# Patient Record
Sex: FEMALE | Race: WHITE | URBAN - NONMETROPOLITAN AREA
[De-identification: names, ages, dates, MRNs, and addresses within clinical notes are randomized per-mention and may not be internally consistent; named-entity substitution may affect disease eponyms.]

---

## 2017-03-02 ENCOUNTER — DOCTOR'S OFFICE (OUTPATIENT)
Dept: URBAN - NONMETROPOLITAN AREA CLINIC 1 | Facility: CLINIC | Age: 62
Setting detail: OPHTHALMOLOGY
End: 2017-03-02
Payer: COMMERCIAL

## 2017-03-02 DIAGNOSIS — I11.9: ICD-10-CM

## 2017-03-02 DIAGNOSIS — H25.13: ICD-10-CM

## 2017-03-02 PROCEDURE — 92014 COMPRE OPH EXAM EST PT 1/>: CPT | Performed by: OPTOMETRIST

## 2017-03-02 ASSESSMENT — REFRACTION_MANIFEST
OS_VA2: 20/
OS_VA3: 20/
OS_VA2: 20/
OS_VA1: 20/
OD_VA2: 20/
OD_VA3: 20/
OU_VA: 20/
OD_VA3: 20/
OD_VA1: 20/
OS_SPHERE: -2.75
OD_VA3: 20/
OD_VA1: 20/
OU_VA: 20/
OS_CYLINDER: -0.50
OS_VA3: 20/
OD_CYLINDER: -0.50
OD_AXIS: 180
OS_AXIS: 180
OS_VA1: 20/
OD_VA2: 20/
OD_VA2: 20/
OU_VA: 20/
OS_VA3: 20/
OD_SPHERE: -2.50
OS_VA1: 20/
OD_VA1: 20/
OS_VA2: 20/

## 2017-03-02 ASSESSMENT — SPHEQUIV_DERIVED
OS_SPHEQUIV: -3
OD_SPHEQUIV: -2.75
OS_SPHEQUIV: -3
OD_SPHEQUIV: -2.75

## 2017-03-02 ASSESSMENT — REFRACTION_CURRENTRX
OS_OVR_VA: 20/
OS_SPHERE: -3.00
OS_OVR_VA: 20/
OS_OVR_VA: 20/
OS_AXIS: 173
OD_AXIS: 172
OD_OVR_VA: 20/
OD_SPHERE: -3.00
OD_CYLINDER: -0.50
OS_CYLINDER: -0.50

## 2017-03-02 ASSESSMENT — VISUAL ACUITY
OD_BCVA: 20/F+F
OS_BCVA: 20/F+F

## 2017-03-02 ASSESSMENT — CONFRONTATIONAL VISUAL FIELD TEST (CVF)
OS_FINDINGS: CONSTRICTION
OD_FINDINGS: CONSTRICTION

## 2018-03-06 ENCOUNTER — DOCTOR'S OFFICE (OUTPATIENT)
Dept: URBAN - NONMETROPOLITAN AREA CLINIC 1 | Facility: CLINIC | Age: 63
Setting detail: OPHTHALMOLOGY
End: 2018-03-06
Payer: COMMERCIAL

## 2018-03-06 DIAGNOSIS — I11.9: ICD-10-CM

## 2018-03-06 DIAGNOSIS — H25.13: ICD-10-CM

## 2018-03-06 PROCEDURE — 92012 INTRM OPH EXAM EST PATIENT: CPT | Performed by: OPTOMETRIST

## 2018-03-15 ASSESSMENT — REFRACTION_OUTSIDERX
OS_CYLINDER: -0.50
OD_SPHERE: -2.50
OS_AXIS: 180
OS_VA2: 20/
OS_VA3: 20/
OD_AXIS: 180
OS_SPHERE: -2.75
OD_VA2: 20/
OD_VA3: 20/
OD_CYLINDER: -0.50
OD_VA1: 20/
OU_VA: 20/
OS_VA1: 20/

## 2018-03-15 ASSESSMENT — REFRACTION_CURRENTRX
OD_OVR_VA: 20/
OS_OVR_VA: 20/
OD_SPHERE: -3.00
OD_AXIS: 172
OS_OVR_VA: 20/
OS_SPHERE: -3.00
OS_OVR_VA: 20/
OD_OVR_VA: 20/
OD_OVR_VA: 20/
OS_CYLINDER: -0.50
OD_CYLINDER: -0.50
OS_AXIS: 173

## 2018-03-15 ASSESSMENT — REFRACTION_MANIFEST
OU_VA: 20/
OS_VA1: 20/
OD_VA1: 20/
OS_VA2: 20/
OS_VA1: 20/
OS_VA3: 20/
OD_VA3: 20/
OD_VA3: 20/
OS_VA2: 20/
OD_VA2: 20/
OS_VA3: 20/
OD_VA2: 20/
OU_VA: 20/
OD_VA1: 20/

## 2018-03-15 ASSESSMENT — SPHEQUIV_DERIVED
OS_SPHEQUIV: -3
OD_SPHEQUIV: -2.75

## 2018-03-15 ASSESSMENT — VISUAL ACUITY
OD_BCVA: 20/F+F
OS_BCVA: 20/F+F

## 2019-03-12 ENCOUNTER — DOCTOR'S OFFICE (OUTPATIENT)
Dept: URBAN - NONMETROPOLITAN AREA CLINIC 1 | Facility: CLINIC | Age: 64
Setting detail: OPHTHALMOLOGY
End: 2019-03-12
Payer: COMMERCIAL

## 2019-03-12 DIAGNOSIS — H25.13: ICD-10-CM

## 2019-03-12 DIAGNOSIS — I11.9: ICD-10-CM

## 2019-03-12 PROCEDURE — 92014 COMPRE OPH EXAM EST PT 1/>: CPT | Performed by: OPTOMETRIST

## 2019-03-12 ASSESSMENT — REFRACTION_MANIFEST
OU_VA: 20/
OD_SPHERE: -2.50
OS_VA2: 20/
OD_VA2: 20/
OD_VA3: 20/
OS_VA3: 20/
OD_VA3: 20/
OS_CYLINDER: -0.50
OD_CYLINDER: -0.50
OS_VA1: 20/
OD_VA1: 20/
OD_AXIS: 180
OD_VA1: 20/
OU_VA: 20/
OD_VA2: 20/
OS_VA2: 20/
OS_VA1: 20/
OS_AXIS: 180
OS_SPHERE: -2.75
OS_VA3: 20/

## 2019-03-12 ASSESSMENT — REFRACTION_CURRENTRX
OD_AXIS: 172
OD_SPHERE: -3.00
OS_CYLINDER: -0.50
OD_OVR_VA: 20/
OS_SPHERE: -3.00
OS_OVR_VA: 20/
OS_AXIS: 173
OS_OVR_VA: 20/
OD_OVR_VA: 20/
OD_OVR_VA: 20/
OD_CYLINDER: -0.50
OS_OVR_VA: 20/

## 2019-03-12 ASSESSMENT — CONFRONTATIONAL VISUAL FIELD TEST (CVF)
OS_FINDINGS: CONSTRICTION
OD_FINDINGS: CONSTRICTION

## 2019-03-12 ASSESSMENT — SPHEQUIV_DERIVED
OS_SPHEQUIV: -3
OD_SPHEQUIV: -2.75
OD_SPHEQUIV: -2.75
OS_SPHEQUIV: -3

## 2019-03-12 ASSESSMENT — VISUAL ACUITY
OD_BCVA: 20/F+F
OS_BCVA: 20/F+F

## 2020-06-05 ENCOUNTER — DOCTOR'S OFFICE (OUTPATIENT)
Dept: URBAN - NONMETROPOLITAN AREA CLINIC 1 | Facility: CLINIC | Age: 65
Setting detail: OPHTHALMOLOGY
End: 2020-06-05
Payer: COMMERCIAL

## 2020-06-05 DIAGNOSIS — I11.9: ICD-10-CM

## 2020-06-05 DIAGNOSIS — H25.13: ICD-10-CM

## 2020-06-05 PROCEDURE — 92014 COMPRE OPH EXAM EST PT 1/>: CPT | Performed by: OPHTHALMOLOGY

## 2020-06-05 ASSESSMENT — REFRACTION_CURRENTRX
OS_SPHERE: -3.00
OS_CYLINDER: -0.50
OS_AXIS: 173
OD_AXIS: 172
OD_OVR_VA: 20/
OD_SPHERE: -3.00
OD_CYLINDER: -0.50
OS_OVR_VA: 20/

## 2020-06-05 ASSESSMENT — VISUAL ACUITY
OS_BCVA: 20/F+F
OD_BCVA: 20/F+F

## 2020-06-05 ASSESSMENT — REFRACTION_MANIFEST
OS_SPHERE: -2.75
OD_SPHERE: -2.50
OD_CYLINDER: -0.50
OS_AXIS: 180
OD_AXIS: 180
OS_CYLINDER: -0.50

## 2020-06-05 ASSESSMENT — SPHEQUIV_DERIVED
OD_SPHEQUIV: -2.75
OD_SPHEQUIV: -2.75
OS_SPHEQUIV: -3
OS_SPHEQUIV: -3

## 2020-06-05 ASSESSMENT — CONFRONTATIONAL VISUAL FIELD TEST (CVF)
OD_FINDINGS: CONSTRICTION
OS_FINDINGS: CONSTRICTION

## 2021-01-03 ENCOUNTER — APPOINTMENT (EMERGENCY)
Dept: RADIOLOGY | Facility: HOSPITAL | Age: 66
End: 2021-01-03
Payer: MEDICARE

## 2021-01-03 ENCOUNTER — HOSPITAL ENCOUNTER (EMERGENCY)
Facility: HOSPITAL | Age: 66
Discharge: HOME | End: 2021-01-03
Attending: EMERGENCY MEDICINE | Admitting: EMERGENCY MEDICINE
Payer: MEDICARE

## 2021-01-03 VITALS
SYSTOLIC BLOOD PRESSURE: 131 MMHG | WEIGHT: 212.52 LBS | DIASTOLIC BLOOD PRESSURE: 65 MMHG | TEMPERATURE: 98 F | RESPIRATION RATE: 22 BRPM | OXYGEN SATURATION: 93 % | HEART RATE: 77 BPM

## 2021-01-03 DIAGNOSIS — R06.02 SHORTNESS OF BREATH: Primary | ICD-10-CM

## 2021-01-03 LAB
ALBUMIN SERPL BCP-MCNC: 3.4 G/DL (ref 3.5–5)
ALP SERPL-CCNC: 78 U/L (ref 46–116)
ALT SERPL W P-5'-P-CCNC: 20 U/L (ref 12–78)
ANION GAP SERPL CALCULATED.3IONS-SCNC: 6 MMOL/L (ref 4–13)
APTT PPP: 33 SECONDS (ref 23–37)
AST SERPL W P-5'-P-CCNC: 11 U/L (ref 5–45)
BASOPHILS # BLD AUTO: 0 THOUSANDS/ΜL (ref 0–0.1)
BASOPHILS NFR BLD AUTO: 0 % (ref 0–1)
BILIRUB SERPL-MCNC: 0.44 MG/DL (ref 0.2–1)
BUN SERPL-MCNC: 12 MG/DL (ref 5–25)
CALCIUM ALBUM COR SERPL-MCNC: 10.5 MG/DL (ref 8.3–10.1)
CALCIUM SERPL-MCNC: 10 MG/DL (ref 8.3–10.1)
CHLORIDE SERPL-SCNC: 96 MMOL/L (ref 100–108)
CO2 SERPL-SCNC: 31 MMOL/L (ref 21–32)
CREAT SERPL-MCNC: 0.7 MG/DL (ref 0.6–1.3)
D DIMER PPP FEU-MCNC: 0.36 UG/ML FEU
EOSINOPHIL # BLD AUTO: 0.04 THOUSAND/ΜL (ref 0–0.61)
EOSINOPHIL NFR BLD AUTO: 1 % (ref 0–6)
ERYTHROCYTE [DISTWIDTH] IN BLOOD BY AUTOMATED COUNT: 15 % (ref 11.6–15.1)
FLUAV RNA RESP QL NAA+PROBE: NEGATIVE
FLUBV RNA RESP QL NAA+PROBE: NEGATIVE
GFR SERPL CREATININE-BSD FRML MDRD: 90 ML/MIN/1.73SQ M
GLUCOSE SERPL-MCNC: 116 MG/DL (ref 65–140)
HCT VFR BLD AUTO: 36 % (ref 34.8–46.1)
HGB BLD-MCNC: 11.6 G/DL (ref 11.5–15.4)
IMM GRANULOCYTES # BLD AUTO: 0.03 THOUSAND/UL (ref 0–0.2)
IMM GRANULOCYTES NFR BLD AUTO: 0 % (ref 0–2)
INR PPP: 1.01 (ref 0.84–1.19)
LACTATE SERPL-SCNC: 1.6 MMOL/L (ref 0.5–2)
LIPASE SERPL-CCNC: 68 U/L (ref 73–393)
LYMPHOCYTES # BLD AUTO: 0.86 THOUSANDS/ΜL (ref 0.6–4.47)
LYMPHOCYTES NFR BLD AUTO: 11 % (ref 14–44)
MCH RBC QN AUTO: 29.7 PG (ref 26.8–34.3)
MCHC RBC AUTO-ENTMCNC: 32.2 G/DL (ref 31.4–37.4)
MCV RBC AUTO: 92 FL (ref 82–98)
MONOCYTES # BLD AUTO: 0.7 THOUSAND/ΜL (ref 0.17–1.22)
MONOCYTES NFR BLD AUTO: 9 % (ref 4–12)
NEUTROPHILS # BLD AUTO: 6.24 THOUSANDS/ΜL (ref 1.85–7.62)
NEUTS SEG NFR BLD AUTO: 79 % (ref 43–75)
NRBC BLD AUTO-RTO: 0 /100 WBCS
NT-PROBNP SERPL-MCNC: 136 PG/ML
PLATELET # BLD AUTO: 136 THOUSANDS/UL (ref 149–390)
PMV BLD AUTO: 9.7 FL (ref 8.9–12.7)
POTASSIUM SERPL-SCNC: 4.3 MMOL/L (ref 3.5–5.3)
PROT SERPL-MCNC: 7.7 G/DL (ref 6.4–8.2)
PROTHROMBIN TIME: 13.1 SECONDS (ref 11.6–14.5)
RBC # BLD AUTO: 3.91 MILLION/UL (ref 3.81–5.12)
RSV RNA RESP QL NAA+PROBE: NEGATIVE
SARS-COV-2 RNA RESP QL NAA+PROBE: NEGATIVE
SODIUM SERPL-SCNC: 133 MMOL/L (ref 136–145)
TROPONIN I SERPL-MCNC: <0.02 NG/ML
WBC # BLD AUTO: 7.87 THOUSAND/UL (ref 4.31–10.16)

## 2021-01-03 PROCEDURE — 84484 ASSAY OF TROPONIN QUANT: CPT | Performed by: EMERGENCY MEDICINE

## 2021-01-03 PROCEDURE — 85025 COMPLETE CBC W/AUTO DIFF WBC: CPT | Performed by: EMERGENCY MEDICINE

## 2021-01-03 PROCEDURE — 83690 ASSAY OF LIPASE: CPT | Performed by: EMERGENCY MEDICINE

## 2021-01-03 PROCEDURE — 99285 EMERGENCY DEPT VISIT HI MDM: CPT | Performed by: EMERGENCY MEDICINE

## 2021-01-03 PROCEDURE — 85610 PROTHROMBIN TIME: CPT | Performed by: EMERGENCY MEDICINE

## 2021-01-03 PROCEDURE — 83880 ASSAY OF NATRIURETIC PEPTIDE: CPT | Performed by: EMERGENCY MEDICINE

## 2021-01-03 PROCEDURE — 36415 COLL VENOUS BLD VENIPUNCTURE: CPT | Performed by: EMERGENCY MEDICINE

## 2021-01-03 PROCEDURE — 93005 ELECTROCARDIOGRAM TRACING: CPT

## 2021-01-03 PROCEDURE — 71045 X-RAY EXAM CHEST 1 VIEW: CPT

## 2021-01-03 PROCEDURE — 99285 EMERGENCY DEPT VISIT HI MDM: CPT

## 2021-01-03 PROCEDURE — 85730 THROMBOPLASTIN TIME PARTIAL: CPT | Performed by: EMERGENCY MEDICINE

## 2021-01-03 PROCEDURE — 85379 FIBRIN DEGRADATION QUANT: CPT | Performed by: EMERGENCY MEDICINE

## 2021-01-03 PROCEDURE — 80053 COMPREHEN METABOLIC PANEL: CPT | Performed by: EMERGENCY MEDICINE

## 2021-01-03 PROCEDURE — 83605 ASSAY OF LACTIC ACID: CPT | Performed by: EMERGENCY MEDICINE

## 2021-01-03 PROCEDURE — 0241U HB NFCT DS VIR RESP RNA 4 TRGT: CPT | Performed by: EMERGENCY MEDICINE

## 2021-01-03 NOTE — ED NOTES
[facility] stated they would be on their way to pick pt up, approx 30-40 min       Jael Hernandez RN  01/03/21 0746

## 2021-01-03 NOTE — ED PROVIDER NOTES
History  Chief Complaint   Patient presents with    Shortness of Breath     Pt comes from [address] and staff reports that pt has been SOB with some wheezing, possible covid exposure     Brought in from group home for possible shortness of breath  Did have a COVID exposure  Noted to be 94% on room air at the scene by EMS  Slight wheezes noted  No nausea or vomiting  No fevers or chills  History provided by:  EMS personnel, patient and nursing home  History limited by: Patient does not speak   used: No    Shortness of Breath  Severity:  Mild  Onset quality:  Sudden  Duration: This morning  Timing:  Constant  Progression:  Unchanged  Chronicity:  New  Context: not animal exposure, not strong odors and not URI    Context comment:  Possible exposure to a person with COVID  Relieved by:  Nothing  Worsened by:  Nothing  Ineffective treatments:  None tried  Associated symptoms: no abdominal pain, no chest pain, no cough, no ear pain, no fever, no headaches, no neck pain, no rash, no sore throat, no sputum production, no syncope, no vomiting and no wheezing        None       Past Medical History:   Diagnosis Date    Anxiety     Diabetes mellitus ([facility] [address] )     GERD (gastroesophageal reflux disease)     Hypertension     Mental disability        History reviewed  No pertinent surgical history  History reviewed  No pertinent family history  I have reviewed and agree with the history as documented  E-Cigarette/Vaping     E-Cigarette/Vaping Substances     Social History     Tobacco Use    Smoking status: Never Smoker    Smokeless tobacco: Never Used   Substance Use Topics    Alcohol use: Not Currently    Drug use: Not Currently       Review of Systems   Constitutional: Negative for chills and fever  HENT: Negative for ear pain, hearing loss, sore throat, trouble swallowing and voice change  Eyes: Negative for pain and discharge     Respiratory: Positive for shortness of breath  Negative for cough, sputum production and wheezing  Cardiovascular: Negative for chest pain, palpitations and syncope  Gastrointestinal: Negative for abdominal pain, blood in stool, constipation, diarrhea, nausea and vomiting  Genitourinary: Negative for dysuria, flank pain, frequency and hematuria  Musculoskeletal: Negative for joint swelling, neck pain and neck stiffness  Skin: Negative for rash and wound  Neurological: Negative for dizziness, seizures, syncope, facial asymmetry and headaches  Psychiatric/Behavioral: Negative for hallucinations, self-injury and suicidal ideas  All other systems reviewed and are negative  Physical Exam  Physical Exam  Vitals signs and nursing note reviewed  Constitutional:       General: She is not in acute distress  Appearance: She is well-developed  HENT:      Head: Normocephalic and atraumatic  Right Ear: External ear normal       Left Ear: External ear normal    Eyes:      Conjunctiva/sclera: Conjunctivae normal       Pupils: Pupils are equal, round, and reactive to light  Neck:      Musculoskeletal: Normal range of motion and neck supple  Cardiovascular:      Rate and Rhythm: Normal rate and regular rhythm  Heart sounds: Normal heart sounds  No murmur  Pulmonary:      Effort: Pulmonary effort is normal       Breath sounds: Normal breath sounds  No wheezing or rales  Abdominal:      General: Bowel sounds are normal  There is no distension  Palpations: Abdomen is soft  Tenderness: There is no abdominal tenderness  There is no guarding or rebound  Musculoskeletal: Normal range of motion  General: No deformity  Skin:     General: Skin is warm and dry  Findings: No rash  Neurological:      General: No focal deficit present  Mental Status: She is alert  Cranial Nerves: No cranial nerve deficit     Psychiatric:         Behavior: Behavior normal          Vital Signs  ED Triage Vitals [01/03/21 0916]   Temperature Pulse Respirations Blood Pressure SpO2   98 °F (36 7 °C) 82 18 125/60 94 %      Temp Source Heart Rate Source Patient Position - Orthostatic VS BP Location FiO2 (%)   Temporal Monitor Lying Left arm --      Pain Score       No Pain           Vitals:    01/03/21 0916 01/03/21 0930 01/03/21 1000 01/03/21 1015   BP: 125/60 125/92 117/63 120/72   Pulse: 82 82 79 79   Patient Position - Orthostatic VS: Lying Lying Lying Lying         Visual Acuity  Visual Acuity      Most Recent Value   L Pupil Size (mm)  2   R Pupil Size (mm)  2          ED Medications  Medications - No data to display    Diagnostic Studies  Results Reviewed     Procedure Component Value Units Date/Time    Lipase [[id number]]  (Abnormal) Collected: 01/03/21 0956    Lab Status: Final result Specimen: Blood from Hand, Right Updated: 01/03/21 1039     Lipase 68 u/L     NT-BNP PRO [[id number]]  (Abnormal) Collected: 01/03/21 0956    Lab Status: Final result Specimen: Blood from Hand, Right Updated: 01/03/21 1039     NT-proBNP 136 pg/mL     Comprehensive metabolic panel [[id number]]  (Abnormal) Collected: 01/03/21 0956    Lab Status: Final result Specimen: Blood from Hand, Right Updated: 01/03/21 1039     Sodium 133 mmol/L      Potassium 4 3 mmol/L      Chloride 96 mmol/L      CO2 31 mmol/L      ANION GAP 6 mmol/L      BUN 12 mg/dL      Creatinine 0 70 mg/dL      Glucose 116 mg/dL      Calcium 10 0 mg/dL      Corrected Calcium 10 5 mg/dL      AST 11 U/L      ALT 20 U/L      Alkaline Phosphatase 78 U/L      Total Protein 7 7 g/dL      Albumin 3 4 g/dL      Total Bilirubin 0 44 mg/dL      eGFR 90 ml/min/1 73sq m     Narrative:      Elizabet guidelines for Chronic Kidney Disease (CKD):     Stage 1 with normal or high GFR (GFR > 90 mL/min/1 73 square meters)    Stage 2 Mild CKD (GFR = 60-89 mL/min/1 73 square meters)    Stage 3A Moderate CKD (GFR = 45-59 mL/min/1 73 square meters)    Stage 3B Moderate CKD (GFR = 30-44 mL/min/1 73 square meters)    Stage 4 Severe CKD (GFR = 15-29 mL/min/1 73 square meters)    Stage 5 End Stage CKD (GFR <15 mL/min/1 73 square meters)  Note: GFR calculation is accurate only with a steady state creatinine    Lactic acid [[id number]]  (Normal) Collected: 01/03/21 0956    Lab Status: Final result Specimen: Blood from Hand, Right Updated: 01/03/21 1021     LACTIC ACID 1 6 mmol/L     Narrative:      Result may be elevated if tourniquet was used during collection  Troponin I [[id number]]  (Normal) Collected: 01/03/21 0956    Lab Status: Final result Specimen: Blood from Hand, Right Updated: 01/03/21 1021     Troponin I <0 02 ng/mL     COVID19, Influenza A/B, RSV PCR, [facility] [[id number]]  (Normal) Collected: 01/03/21 0924    Lab Status: Final result Specimen: Nares from Nasopharyngeal Swab Updated: 01/03/21 1012     SARS-CoV-2 Negative     INFLUENZA A PCR Negative     INFLUENZA B PCR Negative     RSV PCR Negative    Narrative: This test has been authorized by FDA under an EUA (Emergency Use Assay) for use by authorized laboratories  Clinical caution and judgement should be used with the interpretation of these results with consideration of the clinical impression and other laboratory testing  Testing reported as "Positive" or "Negative" has been proven to be accurate according to standard laboratory validation requirements  All testing is performed with control materials showing appropriate reactivity at standard intervals      D-Dimer [[id number]]  (Normal) Collected: 01/03/21 0924    Lab Status: Final result Specimen: Blood from Arm, Left Updated: 01/03/21 0950     D-Dimer, Quant 0 36 ug/ml FEU     Protime-INR [[id number]]  (Normal) Collected: 01/03/21 0924    Lab Status: Final result Specimen: Blood from Arm, Left Updated: 01/03/21 0948     Protime 13 1 seconds      INR 1 01    APTT [[id number]]  (Normal) Collected: 01/03/21 0924    Lab Status: Final result Specimen: Blood from Arm, Left Updated: 01/03/21 0948     PTT 33 seconds     CBC and differential [[id number]]  (Abnormal) Collected: 01/03/21 0924    Lab Status: Final result Specimen: Blood from Arm, Left Updated: 01/03/21 0934     WBC 7 87 Thousand/uL      RBC 3 91 Million/uL      Hemoglobin 11 6 g/dL      Hematocrit 36 0 %      MCV 92 fL      MCH 29 7 pg      MCHC 32 2 g/dL      RDW 15 0 %      MPV 9 7 fL      Platelets 890 Thousands/uL      nRBC 0 /100 WBCs      Neutrophils Relative 79 %      Immat GRANS % 0 %      Lymphocytes Relative 11 %      Monocytes Relative 9 %      Eosinophils Relative 1 %      Basophils Relative 0 %      Neutrophils Absolute 6 24 Thousands/µL      Immature Grans Absolute 0 03 Thousand/uL      Lymphocytes Absolute 0 86 Thousands/µL      Monocytes Absolute 0 70 Thousand/µL      Eosinophils Absolute 0 04 Thousand/µL      Basophils Absolute 0 00 Thousands/µL                  XR chest 1 view portable   Final Result by Louis Deluna MD (01/03 1005)      No acute cardiopulmonary disease  Workstation performed: [id number]                    Procedures  ECG 12 Lead Documentation Only    Date/Time: 1/3/2021 9:18 AM  Performed by: Alexia Case MD  Authorized by: Alexia Case MD     ECG reviewed by me, the ED Provider: yes    Patient location:  ED  Previous ECG:     Previous ECG:  Unavailable  Rate:     ECG rate:  80  Rhythm:     Rhythm: sinus rhythm    Ectopy:     Ectopy: none    QRS:     QRS axis:  Normal             ED Course  ED Course as of Jan 03 1041   Sun Jan 03, 2021   1040 Patient is not able to give a good history  The EKG is unremarkable  The patient's troponin is negative  The D-dimer is negative  Patient is COVID negative  Chest x-ray is read as normal   Will discharge back to the group home where they can observe her  Patient has a normal white blood cell count and lactic acid                                  SBIRT 20yo+      Most Recent Value   SBIRT (22 yo +)   In order to provide better care to our patients, we are screening all of our patients for alcohol and drug use  Would it be okay to ask you these screening questions? Yes Filed at: 01/03/2021 7020   Initial Alcohol Screen: US AUDIT-C    1  How often do you have a drink containing alcohol?  0 Filed at: 01/03/2021 0939   2  How many drinks containing alcohol do you have on a typical day you are drinking? 0 Filed at: 01/03/2021 0939   3a  Male UNDER 65: How often do you have five or more drinks on one occasion? 0 Filed at: 01/03/2021 0939   3b  FEMALE Any Age, or MALE 65+: How often do you have 4 or more drinks on one occassion? 0 Filed at: 01/03/2021 0939   Audit-C Score  0 Filed at: 01/03/2021 5265   KAILASH: How many times in the past year have you    Used an illegal drug or used a prescription medication for non-medical reasons? Never Filed at: 01/03/2021 9013                    MDM  Number of Diagnoses or Management Options     Amount and/or Complexity of Data Reviewed  Clinical lab tests: reviewed        Disposition  Final diagnoses:   Shortness of breath     Time reflects when diagnosis was documented in both MDM as applicable and the Disposition within this note     Time User Action Codes Description Comment    1/3/2021 10:39 AM Jamari Penn Add [R06 02] Shortness of breath       ED Disposition     ED Disposition Condition Date/Time Comment    Discharge Stable Sun Flip 3, 2021 10:40 AM [address] [place] [address] discharge to home/self care  Follow-up Information    None         Patient's Medications    No medications on file     No discharge procedures on file      PDMP Review     None          ED Provider  Electronically Signed by           Suad Joseph MD  01/03/21 Rashaad [id number] Oni Eaton MD  01/03/21 1041

## 2021-01-03 NOTE — ED NOTES
Called [facility] in regards to pt transport home, caregiver stated she would call her on call and ask if they can pick her up or if we should set up ambulance transport              Corrinne Bernard, RN  01/03/21 7421

## 2021-01-09 LAB
ATRIAL RATE: 82 BPM
P AXIS: 54 DEGREES
PR INTERVAL: 178 MS
QRS AXIS: 58 DEGREES
QRSD INTERVAL: 100 MS
QT INTERVAL: 354 MS
QTC INTERVAL: 413 MS
T WAVE AXIS: 149 DEGREES
VENTRICULAR RATE: 82 BPM

## 2021-01-09 PROCEDURE — 93010 ELECTROCARDIOGRAM REPORT: CPT | Performed by: INTERNAL MEDICINE

## 2021-06-09 ENCOUNTER — DOCTOR'S OFFICE (OUTPATIENT)
Dept: URBAN - NONMETROPOLITAN AREA CLINIC 1 | Facility: CLINIC | Age: 66
Setting detail: OPHTHALMOLOGY
End: 2021-06-09
Payer: COMMERCIAL

## 2021-06-09 DIAGNOSIS — H25.13: ICD-10-CM

## 2021-06-09 DIAGNOSIS — H00.11: ICD-10-CM

## 2021-06-09 DIAGNOSIS — I11.9: ICD-10-CM

## 2021-06-09 DIAGNOSIS — H00.14: ICD-10-CM

## 2021-06-09 PROCEDURE — 92014 COMPRE OPH EXAM EST PT 1/>: CPT | Performed by: OPHTHALMOLOGY

## 2021-06-09 ASSESSMENT — CONFRONTATIONAL VISUAL FIELD TEST (CVF)
OS_FINDINGS: CONSTRICTION
OD_FINDINGS: CONSTRICTION

## 2021-06-09 ASSESSMENT — REFRACTION_MANIFEST
OS_AXIS: 180
OD_AXIS: 180
OS_SPHERE: -2.75
OD_SPHERE: -2.50
OD_CYLINDER: -0.50
OS_CYLINDER: -0.50

## 2021-06-09 ASSESSMENT — REFRACTION_CURRENTRX
OD_AXIS: 172
OS_OVR_VA: 20/
OS_SPHERE: -3.00
OS_CYLINDER: -0.50
OD_SPHERE: -3.00
OD_CYLINDER: -0.50
OD_OVR_VA: 20/
OS_AXIS: 173

## 2021-06-09 ASSESSMENT — VISUAL ACUITY
OS_BCVA: 20/F+F
OD_BCVA: 20/F+F

## 2021-06-09 ASSESSMENT — SPHEQUIV_DERIVED
OS_SPHEQUIV: -3
OD_SPHEQUIV: -2.75
OS_SPHEQUIV: -3
OD_SPHEQUIV: -2.75

## 2021-07-18 ENCOUNTER — APPOINTMENT (INPATIENT)
Dept: CT IMAGING | Facility: HOSPITAL | Age: 66
DRG: 871 | End: 2021-07-18
Payer: MEDICARE

## 2021-07-18 ENCOUNTER — HOSPITAL ENCOUNTER (INPATIENT)
Facility: HOSPITAL | Age: 66
LOS: 24 days | Discharge: NURSING, REHAB OR LONG-TERM CARE | DRG: 871 | End: 2021-08-11
Attending: EMERGENCY MEDICINE | Admitting: STUDENT IN AN ORGANIZED HEALTH CARE EDUCATION/TRAINING PROGRAM
Payer: MEDICARE

## 2021-07-18 ENCOUNTER — APPOINTMENT (EMERGENCY)
Dept: RADIOLOGY | Facility: HOSPITAL | Age: 66
DRG: 871 | End: 2021-07-18
Payer: MEDICARE

## 2021-07-18 DIAGNOSIS — K59.00 CONSTIPATION: ICD-10-CM

## 2021-07-18 DIAGNOSIS — E11.9 TYPE 2 DIABETES MELLITUS WITHOUT COMPLICATION, WITHOUT LONG-TERM CURRENT USE OF INSULIN (HCC): ICD-10-CM

## 2021-07-18 DIAGNOSIS — J18.9 PNEUMONIA: ICD-10-CM

## 2021-07-18 DIAGNOSIS — J90 PLEURAL EFFUSION: ICD-10-CM

## 2021-07-18 DIAGNOSIS — F39 MOOD DISORDER (HCC): ICD-10-CM

## 2021-07-18 DIAGNOSIS — R09.02 HYPOXIC: Primary | ICD-10-CM

## 2021-07-18 DIAGNOSIS — R60.0 BILATERAL LEG EDEMA: ICD-10-CM

## 2021-07-18 DIAGNOSIS — G40.909 SEIZURE DISORDER (HCC): ICD-10-CM

## 2021-07-18 DIAGNOSIS — J96.01 ACUTE RESPIRATORY FAILURE WITH HYPOXIA (HCC): ICD-10-CM

## 2021-07-18 PROBLEM — F99 PSYCHIATRIC DISORDER: Status: ACTIVE | Noted: 2021-07-18

## 2021-07-18 PROBLEM — J69.0 ASPIRATION PNEUMONIA (HCC): Status: ACTIVE | Noted: 2021-07-18

## 2021-07-18 PROBLEM — A41.9 SEPSIS (HCC): Status: ACTIVE | Noted: 2021-07-18

## 2021-07-18 PROBLEM — K21.9 GERD (GASTROESOPHAGEAL REFLUX DISEASE): Status: ACTIVE | Noted: 2021-07-18

## 2021-07-18 LAB
ALBUMIN SERPL BCP-MCNC: 3.1 G/DL (ref 3.5–5)
ALP SERPL-CCNC: 67 U/L (ref 46–116)
ALT SERPL W P-5'-P-CCNC: 20 U/L (ref 12–78)
ANION GAP SERPL CALCULATED.3IONS-SCNC: 4 MMOL/L (ref 4–13)
AST SERPL W P-5'-P-CCNC: 25 U/L (ref 5–45)
BASOPHILS # BLD AUTO: 0.01 THOUSANDS/ΜL (ref 0–0.1)
BASOPHILS NFR BLD AUTO: 0 % (ref 0–1)
BILIRUB SERPL-MCNC: 0.54 MG/DL (ref 0.2–1)
BUN SERPL-MCNC: 13 MG/DL (ref 5–25)
CALCIUM ALBUM COR SERPL-MCNC: 10.9 MG/DL (ref 8.3–10.1)
CALCIUM SERPL-MCNC: 10.2 MG/DL (ref 8.3–10.1)
CHLORIDE SERPL-SCNC: 95 MMOL/L (ref 100–108)
CO2 SERPL-SCNC: 35 MMOL/L (ref 21–32)
CREAT SERPL-MCNC: 0.71 MG/DL (ref 0.6–1.3)
EOSINOPHIL # BLD AUTO: 0.13 THOUSAND/ΜL (ref 0–0.61)
EOSINOPHIL NFR BLD AUTO: 3 % (ref 0–6)
ERYTHROCYTE [DISTWIDTH] IN BLOOD BY AUTOMATED COUNT: 14.9 % (ref 11.6–15.1)
GFR SERPL CREATININE-BSD FRML MDRD: 88 ML/MIN/1.73SQ M
GLUCOSE SERPL-MCNC: 140 MG/DL (ref 65–140)
GLUCOSE SERPL-MCNC: 193 MG/DL (ref 65–140)
HCT VFR BLD AUTO: 33.1 % (ref 34.8–46.1)
HGB BLD-MCNC: 10.8 G/DL (ref 11.5–15.4)
IMM GRANULOCYTES # BLD AUTO: 0.02 THOUSAND/UL (ref 0–0.2)
IMM GRANULOCYTES NFR BLD AUTO: 0 % (ref 0–2)
LACTATE SERPL-SCNC: 1.2 MMOL/L (ref 0.5–2)
LACTATE SERPL-SCNC: 1.9 MMOL/L (ref 0.5–2)
LYMPHOCYTES # BLD AUTO: 0.9 THOUSANDS/ΜL (ref 0.6–4.47)
LYMPHOCYTES NFR BLD AUTO: 17 % (ref 14–44)
MCH RBC QN AUTO: 29.8 PG (ref 26.8–34.3)
MCHC RBC AUTO-ENTMCNC: 32.6 G/DL (ref 31.4–37.4)
MCV RBC AUTO: 91 FL (ref 82–98)
MONOCYTES # BLD AUTO: 0.62 THOUSAND/ΜL (ref 0.17–1.22)
MONOCYTES NFR BLD AUTO: 12 % (ref 4–12)
NEUTROPHILS # BLD AUTO: 3.56 THOUSANDS/ΜL (ref 1.85–7.62)
NEUTS SEG NFR BLD AUTO: 68 % (ref 43–75)
NRBC BLD AUTO-RTO: 0 /100 WBCS
NT-PROBNP SERPL-MCNC: 509 PG/ML
PLATELET # BLD AUTO: 163 THOUSANDS/UL (ref 149–390)
PMV BLD AUTO: 9 FL (ref 8.9–12.7)
POTASSIUM SERPL-SCNC: 4.5 MMOL/L (ref 3.5–5.3)
PROT SERPL-MCNC: 7.4 G/DL (ref 6.4–8.2)
RBC # BLD AUTO: 3.62 MILLION/UL (ref 3.81–5.12)
SARS-COV-2 RNA RESP QL NAA+PROBE: NEGATIVE
SODIUM SERPL-SCNC: 134 MMOL/L (ref 136–145)
TROPONIN I SERPL-MCNC: <0.02 NG/ML
WBC # BLD AUTO: 5.24 THOUSAND/UL (ref 4.31–10.16)

## 2021-07-18 PROCEDURE — 71250 CT THORAX DX C-: CPT

## 2021-07-18 PROCEDURE — 94640 AIRWAY INHALATION TREATMENT: CPT

## 2021-07-18 PROCEDURE — 99285 EMERGENCY DEPT VISIT HI MDM: CPT

## 2021-07-18 PROCEDURE — 85025 COMPLETE CBC W/AUTO DIFF WBC: CPT | Performed by: PHYSICIAN ASSISTANT

## 2021-07-18 PROCEDURE — U0003 INFECTIOUS AGENT DETECTION BY NUCLEIC ACID (DNA OR RNA); SEVERE ACUTE RESPIRATORY SYNDROME CORONAVIRUS 2 (SARS-COV-2) (CORONAVIRUS DISEASE [COVID-19]), AMPLIFIED PROBE TECHNIQUE, MAKING USE OF HIGH THROUGHPUT TECHNOLOGIES AS DESCRIBED BY CMS-2020-01-R: HCPCS | Performed by: PHYSICIAN ASSISTANT

## 2021-07-18 PROCEDURE — 83605 ASSAY OF LACTIC ACID: CPT | Performed by: FAMILY MEDICINE

## 2021-07-18 PROCEDURE — 84484 ASSAY OF TROPONIN QUANT: CPT | Performed by: PHYSICIAN ASSISTANT

## 2021-07-18 PROCEDURE — 71045 X-RAY EXAM CHEST 1 VIEW: CPT

## 2021-07-18 PROCEDURE — 80053 COMPREHEN METABOLIC PANEL: CPT | Performed by: PHYSICIAN ASSISTANT

## 2021-07-18 PROCEDURE — 36415 COLL VENOUS BLD VENIPUNCTURE: CPT | Performed by: PHYSICIAN ASSISTANT

## 2021-07-18 PROCEDURE — G1004 CDSM NDSC: HCPCS

## 2021-07-18 PROCEDURE — 99285 EMERGENCY DEPT VISIT HI MDM: CPT | Performed by: EMERGENCY MEDICINE

## 2021-07-18 PROCEDURE — 94760 N-INVAS EAR/PLS OXIMETRY 1: CPT

## 2021-07-18 PROCEDURE — 87040 BLOOD CULTURE FOR BACTERIA: CPT | Performed by: FAMILY MEDICINE

## 2021-07-18 PROCEDURE — 83880 ASSAY OF NATRIURETIC PEPTIDE: CPT | Performed by: PHYSICIAN ASSISTANT

## 2021-07-18 PROCEDURE — 84145 PROCALCITONIN (PCT): CPT | Performed by: FAMILY MEDICINE

## 2021-07-18 PROCEDURE — 82948 REAGENT STRIP/BLOOD GLUCOSE: CPT

## 2021-07-18 PROCEDURE — 99222 1ST HOSP IP/OBS MODERATE 55: CPT | Performed by: FAMILY MEDICINE

## 2021-07-18 PROCEDURE — U0005 INFEC AGEN DETEC AMPLI PROBE: HCPCS | Performed by: PHYSICIAN ASSISTANT

## 2021-07-18 PROCEDURE — 1124F ACP DISCUSS-NO DSCNMKR DOCD: CPT | Performed by: PHYSICIAN ASSISTANT

## 2021-07-18 PROCEDURE — 83605 ASSAY OF LACTIC ACID: CPT | Performed by: PHYSICIAN ASSISTANT

## 2021-07-18 PROCEDURE — 94664 DEMO&/EVAL PT USE INHALER: CPT

## 2021-07-18 RX ORDER — FERROUS SULFATE 325(65) MG
325 TABLET ORAL
COMMUNITY
End: 2021-08-11 | Stop reason: HOSPADM

## 2021-07-18 RX ORDER — FERROUS SULFATE 325(65) MG
325 TABLET ORAL
Status: DISCONTINUED | OUTPATIENT
Start: 2021-07-19 | End: 2021-07-22

## 2021-07-18 RX ORDER — ACETAMINOPHEN 325 MG/1
650 TABLET ORAL EVERY 6 HOURS PRN
Status: DISCONTINUED | OUTPATIENT
Start: 2021-07-18 | End: 2021-07-21

## 2021-07-18 RX ORDER — IPRATROPIUM BROMIDE AND ALBUTEROL SULFATE 2.5; .5 MG/3ML; MG/3ML
3 SOLUTION RESPIRATORY (INHALATION)
Status: DISCONTINUED | OUTPATIENT
Start: 2021-07-18 | End: 2021-07-31

## 2021-07-18 RX ORDER — ATORVASTATIN CALCIUM 40 MG/1
80 TABLET, FILM COATED ORAL
Status: DISCONTINUED | OUTPATIENT
Start: 2021-07-18 | End: 2021-07-22

## 2021-07-18 RX ORDER — GABAPENTIN 800 MG/1
800 TABLET ORAL 3 TIMES DAILY
COMMUNITY
End: 2021-08-11 | Stop reason: HOSPADM

## 2021-07-18 RX ORDER — ROSUVASTATIN CALCIUM 40 MG/1
40 TABLET, COATED ORAL DAILY
COMMUNITY

## 2021-07-18 RX ORDER — CEFEPIME HYDROCHLORIDE 2 G/50ML
2000 INJECTION, SOLUTION INTRAVENOUS EVERY 8 HOURS
Status: DISCONTINUED | OUTPATIENT
Start: 2021-07-18 | End: 2021-07-21

## 2021-07-18 RX ORDER — SODIUM CHLORIDE, SODIUM GLUCONATE, SODIUM ACETATE, POTASSIUM CHLORIDE, MAGNESIUM CHLORIDE, SODIUM PHOSPHATE, DIBASIC, AND POTASSIUM PHOSPHATE .53; .5; .37; .037; .03; .012; .00082 G/100ML; G/100ML; G/100ML; G/100ML; G/100ML; G/100ML; G/100ML
125 INJECTION, SOLUTION INTRAVENOUS CONTINUOUS
Status: DISCONTINUED | OUTPATIENT
Start: 2021-07-18 | End: 2021-07-19

## 2021-07-18 RX ORDER — PANTOPRAZOLE SODIUM 20 MG/1
20 TABLET, DELAYED RELEASE ORAL
Status: DISCONTINUED | OUTPATIENT
Start: 2021-07-19 | End: 2021-07-22

## 2021-07-18 RX ORDER — GABAPENTIN 400 MG/1
800 CAPSULE ORAL 3 TIMES DAILY
Status: DISCONTINUED | OUTPATIENT
Start: 2021-07-18 | End: 2021-07-21

## 2021-07-18 RX ORDER — QUETIAPINE FUMARATE 100 MG/1
100 TABLET, FILM COATED ORAL 2 TIMES DAILY
Status: DISCONTINUED | OUTPATIENT
Start: 2021-07-18 | End: 2021-07-21

## 2021-07-18 RX ORDER — LORAZEPAM 0.5 MG/1
TABLET ORAL 2 TIMES DAILY
COMMUNITY
End: 2021-08-11 | Stop reason: HOSPADM

## 2021-07-18 RX ORDER — OMEPRAZOLE 20 MG/1
20 CAPSULE, DELAYED RELEASE ORAL DAILY
COMMUNITY
Start: 2021-06-22 | End: 2022-06-22

## 2021-07-18 RX ORDER — CEFTRIAXONE 1 G/50ML
1000 INJECTION, SOLUTION INTRAVENOUS ONCE
Status: COMPLETED | OUTPATIENT
Start: 2021-07-18 | End: 2021-07-18

## 2021-07-18 RX ORDER — SERTRALINE HYDROCHLORIDE 100 MG/1
100 TABLET, FILM COATED ORAL 2 TIMES DAILY
COMMUNITY
End: 2021-08-11 | Stop reason: HOSPADM

## 2021-07-18 RX ORDER — OXYBUTYNIN CHLORIDE 5 MG/1
10 TABLET, EXTENDED RELEASE ORAL DAILY
Status: DISCONTINUED | OUTPATIENT
Start: 2021-07-19 | End: 2021-07-22

## 2021-07-18 RX ORDER — NYSTATIN 100000 [USP'U]/G
POWDER TOPICAL DAILY
COMMUNITY
Start: 2021-06-30 | End: 2022-05-21

## 2021-07-18 RX ORDER — DIVALPROEX SODIUM 250 MG/1
250 TABLET, DELAYED RELEASE ORAL DAILY
COMMUNITY

## 2021-07-18 RX ORDER — TOLTERODINE 4 MG/1
2 CAPSULE, EXTENDED RELEASE ORAL 2 TIMES DAILY
COMMUNITY

## 2021-07-18 RX ORDER — CEFEPIME HYDROCHLORIDE 2 G/50ML
2000 INJECTION, SOLUTION INTRAVENOUS EVERY 12 HOURS
Status: DISCONTINUED | OUTPATIENT
Start: 2021-07-18 | End: 2021-07-18

## 2021-07-18 RX ORDER — ASPIRIN 81 MG/1
81 TABLET ORAL DAILY
Status: DISCONTINUED | OUTPATIENT
Start: 2021-07-19 | End: 2021-07-21

## 2021-07-18 RX ORDER — DIVALPROEX SODIUM 250 MG/1
250 TABLET, DELAYED RELEASE ORAL EVERY 8 HOURS SCHEDULED
Status: DISCONTINUED | OUTPATIENT
Start: 2021-07-18 | End: 2021-07-21

## 2021-07-18 RX ORDER — LORAZEPAM 0.5 MG/1
0.5 TABLET ORAL 2 TIMES DAILY
Status: DISCONTINUED | OUTPATIENT
Start: 2021-07-18 | End: 2021-07-21

## 2021-07-18 RX ORDER — QUETIAPINE FUMARATE 100 MG/1
100 TABLET, FILM COATED ORAL 3 TIMES DAILY
COMMUNITY
End: 2021-08-11 | Stop reason: HOSPADM

## 2021-07-18 RX ORDER — ASPIRIN 81 MG/1
81 TABLET ORAL DAILY
COMMUNITY

## 2021-07-18 RX ADMIN — LORAZEPAM 0.5 MG: 0.5 TABLET ORAL at 20:17

## 2021-07-18 RX ADMIN — AZITHROMYCIN MONOHYDRATE 500 MG: 500 INJECTION, POWDER, LYOPHILIZED, FOR SOLUTION INTRAVENOUS at 18:04

## 2021-07-18 RX ADMIN — CEFEPIME HYDROCHLORIDE 2000 MG: 2 INJECTION, SOLUTION INTRAVENOUS at 20:18

## 2021-07-18 RX ADMIN — IPRATROPIUM BROMIDE AND ALBUTEROL SULFATE 3 ML: 2.5; .5 SOLUTION RESPIRATORY (INHALATION) at 20:13

## 2021-07-18 RX ADMIN — SODIUM CHLORIDE, SODIUM GLUCONATE, SODIUM ACETATE, POTASSIUM CHLORIDE, MAGNESIUM CHLORIDE, SODIUM PHOSPHATE, DIBASIC, AND POTASSIUM PHOSPHATE 125 ML/HR: .53; .5; .37; .037; .03; .012; .00082 INJECTION, SOLUTION INTRAVENOUS at 21:06

## 2021-07-18 RX ADMIN — CEFTRIAXONE 1000 MG: 1 INJECTION, SOLUTION INTRAVENOUS at 17:25

## 2021-07-18 RX ADMIN — METRONIDAZOLE 500 MG: 500 INJECTION, SOLUTION INTRAVENOUS at 20:18

## 2021-07-18 RX ADMIN — SERTRALINE HYDROCHLORIDE 100 MG: 100 TABLET ORAL at 21:26

## 2021-07-18 RX ADMIN — ATORVASTATIN CALCIUM 80 MG: 40 TABLET, FILM COATED ORAL at 20:17

## 2021-07-18 RX ADMIN — GABAPENTIN 800 MG: 400 CAPSULE ORAL at 21:26

## 2021-07-18 RX ADMIN — QUETIAPINE 100 MG: 100 TABLET, FILM COATED ORAL at 20:17

## 2021-07-18 RX ADMIN — DIVALPROEX SODIUM 250 MG: 250 TABLET, DELAYED RELEASE ORAL at 21:26

## 2021-07-18 NOTE — H&P
[address] 1955, 77 y o  female MRN: [id number]  Unit/Bed#: -01 Encounter: [id number]  Primary Care Provider: Sumeet Baker MD   Date and time admitted to hospital: 7/18/2021  2:07 PM    * Sepsis [facility])  Assessment & Plan  · 2/2 aspiration pna  On right side  · Place on cefepime and flagyl and proceed with procal  · Met criteria by rr>20 and hr   · I reviewed the CT of the chest I ordered looks like a right lower lobe pneumonia await final read  · Proceed with isolate 100 mL/hour  · Lactic acid is normal  · Blood cultures    GERD (gastroesophageal reflux disease)  Assessment & Plan  · PPI    DM (diabetes mellitus), type 2 ([address] )  Assessment & Plan  Lab Results   Component Value Date    HGBA1C 6 6 (H) 05/13/2019       No results for input(s): POCGLU in the last 72 hours  Blood Sugar Average: Last 72 hrs:  ·  place on sliding scale repeat a hemoglobin A1c    Psychiatric disorder  Assessment & Plan  · Patient is nonverbal but understand you  She resides in a group home continue all her psychiatric medications  I reviewed the QTC is normal for 11    Aspiration pneumonia [facility])  Assessment & Plan  · Chest x-ray showed suspected right middle lobe I had ordered a CT chest looks like right middle lobe suspect aspiration  Will place on cefepime and Flagyl place on modified diet ask speech to evaluate  Aspiration precautions sputum culture will place on also chest physiotherapy as sounds very junky  · Proceed with procalcitonin      VTE Pharmacologic Prophylaxis: VTE Score: 3 Moderate Risk (Score 3-4) - Pharmacological DVT Prophylaxis Ordered: enoxaparin (Lovenox)  Code Status: Level 1 - Full Code   Discussion with family: Attempted to update  (brother) via phone  Left voicemail       Anticipated Length of Stay: Patient will be admitted on an inpatient basis with an anticipated length of stay of greater than 2 midnights secondary to Secondary to sepsis and aspiration pneumonia  Total Time for Visit, including Counseling / Coordination of Care: 60 minutes Greater than 50% of this total time spent on direct patient counseling and coordination of care  Chief Complaint:  Cough    History of Present Illness:  Alejandro Meyers is a 77 y o  female with a PMH of psychiatric disorder who is nonverbal who presents with acute onset of cough that started today very junky  She was doing fine eat yesterday I did discuss with her caretaker at the group home at bedside did not notice any fevers he did not notice any coughing she had previous pneumonias  No nausea vomiting diarrhea    Review of Systems:  Review of Systems   Constitutional: Negative for chills and fever  HENT: Negative for ear pain and sore throat  Eyes: Negative for pain and visual disturbance  Respiratory: Positive for cough  Negative for shortness of breath  Cardiovascular: Negative for chest pain and palpitations  Gastrointestinal: Negative for abdominal pain and vomiting  Genitourinary: Negative for dysuria and hematuria  Musculoskeletal: Negative for arthralgias and back pain  Skin: Negative for color change and rash  Neurological: Negative for seizures and syncope  All other systems reviewed and are negative  Past Medical and Surgical History:   Past Medical History:   Diagnosis Date    Anxiety     Diabetes mellitus ([facility] [address] )     GERD (gastroesophageal reflux disease)     Hypertension     Mental disability        History reviewed  No pertinent surgical history  Meds/Allergies:  Prior to Admission medications    Medication Sig Start Date End Date Taking?  Authorizing Provider   aspirin (ECOTRIN LOW STRENGTH) 81 mg EC tablet Take 81 mg by mouth daily   Yes Historical Provider, MD   divalproex sodium (DEPAKOTE) 250 mg EC tablet Take 250 mg by mouth every 8 (eight) hours   Yes Historical Provider, MD   ferrous sulfate 325 (65 Fe) mg tablet Take 325 mg by mouth daily with breakfast   Yes Historical Provider, MD   gabapentin (NEURONTIN) 800 mg tablet Take 800 mg by mouth 3 (three) times a day   Yes Historical Provider, MD   LORazepam (ATIVAN) 0 5 mg tablet Take by mouth 2 (two) times a day   Yes Historical Provider, MD   nystatin (MYCOSTATIN) powder Apply topically daily 6/30/21 6/30/22 Yes Historical Provider, MD   omeprazole (PriLOSEC) 20 mg delayed release capsule Take 20 mg by mouth daily 6/22/21 6/22/22 Yes Historical Provider, MD   QUEtiapine (SEROquel) 100 mg tablet Take 100 mg by mouth 2 (two) times a day   Yes Historical Provider, MD   rosuvastatin (CRESTOR) 40 MG tablet Take 40 mg by mouth daily   Yes Historical Provider, MD   sertraline (ZOLOFT) 100 mg tablet Take 100 mg by mouth 2 (two) times a day   Yes Historical Provider, MD   tolterodine (DETROL LA) 4 mg 24 hr capsule Take 4 mg by mouth daily   Yes Historical Provider, MD     I have reviewed home medications using recent Epic encounter  Allergies: No Known Allergies    Social History:  Marital Status: Single     Substance Use History:   Social History     Substance and Sexual Activity   Alcohol Use Not Currently     Social History     Tobacco Use   Smoking Status Never Smoker   Smokeless Tobacco Never Used     Social History     Substance and Sexual Activity   Drug Use Not Currently       Family History:  History reviewed  No pertinent family history  Physical Exam:     Vitals:   Blood Pressure: 144/76 (07/18/21 1813)  Pulse: 103 (07/18/21 1813)  Temperature: 98 7 °F (37 1 °C) (07/18/21 1813)  Temp Source: Oral (07/18/21 1813)  Respirations: 22 (07/18/21 1721)  Weight - Scale: 94 2 kg (207 lb 10 8 oz) (07/18/21 1813)  SpO2: 95 % (07/18/21 1813)    Physical Exam  Vitals and nursing note reviewed  Constitutional:       General: She is not in acute distress  Appearance: She is well-developed  HENT:      Head: Normocephalic and atraumatic     Eyes:      Conjunctiva/sclera: Conjunctivae normal  Cardiovascular:      Rate and Rhythm: Normal rate and regular rhythm  Heart sounds: No murmur heard  Pulmonary:      Effort: Pulmonary effort is normal  No respiratory distress  Breath sounds: Rales (Right-sided mid lower and also very congested on the left as well) present  Abdominal:      General: There is no distension  Palpations: Abdomen is soft  Tenderness: There is no abdominal tenderness  Musculoskeletal:         General: Swelling (Mild) present  Cervical back: Neck supple  Skin:     General: Skin is warm and dry  Neurological:      Mental Status: She is alert  Comments: At baseline nonverbal with understands when talked to   Psychiatric:         Mood and Affect: Mood normal          Additional Data:     Lab Results:  Results from last 7 days   Lab Units 07/18/21  1612   WBC Thousand/uL 5 24   HEMOGLOBIN g/dL 10 8*   HEMATOCRIT % 33 1*   PLATELETS Thousands/uL 163   NEUTROS PCT % 68   LYMPHS PCT % 17   MONOS PCT % 12   EOS PCT % 3     Results from last 7 days   Lab Units 07/18/21  1612   SODIUM mmol/L 134*   POTASSIUM mmol/L 4 5   CHLORIDE mmol/L 95*   CO2 mmol/L 35*   BUN mg/dL 13   CREATININE mg/dL 0 71   ANION GAP mmol/L 4   CALCIUM mg/dL 10 2*   ALBUMIN g/dL 3 1*   TOTAL BILIRUBIN mg/dL 0 54   ALK PHOS U/L 67   ALT U/L 20   AST U/L 25   GLUCOSE RANDOM mg/dL 140                 Results from last 7 days   Lab Units 07/18/21  1612   LACTIC ACID mmol/L 1 2       Imaging: Reviewed radiology reports from this admission including: chest CT scan  CT chest wo contrast   Final Result by Magdalena Singletary MD (07/18 1850)      Significant elevation of the right hemidiaphragm with volume loss and consolidation involving the right middle lobe and right lower lobe with appearance favoring atelectasis  Further detail limited by both patient motion and lack of contrast       Dilated central pulmonary arteries as above           Workstation performed: [id number]         XR chest 1 view portable   ED Interpretation by Chante Omalley PA-C (07/18 1008)   Infiltrate in the right base          EKG and Other Studies Reviewed on Admission:   · EKG: NSR  HR 70     ** Please Note: This note has been constructed using a voice recognition system   **

## 2021-07-18 NOTE — ASSESSMENT & PLAN NOTE
· 2/2 aspiration pna  On right side  · Place on cefepime and flagyl and proceed with procal  · Met criteria by rr>20 and hr   · I reviewed the CT of the chest I ordered looks like a right lower lobe pneumonia await final read  · Proceed with isolate 100 mL/hour    · Lactic acid is normal  · Blood cultures

## 2021-07-18 NOTE — ASSESSMENT & PLAN NOTE
· Patient is nonverbal but understand you  She resides in a group home continue all her psychiatric medications    I reviewed the QTC is normal for 11

## 2021-07-18 NOTE — ED PROVIDER NOTES
History  Chief Complaint   Patient presents with    Cough     cough     77year old female presents to the emergency department via EMS from Urgent Care  Patient with history of MR, nonverbal at baseline  EMS states patient was brought to urgent care due to wet cough  Was found to be hypoxic on room air was sent to the emergency department for further evaluation  Caretaker at bedside who reports patient started with cough over last several days  Reports has been fully COVID vaccinated  States has been otherwise doing well  She denies any fevers  Notes patient is a diet-controlled diabetic and did have a noted higher glucose than normal this morning  History provided by:  EMS personnel and caregiver  Cough  Cough characteristics: "wet"  Severity:  Mild  Onset quality:  Gradual  Timing:  Constant  Progression:  Worsening  Chronicity:  New  Smoker: no    Relieved by:  None tried  Worsened by:  Nothing  Ineffective treatments:  None tried  Associated symptoms: no fever, no rash and no wheezing        Prior to Admission Medications   Prescriptions Last Dose Informant Patient Reported? Taking?    LORazepam (ATIVAN) 0 5 mg tablet   Yes Yes   Sig: Take by mouth 2 (two) times a day   QUEtiapine (SEROquel) 100 mg tablet   Yes Yes   Sig: Take 100 mg by mouth 2 (two) times a day   aspirin (ECOTRIN LOW STRENGTH) 81 mg EC tablet   Yes Yes   Sig: Take 81 mg by mouth daily   divalproex sodium (DEPAKOTE) 250 mg EC tablet   Yes Yes   Sig: Take 250 mg by mouth every 8 (eight) hours   ferrous sulfate 325 (65 Fe) mg tablet   Yes Yes   Sig: Take 325 mg by mouth daily with breakfast   gabapentin (NEURONTIN) 800 mg tablet   Yes Yes   Sig: Take 800 mg by mouth 3 (three) times a day   nystatin (MYCOSTATIN) powder   Yes Yes   Sig: Apply topically daily   omeprazole (PriLOSEC) 20 mg delayed release capsule   Yes Yes   Sig: Take 20 mg by mouth daily   rosuvastatin (CRESTOR) 40 MG tablet   Yes Yes   Sig: Take 40 mg by mouth daily sertraline (ZOLOFT) 100 mg tablet   Yes Yes   Sig: Take 100 mg by mouth 2 (two) times a day   tolterodine (DETROL LA) 4 mg 24 hr capsule   Yes Yes   Sig: Take 4 mg by mouth daily      Facility-Administered Medications: None       Past Medical History:   Diagnosis Date    Anxiety     Diabetes mellitus ([facility] [address] )     GERD (gastroesophageal reflux disease)     Hypertension     Mental disability        History reviewed  No pertinent surgical history  History reviewed  No pertinent family history  I have reviewed and agree with the history as documented  E-Cigarette/Vaping    E-Cigarette Use Never User      E-Cigarette/Vaping Substances     Social History     Tobacco Use    Smoking status: Never Smoker    Smokeless tobacco: Never Used   Vaping Use    Vaping Use: Never used   Substance Use Topics    Alcohol use: Not Currently    Drug use: Not Currently       Review of Systems   Unable to perform ROS: Patient nonverbal   Constitutional: Negative for fever  Respiratory: Positive for cough  Negative for wheezing  Skin: Negative for rash  Physical Exam  Physical Exam  Vitals and nursing note reviewed  Constitutional:       General: She is not in acute distress  Appearance: Normal appearance  She is normal weight  She is ill-appearing (Chronically ill-appearing)  She is not toxic-appearing or diaphoretic  HENT:      Head: Normocephalic and atraumatic  Nose: Nose normal  No congestion or rhinorrhea  Mouth/Throat:      Mouth: Mucous membranes are moist       Pharynx: Oropharynx is clear  No oropharyngeal exudate or posterior oropharyngeal erythema  Eyes:      Extraocular Movements: Extraocular movements intact  Conjunctiva/sclera: Conjunctivae normal       Pupils: Pupils are equal, round, and reactive to light  Cardiovascular:      Rate and Rhythm: Normal rate and regular rhythm     Pulmonary:      Effort: Pulmonary effort is normal       Breath sounds: Decreased air movement present  Rales present  Abdominal:      General: Abdomen is flat  Bowel sounds are normal  There is no distension  Palpations: Abdomen is soft  Tenderness: There is no abdominal tenderness  There is no guarding  Musculoskeletal:         General: No swelling or tenderness  Normal range of motion  Cervical back: Normal range of motion  Skin:     General: Skin is warm and dry  Capillary Refill: Capillary refill takes less than 2 seconds  Neurological:      Mental Status: She is alert  Mental status is at baseline  Vital Signs  ED Triage Vitals   Temperature Pulse Respirations Blood Pressure SpO2   07/18/21 1428 07/18/21 1428 07/18/21 1428 07/18/21 1721 07/18/21 1428   97 9 °F (36 6 °C) 80 16 117/57 94 %      Temp Source Heart Rate Source Patient Position - Orthostatic VS BP Location FiO2 (%)   07/18/21 1721 07/18/21 1721 07/18/21 1721 07/18/21 1721 --   Oral Monitor Lying Right arm       Pain Score       07/18/21 1722       No Pain           Vitals:    07/18/21 1428 07/18/21 1721   BP:  117/57   Pulse: 80 93   Patient Position - Orthostatic VS:  Lying         Visual Acuity      ED Medications  Medications   azithromycin (ZITHROMAX) 500 mg in sodium chloride 0 9 % 250 mL IVPB (has no administration in time range)   cefTRIAXone (ROCEPHIN) IVPB (premix in dextrose) 1,000 mg 50 mL (1,000 mg Intravenous New Bag 7/18/21 1725)       Diagnostic Studies  Results Reviewed     Procedure Component Value Units Date/Time    Novel Coronavirus (Covid-19),PCR [facility] - 2 Hour Stat [[id number]]  (Normal) Collected: 07/18/21 1600    Lab Status: Final result Specimen: Nares from Nasopharyngeal Swab Updated: 07/18/21 1721     SARS-CoV-2 Negative    Narrative:       The specimen collection materials, transport medium, and/or testing methodology utilized in the production of these test results have been proven to be reliable in a limited validation with an abbreviated program under the Emergency Utilization Authorization provided by the FDA  Testing reported as "Presumptive positive" will be confirmed with secondary testing to ensure result accuracy  Clinical caution and judgement should be used with the interpretation of these results with consideration of the clinical impression and other laboratory testing  Testing reported as "Positive" or "Negative" has been proven to be accurate according to standard laboratory validation requirements  All testing is performed with control materials showing appropriate reactivity at standard intervals  Troponin I [[id number]]  (Normal) Collected: 07/18/21 1612    Lab Status: Final result Specimen: Blood from Arm, Left Updated: 07/18/21 1700     Troponin I <0 02 ng/mL     NT-BNP PRO [[id number]]  (Abnormal) Collected: 07/18/21 1612    Lab Status: Final result Specimen: Blood from Arm, Left Updated: 07/18/21 1654     NT-proBNP 509 pg/mL     Lactic acid [[id number]]  (Normal) Collected: 07/18/21 1612    Lab Status: Final result Specimen: Blood from Arm, Left Updated: 07/18/21 1653     LACTIC ACID 1 2 mmol/L     Narrative:      Result may be elevated if tourniquet was used during collection      Comprehensive metabolic panel [[id number]]  (Abnormal) Collected: 07/18/21 1612    Lab Status: Final result Specimen: Blood from Arm, Left Updated: 07/18/21 1647     Sodium 134 mmol/L      Potassium 4 5 mmol/L      Chloride 95 mmol/L      CO2 35 mmol/L      ANION GAP 4 mmol/L      BUN 13 mg/dL      Creatinine 0 71 mg/dL      Glucose 140 mg/dL      Calcium 10 2 mg/dL      Corrected Calcium 10 9 mg/dL      AST 25 U/L      ALT 20 U/L      Alkaline Phosphatase 67 U/L      Total Protein 7 4 g/dL      Albumin 3 1 g/dL      Total Bilirubin 0 54 mg/dL      eGFR 88 ml/min/1 73sq m     Narrative:      [place] guidelines for Chronic Kidney Disease (CKD):     Stage 1 with normal or high GFR (GFR > 90 mL/min/1 73 square meters)    Stage 2 Mild CKD (GFR = 60-89 mL/min/1 73 square meters)    Stage 3A Moderate CKD (GFR = 45-59 mL/min/1 73 square meters)    Stage 3B Moderate CKD (GFR = 30-44 mL/min/1 73 square meters)    Stage 4 Severe CKD (GFR = 15-29 mL/min/1 73 square meters)    Stage 5 End Stage CKD (GFR <15 mL/min/1 73 square meters)  Note: GFR calculation is accurate only with a steady state creatinine    CBC and differential [[id number]]  (Abnormal) Collected: 07/18/21 1612    Lab Status: Final result Specimen: Blood from Arm, Left Updated: 07/18/21 1630     WBC 5 24 Thousand/uL      RBC 3 62 Million/uL      Hemoglobin 10 8 g/dL      Hematocrit 33 1 %      MCV 91 fL      MCH 29 8 pg      MCHC 32 6 g/dL      RDW 14 9 %      MPV 9 0 fL      Platelets 459 Thousands/uL      nRBC 0 /100 WBCs      Neutrophils Relative 68 %      Immat GRANS % 0 %      Lymphocytes Relative 17 %      Monocytes Relative 12 %      Eosinophils Relative 3 %      Basophils Relative 0 %      Neutrophils Absolute 3 56 Thousands/µL      Immature Grans Absolute 0 02 Thousand/uL      Lymphocytes Absolute 0 90 Thousands/µL      Monocytes Absolute 0 62 Thousand/µL      Eosinophils Absolute 0 13 Thousand/µL      Basophils Absolute 0 01 Thousands/µL                  XR chest 1 view portable   ED Interpretation by Cortes Jean-Baptiste PA-C (07/18 1705)   Infiltrate in the right base                 Procedures  ECG 12 Lead Documentation Only    Date/Time: 7/18/2021 2:37 PM  Performed by: Cortes Jean-Baptiste PA-C  Authorized by: Cortes Jean-Baptiste PA-C     ECG reviewed by me, the ED Provider: yes    Patient location:  ED  Interpretation:     Interpretation: non-specific    Rate:     ECG rate:  81    ECG rate assessment: normal    Rhythm:     Rhythm: sinus rhythm    Ectopy:     Ectopy: none    QRS:     QRS axis:  Normal    QRS intervals:  Normal  Conduction:     Conduction: normal    ST segments:     ST segments:  Normal  T waves:     T waves: normal               ED Course  ED Course as of Jul 18 1746   Sun Jul 18, 2021   1700 Laboratory findings relatively  No leukocytosis  Sodium minimally  BNP minimally elevated  Normal lactic acid  Troponin  COVID pending-patient is fully vaccinated      [id number] ED interpretation x-ray concerning sided infiltrate      1705 Discussed with hospitalist who accepted the patient for admission      1726 SARS-COV-2: Negative                     SBIRT 22yo+      Most Recent Value   SBIRT (22 yo +)   In order to provide better care to our patients, we are screening all of our patients for alcohol and drug use  Would it be okay to ask you these screening questions? Yes Filed at: 07/18/2021 1430   Initial Alcohol Screen: US AUDIT-C    1  How often do you have a drink containing alcohol?  0 Filed at: 07/18/2021 1430   2  How many drinks containing alcohol do you have on a typical day you are drinking? 0 Filed at: 07/18/2021 1430   3a  Male UNDER 65: How often do you have five or more drinks on one occasion? 0 Filed at: 07/18/2021 1430   3b  FEMALE Any Age, or MALE 65+: How often do you have 4 or more drinks on one occassion? 0 Filed at: 07/18/2021 1430   Audit-C Score  0 Filed at: 07/18/2021 1430   KAILASH: How many times in the past year have you    Used an illegal drug or used a prescription medication for non-medical reasons? Never Filed at: 07/18/2021 1430                    MDM  Number of Diagnoses or Management Options  Hypoxic: new and requires workup  Pneumonia: new and requires workup  Diagnosis management comments: 77year old female presents emergency department with caretaker from the group home/urgent care for evaluation of cough and hypoxia  Vitals and medical record reviewed  On arrival patient oxygen saturation 87% on RA now on 2 5L NC  Patient nonverbal at baseline  Lungs were decreased noted rales  Heart regular rate rhythm  Abdomen soft and nontender  EKG was nonischemic  Troponin negative  No leukocytosis  Sodium 130 foreign minimally elevated BNP  COVID negative    ED interpretation of chest x-ray concerning for pneumonia  Patient was started on antibiotics  Discussed with hospitalist who accepted the patient for admission  Patient's caretakers agreeable with this treatment plan  Amount and/or Complexity of Data Reviewed  Clinical lab tests: ordered and reviewed  Tests in the radiology section of CPT®: ordered and reviewed  Review and summarize past medical records: yes  Discuss the patient with other providers: yes  Independent visualization of images, tracings, or specimens: yes        Disposition  Final diagnoses:   Hypoxic   Pneumonia     Time reflects when diagnosis was documented in both MDM as applicable and the Disposition within this note     Time User Action Codes Description Comment    7/18/2021  5:05 PM Ingrid Boss [R09 02] Hypoxic     7/18/2021  5:05 PM Ingrid Boss [J18 [id number]] Pneumonia       ED Disposition     ED Disposition Condition Date/Time Comment    Admit Stable Sun Jul 18, 2021  5:05 PM Case was discussed with Dr Nakul Meza and the patient's admission status was agreed to be Admission Status: inpatient status to the service of Dr Nakul Meza   Follow-up Information    None         Patient's Medications   Discharge Prescriptions    No medications on file     No discharge procedures on file      PDMP Review     None          ED Provider  Electronically Signed by           Kwasi Santiago PA-C  07/18/21 0744

## 2021-07-18 NOTE — ASSESSMENT & PLAN NOTE
Lab Results   Component Value Date    HGBA1C 6 6 (H) 05/13/2019       No results for input(s): POCGLU in the last 72 hours      Blood Sugar Average: Last 72 hrs:  ·  place on sliding scale repeat a hemoglobin A1c

## 2021-07-18 NOTE — ASSESSMENT & PLAN NOTE
· Chest x-ray showed suspected right middle lobe I had ordered a CT chest looks like right middle lobe suspect aspiration  Will place on cefepime and Flagyl place on modified diet ask speech to evaluate  Aspiration precautions sputum culture will place on also chest physiotherapy as sounds very junky    · Proceed with procalcitonin

## 2021-07-19 LAB
ANION GAP SERPL CALCULATED.3IONS-SCNC: 5 MMOL/L (ref 4–13)
BASOPHILS # BLD MANUAL: 0.05 THOUSAND/UL (ref 0–0.1)
BASOPHILS NFR MAR MANUAL: 1 % (ref 0–1)
BUN SERPL-MCNC: 12 MG/DL (ref 5–25)
CALCIUM SERPL-MCNC: 9.5 MG/DL (ref 8.3–10.1)
CHLORIDE SERPL-SCNC: 97 MMOL/L (ref 100–108)
CO2 SERPL-SCNC: 31 MMOL/L (ref 21–32)
CREAT SERPL-MCNC: 0.67 MG/DL (ref 0.6–1.3)
EOSINOPHIL # BLD MANUAL: 0 THOUSAND/UL (ref 0–0.4)
EOSINOPHIL NFR BLD MANUAL: 0 % (ref 0–6)
ERYTHROCYTE [DISTWIDTH] IN BLOOD BY AUTOMATED COUNT: 15.1 % (ref 11.6–15.1)
EST. AVERAGE GLUCOSE BLD GHB EST-MCNC: 143 MG/DL
GFR SERPL CREATININE-BSD FRML MDRD: 91 ML/MIN/1.73SQ M
GLUCOSE SERPL-MCNC: 158 MG/DL (ref 65–140)
GLUCOSE SERPL-MCNC: 174 MG/DL (ref 65–140)
GLUCOSE SERPL-MCNC: 179 MG/DL (ref 65–140)
GLUCOSE SERPL-MCNC: 200 MG/DL (ref 65–140)
HBA1C MFR BLD: 6.6 %
HCT VFR BLD AUTO: 31 % (ref 34.8–46.1)
HGB BLD-MCNC: 9.8 G/DL (ref 11.5–15.4)
LYMPHOCYTES # BLD AUTO: 0.77 THOUSAND/UL (ref 0.6–4.47)
LYMPHOCYTES # BLD AUTO: 17 % (ref 14–44)
MCH RBC QN AUTO: 29.3 PG (ref 26.8–34.3)
MCHC RBC AUTO-ENTMCNC: 31.6 G/DL (ref 31.4–37.4)
MCV RBC AUTO: 93 FL (ref 82–98)
MONOCYTES # BLD AUTO: 0.32 THOUSAND/UL (ref 0–1.22)
MONOCYTES NFR BLD: 7 % (ref 4–12)
NEUTROPHILS # BLD MANUAL: 3.38 THOUSAND/UL (ref 1.85–7.62)
NEUTS BAND NFR BLD MANUAL: 26 % (ref 0–8)
NEUTS SEG NFR BLD AUTO: 49 % (ref 43–75)
NRBC BLD AUTO-RTO: 0 /100 WBCS
OVALOCYTES BLD QL SMEAR: PRESENT
PLATELET # BLD AUTO: 153 THOUSANDS/UL (ref 149–390)
PLATELET BLD QL SMEAR: ADEQUATE
PMV BLD AUTO: 9 FL (ref 8.9–12.7)
POLYCHROMASIA BLD QL SMEAR: PRESENT
POTASSIUM SERPL-SCNC: 4.1 MMOL/L (ref 3.5–5.3)
PROCALCITONIN SERPL-MCNC: <0.05 NG/ML
RBC # BLD AUTO: 3.35 MILLION/UL (ref 3.81–5.12)
RBC MORPH BLD: PRESENT
SODIUM SERPL-SCNC: 133 MMOL/L (ref 136–145)
TOTAL CELLS COUNTED SPEC: 100
WBC # BLD AUTO: 4.5 THOUSAND/UL (ref 4.31–10.16)
WBC TOXIC VACUOLES BLD QL SMEAR: PRESENT

## 2021-07-19 PROCEDURE — 87070 CULTURE OTHR SPECIMN AEROBIC: CPT | Performed by: FAMILY MEDICINE

## 2021-07-19 PROCEDURE — 85025 COMPLETE CBC W/AUTO DIFF WBC: CPT | Performed by: FAMILY MEDICINE

## 2021-07-19 PROCEDURE — 87205 SMEAR GRAM STAIN: CPT | Performed by: FAMILY MEDICINE

## 2021-07-19 PROCEDURE — 94640 AIRWAY INHALATION TREATMENT: CPT

## 2021-07-19 PROCEDURE — 80048 BASIC METABOLIC PNL TOTAL CA: CPT | Performed by: FAMILY MEDICINE

## 2021-07-19 PROCEDURE — 85007 BL SMEAR W/DIFF WBC COUNT: CPT | Performed by: FAMILY MEDICINE

## 2021-07-19 PROCEDURE — 94760 N-INVAS EAR/PLS OXIMETRY 1: CPT

## 2021-07-19 PROCEDURE — 85027 COMPLETE CBC AUTOMATED: CPT | Performed by: FAMILY MEDICINE

## 2021-07-19 PROCEDURE — 83036 HEMOGLOBIN GLYCOSYLATED A1C: CPT | Performed by: FAMILY MEDICINE

## 2021-07-19 PROCEDURE — 82948 REAGENT STRIP/BLOOD GLUCOSE: CPT

## 2021-07-19 PROCEDURE — 94664 DEMO&/EVAL PT USE INHALER: CPT

## 2021-07-19 PROCEDURE — 94668 MNPJ CHEST WALL SBSQ: CPT

## 2021-07-19 PROCEDURE — 94669 MECHANICAL CHEST WALL OSCILL: CPT

## 2021-07-19 PROCEDURE — 99232 SBSQ HOSP IP/OBS MODERATE 35: CPT | Performed by: FAMILY MEDICINE

## 2021-07-19 RX ADMIN — INSULIN LISPRO 1 UNITS: 100 INJECTION, SOLUTION INTRAVENOUS; SUBCUTANEOUS at 08:32

## 2021-07-19 RX ADMIN — SERTRALINE HYDROCHLORIDE 100 MG: 100 TABLET ORAL at 08:33

## 2021-07-19 RX ADMIN — IPRATROPIUM BROMIDE AND ALBUTEROL SULFATE 3 ML: 2.5; .5 SOLUTION RESPIRATORY (INHALATION) at 07:39

## 2021-07-19 RX ADMIN — DIVALPROEX SODIUM 250 MG: 250 TABLET, DELAYED RELEASE ORAL at 14:14

## 2021-07-19 RX ADMIN — ENOXAPARIN SODIUM 40 MG: 40 INJECTION SUBCUTANEOUS at 08:32

## 2021-07-19 RX ADMIN — ATORVASTATIN CALCIUM 80 MG: 40 TABLET, FILM COATED ORAL at 17:03

## 2021-07-19 RX ADMIN — SERTRALINE HYDROCHLORIDE 100 MG: 100 TABLET ORAL at 20:00

## 2021-07-19 RX ADMIN — INSULIN LISPRO 1 UNITS: 100 INJECTION, SOLUTION INTRAVENOUS; SUBCUTANEOUS at 17:02

## 2021-07-19 RX ADMIN — QUETIAPINE 100 MG: 100 TABLET, FILM COATED ORAL at 17:03

## 2021-07-19 RX ADMIN — ASPIRIN 81 MG: 81 TABLET, COATED ORAL at 08:33

## 2021-07-19 RX ADMIN — LORAZEPAM 0.5 MG: 0.5 TABLET ORAL at 08:33

## 2021-07-19 RX ADMIN — METRONIDAZOLE 500 MG: 500 INJECTION, SOLUTION INTRAVENOUS at 03:45

## 2021-07-19 RX ADMIN — FERROUS SULFATE TAB 325 MG (65 MG ELEMENTAL FE) 325 MG: 325 (65 FE) TAB at 08:33

## 2021-07-19 RX ADMIN — QUETIAPINE 100 MG: 100 TABLET, FILM COATED ORAL at 08:33

## 2021-07-19 RX ADMIN — PANTOPRAZOLE SODIUM 20 MG: 20 TABLET, DELAYED RELEASE ORAL at 05:14

## 2021-07-19 RX ADMIN — IPRATROPIUM BROMIDE AND ALBUTEROL SULFATE 3 ML: 2.5; .5 SOLUTION RESPIRATORY (INHALATION) at 19:30

## 2021-07-19 RX ADMIN — DIVALPROEX SODIUM 250 MG: 250 TABLET, DELAYED RELEASE ORAL at 05:14

## 2021-07-19 RX ADMIN — IPRATROPIUM BROMIDE AND ALBUTEROL SULFATE 3 ML: 2.5; .5 SOLUTION RESPIRATORY (INHALATION) at 02:15

## 2021-07-19 RX ADMIN — GABAPENTIN 800 MG: 400 CAPSULE ORAL at 08:33

## 2021-07-19 RX ADMIN — CEFEPIME HYDROCHLORIDE 2000 MG: 2 INJECTION, SOLUTION INTRAVENOUS at 19:11

## 2021-07-19 RX ADMIN — IPRATROPIUM BROMIDE AND ALBUTEROL SULFATE 3 ML: 2.5; .5 SOLUTION RESPIRATORY (INHALATION) at 13:19

## 2021-07-19 RX ADMIN — METRONIDAZOLE 500 MG: 500 INJECTION, SOLUTION INTRAVENOUS at 18:02

## 2021-07-19 RX ADMIN — METRONIDAZOLE 500 MG: 500 INJECTION, SOLUTION INTRAVENOUS at 12:45

## 2021-07-19 RX ADMIN — GABAPENTIN 800 MG: 400 CAPSULE ORAL at 20:00

## 2021-07-19 RX ADMIN — DIVALPROEX SODIUM 250 MG: 250 TABLET, DELAYED RELEASE ORAL at 20:55

## 2021-07-19 RX ADMIN — SODIUM CHLORIDE, SODIUM GLUCONATE, SODIUM ACETATE, POTASSIUM CHLORIDE, MAGNESIUM CHLORIDE, SODIUM PHOSPHATE, DIBASIC, AND POTASSIUM PHOSPHATE 125 ML/HR: .53; .5; .37; .037; .03; .012; .00082 INJECTION, SOLUTION INTRAVENOUS at 07:15

## 2021-07-19 RX ADMIN — INSULIN LISPRO 2 UNITS: 100 INJECTION, SOLUTION INTRAVENOUS; SUBCUTANEOUS at 11:51

## 2021-07-19 RX ADMIN — OXYBUTYNIN CHLORIDE 10 MG: 5 TABLET, EXTENDED RELEASE ORAL at 08:33

## 2021-07-19 RX ADMIN — CEFEPIME HYDROCHLORIDE 2000 MG: 2 INJECTION, SOLUTION INTRAVENOUS at 04:48

## 2021-07-19 RX ADMIN — CEFEPIME HYDROCHLORIDE 2000 MG: 2 INJECTION, SOLUTION INTRAVENOUS at 11:51

## 2021-07-19 RX ADMIN — ACETAMINOPHEN 650 MG: 325 TABLET ORAL at 14:13

## 2021-07-19 RX ADMIN — LORAZEPAM 0.5 MG: 0.5 TABLET ORAL at 17:03

## 2021-07-19 RX ADMIN — GABAPENTIN 800 MG: 400 CAPSULE ORAL at 17:03

## 2021-07-19 NOTE — PLAN OF CARE
Problem: MOBILITY - ADULT  Goal: Maintain or return to baseline ADL function  Description: INTERVENTIONS:  -  Assess patient's ability to carry out ADLs; assess patient's baseline for ADL function and identify physical deficits which impact ability to perform ADLs (bathing, care of mouth/teeth, toileting, grooming, dressing, etc )  - Assess/evaluate cause of self-care deficits   - Assess range of motion  - Assess patient's mobility; develop plan if impaired  - Assess patient's need for assistive devices and provide as appropriate  - Encourage maximum independence but intervene and supervise when necessary  - Involve family in performance of ADLs  - Assess for home care needs following discharge   - Consider OT consult to assist with ADL evaluation and planning for discharge  - Provide patient education as appropriate  Outcome: Progressing  Goal: Maintains/Returns to pre admission functional level  Description: INTERVENTIONS:  - Perform BMAT or MOVE assessment daily    - Set and communicate daily mobility goal to care team and patient/family/caregiver  - Collaborate with rehabilitation services on mobility goals if consulted  - Perform Range of Motion 4 times a day  - Reposition patient every 2 hours    - Dangle patient 2 times a day  - Stand patient 2 times a day  - Ambulate patient 2 times a day  - Out of bed to chair 2 times a day   - Out of bed for meals 2 times a day  - Out of bed for toileting  - Record patient progress and toleration of activity level   Outcome: Progressing     Problem: Prexisting or High Potential for Compromised Skin Integrity  Goal: Skin integrity is maintained or improved  Description: INTERVENTIONS:  - Identify patients at risk for skin breakdown  - Assess and monitor skin integrity  - Assess and monitor nutrition and hydration status  - Monitor labs   - Assess for incontinence   - Turn and reposition patient  - Assist with mobility/ambulation  - Relieve pressure over bony prominences  - Avoid friction and shearing  - Provide appropriate hygiene as needed including keeping skin clean and dry  - Evaluate need for skin moisturizer/barrier cream  - Collaborate with interdisciplinary team   - Patient/family teaching  - Consider wound care consult   Outcome: Progressing

## 2021-07-19 NOTE — PHYSICAL THERAPY NOTE
PHYSICAL THERAPY NOTE          Patient Name: Karina BORGES Date: 7/19/2021 07/19/21 1132   Note Type   Note type Evaluation   Cancel Reasons Other  (pt poorly responsive)     Received order for PT consult  Chart reviewed  Pt admitted with dx sepsis  Attempted to see patient this AM, however patient poorly responsive to verbal, tactile cues via sternal rub, and painful stimuli at great toe  Not appropriate to participate in PT services this date  Will continue to follow and see patient as medically appropriate at a later time       Milagro Rudolph, PT,DPT

## 2021-07-19 NOTE — PROGRESS NOTES
[address]  Progress Note - Kenroy Leon 1955, 77 y o  female MRN: [id number]  Unit/Bed#: -01 Encounter: [id number]  Primary Care Provider: Sumeet Baker MD   Date and time admitted to hospital: 7/18/2021  2:07 PM    * Sepsis [facility])  Assessment & Plan  · Improved discontinue IV fluids monitor she is eating  · 2/2 aspiration pna  On right side  · Place on cefepime and flagyl and proceed with procal  · Met criteria by rr>20 and hr   · CT of the chest reviewed there is right middle lobe and right lower lobe consolidation today his potassium over atelectasis awaiting procalcitonin with oscillation high-frequency lung sounds much better     ·   · Lactic acid is normal  · Blood cultures    GERD (gastroesophageal reflux disease)  Assessment & Plan  · PPI    DM (diabetes mellitus), type 2 [facility])  Assessment & Plan  Lab Results   Component Value Date    HGBA1C 6 6 (H) 05/13/2019       Recent Labs     07/18/21 2044 07/19/21  0724   POCGLU 193* 158*       Blood Sugar Average: Last 72 hrs:  · (P) 175 5 stable on sliding scale repeat a hemoglobin A1c    Psychiatric disorder  Assessment & Plan  · Patient is nonverbal but understand you  She resides in a group home continue all her psychiatric medications  I reviewed the QTC is normal for 13    Aspiration pneumonia [facility])  Assessment & Plan  · Chest x-ray showed suspected right middle lobe I had ordered a CT chest looks like right middle lobe suspect aspiration  Will continue on cefepime and Flagyl place on modified diet ask speech to evaluate  Aspiration precautions sputum culture will place on also chest physiotherapy as sounds very junky  Today improved  · Proceed with procalcitonin pending  · CT of the chest shows right middle lobe and right lower lobe consultation suspected atelectasis will check procalcitonin prior to discontinue antibiotics          VTE Pharmacologic Prophylaxis: VTE Score: 3 Moderate Risk (Score 3-4) - Pharmacological DVT Prophylaxis Ordered: enoxaparin (Lovenox)  Patient Centered Rounds: I performed bedside rounds with nursing staff today  Discussions with Specialists or Other Care Team Provider:  Discussed with case management    Education and Discussions with Family / Patient: Will attempt to reach brother to update    Time Spent for Care: 30 minutes  More than 50% of total time spent on counseling and coordination of care as described above  Current Length of Stay: 1 day(s)  Current Patient Status: Inpatient   Certification Statement: The patient will continue to require additional inpatient hospital stay due to Secondary to aspiration versus atelectasis  Discharge Plan: Anticipate discharge in 24-48 hrs to rehab facility  Code Status: Level 1 - Full Code    Subjective:   Patient seen and examined she is nonverbal but looks not in acute distress    Objective:     Vitals:   Temp (24hrs), Av 5 °F (36 9 °C), Min:97 6 °F (36 4 °C), Max:99 3 °F (37 4 °C)    Temp:  [97 6 °F (36 4 °C)-99 3 °F (37 4 °C)] 99 3 °F (37 4 °C)  HR:  [] 105  Resp:  [16-24] 22  BP: (117-144)/(57-76) 144/74  SpO2:  [85 %-99 %] 91 %  Body mass index is 40 56 kg/m²  Input and Output Summary (last 24 hours): Intake/Output Summary (Last 24 hours) at 2021 0917  Last data filed at 2021 2200  Gross per 24 hour   Intake 812 5 ml   Output --   Net 812 5 ml       Physical Exam:   Physical Exam  Vitals and nursing note reviewed  Constitutional:       General: She is not in acute distress  Appearance: She is well-developed  HENT:      Head: Normocephalic and atraumatic  Eyes:      Conjunctiva/sclera: Conjunctivae normal    Cardiovascular:      Rate and Rhythm: Normal rate and regular rhythm  Heart sounds: No murmur heard  Pulmonary:      Effort: Pulmonary effort is normal  No respiratory distress  Breath sounds: Rales (Right lower light middle) present     Abdominal:      Palpations: Abdomen is soft  Tenderness: There is no abdominal tenderness  Musculoskeletal:         General: Swelling (Mild) present  Cervical back: Neck supple  Skin:     General: Skin is warm and dry  Neurological:      Mental Status: She is alert  Mental status is at baseline        Comments: Nonverbal but understands   Psychiatric:         Mood and Affect: Mood normal          Additional Data:     Labs:  Results from last 7 days   Lab Units 07/19/21  0517 07/18/21  1612   WBC Thousand/uL 4 50 5 24   HEMOGLOBIN g/dL 9 8* 10 8*   HEMATOCRIT % 31 0* 33 1*   PLATELETS Thousands/uL 153 163   BANDS PCT % 26*  --    NEUTROS PCT %  --  68   LYMPHS PCT %  --  17   LYMPHO PCT % 17  --    MONOS PCT %  --  12   MONO PCT % 7  --    EOS PCT % 0 3     Results from last 7 days   Lab Units 07/19/21  0517 07/18/21  1612   SODIUM mmol/L 133* 134*   POTASSIUM mmol/L 4 1 4 5   CHLORIDE mmol/L 97* 95*   CO2 mmol/L 31 35*   BUN mg/dL 12 13   CREATININE mg/dL 0 67 0 71   ANION GAP mmol/L 5 4   CALCIUM mg/dL 9 5 10 2*   ALBUMIN g/dL  --  3 1*   TOTAL BILIRUBIN mg/dL  --  0 54   ALK PHOS U/L  --  67   ALT U/L  --  20   AST U/L  --  25   GLUCOSE RANDOM mg/dL 174* 140         Results from last 7 days   Lab Units 07/19/21  0724 07/18/21  2044   POC GLUCOSE mg/dl 158* 193*         Results from last 7 days   Lab Units 07/18/21  2039 07/18/21  1612   LACTIC ACID mmol/L 1 9 1 2       Lines/Drains:  Invasive Devices     Peripheral Intravenous Line            Peripheral IV 07/18/21 Left Antecubital <1 day                      Imaging: Reviewed radiology reports from this admission including: chest CT scan    Recent Cultures (last 7 days):         Last 24 Hours Medication List:   Current Facility-Administered Medications   Medication Dose Route Frequency Provider Last Rate    acetaminophen  650 mg Oral Q6H PRN Stefanie Rodriguez MD      aspirin  81 mg Oral Daily Stefanie Rodriguez MD      atorvastatin  80 mg Oral Daily With Ly La MD  cefepime  2,000 mg Intravenous Q8H Govind Wan MD 2,000 mg (07/19/21 0448)    divalproex sodium  250 mg Oral [facility] Govind Wan MD      enoxaparin  40 mg Subcutaneous Q24H [facility] Govind Wan MD      ferrous sulfate  325 mg Oral Daily With Breakfast Govind Wan MD      gabapentin  800 mg Oral TID Govind Wan MD      insulin lispro  1-6 Units Subcutaneous TID [facility] Govind Wan MD      ipratropium-albuterol  3 mL Nebulization Q6H Govind Wan MD      LORazepam  0 5 mg Oral BID Govind Wan MD      metroNIDAZOLE  500 mg Intravenous Q8H Govind Wan  mg (07/19/21 0345)    oxybutynin  10 mg Oral Daily Govind Wan MD      pantoprazole  20 mg Oral Early Morning Govind Wan MD      QUEtiapine  100 mg Oral BID Govind Wan MD      sertraline  100 mg Oral BID Govind Wan MD          Today, Patient Was Seen By: Govind Wan MD    **Please Note: This note may have been constructed using a voice recognition system  **

## 2021-07-19 NOTE — ASSESSMENT & PLAN NOTE
· Chest x-ray showed suspected right middle lobe I had ordered a CT chest looks like right middle lobe suspect aspiration  Will continue on cefepime and Flagyl place on modified diet ask speech to evaluate  Aspiration precautions sputum culture will place on also chest physiotherapy as sounds very junky  Today improved  · Proceed with procalcitonin pending  · CT of the chest shows right middle lobe and right lower lobe consultation suspected atelectasis will check procalcitonin prior to discontinue antibiotics

## 2021-07-19 NOTE — CASE MANAGEMENT
Case Management Assessment & Discharge Planning Note    Patient name Daniella Marshall  Location [facility] [id number] MRN [id number]  : 1955 Date 2021       Current Admission Date: 2021  Current Admission Diagnosis:  Sepsis [facility])  Patient Active Problem List   Diagnosis    Sepsis ([facility] )    Aspiration pneumonia ([facility] )    Psychiatric disorder    DM (diabetes mellitus), type 2 ([facility] )    GERD (gastroesophageal reflux disease)    Previous Admission - Discharge Date:1/3/21   LOS (days): 1  Geometric Mean LOS (GMLOS) (days): 4 80  Days to GMLOS:4 Previous Discharge Diagnosis:  There are no discharge diagnoses documented for the most recent discharge  Risk of Unplanned Readmission Score  Predictive Model Details          13 (Low)  Factor Value    Calculated 2021 08:06 27% Number of active Rx orders 28    Risk of Unplanned Readmission Model 13% Active antipsychotic Rx order present     13% ECG/EKG order present in last 6 months     9% Imaging order present in last 6 months     8% Latest hemoglobin low (9 8 g/dL)     8% Number of ED visits in last six months 1     8% Age 77     6% Active anticoagulant Rx order present     5% Charlson Comorbidity Index 3     1% Active ulcer medication Rx order present     1% Current length of stay 0 626 days         BUNDLE:      OBJECTIVE:  Pt is a 77y o  year old Single, white or  [1], female with Hinduism preference of Non-Adventism admitted on 2021  2:07 PM  Pt is admitted to [place] [id number] at [address] [place] [address] with complaints of Sepsis ([facility] )  Current admission status: Inpatient  Preferred Pharmacy:   [address] [place] [address], [address] [place] [address]  [address] [address]  Phone: [phone] Fax: [phone]    Primary Care Provider: Bebe Weber MD     Admit with sepsis- presumed aspiration PNA  02, 2 IV antibiotics, Speech eval is pending, IV fluids: PT/OT pending      CM met with called brother who is listed as primary contact-- Jim Bower, to obtain baseline information  CM discussed the role of CM in helping the patient develop a discharge plan and assist the patient in carry out their plan  CM spoke to Care Giver and Supervisor  Pt is from a [address] in [facility]-- [phone]  There is 24/7 Supervision in the Home per Pure who works at the home  Supervisor for the house is Erin Chapa   ASSESSMENT:  Healthcare Proxy  Health Care Agent:   Name: Mamie Guerra  Relationship: Brother [1]  Home Phone:   Mobile Phone: [phone]  If more than one Healthcare Proxy exists, details will need to be added manually in this note  Believe Decision Maker for Yu with be the [address] [place] [address]    Patient Information  Mental Status: Other (Comment)  Assessment information provided by[de-identified] , Other - please comment  Primary Caregiver: Other (Comment) ([facility])  Caregiver's Name[de-identified] Ortega Ruvalcaba is the supervisor of Group [facility] [phone]  Caregiver's Relationship to Patient[de-identified] Other (Specify) (Group Supervisor)  Caregiver's Telephone Number[de-identified] [phone]  Support Systems: Other (Comment), Home care staff (group [place] )  What city do you live in?: [facility]  Type of Current Residence: [facility] entry access options   Select all that apply : Ramp  Living Arrangements: Other (Comment) (Has 24/7 Care Givers)    Activities of Daily Living Prior to Admission  Functional Status: Assistance  Completes ADLs independently?: No  Level of ADL dependence: Assistance  Ambulates independently?: No  Level of ambulatory dependence: Assistance  Does patient use assisted devices?: Yes  Assisted Devices (DME) used: [place], Wheelchair  Does patient currently own DME?: Yes  What DME does the patient currently own?: Denise Segura  Does patient have a history of Outpatient Therapy (PT/OT)?: No  Does the patient have a history of Short-Term Rehab?: No  Does patient currently have [address]?: No     Pt does ambulate with assistance, per Pure there are railings all over the home  Pt will use a WC when out in the community-- Noting patient goes to Day Care on Monday- Tues and Thursday  Current Home Health Care  Type of Current Home Care Services: 24hr Caregiver    Patient Information Continued  Income Source: Government aid  Does patient receive dialysis treatments?: No  Does patient have a history of substance abuse?: No       Means of Transportation  Means of Transport to Appts[de-identified]  (Facility Transport)    DISCHARGE DETAILS:    Discharge planning discussed with[de-identified] Brother- [facility] Supervisor of Group home  Freedom of Choice: Yes    Contacts  Patient Contacts: Serjio Gathers  Realtionship to Patient[de-identified] Other (Comment)  Contact Method: Phone  Phone Number: [phone]  Reason/Outcome: Discharge [address]         Is the patient interested in [address] at discharge?: No    DME Referral Provided  Referral made for DME?: No     Anticipate dc will be return to [address] [place] [address], the Group home will transport

## 2021-07-19 NOTE — ASSESSMENT & PLAN NOTE
· Improved discontinue IV fluids monitor she is eating  · 2/2 aspiration pna  On right side  · Place on cefepime and flagyl and proceed with procal  · Met criteria by rr>20 and hr   · CT of the chest reviewed there is right middle lobe and right lower lobe consolidation today his potassium over atelectasis awaiting procalcitonin with oscillation high-frequency lung sounds much better     ·   · Lactic acid is normal  · Blood cultures

## 2021-07-19 NOTE — OCCUPATIONAL THERAPY NOTE
Occupational Therapy Cancellation Note         Patient Name: Portia Younger  HLGIA'V Date: 7/19/2021  Problem List  Principal Problem:    Sepsis ([facility] )  Active Problems:    Aspiration pneumonia ([facility] )    Psychiatric disorder    DM (diabetes mellitus), type 2 ([facility] )    GERD (gastroesophageal reflux disease)          07/19/21 1133   Note Type   Note type Evaluation   Cancel Reasons   (Pt unarrousable)         OT orders received  Chart review completed  Pt admitted to [address] [place] [address] 7/18/2021 with Dx: sepsis  Attempted to see Pt for OT evaluation this AM, however Pt sleeping and unarrousable at this time despite sternal rubs and verbal stimuli  Will continue to follow and address as medically appropriate and as schedule allows         BRAYAN Leiva/JOE

## 2021-07-19 NOTE — RESPIRATORY THERAPY NOTE
RT Protocol Note  [facility] 77 y o  female MRN: [id number]  Unit/Bed#: -01 Encounter: [id number]    Assessment    Principal Problem:    Sepsis ([place] [address] )  Active Problems:    Aspiration pneumonia ([place] [address] )    Psychiatric disorder    DM (diabetes mellitus), type 2 ([place] [address] )    GERD (gastroesophageal reflux disease)      Home Pulmonary Medications:         Past Medical History:   Diagnosis Date    Anxiety     Diabetes mellitus ([place] [address] )     GERD (gastroesophageal reflux disease)     Hypertension     Mental disability      Social History     Socioeconomic History    Marital status: Single     Spouse name: None    Number of children: None    Years of education: None    Highest education level: None   Occupational History    None   Tobacco Use    Smoking status: Never Smoker    Smokeless tobacco: Never Used   Vaping Use    Vaping Use: Never used   Substance and Sexual Activity    Alcohol use: Not Currently    Drug use: Not Currently    Sexual activity: Not Currently   Other Topics Concern    None   Social History Narrative    None     Social Determinants of Health     Financial Resource Strain:     Difficulty of Paying Living Expenses:    Food Insecurity:     Worried About Running Out of Food in the Last Year:     Ran Out of Food in the Last Year:    Transportation Needs:     Lack of Transportation (Medical):      Lack of Transportation (Non-Medical):    Physical Activity:     Days of Exercise per Week:     Minutes of Exercise per Session:    Stress:     Feeling of Stress :    Social Connections:     Frequency of Communication with Friends and Family:     Frequency of Social Gatherings with Friends and Family:     Attends Oriental orthodox Services:     Active Member of Clubs or Organizations:     Attends Club or Organization Meetings:     Marital Status:    Intimate Partner Violence:     Fear of Current or Ex-Partner:     Emotionally Abused:     Physically Abused:     Sexually Abused: Subjective         Objective    Physical Exam:   Assessment Type: During-treatment  General Appearance: Awake, Alert  Respiratory Pattern: Normal  Chest Assessment: Chest expansion symmetrical  Bilateral Breath Sounds: Coarse  Cough: Productive, Congested  Suction: Oral  O2 Device: 2LPM NC    Vitals:  Blood pressure 144/74, pulse 105, temperature 99 3 °F (37 4 °C), resp  rate 22, height 5' (1 524 m), weight 94 2 kg (207 lb 10 8 oz), SpO2 91 %  Imaging and other studies: I have personally reviewed pertinent reports  O2 Device: 2LPM NC     Plan       Airway Clearance Plan: (P) Percussive Vest     Resp Comments: (P) Pt here with Asp  PNA, coarse BS B/L unable to follow commands for flutter valve this am, started on vest therapy  Pt is non-verbal at baseline

## 2021-07-19 NOTE — ASSESSMENT & PLAN NOTE
Lab Results   Component Value Date    HGBA1C 6 6 (H) 05/13/2019       Recent Labs     07/18/21 2044 07/19/21  0724   POCGLU 193* 158*       Blood Sugar Average: Last 72 hrs:  · (P) 175 5 stable on sliding scale repeat a hemoglobin A1c

## 2021-07-19 NOTE — MALNUTRITION/BMI
This medical record reflects one or more clinical indicators suggestive of morbid obesity  BMI Findings:  Adult BMI Classifications: Morbid Obesity 40-44 9     Body mass index is 40 56 kg/m²  See Nutrition note dated 7/19/2021 for additional details  Completed nutrition assessment is viewable in the nutrition documentation

## 2021-07-19 NOTE — RESPIRATORY THERAPY NOTE
RT Protocol Note  Dustin Cortez 77 y o  female MRN: [id number]  Unit/Bed#: -01 Encounter: [id number]    Assessment    Principal Problem:    Sepsis ([place] [address] )  Active Problems:    Aspiration pneumonia ([place] [address] )    Psychiatric disorder    DM (diabetes mellitus), type 2 ([place] [address] )    GERD (gastroesophageal reflux disease)      Home Pulmonary Medications:       Past Medical History:   Diagnosis Date    Anxiety     Diabetes mellitus ([place] [address] )     GERD (gastroesophageal reflux disease)     Hypertension     Mental disability      Social History     Socioeconomic History    Marital status: Single     Spouse name: None    Number of children: None    Years of education: None    Highest education level: None   Occupational History    None   Tobacco Use    Smoking status: Never Smoker    Smokeless tobacco: Never Used   Vaping Use    Vaping Use: Never used   Substance and Sexual Activity    Alcohol use: Not Currently    Drug use: Not Currently    Sexual activity: Not Currently   Other Topics Concern    None   Social History Narrative    None     Social Determinants of Health     Financial Resource Strain:     Difficulty of Paying Living Expenses:    Food Insecurity:     Worried About Running Out of Food in the Last Year:     Ran Out of Food in the Last Year:    Transportation Needs:     Lack of Transportation (Medical):      Lack of Transportation (Non-Medical):    Physical Activity:     Days of Exercise per Week:     Minutes of Exercise per Session:    Stress:     Feeling of Stress :    Social Connections:     Frequency of Communication with Friends and Family:     Frequency of Social Gatherings with Friends and Family:     Attends Episcopal Services:     Active Member of Clubs or Organizations:     Attends Club or Organization Meetings:     Marital Status:    Intimate Partner Violence:     Fear of Current or Ex-Partner:     Emotionally Abused:     Physically Abused:     Sexually Abused: Subjective         Objective    Physical Exam:   Assessment Type: During-treatment  General Appearance: Alert, Awake  Respiratory Pattern: Normal  Chest Assessment: Chest expansion symmetrical  Bilateral Breath Sounds: Coarse, Rhonchi  Cough: Productive  O2 Device: 2lpm NC    Vitals:  Blood pressure 144/76, pulse 90, temperature 98 7 °F (37 1 °C), temperature source Oral, resp  rate 20, height 5' (1 524 m), weight 94 2 kg (207 lb 10 8 oz), SpO2 97 %  Imaging and other studies: I have personally reviewed pertinent reports  O2 Device: 2lpm NC     Plan  Encourage pt to cough and deep breathe with the aid of the flutter valve             Resp Comments: 77 yr old pt, awake and nonverable, follows basic commands, productive loose cough, BS coarse with scattered rhonchi, breatthing pattern unlabored, Sp02 ~ 97% on 2lpm NC

## 2021-07-20 ENCOUNTER — APPOINTMENT (INPATIENT)
Dept: CT IMAGING | Facility: HOSPITAL | Age: 66
DRG: 871 | End: 2021-07-20
Payer: MEDICARE

## 2021-07-20 ENCOUNTER — APPOINTMENT (INPATIENT)
Dept: RADIOLOGY | Facility: HOSPITAL | Age: 66
DRG: 871 | End: 2021-07-20
Payer: MEDICARE

## 2021-07-20 PROBLEM — J96.01 ACUTE RESPIRATORY FAILURE WITH HYPOXIA (HCC): Status: ACTIVE | Noted: 2021-07-20

## 2021-07-20 PROBLEM — J18.9 SEPSIS DUE TO PNEUMONIA (HCC): Status: ACTIVE | Noted: 2021-07-18

## 2021-07-20 LAB
ANION GAP SERPL CALCULATED.3IONS-SCNC: 4 MMOL/L (ref 4–13)
ARTERIAL PATENCY WRIST A: YES
BASE EXCESS BLDA CALC-SCNC: 10 MMOL/L (ref -2–3)
BASE EXCESS BLDA CALC-SCNC: 5.9 MMOL/L
BASOPHILS # BLD AUTO: 0.03 THOUSANDS/ΜL (ref 0–0.1)
BASOPHILS NFR BLD AUTO: 1 % (ref 0–1)
BUN SERPL-MCNC: 15 MG/DL (ref 5–25)
CALCIUM SERPL-MCNC: 9.2 MG/DL (ref 8.3–10.1)
CHLORIDE SERPL-SCNC: 98 MMOL/L (ref 100–108)
CO2 SERPL-SCNC: 32 MMOL/L (ref 21–32)
CREAT SERPL-MCNC: 0.62 MG/DL (ref 0.6–1.3)
EOSINOPHIL # BLD AUTO: 0.03 THOUSAND/ΜL (ref 0–0.61)
EOSINOPHIL NFR BLD AUTO: 1 % (ref 0–6)
ERYTHROCYTE [DISTWIDTH] IN BLOOD BY AUTOMATED COUNT: 15.2 % (ref 11.6–15.1)
GFR SERPL CREATININE-BSD FRML MDRD: 94 ML/MIN/1.73SQ M
GLUCOSE SERPL-MCNC: 180 MG/DL (ref 65–140)
GLUCOSE SERPL-MCNC: 183 MG/DL (ref 65–140)
GLUCOSE SERPL-MCNC: 253 MG/DL (ref 65–140)
GLUCOSE SERPL-MCNC: 255 MG/DL (ref 65–140)
GLUCOSE SERPL-MCNC: 272 MG/DL (ref 65–140)
GLUCOSE SERPL-MCNC: 273 MG/DL (ref 65–140)
HCO3 BLDA-SCNC: 31.5 MMOL/L (ref 22–28)
HCO3 BLDA-SCNC: 35.5 MMOL/L (ref 22–28)
HCT VFR BLD AUTO: 28.7 % (ref 34.8–46.1)
HCT VFR BLD CALC: 29 % (ref 34.8–46.1)
HGB BLD-MCNC: 9.1 G/DL (ref 11.5–15.4)
HGB BLDA-MCNC: 9.9 G/DL (ref 11.5–15.4)
IMM GRANULOCYTES # BLD AUTO: 0.04 THOUSAND/UL (ref 0–0.2)
IMM GRANULOCYTES NFR BLD AUTO: 1 % (ref 0–2)
LYMPHOCYTES # BLD AUTO: 0.56 THOUSANDS/ΜL (ref 0.6–4.47)
LYMPHOCYTES NFR BLD AUTO: 9 % (ref 14–44)
MCH RBC QN AUTO: 29.4 PG (ref 26.8–34.3)
MCHC RBC AUTO-ENTMCNC: 31.7 G/DL (ref 31.4–37.4)
MCV RBC AUTO: 93 FL (ref 82–98)
MONOCYTES # BLD AUTO: 0.58 THOUSAND/ΜL (ref 0.17–1.22)
MONOCYTES NFR BLD AUTO: 9 % (ref 4–12)
NASAL CANNULA: 15
NEUTROPHILS # BLD AUTO: 4.9 THOUSANDS/ΜL (ref 1.85–7.62)
NEUTS SEG NFR BLD AUTO: 79 % (ref 43–75)
NRBC BLD AUTO-RTO: 0 /100 WBCS
O2 CT BLDA-SCNC: 13.2 ML/DL (ref 16–23)
OXYHGB MFR BLDA: 93 % (ref 94–97)
PCO2 BLD: 37 MMOL/L (ref 21–32)
PCO2 BLD: 50.2 MM HG (ref 36–44)
PCO2 BLDA: 51.2 MM HG (ref 36–44)
PH BLD: 7.46 [PH] (ref 7.35–7.45)
PH BLDA: 7.41 [PH] (ref 7.35–7.45)
PLATELET # BLD AUTO: 123 THOUSANDS/UL (ref 149–390)
PMV BLD AUTO: 9 FL (ref 8.9–12.7)
PO2 BLD: 61 MM HG (ref 75–129)
PO2 BLDA: 73.3 MM HG (ref 75–129)
POTASSIUM BLD-SCNC: 3.4 MMOL/L (ref 3.5–5.3)
POTASSIUM SERPL-SCNC: 3.8 MMOL/L (ref 3.5–5.3)
PROCALCITONIN SERPL-MCNC: 0.08 NG/ML
RBC # BLD AUTO: 3.09 MILLION/UL (ref 3.81–5.12)
SAO2 % BLD FROM PO2: 92 % (ref 60–85)
SODIUM BLD-SCNC: 135 MMOL/L (ref 136–145)
SODIUM SERPL-SCNC: 134 MMOL/L (ref 136–145)
SPECIMEN SOURCE: ABNORMAL
SPECIMEN SOURCE: ABNORMAL
WBC # BLD AUTO: 6.14 THOUSAND/UL (ref 4.31–10.16)

## 2021-07-20 PROCEDURE — 71275 CT ANGIOGRAPHY CHEST: CPT

## 2021-07-20 PROCEDURE — 36600 WITHDRAWAL OF ARTERIAL BLOOD: CPT

## 2021-07-20 PROCEDURE — 71045 X-RAY EXAM CHEST 1 VIEW: CPT

## 2021-07-20 PROCEDURE — 94760 N-INVAS EAR/PLS OXIMETRY 1: CPT

## 2021-07-20 PROCEDURE — 94640 AIRWAY INHALATION TREATMENT: CPT

## 2021-07-20 PROCEDURE — 82948 REAGENT STRIP/BLOOD GLUCOSE: CPT

## 2021-07-20 PROCEDURE — 99232 SBSQ HOSP IP/OBS MODERATE 35: CPT | Performed by: INTERNAL MEDICINE

## 2021-07-20 PROCEDURE — 92610 EVALUATE SWALLOWING FUNCTION: CPT

## 2021-07-20 PROCEDURE — 84145 PROCALCITONIN (PCT): CPT | Performed by: FAMILY MEDICINE

## 2021-07-20 PROCEDURE — 94669 MECHANICAL CHEST WALL OSCILL: CPT

## 2021-07-20 PROCEDURE — 82947 ASSAY GLUCOSE BLOOD QUANT: CPT

## 2021-07-20 PROCEDURE — 84132 ASSAY OF SERUM POTASSIUM: CPT

## 2021-07-20 PROCEDURE — 85025 COMPLETE CBC W/AUTO DIFF WBC: CPT | Performed by: FAMILY MEDICINE

## 2021-07-20 PROCEDURE — 85014 HEMATOCRIT: CPT

## 2021-07-20 PROCEDURE — 82803 BLOOD GASES ANY COMBINATION: CPT

## 2021-07-20 PROCEDURE — 99291 CRITICAL CARE FIRST HOUR: CPT | Performed by: STUDENT IN AN ORGANIZED HEALTH CARE EDUCATION/TRAINING PROGRAM

## 2021-07-20 PROCEDURE — G1004 CDSM NDSC: HCPCS

## 2021-07-20 PROCEDURE — 82805 BLOOD GASES W/O2 SATURATION: CPT | Performed by: NURSE PRACTITIONER

## 2021-07-20 PROCEDURE — 94002 VENT MGMT INPAT INIT DAY: CPT

## 2021-07-20 PROCEDURE — 80048 BASIC METABOLIC PNL TOTAL CA: CPT | Performed by: FAMILY MEDICINE

## 2021-07-20 PROCEDURE — 97167 OT EVAL HIGH COMPLEX 60 MIN: CPT

## 2021-07-20 PROCEDURE — 84295 ASSAY OF SERUM SODIUM: CPT

## 2021-07-20 PROCEDURE — 94668 MNPJ CHEST WALL SBSQ: CPT

## 2021-07-20 PROCEDURE — 97163 PT EVAL HIGH COMPLEX 45 MIN: CPT

## 2021-07-20 PROCEDURE — 82805 BLOOD GASES W/O2 SATURATION: CPT | Performed by: INTERNAL MEDICINE

## 2021-07-20 RX ORDER — FUROSEMIDE 10 MG/ML
20 INJECTION INTRAMUSCULAR; INTRAVENOUS ONCE
Status: COMPLETED | OUTPATIENT
Start: 2021-07-20 | End: 2021-07-20

## 2021-07-20 RX ADMIN — CEFEPIME HYDROCHLORIDE 2000 MG: 2 INJECTION, SOLUTION INTRAVENOUS at 03:49

## 2021-07-20 RX ADMIN — INSULIN LISPRO 1 UNITS: 100 INJECTION, SOLUTION INTRAVENOUS; SUBCUTANEOUS at 09:06

## 2021-07-20 RX ADMIN — QUETIAPINE 100 MG: 100 TABLET, FILM COATED ORAL at 17:01

## 2021-07-20 RX ADMIN — IPRATROPIUM BROMIDE AND ALBUTEROL SULFATE 3 ML: 2.5; .5 SOLUTION RESPIRATORY (INHALATION) at 20:06

## 2021-07-20 RX ADMIN — INSULIN LISPRO 4 UNITS: 100 INJECTION, SOLUTION INTRAVENOUS; SUBCUTANEOUS at 16:36

## 2021-07-20 RX ADMIN — VANCOMYCIN HYDROCHLORIDE 1250 MG: 5 INJECTION, POWDER, LYOPHILIZED, FOR SOLUTION INTRAVENOUS at 23:27

## 2021-07-20 RX ADMIN — IPRATROPIUM BROMIDE AND ALBUTEROL SULFATE 3 ML: 2.5; .5 SOLUTION RESPIRATORY (INHALATION) at 02:22

## 2021-07-20 RX ADMIN — LORAZEPAM 0.5 MG: 0.5 TABLET ORAL at 17:02

## 2021-07-20 RX ADMIN — GABAPENTIN 800 MG: 400 CAPSULE ORAL at 09:05

## 2021-07-20 RX ADMIN — DIVALPROEX SODIUM 250 MG: 250 TABLET, DELAYED RELEASE ORAL at 13:58

## 2021-07-20 RX ADMIN — ATORVASTATIN CALCIUM 80 MG: 40 TABLET, FILM COATED ORAL at 16:32

## 2021-07-20 RX ADMIN — LORAZEPAM 0.5 MG: 0.5 TABLET ORAL at 09:05

## 2021-07-20 RX ADMIN — METRONIDAZOLE 500 MG: 500 INJECTION, SOLUTION INTRAVENOUS at 02:43

## 2021-07-20 RX ADMIN — FERROUS SULFATE TAB 325 MG (65 MG ELEMENTAL FE) 325 MG: 325 (65 FE) TAB at 09:06

## 2021-07-20 RX ADMIN — INSULIN LISPRO 3 UNITS: 100 INJECTION, SOLUTION INTRAVENOUS; SUBCUTANEOUS at 11:38

## 2021-07-20 RX ADMIN — CEFEPIME HYDROCHLORIDE 2000 MG: 2 INJECTION, SOLUTION INTRAVENOUS at 19:21

## 2021-07-20 RX ADMIN — METRONIDAZOLE 500 MG: 500 INJECTION, SOLUTION INTRAVENOUS at 18:23

## 2021-07-20 RX ADMIN — IOHEXOL 85 ML: 350 INJECTION, SOLUTION INTRAVENOUS at 10:02

## 2021-07-20 RX ADMIN — IPRATROPIUM BROMIDE AND ALBUTEROL SULFATE 3 ML: 2.5; .5 SOLUTION RESPIRATORY (INHALATION) at 13:38

## 2021-07-20 RX ADMIN — CEFEPIME HYDROCHLORIDE 2000 MG: 2 INJECTION, SOLUTION INTRAVENOUS at 12:58

## 2021-07-20 RX ADMIN — DIVALPROEX SODIUM 250 MG: 250 TABLET, DELAYED RELEASE ORAL at 05:00

## 2021-07-20 RX ADMIN — ENOXAPARIN SODIUM 40 MG: 40 INJECTION SUBCUTANEOUS at 09:05

## 2021-07-20 RX ADMIN — GABAPENTIN 800 MG: 400 CAPSULE ORAL at 16:32

## 2021-07-20 RX ADMIN — PANTOPRAZOLE SODIUM 20 MG: 20 TABLET, DELAYED RELEASE ORAL at 05:00

## 2021-07-20 RX ADMIN — IPRATROPIUM BROMIDE AND ALBUTEROL SULFATE 3 ML: 2.5; .5 SOLUTION RESPIRATORY (INHALATION) at 07:41

## 2021-07-20 RX ADMIN — OXYBUTYNIN CHLORIDE 10 MG: 5 TABLET, EXTENDED RELEASE ORAL at 09:05

## 2021-07-20 RX ADMIN — AZITHROMYCIN MONOHYDRATE 500 MG: 500 INJECTION, POWDER, LYOPHILIZED, FOR SOLUTION INTRAVENOUS at 14:01

## 2021-07-20 RX ADMIN — QUETIAPINE 100 MG: 100 TABLET, FILM COATED ORAL at 09:05

## 2021-07-20 RX ADMIN — ASPIRIN 81 MG: 81 TABLET, COATED ORAL at 09:05

## 2021-07-20 RX ADMIN — METRONIDAZOLE 500 MG: 500 INJECTION, SOLUTION INTRAVENOUS at 11:37

## 2021-07-20 RX ADMIN — FUROSEMIDE 20 MG: 10 INJECTION, SOLUTION INTRAMUSCULAR; INTRAVENOUS at 22:32

## 2021-07-20 RX ADMIN — SERTRALINE HYDROCHLORIDE 100 MG: 100 TABLET ORAL at 09:05

## 2021-07-20 NOTE — ASSESSMENT & PLAN NOTE
· Sepsis secondary to pneumonia likely aspiration vs gram-negative  · Continue cefepime and metronidazole  Add azithromycin for atypical coverage      Results from last 7 days   Lab Units 07/20/21  0521 07/18/21 2032   PROCALCITONIN ng/ml 0 08 <0 05

## 2021-07-20 NOTE — RESPIRATORY THERAPY NOTE
RT Protocol Note  Jose Thakur 77 y o  female MRN: [id number]  Unit/Bed#: -01 Encounter: [id number]    Assessment    Principal Problem:    Sepsis ([place] [address] )  Active Problems:    Aspiration pneumonia ([place] [address] )    Psychiatric disorder    DM (diabetes mellitus), type 2 ([place] [address] )    GERD (gastroesophageal reflux disease)      Home Pulmonary Medications:         Past Medical History:   Diagnosis Date    Anxiety     Diabetes mellitus ([place] [address] )     GERD (gastroesophageal reflux disease)     Hypertension     Mental disability      Social History     Socioeconomic History    Marital status: Single     Spouse name: None    Number of children: None    Years of education: None    Highest education level: None   Occupational History    None   Tobacco Use    Smoking status: Never Smoker    Smokeless tobacco: Never Used   Vaping Use    Vaping Use: Never used   Substance and Sexual Activity    Alcohol use: Not Currently    Drug use: Not Currently    Sexual activity: Not Currently   Other Topics Concern    None   Social History Narrative    None     Social Determinants of Health     Financial Resource Strain:     Difficulty of Paying Living Expenses:    Food Insecurity:     Worried About Running Out of Food in the Last Year:     Ran Out of Food in the Last Year:    Transportation Needs:     Lack of Transportation (Medical):      Lack of Transportation (Non-Medical):    Physical Activity:     Days of Exercise per Week:     Minutes of Exercise per Session:    Stress:     Feeling of Stress :    Social Connections:     Frequency of Communication with Friends and Family:     Frequency of Social Gatherings with Friends and Family:     Attends [employer] Services:     Active Member of Clubs or Organizations:     Attends Club or Organization Meetings:     Marital Status:    Intimate Partner Violence:     Fear of Current or Ex-Partner:     Emotionally Abused:     Physically Abused:     Sexually Abused:

## 2021-07-20 NOTE — SPEECH THERAPY NOTE
Speech-Language Pathology Bedside Swallow Evaluation    Patient Name: Daniella Marshall    EOKYD'L Date: 7/20/2021     Problem List  Principal Problem:    Sepsis ([facility] )  Active Problems:    Aspiration pneumonia ([place] [address] )    Psychiatric disorder    DM (diabetes mellitus), type 2 ([facility] )    GERD (gastroesophageal reflux disease)      Past Medical History  Past Medical History:   Diagnosis Date    Anxiety     Diabetes mellitus ([place] [address] )     GERD (gastroesophageal reflux disease)     Hypertension     Mental disability        Summary  Pt presented with s/s suggestive of mild-moderate oropharyngeal dysphagia  Symptoms or concerns included decreased mastication and suspected decreased control of thin liquids, as well as suspected pharyngeal swallow delay and suspected decreased hyolaryngeal elevation upon palpation  Pt tolerated current diet of mech soft and NTL without s/s aspiration  She also appeared to tolerate a few sips of thin, however unable to phonate after swallow to verify clear vocal quality  Pt remains at increased risk for aspiration due to mid flow O2, suspected swallow delay and previously reported moist cough  Recommend continuing mech soft diet and NTL at this time, ST will continue to follow  Risk/s for Aspiration: at least mild-mod    Recommended Diet: mechanically altered/level 2 diet and nectar thick liquids   Recommended Form of Meds: as tolerated, likely crushed in puree   Aspiration precautions and swallowing strategies: upright posture, only feed when fully alert, slow rate of feeding, small bites/sips and alternating bites and sips  Other Recommendations: Continue frequent oral care      Current Medical Status per H&P 7/8/21  Daniella Marshall is a 77 y o  female with a PMH of psychiatric disorder who is nonverbal who presents with acute onset of cough that started today very junky    She was doing fine eat yesterday I did discuss with her caretaker at the group home at bedside did not notice any fevers he did not notice any coughing she had previous pneumonias  No nausea vomiting diarrhea    Special Studies:  CT chest 7/18/21 IMPRESSION:  Significant elevation of the right hemidiaphragm with volume loss and consolidation involving the right middle lobe and right lower lobe with appearance favoring atelectasis  Further detail limited by both patient motion and lack of contrast   Dilated central pulmonary arteries as above  Social/Education/Vocational Hx:  Pt lives in group home    Swallow Information   Current Risks for Dysphagia & Aspiration: AMS  Current Symptoms/Concerns: coughing with po  Current Diet: mechanically altered/level 2 diet and nectar thick liquids   Baseline Diet: unknown      Baseline Assessment   Behavior/Cognition: alert  Speech/Language Status: able to follow commands inconsistently and no verbal output noted  Patient Positioning: upright in chair  Pain Status/Interventions/Response to Interventions: No report of or nonverbal indications of pain  Swallow Mechanism Exam  Facial: masked facies  Labial: decreased strength  Lingual: decreased coordination  Velum: unable to visualize  Mandible:  decreased ROM  Dentition: edentulous  Vocal quality:aphonic   Volitional Cough: unable to initiate volitional cough   Respiratory Status: on [address]  [address]    Consistencies Assessed and Performance   Consistencies Administered: thin liquids, nectar thick, puree and mechanical soft solids    Oral Stage: mild-moderate, decreased mastication and suspected decreased control of liquids     Pharyngeal Stage: mild-moderate, suspected pharyngeal swallow delay and suspected decreased hyolaryngeal elevation upon palpation  No coughing, throat clearing, change in vocal quality or respiratory status noted today, however suspect increased risk for aspiration        Esophageal Concerns: hx of GERD      Summary and Recommendations (see above)    Results Reviewed with: patient and RN     Treatment Recommended: yes Frequency of treatment: 2-3x/week    Dysphagia LTG  -Patient will demonstrate safe and effective oral intake (without overt s/s significant oral/pharyngeal dysphagia including s/s penetration or aspiration) for the highest appropriate diet level  Short Term Goals:  -Pt will tolerate Dysphagia 2/mechanical soft diet and nectar thick liquid with no significant s/s oral or pharyngeal dysphagia across 1-3 diagnostic sessions     -Patient will tolerate trials of upgraded food and/or liquid texture with no significant s/s of oral or pharyngeal dysphagia including aspiration across 1-3 diagnostic sessions

## 2021-07-20 NOTE — PHYSICAL THERAPY NOTE
PHYSICAL THERAPY EVALUATION  NAME:  Lesia Garcia  DATE: 07/20/21    AGE:   77 y o  Mrn:   [id number]  ADMIT DX:  Cough [R05]  Pneumonia [J18 9]  Hypoxic [R09 02]    Past Medical History:   Diagnosis Date    Anxiety     Diabetes mellitus ([facility] [address] )     GERD (gastroesophageal reflux disease)     Hypertension     Mental disability      Length Of Stay: 2  Performed at least 2 patient identifiers during session: Name and Birthday  PHYSICAL THERAPY EVALUATION :      07/20/21 1116   PT Last Visit   PT Visit Date 07/20/21   Note Type   Note type Evaluation   Pain Assessment   Pain Assessment Tool FLACC   Pain Rating: FLACC (Rest) - Face 0   Pain Rating: FLACC (Rest) - Legs 0   Pain Rating: FLACC (Rest) - Activity 0   Pain Rating: FLACC (Rest) - Cry 0   Pain Rating: FLACC (Rest) - Consolability 0   Score: FLACC (Rest) 0   Pain Rating: FLACC (Activity) - Face 0   Pain Rating: FLACC (Activity) - Legs 1   Pain Rating: FLACC (Activity) - Activity 1   Pain Rating: FLACC (Activity) - Cry 0   Pain Rating: FLACC (Activity) - Consolability 0   Score: FLACC (Activity) 2   Home Living   Type of Home Group Home   Additional Comments pt is non-verbal at baseline  Live sin a group home with 24/7 assistance available  Prior Function   Comments Per CM note, pt ambulates with assistance  There are railings all over the home  Pt uses a wheelchair out in the community and goes to Day Care Monday, Tuesday and Thursday   Restrictions/Precautions   Other Precautions Chair Alarm; Bed Alarm;Cognitive;O2;Fall Risk  (15 lpm)   Cognition   Orientation Level Unable to assess  (pt responds to name)   Following Commands Follows one step commands with increased time or repetition   RLE Assessment   RLE Assessment WNL  (2-/5)   LLE Assessment   LLE Assessment WNL  (2-/5)   Bed Mobility   Supine to Sit 2  Maximal assistance   Additional items Assist x 1; Increased time required;Verbal cues;LE management  (trunk management)   Additional Comments HOB elevated > 30 degrees  completed bed mobility with maxAx1 with increased time and effort with assistance at trunk   Transfers   Sit to Stand 2  Maximal assistance  (1/2 standing despite maxAx2 HHA, then RW maxAx2 to stand)   Additional items Assist x 2; Increased time required;Verbal cues   Stand to Sit 2  Maximal assistance   Additional items Assist x 2; Increased time required;Verbal cues   Stand pivot Unable to assess   Other 2  Maximal assistance  (lateral transfer via multiple scoots)   Additional items Assist x 2; Increased time required;Verbal cues   Additional Comments sit<>stand 1st trial wiht HHA achieved 1/2 standing despite maxAx2  2nd trial with RW, achieved full standing with maxAx2, unable to wt shift to right to advance L LE as pt was attempting to advance R LE  returned to sitting for safety  3rd trial HHA sit<>stand with maxAx2, unable to wt shift to complete spt returning to sitting for safety  completed lateral transfer to right to drop arm recliner with maxx2 with multiple scoots with increased time and verbal cues for tehcnique   Ambulation/Elevation   Distance not appropriate at this time, pt unable   Balance   Static Sitting Fair -   Dynamic Sitting Poor +   Static Standing Poor   Dynamic Standing Poor -   Endurance Deficit   Endurance Deficit Yes   Endurance Deficit Description SpO2 on 15 lpm midflow at rest 94-96%  sitting EOB, Spo2 90% on 15 lpm midflow  after transfer SpO2 decreased as low as 86% during transfer, then 88% with good pleth on 15 lpm  increased to 93% within 3' sitting rest   Activity Tolerance   Activity Tolerance Patient limited by fatigue   Medical Staff Made Aware [facility]   Nurse Made Aware Cierra LERMA   Assessment   Prognosis Good   Problem List Decreased strength;Decreased endurance; Impaired balance;Decreased mobility; Decreased cognition;Decreased safety awareness; Obesity   Barriers to Discharge Decreased caregiver support; Inaccessible home environment   Barriers to Discharge Comments requires increased assistance to complete all mobility   Goals   Patient Goals none stated (pt nonverbal   STG Expiration Date 07/30/21   PT Treatment Day 0   Plan   Treatment/Interventions Functional transfer training;LE strengthening/ROM; Therapeutic exercise; Endurance training;Patient/family training;Equipment eval/education; Bed mobility;Gait training; Compensatory technique education;Spoke to nursing;Spoke to case management;OT   PT Frequency Other (Comment)  (3-5x/week)   Recommendation   PT Discharge Recommendation Post acute rehabilitation services   Equipment Recommended   (TBD by rehab)   PT - OK to Discharge   (when medically cleared to rehab)   Additional Comments pt sitting in relciner chair at end of session with all needs within reach and posey alarm on   AM-PAC Basic Mobility Inpatient   Turning in Bed Without Bedrails 2   Lying on Back to Sitting on Edge of Flat Bed 2   Moving Bed to Chair 1   Standing Up From Chair 1   Walk in Room 1   Climb 3-5 Stairs 1   Basic Mobility Inpatient Raw Score 8   Turning Head Towards Sound 3   Follow Simple Instructions 3   Low Function Basic Mobility Raw Score 14   Low Function Basic Mobility Standardized Score 22 01     Pt requires PT /OT co-eval due to signficant assistance with mobility and cognitive-behavioral impairments  (Please find full objective findings from PT assessment regarding body systems outlined above)  Assessment: Pt is a 77 y o  female seen for PT evaluation s/p admission to [address] [place] [address] on 7/18/2021 with Sepsis due to pneumonia [facility])  CTA chest negative for PE  Order placed for PT services    Upon evaluation: Pt is presenting with impaired functional mobility due to decreased strength, decreased endurance, impaired balance, impaired cognition, decreased safety awareness, fall risk and LE edema requiring maximal assistance for bed mobility and maximal assistance of 2 people for transfers and unable to wt shift to ambulate  Pt's clinical presentation is currently unpredictable given the functional mobility deficits above, especially weakness, edema of extremities, decreased endurance, pain, decreased activity tolerance, decreased functional mobility tolerance, decreased safety awareness, impaired judgement and decreased cognition, coupled with fall risks as indicated by 14/32 on low funcitoning basic mobility form as well as impaired balance, polypharmacy, impaired judgement, decreased safety awareness and decreased cognition and combined with medical complications of abnormal H&H, abnormal blood sugars, abnormal sodium values, low SpO2 values, new onset O2 use, abnormal CO2 values and need for input for mobility technique/safety  Pt's PMHx and comorbidities that may affect physical performance and progress include: DM, HTN and intellectual disability  Personal factors affecting pt at time of IE include: anxiety, inaccessible home environment, limited home support, inability to perform IADLs, inability to perform ADLs, inability to navigate level surfaces without external assistance and limited insight into impairments  Pt will benefit from continued skilled PT services to address deficits as defined above and to maximize level of functional mobility to facilitate return toward PLOF and improved QOL  From PT/mobility standpoint, recommendation at time of d/c would be Short term rehab pending progress in order to reduce fall risk and maximize pt's functional independence and consistency with mobility in order to facilitate return to PLOF  Recommend ther ex next 1-2 sessions  The patient's AM-PAC Basic Mobility Inpatient Short Form Low Function Raw Score 14 , Standardized Score is 22 01  A standardized score less than 42 9 suggests the patient may benefit from discharge to post-acute rehabilitation services  Please also refer to the recommendation of the Physical Therapist for safe discharge planning      Goals: Pt will: Perform bed mobility tasks with consistent min A of 1 to reposition in bed and prepare for transfers  Pt will perform transfers with consistent min A of 1 to decrease burden of care, decrease risk for falls and improve activity tolerance and prepare for ambulation  Pt will ambulate with LRAD for >/= 25' with  modAx1  to decrease burden of care, decrease risk for falls, improve activity tolerance and improve gait quality and to access home environment  Pt will increase B LE strength >/= 1/2 MMT grade to facilitate functional mobility        Renee Stanfordr, PT,DPT

## 2021-07-20 NOTE — PLAN OF CARE
Problem: PHYSICAL THERAPY ADULT  Goal: Performs mobility at highest level of function for planned discharge setting  See evaluation for individualized goals  Description: Treatment/Interventions: Functional transfer training, LE strengthening/ROM, Therapeutic exercise, Endurance training, Patient/family training, Equipment eval/education, Bed mobility, Gait training, Compensatory technique education, Spoke to nursing, Spoke to case management, OT  Equipment Recommended:  (TBD by rehab)       See flowsheet documentation for full assessment, interventions and recommendations  Note: Prognosis: Good  Problem List: Decreased strength, Decreased endurance, Impaired balance, Decreased mobility, Decreased cognition, Decreased safety awareness, Obesity  Assessment: Pt is a 77 y o  female seen for PT evaluation s/p admission to [address] [place] [address] on 7/18/2021 with Sepsis due to pneumonia [facility])  CTA chest negative for PE  Order placed for PT services  Upon evaluation: Pt is presenting with impaired functional mobility due to decreased strength, decreased endurance, impaired balance, impaired cognition, decreased safety awareness, fall risk and LE edema requiring maximal assistance for bed mobility and maximal assistance of 2 people for transfers and unable to wt shift to ambulate   Pt's clinical presentation is currently unpredictable given the functional mobility deficits above, especially weakness, edema of extremities, decreased endurance, pain, decreased activity tolerance, decreased functional mobility tolerance, decreased safety awareness, impaired judgement and decreased cognition, coupled with fall risks as indicated by 14/32 on low funcitoning basic mobility form as well as impaired balance, polypharmacy, impaired judgement, decreased safety awareness and decreased cognition and combined with medical complications of abnormal H&H, abnormal blood sugars, abnormal sodium values, low SpO2 values, new onset O2 use, abnormal CO2 values and need for input for mobility technique/safety  Pt's PMHx and comorbidities that may affect physical performance and progress include: DM, HTN and intellectual disability  Personal factors affecting pt at time of IE include: anxiety, inaccessible home environment, limited home support, inability to perform IADLs, inability to perform ADLs, inability to navigate level surfaces without external assistance and limited insight into impairments  Pt will benefit from continued skilled PT services to address deficits as defined above and to maximize level of functional mobility to facilitate return toward PLOF and improved QOL  From PT/mobility standpoint, recommendation at time of d/c would be Short term rehab pending progress in order to reduce fall risk and maximize pt's functional independence and consistency with mobility in order to facilitate return to PLOF  Recommend ther ex next 1-2 sessions  Barriers to Discharge: Decreased caregiver support, Inaccessible home environment  Barriers to Discharge Comments: requires increased assistance to complete all mobility     PT Discharge Recommendation: Post acute rehabilitation services     PT - OK to Discharge:  (when medically cleared to rehab)    See flowsheet documentation for full assessment

## 2021-07-20 NOTE — PROGRESS NOTES
[address]  Progress Note - Kaushik Andrade 1955, 77 y o  female MRN: [id number]  Unit/Bed#: -Imelda Encounter: [id number]  Primary Care Provider: Arsenio Cranker, MD   Date and time admitted to hospital: 7/18/2021  2:07 PM    * Sepsis due to pneumonia [facility])  Assessment & Plan  · Sepsis secondary to pneumonia likely aspiration vs gram-negative  · Continue cefepime and metronidazole  Add azithromycin for atypical coverage  Results from last 7 days   Lab Units 07/20/21  0521 07/18/21 2032   PROCALCITONIN ng/ml 0 08 <0 05       Acute respiratory failure with hypoxia (HCC)  Assessment & Plan  · Acute respiratory failure/hypoxia secondary to pneumonia  · CTA negative for pulmonary embolism    GERD (gastroesophageal reflux disease)  Assessment & Plan  · Continue pantoprazole    DM (diabetes mellitus), type 2 [facility])  Assessment & Plan  Lab Results   Component Value Date    HGBA1C 6 6 (H) 07/19/2021     Recent Labs     07/19/21  1117 07/19/21  1554 07/20/21  0734 07/20/21  1104   POCGLU 200* 179* 180* 253*     · Continue sliding scale insulin    Psychiatric disorder  Assessment & Plan  · Mood disorder continue depakote gabapentin quetiapine lorazepam and sertraline      VTE Pharmacologic Prophylaxis: VTE Score: 3 Moderate Risk (Score 3-4) - Pharmacological DVT Prophylaxis Ordered: Enoxaparin (Lovenox)  Patient Centered Rounds: I have performed bedside rounds with nursing staff today  Discussions with Specialists or Other Care Team Provider:  Case management    Education and Discussions with Family / Patient:  Discussed with brother and sister-in-law on telephone    Time Spent for Care: 25 mins  More than 50% of total time spent on counseling and coordination of care as described above      Current Length of Stay: 2 day(s)  Current Patient Status: Inpatient   Certification Statement: The patient will continue to require additional inpatient hospital stay due to respiratory failure  Discharge Plan / Estimated Discharge Date: Anticipate discharge in > 72 hrs to discharge location to be determined pending rehab evaluations  Code Status: Level 1 - Full Code      Subjective:   Patient seen and examined  Worsening hypoxia throughout the day currently on 15 L    Objective:   Vitals: Blood pressure 117/59, pulse 87, temperature 99 5 °F (37 5 °C), resp  rate 20, height 5' (1 524 m), weight 94 2 kg (207 lb 10 8 oz), SpO2 90 %  Physical Exam  Vitals reviewed  Constitutional:       General: She is not in acute distress  Comments: Lethargic   HENT:      Head: Atraumatic  Cardiovascular:      Rate and Rhythm: Regular rhythm  Heart sounds: Normal heart sounds  Pulmonary:      Breath sounds: Decreased breath sounds and rhonchi present  No wheezing  Abdominal:      General: Bowel sounds are normal       Palpations: Abdomen is soft  Tenderness: There is no guarding or rebound  Musculoskeletal:         General: No swelling  Skin:     General: Skin is warm     Neurological:      Comments: Nonverbal       Additional Data:   Labs:  Results from last 7 days   Lab Units 07/20/21  0521 07/19/21 0517 07/18/21  1612   WBC Thousand/uL 6 14 4 50 5 24   HEMOGLOBIN g/dL 9 1* 9 8* 10 8*   HEMATOCRIT % 28 7* 31 0* 33 1*   MCV fL 93 93 91   TOTAL NEUT ABS Thousand/uL  --  3 38  --    BANDS PCT %  --  26*  --    PLATELETS Thousands/uL 123* 153 163     Results from last 7 days   Lab Units 07/20/21  0521 07/19/21  0517 07/18/21  1612   SODIUM mmol/L 134* 133* 134*   POTASSIUM mmol/L 3 8 4 1 4 5   CHLORIDE mmol/L 98* 97* 95*   CO2 mmol/L 32 31 35*   ANION GAP mmol/L 4 5 4   BUN mg/dL 15 12 13   CREATININE mg/dL 0 62 0 67 0 71   CALCIUM mg/dL 9 2 9 5 10 2*   ALBUMIN g/dL  --   --  3 1*   TOTAL BILIRUBIN mg/dL  --   --  0 54   ALK PHOS U/L  --   --  67   ALT U/L  --   --  20   AST U/L  --   --  25   EGFR ml/min/1 73sq m 94 91 88   GLUCOSE RANDOM mg/dL 183* 174* 140         Results from last 7 days   Lab Units 07/18/21  1612   TROPONIN I ng/mL <0 02     Results from last 7 days   Lab Units 07/18/21  1612   NT-PRO BNP pg/mL 509*      Results from last 7 days   Lab Units 07/20/21  0521 07/18/21  2039 07/18/21  1612   LACTIC ACID mmol/L  --  1 9 1 2   PROCALCITONIN ng/ml 0 08  --   --      Results from last 7 days   Lab Units 07/20/21  1104 07/20/21  0734 07/19/21  1554 07/19/21  1117 07/19/21  0724 07/18/21  2044   POC GLUCOSE mg/dl 253* 180* 179* 200* 158* 193*     Results from last 7 days   Lab Units 07/19/21  0517   HEMOGLOBIN A1C % 6 6*         * I Have Reviewed All Lab Data Listed Above  Cultures:   Results from last 7 days   Lab Units 07/19/21  0519 07/18/21 2040 07/18/21 2039   BLOOD CULTURE   --  No Growth at 24 hrs  No Growth at 24 hrs  SPUTUM CULTURE  Culture too young- will reincubate  --   --    GRAM STAIN RESULT  4+ Gram negative coccobacilli*  2+ Gram negative rods*  2+ Gram positive cocci in pairs*  2+ Polys*  --   --        Results from last 7 days   Lab Units 07/18/21  1600   SARS-COV-2  Negative           Lines/Drains:  Invasive Devices     Peripheral Intravenous Line            Peripheral IV 07/18/21 Left Antecubital 1 day          Drain            External Urinary Catheter <1 day              Telemetry:      Imaging:  Imaging Reports Reviewed Today Include:   XR chest 1 view portable    Result Date: 7/19/2021  Impression: No acute cardiopulmonary disease  Workstation performed: [id number]     CT chest wo contrast    Result Date: 7/18/2021  Impression: Significant elevation of the right hemidiaphragm with volume loss and consolidation involving the right middle lobe and right lower lobe with appearance favoring atelectasis  Further detail limited by both patient motion and lack of contrast  Dilated central pulmonary arteries as above  Workstation performed: [id number]     CTA chest pe study    Result Date: 7/20/2021  Impression: No central pulmonary emboli identified  Significant interval worsening in airspace consolidation and patchy infiltrates involving the right upper lobe to the greatest degree posterior segment but also elsewhere within the right upper lobe  There is also interval worsening of the consolidation  and volume loss in the dependent lower lobes bilaterally  Also component of chronic volume loss related to elevated right hemidiaphragm  Focally dilated main pulmonary artery  Assess for pulmonary valve disease  Workstation performed: [id number]     Scheduled Meds:  Current Facility-Administered Medications   Medication Dose Route Frequency Provider Last Rate    acetaminophen  650 mg Oral Q6H PRN Elian Lyons MD      aspirin  81 mg Oral Daily Elian Lyons MD      atorvastatin  80 mg Oral Daily With Gaurav Mack MD      cefepime  2,000 mg Intravenous Q8H Elian Lyons MD 2,000 mg (07/20/21 1258)    divalproex sodium  250 mg Oral [facility] Elian Lyons MD      enoxaparin  40 mg Subcutaneous Q24H [facility] Elian Lyons MD      ferrous sulfate  325 mg Oral Daily With Breakfast Elian Lyons MD      gabapentin  800 mg Oral TID Elian Lyons MD      insulin lispro  1-6 Units Subcutaneous TID [facility] Elian Lyons MD      ipratropium-albuterol  3 mL Nebulization Q6H Elian Lyons MD      LORazepam  0 5 mg Oral BID Elian Lyons MD      metroNIDAZOLE  500 mg Intravenous Q8H Elian Lyons  mg (07/20/21 1137)    oxybutynin  10 mg Oral Daily Elian Lyons MD      pantoprazole  20 mg Oral Early Morning Elian Lyons MD      QUEtiapine  100 mg Oral BID Elian Lyons MD      sertraline  100 mg Oral BID MD Judi Valdivia DO  [facility] Internal Medicine  Hospitalist    ** Please Note: This note has been constructed using a voice recognition system   **

## 2021-07-20 NOTE — PLAN OF CARE
Problem: MOBILITY - ADULT  Goal: Maintain or return to baseline ADL function  Description: INTERVENTIONS:  -  Assess patient's ability to carry out ADLs; assess patient's baseline for ADL function and identify physical deficits which impact ability to perform ADLs (bathing, care of mouth/teeth, toileting, grooming, dressing, etc )  - Assess/evaluate cause of self-care deficits   - Assess range of motion  - Assess patient's mobility; develop plan if impaired  - Assess patient's need for assistive devices and provide as appropriate  - Encourage maximum independence but intervene and supervise when necessary  - Involve family in performance of ADLs  - Assess for home care needs following discharge   - Consider OT consult to assist with ADL evaluation and planning for discharge  - Provide patient education as appropriate  Outcome: Progressing  Goal: Maintains/Returns to pre admission functional level  Description: INTERVENTIONS:  - Perform BMAT or MOVE assessment daily    - Set and communicate daily mobility goal to care team and patient/family/caregiver  - Collaborate with rehabilitation services on mobility goals if consulted  - Perform Range of Motion 3 times a day  - Reposition patient every 2 hours    - Dangle patient 3 times a day  - Stand patient 3 times a day  - Ambulate patient 3 times a day  - Out of bed to chair 3 times a day   - Out of bed for meals 3 times a day  - Out of bed for toileting  - Record patient progress and toleration of activity level   Outcome: Progressing     Problem: Prexisting or High Potential for Compromised Skin Integrity  Goal: Skin integrity is maintained or improved  Description: INTERVENTIONS:  - Identify patients at risk for skin breakdown  - Assess and monitor skin integrity  - Assess and monitor nutrition and hydration status  - Monitor labs   - Assess for incontinence   - Turn and reposition patient  - Assist with mobility/ambulation  - Relieve pressure over bony prominences  - Avoid friction and shearing  - Provide appropriate hygiene as needed including keeping skin clean and dry  - Evaluate need for skin moisturizer/barrier cream  - Collaborate with interdisciplinary team   - Patient/family teaching  - Consider wound care consult   Outcome: Progressing     Problem: Nutrition/Hydration-ADULT  Goal: Nutrient/Hydration intake appropriate for improving, restoring or maintaining nutritional needs  Description: Monitor and assess patient's nutrition/hydration status for malnutrition  Collaborate with interdisciplinary team and initiate plan and interventions as ordered  Monitor patient's weight and dietary intake as ordered or per policy  Utilize nutrition screening tool and intervene as necessary  Determine patient's food preferences and provide high-protein, high-caloric foods as appropriate       INTERVENTIONS:  - Monitor oral intake, urinary output, labs, and treatment plans  - Assess nutrition and hydration status and recommend course of action  - Evaluate amount of meals eaten  - Assist patient with eating if necessary   - Allow adequate time for meals  - Recommend/ encourage appropriate diets, oral nutritional supplements, and vitamin/mineral supplements  - Order, calculate, and assess calorie counts as needed  - Recommend, monitor, and adjust tube feedings and TPN/PPN based on assessed needs  - Assess need for intravenous fluids  - Provide specific nutrition/hydration education as appropriate  - Include patient/family/caregiver in decisions related to nutrition  Outcome: Progressing     Problem: Potential for Falls  Goal: Patient will remain free of falls  Description: INTERVENTIONS:  - Educate patient/family on patient safety including physical limitations  - Instruct patient to call for assistance with activity   - Consult OT/PT to assist with strengthening/mobility   - Keep Call bell within reach  - Keep bed low and locked with side rails adjusted as appropriate  - Keep care items and personal belongings within reach  - Initiate and maintain comfort rounds  - Make Fall Risk Sign visible to staff  - Offer Toileting every 2 Hours, in advance of need  - Initiate/Maintain bed/chair alarm  - Obtain necessary fall risk management equipment:   - Apply yellow socks and bracelet for high fall risk patients  - Consider moving patient to room near nurses station  Outcome: Progressing

## 2021-07-20 NOTE — OCCUPATIONAL THERAPY NOTE
Occupational Therapy Evaluation     Patient Name: Jose ROBB Date: 7/20/2021  Problem List  Principal Problem:    Sepsis due to pneumonia [facility])  Active Problems:    Aspiration pneumonia ([place] [address] )    Psychiatric disorder    DM (diabetes mellitus), type 2 ([place] [address] )    GERD (gastroesophageal reflux disease)    Acute respiratory failure with hypoxia ([place] [address] )    Past Medical History  Past Medical History:   Diagnosis Date    Anxiety     Diabetes mellitus ([place] [address] )     GERD (gastroesophageal reflux disease)     Hypertension     Mental disability      Past Surgical History  History reviewed  No pertinent surgical history  07/20/21 1119   Note Type   Note type Evaluation   Restrictions/Precautions   Other Precautions Bed Alarm;Cognitive; Chair Alarm; Fall Risk;O2  (15L MF)   Pain Assessment   Pain Assessment Tool FLACC   Pain Rating: FLACC (Rest) - Face 0   Pain Rating: FLACC (Rest) - Legs 0   Pain Rating: FLACC (Rest) - Activity 0   Pain Rating: FLACC (Rest) - Cry 0   Pain Rating: FLACC (Rest) - Consolability 0   Score: FLACC (Rest) 0   Pain Rating: FLACC (Activity) - Face 0   Pain Rating: FLACC (Activity) - Legs 0   Pain Rating: FLACC (Activity) - Activity 0   Pain Rating: FLACC (Activity) - Cry 0   Pain Rating: FLACC (Activity) - Consolability 0   Score: FLACC (Activity) 0   Home Living   Type of Home Group Home   Additional Comments Pt is non-verbal at baseline, unable to provide PLOF or home set-up  Per CM note, Pt lives at a group home with 24/7 assistance available  Pt ambulates with assistance and has a w/c for long distance use  Prior Function   Comments Uncertain of Pt's PLOF at this time  It is presumed Pt has assistance for ADLs PRN and all IADL tasks  will consult with CM as able to obtain information   ADL   Where Assessed Edge of bed   Grooming Assistance 3  Moderate Assistance   Grooming Deficit Steadying;Verbal cueing;Supervision/safety; Increased time to complete;Wash/dry hands   UB Dressing Assistance 3  Moderate Assistance   UB Dressing Deficit Steadying;Verbal cueing;Supervision/safety; Increased time to complete; Thread RUE; Thread LUE;Pull over head;Pull around back   LB Dressing Assistance 1  Total Assistance   LB Dressing Deficit Steadying; Requires assistive device for steadying;Verbal cueing;Supervision/safety; Increased time to complete; Don/doff R sock; Don/doff L sock; Thread RLE into pants; Thread LLE into pants;Pull up over hips   Additional Comments Pt completing ADL tasks while seated at EOB  UB Dressing and grooming tasks @ MOd A wiht HOHA and vc'ing for initation and sequencing  LB Dressing @ Total A with no attempts to complete independently from Pt at this time   Bed Mobility   Supine to Sit 2  Maximal assistance   Additional items Assist x 1;HOB elevated;Trapeze bar; Increased time required;Verbal cues;LE management  (trunk management)   Transfers   Sit to Stand 2  Maximal assistance  (able to achieve 1/2 stand with HHA  full stand with RW)   Additional items Assist x 2; Increased time required;Verbal cues   Stand to Sit 2  Maximal assistance   Additional items Assist x 2; Increased time required;Verbal cues   Stand pivot Unable to assess   Sit pivot 2  Maximal assistance   Additional items Assist x 2; Increased time required;Verbal cues   Additional Comments Pt completing STS initially from EOB with use of HHA from therapist  Pt able to achieve 1/2 stand with Max x's 2  Pt then completing STS from EOB with RW @ Max x's 2 although Pt unable to safely pivot at htis time  Pt was returned to EOB for safety concerns  Pt then completing sit/squat pivot from EOB to drop arm recliner @ Max A with B knees blocked and assistance with forward flexion and weight shifting  Max A for repositioning in recliner chair      Balance   Static Sitting Fair +   Dynamic Sitting Fair   Static Standing Poor   Dynamic Standing Poor -   Activity Tolerance   Activity Tolerance Patient limited by fatigue   Medical tasks, decrease cognition, decrease safety awareness , decrease UB MS, decrease generalized strength, decrease activity engagement and decrease performance during functional transfers  Pt would benefit from continued acute OT services to address deficits as well as post acute rehab upon d/c from [address] [place] [address]  Plan   Treatment Interventions ADL retraining;Functional transfer training;UE strengthening/ROM; Endurance training;Cognitive reorientation;Patient/family training;Equipment evaluation/education; Neuromuscular reeducation; Compensatory technique education;Continued evaluation; Energy conservation; Activityengagement   Goal Expiration Date 07/30/21   OT Frequency 3-5x/wk   Recommendation   OT Discharge Recommendation Post acute rehabilitation services   [facility] Daily Activity Inpatient   Lower Body Dressing 1   Bathing 1   Toileting 1   Upper Body Dressing 2   Grooming 2   Eating 2   Daily Activity Raw Score 9   Turning Head Towards Sound 3   Follow Simple Instructions 3   Low Function Daily Activity Raw Score 15   Low Function Daily Activity Standardized Score 26 28   AM-PAC Applied Cognition Inpatient   Following a Speech/Presentation 2   Understanding Ordinary Conversation 3   Taking Medications 1   Remembering Where Things Are Placed or Put Away 1   Remembering List of 4-5 Errands 1   Taking Care of Complicated Tasks 1   Applied Cognition Raw Score 9   Applied Cognition Standardized Score 22 48       The patient's raw score on the AM-PAC Daily Activity inpatient short form is 9, standardized score is  , less than 39 4  Patients at this level are likely to benefit from DC to post-acute rehabilitation services  Please refer to the recommendation of the Occupational Therapist for safe DC planning  Pt goals to be met by 7/30/2021    1  Pt will demonstrate ability to complete grooming/hygiene tasks @ S after set-up    2  Pt will demonstrate ability to complete supine<>sit @ S in order to increase safety and independence during ADL tasks  3  Pt will demonstrate ability to complete UB ADLs including washing/dressing @ S in order to increase performance and participation during meaningful tasks  4  Pt will demonstrate ability to complete LB dressing @ Mod A in order to increase safety and independence during meaningful tasks  5  Pt will demonstrate ability to complete toileting tasks including CM and pericare @ Mod A in order to increase safety and independence during meaningful tasks  6  Pt will demonstrate ability to complete EOB, chair, toilet/commode transfers @ Mod A in order to increase performance and participation during functional tasks  7  Pt will demonstrate ability to stand for 1-2 minutes while maintaining F+ balance with use of RW for UB support PRN  8  Pt will demonstrate ability to tolerate 30-35 minute OT session with no vc'ing for deep breathing or use of energy conservation techniques in order to increase activity tolerance during functional tasks  9  Pt will demonstrate Good carryover of use of energy conservation/compensatory strategies during ADLs and functional tasks in order to increase safety and reduce risk for falls  10  Pt will demonstrate Good attention and participation in continued evaluation of functional ambulation house hold distances in order to assist with safe d/c planning  11  Pt will demonstrate 100% carryover of BUE HEP in order to increase BUE MS and increase performance during functional tasks upon d/c home      Ursula Vides OTR/L

## 2021-07-20 NOTE — RESPIRATORY THERAPY NOTE
RT Protocol Note  Asya Sood 77 y o  female MRN: [id number]  Unit/Bed#: -01 Encounter: [id number]    Assessment    Principal Problem:    Sepsis ([place] [address] )  Active Problems:    Aspiration pneumonia ([place] [address] )    Psychiatric disorder    DM (diabetes mellitus), type 2 ([place] [address] )    GERD (gastroesophageal reflux disease)      Home Pulmonary Medications:         Past Medical History:   Diagnosis Date    Anxiety     Diabetes mellitus ([place] [address] )     GERD (gastroesophageal reflux disease)     Hypertension     Mental disability      Social History     Socioeconomic History    Marital status: Single     Spouse name: None    Number of children: None    Years of education: None    Highest education level: None   Occupational History    None   Tobacco Use    Smoking status: Never Smoker    Smokeless tobacco: Never Used   Vaping Use    Vaping Use: Never used   Substance and Sexual Activity    Alcohol use: Not Currently    Drug use: Not Currently    Sexual activity: Not Currently   Other Topics Concern    None   Social History Narrative    None     Social Determinants of Health     Financial Resource Strain:     Difficulty of Paying Living Expenses:    Food Insecurity:     Worried About Running Out of Food in the Last Year:     Ran Out of Food in the Last Year:    Transportation Needs:     Lack of Transportation (Medical):      Lack of Transportation (Non-Medical):    Physical Activity:     Days of Exercise per Week:     Minutes of Exercise per Session:    Stress:     Feeling of Stress :    Social Connections:     Frequency of Communication with Friends and Family:     Frequency of Social Gatherings with Friends and Family:     Attends Jain Services:     Active Member of Clubs or Organizations:     Attends Club or Organization Meetings:     Marital Status:    Intimate Partner Violence:     Fear of Current or Ex-Partner:     Emotionally Abused:     Physically Abused:     Sexually Abused: Subjective         Objective    Physical Exam:   Assessment Type: During-treatment  General Appearance: Alert, Awake  Respiratory Pattern: Normal  Chest Assessment: Chest expansion symmetrical  Bilateral Breath Sounds: Diminished, Clear  Cough: Non-productive  O2 Device: midflow 15LPM     Vitals:  Blood pressure 117/59, pulse 87, temperature 99 5 °F (37 5 °C), resp  rate 20, height 5' (1 524 m), weight 94 2 kg (207 lb 10 8 oz), SpO2 90 %  Imaging and other studies: I have personally reviewed pertinent reports  O2 Device: midflow 15LPM      Plan       Airway Clearance Plan: Percussive Vest     Resp Comments: (P) Pt reassessed and placed on midflow 15LPM  Notified Dr Ziggy De Jesus of pts increased O2 requirements

## 2021-07-20 NOTE — PLAN OF CARE
Problem: OCCUPATIONAL THERAPY ADULT  Goal: Performs self-care activities at highest level of function for planned discharge setting  See evaluation for individualized goals  Description: Treatment Interventions: ADL retraining, Functional transfer training, UE strengthening/ROM, Endurance training, Cognitive reorientation, Patient/family training, Equipment evaluation/education, Neuromuscular reeducation, Compensatory technique education, Continued evaluation, Energy conservation, Activityengagement          See flowsheet documentation for full assessment, interventions and recommendations  Note: Limitation: Decreased ADL status, Decreased UE strength, Decreased Safe judgement during ADL, Decreased endurance, Decreased cognition, Decreased self-care trans, Decreased high-level ADLs  Prognosis: Good  Assessment: Pt is a 76 y/o F admitted to [address] [place] [address] 7/18/2021 d/t experiencing increased coughing  Dx: sepsis d/t pneumonia  Pt with PMHx impacting performance during functional tasks including: anxiety, DM, GERD, HTN, mental disability, non-verbal  Pt unable to report PLOF or home set-up at this time  Limited information available at this time  Per CM note, Pt is a resident at a group home and has 24/7 assistance  Assistance for ambulation and has a w/c for long distance use  On evaluation, Pt requiring Max A for supine>sit  Mod A for UB dressing and grooming tasks  LB Dressing @ Total A  Pt requiring Max x's 2 for STS and sit pivot with HHA and RW  Pt BUE ROM and MS WFL  Pt on 15L of MF at start of session, satting at 95%  with activity Pt dropping to 87% and recovering to 94% with rest and pursed lip breathing technique  Pt with minimal s/s of distress during evaluation   Pt's barriers to d/c include: decrease activity tolerance, decrease standing balance, decrease sitting balance, decrease performance during ADL tasks, decrease cognition, decrease safety awareness , decrease UB MS, decrease generalized strength, decrease activity engagement and decrease performance during functional transfers  Pt would benefit from continued acute OT services to address deficits as well as post acute rehab upon d/c from [address] [place] [address]       OT Discharge Recommendation: Post acute rehabilitation services

## 2021-07-20 NOTE — PLAN OF CARE
Problem: Potential for Falls  Goal: Patient will remain free of falls  Description: INTERVENTIONS:  - Educate patient/family on patient safety including physical limitations  - Instruct patient to call for assistance with activity   - Consult OT/PT to assist with strengthening/mobility   - Keep Call bell within reach  - Keep bed low and locked with side rails adjusted as appropriate  - Keep care items and personal belongings within reach  - Initiate and maintain comfort rounds  - Make Fall Risk Sign visible to staff  - Offer Toileting every Hours, in advance of need  - Initiate/Maintain alarm  - Obtain necessary fall risk management equipment:  Problem: Nutrition/Hydration-ADULT  Goal: Nutrient/Hydration intake appropriate for improving, restoring or maintaining nutritional needs  Description: Monitor and assess patient's nutrition/hydration status for malnutrition  Collaborate with interdisciplinary team and initiate plan and interventions as ordered  Monitor patient's weight and dietary intake as ordered or per policy  Utilize nutrition screening tool and intervene as necessary  Determine patient's food preferences and provide high-protein, high-caloric foods as appropriate       INTERVENTIONS:  - Monitor oral intake, urinary output, labs, and treatment plans  - Assess nutrition and hydration status and recommend course of action  - Evaluate amount of meals eaten  - Assist patient with eating if necessary   - Allow adequate time for meals  - Recommend/ encourage appropriate diets, oral nutritional supplements, and vitamin/mineral supplements  - Order, calculate, and assess calorie counts as needed  - Recommend, monitor, and adjust tube feedings and TPN/PPN based on assessed needs  - Assess need for intravenous fluids  - Provide specific nutrition/hydration education as appropriate  - Include patient/family/caregiver in decisions related to nutrition  Outcome: Progressing     - Apply yellow socks and bracelet for high fall risk patients  - Consider moving patient to room near nurses station  Outcome: Progressing

## 2021-07-21 ENCOUNTER — APPOINTMENT (INPATIENT)
Dept: NON INVASIVE DIAGNOSTICS | Facility: HOSPITAL | Age: 66
DRG: 871 | End: 2021-07-21
Payer: MEDICARE

## 2021-07-21 ENCOUNTER — APPOINTMENT (INPATIENT)
Dept: RADIOLOGY | Facility: HOSPITAL | Age: 66
DRG: 871 | End: 2021-07-21
Payer: MEDICARE

## 2021-07-21 PROBLEM — D61.818 PANCYTOPENIA (HCC): Status: ACTIVE | Noted: 2021-07-21

## 2021-07-21 PROBLEM — E78.5 HYPERLIPIDEMIA: Status: ACTIVE | Noted: 2021-07-21

## 2021-07-21 PROBLEM — G40.909 SEIZURE DISORDER (HCC): Chronic | Status: ACTIVE | Noted: 2021-07-21

## 2021-07-21 PROBLEM — R93.89 ABNORMAL CT OF THE CHEST: Status: ACTIVE | Noted: 2021-07-21

## 2021-07-21 PROBLEM — E66.9 OBESITY (BMI 30.0-34.9): Status: ACTIVE | Noted: 2021-07-21

## 2021-07-21 PROBLEM — E66.01 MORBID OBESITY WITH BMI OF 40.0-44.9, ADULT (HCC): Chronic | Status: ACTIVE | Noted: 2021-07-21

## 2021-07-21 PROBLEM — E66.811 OBESITY (BMI 30.0-34.9): Status: ACTIVE | Noted: 2021-07-21

## 2021-07-21 PROBLEM — E87.70 FLUID OVERLOAD: Status: ACTIVE | Noted: 2021-07-21

## 2021-07-21 LAB
ALBUMIN SERPL BCP-MCNC: 2.2 G/DL (ref 3.5–5)
ALP SERPL-CCNC: 64 U/L (ref 46–116)
ALT SERPL W P-5'-P-CCNC: 54 U/L (ref 12–78)
ANION GAP SERPL CALCULATED.3IONS-SCNC: 2 MMOL/L (ref 4–13)
ANION GAP SERPL CALCULATED.3IONS-SCNC: 5 MMOL/L (ref 4–13)
ANION GAP SERPL CALCULATED.3IONS-SCNC: 6 MMOL/L (ref 4–13)
ARTERIAL PATENCY WRIST A: YES
AST SERPL W P-5'-P-CCNC: 49 U/L (ref 5–45)
BACTERIA SPT RESP CULT: ABNORMAL
BASE EXCESS BLDA CALC-SCNC: 6.5 MMOL/L
BASE EXCESS BLDA CALC-SCNC: 6.8 MMOL/L
BASE EXCESS BLDA CALC-SCNC: 9.5 MMOL/L
BILIRUB SERPL-MCNC: 0.39 MG/DL (ref 0.2–1)
BODY TEMPERATURE: 99 DEGREES FEHRENHEIT
BODY TEMPERATURE: 99 DEGREES FEHRENHEIT
BODY TEMPERATURE: 99.9 DEGREES FEHRENHEIT
BUN SERPL-MCNC: 12 MG/DL (ref 5–25)
BUN SERPL-MCNC: 12 MG/DL (ref 5–25)
BUN SERPL-MCNC: 15 MG/DL (ref 5–25)
CALCIUM ALBUM COR SERPL-MCNC: 10.7 MG/DL (ref 8.3–10.1)
CALCIUM SERPL-MCNC: 9.3 MG/DL (ref 8.3–10.1)
CALCIUM SERPL-MCNC: 9.4 MG/DL (ref 8.3–10.1)
CALCIUM SERPL-MCNC: 9.5 MG/DL (ref 8.3–10.1)
CHLORIDE SERPL-SCNC: 102 MMOL/L (ref 100–108)
CHLORIDE SERPL-SCNC: 99 MMOL/L (ref 100–108)
CHLORIDE SERPL-SCNC: 99 MMOL/L (ref 100–108)
CO2 SERPL-SCNC: 27 MMOL/L (ref 21–32)
CO2 SERPL-SCNC: 31 MMOL/L (ref 21–32)
CO2 SERPL-SCNC: 34 MMOL/L (ref 21–32)
CREAT SERPL-MCNC: 0.56 MG/DL (ref 0.6–1.3)
CREAT SERPL-MCNC: 0.58 MG/DL (ref 0.6–1.3)
CREAT SERPL-MCNC: 0.58 MG/DL (ref 0.6–1.3)
ERYTHROCYTE [DISTWIDTH] IN BLOOD BY AUTOMATED COUNT: 14.9 % (ref 11.6–15.1)
FLUAV RNA NPH QL NAA+PROBE: NORMAL
FLUBV RNA NPH QL NAA+PROBE: NORMAL
GFR SERPL CREATININE-BSD FRML MDRD: 96 ML/MIN/1.73SQ M
GFR SERPL CREATININE-BSD FRML MDRD: 96 ML/MIN/1.73SQ M
GFR SERPL CREATININE-BSD FRML MDRD: 97 ML/MIN/1.73SQ M
GLUCOSE SERPL-MCNC: 155 MG/DL (ref 65–140)
GLUCOSE SERPL-MCNC: 156 MG/DL (ref 65–140)
GLUCOSE SERPL-MCNC: 185 MG/DL (ref 65–140)
GLUCOSE SERPL-MCNC: 186 MG/DL (ref 65–140)
GLUCOSE SERPL-MCNC: 209 MG/DL (ref 65–140)
GLUCOSE SERPL-MCNC: 216 MG/DL (ref 65–140)
GLUCOSE SERPL-MCNC: 263 MG/DL (ref 65–140)
GRAM STN SPEC: ABNORMAL
HCO3 BLDA-SCNC: 31.8 MMOL/L (ref 22–28)
HCO3 BLDA-SCNC: 33 MMOL/L (ref 22–28)
HCO3 BLDA-SCNC: 33.5 MMOL/L (ref 22–28)
HCT VFR BLD AUTO: 30.4 % (ref 34.8–46.1)
HGB BLD-MCNC: 9.6 G/DL (ref 11.5–15.4)
MAGNESIUM SERPL-MCNC: 1.9 MG/DL (ref 1.6–2.6)
MAGNESIUM SERPL-MCNC: 2 MG/DL (ref 1.6–2.6)
MCH RBC QN AUTO: 29.9 PG (ref 26.8–34.3)
MCHC RBC AUTO-ENTMCNC: 31.6 G/DL (ref 31.4–37.4)
MCV RBC AUTO: 95 FL (ref 82–98)
NASAL CANNULA: 15
NON VENT ROOM AIR: 100 %
O2 CT BLDA-SCNC: 12.2 ML/DL (ref 16–23)
O2 CT BLDA-SCNC: 13.7 ML/DL (ref 16–23)
O2 CT BLDA-SCNC: 15.7 ML/DL (ref 16–23)
OXYHGB MFR BLDA: 71.8 % (ref 94–97)
OXYHGB MFR BLDA: 94.8 % (ref 94–97)
OXYHGB MFR BLDA: 97.8 % (ref 94–97)
PCO2 BLDA: 43.3 MM HG (ref 36–44)
PCO2 BLDA: 47.3 MM HG (ref 36–44)
PCO2 BLDA: 56.2 MM HG (ref 36–44)
PCO2 TEMP ADJ BLDA: 43.7 MM HG (ref 36–44)
PCO2 TEMP ADJ BLDA: 48.8 MM HG (ref 36–44)
PCO2 TEMP ADJ BLDA: 56.7 MM HG (ref 36–44)
PH BLD: 7.38 [PH] (ref 7.35–7.45)
PH BLD: 7.44 [PH] (ref 7.35–7.45)
PH BLD: 7.5 [PH] (ref 7.35–7.45)
PH BLDA: 7.39 [PH] (ref 7.35–7.45)
PH BLDA: 7.45 [PH] (ref 7.35–7.45)
PH BLDA: 7.51 [PH] (ref 7.35–7.45)
PHOSPHATE SERPL-MCNC: 3 MG/DL (ref 2.3–4.1)
PLATELET # BLD AUTO: 149 THOUSANDS/UL (ref 149–390)
PMV BLD AUTO: 9.8 FL (ref 8.9–12.7)
PO2 BLD: 126.6 MM HG (ref 75–129)
PO2 BLD: 40.3 MM HG (ref 75–129)
PO2 BLD: 86.1 MM HG (ref 75–129)
PO2 BLDA: 125.3 MM HG (ref 75–129)
PO2 BLDA: 38.4 MM HG (ref 75–129)
PO2 BLDA: 85 MM HG (ref 75–129)
POTASSIUM SERPL-SCNC: 3.3 MMOL/L (ref 3.5–5.3)
POTASSIUM SERPL-SCNC: 3.8 MMOL/L (ref 3.5–5.3)
POTASSIUM SERPL-SCNC: 3.9 MMOL/L (ref 3.5–5.3)
PROCALCITONIN SERPL-MCNC: 0.14 NG/ML
PROT SERPL-MCNC: 6.5 G/DL (ref 6.4–8.2)
RBC # BLD AUTO: 3.21 MILLION/UL (ref 3.81–5.12)
RSV RNA NPH QL NAA+PROBE: NORMAL
SODIUM SERPL-SCNC: 135 MMOL/L (ref 136–145)
SPECIMEN SOURCE: ABNORMAL
WBC # BLD AUTO: 6.66 THOUSAND/UL (ref 4.31–10.16)

## 2021-07-21 PROCEDURE — 80048 BASIC METABOLIC PNL TOTAL CA: CPT | Performed by: NURSE PRACTITIONER

## 2021-07-21 PROCEDURE — 94669 MECHANICAL CHEST WALL OSCILL: CPT

## 2021-07-21 PROCEDURE — 93970 EXTREMITY STUDY: CPT

## 2021-07-21 PROCEDURE — 82948 REAGENT STRIP/BLOOD GLUCOSE: CPT

## 2021-07-21 PROCEDURE — 94760 N-INVAS EAR/PLS OXIMETRY 1: CPT

## 2021-07-21 PROCEDURE — 82272 OCCULT BLD FECES 1-3 TESTS: CPT | Performed by: NURSE PRACTITIONER

## 2021-07-21 PROCEDURE — 80053 COMPREHEN METABOLIC PANEL: CPT | Performed by: NURSE PRACTITIONER

## 2021-07-21 PROCEDURE — 82805 BLOOD GASES W/O2 SATURATION: CPT | Performed by: PHYSICIAN ASSISTANT

## 2021-07-21 PROCEDURE — 94640 AIRWAY INHALATION TREATMENT: CPT

## 2021-07-21 PROCEDURE — 84100 ASSAY OF PHOSPHORUS: CPT | Performed by: PHYSICIAN ASSISTANT

## 2021-07-21 PROCEDURE — 94003 VENT MGMT INPAT SUBQ DAY: CPT

## 2021-07-21 PROCEDURE — 94668 MNPJ CHEST WALL SBSQ: CPT

## 2021-07-21 PROCEDURE — 87631 RESP VIRUS 3-5 TARGETS: CPT | Performed by: INTERNAL MEDICINE

## 2021-07-21 PROCEDURE — 84145 PROCALCITONIN (PCT): CPT | Performed by: NURSE PRACTITIONER

## 2021-07-21 PROCEDURE — 36600 WITHDRAWAL OF ARTERIAL BLOOD: CPT

## 2021-07-21 PROCEDURE — 93306 TTE W/DOPPLER COMPLETE: CPT

## 2021-07-21 PROCEDURE — 87081 CULTURE SCREEN ONLY: CPT | Performed by: NURSE PRACTITIONER

## 2021-07-21 PROCEDURE — 80165 DIPROPYLACETIC ACID FREE: CPT | Performed by: PHYSICIAN ASSISTANT

## 2021-07-21 PROCEDURE — 83735 ASSAY OF MAGNESIUM: CPT | Performed by: PHYSICIAN ASSISTANT

## 2021-07-21 PROCEDURE — 80048 BASIC METABOLIC PNL TOTAL CA: CPT | Performed by: PHYSICIAN ASSISTANT

## 2021-07-21 PROCEDURE — 99291 CRITICAL CARE FIRST HOUR: CPT | Performed by: STUDENT IN AN ORGANIZED HEALTH CARE EDUCATION/TRAINING PROGRAM

## 2021-07-21 PROCEDURE — 85027 COMPLETE CBC AUTOMATED: CPT | Performed by: NURSE PRACTITIONER

## 2021-07-21 PROCEDURE — 83735 ASSAY OF MAGNESIUM: CPT | Performed by: NURSE PRACTITIONER

## 2021-07-21 PROCEDURE — 93970 EXTREMITY STUDY: CPT | Performed by: SURGERY

## 2021-07-21 RX ORDER — POTASSIUM CHLORIDE 20 MEQ/1
20 TABLET, EXTENDED RELEASE ORAL ONCE
Status: COMPLETED | OUTPATIENT
Start: 2021-07-21 | End: 2021-07-21

## 2021-07-21 RX ORDER — BISACODYL 10 MG
10 SUPPOSITORY, RECTAL RECTAL DAILY
Status: DISCONTINUED | OUTPATIENT
Start: 2021-07-21 | End: 2021-07-21

## 2021-07-21 RX ORDER — ACETAMINOPHEN 650 MG/1
650 SUPPOSITORY RECTAL EVERY 4 HOURS PRN
Status: DISCONTINUED | OUTPATIENT
Start: 2021-07-21 | End: 2021-07-24

## 2021-07-21 RX ORDER — POTASSIUM CHLORIDE 14.9 MG/ML
20 INJECTION INTRAVENOUS
Status: COMPLETED | OUTPATIENT
Start: 2021-07-21 | End: 2021-07-21

## 2021-07-21 RX ORDER — GABAPENTIN 250 MG/5ML
400 SOLUTION ORAL 3 TIMES DAILY
Status: DISCONTINUED | OUTPATIENT
Start: 2021-07-21 | End: 2021-07-22

## 2021-07-21 RX ORDER — OLANZAPINE 5 MG/1
5 TABLET, ORALLY DISINTEGRATING ORAL
Status: DISCONTINUED | OUTPATIENT
Start: 2021-07-21 | End: 2021-07-23

## 2021-07-21 RX ORDER — MAGNESIUM SULFATE HEPTAHYDRATE 40 MG/ML
2 INJECTION, SOLUTION INTRAVENOUS ONCE
Status: COMPLETED | OUTPATIENT
Start: 2021-07-21 | End: 2021-07-21

## 2021-07-21 RX ORDER — ACETAMINOPHEN 160 MG/5ML
650 SUSPENSION, ORAL (FINAL DOSE FORM) ORAL EVERY 4 HOURS PRN
Status: DISCONTINUED | OUTPATIENT
Start: 2021-07-21 | End: 2021-07-21

## 2021-07-21 RX ORDER — DIVALPROEX SODIUM 250 MG/1
250 TABLET, EXTENDED RELEASE ORAL DAILY
Status: DISCONTINUED | OUTPATIENT
Start: 2021-07-21 | End: 2021-07-22

## 2021-07-21 RX ORDER — BISACODYL 10 MG
10 SUPPOSITORY, RECTAL RECTAL DAILY PRN
Status: DISCONTINUED | OUTPATIENT
Start: 2021-07-22 | End: 2021-07-24

## 2021-07-21 RX ORDER — DIVALPROEX SODIUM 500 MG/1
500 TABLET, EXTENDED RELEASE ORAL DAILY
Status: DISCONTINUED | OUTPATIENT
Start: 2021-07-21 | End: 2021-07-21

## 2021-07-21 RX ORDER — ASPIRIN 81 MG/1
81 TABLET, CHEWABLE ORAL DAILY
Status: DISCONTINUED | OUTPATIENT
Start: 2021-07-21 | End: 2021-07-22

## 2021-07-21 RX ORDER — AMOXICILLIN 250 MG
1 CAPSULE ORAL
Status: DISCONTINUED | OUTPATIENT
Start: 2021-07-21 | End: 2021-07-22

## 2021-07-21 RX ORDER — FUROSEMIDE 10 MG/ML
20 INJECTION INTRAMUSCULAR; INTRAVENOUS ONCE
Status: CANCELLED | OUTPATIENT
Start: 2021-07-21 | End: 2021-07-21

## 2021-07-21 RX ORDER — GABAPENTIN 400 MG/1
400 CAPSULE ORAL 3 TIMES DAILY
Status: DISCONTINUED | OUTPATIENT
Start: 2021-07-21 | End: 2021-07-21

## 2021-07-21 RX ORDER — POTASSIUM CHLORIDE 14.9 MG/ML
20 INJECTION INTRAVENOUS ONCE
Status: DISCONTINUED | OUTPATIENT
Start: 2021-07-21 | End: 2021-07-21

## 2021-07-21 RX ORDER — ACETAZOLAMIDE 500 MG/5ML
500 INJECTION, POWDER, LYOPHILIZED, FOR SOLUTION INTRAVENOUS ONCE
Status: COMPLETED | OUTPATIENT
Start: 2021-07-21 | End: 2021-07-21

## 2021-07-21 RX ORDER — ECHINACEA PURPUREA EXTRACT 125 MG
1 TABLET ORAL
Status: DISCONTINUED | OUTPATIENT
Start: 2021-07-21 | End: 2021-08-11 | Stop reason: HOSPADM

## 2021-07-21 RX ORDER — DIVALPROEX SODIUM 500 MG/1
500 TABLET, EXTENDED RELEASE ORAL EVERY EVENING
Status: DISCONTINUED | OUTPATIENT
Start: 2021-07-21 | End: 2021-07-22

## 2021-07-21 RX ORDER — OXYMETAZOLINE HYDROCHLORIDE 0.05 G/100ML
1 SPRAY NASAL EVERY 12 HOURS SCHEDULED
Status: DISCONTINUED | OUTPATIENT
Start: 2021-07-21 | End: 2021-07-23

## 2021-07-21 RX ORDER — BISACODYL 10 MG
10 SUPPOSITORY, RECTAL RECTAL DAILY PRN
Status: DISCONTINUED | OUTPATIENT
Start: 2021-07-21 | End: 2021-07-21

## 2021-07-21 RX ADMIN — INSULIN LISPRO 2 UNITS: 100 INJECTION, SOLUTION INTRAVENOUS; SUBCUTANEOUS at 17:44

## 2021-07-21 RX ADMIN — POTASSIUM CHLORIDE 20 MEQ: 14.9 INJECTION, SOLUTION INTRAVENOUS at 01:59

## 2021-07-21 RX ADMIN — INSULIN LISPRO 2 UNITS: 100 INJECTION, SOLUTION INTRAVENOUS; SUBCUTANEOUS at 12:32

## 2021-07-21 RX ADMIN — CEFEPIME HYDROCHLORIDE 2000 MG: 2 INJECTION, SOLUTION INTRAVENOUS at 11:36

## 2021-07-21 RX ADMIN — OXYMETAZOLINE HYDROCHLORIDE 1 SPRAY: 0.05 SPRAY NASAL at 21:24

## 2021-07-21 RX ADMIN — CEFEPIME HYDROCHLORIDE 2000 MG: 2 INJECTION, SOLUTION INTRAVENOUS at 18:18

## 2021-07-21 RX ADMIN — METRONIDAZOLE 500 MG: 500 INJECTION, SOLUTION INTRAVENOUS at 11:36

## 2021-07-21 RX ADMIN — INSULIN LISPRO 2 UNITS: 100 INJECTION, SOLUTION INTRAVENOUS; SUBCUTANEOUS at 23:17

## 2021-07-21 RX ADMIN — GABAPENTIN 400 MG: 400 CAPSULE ORAL at 16:03

## 2021-07-21 RX ADMIN — GABAPENTIN 400 MG: 400 CAPSULE ORAL at 21:24

## 2021-07-21 RX ADMIN — ACETAZOLAMIDE 500 MG: 500 INJECTION, POWDER, LYOPHILIZED, FOR SOLUTION INTRAVENOUS at 11:28

## 2021-07-21 RX ADMIN — MAGNESIUM SULFATE HEPTAHYDRATE 2 G: 40 INJECTION, SOLUTION INTRAVENOUS at 08:35

## 2021-07-21 RX ADMIN — METRONIDAZOLE 500 MG: 500 INJECTION, SOLUTION INTRAVENOUS at 02:48

## 2021-07-21 RX ADMIN — ENOXAPARIN SODIUM 40 MG: 40 INJECTION SUBCUTANEOUS at 09:03

## 2021-07-21 RX ADMIN — DIVALPROEX SODIUM 250 MG: 250 TABLET, EXTENDED RELEASE ORAL at 11:43

## 2021-07-21 RX ADMIN — IPRATROPIUM BROMIDE AND ALBUTEROL SULFATE 3 ML: 2.5; .5 SOLUTION RESPIRATORY (INHALATION) at 07:53

## 2021-07-21 RX ADMIN — ATORVASTATIN CALCIUM 80 MG: 40 TABLET, FILM COATED ORAL at 16:03

## 2021-07-21 RX ADMIN — POTASSIUM CHLORIDE 20 MEQ: 14.9 INJECTION, SOLUTION INTRAVENOUS at 05:10

## 2021-07-21 RX ADMIN — BISACODYL 10 MG: 10 SUPPOSITORY RECTAL at 11:36

## 2021-07-21 RX ADMIN — OXYMETAZOLINE HYDROCHLORIDE 1 SPRAY: 0.05 SPRAY NASAL at 12:21

## 2021-07-21 RX ADMIN — VANCOMYCIN HYDROCHLORIDE 1250 MG: 5 INJECTION, POWDER, LYOPHILIZED, FOR SOLUTION INTRAVENOUS at 23:09

## 2021-07-21 RX ADMIN — IPRATROPIUM BROMIDE AND ALBUTEROL SULFATE 3 ML: 2.5; .5 SOLUTION RESPIRATORY (INHALATION) at 13:44

## 2021-07-21 RX ADMIN — DIVALPROEX SODIUM 500 MG: 500 TABLET, FILM COATED, EXTENDED RELEASE ORAL at 17:22

## 2021-07-21 RX ADMIN — VANCOMYCIN HYDROCHLORIDE 1250 MG: 5 INJECTION, POWDER, LYOPHILIZED, FOR SOLUTION INTRAVENOUS at 12:00

## 2021-07-21 RX ADMIN — IPRATROPIUM BROMIDE AND ALBUTEROL SULFATE 3 ML: 2.5; .5 SOLUTION RESPIRATORY (INHALATION) at 20:59

## 2021-07-21 RX ADMIN — CEFEPIME HYDROCHLORIDE 2000 MG: 2 INJECTION, SOLUTION INTRAVENOUS at 04:04

## 2021-07-21 RX ADMIN — POTASSIUM CHLORIDE 20 MEQ: 1500 TABLET, EXTENDED RELEASE ORAL at 17:23

## 2021-07-21 RX ADMIN — ACETAZOLAMIDE 500 MG: 500 INJECTION, POWDER, LYOPHILIZED, FOR SOLUTION INTRAVENOUS at 02:37

## 2021-07-21 RX ADMIN — OLANZAPINE 5 MG: 5 TABLET, ORALLY DISINTEGRATING ORAL at 23:04

## 2021-07-21 RX ADMIN — MEROPENEM 1000 MG: 1 INJECTION, POWDER, FOR SOLUTION INTRAVENOUS at 23:12

## 2021-07-21 RX ADMIN — IPRATROPIUM BROMIDE AND ALBUTEROL SULFATE 3 ML: 2.5; .5 SOLUTION RESPIRATORY (INHALATION) at 01:35

## 2021-07-21 RX ADMIN — ASPIRIN 81 MG CHEWABLE TABLET 81 MG: 81 TABLET CHEWABLE at 11:36

## 2021-07-21 RX ADMIN — METRONIDAZOLE 500 MG: 500 INJECTION, SOLUTION INTRAVENOUS at 18:17

## 2021-07-21 RX ADMIN — INSULIN LISPRO 4 UNITS: 100 INJECTION, SOLUTION INTRAVENOUS; SUBCUTANEOUS at 08:35

## 2021-07-21 NOTE — ASSESSMENT & PLAN NOTE
· Congential Intellectual Disabiltiy, Anxiety, Psychosis- continue gabapentin, quetiapine, lorazepam, and sertraline  · Home regimen of seroquel is 100 mg q am and 1600 with 200 mg q hs  · Hold Seroquel due to somnolence   · Zyprexa 5 mg sublingual while NPO

## 2021-07-21 NOTE — QUICK NOTE
Called to see patient who became hypoxic and lethargic after respiratory treatment  Was requiring 15L, now requiring additional NRB support  Pt is full code and nonverbal at baseline  ABG and CXR ordered  Not a candidate for BIPAP  D/W CC who will accept as transfer for high flow oxygen support  [facility] director

## 2021-07-21 NOTE — ASSESSMENT & PLAN NOTE
· POA a/e/b - tachypnea, tachycardia, hypoxia   · Sepsis secondary to pneumonia likely aspiration vs gram-negative  · CXR 7/20 progressive b/l infiltrates R>L  · 7/20 Continue cefepime and metronidazole  Discontinue azithromycin and add Vanco given group home setting and worsening infiltrate on CXR    · 7/22 Due to low grade fevers, clinical deterioration, and positive sputum culture Cefepime changed to Meropenum  · Current ABX - Meropenum, Metronidazole, Vancomycin  · Blood cultures NG48  · Flu/RSV - negative  · Sputum culture Gram neg coccobacilli, GNR, and GPC  · COVID negative on admission and patient fully vaccinated  · See plan for aspiration below    Results from last 7 days   Lab Units 07/20/21  0521 07/18/21 2032   PROCALCITONIN ng/ml 0 08 <0 05

## 2021-07-21 NOTE — ASSESSMENT & PLAN NOTE
· Sepsis secondary to pneumonia likely aspiration vs gram-negative  · Continue cefepime and metronidazole  Discontinue azithromycin and add Vanco given group home setting and worsening infiltrate on CXR    · Blood cultures pending  · COVID negative on admission and patient fully vaccinated  · CXR 7/21 shows worsening right infiltrate official read pending  · Monitor I/O's and daily lab work    Results from last 7 days   Lab Units 07/20/21  0521 07/18/21  2032   PROCALCITONIN ng/ml 0 08 <0 05

## 2021-07-21 NOTE — PROGRESS NOTES
Vancomycin Assessment    Nadege Stevenson is a 77 y o  female who will begin receiving vancomycin for Pneumonia  Relevant clinical data and objective history reviewed:  Creatinine   Date Value Ref Range Status   07/20/2021 0 62 0 60 - 1 30 mg/dL Final     Comment:     Standardized to IDMS reference method   07/19/2021 0 67 0 60 - 1 30 mg/dL Final     Comment:     Standardized to IDMS reference method   07/18/2021 0 71 0 60 - 1 30 mg/dL Final     Comment:     Standardized to IDMS reference method     /70   Pulse 91   Temp 99 3 °F (37 4 °C)   Resp 20   Ht 5' (1 524 m)   Wt 94 2 kg (207 lb 10 8 oz)   SpO2 90%   BMI 40 56 kg/m²   I/O last 3 completed shifts: In: 3447 1 [P O :1820; I V :1427 1; IV Piggyback:200]  Out: 862 [Urine:862]  Lab Results   Component Value Date/Time    BUN 15 07/20/2021 05:21 AM    WBC 6 14 07/20/2021 05:21 AM    HGB 9 1 (L) 07/20/2021 05:21 AM    HCT 28 7 (L) 07/20/2021 05:21 AM    MCV 93 07/20/2021 05:21 AM     (L) 07/20/2021 05:21 AM     Temp Readings from Last 3 Encounters:   07/20/21 99 3 °F (37 4 °C)   01/03/21 98 °F (36 7 °C) (Temporal)     Vancomycin Days of Therapy: 1    Assessment/Plan  The patient is currently on vancomycin utilizing scheduled dosing based on adjusted body weight (due to obesity)  Baseline risks associated with therapy include: pre-existing renal impairment, concomitant nephrotoxic medications, advanced age, and dehydration  After clinical evaluation the patient will begin vancomycin 1250 mg IV q 12 hrs  Pharmacy will also follow closely for s/sx of nephrotoxicity, infusion reactions, and appropriateness of therapy  BMP and CBC will be ordered per protocol  Plan for trough as patient approaches steady state, prior to the 4th  dose at approximately 1030 on 7/22/21  Due to infection severity, will target a trough of 15-20 (appropriate for most indications)     Pharmacy will continue to follow the patients culture results and clinical progress daily      Diana Jean-Baptiste, Pharmacist

## 2021-07-21 NOTE — ASSESSMENT & PLAN NOTE
· Congential Intellectual Disabiltiy, Anxiety, Psychosis- continue gabapentin, quetiapine, lorazepam, and sertraline  · Home regimen of seroquel is 100 mg q am and 1600 with 200 mg q hs  · Currently on 100 mg BID due to somnolence  · PM dose held last night

## 2021-07-21 NOTE — ASSESSMENT & PLAN NOTE
· Acute respiratory failure/hypoxia secondary to pneumonia with component of pulmonary edema  · Cont nebs and abx as abve  · CTA negative for pulmonary embolism- see plan under PNA  · Check echo  · Gave 20 mg IV Lasix with good response  · Repeat lab work concerning for contraction alkalosis - Diamox 500 mg IV x 2   · 7/20 Escalated to midflow then required transfer for HFNC under Critical Care  · 7/21 HFNC + NRB, tolerated off NRB in the evening  · 7/22 progressive hypoxia on HFNC - placed on CPAP with improvement, CXR - progressive infiltrate R middle lobe, highly suggestion of mucous plugging and aspiration  · Currently on CPAP 12 100%  · Goal SpO2 >92%  · Trend ABG as needed  · Cont duoneb, 3% saline, chest PT, suction  · Due to cognitive delay, pt is unable to fully participate in aggressive pulmonary toilet  · Likely has undiagnosed sleep apnea

## 2021-07-21 NOTE — ASSESSMENT & PLAN NOTE
· CT of chest on 7/18 revealed:  Significant dilation of the main pulmonary artery, measuring 44 mm, with prominence of the central pulmonary arteries    · ECHO - pending

## 2021-07-21 NOTE — ASSESSMENT & PLAN NOTE
· Chest x-ray showed suspected right middle lobe I had ordered a CT chest looks like right middle lobe suspect aspiration      · Completed 7 days of antibiotics - see course above  · Aspiration precautions  · Sputum culture - gram neg and gram positive  · Speech therapy saw patient 7/23 recommends mechanically altered/level 1 diet and nectar thick liquids

## 2021-07-21 NOTE — ASSESSMENT & PLAN NOTE
· Following fluid resuscitation pt developed increased work of breathing and increased oxygen requirements  · 2 7 Liters positive associated with generalized edema     · Transferred to critical care, placed on high flow nasal cannula  · Diuresed with Lasix 20 mg X 1 followed by Diamox 500 mg X 2  · Goal Net Negative  1 L   · Echo, pending   · Florette Bran IVF  · Obtain b/l dopplers of lower extremities due to increased edema and pain to palpitation   · Monitor I/O  · Daily weights

## 2021-07-21 NOTE — ASSESSMENT & PLAN NOTE
· Sepsis secondary to pneumonia likely aspiration resolved status post antibiotics       Results from last 7 days   Lab Units 07/20/21  0521 07/18/21 2032   PROCALCITONIN ng/ml 0 08 <0 05

## 2021-07-21 NOTE — RESPIRATORY THERAPY NOTE
Assumed care of patient at 1900 after report from day shift therapist   Patient was on 15L midflow from earlier in the day  Shewas placed on oscillating vest with help from nurse's aide at 2006  Patient remained stable while on vest   After vest was removed patient's SpO2 decreased to 82%  Day shift therapist said in report the same event happened earlier in the day  Nurse confirmed she had heard the same in her shift report  Patient did not otherwise appear in distress  Her color was good  Patient also respond to hearing her name (she is non vocal)  Pulsox was repositioned several times  Nasal suctioning was attempted but therapist was unable to pass catheter past nares  Patient was orally suctioned for a moderate amount of blood  SLIM was at bedside  An ABG was drawn by therapist (R Radial, Pos adin's test) 7 46/CO2 47 3/pO2 38 4/HCO3 31 8  [facility] Critical care was called  It was decided patient should be moved to ICU  ABG label was scanned by nurse to expedite patient transport  Once in ICU patient's SpO2 was 90% on 15L midflow cannula  High Flow Nasal Cannula was ordered by critical care  Patient's SpO2 was stable at 92% at 100%/55L  A repeat ABG was drawn (R radial, pos  Adin's test) to rule out mixed sample  Sample analgized point of care as printer would not print label  7 45/CO2 50 2 pO2 61/HCO3 35 5  Therapist was called back to patient's room as SpO2 decreased to 84%  A non rebreather was placed on patient over NC  Critical care physician order a third ABG to rule out intubation  (L Radial pos adin's test) 7 506/CO2 43 3 pO2 125 HCO3 33 5  Patient had become more active at third BAG draw, kicking off her left sock with right foot and scratching her nose  She also began looking around her room

## 2021-07-21 NOTE — CASE MANAGEMENT
Cm called Tony Muse (supervisior of the group home) at 09:51 to # [phone] and she stated she would meet with me at 11:30 am when she comes to the hospital, cm did meet with her and she stated the pt does not have a guardian or POA, she stated she is able to make decisions for the pt  But not about health issues, she stated her brother needs to make the medical decisions, she left before I could review possible snf facilities, pt is a target and she will need to have the option process completed  Pt is not stable for d/c, pt was transferred to ICU last eveinng and is on 15 liters of high flow oxygen, cm will continue to work on a safe d/c plan

## 2021-07-21 NOTE — RESPIRATORY THERAPY NOTE
RT Protocol Note  Jaun Brand 77 y o  female MRN: [id number]  Unit/Bed#: -01 Encounter: [id number]    Assessment    Principal Problem:    Sepsis due to pneumonia [facility])  Active Problems:    Aspiration pneumonia ([place] [address] )    Psychiatric disorder    DM (diabetes mellitus), type 2 ([place] [address] )    GERD (gastroesophageal reflux disease)    Acute respiratory failure with hypoxia (HCC)    Obesity (BMI 30 0-34  9)    Seizure disorder ([place] [address] )    Pancytopenia ([place] [address] )    Hyperlipidemia      Home Pulmonary Medications:         Past Medical History:   Diagnosis Date    Anxiety     Diabetes mellitus ([place] [address] )     GERD (gastroesophageal reflux disease)     Hypertension     Mental disability      Social History     Socioeconomic History    Marital status: Single     Spouse name: None    Number of children: None    Years of education: None    Highest education level: None   Occupational History    None   Tobacco Use    Smoking status: Never Smoker    Smokeless tobacco: Never Used   Vaping Use    Vaping Use: Never used   Substance and Sexual Activity    Alcohol use: Not Currently    Drug use: Not Currently    Sexual activity: Not Currently   Other Topics Concern    None   Social History Narrative    None     Social Determinants of Health     Financial Resource Strain:     Difficulty of Paying Living Expenses:    Food Insecurity:     Worried About Running Out of Food in the Last Year:     Ran Out of Food in the Last Year:    Transportation Needs:     Lack of Transportation (Medical):      Lack of Transportation (Non-Medical):    Physical Activity:     Days of Exercise per Week:     Minutes of Exercise per Session:    Stress:     Feeling of Stress :    Social Connections:     Frequency of Communication with Friends and Family:     Frequency of Social Gatherings with Friends and Family:     Attends Hinduism Services:     Active Member of Clubs or Organizations:     Attends Club or Organization Meetings:    Aldair Villegas Marital Status:    Intimate Partner Violence:     Fear of Current or Ex-Partner:     Emotionally Abused:     Physically Abused:     Sexually Abused:        Subjective    Subjective Data: sleeping when woke for treatment    Objective    Physical Exam:   Assessment Type: (P) During-treatment  General Appearance: (P) Awake  Respiratory Pattern: (P) Tachypneic  Chest Assessment: (P) Chest expansion symmetrical  Bilateral Breath Sounds: (P) Diminished, Coarse  O2 Device: (P) HFNC 100%    Vitals:  Blood pressure 128/60, pulse 70, temperature 99 °F (37 2 °C), resp  rate (!) 27, height 5' (1 524 m), weight 80 4 kg (177 lb 4 oz), SpO2 93 %  Results from last 7 days   Lab Units 07/21/21  0600   PH ART  7 386   PCO2 ART mm Hg 56 2*   PO2 ART mm Hg 85 0   HCO3 ART mmol/L 33 0*   BASE EXC ART mmol/L 6 5   O2 CONTENT ART mL/dL 15 7*   O2 HGB, ARTERIAL % 94 8   ABG SOURCE  Radial, Left   SOPHIA TEST  Yes   NON VENT ROOM AIR % 100       Imaging and other studies: I have personally reviewed pertinent reports  O2 Device: (P) HFNC 100%     Plan       Airway Clearance Plan: Percussive Vest     Resp Comments: Vest on hold at this point  Pt had major destauration events with vest at 1400 and 2000  reamins on HFNC without NRB for now

## 2021-07-21 NOTE — RESPIRATORY THERAPY NOTE
RT Protocol Note  Montana Rivera 77 y o  female MRN: [id number]  Unit/Bed#: -01 Encounter: [id number]    Assessment    Principal Problem:    Sepsis due to pneumonia [facility])  Active Problems:    Aspiration pneumonia ([place] [address] )    Psychiatric disorder    DM (diabetes mellitus), type 2 ([place] [address] )    GERD (gastroesophageal reflux disease)    Acute respiratory failure with hypoxia (HCC)    Obesity (BMI 30 0-34  9)    Seizure disorder (HCC)    Pancytopenia (HCC)    Hyperlipidemia    Abnormal CT of the chest    Fluid overload      Home Pulmonary Medications:         Past Medical History:   Diagnosis Date    Anxiety     Diabetes mellitus ([place] [address] )     GERD (gastroesophageal reflux disease)     Hyperlipidemia     Hypertension     Mental disability     Seizure disorder ([place] [address] )      Social History     Socioeconomic History    Marital status: Single     Spouse name: None    Number of children: None    Years of education: None    Highest education level: None   Occupational History    None   Tobacco Use    Smoking status: Never Smoker    Smokeless tobacco: Never Used   Vaping Use    Vaping Use: Never used   Substance and Sexual Activity    Alcohol use: Not Currently    Drug use: Not Currently    Sexual activity: Not Currently   Other Topics Concern    None   Social History Narrative    None     Social Determinants of Health     Financial Resource Strain:     Difficulty of Paying Living Expenses:    Food Insecurity:     Worried About Running Out of Food in the Last Year:     Ran Out of Food in the Last Year:    Transportation Needs:     Lack of Transportation (Medical):      Lack of Transportation (Non-Medical):    Physical Activity:     Days of Exercise per Week:     Minutes of Exercise per Session:    Stress:     Feeling of Stress :    Social Connections:     Frequency of Communication with Friends and Family:     Frequency of Social Gatherings with Friends and Family:     Attends Hindu Services:     Active Member of Clubs or Organizations:     Attends Club or Organization Meetings:     Marital Status:    Intimate Partner Violence:     Fear of Current or Ex-Partner:     Emotionally Abused:     Physically Abused:     Sexually Abused:        Subjective    Subjective Data: FIO2 titrated to 90%     Objective    Physical Exam:   Assessment Type: (P) During-treatment  General Appearance: (P) Awake  Respiratory Pattern: (P) Tachypneic  Chest Assessment: (P) Chest expansion symmetrical  Bilateral Breath Sounds: (P) Diminished, Coarse  Cough: Non-productive  O2 Device: (P) HFNC 80%    Vitals:  Blood pressure 120/58, pulse 77, temperature 99 5 °F (37 5 °C), resp  rate (!) 32, height 5' (1 524 m), weight 80 4 kg (177 lb 4 oz), SpO2 95 %  Results from last 7 days   Lab Units 07/21/21  0600   PH ART  7 386   PCO2 ART mm Hg 56 2*   PO2 ART mm Hg 85 0   HCO3 ART mmol/L 33 0*   BASE EXC ART mmol/L 6 5   O2 CONTENT ART mL/dL 15 7*   O2 HGB, ARTERIAL % 94 8   ABG SOURCE  Radial, Left   SOPHIA TEST  Yes   NON VENT ROOM AIR % 100       Imaging and other studies: I have personally reviewed pertinent reports  O2 Device: (P) HFNC 80%     Plan       Airway Clearance Plan: Percussive Vest     Resp Comments: (P) Vest resummed, tolerated well   Pt had no desaturation events during or after vest therapy   Pt was helpful and able to lean forward and lift her arms to put vest on

## 2021-07-21 NOTE — ASSESSMENT & PLAN NOTE
· History of  Pancytopenia diagnosed in 3/2019  · Follows with Oncologist, Dr Itz Carter   · Continue to monitor CBC

## 2021-07-21 NOTE — ASSESSMENT & PLAN NOTE
Lab Results   Component Value Date    HGBA1C 6 6 (H) 07/19/2021     Recent Labs     07/20/21  0734 07/20/21  1104 07/20/21  1617 07/20/21 2042   POCGLU 180* 253* 272* 255*     · Continue sliding scale insulin sugar stable continue Lantus 10 at night Humalog 4 units t i d   With meals she eats adequately and sliding scale

## 2021-07-21 NOTE — ASSESSMENT & PLAN NOTE
· Acute respiratory failure/hypoxia secondary to pneumonia  · Cont nebs and abx as abve  · CTA negative for pulmonary embolism- see plan under PNA  · Check echo  · Gave 20 mg IV Lasix with 1 liter output- repeat dose PRN  · Diamox 500 mg IV X 1   · Escalated to midflow then required transfer to [facility] under Critical Care  · Patient fell asleep, then desaturated and noted to be snoring  · Easily arousable, so NRB placed over HFNC while sleeping with sats in mid 90's  · Improved PaO2 of 125  · Likely has undiagnosed sleep apnea and unable to wear CPAP mask due to intellectual disability  · Spoke with brother who wants patient Level 1 full code

## 2021-07-21 NOTE — ASSESSMENT & PLAN NOTE
· Chest x-ray showed suspected right middle lobe  · CTA Chest:   No central pulmonary emboli identified  Significant interval worsening in airspace consolidation and patchy infiltrates  involving the right upper lobe to the greatest degree posterior segment but also elsewhere within the right upper lobe  There is also interval worsening of the consolidation and volume loss in the dependent lower lobes bilaterally  Also  component of chronic volume loss related to elevated right hemidiaphragm  Focally dilated main pulmonary artery  Assess for pulmonary valve disease     · Continue cefepime and Flagyl, add Vanco    · NPO  · Sputum culture pending- consider sending respiratory viral panel given negative procalcitonin

## 2021-07-21 NOTE — CONSULTS
[address] 1955, 77 y o  female MRN: [id number]  Unit/Bed#: -01 Encounter: [id number]  Primary Care Provider: Harshad Garrison MD   Date and time admitted to hospital: 7/18/2021  2:07 PM    Consults    * Sepsis due to pneumonia [facility])  Assessment & Plan  · Sepsis secondary to pneumonia likely aspiration vs gram-negative  · Continue cefepime and metronidazole  Discontinue azithromycin and add Vanco given group home setting and worsening infiltrate on CXR    · Blood cultures pending  · COVID negative on admission and patient fully vaccinated  · CXR 7/21 shows worsening right infiltrate official read pending  · Monitor I/O's and daily lab work    Results from last 7 days   Lab Units 07/20/21 0521 07/18/21 2032   PROCALCITONIN ng/ml 0 08 <0 05         Acute respiratory failure with hypoxia (HCC)  Assessment & Plan  · Acute respiratory failure/hypoxia secondary to pneumonia  · Cont nebs and abx as abve  · CTA negative for pulmonary embolism- see plan under PNA  · Check echo  · Gave 20 mg IV Lasix with 1 liter output- repeat dose PRN  · Escalated to midflow then required transfer to [facility] under Critical Care  · Patient fell asleep, then desaturated and noted to be snoring  · Easily arousable, so NRB placed over HFNC while sleeping with sats in mid 90's  · Likely has undiagnosed sleep apnea and unable to wear CPAP mask due to intellectual disability  · Spoke with brother who wants patient Level 1 full code      Seizure disorder ([facility] [address] )  Assessment & Plan  · Cont depakote  · Check with Pharmacy regarding current dose of Depakote  mg q 8 hours vs home dose of Depakote  mg q am and 500 mg q pm  · Check level in am    Morbid obesity with BMI of 40 0-44 9, adult ([address] )  Assessment & Plan  · Speech consulted and recommended Level 2 nectar thick diet  · Nutrition consulted    GERD (gastroesophageal reflux disease)  Assessment & Plan  · Continue pantoprazole      DM (diabetes mellitus), type 2 [facility])  Assessment & Plan  Lab Results   Component Value Date    HGBA1C 6 6 (H) 07/19/2021       Recent Labs     07/20/21  0734 07/20/21  1104 07/20/21  1617 07/20/21  2042   POCGLU 180* 253* 272* 255*       Blood Sugar Average: Last 72 hrs:  (P) 211 25   · Not on meds prehospital  · Continue sliding scale insulin      Psychiatric disorder  Assessment & Plan  · Congential Intellectual Disabiltiy, Anxiety, Psychosis- continue gabapentin, quetiapine, lorazepam, and sertraline  · Home regimen of seroquel is 100 mg q am and 1600 with 200 mg q hs  · Currently on 100 mg BID due to somnolence  · PM dose held last night      Aspiration pneumonia (HCC)  Assessment & Plan  · Chest x-ray showed suspected right middle lobe  · CTA Chest:   No central pulmonary emboli identified  Significant interval worsening in airspace consolidation and patchy infiltrates  involving the right upper lobe to the greatest degree posterior segment but also elsewhere within the right upper lobe  There is also interval worsening of the consolidation and volume loss in the dependent lower lobes bilaterally  Also  component of chronic volume loss related to elevated right hemidiaphragm  Focally dilated main pulmonary artery  Assess for pulmonary valve disease  · Continue cefepime and Flagyl, add Vanco    · NPO  · Sputum culture pending- consider sending respiratory viral panel given negative procalcitonin      -------------------------------------------------------------------------------------------------------------  Chief Complaint: Cough    History of Present Illness   HX and PE limited by:  Intellectual disability and aphasic at baseline  Gordon Zaragoza is a 77 y o  female with a past medical history of intellectual disability residing in assisted care facility, anxiety, psychosis, seizure disorder, frequent UTIs who was taken to urgent care for a cough and was found to be hypoxic and referred to the emergency department  In the ER, saturations were noted in the mid 80s and she was placed on 2-3 L of nasal cannula  Lab work revealed a normal white count, negative troponin, mildly elevated BNP of 509 and lactate of 1 2  Chest x-ray clear on admission but due to clinical exam patient started on broad-spectrum antibiotics for aspiration versus Gram-negative pneumonia  She was admitted to the medicine service and continued on cefepime, azithromycin, and Flagyl  Speech was consulted and recommended a mechanically altered level 2 diet with nectar thick liquids  She had intermittent fevers and escalating requirements of oxygen on July 20th  Due to increasing somnolence and difficulty obtaining a pulse ox, the critical care team was consulted  The patient was on 15 L mid flow with mild increased work of breathing, but easily arousable  She was transferred to the critical care service and placed on high-flow nasal cannula with improvement of oxygenation  The long-term care facility was contacted, as was her brother who will serve as her decision maker  Additional history was provided of frequent urinary tract infections and upon review of medical record, patient had a Pseudomonas UTI in January 2021 and E coli UTI in November 2020  No multi-drug resistant organisms noted  History obtained from chart review  -------------------------------------------------------------------------------------------------------------  Dispo: Transfer to Stepdown Level 1     Code Status: Level 1 - Full Code  --------------------------------------------------------------------------------------------------------------  Review of Systems   Unable to perform ROS: Patient nonverbal       Review of systems was unable to be performed secondary to intellectual disability and aphasia at baseline    Physical Exam  Constitutional:       Appearance: She is ill-appearing  HENT:      Head: Normocephalic  Mouth/Throat:      Mouth: Mucous membranes are moist       Pharynx: Oropharynx is clear  Eyes:      Conjunctiva/sclera: Conjunctivae normal       Pupils: Pupils are equal, round, and reactive to light  Cardiovascular:      Rate and Rhythm: Normal rate and regular rhythm  Pulmonary:      Effort: Respiratory distress present  Breath sounds: Rhonchi and rales present  Abdominal:      General: Bowel sounds are normal  There is no distension  Palpations: Abdomen is soft  Tenderness: There is no abdominal tenderness  There is no guarding  Musculoskeletal:      Right lower leg: Edema present  Left lower leg: Edema present  Skin:     General: Skin is warm  Neurological:      General: No focal deficit present  Mental Status: She is alert  Cranial Nerves: No facial asymmetry  Comments: Aphasia at baseline  Moves all extremities       --------------------------------------------------------------------------------------------------------------  Vitals:   Vitals:    07/20/21 2300 07/20/21 2346 07/20/21 2353 07/21/21 0000   BP: 133/61   143/67   Pulse: 88   79   Resp: (!) 28   (!) 23   Temp: 100 °F (37 8 °C)   100 2 °F (37 9 °C)   TempSrc: Bladder      SpO2: (!) 88% (!) 80% (!) 85% 95%   Weight:       Height:         Temp  Min: 97 6 °F (36 4 °C)  Max: 101 4 °F (38 6 °C)  IBW (Ideal Body Weight): 45 5 kg  Height: 5' (152 4 cm)  Body mass index is 40 56 kg/m²      Laboratory and Diagnostics:  Results from last 7 days   Lab Units 07/20/21  2312 07/20/21  0521 07/19/21  0517 07/18/21  1612   WBC Thousand/uL  --  6 14 4 50 5 24   HEMOGLOBIN g/dL  --  9 1* 9 8* 10 8*   I STAT HEMOGLOBIN g/dl 9 9*  --   --   --    HEMATOCRIT %  --  28 7* 31 0* 33 1*   HEMATOCRIT, ISTAT % 29*  --   --   --    PLATELETS Thousands/uL  --  123* 153 163   NEUTROS PCT %  --  79*  --  68   BANDS PCT %  --   --  26*  --    MONOS PCT %  --  9  --  12   MONO PCT %  --   --  7  --      Results from last 7 days   Lab Units 07/20/21  2312 07/20/21  0521 07/19/21  0517 07/18/21  1612   SODIUM mmol/L  --  134* 133* 134*   POTASSIUM mmol/L  --  3 8 4 1 4 5   CHLORIDE mmol/L  --  98* 97* 95*   CO2 mmol/L  --  32 31 35*   CO2, I-STAT mmol/L 37*  --   --   --    ANION GAP mmol/L  --  4 5 4   BUN mg/dL  --  15 12 13   CREATININE mg/dL  --  0 62 0 67 0 71   CALCIUM mg/dL  --  9 2 9 5 10 2*   GLUCOSE RANDOM mg/dL  --  183* 174* 140   ALT U/L  --   --   --  20   AST U/L  --   --   --  25   ALK PHOS U/L  --   --   --  67   ALBUMIN g/dL  --   --   --  3 1*   TOTAL BILIRUBIN mg/dL  --   --   --  0 54               Results from last 7 days   Lab Units 07/18/21  1612   TROPONIN I ng/mL <0 02     Results from last 7 days   Lab Units 07/18/21 2039 07/18/21  1612   LACTIC ACID mmol/L 1 9 1 2     ABG:  Results from last 7 days   Lab Units 07/20/21 2122   PH ART  7 446   PCO2 ART mm Hg 47 3*   PO2 ART mm Hg 38 4*   HCO3 ART mmol/L 31 8*   BASE EXC ART mmol/L 6 8   ABG SOURCE  Radial, Right     VBG:  Results from last 7 days   Lab Units 07/20/21 2122   ABG SOURCE  Radial, Right     Results from last 7 days   Lab Units 07/20/21  0521 07/18/21 2032   PROCALCITONIN ng/ml 0 08 <0 05       Micro:  Results from last 7 days   Lab Units 07/19/21  0519 07/18/21 2040 07/18/21 2039   BLOOD CULTURE   --  No Growth at 24 hrs  No Growth at 24 hrs  SPUTUM CULTURE  Culture too young- will reincubate  --   --    GRAM STAIN RESULT  4+ Gram negative coccobacilli*  2+ Gram negative rods*  2+ Gram positive cocci in pairs*  2+ Polys*  --   --        EKG:   Imaging: I have personally reviewed pertinent reports  Historical Information   Past Medical History:   Diagnosis Date    Anxiety     Diabetes mellitus ([address] )     GERD (gastroesophageal reflux disease)     Hypertension     Mental disability      History reviewed  No pertinent surgical history    Social History   Social History     Substance and Sexual Activity   Alcohol Use Not Currently     Social History     Substance and Sexual Activity   Drug Use Not Currently     Social History     Tobacco Use   Smoking Status Never Smoker   Smokeless Tobacco Never Used     Exercise History: Wheelchair bound at baseline  Family History:   History reviewed  No pertinent family history  Family history unknown      Medications:  Current Facility-Administered Medications   Medication Dose Route Frequency    acetaminophen (TYLENOL) tablet 650 mg  650 mg Oral Q6H PRN    aspirin (ECOTRIN LOW STRENGTH) EC tablet 81 mg  81 mg Oral Daily    atorvastatin (LIPITOR) tablet 80 mg  80 mg Oral Daily With Dinner    cefepime (MAXIPIME) IVPB (premix in dextrose) 2,000 mg 50 mL  2,000 mg Intravenous Q8H    divalproex sodium (DEPAKOTE) EC tablet 250 mg  250 mg Oral Q8H [address]    enoxaparin (LOVENOX) subcutaneous injection 40 mg  40 mg Subcutaneous Q24H RONNELL    ferrous sulfate tablet 325 mg  325 mg Oral Daily With Breakfast    gabapentin (NEURONTIN) capsule 800 mg  800 mg Oral TID    insulin lispro (HumaLOG) 100 units/mL subcutaneous injection 1-6 Units  1-6 Units Subcutaneous TID AC    ipratropium-albuterol (DUO-NEB) 0 5-2 5 mg/3 mL inhalation solution 3 mL  3 mL Nebulization Q6H    LORazepam (ATIVAN) tablet 0 5 mg  0 5 mg Oral BID    metroNIDAZOLE (FLAGYL) IVPB (premix) 500 mg 100 mL  500 mg Intravenous Q8H    oxybutynin (DITROPAN-XL) 24 hr tablet 10 mg  10 mg Oral Daily    pantoprazole (PROTONIX) EC tablet 20 mg  20 mg Oral Early Morning    QUEtiapine (SEROquel) tablet 100 mg  100 mg Oral BID    sertraline (ZOLOFT) tablet 100 mg  100 mg Oral BID    vancomycin (VANCOCIN) 1,250 mg in sodium chloride 0 9 % 250 mL IVPB  17 5 mg/kg (Adjusted) Intravenous Q12H     Home medications:  Prior to Admission Medications   Prescriptions Last Dose Informant Patient Reported? Taking?    LORazepam (ATIVAN) 0 5 mg tablet 7/18/2021 at Unknown time  Yes Yes   Sig: Take by mouth 2 (two) times a day   QUEtiapine (SEROquel) 100 mg tablet 7/18/2021 at Unknown time  Yes Yes   Sig: Take 100 mg by mouth 2 (two) times a day   aspirin (ECOTRIN LOW STRENGTH) 81 mg EC tablet 7/18/2021 at Unknown time  Yes Yes   Sig: Take 81 mg by mouth daily   divalproex sodium (DEPAKOTE) 250 mg EC tablet 7/18/2021 at Unknown time  Yes Yes   Sig: Take 250 mg by mouth every 8 (eight) hours   ferrous sulfate 325 (65 Fe) mg tablet 7/18/2021 at Unknown time  Yes Yes   Sig: Take 325 mg by mouth daily with breakfast   gabapentin (NEURONTIN) 800 mg tablet 7/18/2021 at Unknown time  Yes Yes   Sig: Take 800 mg by mouth 3 (three) times a day   nystatin (MYCOSTATIN) powder 7/18/2021 at Unknown time  Yes Yes   Sig: Apply topically daily   omeprazole (PriLOSEC) 20 mg delayed release capsule 7/18/2021 at Unknown time  Yes Yes   Sig: Take 20 mg by mouth daily   rosuvastatin (CRESTOR) 40 MG tablet 7/18/2021 at Unknown time  Yes Yes   Sig: Take 40 mg by mouth daily   sertraline (ZOLOFT) 100 mg tablet 7/18/2021 at Unknown time  Yes Yes   Sig: Take 100 mg by mouth 2 (two) times a day   tolterodine (DETROL LA) 4 mg 24 hr capsule 7/18/2021 at Unknown time  Yes Yes   Sig: Take 4 mg by mouth daily      Facility-Administered Medications: None     Allergies:  No Known Allergies  ------------------------------------------------------------------------------------------------------------  Advance Directive and Living Will:      Power of :    POLST:    ------------------------------------------------------------------------------------------------------------  Care Time Delivered:   Upon my evaluation, this patient had a high probability of imminent or life-threatening deterioration due to respiratory failure, which required my direct attention, intervention, and personal management  I have personally provided 70 minutes ([phone] [phone]) of critical care time, exclusive of procedures, teaching, family meetings, and any prior time recorded by providers other than myself         Davis Hall, PA-C        Portions of the record may have been created with voice recognition software  Occasional wrong word or "sound a like" substitutions may have occurred due to the inherent limitations of voice recognition software    Read the chart carefully and recognize, using context, where substitutions have occurred

## 2021-07-21 NOTE — ASSESSMENT & PLAN NOTE
· Acute respiratory failure/hypoxia secondary to pneumonia  · CTA negative for pulmonary embolism  · Patient mid hospitalization had worsening hypoxia and was transferred to critical care service requiring high-flow and BiPAP at night  · 7/27 awake bronchoscopy-with mucus plugs in right middle and lower lobes   · Patient is down on 2 L nasal cannula without any evidence of coughing with current diet    Will repeat a chest x-ray portable tomorrow she still has some rales

## 2021-07-21 NOTE — ASSESSMENT & PLAN NOTE
· Chest x-ray showed suspected right middle lobe consolidation  · CTA Chest:   No central pulmonary emboli identified  Significant interval worsening in airspace consolidation and patchy infiltrates  involving the right upper lobe to the greatest degree posterior segment but also elsewhere within the right upper lobe  There is also interval worsening of the consolidation and volume loss in the dependent lower lobes bilaterally  Also  component of chronic volume loss related to elevated right hemidiaphragm  Focally dilated main pulmonary artery  Assess for pulmonary valve disease     · Continue cefepime and Flagyl, add Vanco    · NPO due to mental status  · Aspiration precautions  · Sputum culture - gram neg and gram positive  · Speech therapy recommends mechanically altered/level 2 diet and nectar thick liquids  · Witnessed aspiration 7/21, made NPO with re-eval speech eval on 7/22

## 2021-07-21 NOTE — ASSESSMENT & PLAN NOTE
· Cont depakote  · Check with Pharmacy regarding current dose of Depakote  mg q 8 hours vs home dose of Depakote  mg q am and 500 mg q pm  · Level pending

## 2021-07-21 NOTE — PROGRESS NOTES
Vancomycin IV Pharmacy-to-Dose Consultation    Megan Unger is a 77 y o  female who is currently receiving Vancomycin IV with management by the Pharmacy Consult service  Assessment/Plan:  The patient was reviewed  Renal function is stable and no signs or symptoms of nephrotoxicity and/or infusion reactions were documented in the chart  Based on todays assessment, continue current vancomycin (day # 2) dosing of 1250 mg IV q 12 hrs, with a plan for trough to be drawn at 1030 on 7/22/21  We will continue to follow the patients culture results and clinical progress daily      Donna Barker, Pharmacist

## 2021-07-21 NOTE — ASSESSMENT & PLAN NOTE
· Acute respiratory failure/hypoxia secondary to pneumonia  · Cont nebs and abx as abve  · CTA negative for pulmonary embolism- see plan under PNA  · Check echo  · Gave 20 mg IV Lasix with 1 liter output- repeat dose PRN  · Escalated to midflow then required transfer to [facility] under Critical Care  · Patient fell asleep, then desaturated and noted to be snoring  · Easily arousable, so NRB placed over HFNC while sleeping with sats in mid 90's  · Likely has undiagnosed sleep apnea and unable to wear CPAP mask due to intellectual disability  · Spoke with brother who wants patient Level 1 full code

## 2021-07-21 NOTE — ASSESSMENT & PLAN NOTE
· Cont depakote  · Check with Pharmacy regarding current dose of Depakote  mg q 8 hours vs home dose of Depakote  mg q am and 500 mg q pm  · Check level in am

## 2021-07-21 NOTE — ASSESSMENT & PLAN NOTE
Lab Results   Component Value Date    HGBA1C 6 6 (H) 07/19/2021       Recent Labs     07/20/21  0734 07/20/21  1104 07/20/21  1617 07/20/21 2042   POCGLU 180* 253* 272* 255*       Blood Sugar Average: Last 72 hrs:  (P) 211 25   · Not on meds prehospital  · Continue sliding scale insulin

## 2021-07-21 NOTE — PROGRESS NOTES
[address]  Progress Note - Portia Younger 1955, 77 y o  female MRN: [id number]  Unit/Bed#: -01 Encounter: [id number]  Primary Care Provider: Nikki Wisdom MD   Date and time admitted to hospital: 7/18/2021  2:07 PM    * Sepsis due to pneumonia [facility])  Assessment & Plan  · Sepsis secondary to pneumonia likely aspiration vs gram-negative  · Continue cefepime and metronidazole  Discontinue azithromycin and add Vanco given group home setting and worsening infiltrate on CXR    · Blood cultures pending  · COVID negative on admission and patient fully vaccinated  · CXR 7/21 shows worsening right infiltrate official read pending  · Monitor I/O's and daily lab work    Results from last 7 days   Lab Units 07/20/21 0521 07/18/21 2032   PROCALCITONIN ng/ml 0 08 <0 05         Acute respiratory failure with hypoxia (HCC)  Assessment & Plan  · Acute respiratory failure/hypoxia secondary to pneumonia  · Cont nebs and abx as abve  · CTA negative for pulmonary embolism- see plan under PNA  · Check echo  · Gave 20 mg IV Lasix with 1 liter output- repeat dose PRN  · Diamox 500 mg IV X 1   · Escalated to midflow then required transfer to [facility] under Critical Care  · Patient fell asleep, then desaturated and noted to be snoring  · Easily arousable, so NRB placed over HFNC while sleeping with sats in mid 90's  · Improved PaO2 of 125  · Likely has undiagnosed sleep apnea and unable to wear CPAP mask due to intellectual disability  · Spoke with brother who wants patient Level 1 full code      Seizure disorder ([facility] [address] )  Assessment & Plan  · Cont depakote  · Check with Pharmacy regarding current dose of Depakote  mg q 8 hours vs home dose of Depakote  mg q am and 500 mg q pm  · Level pending    Morbid obesity with BMI of 40 0-44 9, adult ([facility] [address] )  Assessment & Plan  · Speech consulted and recommended Level 2 nectar thick diet  · Nutrition consulted    GERD (gastroesophageal reflux disease)  Assessment & Plan  · Continue pantoprazole      DM (diabetes mellitus), type 2 [facility])  Assessment & Plan  Lab Results   Component Value Date    HGBA1C 6 6 (H) 07/19/2021       Recent Labs     07/20/21  0734 07/20/21  1104 07/20/21  1617 07/20/21  2042   POCGLU 180* 253* 272* 255*       Blood Sugar Average: Last 72 hrs:  (P) 211 25   · Not on meds prehospital  · Continue sliding scale insulin      Psychiatric disorder  Assessment & Plan  · Congential Intellectual Disabiltiy, Anxiety, Psychosis- continue gabapentin, quetiapine, lorazepam, and sertraline  · Home regimen of seroquel is 100 mg q am and 1600 with 200 mg q hs  · Currently on 100 mg BID due to somnolence  · PM dose held last night      Aspiration pneumonia (HCC)  Assessment & Plan  · Chest x-ray showed suspected right middle lobe  · CTA Chest:   No central pulmonary emboli identified  Significant interval worsening in airspace consolidation and patchy infiltrates  involving the right upper lobe to the greatest degree posterior segment but also elsewhere within the right upper lobe  There is also interval worsening of the consolidation and volume loss in the dependent lower lobes bilaterally  Also  component of chronic volume loss related to elevated right hemidiaphragm  Focally dilated main pulmonary artery  Assess for pulmonary valve disease  · Continue cefepime and Flagyl, add Vanco    · NPO  · Sputum culture pending- consider sending respiratory viral panel given negative procalcitonin      ----------------------------------------------------------------------------------------  HPI/24hr events: Slept well overnight with HFNC with NRB  Received Lasix 20 mg, then Diamox 500 with 1 59L output      Disposition: Continue Stepdown Level 1 level of care   Code Status: Level 1 - Full Code  ---------------------------------------------------------------------------------------  SUBJECTIVE    Review of Systems   Unable to perform ROS: Patient nonverbal     Review of systems was unable to be performed secondary to patient being nonverbal  ---------------------------------------------------------------------------------------  OBJECTIVE    Vitals   Vitals:    21 2353 21 0000 21 0100 21 0136   BP:  143/67 133/62    Pulse:  79 81    Resp:  (!) 23 (!) 26    Temp:  100 2 °F (37 9 °C) 100 2 °F (37 9 °C)    TempSrc:       SpO2: (!) 85% 97% 97% 92%   Weight:       Height:         Temp (24hrs), Av 5 °F (37 5 °C), Min:98 °F (36 7 °C), Max:100 2 °F (37 9 °C)  Current: Temperature: 100 2 °F (37 9 °C)          Respiratory:  SpO2: SpO2: 92 %  Nasal Cannula O2 Flow Rate (L/min): 15 L/min    Invasive/non-invasive ventilation settings   Respiratory    Lab Data (Last 4 hours)       0108            pH, Arterial       7 506           pCO2, Arterial       43 3           pO2, Arterial       125 3           HCO3, Arterial       33 5           Base Excess, Arterial       9 5                O2/Vent Data           Most Recent        Non-Invasive Ventilation Mode   HFNC (High flow)                  Physical Exam  Vitals and nursing note reviewed  Constitutional:       General: She is not in acute distress  Appearance: She is ill-appearing  HENT:      Head: Normocephalic  Mouth/Throat:      Mouth: Mucous membranes are moist    Eyes:      Conjunctiva/sclera: Conjunctivae normal       Pupils: Pupils are equal, round, and reactive to light  Cardiovascular:      Rate and Rhythm: Normal rate and regular rhythm  Heart sounds: Normal heart sounds  Pulmonary:      Effort: Pulmonary effort is normal  No respiratory distress  Breath sounds: Rhonchi and rales present  Abdominal:      General: Bowel sounds are normal  There is no distension  Palpations: Abdomen is soft  Tenderness: There is no abdominal tenderness  There is no guarding  Musculoskeletal:      Right lower leg: Edema present        Left lower leg: Edema present  Skin:     General: Skin is warm  Neurological:      General: No focal deficit present  Mental Status: She is alert  Mental status is at baseline           Laboratory and Diagnostics:  Results from last 7 days   Lab Units 07/20/21 2312 07/20/21 0521 07/19/21 0517 07/18/21  1612   WBC Thousand/uL  --  6 14 4 50 5 24   HEMOGLOBIN g/dL  --  9 1* 9 8* 10 8*   I STAT HEMOGLOBIN g/dl 9 9*  --   --   --    HEMATOCRIT %  --  28 7* 31 0* 33 1*   HEMATOCRIT, ISTAT % 29*  --   --   --    PLATELETS Thousands/uL  --  123* 153 163   NEUTROS PCT %  --  79*  --  68   BANDS PCT %  --   --  26*  --    MONOS PCT %  --  9  --  12   MONO PCT %  --   --  7  --      Results from last 7 days   Lab Units 07/21/21  0106 07/20/21 2312 07/20/21 0521 07/19/21 0517 07/18/21  1612   SODIUM mmol/L 135*  --  134* 133* 134*   POTASSIUM mmol/L 3 3*  --  3 8 4 1 4 5   CHLORIDE mmol/L 99*  --  98* 97* 95*   CO2 mmol/L 34*  --  32 31 35*   CO2, I-STAT mmol/L  --  37*  --   --   --    ANION GAP mmol/L 2*  --  4 5 4   BUN mg/dL 12  --  15 12 13   CREATININE mg/dL 0 56*  --  0 62 0 67 0 71   CALCIUM mg/dL 9 4  --  9 2 9 5 10 2*   GLUCOSE RANDOM mg/dL 263*  --  183* 174* 140   ALT U/L  --   --   --   --  20   AST U/L  --   --   --   --  25   ALK PHOS U/L  --   --   --   --  67   ALBUMIN g/dL  --   --   --   --  3 1*   TOTAL BILIRUBIN mg/dL  --   --   --   --  0 54               Results from last 7 days   Lab Units 07/18/21  1612   TROPONIN I ng/mL <0 02     Results from last 7 days   Lab Units 07/18/21 2039 07/18/21  1612   LACTIC ACID mmol/L 1 9 1 2     ABG:  Results from last 7 days   Lab Units 07/21/21  0108   PH ART  7 506*   PCO2 ART mm Hg 43 3   PO2 ART mm Hg 125 3   HCO3 ART mmol/L 33 5*   BASE EXC ART mmol/L 9 5   ABG SOURCE  Radial, Left     VBG:  Results from last 7 days   Lab Units 07/21/21  0108   ABG SOURCE  Radial, Left     Results from last 7 days   Lab Units 07/20/21  0521 07/18/21  2032   PROCALCITONIN ng/ml 0 08 <0 05       Micro  Results from last 7 days   Lab Units 07/19/21  0519 07/18/21 2040 07/18/21 2039   BLOOD CULTURE   --  No Growth at 24 hrs  No Growth at 24 hrs  SPUTUM CULTURE  Culture too young- will reincubate  --   --    GRAM STAIN RESULT  4+ Gram negative coccobacilli*  2+ Gram negative rods*  2+ Gram positive cocci in pairs*  2+ Polys*  --   --        EKG:   Imaging: I have personally reviewed pertinent reports  Intake and Output  I/O       07/19 0701 - 07/20 0700 07/20 0701 - 07/21 0700    P  O  1360 940    I V  (mL/kg) 1427 1 (15 1)     IV Piggyback 200     Total Intake(mL/kg) 2987 1 (31 7) 940 (10)    Urine (mL/kg/hr) 862 (0 4) 1100 (0 5)    Total Output 862 1100    Net +2125 1 -160                Height and Weights   Height: 5' (152 4 cm)  IBW (Ideal Body Weight): 45 5 kg  Body mass index is 40 56 kg/m²  Weight (last 2 days)     None            Nutrition       Diet Orders   (From admission, onward)             Start     Ordered    07/20/21 2216  Diet NPO  Diet effective now     Question Answer Comment   Diet Type NPO    RD to adjust diet per protocol?  Yes        07/20/21 2216                  Active Medications  Scheduled Meds:  Current Facility-Administered Medications   Medication Dose Route Frequency Provider Last Rate    acetaminophen  650 mg Oral Q6H PRN Bettina S Keven, CRNP      aspirin  81 mg Oral Daily Bettina S Keven, CRNP      atorvastatin  80 mg Oral Daily With [employer] S Keven, CRNP      cefepime  2,000 mg Intravenous Q8H Bettina S Keven, CRNP 2,000 mg (07/20/21 1921)    divalproex sodium  250 mg Oral Q8H [address] Bettina S Keven, CRNP      enoxaparin  40 mg Subcutaneous Q24H [address] Bettina S Keven, CRNP      ferrous sulfate  325 mg Oral Daily With Breakfast Bettina S Keven, CRNP      gabapentin  800 mg Oral TID Bettina S Keven, CRNP      insulin lispro  1-6 Units Subcutaneous TID AC Bettina S Keven, CRNP      ipratropium-albuterol  3 mL Nebulization Q6H Bettian S Keven, CRNP      LORazepam 0 5 mg Oral BID Bettina S Keven, CRNP      metroNIDAZOLE  500 mg Intravenous Q8H Bettina S Keven, CRNP 500 mg (07/20/21 1823)    oxybutynin  10 mg Oral Daily Bettina S Keven, CRNP      pantoprazole  20 mg Oral Early Morning Bettina S Keven, CRNP      potassium chloride  20 mEq Intravenous Q2H Erma Harris PA-C      QUEtiapine  100 mg Oral BID Bettina S Keven, CRNP      sertraline  100 mg Oral BID Bettina S Keven, CRNP      vancomycin  17 5 mg/kg (Adjusted) Intravenous Q12H Azuka Onye, DO 1,250 mg (07/20/21 2327)     Continuous Infusions:     PRN Meds:   acetaminophen, 650 mg, Q6H PRN        Invasive Devices Review  Invasive Devices     Peripheral Intravenous Line            Peripheral IV 07/18/21 Left Antecubital 2 days    Peripheral IV 07/20/21 Left;Upper Arm <1 day          Drain            Urethral Catheter Temperature probe;Non-latex 16 Fr  <1 day                Rationale for remaining devices: nicole for accurate I/O's  ---------------------------------------------------------------------------------------  Advance Directive and Living Will:      Power of :    POLST:    ---------------------------------------------------------------------------------------  Care Time Delivered:   No Critical Care time spent       Brian Reyna PA-C      Portions of the record may have been created with voice recognition software  Occasional wrong word or "sound a like" substitutions may have occurred due to the inherent limitations of voice recognition software    Read the chart carefully and recognize, using context, where substitutions have occurred

## 2021-07-22 ENCOUNTER — APPOINTMENT (INPATIENT)
Dept: RADIOLOGY | Facility: HOSPITAL | Age: 66
DRG: 871 | End: 2021-07-22
Payer: MEDICARE

## 2021-07-22 LAB
ANION GAP SERPL CALCULATED.3IONS-SCNC: 3 MMOL/L (ref 4–13)
ANION GAP SERPL CALCULATED.3IONS-SCNC: 6 MMOL/L (ref 4–13)
ARTERIAL PATENCY WRIST A: YES
BACTERIA UR QL AUTO: ABNORMAL /HPF
BASE EXCESS BLDA CALC-SCNC: 1.1 MMOL/L
BASOPHILS # BLD AUTO: 0.02 THOUSANDS/ΜL (ref 0–0.1)
BASOPHILS NFR BLD AUTO: 0 % (ref 0–1)
BILIRUB UR QL STRIP: NEGATIVE
BODY TEMPERATURE: ABNORMAL DEGREES FEHRENHEIT
BUN SERPL-MCNC: 15 MG/DL (ref 5–25)
BUN SERPL-MCNC: 20 MG/DL (ref 5–25)
CALCIUM SERPL-MCNC: 9 MG/DL (ref 8.3–10.1)
CALCIUM SERPL-MCNC: 9.8 MG/DL (ref 8.3–10.1)
CHLORIDE SERPL-SCNC: 103 MMOL/L (ref 100–108)
CHLORIDE SERPL-SCNC: 104 MMOL/L (ref 100–108)
CLARITY UR: ABNORMAL
CO2 SERPL-SCNC: 27 MMOL/L (ref 21–32)
CO2 SERPL-SCNC: 29 MMOL/L (ref 21–32)
COARSE GRAN CASTS URNS QL MICRO: ABNORMAL /LPF
COLOR UR: YELLOW
CREAT SERPL-MCNC: 0.59 MG/DL (ref 0.6–1.3)
CREAT SERPL-MCNC: 0.59 MG/DL (ref 0.6–1.3)
EOSINOPHIL # BLD AUTO: 0.02 THOUSAND/ΜL (ref 0–0.61)
EOSINOPHIL NFR BLD AUTO: 0 % (ref 0–6)
ERYTHROCYTE [DISTWIDTH] IN BLOOD BY AUTOMATED COUNT: 14.9 % (ref 11.6–15.1)
FINE GRAN CASTS URNS QL MICRO: ABNORMAL /LPF
GFR SERPL CREATININE-BSD FRML MDRD: 95 ML/MIN/1.73SQ M
GFR SERPL CREATININE-BSD FRML MDRD: 95 ML/MIN/1.73SQ M
GLUCOSE SERPL-MCNC: 140 MG/DL (ref 65–140)
GLUCOSE SERPL-MCNC: 140 MG/DL (ref 65–140)
GLUCOSE SERPL-MCNC: 163 MG/DL (ref 65–140)
GLUCOSE SERPL-MCNC: 167 MG/DL (ref 65–140)
GLUCOSE SERPL-MCNC: 173 MG/DL (ref 65–140)
GLUCOSE UR STRIP-MCNC: NEGATIVE MG/DL
HCO3 BLDA-SCNC: 26.7 MMOL/L (ref 22–28)
HCT VFR BLD AUTO: 31.1 % (ref 34.8–46.1)
HGB BLD-MCNC: 9.7 G/DL (ref 11.5–15.4)
HGB UR QL STRIP.AUTO: ABNORMAL
HYALINE CASTS #/AREA URNS LPF: ABNORMAL /LPF
IMM GRANULOCYTES # BLD AUTO: 0.11 THOUSAND/UL (ref 0–0.2)
IMM GRANULOCYTES NFR BLD AUTO: 1 % (ref 0–2)
KETONES UR STRIP-MCNC: ABNORMAL MG/DL
LEUKOCYTE ESTERASE UR QL STRIP: ABNORMAL
LYMPHOCYTES # BLD AUTO: 0.5 THOUSANDS/ΜL (ref 0.6–4.47)
LYMPHOCYTES NFR BLD AUTO: 6 % (ref 14–44)
MAGNESIUM SERPL-MCNC: 1.9 MG/DL (ref 1.6–2.6)
MCH RBC QN AUTO: 29.5 PG (ref 26.8–34.3)
MCHC RBC AUTO-ENTMCNC: 31.2 G/DL (ref 31.4–37.4)
MCV RBC AUTO: 95 FL (ref 82–98)
MONOCYTES # BLD AUTO: 0.67 THOUSAND/ΜL (ref 0.17–1.22)
MONOCYTES NFR BLD AUTO: 9 % (ref 4–12)
MRSA NOSE QL CULT: NORMAL
NEUTROPHILS # BLD AUTO: 6.52 THOUSANDS/ΜL (ref 1.85–7.62)
NEUTS SEG NFR BLD AUTO: 84 % (ref 43–75)
NITRITE UR QL STRIP: NEGATIVE
NON VENT CPAP: ABNORMAL
NON-SQ EPI CELLS URNS QL MICRO: ABNORMAL /HPF
NRBC BLD AUTO-RTO: 0 /100 WBCS
O2 CT BLDA-SCNC: 13.3 ML/DL (ref 16–23)
OXYHGB MFR BLDA: 96.1 % (ref 94–97)
PCO2 BLDA: 47.1 MM HG (ref 36–44)
PH BLDA: 7.37 [PH] (ref 7.35–7.45)
PH UR STRIP.AUTO: 6.5 [PH]
PLATELET # BLD AUTO: 161 THOUSANDS/UL (ref 149–390)
PMV BLD AUTO: 9.3 FL (ref 8.9–12.7)
PO2 BLDA: 95 MM HG (ref 75–129)
POTASSIUM SERPL-SCNC: 3.7 MMOL/L (ref 3.5–5.3)
POTASSIUM SERPL-SCNC: 4.1 MMOL/L (ref 3.5–5.3)
PROCALCITONIN SERPL-MCNC: 0.08 NG/ML
PROT UR STRIP-MCNC: ABNORMAL MG/DL
RBC # BLD AUTO: 3.29 MILLION/UL (ref 3.81–5.12)
RBC #/AREA URNS AUTO: ABNORMAL /HPF
SODIUM SERPL-SCNC: 134 MMOL/L (ref 136–145)
SODIUM SERPL-SCNC: 138 MMOL/L (ref 136–145)
SP GR UR STRIP.AUTO: 1.02 (ref 1–1.03)
SPECIMEN SOURCE: ABNORMAL
UROBILINOGEN UR QL STRIP.AUTO: 1 E.U./DL
VANCOMYCIN TROUGH SERPL-MCNC: 13.2 UG/ML (ref 10–20)
VENT CPAP FIO2: 100 %
WBC # BLD AUTO: 7.84 THOUSAND/UL (ref 4.31–10.16)
WBC #/AREA URNS AUTO: ABNORMAL /HPF

## 2021-07-22 PROCEDURE — 94640 AIRWAY INHALATION TREATMENT: CPT

## 2021-07-22 PROCEDURE — 80048 BASIC METABOLIC PNL TOTAL CA: CPT | Performed by: INTERNAL MEDICINE

## 2021-07-22 PROCEDURE — 93306 TTE W/DOPPLER COMPLETE: CPT | Performed by: INTERNAL MEDICINE

## 2021-07-22 PROCEDURE — 94669 MECHANICAL CHEST WALL OSCILL: CPT

## 2021-07-22 PROCEDURE — 81001 URINALYSIS AUTO W/SCOPE: CPT | Performed by: INTERNAL MEDICINE

## 2021-07-22 PROCEDURE — 94760 N-INVAS EAR/PLS OXIMETRY 1: CPT

## 2021-07-22 PROCEDURE — 80048 BASIC METABOLIC PNL TOTAL CA: CPT | Performed by: PHYSICIAN ASSISTANT

## 2021-07-22 PROCEDURE — C9113 INJ PANTOPRAZOLE SODIUM, VIA: HCPCS | Performed by: INTERNAL MEDICINE

## 2021-07-22 PROCEDURE — 99291 CRITICAL CARE FIRST HOUR: CPT | Performed by: INTERNAL MEDICINE

## 2021-07-22 PROCEDURE — 82805 BLOOD GASES W/O2 SATURATION: CPT | Performed by: INTERNAL MEDICINE

## 2021-07-22 PROCEDURE — 82948 REAGENT STRIP/BLOOD GLUCOSE: CPT

## 2021-07-22 PROCEDURE — 94002 VENT MGMT INPAT INIT DAY: CPT

## 2021-07-22 PROCEDURE — NC001 PR NO CHARGE: Performed by: ANESTHESIOLOGY

## 2021-07-22 PROCEDURE — 84145 PROCALCITONIN (PCT): CPT | Performed by: INTERNAL MEDICINE

## 2021-07-22 PROCEDURE — 94660 CPAP INITIATION&MGMT: CPT

## 2021-07-22 PROCEDURE — 94003 VENT MGMT INPAT SUBQ DAY: CPT

## 2021-07-22 PROCEDURE — 80202 ASSAY OF VANCOMYCIN: CPT | Performed by: STUDENT IN AN ORGANIZED HEALTH CARE EDUCATION/TRAINING PROGRAM

## 2021-07-22 PROCEDURE — 71045 X-RAY EXAM CHEST 1 VIEW: CPT

## 2021-07-22 PROCEDURE — 85025 COMPLETE CBC W/AUTO DIFF WBC: CPT | Performed by: INTERNAL MEDICINE

## 2021-07-22 PROCEDURE — 83735 ASSAY OF MAGNESIUM: CPT | Performed by: INTERNAL MEDICINE

## 2021-07-22 RX ORDER — SODIUM CHLORIDE 30 MG/ML INHALATION SOLUTION 30 MG/ML
4 SOLUTION INHALANT
Status: DISCONTINUED | OUTPATIENT
Start: 2021-07-22 | End: 2021-07-31

## 2021-07-22 RX ORDER — FUROSEMIDE 10 MG/ML
20 INJECTION INTRAMUSCULAR; INTRAVENOUS ONCE
Status: COMPLETED | OUTPATIENT
Start: 2021-07-22 | End: 2021-07-22

## 2021-07-22 RX ORDER — POTASSIUM CHLORIDE 14.9 MG/ML
20 INJECTION INTRAVENOUS ONCE
Status: COMPLETED | OUTPATIENT
Start: 2021-07-22 | End: 2021-07-22

## 2021-07-22 RX ORDER — ACETAZOLAMIDE 500 MG/5ML
500 INJECTION, POWDER, LYOPHILIZED, FOR SOLUTION INTRAVENOUS ONCE
Status: DISCONTINUED | OUTPATIENT
Start: 2021-07-22 | End: 2021-07-22

## 2021-07-22 RX ORDER — PANTOPRAZOLE SODIUM 40 MG/1
40 INJECTION, POWDER, FOR SOLUTION INTRAVENOUS
Status: DISCONTINUED | OUTPATIENT
Start: 2021-07-22 | End: 2021-07-23

## 2021-07-22 RX ORDER — POTASSIUM CHLORIDE 14.9 MG/ML
20 INJECTION INTRAVENOUS ONCE
Status: COMPLETED | OUTPATIENT
Start: 2021-07-22 | End: 2021-07-23

## 2021-07-22 RX ORDER — OLANZAPINE 5 MG/1
2.5 TABLET, ORALLY DISINTEGRATING ORAL DAILY PRN
Status: DISCONTINUED | OUTPATIENT
Start: 2021-07-22 | End: 2021-07-25

## 2021-07-22 RX ADMIN — INSULIN LISPRO 2 UNITS: 100 INJECTION, SOLUTION INTRAVENOUS; SUBCUTANEOUS at 12:05

## 2021-07-22 RX ADMIN — METRONIDAZOLE 500 MG: 500 INJECTION, SOLUTION INTRAVENOUS at 18:34

## 2021-07-22 RX ADMIN — IPRATROPIUM BROMIDE AND ALBUTEROL SULFATE 3 ML: 2.5; .5 SOLUTION RESPIRATORY (INHALATION) at 01:35

## 2021-07-22 RX ADMIN — FUROSEMIDE 20 MG: 10 INJECTION, SOLUTION INTRAMUSCULAR; INTRAVENOUS at 03:06

## 2021-07-22 RX ADMIN — ENOXAPARIN SODIUM 40 MG: 40 INJECTION SUBCUTANEOUS at 08:46

## 2021-07-22 RX ADMIN — VANCOMYCIN HYDROCHLORIDE 1500 MG: 1 INJECTION, POWDER, LYOPHILIZED, FOR SOLUTION INTRAVENOUS at 23:58

## 2021-07-22 RX ADMIN — VANCOMYCIN HYDROCHLORIDE 1500 MG: 1 INJECTION, POWDER, LYOPHILIZED, FOR SOLUTION INTRAVENOUS at 11:17

## 2021-07-22 RX ADMIN — VALPROATE SODIUM 250 MG: 100 INJECTION, SOLUTION INTRAVENOUS at 06:06

## 2021-07-22 RX ADMIN — IPRATROPIUM BROMIDE AND ALBUTEROL SULFATE 3 ML: 2.5; .5 SOLUTION RESPIRATORY (INHALATION) at 20:57

## 2021-07-22 RX ADMIN — OLANZAPINE 5 MG: 5 TABLET, ORALLY DISINTEGRATING ORAL at 22:13

## 2021-07-22 RX ADMIN — FUROSEMIDE 20 MG: 10 INJECTION, SOLUTION INTRAMUSCULAR; INTRAVENOUS at 20:51

## 2021-07-22 RX ADMIN — FUROSEMIDE 20 MG: 10 INJECTION, SOLUTION INTRAMUSCULAR; INTRAVENOUS at 10:03

## 2021-07-22 RX ADMIN — POTASSIUM CHLORIDE 20 MEQ: 14.9 INJECTION, SOLUTION INTRAVENOUS at 10:03

## 2021-07-22 RX ADMIN — MEROPENEM 1000 MG: 1 INJECTION, POWDER, FOR SOLUTION INTRAVENOUS at 15:09

## 2021-07-22 RX ADMIN — IPRATROPIUM BROMIDE AND ALBUTEROL SULFATE 3 ML: 2.5; .5 SOLUTION RESPIRATORY (INHALATION) at 14:06

## 2021-07-22 RX ADMIN — PANTOPRAZOLE SODIUM 40 MG: 40 INJECTION, POWDER, FOR SOLUTION INTRAVENOUS at 08:47

## 2021-07-22 RX ADMIN — VALPROATE SODIUM 250 MG: 100 INJECTION, SOLUTION INTRAVENOUS at 13:56

## 2021-07-22 RX ADMIN — METRONIDAZOLE 500 MG: 500 INJECTION, SOLUTION INTRAVENOUS at 11:17

## 2021-07-22 RX ADMIN — POTASSIUM CHLORIDE 20 MEQ: 14.9 INJECTION, SOLUTION INTRAVENOUS at 20:19

## 2021-07-22 RX ADMIN — INSULIN LISPRO 2 UNITS: 100 INJECTION, SOLUTION INTRAVENOUS; SUBCUTANEOUS at 06:02

## 2021-07-22 RX ADMIN — SODIUM CHLORIDE SOLN NEBU 3% 4 ML: 3 NEBU SOLN at 03:17

## 2021-07-22 RX ADMIN — INSULIN LISPRO 2 UNITS: 100 INJECTION, SOLUTION INTRAVENOUS; SUBCUTANEOUS at 23:15

## 2021-07-22 RX ADMIN — OXYMETAZOLINE HYDROCHLORIDE 1 SPRAY: 0.05 SPRAY NASAL at 20:20

## 2021-07-22 RX ADMIN — SODIUM CHLORIDE SOLN NEBU 3% 4 ML: 3 NEBU SOLN at 14:06

## 2021-07-22 RX ADMIN — MEROPENEM 1000 MG: 1 INJECTION, POWDER, FOR SOLUTION INTRAVENOUS at 07:51

## 2021-07-22 RX ADMIN — IPRATROPIUM BROMIDE AND ALBUTEROL SULFATE 3 ML: 2.5; .5 SOLUTION RESPIRATORY (INHALATION) at 07:30

## 2021-07-22 RX ADMIN — SODIUM CHLORIDE SOLN NEBU 3% 4 ML: 3 NEBU SOLN at 20:57

## 2021-07-22 RX ADMIN — METRONIDAZOLE 500 MG: 500 INJECTION, SOLUTION INTRAVENOUS at 02:13

## 2021-07-22 RX ADMIN — POTASSIUM CHLORIDE 20 MEQ: 14.9 INJECTION, SOLUTION INTRAVENOUS at 23:04

## 2021-07-22 RX ADMIN — SODIUM CHLORIDE SOLN NEBU 3% 4 ML: 3 NEBU SOLN at 07:30

## 2021-07-22 RX ADMIN — VALPROATE SODIUM 250 MG: 100 INJECTION, SOLUTION INTRAVENOUS at 22:14

## 2021-07-22 NOTE — PROGRESS NOTES
Late Entry   0210-Drop in patient's saturations r/t coughing attack  Saturations high 70's to low 80's  Saturations improved with no extra oxygen requirements  0219-Saturations dropped to high 70's after coughing attack  Currently on HFNC with no improvement noted  Patient also placed on 15L's NRB  Sats ranging mid to low 80's  Dry, congested cough pursed lip, irregular, and shallow breathing all noted  Audible crackles and rales auscultated throughout left lung field and little air movement heard throughout right  Nubia Kizzy made aware and present at bedside  CXR obtained and morning labs sent  New orders placed CPAP  Sitter (Ailyn Lewis) present at bedside and SBAR given

## 2021-07-22 NOTE — RESPIRATORY THERAPY NOTE
RT Ventilator Management Note  Ngoc Moura 77 y o  female MRN: [id number]  Unit/Bed#: -01 Encounter: [id number]      Daily Screen    No data found in the last 10 encounters  Physical Exam:   Assessment Type: During-treatment  General Appearance: Sleeping  Respiratory Pattern: Tachypneic  Chest Assessment: Chest expansion asymmetrical  Cough: Non-productive, Congested, Dry  O2 Device: CPAP  Subjective Data: Patient coughed uncontrolably on BiPAP      Resp Comments: Assumed care of patient at 1900 after report from day shift therapist   Patient presented on HFNC 90%/55L  FiO2 was found at 90% at first check  SpO2 was 92% and uninteded increase did not seem inappropriate as SpO2 was 92%  At 0237 critical care requested BiPAP  restraints (mitts) were removed and patient placed under 1 on 1 care  V60 was eventually set at CPAP 12 cmH2O and FiO2 100%  Patient did not tolerate BiPAP mode coughing uncontrolably and producing low tidal volumes making oxygenation difficult  at 0346 and ABG was drawn by critical care (pH 7 37/CO2 47 7/pO2 95/HCO3 26 7 also o2 Content was low at 13 3 and P/F ratio was 95   at 0516 critical care tiger texted therapsit she had lowered FiO2 to 80%

## 2021-07-22 NOTE — PLAN OF CARE
Speech-Language Pathology Bedside Swallow Evaluation      Patient Name: Gregg Stafford    ESITM'Q Date: 7/22/2021     Chart review and discussion with RN/NP completed  ? Aspiration event last date with evening pills (crushed in applesauce)  Since episode, medical condition worsened  Recommended hold and reattempt next date and completion will be based upon pt's medical condition  Continue to recommend NPO as pt is not safe for po intake at this time  Oral care      [address], CCC-SLP

## 2021-07-22 NOTE — PROGRESS NOTES
Attempted to give patient's evening medications crushed in applesauce  Immediately coughed with a drop in saturations  Patient temporarily placed on 15L NRB along with HFNC until saturations improved  Zak Souza made aware and verbal order received to hold meds  Patient currently NPO

## 2021-07-22 NOTE — ASSESSMENT & PLAN NOTE
· Chest x-ray showed suspected right middle lobe consolidation  · CTA Chest:   No central pulmonary emboli identified  Significant interval worsening in airspace consolidation and patchy infiltrates  involving the right upper lobe to the greatest degree posterior segment but also elsewhere within the right upper lobe  There is also interval worsening of the consolidation and volume loss in the dependent lower lobes bilaterally  Also  component of chronic volume loss related to elevated right hemidiaphragm  Focally dilated main pulmonary artery  Assess for pulmonary valve disease     · Continue cefepime and Flagyl, add Vanco    · NPO due to mental status   · Aspiration precautions  · Sputum culture - gram neg and gram positive  · Speech therapy recommends mechanically altered/level 2 diet and nectar thick liquids  · Witnessed aspiration 7/21, made NPO with re-eval speech eval on 7/23

## 2021-07-22 NOTE — PROGRESS NOTES
[address]  Progress Note - Sydnie Acosta 1955, 77 y o  female MRN: [id number]  Unit/Bed#: -01 Encounter: [id number]  Primary Care Provider: Corrina Olivarez MD   Date and time admitted to hospital: 7/18/2021  2:07 PM    * Sepsis due to pneumonia [facility])  Assessment & Plan  · POA a/e/b - tachypnea, tachycardia, hypoxia   · Sepsis secondary to pneumonia likely aspiration vs gram-negative  · CXR 7/20 progressive b/l infiltrates R>L  · 7/20 Continue cefepime and metronidazole  Discontinue azithromycin and add Vanco given group home setting and worsening infiltrate on CXR    · 7/22 Due to low grade fevers, clinical deterioration, and positive sputum culture Cefepime changed to Meropenum  · Current ABX - Meropenum, Metronidazole, Vancomycin  · Blood cultures NG48  · Flu/RSV - negative  · Sputum culture Gram neg coccobacilli, GNR, and GPC  · COVID negative on admission and patient fully vaccinated  · See plan for aspiration below    Results from last 7 days   Lab Units 07/20/21  0521 07/18/21 2032   PROCALCITONIN ng/ml 0 08 <0 05       Acute respiratory failure with hypoxia (HCC)  Assessment & Plan  · Acute respiratory failure/hypoxia secondary to pneumonia with component of pulmonary edema  · Cont nebs and abx as abve  · CTA negative for pulmonary embolism- see plan under PNA  · Check echo  · Gave 20 mg IV Lasix with good response  · Repeat lab work concerning for contraction alkalosis - Diamox 500 mg IV x 2   · 7/20 Escalated to midflow then required transfer for HFNC under Critical Care  · 7/21 HFNC + NRB, tolerated off NRB in the evening  · 7/22 progressive hypoxia on HFNC - placed on CPAP with improvement, CXR - progressive infiltrate R middle lobe, highly suggestion of mucous plugging and aspiration  · Currently on CPAP 12 100%  · Goal SpO2 >92%  · Trend ABG as needed  · Cont duoneb, 3% saline, chest PT, suction  · Due to cognitive delay, pt is unable to fully participate in aggressive pulmonary toilet  · Likely has undiagnosed sleep apnea    Aspiration pneumonia [facility])  Assessment & Plan  · Chest x-ray showed suspected right middle lobe consolidation  · CTA Chest:   No central pulmonary emboli identified  Significant interval worsening in airspace consolidation and patchy infiltrates  involving the right upper lobe to the greatest degree posterior segment but also elsewhere within the right upper lobe  There is also interval worsening of the consolidation and volume loss in the dependent lower lobes bilaterally  Also  component of chronic volume loss related to elevated right hemidiaphragm  Focally dilated main pulmonary artery  Assess for pulmonary valve disease  · Continue cefepime and Flagyl, add Vanco    · NPO due to mental status  · Aspiration precautions  · Sputum culture - gram neg and gram positive  · Speech therapy recommends mechanically altered/level 2 diet and nectar thick liquids  · Witnessed aspiration 7/21, made NPO with re-eval speech eval on 7/22    Abnormal CT of the chest  Assessment & Plan  · CT of chest on 7/18 revealed:  Significant dilation of the main pulmonary artery, measuring 44 mm, with prominence of the central pulmonary arteries  · ECHO - pending    Hyperlipidemia  Assessment & Plan  · Patient takes Crestor 40 mg daily at home  · Continue Lipitor inpatient - on hold due to NPO    Pancytopenia [facility])  Assessment & Plan  · History of  Pancytopenia diagnosed in 3/2019  · Follows with Oncologist, Dr Holloway Gist   · Continue to monitor CBC     Seizure disorder [facility])  Assessment & Plan  · Cont home Depakote  mg q am and 500 mg q pm  · Changed to IV due to NPO  · Level pending    Obesity (BMI 30 0-34  9)  Assessment & Plan  · Nutrition consulted    GERD (gastroesophageal reflux disease)  Assessment & Plan  · Continue pantoprazole      DM (diabetes mellitus), type 2 [facility])  Assessment & Plan  Lab Results   Component Value Date    HGBA1C 6 6 (H) 07/19/2021 Recent Labs     07/20/21  0734 07/20/21  1104 07/20/21  1617 07/20/21  2042   POCGLU 180* 253* 272* 255*       Blood Sugar Average: Last 72 hrs:  (P) 211 25   · Not on meds prehospital  · Continue sliding scale insulin      Psychiatric disorder  Assessment & Plan  · Congential Intellectual Disabiltiy, Anxiety, Psychosis- continue gabapentin, quetiapine, lorazepam, and sertraline  · Home regimen of seroquel is 100 mg q am and 1600 with 200 mg q hs  · Hold Seroquel due to somnolence   · Zyprexa 5 mg sublingual while NPO      ----------------------------------------------------------------------------------------  HPI/24hr events: Yesterday patient tolerated slight wean in HFNC with FiO2 at 80% however as the evening progressed, pt had witnessed aspiration on applesauce  Progressive hypoxia overnight, maxed on HFNC with NRB, SpO2 86 - 88% (as low as 74% following attempts to cough)  CXR revealing worsening right middle lobe infiltrate with concern for mucous plugging  Cpap and 3% saline neb ordered with improvement in oxygenation  ABX changed due to low grade fevers, clinical deterioration, and high potential for MDRO pneumonia despite 4 days of broad antibiotic therapy  Pt placed on 1:1 continual observation for safety while on Cpap       Disposition: Continue Critical Care   Code Status: Level 3 - DNAR and DNI  ---------------------------------------------------------------------------------------  SUBJECTIVE  n/a    Review of Systems  Review of systems was unable to be performed secondary to encephalopathy, resp distress  ---------------------------------------------------------------------------------------  OBJECTIVE    Vitals   Vitals:    07/22/21 0302 07/22/21 0310 07/22/21 0317 07/22/21 0400   BP:    111/53   BP Location:    Left arm   Pulse: 69   67   Resp: (!) 30   (!) 31   Temp: 99 9 °F (37 7 °C)   99 9 °F (37 7 °C)   TempSrc:    Bladder   SpO2: 91% 91% 95% 97%   Weight:       Height:         Temp (24hrs), Av 8 °F (37 7 °C), Min:99 °F (37 2 °C), Max:100 4 °F (38 °C)  Current: Temperature: 99 9 °F (37 7 °C)          Respiratory:  SpO2: SpO2: 97 %, SpO2 Device: O2 Device: CPAP  Nasal Cannula O2 Flow Rate (L/min): 15 L/min    Invasive/non-invasive ventilation settings   Respiratory    Lab Data (Last 4 hours)       0346            pH, Arterial       7 371           pCO2, Arterial       47 1           pO2, Arterial       95 0           HCO3, Arterial       26 7           Base Excess, Arterial       1 1                O2/Vent Data        0317   Most Recent        Non-Invasive Ventilation Mode CPAP  CPAP                  Physical Exam  Constitutional:       Appearance: She is well-developed  She is obese  She is ill-appearing  Interventions: Face mask in place  Cardiovascular:      Rate and Rhythm: Normal rate and regular rhythm  Pulses: Normal pulses  Heart sounds: No murmur heard  No friction rub  No gallop  Pulmonary:      Effort: Tachypnea and accessory muscle usage present  Breath sounds: Decreased breath sounds, rhonchi and rales present  No wheezing  Comments: Poor effort  Abdominal:      General: Bowel sounds are normal  There is no distension  Palpations: Abdomen is soft  Tenderness: There is no abdominal tenderness  Musculoskeletal:      Right lower le+ Pitting Edema present  Left lower le+ Pitting Edema present  Skin:     Capillary Refill: Capillary refill takes less than 2 seconds  Neurological:      General: No focal deficit present  Mental Status: She is easily aroused  Mental status is at baseline  She is lethargic        Comments: Pt non verbal         Laboratory and Diagnostics:  Results from last 7 days   Lab Units 21  0233 21  0556 21  2312 21  0521 21  0517 21  1612   WBC Thousand/uL 7 84 6 66  --  6 14 4 50 5 24   HEMOGLOBIN g/dL 9 7* 9 6*  --  9 1* 9 8* 10 8*   I STAT HEMOGLOBIN g/dl  --   --  9 9*  --   --   --    HEMATOCRIT % 31 1* 30 4*  --  28 7* 31 0* 33 1*   HEMATOCRIT, ISTAT %  --   --  29*  --   --   --    PLATELETS Thousands/uL 161 149  --  123* 153 163   NEUTROS PCT % 84*  --   --  79*  --  68   BANDS PCT %  --   --   --   --  26*  --    MONOS PCT % 9  --   --  9  --  12   MONO PCT %  --   --   --   --  7  --      Results from last 7 days   Lab Units 07/22/21 0233 07/21/21  1600 07/21/21  0556 07/21/21  0106 07/20/21  2312 07/20/21  0521 07/19/21  0517 07/18/21  1612   SODIUM mmol/L 134* 135* 135* 135*  --  134* 133* 134*   POTASSIUM mmol/L 4 1 3 8 3 9 3 3*  --  3 8 4 1 4 5   CHLORIDE mmol/L 104 102 99* 99*  --  98* 97* 95*   CO2 mmol/L 27 27 31 34*  --  32 31 35*   CO2, I-STAT mmol/L  --   --   --   --  37*  --   --   --    ANION GAP mmol/L 3* 6 5 2*  --  4 5 4   BUN mg/dL 15 15 12 12  --  15 12 13   CREATININE mg/dL 0 59* 0 58* 0 58* 0 56*  --  0 62 0 67 0 71   CALCIUM mg/dL 9 0 9 5 9 3 9 4  --  9 2 9 5 10 2*   GLUCOSE RANDOM mg/dL 167* 155* 216* 263*  --  183* 174* 140   ALT U/L  --   --  54  --   --   --   --  20   AST U/L  --   --  49*  --   --   --   --  25   ALK PHOS U/L  --   --  64  --   --   --   --  67   ALBUMIN g/dL  --   --  2 2*  --   --   --   --  3 1*   TOTAL BILIRUBIN mg/dL  --   --  0 39  --   --   --   --  0 54     Results from last 7 days   Lab Units 07/22/21 0233 07/21/21  1600 07/21/21  0556   MAGNESIUM mg/dL 1 9 2 0 1 9   PHOSPHORUS mg/dL  --   --  3 0           Results from last 7 days   Lab Units 07/18/21  1612   TROPONIN I ng/mL <0 02     Results from last 7 days   Lab Units 07/18/21 2039 07/18/21  1612   LACTIC ACID mmol/L 1 9 1 2     ABG:  Results from last 7 days   Lab Units 07/22/21  0346   PH ART  7 371   PCO2 ART mm Hg 47 1*   PO2 ART mm Hg 95 0   HCO3 ART mmol/L 26 7   BASE EXC ART mmol/L 1 1   ABG SOURCE  Radial, Left     VBG:  Results from last 7 days   Lab Units 07/22/21  0346   ABG SOURCE  Radial, Left     Results from last 7 days   Lab Units 07/21/21  0556 07/20/21  0521 07/18/21 2032   PROCALCITONIN ng/ml 0 14 0 08 <0 05       Micro  Results from last 7 days   Lab Units 07/19/21  0519 07/18/21 2040 07/18/21 2039   BLOOD CULTURE   --  No Growth at 48 hrs  No Growth at 48 hrs  SPUTUM CULTURE  4+ Growth of   --   --    GRAM STAIN RESULT  4+ Gram negative coccobacilli*  2+ Gram negative rods*  2+ Gram positive cocci in pairs*  2+ Polys*  --   --        EKG: NSR HR 60's  Imaging: I have personally reviewed pertinent reports  and I have personally reviewed pertinent films in PACS    Intake and Output  I/O       07/20 0701 - 07/21 0700 07/21 0701 - 07/22 0700    P  O  940 60    I V  (mL/kg) 60 (0 7) 40 (0 5)    IV Piggyback 550 550    Total Intake(mL/kg) 1550 (19 3) 650 (8 1)    Urine (mL/kg/hr) 1715 (0 9) 1745 (0 9)    Stool  0    Total Output 1715 1745    Net -165 -1095          Unmeasured Stool Occurrence  1 x          Height and Weights   Height: 5' (152 4 cm)  IBW (Ideal Body Weight): 45 5 kg  Body mass index is 34 62 kg/m²  Weight (last 2 days)     Date/Time   Weight    07/21/21 0547   80 4 (177 25)                Nutrition       Diet Orders   (From admission, onward)             Start     Ordered    07/22/21 0229  Diet NPO  Diet effective now     Comments: Mechanical soft/level 2, Nectar thick liquids, crush pills if able   Question Answer Comment   Diet Type NPO    RD to adjust diet per protocol?  Yes        07/22/21 0228                  Active Medications  Scheduled Meds:  Current Facility-Administered Medications   Medication Dose Route Frequency Provider Last Rate    acetaminophen  650 mg Rectal Q4H PRN KRISTOPHER Huitron      bisacodyl  10 mg Rectal Daily PRN KRISTOPHER Moreno      enoxaparin  40 mg Subcutaneous Q24H [address] Bettina S KevenKRISTOPHER      insulin lispro  2-12 Units Subcutaneous Q6H [address] KRISTOPHER Moreno      ipratropium-albuterol  3 mL Nebulization Q6H Bettina S Keven, CRNP      meropenem  1,000 mg Intravenous Q8H Aura KRISTOPHER Bennett Stopped (07/22/21 0100)    metroNIDAZOLE  500 mg Intravenous Q8H Bettina S KevenJONATHANNP 500 mg (07/22/21 0213)    OLANZapine  5 mg Oral HS Mala Vallejofelipa, JONATHANNP      oxymetazoline  1 spray Each Nare Q12H [address] KRISTOPHER Moreno      pantoprazole  40 mg Intravenous Q24H [address] JONATHAN CasonNP      sodium chloride  1 spray Each Nare Q1H PRN KRISTOPHER Moreno      sodium chloride  4 mL Nebulization Q6H KRISTOPHER Cason      valproate sodium  250 mg Intravenous Q8H [address] KRISTOPHER Ojeda      vancomycin  17 5 mg/kg (Adjusted) Intravenous Q12H Genette Brain, DO Stopped (07/22/21 0100)     Continuous Infusions:     PRN Meds:   acetaminophen, 650 mg, Q4H PRN  bisacodyl, 10 mg, Daily PRN  sodium chloride, 1 spray, Q1H PRN        Invasive Devices Review  Invasive Devices     Peripheral Intravenous Line            Peripheral IV 07/21/21 Left Forearm 1 day    Peripheral IV 07/21/21 Right Forearm 1 day          Drain            Urethral Catheter Temperature probe;Non-latex 16 Fr  1 day                Rationale for remaining devices: nicole-accurate I/O critically ill, immobile  ---------------------------------------------------------------------------------------  Advance Directive and Living Will:      Power of :    POLST:    ---------------------------------------------------------------------------------------  Care Time Delivered:   Upon my evaluation, this patient had a high probability of imminent or life-threatening deterioration due to acute hypoxic respiratory failure, pneumonia, which required my direct attention, intervention, and personal management  I have personally provided 65 minutes (01:05 to 05:10) of critical care time, exclusive of procedures, teaching, family meetings, and any prior time recorded by providers other than myself         KRISTOPHER Mitchell      Portions of the record may have been created with voice recognition software  Occasional wrong word or "sound a like" substitutions may have occurred due to the inherent limitations of voice recognition software    Read the chart carefully and recognize, using context, where substitutions have occurred

## 2021-07-22 NOTE — RESPIRATORY THERAPY NOTE
RT Protocol Note  Megan Fluke 77 y o  female MRN: [id number]  Unit/Bed#: -01 Encounter: [id number]    Assessment    Principal Problem:    Sepsis due to pneumonia [facility])  Active Problems:    Aspiration pneumonia ([place] [address] )    Psychiatric disorder    DM (diabetes mellitus), type 2 ([place] [address] )    GERD (gastroesophageal reflux disease)    Acute respiratory failure with hypoxia (HCC)    Obesity (BMI 30 0-34  9)    Seizure disorder (HCC)    Pancytopenia (HCC)    Hyperlipidemia    Abnormal CT of the chest      Home Pulmonary Medications:         Past Medical History:   Diagnosis Date    Anxiety     Diabetes mellitus ([place] [address] )     GERD (gastroesophageal reflux disease)     Hyperlipidemia     Hypertension     Mental disability     Seizure disorder ([place] [address] )      Social History     Socioeconomic History    Marital status: Single     Spouse name: None    Number of children: None    Years of education: None    Highest education level: None   Occupational History    None   Tobacco Use    Smoking status: Never Smoker    Smokeless tobacco: Never Used   Vaping Use    Vaping Use: Never used   Substance and Sexual Activity    Alcohol use: Not Currently    Drug use: Not Currently    Sexual activity: Not Currently   Other Topics Concern    None   Social History Narrative    None     Social Determinants of Health     Financial Resource Strain:     Difficulty of Paying Living Expenses:    Food Insecurity:     Worried About Running Out of Food in the Last Year:     Ran Out of Food in the Last Year:    Transportation Needs:     Lack of Transportation (Medical):      Lack of Transportation (Non-Medical):    Physical Activity:     Days of Exercise per Week:     Minutes of Exercise per Session:    Stress:     Feeling of Stress :    Social Connections:     Frequency of Communication with Friends and Family:     Frequency of Social Gatherings with Friends and Family:     Attends Holiness Services:     Active Member of Clubs or Organizations:     Attends Club or Organization Meetings:     Marital Status:    Intimate Partner Violence:     Fear of Current or Ex-Partner:     Emotionally Abused:     Physically Abused:     Sexually Abused:        Subjective    Subjective Data: Patient coughed uncontrolably on BiPAP    Objective    Physical Exam:   Assessment Type: (P) Assess only  General Appearance: (P) Awake  Respiratory Pattern: (P) Tachypneic  Chest Assessment: (P) Chest expansion symmetrical  Bilateral Breath Sounds: (P) Diminished  Cough: Non-productive, Congested, Dry  O2 Device: (P) HFNC 90%    Vitals:  Blood pressure 110/55, pulse 70, temperature 99 5 °F (37 5 °C), temperature source Bladder, resp  rate (!) 29, height 5' (1 524 m), weight 81 6 kg (179 lb 14 3 oz), SpO2 94 %  Results from last 7 days   Lab Units 07/22/21  0346 07/21/21  0600   PH ART  7 371 7 386   PCO2 ART mm Hg 47 1* 56 2*   PO2 ART mm Hg 95 0 85 0   HCO3 ART mmol/L 26 7 33 0*   BASE EXC ART mmol/L 1 1 6 5   O2 CONTENT ART mL/dL 13 3* 15 7*   O2 HGB, ARTERIAL % 96 1 94 8   ABG SOURCE  Radial, Left Radial, Left   SOPHIA TEST  Yes Yes   NON VENT ROOM AIR %  --  100       Imaging and other studies: I have personally reviewed pertinent reports        O2 Device: (P) HFNC 90%     Plan       Airway Clearance Plan: Percussive Vest     Resp Comments: (P) Pt taken off cpap and on HFNC, 90% 55L, Pt is stable and tolerating well

## 2021-07-22 NOTE — ASSESSMENT & PLAN NOTE
· Acute respiratory failure/hypoxia secondary to pneumonia with component of pulmonary edema  · Cont nebs and abx as abve  · CTA negative for pulmonary embolism- see plan under PNA  · Check echo  · Gave 20 mg IV Lasix with good response  · Repeat lab work concerning for contraction alkalosis - Diamox 500 mg IV x 2   · 7/20 Escalated to midflow then required transfer for HFNC under Critical Care  · 7/21 HFNC + NRB, tolerated off NRB in the evening  · 7/22 progressive hypoxia on HFNC - placed on CPAP with improvement, CXR - progressive infiltrate R middle lobe, highly suggestion of mucous plugging and aspiration  · Alternates HFNC and CPAP 12 100%  · Goal SpO2 >92%  · Trend ABG as needed  · Cont duoneb, 3% saline, chest PT, suction  · Due to cognitive delay, pt is unable to fully participate in aggressive pulmonary toilet  · Likely has undiagnosed sleep apnea

## 2021-07-22 NOTE — PROGRESS NOTES
Vancomycin Assessment    Madison Askew is a 77 y o  female who is currently receiving vancomycin 1250 mg IV q 12 hrs for Pneumonia     Relevant clinical data and objective history reviewed:  Creatinine   Date Value Ref Range Status   07/22/2021 0 59 (L) 0 60 - 1 30 mg/dL Final     Comment:     Standardized to IDMS reference method   07/21/2021 0 58 (L) 0 60 - 1 30 mg/dL Final     Comment:     Standardized to IDMS reference method   07/21/2021 0 58 (L) 0 60 - 1 30 mg/dL Final     Comment:     Standardized to IDMS reference method     /56 (BP Location: Left arm)   Pulse 74   Temp 99 3 °F (37 4 °C) (Bladder)   Resp (!) 27   Ht 5' (1 524 m)   Wt 81 6 kg (179 lb 14 3 oz)   SpO2 92%   BMI 35 13 kg/m²   I/O last 3 completed shifts: In: 5155 [P O :540; I V :100; IV Piggyback:1100]  Out: 4335 [Urine:4335]  Lab Results   Component Value Date/Time    BUN 15 07/22/2021 02:33 AM    WBC 7 84 07/22/2021 02:33 AM    HGB 9 7 (L) 07/22/2021 02:33 AM    HCT 31 1 (L) 07/22/2021 02:33 AM    MCV 95 07/22/2021 02:33 AM     07/22/2021 02:33 AM     Temp Readings from Last 3 Encounters:   07/22/21 99 3 °F (37 4 °C) (Bladder)   01/03/21 98 °F (36 7 °C) (Temporal)     Vancomycin Days of Therapy: 3    Assessment/Plan  The patient is currently on vancomycin utilizing scheduled dosing  Baseline risks associated with therapy include: pre-existing renal impairment, concomitant nephrotoxic medications, and dehydration  The patient is receiving 1250 mg IV q 12 hrs with the most recent vancomycin level being at steady-state and sub-therapeutic based on a goal of 15-20 (appropriate for most indications) ; therefore, after clinical evaluation will be changed to 1500 mg IV q 12 hrs   Pharmacy will continue to follow closely for s/sx of nephrotoxicity, infusion reactions, and appropriateness of therapy  BMP and CBC will be ordered per protocol    Plan for trough as patient approaches steady state, prior to the 5th  dose at approximately 1030 on 07/24/2021  Pharmacy will continue to follow the patients culture results and clinical progress daily      Lee Chowdhury, Pharmacist

## 2021-07-22 NOTE — PHYSICAL THERAPY NOTE
PHYSICAL THERAPY NOTE          Patient Name: Daniella Marshall  CWGBE'P Date: 7/22/2021 07/22/21 1100   Note Type   Note Type Treatment   Cancel Reasons Medical status       Chart reviewed  Discussed pt's status during mobility rounds  Pt was transferred to ICU 7/20/2021 requiring increasing O2 demands with HFNC and CPAP  Per Dr Nj Murrieta, patient not medically appropriate to be seen by therapy this date  Will continue to follow and see patient as medically appropriate at a later time       Brie Oconnell, PT,DPT

## 2021-07-22 NOTE — ACP (ADVANCE CARE PLANNING)
ACP Note  7/22/2021  1043    Called patient's brother and primary healthcare decision maker, Kelliedulce maria Alvin  Gave update on patient's condition  Discussed goals of care, and he reaffirmed that his family wants her to be DNR/DNI  We additionally discussed whether to list the patient's sister, Dory Harrington, as a contact  He asked that we continue to give updates to him, and that he remain the patient's primary decision maker      ACP Time 10 minutes    Carlos Lopez MD

## 2021-07-23 PROBLEM — J98.6 ELEVATED HEMIDIAPHRAGM: Status: ACTIVE | Noted: 2021-07-23

## 2021-07-23 LAB
ANION GAP SERPL CALCULATED.3IONS-SCNC: 5 MMOL/L (ref 4–13)
BUN SERPL-MCNC: 21 MG/DL (ref 5–25)
CALCIUM SERPL-MCNC: 9.5 MG/DL (ref 8.3–10.1)
CHLORIDE SERPL-SCNC: 105 MMOL/L (ref 100–108)
CO2 SERPL-SCNC: 30 MMOL/L (ref 21–32)
CREAT SERPL-MCNC: 0.48 MG/DL (ref 0.6–1.3)
ERYTHROCYTE [DISTWIDTH] IN BLOOD BY AUTOMATED COUNT: 15.2 % (ref 11.6–15.1)
GFR SERPL CREATININE-BSD FRML MDRD: 102 ML/MIN/1.73SQ M
GLUCOSE SERPL-MCNC: 142 MG/DL (ref 65–140)
GLUCOSE SERPL-MCNC: 143 MG/DL (ref 65–140)
GLUCOSE SERPL-MCNC: 165 MG/DL (ref 65–140)
GLUCOSE SERPL-MCNC: 177 MG/DL (ref 65–140)
GLUCOSE SERPL-MCNC: 188 MG/DL (ref 65–140)
HCT VFR BLD AUTO: 27.5 % (ref 34.8–46.1)
HGB BLD-MCNC: 8.7 G/DL (ref 11.5–15.4)
MAGNESIUM SERPL-MCNC: 1.9 MG/DL (ref 1.6–2.6)
MCH RBC QN AUTO: 29.5 PG (ref 26.8–34.3)
MCHC RBC AUTO-ENTMCNC: 31.6 G/DL (ref 31.4–37.4)
MCV RBC AUTO: 93 FL (ref 82–98)
PLATELET # BLD AUTO: 188 THOUSANDS/UL (ref 149–390)
PMV BLD AUTO: 9 FL (ref 8.9–12.7)
POTASSIUM SERPL-SCNC: 4.5 MMOL/L (ref 3.5–5.3)
RBC # BLD AUTO: 2.95 MILLION/UL (ref 3.81–5.12)
SODIUM SERPL-SCNC: 140 MMOL/L (ref 136–145)
VALPROATE FREE SERPL-MCNC: 6.5 UG/ML (ref 6–22)
WBC # BLD AUTO: 5.12 THOUSAND/UL (ref 4.31–10.16)

## 2021-07-23 PROCEDURE — 85027 COMPLETE CBC AUTOMATED: CPT | Performed by: PHYSICIAN ASSISTANT

## 2021-07-23 PROCEDURE — 80048 BASIC METABOLIC PNL TOTAL CA: CPT | Performed by: PHYSICIAN ASSISTANT

## 2021-07-23 PROCEDURE — 94669 MECHANICAL CHEST WALL OSCILL: CPT

## 2021-07-23 PROCEDURE — 92526 ORAL FUNCTION THERAPY: CPT

## 2021-07-23 PROCEDURE — 94760 N-INVAS EAR/PLS OXIMETRY 1: CPT

## 2021-07-23 PROCEDURE — C9113 INJ PANTOPRAZOLE SODIUM, VIA: HCPCS | Performed by: INTERNAL MEDICINE

## 2021-07-23 PROCEDURE — 83735 ASSAY OF MAGNESIUM: CPT | Performed by: PHYSICIAN ASSISTANT

## 2021-07-23 PROCEDURE — 99291 CRITICAL CARE FIRST HOUR: CPT | Performed by: INTERNAL MEDICINE

## 2021-07-23 PROCEDURE — 94660 CPAP INITIATION&MGMT: CPT

## 2021-07-23 PROCEDURE — 94640 AIRWAY INHALATION TREATMENT: CPT

## 2021-07-23 PROCEDURE — 97530 THERAPEUTIC ACTIVITIES: CPT

## 2021-07-23 PROCEDURE — 82948 REAGENT STRIP/BLOOD GLUCOSE: CPT

## 2021-07-23 RX ORDER — OLANZAPINE 5 MG/1
2.5 TABLET, ORALLY DISINTEGRATING ORAL ONCE
Status: COMPLETED | OUTPATIENT
Start: 2021-07-23 | End: 2021-07-23

## 2021-07-23 RX ORDER — FUROSEMIDE 10 MG/ML
20 INJECTION INTRAMUSCULAR; INTRAVENOUS ONCE
Status: COMPLETED | OUTPATIENT
Start: 2021-07-23 | End: 2021-07-23

## 2021-07-23 RX ORDER — OLANZAPINE 10 MG/1
10 TABLET, ORALLY DISINTEGRATING ORAL
Status: DISCONTINUED | OUTPATIENT
Start: 2021-07-23 | End: 2021-07-24

## 2021-07-23 RX ORDER — MAGNESIUM SULFATE HEPTAHYDRATE 40 MG/ML
2 INJECTION, SOLUTION INTRAVENOUS ONCE
Status: COMPLETED | OUTPATIENT
Start: 2021-07-23 | End: 2021-07-23

## 2021-07-23 RX ORDER — GABAPENTIN 400 MG/1
400 CAPSULE ORAL 3 TIMES DAILY
Status: DISCONTINUED | OUTPATIENT
Start: 2021-07-23 | End: 2021-07-23

## 2021-07-23 RX ORDER — FERROUS SULFATE 325(65) MG
325 TABLET ORAL
Status: DISCONTINUED | OUTPATIENT
Start: 2021-07-24 | End: 2021-07-23

## 2021-07-23 RX ORDER — DIVALPROEX SODIUM 125 MG/1
250 CAPSULE, COATED PELLETS ORAL EVERY 8 HOURS SCHEDULED
Status: DISCONTINUED | OUTPATIENT
Start: 2021-07-23 | End: 2021-07-23

## 2021-07-23 RX ORDER — ATORVASTATIN CALCIUM 40 MG/1
80 TABLET, FILM COATED ORAL
Status: DISCONTINUED | OUTPATIENT
Start: 2021-07-23 | End: 2021-07-23

## 2021-07-23 RX ORDER — ASPIRIN 81 MG/1
81 TABLET, CHEWABLE ORAL DAILY
Status: DISCONTINUED | OUTPATIENT
Start: 2021-07-24 | End: 2021-07-23

## 2021-07-23 RX ORDER — PANTOPRAZOLE SODIUM 40 MG/1
40 INJECTION, POWDER, FOR SOLUTION INTRAVENOUS
Status: DISCONTINUED | OUTPATIENT
Start: 2021-07-24 | End: 2021-07-25

## 2021-07-23 RX ORDER — OLANZAPINE 10 MG/1
10 TABLET, ORALLY DISINTEGRATING ORAL
Status: DISCONTINUED | OUTPATIENT
Start: 2021-07-23 | End: 2021-07-23

## 2021-07-23 RX ADMIN — FUROSEMIDE 20 MG: 10 INJECTION, SOLUTION INTRAMUSCULAR; INTRAVENOUS at 10:23

## 2021-07-23 RX ADMIN — MEROPENEM 1000 MG: 1 INJECTION, POWDER, FOR SOLUTION INTRAVENOUS at 23:05

## 2021-07-23 RX ADMIN — METRONIDAZOLE 500 MG: 500 INJECTION, SOLUTION INTRAVENOUS at 18:33

## 2021-07-23 RX ADMIN — ENOXAPARIN SODIUM 40 MG: 40 INJECTION SUBCUTANEOUS at 08:07

## 2021-07-23 RX ADMIN — OLANZAPINE 2.5 MG: 5 TABLET, ORALLY DISINTEGRATING ORAL at 01:06

## 2021-07-23 RX ADMIN — INSULIN LISPRO 2 UNITS: 100 INJECTION, SOLUTION INTRAVENOUS; SUBCUTANEOUS at 18:36

## 2021-07-23 RX ADMIN — IPRATROPIUM BROMIDE AND ALBUTEROL SULFATE 3 ML: 2.5; .5 SOLUTION RESPIRATORY (INHALATION) at 20:13

## 2021-07-23 RX ADMIN — OLANZAPINE 10 MG: 10 TABLET, ORALLY DISINTEGRATING ORAL at 21:50

## 2021-07-23 RX ADMIN — SODIUM CHLORIDE 250 MG: 9 INJECTION, SOLUTION INTRAVENOUS at 14:41

## 2021-07-23 RX ADMIN — ACETAMINOPHEN 650 MG: 650 SUPPOSITORY RECTAL at 21:50

## 2021-07-23 RX ADMIN — PANTOPRAZOLE SODIUM 40 MG: 40 INJECTION, POWDER, FOR SOLUTION INTRAVENOUS at 08:07

## 2021-07-23 RX ADMIN — IPRATROPIUM BROMIDE AND ALBUTEROL SULFATE 3 ML: 2.5; .5 SOLUTION RESPIRATORY (INHALATION) at 08:21

## 2021-07-23 RX ADMIN — OLANZAPINE 2.5 MG: 5 TABLET, ORALLY DISINTEGRATING ORAL at 00:03

## 2021-07-23 RX ADMIN — MAGNESIUM SULFATE HEPTAHYDRATE 2 G: 40 INJECTION, SOLUTION INTRAVENOUS at 08:07

## 2021-07-23 RX ADMIN — SODIUM CHLORIDE SOLN NEBU 3% 4 ML: 3 NEBU SOLN at 14:03

## 2021-07-23 RX ADMIN — MEROPENEM 1000 MG: 1 INJECTION, POWDER, FOR SOLUTION INTRAVENOUS at 15:41

## 2021-07-23 RX ADMIN — METRONIDAZOLE 500 MG: 500 INJECTION, SOLUTION INTRAVENOUS at 10:30

## 2021-07-23 RX ADMIN — MEROPENEM 1000 MG: 1 INJECTION, POWDER, FOR SOLUTION INTRAVENOUS at 08:09

## 2021-07-23 RX ADMIN — MEROPENEM 1000 MG: 1 INJECTION, POWDER, FOR SOLUTION INTRAVENOUS at 01:06

## 2021-07-23 RX ADMIN — IPRATROPIUM BROMIDE AND ALBUTEROL SULFATE 3 ML: 2.5; .5 SOLUTION RESPIRATORY (INHALATION) at 02:40

## 2021-07-23 RX ADMIN — INSULIN LISPRO 2 UNITS: 100 INJECTION, SOLUTION INTRAVENOUS; SUBCUTANEOUS at 12:42

## 2021-07-23 RX ADMIN — METRONIDAZOLE 500 MG: 500 INJECTION, SOLUTION INTRAVENOUS at 03:10

## 2021-07-23 RX ADMIN — SODIUM CHLORIDE SOLN NEBU 3% 4 ML: 3 NEBU SOLN at 02:40

## 2021-07-23 RX ADMIN — VALPROATE SODIUM 250 MG: 100 INJECTION, SOLUTION INTRAVENOUS at 05:32

## 2021-07-23 RX ADMIN — SODIUM CHLORIDE SOLN NEBU 3% 4 ML: 3 NEBU SOLN at 08:21

## 2021-07-23 RX ADMIN — SODIUM CHLORIDE SOLN NEBU 3% 4 ML: 3 NEBU SOLN at 20:13

## 2021-07-23 RX ADMIN — SODIUM CHLORIDE 250 MG: 9 INJECTION, SOLUTION INTRAVENOUS at 21:50

## 2021-07-23 RX ADMIN — FUROSEMIDE 20 MG: 10 INJECTION, SOLUTION INTRAMUSCULAR; INTRAVENOUS at 21:50

## 2021-07-23 RX ADMIN — IPRATROPIUM BROMIDE AND ALBUTEROL SULFATE 3 ML: 2.5; .5 SOLUTION RESPIRATORY (INHALATION) at 14:03

## 2021-07-23 NOTE — ASSESSMENT & PLAN NOTE
· Cont home Depakote  mg q am and 500 mg q pm  · Changed to IV due to NPO  · Be alert for breakthrough seizures given meropenem lowering depakote effectiveness

## 2021-07-23 NOTE — PHYSICAL THERAPY NOTE
PHYSICAL THERAPY TREATMENT NOTE  NAME:  Yaritza Ponce  DATE: 07/23/21    Length Of Stay: 5  Performed at least 2 patient identifiers during session: Assigned identification number and ID bracelet    TREATMENT:    07/23/21 1130   PT Last Visit   PT Visit Date 07/23/21   Note Type   Note Type Treatment   Pain Assessment   Pain Assessment Tool FLACC   Pain Score No Pain   Pain Rating: FLACC (Rest) - Face 0   Pain Rating: FLACC (Rest) - Legs 0   Pain Rating: FLACC (Rest) - Activity 0   Pain Rating: FLACC (Rest) - Cry 0   Pain Rating: FLACC (Rest) - Consolability 0   Score: FLACC (Rest) 0   Pain Rating: FLACC (Activity) - Face 0   Pain Rating: FLACC (Activity) - Legs 0   Pain Rating: FLACC (Activity) - Activity 0   Pain Rating: FLACC (Activity) - Cry 0   Pain Rating: FLACC (Activity) - Consolability 0   Score: FLACC (Activity) 0   Restrictions/Precautions   Other Precautions Chair Alarm; Bed Alarm; Fall Risk;Telemetry;O2;Multiple lines  (HFNC 55LPM)   General   Chart Reviewed Yes   Response to Previous Treatment Patient unable to report, no changes reported from family or staff   Cognition   Orientation Level Unable to assess   Following Commands Follows one step commands inconsistently   Bed Mobility   Supine to Sit 2  Maximal assistance   Additional items Assist x 2;HOB elevated; Increased time required   Transfers   Sit to Stand 2  Maximal assistance   Additional items Assist x 2;Verbal cues; Increased time required  (with use of Quick Move)   Stand to Sit 2  Maximal assistance   Additional items Assist x 2; Increased time required;Verbal cues   Stand pivot Unable to assess   Other 2  Maximal assistance   Additional items Assist x 3   Additional Comments   (Used Quick Move for sit to stand transfer into bedside chair)   Balance   Static Sitting Fair +   Dynamic Sitting Fair   Static Standing Poor   Dynamic Standing Poor -   Endurance Deficit   Endurance Deficit Yes   Endurance Deficit Description Pt wearing a HFNC @ 55 LPM, maintained SPO@ in 90%   Activity Tolerance   Activity Tolerance Patient limited by fatigue   Nurse Made Aware RN Aime medrano helped with the transfer   Assessment   Prognosis Good   Problem List Decreased strength;Decreased endurance; Impaired balance;Decreased mobility; Decreased cognition;Decreased safety awareness; Obesity   Barriers to Discharge Decreased caregiver support   Barriers to Discharge Comments   (Requires increased assistance )   Goals   PT Treatment Day 1   Plan   Treatment/Interventions Functional transfer training;LE strengthening/ROM; Therapeutic exercise; Endurance training;Patient/family training;Equipment eval/education; Bed mobility;Gait training; Compensatory technique education   Progress Slow progress, decreased activity tolerance   PT Frequency   (3-5x per week)   Recommendation   PT Discharge Recommendation Post acute rehabilitation services   PT - OK to Discharge   (OK to d/c when medically cleared)   Additional Comments Pt was in bedside recliner ready for Speech Therapy/lunch   AM-PAC Basic Mobility Inpatient   Turning in Bed Without Bedrails 2   Lying on Back to Sitting on Edge of Flat Bed 1   Moving Bed to Chair 1   Standing Up From Chair 1   Walk in Room 1   Climb 3-5 Stairs 1   Basic Mobility Inpatient Raw Score 7   Turning Head Towards Sound 3   Follow Simple Instructions 3   Low Function Basic Mobility Raw Score 13   Low Function Basic Mobility Standardized Score 20 14       The patient's AM-PAC Basic Mobility Inpatient Short Form Low Function Raw Score 13 , Standardized Score is 20  14  A standardized score less than 42 9 suggests the patient may benefit from discharge to post-acute rehabilitation services  Please also refer to the recommendation of the Physical Therapist for safe discharge planning  Pt was seen in room today for PT  Pt was non-expressive  Used the Move Quick to transfer the pt out of bed into her bedside recliner   Pt was left in bedside recliner ready to be seen by Speech Therapy and for lunch  Pt was able to grab the bar on the Quick Move however was not able to follow directions  Pt would continue to benefit from cont'd PT services        Jazmine Moreno, PTA

## 2021-07-23 NOTE — ASSESSMENT & PLAN NOTE
· Congential Intellectual Disabiltiy, Anxiety, Psychosis- continue gabapentin, quetiapine, lorazepam, and sertraline  · Home regimen of seroquel is 100 mg q am and 1600 with 200 mg q hs  · Hold Seroquel due to somnolence   · Zyprexa 10 mg sublingual @ hs and 2 5 mg prn while NPO

## 2021-07-23 NOTE — ASSESSMENT & PLAN NOTE
· Chest x-ray showed suspected right middle lobe consolidation  · CTA Chest:   No central pulmonary emboli identified  Significant interval worsening in airspace consolidation and patchy infiltrates  involving the right upper lobe to the greatest degree posterior segment but also elsewhere within the right upper lobe  There is also interval worsening of the consolidation and volume loss in the dependent lower lobes bilaterally  Also  component of chronic volume loss related to elevated right hemidiaphragm  Focally dilated main pulmonary artery  Assess for pulmonary valve disease     · Continue Merropenem/flagyl (though can likely d/c flagyl)  · Aspiration precautions  · Sputum culture - gram neg and gram positive  · Speech therapy saw patient 7/23 recommends mechanically altered/level 1 diet and nectar thick liquids  · Witnessed aspiration 7/21  · Continue NPO for now given respiratory status and poor reserve, consider PO diet vs enteral access 7/24

## 2021-07-23 NOTE — ASSESSMENT & PLAN NOTE
· CT of chest on 7/18 revealed:  Significant dilation of the main pulmonary artery, measuring 44 mm, with prominence of the central pulmonary arteries    · ECHO - EF 60%, mild AR, MR, TR, and W[place]; peak PA pressure 35 mmHg

## 2021-07-23 NOTE — ASSESSMENT & PLAN NOTE
· History of  Pancytopenia diagnosed in 3/2019  · Follows with Oncologist, Dr Carlos Manuel Elder   · Continue to monitor CBC    · Start Ferrous sulfate when able

## 2021-07-23 NOTE — ASSESSMENT & PLAN NOTE
· CT of chest on 7/18 revealed:  Significant dilation of the main pulmonary artery, measuring 44 mm, with prominence of the central pulmonary arteries    · ECHO - EF 60%, mild AR, MR, TR, and LA; peak PA pressure 35 mmHg

## 2021-07-23 NOTE — SPEECH THERAPY NOTE
Speech Language/Pathology    Speech/Language Pathology Progress Note    Patient Name: Yesi Monzon  EKADJ'[id number] Date: 7/23/2021       Subjective:  Pt is OOB, in chair  HFNC in place  Current Diet: NPO, noted a  'aspiration event' a few days ago? Objective:  Pt seen for ongoing dx dysphagia tx  She is fully awake & cooperative  Trialed w/ puree, soft banana, cookie, nt by cup/straw (4 oz), thin by cup straw (4 oz)  Pt w/ immed retrieval from tsp, able to form adequate seal on cup rim & draw from straw  Noted improved amt from straw given the hiflow in the way  Reduced manipulation of the solids w/ suspected whole pieces transferred given the lack of mastication  Swallows are suspected prompt w/ fair rise  O2 did not decline from 94-95% with any material even w/ successive straw sip thin  Mult swallows at times w/ puree but no overt coughing or throat clearing w/ any material       Assessment:  Pt w/ mild oropharyngeal swallow w/ all material but no overt coughing or choking today  Would benefit from a diet at this point        Plan/Recommendations:  Begin puree with nectar thick  Crush meds in puree

## 2021-07-23 NOTE — ASSESSMENT & PLAN NOTE
Lab Results   Component Value Date    HGBA1C 6 6 (H) 07/19/2021       Recent Labs     07/22/21  1808 07/22/21  2313 07/23/21  0545 07/23/21  1147   POCGLU 140 177* 143* 165*       Blood Sugar Average: Last 72 hrs:  (P) 140 [id number]   · Not on meds prehospital  · Continue sliding scale insulin

## 2021-07-23 NOTE — ASSESSMENT & PLAN NOTE
· Acute respiratory failure/hypoxia secondary to pneumonia with component of pulmonary edema  · Cont nebs and abx as abve  · CTA negative for pulmonary embolism- see plan under PNA  · Check echo  · Gave 20 mg IV Lasix once again today  · Goal Negative 1-1 5L  · 7/20 Escalated to midflow then required transfer for HFNC under Critical Care  · 7/21 HFNC + NRB, tolerated off NRB in the evening  · 7/22 progressive hypoxia on HFNC - placed on CPAP with improvement, CXR - progressive infiltrate R middle lobe, highly suggestion of mucous plugging and aspiration  · Alternates HFNC during the day and CPAP 12 70% HS/prn  · Goal SpO2 >92%  · Trend ABG as needed  · Cont duoneb, 3% saline, chest PT, suction  · Due to cognitive delay, pt is unable to fully participate in aggressive pulmonary toilet  SLP re-evaluation on 07/23 cleared for puree with nectar thick however will maintain NPO status at this time    · Likely has undiagnosed sleep apnea  · Consider bronchoscopy given thick right sided secretions with history of elevated right hemidiaphragm

## 2021-07-23 NOTE — CASE MANAGEMENT
Case Management Progress Note    Patient name Galilea Alcazar  Location /- MRN [id number]  : 1955 Date 2021       LOS (days): 5  Geometric Mean LOS (GMLOS) (days): 4 80  Days to GMLOS:0        BUNDLE:      OBJECTIVE:  Pt is a 77y o  year old Single, white or  [1], female with Church preference of Non-Hoahaoism admitted on 2021  2:07 PM  Pt is admitted to - at [address] [facility] with complaints of Sepsis due to pneumonia [facility])  Current admission status: Inpatient  Preferred Pharmacy:   [address] [place] [address], [facility] [address]  [address] [place] [address]  Phone: [phone] Fax: [phone]    Primary Care Provider: Nba Baez MD    PROGRESS NOTE:  Not medically ready for dc  Acute respiratory failure  Aspiration pneumonia  Remains on high flow  IV antibiotics      DC plan: will need STR  Patient is a trigger for Level 2 PASSR  ? If patient will be appropriate for Acute Rehab  CM spoke to Supervisor of Rosa Isela Wheeler, and discussed possible SNF upon stability  Per Ke Noss all documentation that would be needed to completed level of care assessment is in the Twitter that is here in the hospital     CM will continue to follow

## 2021-07-23 NOTE — ASSESSMENT & PLAN NOTE
· History of  Pancytopenia diagnosed in 3/2019  · Follows with Oncologist, Dr Tutu Martinez   · Continue to monitor CBC

## 2021-07-23 NOTE — RESPIRATORY THERAPY NOTE
RT Protocol Note  Melissa Dill 77 y o  female MRN: [id number]  Unit/Bed#: -01 Encounter: [id number]    Assessment    Principal Problem:    Sepsis due to pneumonia [facility])  Active Problems:    Aspiration pneumonia ([place] [address] )    Psychiatric disorder    DM (diabetes mellitus), type 2 ([place] [address] )    GERD (gastroesophageal reflux disease)    Acute respiratory failure with hypoxia (HCC)    Obesity (BMI 30 0-34  9)    Seizure disorder (HCC)    Pancytopenia (HCC)    Hyperlipidemia    Abnormal CT of the chest      Home Pulmonary Medications:         Past Medical History:   Diagnosis Date    Anxiety     Diabetes mellitus ([place] [address] )     GERD (gastroesophageal reflux disease)     Hyperlipidemia     Hypertension     Mental disability     Seizure disorder ([place] [address] )      Social History     Socioeconomic History    Marital status: Single     Spouse name: None    Number of children: None    Years of education: None    Highest education level: None   Occupational History    None   Tobacco Use    Smoking status: Never Smoker    Smokeless tobacco: Never Used   Vaping Use    Vaping Use: Never used   Substance and Sexual Activity    Alcohol use: Not Currently    Drug use: Not Currently    Sexual activity: Not Currently   Other Topics Concern    None   Social History Narrative    None     Social Determinants of Health     Financial Resource Strain:     Difficulty of Paying Living Expenses:    Food Insecurity:     Worried About Running Out of Food in the Last Year:     Ran Out of Food in the Last Year:    Transportation Needs:     Lack of Transportation (Medical):      Lack of Transportation (Non-Medical):    Physical Activity:     Days of Exercise per Week:     Minutes of Exercise per Session:    Stress:     Feeling of Stress :    Social Connections:     Frequency of Communication with Friends and Family:     Frequency of Social Gatherings with Friends and Family:     Attends Alevism Services:     Active Member of Clubs or Organizations:     Attends Club or Organization Meetings:     Marital Status:    Intimate Partner Violence:     Fear of Current or Ex-Partner:     Emotionally Abused:     Physically Abused:     Sexually Abused:        Subjective    Subjective Data: CPT w/ bed     Objective    Physical Exam:   Assessment Type: During-treatment  General Appearance: Awake  Respiratory Pattern: Tachypneic  Chest Assessment: Chest expansion symmetrical  Bilateral Breath Sounds: Diminished  Cough: Non-productive    Vitals:  Blood pressure 110/53, pulse 74, temperature 99 5 °F (37 5 °C), temperature source Bladder, resp  rate (!) 28, height 5' (1 524 m), weight 82 1 kg (181 lb), SpO2 96 %  Results from last 7 days   Lab Units 07/22/21  0346 07/21/21  0600   PH ART  7 371 7 386   PCO2 ART mm Hg 47 1* 56 2*   PO2 ART mm Hg 95 0 85 0   HCO3 ART mmol/L 26 7 33 0*   BASE EXC ART mmol/L 1 1 6 5   O2 CONTENT ART mL/dL 13 3* 15 7*   O2 HGB, ARTERIAL % 96 1 94 8   ABG SOURCE  Radial, Left Radial, Left   SOPHIA TEST  Yes Yes   NON VENT ROOM AIR %  --  100       Imaging and other studies: I have personally reviewed pertinent reports  O2 Device: HFNC 90%     Plan       Airway Clearance Plan: Percussive Vest     Resp Comments: (P) Pt off biap and on HFNC at 0821   Tolerated vest therapy WO any desaturations  HFNC @ 100% 55L, will titrate as tolerated    Pt is stable no distress

## 2021-07-23 NOTE — ASSESSMENT & PLAN NOTE
· POA a/e/b - tachypnea, tachycardia, hypoxia   · Sepsis secondary to pneumonia likely aspiration vs gram-negative  · CXR 7/20 progressive b/l infiltrates R>L  · 7/20 Continue cefepime and metronidazole  Discontinue azithromycin and add Vanco given group home setting and worsening infiltrate on CXR    · 7/22 Due to low grade fevers, clinical deterioration, and positive sputum culture Cefepime changed to Meropenum  · Current ABX - Meropenum, Metronidazole  · Vancomycin d/c due to negative MRSA swab, culture results MRF  · Consider discontinuing Flagyl due to no anaerobic isolates and new evidence not supportive of need for anaerobic coverage  · Consider D/C antibiotics after 7 day course  · Blood cultures NG 4 days  · Flu/RSV - negative  · Sputum culture Gram neg coccobacilli, GNR, and GPC; MRF  · COVID negative on admission and patient fully vaccinated  · PCT negative: <0 05 > 0 08 > 0 14 > 0 08  · See plan for aspiration below

## 2021-07-23 NOTE — ASSESSMENT & PLAN NOTE
Lab Results   Component Value Date    HGBA1C 6 6 (H) 07/19/2021       Recent Labs     07/22/21  0601 07/22/21  1204 07/22/21  1808 07/22/21  2313   POCGLU 173* 163* 140 177*       Blood Sugar Average: Last 72 hrs:  (P) 195 75   · Not on meds prehospital  · Continue sliding scale insulin

## 2021-07-23 NOTE — PLAN OF CARE
Problem: PHYSICAL THERAPY ADULT  Goal: Performs mobility at highest level of function for planned discharge setting  See evaluation for individualized goals  Description: Treatment/Interventions: Functional transfer training, LE strengthening/ROM, Therapeutic exercise, Endurance training, Patient/family training, Equipment eval/education, Bed mobility, Gait training, Compensatory technique education, Spoke to nursing, Spoke to case management, OT  Equipment Recommended:  (TBD by rehab)       See flowsheet documentation for full assessment, interventions and recommendations  Outcome: Progressing  Note: Prognosis: Good  Problem List: Decreased strength, Decreased endurance, Impaired balance, Decreased mobility, Decreased cognition, Decreased safety awareness, Obesity  Assessment: Pt was seen in room today for PT  Pt was non-expressive  Used the Move Quick to transfer the pt out of bed into her bedside recliner  Pt was left in bedside recliner ready to be seen by Speech Therapy and for lunch  Pt was able to grab the bar on the Quick Move however was not able to follow directions  Pt would continue to benefit from cont'd PT services  Barriers to Discharge: Decreased caregiver support  Barriers to Discharge Comments:  (Requires increased assistance )     PT Discharge Recommendation: Post acute rehabilitation services     PT - OK to Discharge:  (OK to d/c when medically cleared)    See flowsheet documentation for full assessment

## 2021-07-23 NOTE — RESPIRATORY THERAPY NOTE
RT Ventilator Management Note  Daniella Marshall 77 y o  female MRN: [id number]  Unit/Bed#: -01 Encounter: [id number]      Daily Screen    No data found in the last 10 encounters  Physical Exam:   Assessment Type: Pre-treatment  General Appearance: Alert, Awake  Respiratory Pattern: Tachypneic, Shallow  Chest Assessment: Chest expansion symmetrical  Bilateral Breath Sounds: Clear, Diminished  Cough: Non-productive      Resp Comments: Assumed care of patient at 1900 after report from dayshift therapist   Patient presented in stable condition on HFNC  CPT performed with bed as reporisitong patien with vest had consiistantly caused a stressful drop in SpO2  Patient placed on hours of sleep CPAp per physician order  Patient tolerated CPAP well    She lspt through the night remaining stable

## 2021-07-23 NOTE — CONSULTS
The patient's Vancomycin therapy has been discontinued / completed  Thank you for this consult  Pharmacy will sign-off now

## 2021-07-24 LAB
ANION GAP SERPL CALCULATED.3IONS-SCNC: 5 MMOL/L (ref 4–13)
BACTERIA BLD CULT: NORMAL
BACTERIA BLD CULT: NORMAL
BUN SERPL-MCNC: 25 MG/DL (ref 5–25)
CA-I BLD-SCNC: 1.25 MMOL/L (ref 1.12–1.32)
CALCIUM SERPL-MCNC: 9.7 MG/DL (ref 8.3–10.1)
CHLORIDE SERPL-SCNC: 104 MMOL/L (ref 100–108)
CO2 SERPL-SCNC: 36 MMOL/L (ref 21–32)
CREAT SERPL-MCNC: 0.6 MG/DL (ref 0.6–1.3)
ERYTHROCYTE [DISTWIDTH] IN BLOOD BY AUTOMATED COUNT: 14.9 % (ref 11.6–15.1)
GFR SERPL CREATININE-BSD FRML MDRD: 95 ML/MIN/1.73SQ M
GLUCOSE SERPL-MCNC: 153 MG/DL (ref 65–140)
GLUCOSE SERPL-MCNC: 153 MG/DL (ref 65–140)
GLUCOSE SERPL-MCNC: 154 MG/DL (ref 65–140)
GLUCOSE SERPL-MCNC: 158 MG/DL (ref 65–140)
GLUCOSE SERPL-MCNC: 217 MG/DL (ref 65–140)
GLUCOSE SERPL-MCNC: 314 MG/DL (ref 65–140)
HCT VFR BLD AUTO: 30 % (ref 34.8–46.1)
HGB BLD-MCNC: 9.2 G/DL (ref 11.5–15.4)
MAGNESIUM SERPL-MCNC: 2.1 MG/DL (ref 1.6–2.6)
MCH RBC QN AUTO: 29.1 PG (ref 26.8–34.3)
MCHC RBC AUTO-ENTMCNC: 30.7 G/DL (ref 31.4–37.4)
MCV RBC AUTO: 95 FL (ref 82–98)
PHOSPHATE SERPL-MCNC: 3.4 MG/DL (ref 2.3–4.1)
PLATELET # BLD AUTO: 247 THOUSANDS/UL (ref 149–390)
PMV BLD AUTO: 8.5 FL (ref 8.9–12.7)
POTASSIUM SERPL-SCNC: 3.4 MMOL/L (ref 3.5–5.3)
RBC # BLD AUTO: 3.16 MILLION/UL (ref 3.81–5.12)
SODIUM SERPL-SCNC: 145 MMOL/L (ref 136–145)
WBC # BLD AUTO: 5.41 THOUSAND/UL (ref 4.31–10.16)

## 2021-07-24 PROCEDURE — 94669 MECHANICAL CHEST WALL OSCILL: CPT

## 2021-07-24 PROCEDURE — 82330 ASSAY OF CALCIUM: CPT | Performed by: PHYSICIAN ASSISTANT

## 2021-07-24 PROCEDURE — 83735 ASSAY OF MAGNESIUM: CPT | Performed by: PHYSICIAN ASSISTANT

## 2021-07-24 PROCEDURE — 85027 COMPLETE CBC AUTOMATED: CPT | Performed by: PHYSICIAN ASSISTANT

## 2021-07-24 PROCEDURE — 99233 SBSQ HOSP IP/OBS HIGH 50: CPT | Performed by: NURSE PRACTITIONER

## 2021-07-24 PROCEDURE — 82948 REAGENT STRIP/BLOOD GLUCOSE: CPT

## 2021-07-24 PROCEDURE — 94640 AIRWAY INHALATION TREATMENT: CPT

## 2021-07-24 PROCEDURE — 94760 N-INVAS EAR/PLS OXIMETRY 1: CPT

## 2021-07-24 PROCEDURE — 84100 ASSAY OF PHOSPHORUS: CPT | Performed by: PHYSICIAN ASSISTANT

## 2021-07-24 PROCEDURE — 94660 CPAP INITIATION&MGMT: CPT

## 2021-07-24 PROCEDURE — 94668 MNPJ CHEST WALL SBSQ: CPT

## 2021-07-24 PROCEDURE — C9113 INJ PANTOPRAZOLE SODIUM, VIA: HCPCS | Performed by: PHYSICIAN ASSISTANT

## 2021-07-24 PROCEDURE — 80048 BASIC METABOLIC PNL TOTAL CA: CPT | Performed by: PHYSICIAN ASSISTANT

## 2021-07-24 RX ORDER — ACETAMINOPHEN 160 MG/1
640 BAR, CHEWABLE ORAL EVERY 6 HOURS PRN
Status: DISCONTINUED | OUTPATIENT
Start: 2021-07-24 | End: 2021-08-11 | Stop reason: HOSPADM

## 2021-07-24 RX ORDER — ONDANSETRON 2 MG/ML
4 INJECTION INTRAMUSCULAR; INTRAVENOUS EVERY 6 HOURS PRN
Status: DISCONTINUED | OUTPATIENT
Start: 2021-07-24 | End: 2021-08-11 | Stop reason: HOSPADM

## 2021-07-24 RX ORDER — OXYBUTYNIN CHLORIDE 5 MG/1
10 TABLET, EXTENDED RELEASE ORAL DAILY
Status: DISCONTINUED | OUTPATIENT
Start: 2021-07-25 | End: 2021-08-11 | Stop reason: HOSPADM

## 2021-07-24 RX ORDER — RANITIDINE 150 MG/1
150 CAPSULE ORAL DAILY
COMMUNITY
End: 2021-08-11 | Stop reason: HOSPADM

## 2021-07-24 RX ORDER — FUROSEMIDE 10 MG/ML
20 INJECTION INTRAMUSCULAR; INTRAVENOUS ONCE
Status: COMPLETED | OUTPATIENT
Start: 2021-07-24 | End: 2021-07-24

## 2021-07-24 RX ORDER — POTASSIUM CHLORIDE 14.9 MG/ML
20 INJECTION INTRAVENOUS
Status: COMPLETED | OUTPATIENT
Start: 2021-07-24 | End: 2021-07-24

## 2021-07-24 RX ORDER — LORAZEPAM 0.5 MG/1
0.5 TABLET ORAL 2 TIMES DAILY
Status: DISCONTINUED | OUTPATIENT
Start: 2021-07-25 | End: 2021-07-25

## 2021-07-24 RX ORDER — GABAPENTIN 400 MG/1
400 CAPSULE ORAL 3 TIMES DAILY
Status: DISCONTINUED | OUTPATIENT
Start: 2021-07-24 | End: 2021-08-11 | Stop reason: HOSPADM

## 2021-07-24 RX ORDER — DOXYCYCLINE HYCLATE 50 MG/1
324 CAPSULE, GELATIN COATED ORAL
COMMUNITY
End: 2022-07-22

## 2021-07-24 RX ORDER — QUETIAPINE FUMARATE 50 MG/1
200 TABLET, EXTENDED RELEASE ORAL
Status: DISCONTINUED | OUTPATIENT
Start: 2021-07-24 | End: 2021-08-11 | Stop reason: HOSPADM

## 2021-07-24 RX ADMIN — GABAPENTIN 400 MG: 400 CAPSULE ORAL at 15:20

## 2021-07-24 RX ADMIN — MEROPENEM 1000 MG: 1 INJECTION, POWDER, FOR SOLUTION INTRAVENOUS at 08:31

## 2021-07-24 RX ADMIN — ENOXAPARIN SODIUM 40 MG: 40 INJECTION SUBCUTANEOUS at 08:31

## 2021-07-24 RX ADMIN — INSULIN LISPRO 2 UNITS: 100 INJECTION, SOLUTION INTRAVENOUS; SUBCUTANEOUS at 05:54

## 2021-07-24 RX ADMIN — GABAPENTIN 400 MG: 400 CAPSULE ORAL at 21:25

## 2021-07-24 RX ADMIN — POTASSIUM CHLORIDE 20 MEQ: 14.9 INJECTION, SOLUTION INTRAVENOUS at 10:50

## 2021-07-24 RX ADMIN — INSULIN LISPRO 3 UNITS: 100 INJECTION, SOLUTION INTRAVENOUS; SUBCUTANEOUS at 22:25

## 2021-07-24 RX ADMIN — INSULIN LISPRO 1 UNITS: 100 INJECTION, SOLUTION INTRAVENOUS; SUBCUTANEOUS at 08:32

## 2021-07-24 RX ADMIN — SERTRALINE HYDROCHLORIDE 100 MG: 50 TABLET ORAL at 21:25

## 2021-07-24 RX ADMIN — SODIUM CHLORIDE SOLN NEBU 3% 4 ML: 3 NEBU SOLN at 02:23

## 2021-07-24 RX ADMIN — INSULIN LISPRO 2 UNITS: 100 INJECTION, SOLUTION INTRAVENOUS; SUBCUTANEOUS at 01:01

## 2021-07-24 RX ADMIN — GABAPENTIN 400 MG: 400 CAPSULE ORAL at 08:32

## 2021-07-24 RX ADMIN — SODIUM CHLORIDE SOLN NEBU 3% 4 ML: 3 NEBU SOLN at 08:34

## 2021-07-24 RX ADMIN — IPRATROPIUM BROMIDE AND ALBUTEROL SULFATE 3 ML: 2.5; .5 SOLUTION RESPIRATORY (INHALATION) at 08:26

## 2021-07-24 RX ADMIN — MEROPENEM 1000 MG: 1 INJECTION, POWDER, FOR SOLUTION INTRAVENOUS at 15:20

## 2021-07-24 RX ADMIN — IPRATROPIUM BROMIDE AND ALBUTEROL SULFATE 3 ML: 2.5; .5 SOLUTION RESPIRATORY (INHALATION) at 13:37

## 2021-07-24 RX ADMIN — SODIUM CHLORIDE SOLN NEBU 3% 4 ML: 3 NEBU SOLN at 13:42

## 2021-07-24 RX ADMIN — METRONIDAZOLE 500 MG: 500 INJECTION, SOLUTION INTRAVENOUS at 10:55

## 2021-07-24 RX ADMIN — INSULIN LISPRO 3 UNITS: 100 INJECTION, SOLUTION INTRAVENOUS; SUBCUTANEOUS at 15:19

## 2021-07-24 RX ADMIN — METRONIDAZOLE 500 MG: 500 INJECTION, SOLUTION INTRAVENOUS at 18:02

## 2021-07-24 RX ADMIN — SODIUM CHLORIDE SOLN NEBU 3% 4 ML: 3 NEBU SOLN at 19:41

## 2021-07-24 RX ADMIN — SODIUM CHLORIDE 250 MG: 9 INJECTION, SOLUTION INTRAVENOUS at 05:28

## 2021-07-24 RX ADMIN — SODIUM CHLORIDE 250 MG: 9 INJECTION, SOLUTION INTRAVENOUS at 14:44

## 2021-07-24 RX ADMIN — FUROSEMIDE 20 MG: 10 INJECTION, SOLUTION INTRAMUSCULAR; INTRAVENOUS at 18:02

## 2021-07-24 RX ADMIN — PANTOPRAZOLE SODIUM 40 MG: 40 INJECTION, POWDER, FOR SOLUTION INTRAVENOUS at 08:32

## 2021-07-24 RX ADMIN — INSULIN LISPRO 1 UNITS: 100 INJECTION, SOLUTION INTRAVENOUS; SUBCUTANEOUS at 11:18

## 2021-07-24 RX ADMIN — QUETIAPINE FUMARATE 200 MG: 50 TABLET, EXTENDED RELEASE ORAL at 21:25

## 2021-07-24 RX ADMIN — IPRATROPIUM BROMIDE AND ALBUTEROL SULFATE 3 ML: 2.5; .5 SOLUTION RESPIRATORY (INHALATION) at 19:40

## 2021-07-24 RX ADMIN — SODIUM CHLORIDE 250 MG: 9 INJECTION, SOLUTION INTRAVENOUS at 21:25

## 2021-07-24 RX ADMIN — POTASSIUM CHLORIDE 20 MEQ: 14.9 INJECTION, SOLUTION INTRAVENOUS at 08:33

## 2021-07-24 RX ADMIN — IPRATROPIUM BROMIDE AND ALBUTEROL SULFATE 3 ML: 2.5; .5 SOLUTION RESPIRATORY (INHALATION) at 02:23

## 2021-07-24 RX ADMIN — METRONIDAZOLE 500 MG: 500 INJECTION, SOLUTION INTRAVENOUS at 02:29

## 2021-07-24 NOTE — PROGRESS NOTES
[address]  Progress Note - Alejandro Meyers 1955, 77 y o  female MRN: [id number]  Unit/Bed#: -01 Encounter: [id number]  Primary Care Provider: Arlyn Larsen MD   Date and time admitted to hospital: 7/18/2021  2:07 PM    * Sepsis due to pneumonia [facility])  Assessment & Plan  · POA a/e/b - tachypnea, tachycardia, hypoxia   · Sepsis secondary to pneumonia likely aspiration vs gram-negative  · CXR 7/20 progressive b/l infiltrates R>L  · 7/20 Continue cefepime and metronidazole  Discontinue azithromycin and add Vanco given group home setting and worsening infiltrate on CXR    · 7/22 Due to low grade fevers, clinical deterioration, and positive sputum culture Cefepime changed to Meropenum  · Current ABX - Meropenum, Metronidazole  · Vancomycin d/c due to negative MRSA swab, culture results MRF  · Consider discontinuing Flagyl due to no anaerobic isolates and new evidence not supportive of need for anaerobic coverage  · Consider D/C antibiotics after 7 day course  · Blood cultures NG 4 days  · Flu/RSV - negative  · Sputum culture Gram neg coccobacilli, GNR, and GPC; MRF  · COVID negative on admission and patient fully vaccinated  · PCT negative: <0 05 > 0 08 > 0 14 > 0 08  · See plan for aspiration below    Acute respiratory failure with hypoxia (HCC)  Assessment & Plan  · Acute respiratory failure/hypoxia secondary to pneumonia with component of pulmonary edema  · Cont nebs and abx as abve  · CTA negative for pulmonary embolism- see plan under PNA  · Check echo  · Gave 20 mg IV Lasix once again today  · Goal Negative 1-1 5L  · 7/20 Escalated to midflow then required transfer for HFNC under Critical Care  · 7/21 HFNC + NRB, tolerated off NRB in the evening  · 7/22 progressive hypoxia on HFNC - placed on CPAP with improvement, CXR - progressive infiltrate R middle lobe, highly suggestion of mucous plugging and aspiration  · Alternates HFNC during the day and CPAP 12 70% HS/prn  · Goal SpO2 >92%  · Trend ABG as needed  · Cont duoneb, 3% saline, chest PT, suction  · Due to cognitive delay, pt is unable to fully participate in aggressive pulmonary toilet  SLP re-evaluation on 07/23 cleared for puree with nectar thick however will maintain NPO status at this time  · Likely has undiagnosed sleep apnea  · Consider bronchoscopy given thick right sided secretions with history of elevated right hemidiaphragm     Aspiration pneumonia (HCC)  Assessment & Plan  · Chest x-ray showed suspected right middle lobe consolidation  · CTA Chest:   No central pulmonary emboli identified  Significant interval worsening in airspace consolidation and patchy infiltrates  involving the right upper lobe to the greatest degree posterior segment but also elsewhere within the right upper lobe  There is also interval worsening of the consolidation and volume loss in the dependent lower lobes bilaterally  Also  component of chronic volume loss related to elevated right hemidiaphragm  Focally dilated main pulmonary artery  Assess for pulmonary valve disease  · Continue Merropenem/flagyl (though can likely d/c flagyl)  · Aspiration precautions  · Sputum culture - gram neg and gram positive  · Speech therapy saw patient 7/23 recommends mechanically altered/level 1 diet and nectar thick liquids  · Witnessed aspiration 7/21  · Continue NPO for now given respiratory status and poor reserve, consider PO diet vs enteral access 7/24    Chronically elevated right hemidiaphragm  Assessment & Plan  · CTA showed component of chronic volume loss related to elevated right hemidiaphragm  Abnormal CT of the chest  Assessment & Plan  · CT of chest on 7/18 revealed:  Significant dilation of the main pulmonary artery, measuring 44 mm, with prominence of the central pulmonary arteries    · ECHO - EF 60%, mild AR, MR, TR, and VA; peak PA pressure 35 mmHg    Hyperlipidemia  Assessment & Plan  · Patient takes Crestor 40 mg daily at home  · Restart Lipitor when able; currently NPO    Pancytopenia ([address] )  Assessment & Plan  · History of  Pancytopenia diagnosed in 3/2019  · Follows with Oncologist, Dr Galilea Sandy   · Continue to monitor CBC    · Start Ferrous sulfate when able    Seizure disorder [facility])  Assessment & Plan  · Cont home Depakote  mg q am and 500 mg q pm  · Changed to IV due to NPO  · Be alert for breakthrough seizures given meropenem lowering depakote effectiveness    Obesity (BMI 30 0-34  9)  Assessment & Plan  · Nutrition consulted     GERD (gastroesophageal reflux disease)  Assessment & Plan  · Continue pantoprazole    DM (diabetes mellitus), type 2 [facility])  Assessment & Plan  Lab Results   Component Value Date    HGBA1C 6 6 (H) 07/19/2021       Recent Labs     07/22/21  1808 07/22/21  2313 07/23/21  0545 07/23/21  1147   POCGLU 140 177* 143* 165*       Blood Sugar Average: Last 72 hrs:  (P) 912 [id number]   · Not on meds prehospital  · Continue sliding scale insulin    Psychiatric disorder  Assessment & Plan  · Congential Intellectual Disabiltiy, Anxiety, Psychosis- continue gabapentin, quetiapine, lorazepam, and sertraline  · Home regimen of seroquel is 100 mg q am and 1600 with 200 mg q hs  · Hold Seroquel due to somnolence   · Zyprexa 10 mg sublingual @ hs and 2 5 mg prn while NPO    ----------------------------------------------------------------------------------------  HPI/24hr events: Yesterday (07/23/2021), patient tolerated transition from CPAP HS to HFNC during the day  Weaned HFNC to 85% however patient did pull off oxygen multiple times and due to desaturation required increase of FiO2 to 100%  Patient returned to CPAP 12 with 70% during HS  Given scheduled zyprexa 10 mg ODT prior to start of CPAP  Patient tolerated CPAP overnight and did not require 1:1     Given lasix 20 mg IV x2  for goal net negative 1 to 2 liters  Vancomycin was discontinued 07/23 - procal and MRSA negative      Re-evaluated by SLP on  and cleared for puree with nectar thick liquids however NPO status maintained due to poor reserve  Disposition: Continue Stepdown Level 1 level of care   Code Status: Level 3 - DNAR and DNI  ---------------------------------------------------------------------------------------  SUBJECTIVE  Pt is non-verbal at baseline    Review of Systems  Review of systems was unable to be performed secondary to non-verbal at baseline   ---------------------------------------------------------------------------------------  OBJECTIVE    Vitals   Vitals:    21 1900 21 0000 21 0223   BP: 154/65  129/60    BP Location: Right arm  Right arm    Pulse: 64  62    Resp: 22  (!) 33    Temp: 99 9 °F (37 7 °C)  99 5 °F (37 5 °C)    TempSrc: Bladder  Bladder    SpO2: 97% 95% 97% 98%   Weight:       Height:         Temp (24hrs), Av 6 °F (37 6 °C), Min:99 1 °F (37 3 °C), Max:99 9 °F (37 7 °C)  Current: Temperature: 99 5 °F (37 5 °C)          Respiratory:  SpO2: SpO2: 98 %, SpO2 Activity: SpO2 Activity: At Rest, SpO2 Device: O2 Device: CPAP 12 with FiO2 70%    Invasive/non-invasive ventilation settings   Respiratory    Lab Data (Last 4 hours)    None         O2/Vent Data (Last 4 hours)       0200          Non-Invasive Ventilation Mode CPAP                   Physical Exam  Vitals reviewed  Constitutional:       General: She is not in acute distress  Appearance: She is well-developed  She is obese  She is not toxic-appearing or diaphoretic  Comments: CPAP intact   HENT:      Head: Normocephalic and atraumatic  Nose: Nose normal    Eyes:      General: No scleral icterus  Conjunctiva/sclera: Conjunctivae normal       Pupils: Pupils are equal, round, and reactive to light  Neck:      Vascular: No JVD  Cardiovascular:      Rate and Rhythm: Normal rate and regular rhythm  Heart sounds: Normal heart sounds  No murmur heard  No friction rub     Pulmonary:      Effort: Pulmonary effort is normal  No respiratory distress  Breath sounds: Rhonchi and rales present  No wheezing  Comments: Occasional moist cough  Abdominal:      General: Bowel sounds are normal  There is no distension  Palpations: Abdomen is soft  There is no mass  Tenderness: There is no abdominal tenderness  There is no guarding or rebound  Musculoskeletal:         General: No tenderness or deformity  Normal range of motion  Cervical back: Normal range of motion and neck supple  Right lower leg: Edema present  Left lower leg: Edema present  Skin:     General: Skin is warm and dry  Capillary Refill: Capillary refill takes less than 2 seconds  Coloration: Skin is not pale  Findings: No erythema or rash  Neurological:      General: No focal deficit present  Mental Status: She is alert  Mental status is at baseline  Cranial Nerves: No cranial nerve deficit  Comments: Non-verbal at baseline  Psychiatric:         Behavior: Behavior normal  Behavior is cooperative  Comments: Mental status at baseline           Laboratory and Diagnostics:  Results from last 7 days   Lab Units 07/23/21  0542 07/22/21  0233 07/21/21  0556 07/20/21  2312 07/20/21  0521 07/19/21  0517 07/18/21  1612   WBC Thousand/uL 5 12 7 84 6 66  --  6 14 4 50 5 24   HEMOGLOBIN g/dL 8 7* 9 7* 9 6*  --  9 1* 9 8* 10 8*   I STAT HEMOGLOBIN g/dl  --   --   --  9 9*  --   --   --    HEMATOCRIT % 27 5* 31 1* 30 4*  --  28 7* 31 0* 33 1*   HEMATOCRIT, ISTAT %  --   --   --  29*  --   --   --    PLATELETS Thousands/uL 188 161 149  --  123* 153 163   NEUTROS PCT %  --  84*  --   --  79*  --  68   BANDS PCT %  --   --   --   --   --  26*  --    MONOS PCT %  --  9  --   --  9  --  12   MONO PCT %  --   --   --   --   --  7  --      Results from last 7 days   Lab Units 07/23/21  0542 07/22/21  1805 07/22/21  0233 07/21/21  1600 07/21/21  0556 07/21/21  0106 07/20/21  2312 07/20/21  4429 07/18/21  1612   SODIUM mmol/L 140 138 134* 135* 135* 135*  --  134* 134*   POTASSIUM mmol/L 4 5 3 7 4 1 3 8 3 9 3 3*  --  3 8 4 5   CHLORIDE mmol/L 105 103 104 102 99* 99*  --  98* 95*   CO2 mmol/L 30 29 27 27 31 34*  --  32 35*   CO2, I-STAT mmol/L  --   --   --   --   --   --  37*  --   --    ANION GAP mmol/L 5 6 3* 6 5 2*  --  4 4   BUN mg/dL 21 20 15 15 12 12  --  15 13   CREATININE mg/dL 0 48* 0 59* 0 59* 0 58* 0 58* 0 56*  --  0 62 0 71   CALCIUM mg/dL 9 5 9 8 9 0 9 5 9 3 9 4  --  9 2 10 2*   GLUCOSE RANDOM mg/dL 142* 140 167* 155* 216* 263*  --  183* 140   ALT U/L  --   --   --   --  54  --   --   --  20   AST U/L  --   --   --   --  49*  --   --   --  25   ALK PHOS U/L  --   --   --   --  64  --   --   --  67   ALBUMIN g/dL  --   --   --   --  2 2*  --   --   --  3 1*   TOTAL BILIRUBIN mg/dL  --   --   --   --  0 39  --   --   --  0 54     Results from last 7 days   Lab Units 07/23/21  0542 07/22/21  0233 07/21/21  1600 07/21/21  0556   MAGNESIUM mg/dL 1 9 1 9 2 0 1 9   PHOSPHORUS mg/dL  --   --   --  3 0           Results from last 7 days   Lab Units 07/18/21  1612   TROPONIN I ng/mL <0 02     Results from last 7 days   Lab Units 07/18/21 2039 07/18/21  1612   LACTIC ACID mmol/L 1 9 1 2     ABG:  Results from last 7 days   Lab Units 07/22/21  0346   PH ART  7 371   PCO2 ART mm Hg 47 1*   PO2 ART mm Hg 95 0   HCO3 ART mmol/L 26 7   BASE EXC ART mmol/L 1 1   ABG SOURCE  Radial, Left     VBG:  Results from last 7 days   Lab Units 07/22/21  0346   ABG SOURCE  Radial, Left     Results from last 7 days   Lab Units 07/22/21  0233 07/21/21  0556 07/20/21  0521 07/18/21 2032   PROCALCITONIN ng/ml 0 08 0 14 0 08 <0 05       Micro  Results from last 7 days   Lab Units 07/21/21  1055 07/19/21  0519 07/18/21 2040 07/18/21 2039   BLOOD CULTURE   --   --  No Growth After 4 Days  No Growth After 4 Days     SPUTUM CULTURE   --  4+ Growth of   --   --    GRAM STAIN RESULT   --  4+ Gram negative coccobacilli*  2+ Gram negative rods*  2+ Gram positive cocci in pairs*  2+ Polys*  --   --    MRSA CULTURE ONLY  No Methicillin Resistant Staphlyococcus aureus (MRSA) isolated  --   --   --        EKG: NSR rate 70  Imaging: I have personally reviewed pertinent reports  and I have personally reviewed pertinent films in PACS    Intake and Output  I/O       07/22 0701 - 07/23 0700 07/23 0701 - 07/24 0700    P  O   0    I V  (mL/kg) 250 (3) 90 (1 1)    IV Piggyback 1050 500    Total Intake(mL/kg) 1300 (15 8) 590 (7 2)    Urine (mL/kg/hr) 2730 (1 4) 2410 (1 2)    Stool  0    Total Output 2730 2410    Net -1430 -1820          Unmeasured Stool Occurrence  1 x          Height and Weights   Height: 5' (152 4 cm)  IBW (Ideal Body Weight): 45 5 kg  Body mass index is 35 35 kg/m²  Weight (last 2 days)     Date/Time   Weight    07/23/21 0553   82 1 (181)    07/22/21 0600   81 6 (179 9)                Nutrition       Diet Orders   (From admission, onward)             Start     Ordered    07/23/21 1246  Diet NPO  Diet effective now     Comments: Mechanical soft/level 1, Nectar thick liquids, crush pills if able   Question Answer Comment   Diet Type NPO    RD to adjust diet per protocol?  Yes        07/23/21 1246                  Active Medications  Scheduled Meds:  Current Facility-Administered Medications   Medication Dose Route Frequency Provider Last Rate    acetaminophen  650 mg Rectal Q4H PRN KRISTOPHER Simmons      bisacodyl  10 mg Rectal Daily PRN KRISTOPHER Moreno      enoxaparin  40 mg Subcutaneous Q24H [facility] Bettina S Keven, KRISTOPHER      insulin lispro  2-12 Units Subcutaneous Q6H [facility] KRISTOPHER Moreno      ipratropium-albuterol  3 mL Nebulization Q6H Bettina S Keven, KRISTOPHER      meropenem  1,000 mg Intravenous Q8H KRISTOPHER Cason Stopped (07/24/21 0201)    metroNIDAZOLE  500 mg Intravenous Q8H Bettina S Keven, JONATHANNP 500 mg (07/24/21 0229)    OLANZapine  10 mg Oral HS Charan oMrel PA-C      OLANZapine  2 5 mg Oral Daily PRN Henrik Toledo PA-C      pantoprazole  40 mg Intravenous Q24H [address] [place] PA-C      sodium chloride  1 spray Each Nare Q1H PRN KRISTOPHER Castro      sodium chloride  4 mL Nebulization Q6H KRISTOPHER Cason      valproate sodium  250 mg Intravenous [facility] Quentin [place] PA-C Stopped (07/23/21 2300)     Continuous Infusions:     PRN Meds:   acetaminophen, 650 mg, Q4H PRN  bisacodyl, 10 mg, Daily PRN  OLANZapine, 2 5 mg, Daily PRN  sodium chloride, 1 spray, Q1H PRN        Invasive Devices Review  Invasive Devices     Peripheral Intravenous Line            Peripheral IV 07/21/21 Left Forearm 2 days    Peripheral IV 07/21/21 Right Forearm 2 days          Drain            Urethral Catheter Temperature probe;Non-latex 16 Fr  3 days                Rationale for remaining devices:   Discontinue nicole catheter  Pure wick placed   ---------------------------------------------------------------------------------------  Advance Directive and Living Will:      Power of :    POLST:    ---------------------------------------------------------------------------------------  Care Time Delivered:   No Critical Care time spent       KRISTOPHER Castro      Portions of the record may have been created with voice recognition software  Occasional wrong word or "sound a like" substitutions may have occurred due to the inherent limitations of voice recognition software    Read the chart carefully and recognize, using context, where substitutions have occurred

## 2021-07-24 NOTE — RESPIRATORY THERAPY NOTE
Resp Care        07/24/21 0841   Respiratory Assessment   Assessment Type During-treatment   General Appearance Awake   Respiratory Pattern Tachypneic   Chest Assessment Chest expansion symmetrical   Bilateral Breath Sounds Diminished   Cough Dry;Non-productive   Resp Comments No changes this morning pt on HFNC 80%/55L tolerating well SpO2 91-92%      O2 Device HFNC 80%

## 2021-07-24 NOTE — RESPIRATORY THERAPY NOTE
RT Ventilator Management Note  Jose Thakur 77 y o  female MRN: [id number]  Unit/Bed#: -01 Encounter: [id number]      Daily Screen    No data found in the last 10 encounters  Physical Exam:          Pt received on HFNC 55L @ 100% via nasal prongs  Tolerating well  Plan to use bipap HS

## 2021-07-25 ENCOUNTER — APPOINTMENT (INPATIENT)
Dept: RADIOLOGY | Facility: HOSPITAL | Age: 66
DRG: 871 | End: 2021-07-25
Payer: MEDICARE

## 2021-07-25 PROBLEM — J98.6 ELEVATED HEMIDIAPHRAGM: Chronic | Status: ACTIVE | Noted: 2021-07-23

## 2021-07-25 PROBLEM — I10 HYPERTENSION: Status: ACTIVE | Noted: 2019-12-04

## 2021-07-25 PROBLEM — F79 INTELLECTUAL DISABILITY: Status: ACTIVE | Noted: 2017-09-20

## 2021-07-25 PROBLEM — E11.9 TYPE 2 DIABETES MELLITUS WITHOUT COMPLICATION, WITHOUT LONG-TERM CURRENT USE OF INSULIN (HCC): Status: ACTIVE | Noted: 2020-02-14

## 2021-07-25 PROBLEM — E11.9 TYPE 2 DIABETES MELLITUS WITHOUT COMPLICATION, WITHOUT LONG-TERM CURRENT USE OF INSULIN (HCC): Chronic | Status: ACTIVE | Noted: 2020-02-14

## 2021-07-25 PROBLEM — R26.2 AMBULATORY DYSFUNCTION: Chronic | Status: ACTIVE | Noted: 2017-09-20

## 2021-07-25 PROBLEM — N39.46 MIXED INCONTINENCE: Chronic | Status: ACTIVE | Noted: 2017-04-12

## 2021-07-25 PROBLEM — I10 HYPERTENSION: Chronic | Status: ACTIVE | Noted: 2019-12-04

## 2021-07-25 PROBLEM — E66.9 OBESITY (BMI 30.0-34.9): Chronic | Status: ACTIVE | Noted: 2021-07-21

## 2021-07-25 PROBLEM — N39.46 MIXED INCONTINENCE: Status: ACTIVE | Noted: 2017-04-12

## 2021-07-25 PROBLEM — D61.818 PANCYTOPENIA (HCC): Status: RESOLVED | Noted: 2021-07-21 | Resolved: 2021-07-25

## 2021-07-25 PROBLEM — F79 INTELLECTUAL DISABILITY: Chronic | Status: ACTIVE | Noted: 2017-09-20

## 2021-07-25 PROBLEM — R93.89 ABNORMAL CT OF THE CHEST: Status: RESOLVED | Noted: 2021-07-21 | Resolved: 2021-07-25

## 2021-07-25 PROBLEM — K21.9 GASTROESOPHAGEAL REFLUX DISEASE WITHOUT ESOPHAGITIS: Chronic | Status: ACTIVE | Noted: 2018-04-02

## 2021-07-25 PROBLEM — R26.2 AMBULATORY DYSFUNCTION: Status: ACTIVE | Noted: 2017-09-20

## 2021-07-25 PROBLEM — K21.9 GASTROESOPHAGEAL REFLUX DISEASE WITHOUT ESOPHAGITIS: Status: ACTIVE | Noted: 2018-04-02

## 2021-07-25 PROBLEM — F99 PSYCHIATRIC DISORDER: Chronic | Status: ACTIVE | Noted: 2021-07-18

## 2021-07-25 PROBLEM — E66.811 OBESITY (BMI 30.0-34.9): Chronic | Status: ACTIVE | Noted: 2021-07-21

## 2021-07-25 PROBLEM — E78.5 HYPERLIPIDEMIA: Chronic | Status: ACTIVE | Noted: 2021-07-21

## 2021-07-25 LAB
ANION GAP SERPL CALCULATED.3IONS-SCNC: 3 MMOL/L (ref 4–13)
ARTERIAL PATENCY WRIST A: YES
BASE EXCESS BLDA CALC-SCNC: 8.1 MMOL/L
BASOPHILS # BLD AUTO: 0.02 THOUSANDS/ΜL (ref 0–0.1)
BASOPHILS NFR BLD AUTO: 0 % (ref 0–1)
BUN SERPL-MCNC: 24 MG/DL (ref 5–25)
CALCIUM SERPL-MCNC: 9.4 MG/DL (ref 8.3–10.1)
CHLORIDE SERPL-SCNC: 109 MMOL/L (ref 100–108)
CO2 SERPL-SCNC: 36 MMOL/L (ref 21–32)
CREAT SERPL-MCNC: 0.56 MG/DL (ref 0.6–1.3)
EOSINOPHIL # BLD AUTO: 0.08 THOUSAND/ΜL (ref 0–0.61)
EOSINOPHIL NFR BLD AUTO: 2 % (ref 0–6)
ERYTHROCYTE [DISTWIDTH] IN BLOOD BY AUTOMATED COUNT: 15.2 % (ref 11.6–15.1)
GFR SERPL CREATININE-BSD FRML MDRD: 97 ML/MIN/1.73SQ M
GLUCOSE SERPL-MCNC: 211 MG/DL (ref 65–140)
GLUCOSE SERPL-MCNC: 235 MG/DL (ref 65–140)
GLUCOSE SERPL-MCNC: 266 MG/DL (ref 65–140)
GLUCOSE SERPL-MCNC: 269 MG/DL (ref 65–140)
GLUCOSE SERPL-MCNC: 287 MG/DL (ref 65–140)
HCO3 BLDA-SCNC: 33.5 MMOL/L (ref 22–28)
HCT VFR BLD AUTO: 31.6 % (ref 34.8–46.1)
HEMOCCULT STL QL: NEGATIVE
HEMOCCULT STL QL: NEGATIVE
HEMOCCULT STL QL: NORMAL
HGB BLD-MCNC: 9.6 G/DL (ref 11.5–15.4)
IMM GRANULOCYTES # BLD AUTO: 0.11 THOUSAND/UL (ref 0–0.2)
IMM GRANULOCYTES NFR BLD AUTO: 2 % (ref 0–2)
LYMPHOCYTES # BLD AUTO: 1.01 THOUSANDS/ΜL (ref 0.6–4.47)
LYMPHOCYTES NFR BLD AUTO: 20 % (ref 14–44)
MAGNESIUM SERPL-MCNC: 2.2 MG/DL (ref 1.6–2.6)
MCH RBC QN AUTO: 29.3 PG (ref 26.8–34.3)
MCHC RBC AUTO-ENTMCNC: 30.4 G/DL (ref 31.4–37.4)
MCV RBC AUTO: 96 FL (ref 82–98)
MONOCYTES # BLD AUTO: 0.68 THOUSAND/ΜL (ref 0.17–1.22)
MONOCYTES NFR BLD AUTO: 14 % (ref 4–12)
NEUTROPHILS # BLD AUTO: 3.09 THOUSANDS/ΜL (ref 1.85–7.62)
NEUTS SEG NFR BLD AUTO: 62 % (ref 43–75)
NON VENT ROOM AIR: 60 %
NRBC BLD AUTO-RTO: 0 /100 WBCS
O2 CT BLDA-SCNC: 16.2 ML/DL (ref 16–23)
OXYHGB MFR BLDA: 94 % (ref 94–97)
PCO2 BLDA: 50.4 MM HG (ref 36–44)
PH BLDA: 7.44 [PH] (ref 7.35–7.45)
PHOSPHATE SERPL-MCNC: 3 MG/DL (ref 2.3–4.1)
PLATELET # BLD AUTO: 276 THOUSANDS/UL (ref 149–390)
PMV BLD AUTO: 8.5 FL (ref 8.9–12.7)
PO2 BLDA: 77 MM HG (ref 75–129)
POTASSIUM SERPL-SCNC: 3.8 MMOL/L (ref 3.5–5.3)
RBC # BLD AUTO: 3.28 MILLION/UL (ref 3.81–5.12)
SODIUM SERPL-SCNC: 148 MMOL/L (ref 136–145)
WBC # BLD AUTO: 4.99 THOUSAND/UL (ref 4.31–10.16)

## 2021-07-25 PROCEDURE — 36600 WITHDRAWAL OF ARTERIAL BLOOD: CPT

## 2021-07-25 PROCEDURE — 84100 ASSAY OF PHOSPHORUS: CPT | Performed by: PHYSICIAN ASSISTANT

## 2021-07-25 PROCEDURE — 85025 COMPLETE CBC W/AUTO DIFF WBC: CPT | Performed by: PHYSICIAN ASSISTANT

## 2021-07-25 PROCEDURE — 82948 REAGENT STRIP/BLOOD GLUCOSE: CPT

## 2021-07-25 PROCEDURE — 82805 BLOOD GASES W/O2 SATURATION: CPT | Performed by: PHYSICIAN ASSISTANT

## 2021-07-25 PROCEDURE — 80048 BASIC METABOLIC PNL TOTAL CA: CPT | Performed by: PHYSICIAN ASSISTANT

## 2021-07-25 PROCEDURE — 94640 AIRWAY INHALATION TREATMENT: CPT

## 2021-07-25 PROCEDURE — 99233 SBSQ HOSP IP/OBS HIGH 50: CPT | Performed by: NURSE PRACTITIONER

## 2021-07-25 PROCEDURE — 71045 X-RAY EXAM CHEST 1 VIEW: CPT

## 2021-07-25 PROCEDURE — 94760 N-INVAS EAR/PLS OXIMETRY 1: CPT

## 2021-07-25 PROCEDURE — 83735 ASSAY OF MAGNESIUM: CPT | Performed by: PHYSICIAN ASSISTANT

## 2021-07-25 RX ORDER — PANTOPRAZOLE SODIUM 40 MG/1
40 TABLET, DELAYED RELEASE ORAL
Status: DISCONTINUED | OUTPATIENT
Start: 2021-07-25 | End: 2021-08-11 | Stop reason: HOSPADM

## 2021-07-25 RX ORDER — ATORVASTATIN CALCIUM 40 MG/1
80 TABLET, FILM COATED ORAL
Status: DISCONTINUED | OUTPATIENT
Start: 2021-07-25 | End: 2021-08-11 | Stop reason: HOSPADM

## 2021-07-25 RX ORDER — LORAZEPAM 0.5 MG/1
0.5 TABLET ORAL EVERY 12 HOURS SCHEDULED
Status: DISCONTINUED | OUTPATIENT
Start: 2021-07-25 | End: 2021-07-26

## 2021-07-25 RX ORDER — ROSUVASTATIN CALCIUM 40 MG/1
40 TABLET, COATED ORAL DAILY
Status: ON HOLD | COMMUNITY
Start: 2021-07-20 | End: 2021-07-25 | Stop reason: SDUPTHER

## 2021-07-25 RX ORDER — ASPIRIN 81 MG/1
81 TABLET ORAL DAILY
Status: DISCONTINUED | OUTPATIENT
Start: 2021-07-25 | End: 2021-08-11 | Stop reason: HOSPADM

## 2021-07-25 RX ORDER — ASPIRIN 81 MG/1
81 TABLET ORAL DAILY
Status: ON HOLD | COMMUNITY
Start: 2021-07-20 | End: 2021-07-25 | Stop reason: SDUPTHER

## 2021-07-25 RX ORDER — INSULIN GLARGINE 100 [IU]/ML
6 INJECTION, SOLUTION SUBCUTANEOUS
Status: DISCONTINUED | OUTPATIENT
Start: 2021-07-25 | End: 2021-07-29

## 2021-07-25 RX ORDER — POTASSIUM CHLORIDE 14.9 MG/ML
20 INJECTION INTRAVENOUS ONCE
Status: COMPLETED | OUTPATIENT
Start: 2021-07-25 | End: 2021-07-25

## 2021-07-25 RX ORDER — DIVALPROEX SODIUM 250 MG/1
250 TABLET, DELAYED RELEASE ORAL EVERY 8 HOURS SCHEDULED
Status: DISCONTINUED | OUTPATIENT
Start: 2021-07-25 | End: 2021-07-25

## 2021-07-25 RX ORDER — FERROUS SULFATE 325(65) MG
325 TABLET ORAL
Status: DISCONTINUED | OUTPATIENT
Start: 2021-07-25 | End: 2021-08-11 | Stop reason: HOSPADM

## 2021-07-25 RX ORDER — DIVALPROEX SODIUM 250 MG/1
250 TABLET, DELAYED RELEASE ORAL EVERY 8 HOURS SCHEDULED
Status: DISCONTINUED | OUTPATIENT
Start: 2021-07-25 | End: 2021-08-11 | Stop reason: HOSPADM

## 2021-07-25 RX ADMIN — LORAZEPAM 0.5 MG: 0.5 TABLET ORAL at 08:00

## 2021-07-25 RX ADMIN — DIVALPROEX SODIUM 250 MG: 250 TABLET, DELAYED RELEASE ORAL at 13:30

## 2021-07-25 RX ADMIN — OXYBUTYNIN 10 MG: 5 TABLET, FILM COATED, EXTENDED RELEASE ORAL at 08:00

## 2021-07-25 RX ADMIN — MEROPENEM 1000 MG: 1 INJECTION, POWDER, FOR SOLUTION INTRAVENOUS at 00:20

## 2021-07-25 RX ADMIN — SODIUM CHLORIDE SOLN NEBU 3% 4 ML: 3 NEBU SOLN at 01:37

## 2021-07-25 RX ADMIN — SERTRALINE HYDROCHLORIDE 100 MG: 50 TABLET ORAL at 21:32

## 2021-07-25 RX ADMIN — INSULIN GLARGINE 6 UNITS: 100 INJECTION, SOLUTION SUBCUTANEOUS at 21:31

## 2021-07-25 RX ADMIN — IPRATROPIUM BROMIDE AND ALBUTEROL SULFATE 3 ML: 2.5; .5 SOLUTION RESPIRATORY (INHALATION) at 01:37

## 2021-07-25 RX ADMIN — GABAPENTIN 400 MG: 400 CAPSULE ORAL at 16:45

## 2021-07-25 RX ADMIN — IPRATROPIUM BROMIDE AND ALBUTEROL SULFATE 3 ML: 2.5; .5 SOLUTION RESPIRATORY (INHALATION) at 19:55

## 2021-07-25 RX ADMIN — INSULIN LISPRO 2 UNITS: 100 INJECTION, SOLUTION INTRAVENOUS; SUBCUTANEOUS at 07:44

## 2021-07-25 RX ADMIN — QUETIAPINE FUMARATE 200 MG: 50 TABLET, EXTENDED RELEASE ORAL at 21:32

## 2021-07-25 RX ADMIN — INSULIN LISPRO 3 UNITS: 100 INJECTION, SOLUTION INTRAVENOUS; SUBCUTANEOUS at 16:30

## 2021-07-25 RX ADMIN — SODIUM CHLORIDE SOLN NEBU 3% 4 ML: 3 NEBU SOLN at 19:55

## 2021-07-25 RX ADMIN — IPRATROPIUM BROMIDE AND ALBUTEROL SULFATE 3 ML: 2.5; .5 SOLUTION RESPIRATORY (INHALATION) at 14:07

## 2021-07-25 RX ADMIN — SODIUM CHLORIDE 250 MG: 9 INJECTION, SOLUTION INTRAVENOUS at 05:50

## 2021-07-25 RX ADMIN — ATORVASTATIN CALCIUM 80 MG: 40 TABLET, FILM COATED ORAL at 17:06

## 2021-07-25 RX ADMIN — ENOXAPARIN SODIUM 40 MG: 40 INJECTION SUBCUTANEOUS at 08:00

## 2021-07-25 RX ADMIN — LORAZEPAM 0.5 MG: 0.5 TABLET ORAL at 20:17

## 2021-07-25 RX ADMIN — FERROUS SULFATE TAB 325 MG (65 MG ELEMENTAL FE) 325 MG: 325 (65 FE) TAB at 07:56

## 2021-07-25 RX ADMIN — GABAPENTIN 400 MG: 400 CAPSULE ORAL at 20:17

## 2021-07-25 RX ADMIN — INSULIN LISPRO 3 UNITS: 100 INJECTION, SOLUTION INTRAVENOUS; SUBCUTANEOUS at 11:31

## 2021-07-25 RX ADMIN — POTASSIUM CHLORIDE 20 MEQ: 14.9 INJECTION, SOLUTION INTRAVENOUS at 05:49

## 2021-07-25 RX ADMIN — GABAPENTIN 400 MG: 400 CAPSULE ORAL at 08:00

## 2021-07-25 RX ADMIN — ASPIRIN 81 MG: 81 TABLET, COATED ORAL at 08:00

## 2021-07-25 RX ADMIN — SODIUM CHLORIDE SOLN NEBU 3% 4 ML: 3 NEBU SOLN at 07:40

## 2021-07-25 RX ADMIN — METRONIDAZOLE 500 MG: 500 INJECTION, SOLUTION INTRAVENOUS at 03:37

## 2021-07-25 RX ADMIN — PANTOPRAZOLE SODIUM 40 MG: 40 TABLET, DELAYED RELEASE ORAL at 08:00

## 2021-07-25 RX ADMIN — INSULIN LISPRO 4 UNITS: 100 INJECTION, SOLUTION INTRAVENOUS; SUBCUTANEOUS at 21:38

## 2021-07-25 RX ADMIN — SODIUM CHLORIDE SOLN NEBU 3% 4 ML: 3 NEBU SOLN at 14:07

## 2021-07-25 RX ADMIN — DIVALPROEX SODIUM 250 MG: 250 TABLET, DELAYED RELEASE ORAL at 21:32

## 2021-07-25 RX ADMIN — MEROPENEM 1000 MG: 1 INJECTION, POWDER, FOR SOLUTION INTRAVENOUS at 07:46

## 2021-07-25 RX ADMIN — IPRATROPIUM BROMIDE AND ALBUTEROL SULFATE 3 ML: 2.5; .5 SOLUTION RESPIRATORY (INHALATION) at 07:40

## 2021-07-25 NOTE — ASSESSMENT & PLAN NOTE
· Congential Intellectual Disabiltiy, Anxiety, Psychosis- continue gabapentin, quetiapine, lorazepam, and sertraline  · Home regimen of seroquel is 100 mg q am and 1600 with 200 mg q hs  · Home medications held after transfer to  due to somnolence  · Mental status improved and appears at baseline     · Seroquel 200 mg PO HS resumed 07/24   · Lorazepam 0 5 mg BID resumed 07/24 - hold for sedation, low threshold to discontinue  · Sertraline 100 mg PO HS resumed 07/24  · Continue Zyprexa 2 5 mg PO daily prn

## 2021-07-25 NOTE — RESPIRATORY THERAPY NOTE
RT Ventilator Management Note  Ioana Rape 77 y o  female MRN: [id number]  Unit/Bed#: -01 Encounter: [id number]      Daily Screen    No data found in the last 10 encounters  Physical Exam:   Assessment Type: Assess only  General Appearance: Sleeping  Respiratory Pattern: Normal  Chest Assessment: Chest expansion symmetrical  Bilateral Breath Sounds: Diminished  Cough: Non-productive  O2 Device: HFNC 80%  and 55L  Subjective Data: Pt placed on cpap for hs        Resp Comments: Pt continues ons cpap of 12 and 60%, resting comfortably, breathing pattern unlabored, Sp02 averaging 95%  Morning abg results as follows on CPAP support           Results for Ny Lantigua (MRN [id number]) as of 7/25/2021 04:20   7/25/2021 04:15   pH, Arterial 7 441   pCO2, Arterial 50 4 (H)   pO2, Arterial 77 0   HCO3, Arterial 33 5 (H)   Base Excess, Arterial 8 1   O2 Content, Arterial 16 2   O2 HGB,Arterial 94 0   ROOM AIR FIO2 60

## 2021-07-25 NOTE — ASSESSMENT & PLAN NOTE
· Cont home Depakote  mg q am and 500 mg q pm  · Changed to IV due to NPO  · Consider resuming PO depakote if tolerating PO pills  · Be alert for breakthrough seizures given meropenem lowering depakote effectiveness

## 2021-07-25 NOTE — ASSESSMENT & PLAN NOTE
· POA a/e/b - tachypnea, tachycardia, hypoxia   · Sepsis secondary to pneumonia likely aspiration vs gram-negative  · CXR 7/20 progressive b/l infiltrates R>L  · 7/20 Continue cefepime and metronidazole  Discontinue azithromycin and add Vanco given group home setting and worsening infiltrate on CXR  · 7/22 Due to low grade fevers, clinical deterioration, and positive sputum culture Cefepime changed to Meropenum  · Current ABX - Meropenum  Consider discontinuing  PCT pending    · Vancomycin d/c due to negative MRSA swab, culture results MRF  · Discontinue flagyl after 7 day course  · Consider D/C antibiotics after 7 day course  · Blood cultures - negative  · Flu/RSV - negative  · Sputum culture Gram neg coccobacilli, GNR, and GPC; MRF  · COVID negative on admission and patient fully vaccinated  · PCT negative: <0 05 > 0 08 > 0 14 > 0 08  · See plan for aspiration below

## 2021-07-25 NOTE — ASSESSMENT & PLAN NOTE
· Acute respiratory failure/hypoxia secondary to pneumonia with component of pulmonary edema  · Cont nebs and abx as abve  · CTA negative for pulmonary embolism- see plan under PNA  · Check echo  · Lasix prn  · Goal Negative 1L/24 hours  · 7/20 Escalated to midflow then required transfer for HFNC under Critical Care  · 7/21 HFNC + NRB, tolerated off NRB in the evening  · 7/22 progressive hypoxia on HFNC - placed on CPAP with improvement, CXR - progressive infiltrate R middle lobe, highly suggestion of mucous plugging and aspiration  · Alternates HFNC during the day and CPAP 12 60% HS/prn  · Goal SpO2 >92%  · Trend ABG as needed  · Cont duoneb, 3% saline, chest PT, suction  · Due to cognitive delay, pt is unable to fully participate in aggressive pulmonary toilet  SLP re-evaluation on 07/23 cleared for puree with nectar thick  Diet resumed 07/25  Aspiration precautions    · Likely has undiagnosed sleep apnea  · Consider bronchoscopy given thick right sided secretions with history of elevated right hemidiaphragm

## 2021-07-25 NOTE — ASSESSMENT & PLAN NOTE
Lab Results   Component Value Date    HGBA1C 6 6 (H) 07/19/2021       Recent Labs     07/24/21  0824 07/24/21  1051 07/24/21  1519 07/24/21  2225   POCGLU 154* 217* 314* 287*       Blood Sugar Average: Last 72 hrs:  (P) 178 75   · Not on meds prehospital  · Continue sliding scale insulin - increased algorithm for increased glucose; above goal range

## 2021-07-25 NOTE — ASSESSMENT & PLAN NOTE
· Cognitive intellectual disability, non-verbal  · Resides in Group home  · Appears to be a baseline

## 2021-07-25 NOTE — ASSESSMENT & PLAN NOTE
· CT of chest on 7/18 revealed:  Significant dilation of the main pulmonary artery, measuring 44 mm, with prominence of the central pulmonary arteries    · ECHO - EF 60%, mild AR, MR, TR, and VA; peak PA pressure 35 mmHg

## 2021-07-25 NOTE — ASSESSMENT & PLAN NOTE
· Chest x-ray showed suspected right middle lobe consolidation  · CTA Chest:   No central pulmonary emboli identified  Significant interval worsening in airspace consolidation and patchy infiltrates  involving the right upper lobe to the greatest degree posterior segment but also elsewhere within the right upper lobe  There is also interval worsening of the consolidation and volume loss in the dependent lower lobes bilaterally  Also  component of chronic volume loss related to elevated right hemidiaphragm  Focally dilated main pulmonary artery  Assess for pulmonary valve disease  · Consider discontining abx after 7 day course  Currently on Meropenem  · Aspiration precautions  · Sputum culture - gram neg and gram positive  · Speech therapy saw patient 7/23 recommends mechanically altered/level 1 diet and nectar thick liquids  · Witnessed aspiration 7/21 - made NPO and awaiting re-evaluation by SLP  · Re-evaluation by SLP on 07/23 - same recommendation  Diet resumed on 07/24 when mental status improved to baseline

## 2021-07-25 NOTE — ASSESSMENT & PLAN NOTE
· History of  Pancytopenia diagnosed in 3/2019  · Follows with Oncologist, Dr Holloway Gist   · Continue to monitor CBC    · Start Ferrous sulfate when able

## 2021-07-25 NOTE — ASSESSMENT & PLAN NOTE
· Congential Intellectual Disabiltiy, Anxiety, Psychosis- continue gabapentin, quetiapine, lorazepam, and sertraline  · Home regimen of seroquel is 100 mg q am and 1600 with 200 mg q hs  · Home medications held after transfer to  due to somnolence  · Mental status improved and appears at baseline     · Seroquel 200 mg PO HS resumed 07/24   · Lorazepam 0 5 mg BID resumed 07/24  · Sertraline 100 mg PO HS resumed 07/24  · Continue Zyprexa 2 5 mg PO daily prn

## 2021-07-25 NOTE — PROGRESS NOTES
[address]  Progress Note - Sydnie Acosta 1955, 77 y o  female MRN: [id number]  Unit/Bed#: -01 Encounter: [id number]  Primary Care Provider: Corrina Olivarez MD   Date and time admitted to hospital: 7/18/2021  2:07 PM    * Sepsis due to pneumonia [facility])  Assessment & Plan  · POA a/e/b - tachypnea, tachycardia, hypoxia   · Sepsis secondary to pneumonia likely aspiration vs gram-negative  · CXR 7/20 progressive b/l infiltrates R>L  · 7/20 Continue cefepime and metronidazole  Discontinue azithromycin and add Vanco given group home setting and worsening infiltrate on CXR  · 7/22 Due to low grade fevers, clinical deterioration, and positive sputum culture Cefepime changed to Meropenum  · Current ABX - Meropenum  Consider discontinuing  PCT pending    · Vancomycin d/c due to negative MRSA swab, culture results MRF  · Discontinue flagyl after 7 day course  · Consider D/C antibiotics after 7 day course  · Blood cultures - negative  · Flu/RSV - negative  · Sputum culture Gram neg coccobacilli, GNR, and GPC; MRF  · COVID negative on admission and patient fully vaccinated  · PCT negative: <0 05 > 0 08 > 0 14 > 0 08  · See plan for aspiration below    Acute respiratory failure with hypoxia (HCC)  Assessment & Plan  · Acute respiratory failure/hypoxia secondary to pneumonia with component of pulmonary edema  · Cont nebs and abx as abve  · CTA negative for pulmonary embolism- see plan under PNA  · Check echo  · Lasix prn  · Goal Negative 1L/24 hours  · 7/20 Escalated to midflow then required transfer for HFNC under Critical Care  · 7/21 HFNC + NRB, tolerated off NRB in the evening  · 7/22 progressive hypoxia on HFNC - placed on CPAP with improvement, CXR - progressive infiltrate R middle lobe, highly suggestion of mucous plugging and aspiration  · Alternates HFNC during the day and CPAP 12 60% HS/prn  · Goal SpO2 >92%  · Trend ABG as needed  · Cont duoneb, 3% saline, chest PT, suction  · Due to cognitive delay, pt is unable to fully participate in aggressive pulmonary toilet  SLP re-evaluation on 07/23 cleared for puree with nectar thick  Diet resumed 07/25  Aspiration precautions  · Likely has undiagnosed sleep apnea  · Consider bronchoscopy given thick right sided secretions with history of elevated right hemidiaphragm     Aspiration pneumonia (HCC)  Assessment & Plan  · Chest x-ray showed suspected right middle lobe consolidation  · CTA Chest:   No central pulmonary emboli identified  Significant interval worsening in airspace consolidation and patchy infiltrates  involving the right upper lobe to the greatest degree posterior segment but also elsewhere within the right upper lobe  There is also interval worsening of the consolidation and volume loss in the dependent lower lobes bilaterally  Also  component of chronic volume loss related to elevated right hemidiaphragm  Focally dilated main pulmonary artery  Assess for pulmonary valve disease  · Consider discontining abx after 7 day course  Currently on Meropenem  · Aspiration precautions  · Sputum culture - gram neg and gram positive  · Speech therapy saw patient 7/23 recommends mechanically altered/level 1 diet and nectar thick liquids  · Witnessed aspiration 7/21 - made NPO and awaiting re-evaluation by SLP  · Re-evaluation by SLP on 07/23 - same recommendation  Diet resumed on 07/24 when mental status improved to baseline  Chronically elevated right hemidiaphragm  Assessment & Plan  · CTA showed component of chronic volume loss related to elevated right hemidiaphragm  Abnormal CT of the chest  Assessment & Plan  · CT of chest on 7/18 revealed:  Significant dilation of the main pulmonary artery, measuring 44 mm, with prominence of the central pulmonary arteries    · ECHO - EF 60%, mild AR, MR, TR, and NV; peak PA pressure 35 mmHg    Hyperlipidemia  Assessment & Plan  · Patient takes Crestor 40 mg daily at home  · Restart Lipitor when able; currently NPO    Pancytopenia ([address] )  Assessment & Plan  · History of  Pancytopenia diagnosed in 3/2019  · Follows with Oncologist, Dr Laly Russell   · Continue to monitor CBC    · Start Ferrous sulfate when able    Seizure disorder [facility])  Assessment & Plan  · Cont home Depakote  mg q am and 500 mg q pm  · Changed to IV due to NPO  · Consider resuming PO depakote if tolerating PO pills  · Be alert for breakthrough seizures given meropenem lowering depakote effectiveness    Obesity (BMI 30 0-34  9)  Assessment & Plan  · Nutrition consulted     GERD (gastroesophageal reflux disease)  Assessment & Plan  · Continue pantoprazole    DM (diabetes mellitus), type 2 [facility])  Assessment & Plan  Lab Results   Component Value Date    HGBA1C 6 6 (H) 07/19/2021       Recent Labs     07/24/21  0824 07/24/21  1051 07/24/21  1519 07/24/21  2225   POCGLU 154* 217* 314* 287*       Blood Sugar Average: Last 72 hrs:  (P) 178 75   · Not on meds prehospital  · Continue sliding scale insulin - increased algorithm for increased glucose; above goal range  Psychiatric disorder  Assessment & Plan  · Congential Intellectual Disabiltiy, Anxiety, Psychosis- continue gabapentin, quetiapine, lorazepam, and sertraline  · Home regimen of seroquel is 100 mg q am and 1600 with 200 mg q hs  · Home medications held after transfer to  due to somnolence  · Mental status improved and appears at baseline  · Seroquel 200 mg PO HS resumed 07/24   · Lorazepam 0 5 mg BID resumed 07/24  · Sertraline 100 mg PO HS resumed 07/24  · Continue Zyprexa 2 5 mg PO daily prn    ----------------------------------------------------------------------------------------  HPI/24hr events: Patient was drowsy yesterday (07/24) AM after receiving Zyprexa 10 mg ODT evening prior  Resumed HS Seroquel and sertraline  Discontinued Zyprexa 10 mg ODT HS  Diet advanced to puree with nectar thick and patient tolerated diet well   Compliant with HFNC during the day - weaned to FiO2 80%  Tolerated CPAP 12 with FiO2 60% overnight  Slept well and no acute issues  Lasix 20 mg IV given last evening at 1802  Disposition: Continue Stepdown Level 1 level of care   Code Status: Level 3 - DNAR and DNI  ---------------------------------------------------------------------------------------  SUBJECTIVE    Review of Systems  Review of systems was unable to be performed secondary to non-verbal at baseline   ---------------------------------------------------------------------------------------  OBJECTIVE    Vitals   Vitals:    21 2332 21 0000 21 0138   BP: 129/60  146/58    BP Location: Right arm  Right arm    Pulse: 74  62    Resp: 18  18    Temp: 98 1 °F (36 7 °C)  97 9 °F (36 6 °C)    TempSrc: Temporal  Temporal    SpO2: 90% 95% 95% 97%   Weight:       Height:         Temp (24hrs), Av °F (36 7 °C), Min:97 5 °F (36 4 °C), Max:98 7 °F (37 1 °C)  Current: Temperature: 97 9 °F (36 6 °C)          Respiratory:  SpO2: SpO2: 97 %, SpO2 Activity: SpO2 Activity: At Rest, SpO2 Device: O2 Device: CPAP  Nasal Cannula O2 Flow Rate (L/min): 15 L/min    Invasive/non-invasive ventilation settings   Respiratory    Lab Data (Last 4 hours)       0415            pH, Arterial       7 441           pCO2, Arterial       50 4           pO2, Arterial       77 0           HCO3, Arterial       33 5           Base Excess, Arterial       8 1                O2/Vent Data     None                Physical Exam  Vitals reviewed  Constitutional:       General: She is not in acute distress  Appearance: She is well-developed  She is obese  She is not ill-appearing, toxic-appearing or diaphoretic  HENT:      Head: Normocephalic and atraumatic  Mouth/Throat:      Mouth: Mucous membranes are moist       Pharynx: Oropharynx is clear  Eyes:      General: No scleral icterus       Conjunctiva/sclera: Conjunctivae normal       Pupils: Pupils are equal, round, and reactive to light  Neck:      Vascular: No JVD  Cardiovascular:      Rate and Rhythm: Normal rate and regular rhythm  Pulses: Normal pulses  Heart sounds: Normal heart sounds  No murmur heard  No friction rub  Pulmonary:      Effort: Pulmonary effort is normal  No respiratory distress  Breath sounds: Normal breath sounds  No wheezing, rhonchi or rales  Abdominal:      General: Bowel sounds are normal  There is no distension  Palpations: Abdomen is soft  There is no mass  Tenderness: There is no abdominal tenderness  There is no guarding or rebound  Musculoskeletal:         General: No tenderness or deformity  Normal range of motion  Cervical back: Normal range of motion and neck supple  No rigidity  Right lower leg: Edema present  Left lower leg: Edema present  Skin:     General: Skin is warm and dry  Capillary Refill: Capillary refill takes less than 2 seconds  Coloration: Skin is not pale  Findings: No erythema or rash  Neurological:      Mental Status: She is alert  Mental status is at baseline  Cranial Nerves: No cranial nerve deficit  Comments: Non-verbal at baseline  Psychiatric:         Behavior: Behavior normal       Comments: Mental status/behavior at baseline           Laboratory and Diagnostics:  Results from last 7 days   Lab Units 07/24/21  0545 07/23/21  0542 07/22/21  0233 07/21/21  0556 07/20/21  2312 07/20/21  0521 07/19/21  0517 07/18/21  1612   WBC Thousand/uL 5 41 5 12 7 84 6 66  --  6 14 4 50 5 24   HEMOGLOBIN g/dL 9 2* 8 7* 9 7* 9 6*  --  9 1* 9 8* 10 8*   I STAT HEMOGLOBIN g/dl  --   --   --   --  9 9*  --   --   --    HEMATOCRIT % 30 0* 27 5* 31 1* 30 4*  --  28 7* 31 0* 33 1*   HEMATOCRIT, ISTAT %  --   --   --   --  29*  --   --   --    PLATELETS Thousands/uL 247 188 161 149  --  123* 153 163   NEUTROS PCT %  --   --  84*  --   --  79*  --  68   BANDS PCT %  --   --   --   --   --   --  26*  --    MONOS PCT %  --   --  9  --   --  9  --  12   MONO PCT %  --   --   --   --   --   --  7  --      Results from last 7 days   Lab Units 07/24/21  0545 07/23/21  0542 07/22/21  1805 07/22/21  0233 07/21/21  1600 07/21/21  0556 07/21/21  0106 07/19/21  0517 07/18/21  1612   SODIUM mmol/L 145 140 138 134* 135* 135* 135*  --  134*   POTASSIUM mmol/L 3 4* 4 5 3 7 4 1 3 8 3 9 3 3*  --  4 5   CHLORIDE mmol/L 104 105 103 104 102 99* 99*  --  95*   CO2 mmol/L 36* 30 29 27 27 31 34*  --  35*   CO2, I-STAT   --   --   --   --   --   --   --    < >  --    ANION GAP mmol/L 5 5 6 3* 6 5 2*  --  4   BUN mg/dL 25 21 20 15 15 12 12  --  13   CREATININE mg/dL 0 60 0 48* 0 59* 0 59* 0 58* 0 58* 0 56*  --  0 71   CALCIUM mg/dL 9 7 9 5 9 8 9 0 9 5 9 3 9 4  --  10 2*   GLUCOSE RANDOM mg/dL 153* 142* 140 167* 155* 216* 263*  --  140   ALT U/L  --   --   --   --   --  54  --   --  20   AST U/L  --   --   --   --   --  49*  --   --  25   ALK PHOS U/L  --   --   --   --   --  64  --   --  67   ALBUMIN g/dL  --   --   --   --   --  2 2*  --   --  3 1*   TOTAL BILIRUBIN mg/dL  --   --   --   --   --  0 39  --   --  0 54    < > = values in this interval not displayed       Results from last 7 days   Lab Units 07/24/21  0545 07/23/21  0542 07/22/21  0233 07/21/21  1600 07/21/21  0556   MAGNESIUM mg/dL 2 1 1 9 1 9 2 0 1 9   PHOSPHORUS mg/dL 3 4  --   --   --  3 0           Results from last 7 days   Lab Units 07/18/21  1612   TROPONIN I ng/mL <0 02     Results from last 7 days   Lab Units 07/18/21  2039 07/18/21  1612   LACTIC ACID mmol/L 1 9 1 2     ABG:  Results from last 7 days   Lab Units 07/25/21  0415 07/22/21  0346   PH ART  7 441 7 371   PCO2 ART mm Hg 50 4* 47 1*   PO2 ART mm Hg 77 0 95 0   HCO3 ART mmol/L 33 5* 26 7   BASE EXC ART mmol/L 8 1 1 1   ABG SOURCE   --  Radial, Left     VBG:  Results from last 7 days   Lab Units 07/22/21  0346   ABG SOURCE  Radial, Left     Results from last 7 days   Lab Units 07/22/21  0233 07/21/21  0556 07/20/21  0521 07/18/21 2032   PROCALCITONIN ng/ml 0 08 0 14 0 08 <0 05       Micro  Results from last 7 days   Lab Units 07/21/21  1055 07/19/21  0519 07/18/21 2040 07/18/21 2039   BLOOD CULTURE   --   --  No Growth After 5 Days  No Growth After 5 Days  SPUTUM CULTURE   --  4+ Growth of   --   --    GRAM STAIN RESULT   --  4+ Gram negative coccobacilli*  2+ Gram negative rods*  2+ Gram positive cocci in pairs*  2+ Polys*  --   --    MRSA CULTURE ONLY  No Methicillin Resistant Staphlyococcus aureus (MRSA) isolated  --   --   --        EKG: NSR on telemetry  Imaging: I have personally reviewed pertinent reports  and I have personally reviewed pertinent films in PACS  No new imaging in past 24 hours  Intake and Output  I/O       07/23 0701 - 07/24 0700 07/24 0701 - 07/25 0700    P  O  0 913    I V  (mL/kg) 90 (1 1) 90 (1 1)    IV Piggyback 600 650    Total Intake(mL/kg) 690 (8 5) 1653 (20 4)    Urine (mL/kg/hr) 2610 (1 3) 500 (0 3)    Stool 0     Total Output 2610 500    Net -1920 +1153          Unmeasured Urine Occurrence  1 x    Unmeasured Stool Occurrence 1 x           Height and Weights   Height: 5' (152 4 cm)  IBW (Ideal Body Weight): 45 5 kg  Body mass index is 34 92 kg/m²  Weight (last 2 days)     Date/Time   Weight    07/24/21 0547   81 1 (178 79)    07/23/21 0553   82 1 (181)                Nutrition       Diet Orders   (From admission, onward)             Start     Ordered    07/24/21 1201  Diet Dysphagia/Modified Consistency; Dysphagia 1-Pureed; Nectar Thick Liquid  Diet effective now     Comments: Mechanical soft/level 1, Nectar thick liquids, crush pills if able  Crush medications   Question Answer Comment   Diet Type Dysphagia/Modified Consistency    Dysphagia/Modified Consistency Dysphagia 1-Pureed    Liquid Modifier Nectar Thick Liquid    Special Instructions Aspiration precautions    RD to adjust diet per protocol?  Yes        07/24/21 1201                  Active Medications  Scheduled Meds:  Current Facility-Administered Medications   Medication Dose Route Frequency Provider Last Rate    acetaminophen  640 mg Oral Q6H PRN Thelma Chambers PA-C      enoxaparin  40 mg Subcutaneous Q24H [address] KRISTOPHER Hairston      gabapentin  400 mg Oral TID Thelma Chambers PA-C      insulin lispro  1-5 Units Subcutaneous HS KRISTOPHER Moreno      insulin lispro  1-6 Units Subcutaneous TID AC KRISTOPHER Moreno      ipratropium-albuterol  3 mL Nebulization Q6H KRISTOPHER Hairston      LORazepam  0 5 mg Oral BID Thelma Chambers PA-C      meropenem  1,000 mg Intravenous Q8H KRISTOPHER Wall Stopped (07/25/21 0101)    metroNIDAZOLE  500 mg Intravenous Q8H KRISTOPHER Moreno 500 mg (07/25/21 0337)    OLANZapine  2 5 mg Oral Daily PRN Brian Reyna PA-C      ondansetron  4 mg Intravenous Q6H PRN Thelma Chambers PA-C      oxybutynin  10 mg Oral Daily Chad Blue      pantoprazole  40 mg Intravenous Q24H [address] [facility] [place]      QUEtiapine  200 mg Oral HS Thelma Chambers PA-C      sertraline  100 mg Oral HS Thelma Chambers PA-C      sodium chloride  1 spray Each Nare Q1H PRN KRISTOPHER Moreno      sodium chloride  4 mL Nebulization Q6H KRISTOPHER Cason      valproate sodium  250 mg Intravenous [facility] Angel Grajeda PA-C Stopped (07/24/21 2239)     Continuous Infusions:     PRN Meds:   acetaminophen, 640 mg, Q6H PRN  OLANZapine, 2 5 mg, Daily PRN  ondansetron, 4 mg, Q6H PRN  sodium chloride, 1 spray, Q1H PRN        Invasive Devices Review  Invasive Devices     Peripheral Intravenous Line            Peripheral IV 07/21/21 Left Forearm 4 days    Peripheral IV 07/21/21 Right Forearm 4 days                Rationale for remaining devices: PIV for medications   ---------------------------------------------------------------------------------------  Advance Directive and Living Will:      Power of :    POLST: ---------------------------------------------------------------------------------------  Care Time Delivered:   No Critical Care time spent       [facility]      Portions of the record may have been created with voice recognition software  Occasional wrong word or "sound a like" substitutions may have occurred due to the inherent limitations of voice recognition software    Read the chart carefully and recognize, using context, where substitutions have occurred

## 2021-07-25 NOTE — ASSESSMENT & PLAN NOTE
· Cont home Depakote  mg q am and 500 mg q pm  · Changed to IV due to NPO  · Resumed PO depakote 07/25 - currently on 250 mg Q 8 hours

## 2021-07-25 NOTE — ASSESSMENT & PLAN NOTE
· Acute respiratory failure/hypoxia secondary to pneumonia with component of pulmonary edema  · Cont nebs and abx as abve  · CTA negative for pulmonary embolism- see plan under PNA  · Check echo  · Lasix prn  · Goal even to slightly negative  · 7/20 Escalated to midflow then required transfer for HFNC under Critical Care  · 7/21 HFNC + NRB, tolerated off NRB in the evening  · 7/22 progressive hypoxia on HFNC - placed on CPAP with improvement, CXR - progressive infiltrate R middle lobe, highly suggestion of mucous plugging and aspiration  · Alternates HFNC during the day and CPAP 12 70% HS/prn  · Goal SpO2 >92%  · Trend ABG as needed  · Cont duoneb, 3% saline, chest PT, suction  · Due to cognitive delay, pt is unable to fully participate in aggressive pulmonary toilet  SLP re-evaluation on 07/23 cleared for puree with nectar thick  Diet resumed 07/25  Aspiration precautions    · Likely has undiagnosed sleep apnea  · Consider bronchoscopy given thick right sided secretions with history of elevated right hemidiaphragm

## 2021-07-25 NOTE — ASSESSMENT & PLAN NOTE
· Chest x-ray showed suspected right middle lobe consolidation  · CTA Chest:   No central pulmonary emboli identified  Significant interval worsening in airspace consolidation and patchy infiltrates  involving the right upper lobe to the greatest degree posterior segment but also elsewhere within the right upper lobe  There is also interval worsening of the consolidation and volume loss in the dependent lower lobes bilaterally  Also  component of chronic volume loss related to elevated right hemidiaphragm  Focally dilated main pulmonary artery  Assess for pulmonary valve disease  · Completed 7 days of antibiotics - see course above  · Aspiration precautions  · Sputum culture - gram neg and gram positive  · Speech therapy saw patient 7/23 recommends mechanically altered/level 1 diet and nectar thick liquids  · Witnessed aspiration 7/21 - made NPO and awaiting re-evaluation by SLP  · Re-evaluation by SLP on 07/23 - same recommendation  Diet resumed on 07/24 when mental status improved to baseline

## 2021-07-25 NOTE — ASSESSMENT & PLAN NOTE
· POA a/e/b - tachypnea, tachycardia, hypoxia   · Sepsis secondary to pneumonia likely aspiration vs gram-negative  · CXR 7/20 progressive b/l infiltrates R>L  · 7/20 Continue cefepime and metronidazole  Discontinue azithromycin and add Vanco given group home setting and worsening infiltrate on CXR    · 7/22 Due to low grade fevers, clinical deterioration, and positive sputum culture Cefepime changed to Meropenum  · Antibiotics completed:  · Meropenum 07/21 - 07/25  · Vancomycin 07/20 - 0723, d/c due to negative MRSA swab, culture results MRF  · Metronidazole 07/18 - 07/24  · Azithromycin 07/18, 07/20  · Ceftriaxone 07/18  · Blood cultures - negative  · Flu/RSV - negative  · Sputum culture Gram neg coccobacilli, GNR, and GPC; MRF  · COVID negative on admission and patient fully vaccinated  · PCT negative: <0 05 > 0 08 > 0 14 > 0 08  · See plan for aspiration below

## 2021-07-25 NOTE — ASSESSMENT & PLAN NOTE
Lab Results   Component Value Date    HGBA1C 6 6 (H) 07/19/2021       Recent Labs     07/25/21  0744 07/25/21  1130 07/25/21  1555 07/25/21  2137   POCGLU 211* 269* 235* 330*       Blood Sugar Average: Last 72 hrs:  (P) 567 6622   · Not on meds prehospital  · Continue sliding scale insulin  · Started lantus 6 units HS 07/25

## 2021-07-26 ENCOUNTER — APPOINTMENT (INPATIENT)
Dept: CT IMAGING | Facility: HOSPITAL | Age: 66
DRG: 871 | End: 2021-07-26
Payer: MEDICARE

## 2021-07-26 LAB
ANION GAP SERPL CALCULATED.3IONS-SCNC: -2 MMOL/L (ref 4–13)
ANION GAP SERPL CALCULATED.3IONS-SCNC: 2 MMOL/L (ref 4–13)
BASOPHILS # BLD AUTO: 0.02 THOUSANDS/ΜL (ref 0–0.1)
BASOPHILS NFR BLD AUTO: 0 % (ref 0–1)
BUN SERPL-MCNC: 22 MG/DL (ref 5–25)
BUN SERPL-MCNC: 23 MG/DL (ref 5–25)
CALCIUM SERPL-MCNC: 9.6 MG/DL (ref 8.3–10.1)
CALCIUM SERPL-MCNC: 9.7 MG/DL (ref 8.3–10.1)
CHLORIDE SERPL-SCNC: 110 MMOL/L (ref 100–108)
CHLORIDE SERPL-SCNC: 115 MMOL/L (ref 100–108)
CO2 SERPL-SCNC: 39 MMOL/L (ref 21–32)
CO2 SERPL-SCNC: 39 MMOL/L (ref 21–32)
CREAT SERPL-MCNC: 0.47 MG/DL (ref 0.6–1.3)
CREAT SERPL-MCNC: 0.57 MG/DL (ref 0.6–1.3)
EOSINOPHIL # BLD AUTO: 0.16 THOUSAND/ΜL (ref 0–0.61)
EOSINOPHIL NFR BLD AUTO: 3 % (ref 0–6)
ERYTHROCYTE [DISTWIDTH] IN BLOOD BY AUTOMATED COUNT: 15.3 % (ref 11.6–15.1)
GFR SERPL CREATININE-BSD FRML MDRD: 103 ML/MIN/1.73SQ M
GFR SERPL CREATININE-BSD FRML MDRD: 96 ML/MIN/1.73SQ M
GLUCOSE SERPL-MCNC: 171 MG/DL (ref 65–140)
GLUCOSE SERPL-MCNC: 180 MG/DL (ref 65–140)
GLUCOSE SERPL-MCNC: 185 MG/DL (ref 65–140)
GLUCOSE SERPL-MCNC: 202 MG/DL (ref 65–140)
GLUCOSE SERPL-MCNC: 306 MG/DL (ref 65–140)
GLUCOSE SERPL-MCNC: 314 MG/DL (ref 65–140)
GLUCOSE SERPL-MCNC: 330 MG/DL (ref 65–140)
HCT VFR BLD AUTO: 33.2 % (ref 34.8–46.1)
HGB BLD-MCNC: 9.8 G/DL (ref 11.5–15.4)
IMM GRANULOCYTES # BLD AUTO: 0.11 THOUSAND/UL (ref 0–0.2)
IMM GRANULOCYTES NFR BLD AUTO: 2 % (ref 0–2)
LYMPHOCYTES # BLD AUTO: 1.48 THOUSANDS/ΜL (ref 0.6–4.47)
LYMPHOCYTES NFR BLD AUTO: 26 % (ref 14–44)
MCH RBC QN AUTO: 29.3 PG (ref 26.8–34.3)
MCHC RBC AUTO-ENTMCNC: 29.5 G/DL (ref 31.4–37.4)
MCV RBC AUTO: 99 FL (ref 82–98)
MONOCYTES # BLD AUTO: 0.55 THOUSAND/ΜL (ref 0.17–1.22)
MONOCYTES NFR BLD AUTO: 10 % (ref 4–12)
NEUTROPHILS # BLD AUTO: 3.37 THOUSANDS/ΜL (ref 1.85–7.62)
NEUTS SEG NFR BLD AUTO: 59 % (ref 43–75)
NRBC BLD AUTO-RTO: 0 /100 WBCS
PLATELET # BLD AUTO: 315 THOUSANDS/UL (ref 149–390)
PMV BLD AUTO: 8.7 FL (ref 8.9–12.7)
POTASSIUM SERPL-SCNC: 4.2 MMOL/L (ref 3.5–5.3)
POTASSIUM SERPL-SCNC: 4.2 MMOL/L (ref 3.5–5.3)
PROCALCITONIN SERPL-MCNC: <0.05 NG/ML
RBC # BLD AUTO: 3.35 MILLION/UL (ref 3.81–5.12)
SODIUM SERPL-SCNC: 151 MMOL/L (ref 136–145)
SODIUM SERPL-SCNC: 152 MMOL/L (ref 136–145)
WBC # BLD AUTO: 5.69 THOUSAND/UL (ref 4.31–10.16)

## 2021-07-26 PROCEDURE — 94640 AIRWAY INHALATION TREATMENT: CPT

## 2021-07-26 PROCEDURE — 94760 N-INVAS EAR/PLS OXIMETRY 1: CPT

## 2021-07-26 PROCEDURE — 80048 BASIC METABOLIC PNL TOTAL CA: CPT | Performed by: INTERNAL MEDICINE

## 2021-07-26 PROCEDURE — G1004 CDSM NDSC: HCPCS

## 2021-07-26 PROCEDURE — 84145 PROCALCITONIN (PCT): CPT | Performed by: ANESTHESIOLOGY

## 2021-07-26 PROCEDURE — 71250 CT THORAX DX C-: CPT

## 2021-07-26 PROCEDURE — 85025 COMPLETE CBC W/AUTO DIFF WBC: CPT | Performed by: PHYSICIAN ASSISTANT

## 2021-07-26 PROCEDURE — 80048 BASIC METABOLIC PNL TOTAL CA: CPT | Performed by: PHYSICIAN ASSISTANT

## 2021-07-26 PROCEDURE — 82948 REAGENT STRIP/BLOOD GLUCOSE: CPT

## 2021-07-26 PROCEDURE — 99291 CRITICAL CARE FIRST HOUR: CPT | Performed by: ANESTHESIOLOGY

## 2021-07-26 RX ORDER — FUROSEMIDE 10 MG/ML
20 INJECTION INTRAMUSCULAR; INTRAVENOUS ONCE
Status: COMPLETED | OUTPATIENT
Start: 2021-07-26 | End: 2021-07-26

## 2021-07-26 RX ORDER — LIDOCAINE HYDROCHLORIDE 40 MG/ML
10 SOLUTION TOPICAL ONCE
Status: DISCONTINUED | OUTPATIENT
Start: 2021-07-26 | End: 2021-07-31

## 2021-07-26 RX ORDER — LORAZEPAM 0.5 MG/1
0.5 TABLET ORAL 2 TIMES DAILY PRN
Status: DISCONTINUED | OUTPATIENT
Start: 2021-07-26 | End: 2021-08-11 | Stop reason: HOSPADM

## 2021-07-26 RX ORDER — ACETAMINOPHEN 325 MG/1
650 TABLET ORAL ONCE
Status: COMPLETED | OUTPATIENT
Start: 2021-07-26 | End: 2021-07-26

## 2021-07-26 RX ORDER — ECHINACEA PURPUREA EXTRACT 125 MG
1 TABLET ORAL 3 TIMES DAILY
Status: DISCONTINUED | OUTPATIENT
Start: 2021-07-26 | End: 2021-07-31

## 2021-07-26 RX ADMIN — IPRATROPIUM BROMIDE AND ALBUTEROL SULFATE 3 ML: 2.5; .5 SOLUTION RESPIRATORY (INHALATION) at 13:45

## 2021-07-26 RX ADMIN — DIVALPROEX SODIUM 250 MG: 250 TABLET, DELAYED RELEASE ORAL at 15:55

## 2021-07-26 RX ADMIN — SALINE NASAL SPRAY 1 SPRAY: 1.5 SOLUTION NASAL at 22:05

## 2021-07-26 RX ADMIN — ACETAMINOPHEN 650 MG: 325 TABLET ORAL at 23:26

## 2021-07-26 RX ADMIN — FERROUS SULFATE TAB 325 MG (65 MG ELEMENTAL FE) 325 MG: 325 (65 FE) TAB at 07:38

## 2021-07-26 RX ADMIN — SODIUM CHLORIDE SOLN NEBU 3% 4 ML: 3 NEBU SOLN at 01:50

## 2021-07-26 RX ADMIN — FUROSEMIDE 20 MG: 10 INJECTION, SOLUTION INTRAMUSCULAR; INTRAVENOUS at 04:48

## 2021-07-26 RX ADMIN — OXYBUTYNIN 10 MG: 5 TABLET, FILM COATED, EXTENDED RELEASE ORAL at 08:27

## 2021-07-26 RX ADMIN — INSULIN LISPRO 2 UNITS: 100 INJECTION, SOLUTION INTRAVENOUS; SUBCUTANEOUS at 07:39

## 2021-07-26 RX ADMIN — QUETIAPINE FUMARATE 200 MG: 50 TABLET, EXTENDED RELEASE ORAL at 21:53

## 2021-07-26 RX ADMIN — ATORVASTATIN CALCIUM 80 MG: 40 TABLET, FILM COATED ORAL at 15:55

## 2021-07-26 RX ADMIN — INSULIN LISPRO 1 UNITS: 100 INJECTION, SOLUTION INTRAVENOUS; SUBCUTANEOUS at 17:38

## 2021-07-26 RX ADMIN — IPRATROPIUM BROMIDE AND ALBUTEROL SULFATE 3 ML: 2.5; .5 SOLUTION RESPIRATORY (INHALATION) at 01:50

## 2021-07-26 RX ADMIN — SODIUM CHLORIDE SOLN NEBU 3% 4 ML: 3 NEBU SOLN at 19:59

## 2021-07-26 RX ADMIN — INSULIN LISPRO 3 UNITS: 100 INJECTION, SOLUTION INTRAVENOUS; SUBCUTANEOUS at 22:35

## 2021-07-26 RX ADMIN — DIVALPROEX SODIUM 250 MG: 250 TABLET, DELAYED RELEASE ORAL at 07:37

## 2021-07-26 RX ADMIN — ENOXAPARIN SODIUM 40 MG: 40 INJECTION SUBCUTANEOUS at 08:27

## 2021-07-26 RX ADMIN — GABAPENTIN 400 MG: 400 CAPSULE ORAL at 21:53

## 2021-07-26 RX ADMIN — SODIUM CHLORIDE SOLN NEBU 3% 4 ML: 3 NEBU SOLN at 10:58

## 2021-07-26 RX ADMIN — SERTRALINE HYDROCHLORIDE 100 MG: 50 TABLET ORAL at 21:53

## 2021-07-26 RX ADMIN — SODIUM CHLORIDE SOLN NEBU 3% 4 ML: 3 NEBU SOLN at 13:57

## 2021-07-26 RX ADMIN — GABAPENTIN 400 MG: 400 CAPSULE ORAL at 15:55

## 2021-07-26 RX ADMIN — GABAPENTIN 400 MG: 400 CAPSULE ORAL at 08:26

## 2021-07-26 RX ADMIN — PANTOPRAZOLE SODIUM 40 MG: 40 TABLET, DELAYED RELEASE ORAL at 07:38

## 2021-07-26 RX ADMIN — IPRATROPIUM BROMIDE AND ALBUTEROL SULFATE 3 ML: 2.5; .5 SOLUTION RESPIRATORY (INHALATION) at 07:46

## 2021-07-26 RX ADMIN — INSULIN LISPRO 4 UNITS: 100 INJECTION, SOLUTION INTRAVENOUS; SUBCUTANEOUS at 11:26

## 2021-07-26 RX ADMIN — IPRATROPIUM BROMIDE AND ALBUTEROL SULFATE 3 ML: 2.5; .5 SOLUTION RESPIRATORY (INHALATION) at 19:59

## 2021-07-26 RX ADMIN — INSULIN GLARGINE 6 UNITS: 100 INJECTION, SOLUTION SUBCUTANEOUS at 21:54

## 2021-07-26 RX ADMIN — DIVALPROEX SODIUM 250 MG: 250 TABLET, DELAYED RELEASE ORAL at 21:53

## 2021-07-26 RX ADMIN — SALINE NASAL SPRAY 1 SPRAY: 1.5 SOLUTION NASAL at 16:05

## 2021-07-26 RX ADMIN — ASPIRIN 81 MG: 81 TABLET, COATED ORAL at 08:27

## 2021-07-26 NOTE — PROGRESS NOTES
[address]  Progress Note - Daniella Marshall 1955, 77 y o  female MRN: [id number]  Unit/Bed#: -01 Encounter: [id number]  Primary Care Provider: Bebe Weber MD   Date and time admitted to hospital: 7/18/2021  2:07 PM    * Sepsis due to pneumonia [facility])  Assessment & Plan  · POA a/e/b - tachypnea, tachycardia, hypoxia   · Sepsis secondary to pneumonia likely aspiration vs gram-negative  · CXR 7/20 progressive b/l infiltrates R>L  · 7/20 Continue cefepime and metronidazole  Discontinue azithromycin and add Vanco given group home setting and worsening infiltrate on CXR    · 7/22 Due to low grade fevers, clinical deterioration, and positive sputum culture Cefepime changed to Meropenum  · Antibiotics completed:  · Meropenum 07/21 - 07/25  · Vancomycin 07/20 - 0723, d/c due to negative MRSA swab, culture results MRF  · Metronidazole 07/18 - 07/24  · Azithromycin 07/18, 07/20  · Ceftriaxone 07/18  · Blood cultures - negative  · Flu/RSV - negative  · Sputum culture Gram neg coccobacilli, GNR, and GPC; MRF  · COVID negative on admission and patient fully vaccinated  · PCT negative: <0 05 > 0 08 > 0 14 > 0 08  · See plan for aspiration below    Acute respiratory failure with hypoxia (HCC)  Assessment & Plan  · Acute respiratory failure/hypoxia secondary to pneumonia with component of pulmonary edema  · Cont nebs and abx as abve  · CTA negative for pulmonary embolism- see plan under PNA  · Check echo  · Lasix prn  · Goal even to slightly negative  · 7/20 Escalated to midflow then required transfer for HFNC under Critical Care  · 7/21 HFNC + NRB, tolerated off NRB in the evening  · 7/22 progressive hypoxia on HFNC - placed on CPAP with improvement, CXR - progressive infiltrate R middle lobe, highly suggestion of mucous plugging and aspiration  · Alternates HFNC during the day and CPAP 12 70% HS/prn  · Goal SpO2 >92%  · Trend ABG as needed  · Cont duoneb, 3% saline, chest PT, suction  · Due to cognitive delay, pt is unable to fully participate in aggressive pulmonary toilet  SLP re-evaluation on 07/23 cleared for puree with nectar thick  Diet resumed 07/25  Aspiration precautions  · Likely has undiagnosed sleep apnea  · Consider bronchoscopy given thick right sided secretions with history of elevated right hemidiaphragm     Aspiration pneumonia (HCC)  Assessment & Plan  · Chest x-ray showed suspected right middle lobe consolidation  · CTA Chest:   No central pulmonary emboli identified  Significant interval worsening in airspace consolidation and patchy infiltrates  involving the right upper lobe to the greatest degree posterior segment but also elsewhere within the right upper lobe  There is also interval worsening of the consolidation and volume loss in the dependent lower lobes bilaterally  Also  component of chronic volume loss related to elevated right hemidiaphragm  Focally dilated main pulmonary artery  Assess for pulmonary valve disease  · Completed 7 days of antibiotics - see course above  · Aspiration precautions  · Sputum culture - gram neg and gram positive  · Speech therapy saw patient 7/23 recommends mechanically altered/level 1 diet and nectar thick liquids  · Witnessed aspiration 7/21 - made NPO and awaiting re-evaluation by SLP  · Re-evaluation by SLP on 07/23 - same recommendation  Diet resumed on 07/24 when mental status improved to baseline  Intellectual disability  Assessment & Plan  · Cognitive intellectual disability, non-verbal  · Resides in Group home  · Appears to be a baseline  Psychiatric disorder  Assessment & Plan  · Congential Intellectual Disabiltiy, Anxiety, Psychosis- continue gabapentin, quetiapine, lorazepam, and sertraline  · Home regimen of seroquel is 100 mg q am and 1600 with 200 mg q hs  · Home medications held after transfer to  due to somnolence  · Mental status improved and appears at baseline     · Seroquel 200 mg PO HS resumed 07/24   · Lorazepam 0 5 mg BID resumed 07/24 - hold for sedation, low threshold to discontinue  · Sertraline 100 mg PO HS resumed 07/24  · Continue Zyprexa 2 5 mg PO daily prn    Seizure disorder (HCC)  Assessment & Plan  · Cont home Depakote  mg q am and 500 mg q pm  · Changed to IV due to NPO  · Resumed PO depakote 07/25 - currently on 250 mg Q 8 hours    Obesity (BMI 30 0-34  9)  Assessment & Plan  · Nutrition consulted     Type 2 diabetes mellitus without complication, without long-term current use of insulin [facility])  Assessment & Plan  Lab Results   Component Value Date    HGBA1C 6 6 (H) 07/19/2021       Recent Labs     07/25/21  0744 07/25/21  1130 07/25/21  1555 07/25/21  2137   POCGLU 211* 269* 235* 330*       Blood Sugar Average: Last 72 hrs:  (P) [phone]   · Not on meds prehospital  · Continue sliding scale insulin  · Started lantus 6 units HS 07/25      ----------------------------------------------------------------------------------------  HPI/24hr events:   Patient tolerating HFNC during the day however when oxygen is removed by patient, saturations quickly decline due to this, FiO2 need to be titrated up to 100% once again  Patient is compliant with CPAP overnight, FiO2 weaned down to 70%  Lantus 6 units HS started last evening for persistent hyperglycemia greater than 200 despite Accu-Checks a c  HS with sliding scale insulin coverage  Decreasing UOP overnight and fluid balance positive   Lasix 20 mg given this AM     Disposition: Continue Stepdown Level 1 level of care   Code Status: Level 3 - DNAR and DNI  ---------------------------------------------------------------------------------------  SUBJECTIVE    Review of Systems  Review of systems was unable to be performed secondary to Intellectual disability and nonverbal at baseline  ---------------------------------------------------------------------------------------  OBJECTIVE    Vitals   Vitals:    07/26/21 0000 07/26/21 0151 21 0152 21 0206   BP: 116/58      BP Location: Right arm      Pulse: 67      Resp: 20      Temp: 97 9 °F (36 6 °C)      TempSrc: Temporal      SpO2: 95% 95% 95% 94%   Weight:       Height:         Temp (24hrs), Av 2 °F (36 8 °C), Min:97 9 °F (36 6 °C), Max:98 6 °F (37 °C)  Current: Temperature: 97 9 °F (36 6 °C)          Respiratory:  SpO2: SpO2: 94 %, SpO2 Activity: SpO2 Activity: At Rest, SpO2 Device: O2 Device: CPAP  Nasal Cannula O2 Flow Rate (L/min): 15 L/min    Invasive/non-invasive ventilation settings   Respiratory    Lab Data (Last 4 hours)    None         O2/Vent Data (Last 4 hours)    None                Physical Exam  Vitals reviewed  Constitutional:       General: She is not in acute distress  Appearance: She is well-developed  She is obese  She is not ill-appearing, toxic-appearing or diaphoretic  HENT:      Head: Normocephalic and atraumatic  Mouth/Throat:      Mouth: Mucous membranes are moist       Pharynx: Oropharynx is clear  No oropharyngeal exudate  Eyes:      General: No scleral icterus  Conjunctiva/sclera: Conjunctivae normal       Pupils: Pupils are equal, round, and reactive to light  Neck:      Vascular: No JVD  Cardiovascular:      Rate and Rhythm: Normal rate and regular rhythm  Heart sounds: Normal heart sounds  No murmur heard  No friction rub  Pulmonary:      Effort: Pulmonary effort is normal  No respiratory distress  Breath sounds: Normal breath sounds  No wheezing or rales  Abdominal:      General: Bowel sounds are normal  There is no distension  Palpations: Abdomen is soft  There is no mass  Tenderness: There is no abdominal tenderness  There is no guarding or rebound  Musculoskeletal:         General: No tenderness or deformity  Normal range of motion  Cervical back: Normal range of motion and neck supple  Right lower leg: Edema present  Left lower leg: Edema present     Skin:     General: Skin is warm and dry  Capillary Refill: Capillary refill takes less than 2 seconds  Coloration: Skin is not pale  Findings: No erythema or rash  Neurological:      General: No focal deficit present  Mental Status: She is alert  Mental status is at baseline  GCS: GCS eye subscore is 4  GCS verbal subscore is 1  GCS motor subscore is 6  Cranial Nerves: No cranial nerve deficit        Comments: Nonverbal at baseline   Psychiatric:      Comments: At Baseline         Laboratory and Diagnostics:  Results from last 7 days   Lab Units 07/25/21 0448 07/24/21  0545 07/23/21  0542 07/22/21  0233 07/21/21  0556 07/20/21  2312 07/20/21  0521 07/19/21  0517   WBC Thousand/uL 4 99 5 41 5 12 7 84 6 66  --  6 14 4 50   HEMOGLOBIN g/dL 9 6* 9 2* 8 7* 9 7* 9 6*  --  9 1* 9 8*   I STAT HEMOGLOBIN g/dl  --   --   --   --   --  9 9*  --   --    HEMATOCRIT % 31 6* 30 0* 27 5* 31 1* 30 4*  --  28 7* 31 0*   HEMATOCRIT, ISTAT %  --   --   --   --   --  29*  --   --    PLATELETS Thousands/uL 276 247 188 161 149  --  123* 153   NEUTROS PCT % 62  --   --  84*  --   --  79*  --    BANDS PCT %  --   --   --   --   --   --   --  26*   MONOS PCT % 14*  --   --  9  --   --  9  --    MONO PCT %  --   --   --   --   --   --   --  7     Results from last 7 days   Lab Units 07/25/21 0448 07/24/21  0545 07/23/21  0542 07/22/21  1805 07/22/21  0233 07/21/21  1600 07/21/21  0556   SODIUM mmol/L 148* 145 140 138 134* 135* 135*   POTASSIUM mmol/L 3 8 3 4* 4 5 3 7 4 1 3 8 3 9   CHLORIDE mmol/L 109* 104 105 103 104 102 99*   CO2 mmol/L 36* 36* 30 29 27 27 31   ANION GAP mmol/L 3* 5 5 6 3* 6 5   BUN mg/dL 24 25 21 20 15 15 12   CREATININE mg/dL 0 56* 0 60 0 48* 0 59* 0 59* 0 58* 0 58*   CALCIUM mg/dL 9 4 9 7 9 5 9 8 9 0 9 5 9 3   GLUCOSE RANDOM mg/dL 266* 153* 142* 140 167* 155* 216*   ALT U/L  --   --   --   --   --   --  54   AST U/L  --   --   --   --   --   --  49*   ALK PHOS U/L  --   --   --   --   --   --  64   ALBUMIN g/dL  -- --   --   --   --   --  2 2*   TOTAL BILIRUBIN mg/dL  --   --   --   --   --   --  0 39     Results from last 7 days   Lab Units 07/25/21  0448 07/24/21  0545 07/23/21  0542 07/22/21  0233 07/21/21  1600 07/21/21  0556   MAGNESIUM mg/dL 2 2 2 1 1 9 1 9 2 0 1 9   PHOSPHORUS mg/dL 3 0 3 4  --   --   --  3 0                   ABG:  Results from last 7 days   Lab Units 07/25/21  0415 07/22/21  0346   PH ART  7 441 7 371   PCO2 ART mm Hg 50 4* 47 1*   PO2 ART mm Hg 77 0 95 0   HCO3 ART mmol/L 33 5* 26 7   BASE EXC ART mmol/L 8 1 1 1   ABG SOURCE   --  Radial, Left     VBG:  Results from last 7 days   Lab Units 07/22/21  0346   ABG SOURCE  Radial, Left     Results from last 7 days   Lab Units 07/22/21  0233 07/21/21  0556 07/20/21  0521   PROCALCITONIN ng/ml 0 08 0 14 0 08       Micro  Results from last 7 days   Lab Units 07/21/21  1055 07/19/21  0519   SPUTUM CULTURE   --  4+ Growth of    GRAM STAIN RESULT   --  4+ Gram negative coccobacilli*  2+ Gram negative rods*  2+ Gram positive cocci in pairs*  2+ Polys*   MRSA CULTURE ONLY  No Methicillin Resistant Staphlyococcus aureus (MRSA) isolated  --        EKG: NSR  Imaging: I have personally reviewed pertinent reports  and I have personally reviewed pertinent films in PACS    Intake and Output  I/O       07/24 0701 - 07/25 0700 07/25 0701 - 07/26 0700    P  O  913 1140    I V  (mL/kg) 90 (1 1)     IV Piggyback 750 200    Total Intake(mL/kg) 1753 (21 4) 1340 (16 4)    Urine (mL/kg/hr) 1000 (0 5) 600 (0 3)    Stool  0    Total Output 1000 600    Net +753 +740          Unmeasured Urine Occurrence 1 x 1 x    Unmeasured Stool Occurrence  2 x          Height and Weights   Height: 5' (152 4 cm)  IBW (Ideal Body Weight): 45 5 kg  Body mass index is 35 22 kg/m²    Weight (last 2 days)     Date/Time   Weight    07/25/21 0600   81 8 (180 34)    07/24/21 0547   81 1 (178 79)                Nutrition       Diet Orders   (From admission, onward)             Start     Ordered 07/24/21 1201  Diet Dysphagia/Modified Consistency; Dysphagia 1-Pureed; Nectar Thick Liquid  Diet effective now     Comments: Mechanical soft/level 1, Nectar thick liquids, crush pills if able  Crush medications   Question Answer Comment   Diet Type Dysphagia/Modified Consistency    Dysphagia/Modified Consistency Dysphagia 1-Pureed    Liquid Modifier Nectar Thick Liquid    Special Instructions Aspiration precautions    RD to adjust diet per protocol?  Yes        07/24/21 1201                  Active Medications  Scheduled Meds:  Current Facility-Administered Medications   Medication Dose Route Frequency Provider Last Rate    acetaminophen  640 mg Oral Q6H PRN Zenaida Farfan PA-C      aspirin  81 mg Oral Daily Zenaida Farfan PA-C      atorvastatin  80 mg Oral Daily With Bari Harrison PA-C      divalproex sodium  250 mg Oral [facility] Zenaida Farfan PA-C      enoxaparin  40 mg Subcutaneous Q24H [address] Bettina S Keven, KRISTOPHER      ferrous sulfate  325 mg Oral Daily With Breakfast Zenaida Farfan PA-C      gabapentin  400 mg Oral TID Zenaida Farfan PA-C      insulin glargine  6 Units Subcutaneous HS KRISTOPHER Moreno      insulin lispro  1-5 Units Subcutaneous HS KRISTOPHER Moreno      insulin lispro  1-6 Units Subcutaneous TID AC KRISTOPHER Moreno      ipratropium-albuterol  3 mL Nebulization Q6H Bettina S Keven, KRISTOPHER      LORazepam  0 5 mg Oral Q12H [address] KRISTOPHER Moreno      ondansetron  4 mg Intravenous Q6H PRN Zenaida Farfan PA-C      oxybutynin  10 mg Oral Daily Nga Aguirre Lemrodrigo      pantoprazole  40 mg Oral Early Morning Nga Aguirre Lemrodrigo      QUEtiapine  200 mg Oral HS Zenaidaab Farfan PA-C      sertraline  100 mg Oral HS Zenaida Farfan PA-C      sodium chloride  1 spray Each Nare Q1H PRN KRISTOPHER Moreno      sodium chloride  4 mL Nebulization Q6H KRISTOPHER Cason       Continuous Infusions:     PRN Meds:   acetaminophen, 640 mg, Q6H PRN  ondansetron, 4 mg, Q6H PRN  sodium chloride, 1 spray, Q1H PRN        Invasive Devices Review  Invasive Devices     Peripheral Intravenous Line            Peripheral IV 07/21/21 Left Forearm 5 days    Peripheral IV 07/21/21 Right Forearm 5 days                Rationale for remaining devices: PIV for medication administration  ---------------------------------------------------------------------------------------  Advance Directive and Living Will:      Power of :    POLST:    ---------------------------------------------------------------------------------------  Care Time Delivered:   No Critical Care time spent       KRISTOPHER Earl      Portions of the record may have been created with voice recognition software  Occasional wrong word or "sound a like" substitutions may have occurred due to the inherent limitations of voice recognition software    Read the chart carefully and recognize, using context, where substitutions have occurred

## 2021-07-26 NOTE — OCCUPATIONAL THERAPY NOTE
Occupational Therapy Cancellation Note     Patient Name: Sydnie Acosta  HDQHO'N Date: 7/26/2021  Problem List  Principal Problem:    Sepsis due to pneumonia [facility])  Active Problems:    Aspiration pneumonia ([facility] )    Psychiatric disorder    Type 2 diabetes mellitus without complication, without long-term current use of insulin ([facility] )    Acute respiratory failure with hypoxia (HCC)    Obesity (BMI 30 0-34  9)    Seizure disorder ([place] [address] )    Intellectual disability    Mixed incontinence            07/26/21 1105   Note Type   Note Type Treatment   Cancel Reasons Medical status         Chart review completed  Pt addressed with Claude Mera during mobility rounds who reports Pt is requiring increased O2 needs, currently on BiPAP and is not appropriate for OT services at this time  Will continue to follow Pt and address as medically appropriate and as schedule allows         BRAYAN Ann/JOE

## 2021-07-26 NOTE — ASSESSMENT & PLAN NOTE
Lab Results   Component Value Date    HGBA1C 6 6 (H) 07/19/2021       Recent Labs     07/25/21  1555 07/25/21  2137 07/26/21  0709 07/26/21  1125   POCGLU 235* 330* 202* 306*       Blood Sugar Average: Last 72 hrs:  (P) 375 [id number]   · Not on meds prehospital  · Continue sliding scale insulin  · Started lantus 6 units HS 07/25  · Pt now NPO, may require insulin infusion until oral intake is re-established

## 2021-07-26 NOTE — PROGRESS NOTES
ST recommending pureed with NTLs as of 7/23 noted, reflected in current diet order  Elevated POC BG levels noted, will add CCD 1 restriction  Calorie count ordered, sheets placed in pt's room, RN notified  No supplementation at this time, good intake per nursing flowsheets and RN report

## 2021-07-26 NOTE — PHYSICAL THERAPY NOTE
PHYSICAL THERAPY NOTE          Patient Name: Lamont Mead  RHTFG'S Date: 7/26/2021     Chart review completed  Spoke with RN who reports pt is currently on BiPAP and is not appropriate for PT services at this time  Will continue to follow pt and address as medically appropriate and as schedule allows          Abby Sands, ROSALVA

## 2021-07-26 NOTE — ASSESSMENT & PLAN NOTE
· Acute respiratory failure/hypoxia secondary to pneumonia with component of pulmonary edema, progressed to dense consolidation with likely mucous plugging  · CTA negative for pulmonary embolism- see plan under PNA  · 7/20 Escalated to midflow then required transfer for HFNC under Critical Care  · 7/21 HFNC + NRB, tolerated off NRB in the evening  · 7/22 progressive hypoxia on HFNC - placed on CPAP with improvement, CXR - progressive infiltrate R middle lobe, highly suggestion of mucous plugging and aspiration  · 7/26 CT chest - multifocal pneumonia with improvement in right lower lobe with worsening in b/l upper lobes, mild interstitial edema, mod b/l atelectasis, small b/l pleural effusions  · Currently alternates HFNC and Bipap 18/12 70%  · Goal SpO2 >92%  · Trend ABG as needed  · Diurese with Lasix/Diamox prn  · Goal neg to even fluid balance  · Cont duoneb, 3% saline, chest PT, suction  · Due to cognitive delay, pt is unable to fully participate in aggressive pulmonary toilet  SLP re-evaluation on 07/23 cleared for puree with nectar thick  Diet resumed 07/25  Aspiration precautions    · Likely has undiagnosed sleep apnea  · 7/27 plan for awake bronchoscopy

## 2021-07-26 NOTE — ASSESSMENT & PLAN NOTE
· POA a/e/b - tachypnea, tachycardia, hypoxia   · Sepsis secondary to pneumonia likely aspiration vs gram-negative  · CXR 7/20 progressive b/l infiltrates R>L  · 7/20 Continue cefepime and metronidazole  Discontinue azithromycin and add Vanco given group home setting and worsening infiltrate on CXR    · 7/22 Due to low grade fevers, clinical deterioration, and positive sputum culture Cefepime changed to Meropenum  · Antibiotics completed:  · Meropenum 07/21 - 07/25  · Vancomycin 07/20 - 0723, d/c due to negative MRSA swab, culture results MRF  · Metronidazole 07/18 - 07/24  · Azithromycin 07/18, 07/20  · Ceftriaxone 07/18  · Blood cultures - negative  · Flu/RSV - negative  · Sputum culture Gram neg coccobacilli, GNR, and GPC; MRF  · COVID negative on admission and patient fully vaccinated  · PCT negative: <0 05 > 0 08 > 0 14 > 0 08  · See plan for aspiration below  · 7/26 CT chest - multifocal pneumonia with improvement in right lower lobe with worsening in b/l upper lobes  · Plan for awake bronchoscopy 7/27

## 2021-07-27 ENCOUNTER — APPOINTMENT (INPATIENT)
Dept: GASTROENTEROLOGY | Facility: HOSPITAL | Age: 66
DRG: 871 | End: 2021-07-27
Attending: ANESTHESIOLOGY
Payer: MEDICARE

## 2021-07-27 ENCOUNTER — APPOINTMENT (INPATIENT)
Dept: RADIOLOGY | Facility: HOSPITAL | Age: 66
DRG: 871 | End: 2021-07-27
Payer: MEDICARE

## 2021-07-27 LAB
ANION GAP SERPL CALCULATED.3IONS-SCNC: 1 MMOL/L (ref 4–13)
ARTERIAL PATENCY WRIST A: YES
BASE EXCESS BLDA CALC-SCNC: 13.7 MMOL/L
BUN SERPL-MCNC: 23 MG/DL (ref 5–25)
CALCIUM SERPL-MCNC: 9.4 MG/DL (ref 8.3–10.1)
CHLORIDE SERPL-SCNC: 110 MMOL/L (ref 100–108)
CO2 SERPL-SCNC: 37 MMOL/L (ref 21–32)
CREAT SERPL-MCNC: 0.51 MG/DL (ref 0.6–1.3)
ERYTHROCYTE [DISTWIDTH] IN BLOOD BY AUTOMATED COUNT: 15.4 % (ref 11.6–15.1)
GFR SERPL CREATININE-BSD FRML MDRD: 100 ML/MIN/1.73SQ M
GLUCOSE SERPL-MCNC: 199 MG/DL (ref 65–140)
GLUCOSE SERPL-MCNC: 216 MG/DL (ref 65–140)
GLUCOSE SERPL-MCNC: 243 MG/DL (ref 65–140)
GLUCOSE SERPL-MCNC: 265 MG/DL (ref 65–140)
GLUCOSE SERPL-MCNC: 269 MG/DL (ref 65–140)
HCO3 BLDA-SCNC: 39.1 MMOL/L (ref 22–28)
HCT VFR BLD AUTO: 33.2 % (ref 34.8–46.1)
HGB BLD-MCNC: 9.7 G/DL (ref 11.5–15.4)
MCH RBC QN AUTO: 28.8 PG (ref 26.8–34.3)
MCHC RBC AUTO-ENTMCNC: 29.2 G/DL (ref 31.4–37.4)
MCV RBC AUTO: 99 FL (ref 82–98)
O2 CT BLDA-SCNC: 14.8 ML/DL (ref 16–23)
OXYHGB MFR BLDA: 96.6 % (ref 94–97)
PCO2 BLDA: 53.3 MM HG (ref 36–44)
PH BLDA: 7.48 [PH] (ref 7.35–7.45)
PLATELET # BLD AUTO: 314 THOUSANDS/UL (ref 149–390)
PMV BLD AUTO: 8.6 FL (ref 8.9–12.7)
PO2 BLDA: 99.4 MM HG (ref 75–129)
POTASSIUM SERPL-SCNC: 4.5 MMOL/L (ref 3.5–5.3)
PROCALCITONIN SERPL-MCNC: <0.05 NG/ML
RBC # BLD AUTO: 3.37 MILLION/UL (ref 3.81–5.12)
SODIUM SERPL-SCNC: 148 MMOL/L (ref 136–145)
SPECIMEN SOURCE: ABNORMAL
WBC # BLD AUTO: 6.53 THOUSAND/UL (ref 4.31–10.16)

## 2021-07-27 PROCEDURE — 94640 AIRWAY INHALATION TREATMENT: CPT

## 2021-07-27 PROCEDURE — 94760 N-INVAS EAR/PLS OXIMETRY 1: CPT

## 2021-07-27 PROCEDURE — 80048 BASIC METABOLIC PNL TOTAL CA: CPT | Performed by: INTERNAL MEDICINE

## 2021-07-27 PROCEDURE — 85027 COMPLETE CBC AUTOMATED: CPT | Performed by: INTERNAL MEDICINE

## 2021-07-27 PROCEDURE — 36600 WITHDRAWAL OF ARTERIAL BLOOD: CPT

## 2021-07-27 PROCEDURE — 31645 BRNCHSC W/THER ASPIR 1ST: CPT | Performed by: ANESTHESIOLOGY

## 2021-07-27 PROCEDURE — 0BC68ZZ EXTIRPATION OF MATTER FROM RIGHT LOWER LOBE BRONCHUS, VIA NATURAL OR ARTIFICIAL OPENING ENDOSCOPIC: ICD-10-PCS | Performed by: FAMILY MEDICINE

## 2021-07-27 PROCEDURE — 94660 CPAP INITIATION&MGMT: CPT

## 2021-07-27 PROCEDURE — 94669 MECHANICAL CHEST WALL OSCILL: CPT

## 2021-07-27 PROCEDURE — 82948 REAGENT STRIP/BLOOD GLUCOSE: CPT

## 2021-07-27 PROCEDURE — 84145 PROCALCITONIN (PCT): CPT | Performed by: ANESTHESIOLOGY

## 2021-07-27 PROCEDURE — 0BC38ZZ EXTIRPATION OF MATTER FROM RIGHT MAIN BRONCHUS, VIA NATURAL OR ARTIFICIAL OPENING ENDOSCOPIC: ICD-10-PCS | Performed by: FAMILY MEDICINE

## 2021-07-27 PROCEDURE — 82805 BLOOD GASES W/O2 SATURATION: CPT | Performed by: NURSE PRACTITIONER

## 2021-07-27 PROCEDURE — 71045 X-RAY EXAM CHEST 1 VIEW: CPT

## 2021-07-27 PROCEDURE — 99291 CRITICAL CARE FIRST HOUR: CPT | Performed by: ANESTHESIOLOGY

## 2021-07-27 PROCEDURE — 0BCB8ZZ EXTIRPATION OF MATTER FROM LEFT LOWER LOBE BRONCHUS, VIA NATURAL OR ARTIFICIAL OPENING ENDOSCOPIC: ICD-10-PCS | Performed by: FAMILY MEDICINE

## 2021-07-27 RX ORDER — LIDOCAINE HYDROCHLORIDE 40 MG/ML
10 INJECTION, SOLUTION RETROBULBAR; TOPICAL ONCE
Status: DISCONTINUED | OUTPATIENT
Start: 2021-07-27 | End: 2021-07-27 | Stop reason: CLARIF

## 2021-07-27 RX ORDER — LIDOCAINE HYDROCHLORIDE 40 MG/ML
5 SOLUTION TOPICAL ONCE
Status: COMPLETED | OUTPATIENT
Start: 2021-07-27 | End: 2021-07-27

## 2021-07-27 RX ORDER — MIDAZOLAM HYDROCHLORIDE 2 MG/2ML
2 INJECTION, SOLUTION INTRAMUSCULAR; INTRAVENOUS ONCE
Status: COMPLETED | OUTPATIENT
Start: 2021-07-27 | End: 2021-07-27

## 2021-07-27 RX ORDER — EPINEPHRINE 0.1 MG/ML
SYRINGE (ML) INJECTION
Status: DISPENSED
Start: 2021-07-27 | End: 2021-07-27

## 2021-07-27 RX ORDER — LIDOCAINE HYDROCHLORIDE 40 MG/ML
10 SOLUTION TOPICAL ONCE
Status: COMPLETED | OUTPATIENT
Start: 2021-07-27 | End: 2021-07-27

## 2021-07-27 RX ORDER — KETAMINE HYDROCHLORIDE 50 MG/ML
50 INJECTION, SOLUTION, CONCENTRATE INTRAMUSCULAR; INTRAVENOUS ONCE
Status: DISCONTINUED | OUTPATIENT
Start: 2021-07-27 | End: 2021-07-31

## 2021-07-27 RX ORDER — PHENYLEPHRINE HYDROCHLORIDE 10 MG/ML
INJECTION INTRAVENOUS
Status: DISPENSED
Start: 2021-07-27 | End: 2021-07-27

## 2021-07-27 RX ORDER — LIDOCAINE HYDROCHLORIDE 40 MG/ML
10 INJECTION, SOLUTION RETROBULBAR; TOPICAL ONCE
Status: DISCONTINUED | OUTPATIENT
Start: 2021-07-27 | End: 2021-07-27 | Stop reason: SDUPTHER

## 2021-07-27 RX ADMIN — IPRATROPIUM BROMIDE AND ALBUTEROL SULFATE 3 ML: 2.5; .5 SOLUTION RESPIRATORY (INHALATION) at 07:50

## 2021-07-27 RX ADMIN — IPRATROPIUM BROMIDE AND ALBUTEROL SULFATE 3 ML: 2.5; .5 SOLUTION RESPIRATORY (INHALATION) at 13:44

## 2021-07-27 RX ADMIN — SODIUM CHLORIDE SOLN NEBU 3% 4 ML: 3 NEBU SOLN at 13:44

## 2021-07-27 RX ADMIN — DIVALPROEX SODIUM 250 MG: 250 TABLET, DELAYED RELEASE ORAL at 14:02

## 2021-07-27 RX ADMIN — LIDOCAINE HYDROCHLORIDE 10 ML: 40 SOLUTION TOPICAL at 10:26

## 2021-07-27 RX ADMIN — INSULIN LISPRO 2 UNITS: 100 INJECTION, SOLUTION INTRAVENOUS; SUBCUTANEOUS at 22:18

## 2021-07-27 RX ADMIN — ATORVASTATIN CALCIUM 80 MG: 40 TABLET, FILM COATED ORAL at 16:26

## 2021-07-27 RX ADMIN — QUETIAPINE FUMARATE 200 MG: 50 TABLET, EXTENDED RELEASE ORAL at 22:19

## 2021-07-27 RX ADMIN — IPRATROPIUM BROMIDE AND ALBUTEROL SULFATE 3 ML: 2.5; .5 SOLUTION RESPIRATORY (INHALATION) at 01:30

## 2021-07-27 RX ADMIN — ENOXAPARIN SODIUM 40 MG: 40 INJECTION SUBCUTANEOUS at 08:52

## 2021-07-27 RX ADMIN — SODIUM CHLORIDE SOLN NEBU 3% 4 ML: 3 NEBU SOLN at 19:43

## 2021-07-27 RX ADMIN — SALINE NASAL SPRAY 1 SPRAY: 1.5 SOLUTION NASAL at 21:45

## 2021-07-27 RX ADMIN — INSULIN GLARGINE 6 UNITS: 100 INJECTION, SOLUTION SUBCUTANEOUS at 22:19

## 2021-07-27 RX ADMIN — SODIUM CHLORIDE SOLN NEBU 3% 4 ML: 3 NEBU SOLN at 01:30

## 2021-07-27 RX ADMIN — SALINE NASAL SPRAY 1 SPRAY: 1.5 SOLUTION NASAL at 16:38

## 2021-07-27 RX ADMIN — GABAPENTIN 400 MG: 400 CAPSULE ORAL at 16:26

## 2021-07-27 RX ADMIN — LIDOCAINE HYDROCHLORIDE 10 ML: 40 INJECTION, SOLUTION RETROBULBAR; TOPICAL at 10:28

## 2021-07-27 RX ADMIN — IPRATROPIUM BROMIDE AND ALBUTEROL SULFATE 3 ML: 2.5; .5 SOLUTION RESPIRATORY (INHALATION) at 19:25

## 2021-07-27 RX ADMIN — SERTRALINE HYDROCHLORIDE 100 MG: 50 TABLET ORAL at 22:20

## 2021-07-27 RX ADMIN — LIDOCAINE HYDROCHLORIDE 5 ML: 40 SOLUTION TOPICAL at 10:28

## 2021-07-27 RX ADMIN — MIDAZOLAM 0.5 MG: 1 INJECTION INTRAMUSCULAR; INTRAVENOUS at 10:27

## 2021-07-27 RX ADMIN — GABAPENTIN 400 MG: 400 CAPSULE ORAL at 21:45

## 2021-07-27 RX ADMIN — SODIUM CHLORIDE SOLN NEBU 3% 4 ML: 3 NEBU SOLN at 07:50

## 2021-07-27 RX ADMIN — DIVALPROEX SODIUM 250 MG: 250 TABLET, DELAYED RELEASE ORAL at 22:20

## 2021-07-27 NOTE — PROGRESS NOTES
[address]  Progress Note - Talita Shelter 1955, 77 y o  female MRN: [id number]  Unit/Bed#: -01 Encounter: [id number]  Primary Care Provider: Darleen Abarca MD   Date and time admitted to hospital: 7/18/2021  2:07 PM    * Sepsis due to pneumonia [facility])  Assessment & Plan  · POA a/e/b - tachypnea, tachycardia, hypoxia   · Sepsis secondary to pneumonia likely aspiration vs gram-negative  · CXR 7/20 progressive b/l infiltrates R>L  · 7/20 Continue cefepime and metronidazole  Discontinue azithromycin and add Vanco given group home setting and worsening infiltrate on CXR    · 7/22 Due to low grade fevers, clinical deterioration, and positive sputum culture Cefepime changed to Meropenum  · Antibiotics completed:  · Meropenum 07/21 - 07/25  · Vancomycin 07/20 - 0723, d/c due to negative MRSA swab, culture results MRF  · Metronidazole 07/18 - 07/24  · Azithromycin 07/18, 07/20  · Ceftriaxone 07/18  · Blood cultures - negative  · Flu/RSV - negative  · Sputum culture Gram neg coccobacilli, GNR, and GPC; MRF  · COVID negative on admission and patient fully vaccinated  · PCT negative: <0 05 > 0 08 > 0 14 > 0 08  · See plan for aspiration below  · 7/26 CT chest - multifocal pneumonia with improvement in right lower lobe with worsening in b/l upper lobes  · Plan for awake bronchoscopy 7/27    Acute respiratory failure with hypoxia [facility])  Assessment & Plan  · Acute respiratory failure/hypoxia secondary to pneumonia with component of pulmonary edema, progressed to dense consolidation with likely mucous plugging  · CTA negative for pulmonary embolism- see plan under PNA  · 7/20 Escalated to midflow then required transfer for HFNC under Critical Care  · 7/21 HFNC + NRB, tolerated off NRB in the evening  · 7/22 progressive hypoxia on HFNC - placed on CPAP with improvement, CXR - progressive infiltrate R middle lobe, highly suggestion of mucous plugging and aspiration  · 7/26 CT chest - multifocal pneumonia with improvement in right lower lobe with worsening in b/l upper lobes, mild interstitial edema, mod b/l atelectasis, small b/l pleural effusions  · Currently alternates HFNC and Bipap 18/12 70%  · Goal SpO2 >92%  · Trend ABG as needed  · Diurese with Lasix/Diamox prn  · Goal neg to even fluid balance  · Cont duoneb, 3% saline, chest PT, suction  · Due to cognitive delay, pt is unable to fully participate in aggressive pulmonary toilet  SLP re-evaluation on 07/23 cleared for puree with nectar thick  Diet resumed 07/25  Aspiration precautions  · Likely has undiagnosed sleep apnea  · 7/27 plan for awake bronchoscopy     Aspiration pneumonia [facility])  Assessment & Plan  · Chest x-ray showed suspected right middle lobe consolidation  · CTA Chest:   No central pulmonary emboli identified  Significant interval worsening in airspace consolidation and patchy infiltrates  involving the right upper lobe to the greatest degree posterior segment but also elsewhere within the right upper lobe  There is also interval worsening of the consolidation and volume loss in the dependent lower lobes bilaterally  Also  component of chronic volume loss related to elevated right hemidiaphragm  Focally dilated main pulmonary artery  Assess for pulmonary valve disease  · Completed 7 days of antibiotics - see course above  · Aspiration precautions  · Sputum culture - gram neg and gram positive  · Speech therapy saw patient 7/23 recommends mechanically altered/level 1 diet and nectar thick liquids  · Witnessed aspiration 7/21 - made NPO and awaiting re-evaluation by SLP  · Re-evaluation by SLP on 07/23 - same recommendation  Diet resumed on 07/24 when mental status improved to baseline  Intellectual disability  Assessment & Plan  · Cognitive intellectual disability, non-verbal  · Resides in Group home  · Appears to be a baseline      Psychiatric disorder  Assessment & Plan  · Congential Intellectual Disabiltiy, Anxiety, Psychosis- continue gabapentin, quetiapine, lorazepam, and sertraline  · Home regimen of seroquel is 100 mg q am and 1600 with 200 mg q hs  · Home medications held after transfer to  due to somnolence  · Mental status improved and appears at baseline  · Seroquel 200 mg PO HS resumed 07/24   · Lorazepam 0 5 mg BID resumed 07/24 - hold for sedation, low threshold to discontinue  · Sertraline 100 mg PO HS resumed 07/24  · Continue Zyprexa 2 5 mg PO daily prn    Seizure disorder [facility])  Assessment & Plan  · Cont home Depakote  mg q am and 500 mg q pm  · Changed to IV due to NPO  · Valproic acid level in 7/21 was 6 5  · Resumed PO depakote 07/25 - currently on 250 mg Q 8 hours    Obesity (BMI 30 0-34  9)  Assessment & Plan  · Nutrition consulted     Type 2 diabetes mellitus without complication, without long-term current use of insulin [facility])  Assessment & Plan  Lab Results   Component Value Date    HGBA1C 6 6 (H) 07/19/2021       Recent Labs     07/25/21  1555 07/25/21  2137 07/26/21  0709 07/26/21  1125   POCGLU 235* 330* 202* 306*       Blood Sugar Average: Last 72 hrs:  (P) 245 [id number]   · Not on meds prehospital  · Continue sliding scale insulin  · Started lantus 6 units HS 07/25  · Pt now NPO, may require insulin infusion until oral intake is re-established      ----------------------------------------------------------------------------------------  HPI/24hr events: Low grade temp last night- given tylenol with resolution  Slept well on Bipap  No acute issues      Disposition: Continue Stepdown Level 2 level of care   Code Status: Level 3 - DNAR and DNI  ---------------------------------------------------------------------------------------  SUBJECTIVE    Review of Systems   Unable to perform ROS: Patient nonverbal     Review of systems was unable to be performed secondary to patient nonverbal at baseline  ---------------------------------------------------------------------------------------  OBJECTIVE    Vitals   Vitals:    21 1514 21 1928 210   BP: 107/55 113/55     BP Location: Left arm Left arm     Pulse: 69 75     Resp: (!)      Temp: 98 1 °F (36 7 °C) 98 4 °F (36 9 °C)     TempSrc: Temporal Temporal     SpO2: 98% 94% 97% 95%   Weight:       Height:         Temp (24hrs), Av 4 °F (36 9 °C), Min:97 9 °F (36 6 °C), Max:98 9 °F (37 2 °C)  Current: Temperature: 98 4 °F (36 9 °C)          Respiratory:  SpO2: SpO2: 95 %  Nasal Cannula O2 Flow Rate (L/min): 60 L/min    Invasive/non-invasive ventilation settings   Respiratory    Lab Data (Last 4 hours)    None         O2/Vent Data (Last 4 hours)      2002          Non-Invasive Ventilation Mode HFNC (High flow)                   Physical Exam  Vitals and nursing note reviewed  HENT:      Head: Normocephalic and atraumatic  Mouth/Throat:      Mouth: Mucous membranes are moist    Eyes:      Conjunctiva/sclera: Conjunctivae normal    Cardiovascular:      Rate and Rhythm: Normal rate and regular rhythm  Pulmonary:      Effort: Pulmonary effort is normal       Comments: Decreased BS   Abdominal:      General: Bowel sounds are normal  There is no distension  Palpations: Abdomen is soft  Tenderness: There is no guarding  Musculoskeletal:      Right lower leg: No edema  Left lower leg: No edema  Neurological:      General: No focal deficit present  Mental Status: She is alert        Comments: Nonverbal- baseline         Laboratory and Diagnostics:  Results from last 7 days   Lab Units 21  0439 21  0448 21  0545 21  0542 21  0233 21  0556 21  2312 21  0521   WBC Thousand/uL 5 69 4 99 5 41 5 12 7 84 6 66  --  6 14   HEMOGLOBIN g/dL 9 8* 9 6* 9 2* 8 7* 9 7* 9 6*  --  9 1*   I STAT HEMOGLOBIN g/dl  --   --   --   --   --   --  9 9*  -- HEMATOCRIT % 33 2* 31 6* 30 0* 27 5* 31 1* 30 4*  --  28 7*   HEMATOCRIT, ISTAT %  --   --   --   --   --   --  29*  --    PLATELETS Thousands/uL 315 276 247 188 161 149  --  123*   NEUTROS PCT % 59 62  --   --  84*  --   --  79*   MONOS PCT % 10 14*  --   --  9  --   --  9     Results from last 7 days   Lab Units 07/26/21  1605 07/26/21  0439 07/25/21 0448 07/24/21  0545 07/23/21  0542 07/22/21  1805 07/22/21  0233 07/21/21  0556   SODIUM mmol/L 151* 152* 148* 145 140 138 134* 135*   POTASSIUM mmol/L 4 2 4 2 3 8 3 4* 4 5 3 7 4 1 3 9   CHLORIDE mmol/L 110* 115* 109* 104 105 103 104 99*   CO2 mmol/L 39* 39* 36* 36* 30 29 27 31   ANION GAP mmol/L 2* -2* 3* 5 5 6 3* 5   BUN mg/dL 22 23 24 25 21 20 15 12   CREATININE mg/dL 0 47* 0 57* 0 56* 0 60 0 48* 0 59* 0 59* 0 58*   CALCIUM mg/dL 9 7 9 6 9 4 9 7 9 5 9 8 9 0 9 3   GLUCOSE RANDOM mg/dL 171* 180* 266* 153* 142* 140 167* 216*   ALT U/L  --   --   --   --   --   --   --  54   AST U/L  --   --   --   --   --   --   --  49*   ALK PHOS U/L  --   --   --   --   --   --   --  64   ALBUMIN g/dL  --   --   --   --   --   --   --  2 2*   TOTAL BILIRUBIN mg/dL  --   --   --   --   --   --   --  0 39     Results from last 7 days   Lab Units 07/25/21 0448 07/24/21  0545 07/23/21  0542 07/22/21  0233 07/21/21  1600 07/21/21  0556   MAGNESIUM mg/dL 2 2 2 1 1 9 1 9 2 0 1 9   PHOSPHORUS mg/dL 3 0 3 4  --   --   --  3 0                   ABG:  Results from last 7 days   Lab Units 07/25/21  0415 07/22/21  0346   PH ART  7 441 7 371   PCO2 ART mm Hg 50 4* 47 1*   PO2 ART mm Hg 77 0 95 0   HCO3 ART mmol/L 33 5* 26 7   BASE EXC ART mmol/L 8 1 1 1   ABG SOURCE   --  Radial, Left     VBG:  Results from last 7 days   Lab Units 07/22/21  0346   ABG SOURCE  Radial, Left     Results from last 7 days   Lab Units 07/26/21  0439 07/22/21  0233 07/21/21  0556 07/20/21  0521   PROCALCITONIN ng/ml <0 05 0 08 0 14 0 08       Micro  Results from last 7 days   Lab Units 07/21/21  1055   MRSA CULTURE ONLY  No Methicillin Resistant Staphlyococcus aureus (MRSA) isolated       EKG:   Imaging: I have personally reviewed pertinent reports  Intake and Output  I/O       07/25 0701 - 07/26 0700 07/26 0701 - 07/27 0700    P  O  1140 720    I V  (mL/kg)      IV Piggyback 200     Total Intake(mL/kg) 1340 (16 3) 720 (8 8)    Urine (mL/kg/hr) 600 (0 3) 500 (0 4)    Stool 0     Total Output 600 500    Net +740 +220          Unmeasured Urine Occurrence 1 x     Unmeasured Stool Occurrence 2 x           Height and Weights   Height: 5' (152 4 cm)  IBW (Ideal Body Weight): 45 5 kg  Body mass index is 35 31 kg/m²  Weight (last 2 days)     Date/Time   Weight    07/26/21 0600   82 (180 78)    07/25/21 0600   81 8 (180 34)    07/24/21 0547   81 1 (178 79)                Nutrition       Diet Orders   (From admission, onward)             Start     Ordered    07/27/21 0001  Diet NPO; Sips with meds  Diet effective midnight     Question Answer Comment   Diet Type NPO    NPO Except: Sips with meds    RD to adjust diet per protocol? Yes        07/26/21 1655 07/26/21 1655  Diet Dysphagia/Modified Consistency; Dysphagia 1-Pureed; Nectar Thick Liquid  Diet effective now     Question Answer Comment   Diet Type Dysphagia/Modified Consistency    Dysphagia/Modified Consistency Dysphagia 1-Pureed    Liquid Modifier Nectar Thick Liquid    Special Instructions Aspiration precautions    RD to adjust diet per protocol?  Yes        07/26/21 1655                  Active Medications  Scheduled Meds:  Current Facility-Administered Medications   Medication Dose Route Frequency Provider Last Rate    acetaminophen  640 mg Oral Q6H PRN Anthony Webber PA-C      aspirin  81 mg Oral Daily Anthony Webber PA-C      atorvastatin  80 mg Oral Daily With Bari Harrison PA-C      divalproex sodium  250 mg Oral [facility] Anthony Webber PA-C      enoxaparin  40 mg Subcutaneous Q24H [address] KRISTOPHER Baca      ferrous sulfate  325 mg Oral Daily With Breakfast Philippe Mcgraw PA-C      gabapentin  400 mg Oral TID [facility]      insulin glargine  6 Units Subcutaneous HS KRISTOPHER Moreno      insulin lispro  1-5 Units Subcutaneous HS KRISTOPHER Moreno      [START ON 7/27/2021] insulin lispro  1-6 Units Subcutaneous TID AC Erma Harris PA-C      ipratropium-albuterol  3 mL Nebulization Q6H KRISTOPHER Hairston      lidocaine  10 mL Topical Once Josemanuel Quiñonez MD      LORazepam  0 5 mg Oral BID PRN KRISTOPHER Carcamo      ondansetron  4 mg Intravenous Q6H PRN [facility]      oxybutynin  10 mg Oral Daily [place], [place]      pantoprazole  40 mg Oral Early Morning [place], [place]      QUEtiapine  200 mg Oral HS [facility]      sertraline  100 mg Oral HS [facility], PA-      sodium chloride  1 spray Each Nare Q1H PRN KRISTOPHER Ibrahim      sodium chloride  1 spray Each Nare TID KRSITOPHER Carcamo      sodium chloride  4 mL Nebulization Q6H KRISTOPHER Cason       Continuous Infusions:     PRN Meds:   acetaminophen, 640 mg, Q6H PRN  LORazepam, 0 5 mg, BID PRN  ondansetron, 4 mg, Q6H PRN  sodium chloride, 1 spray, Q1H PRN        Invasive Devices Review  Invasive Devices     Peripheral Intravenous Line            Peripheral IV 07/21/21 Right Forearm 5 days    Peripheral IV 07/26/21 Dorsal (posterior); Proximal;Right Forearm <1 day          Drain            External Urinary Catheter <1 day                Rationale for remaining devices:   ---------------------------------------------------------------------------------------  Advance Directive and Living Will:      Power of :    POLST:    ---------------------------------------------------------------------------------------  Care Time Delivered:   No Critical Care time spent       Rommel Ward PA-C      Portions of the record may have been created with voice recognition software    Occasional wrong word or "sound a like" substitutions may have occurred due to the inherent limitations of voice recognition software    Read the chart carefully and recognize, using context, where substitutions have occurred

## 2021-07-27 NOTE — PHYSICAL THERAPY NOTE
PHYSICAL THERAPY NOTE          Patient Name: Palma CARLOS Date: 7/27/2021 07/27/21 1100   Note Type   Note Type Treatment   Cancel Reasons Medical status     Chart reviewed  Discussed at start of mobility rounds  Pt is s/p bronchoscopy this AM, continuing to require BiPAP at this time  Per RN, Priya Sandy, and advanced practitioner, patient not appropriate for PT services this date  Will continue to follow and see patient as medically appropriate at a later time       Fab Mendoza, PT,DPT

## 2021-07-27 NOTE — PLAN OF CARE
Problem: MOBILITY - ADULT  Goal: Maintain or return to baseline ADL function  Description: INTERVENTIONS:  -  Assess patient's ability to carry out ADLs; assess patient's baseline for ADL function and identify physical deficits which impact ability to perform ADLs (bathing, care of mouth/teeth, toileting, grooming, dressing, etc )  - Assess/evaluate cause of self-care deficits   - Assess range of motion  - Assess patient's mobility; develop plan if impaired  - Assess patient's need for assistive devices and provide as appropriate  - Encourage maximum independence but intervene and supervise when necessary  - Involve family in performance of ADLs  - Assess for home care needs following discharge   - Consider OT consult to assist with ADL evaluation and planning for discharge  - Provide patient education as appropriate  Outcome: Progressing  Goal: Maintains/Returns to pre admission functional level  Description: INTERVENTIONS:  - Perform BMAT or MOVE assessment daily    - Set and communicate daily mobility goal to care team and patient/family/caregiver  - Collaborate with rehabilitation services on mobility goals if consulted  - Perform Range of Motion 3 times a day  - Reposition patient every 2 hours    - Dangle patient 3 times a day  - Stand patient 3 times a day  - Ambulate patient 3 times a day  - Out of bed to chair 3 times a day   - Out of bed for meals 3 times a day  - Out of bed for toileting  - Record patient progress and toleration of activity level   Outcome: Progressing     Problem: Prexisting or High Potential for Compromised Skin Integrity  Goal: Skin integrity is maintained or improved  Description: INTERVENTIONS:  - Identify patients at risk for skin breakdown  - Assess and monitor skin integrity  - Assess and monitor nutrition and hydration status  - Monitor labs   - Assess for incontinence   - Turn and reposition patient  - Assist with mobility/ambulation  - Relieve pressure over bony prominences  - Avoid friction and shearing  - Provide appropriate hygiene as needed including keeping skin clean and dry  - Evaluate need for skin moisturizer/barrier cream  - Collaborate with interdisciplinary team   - Patient/family teaching  - Consider wound care consult   Outcome: Progressing     Problem: Nutrition/Hydration-ADULT  Goal: Nutrient/Hydration intake appropriate for improving, restoring or maintaining nutritional needs  Description: Monitor and assess patient's nutrition/hydration status for malnutrition  Collaborate with interdisciplinary team and initiate plan and interventions as ordered  Monitor patient's weight and dietary intake as ordered or per policy  Utilize nutrition screening tool and intervene as necessary  Determine patient's food preferences and provide high-protein, high-caloric foods as appropriate       INTERVENTIONS:  - Monitor oral intake, urinary output, labs, and treatment plans  - Assess nutrition and hydration status and recommend course of action  - Evaluate amount of meals eaten  - Assist patient with eating if necessary   - Allow adequate time for meals  - Recommend/ encourage appropriate diets, oral nutritional supplements, and vitamin/mineral supplements  - Order, calculate, and assess calorie counts as needed  - Recommend, monitor, and adjust tube feedings and TPN/PPN based on assessed needs  - Assess need for intravenous fluids  - Provide specific nutrition/hydration education as appropriate  - Include patient/family/caregiver in decisions related to nutrition  Outcome: Progressing     Problem: Potential for Falls  Goal: Patient will remain free of falls  Description: INTERVENTIONS:  - Educate patient/family on patient safety including physical limitations  - Instruct patient to call for assistance with activity   - Consult OT/PT to assist with strengthening/mobility   - Keep Call bell within reach  - Keep bed low and locked with side rails adjusted as appropriate  - Keep care items and personal belongings within reach  - Initiate and maintain comfort rounds  - Make Fall Risk Sign visible to staff  - Offer Toileting every 2 Hours, in advance of need  - Initiate/Maintain bed/chair alarm  - Obtain necessary fall risk management equipment:   - Apply yellow socks and bracelet for high fall risk patients  - Consider moving patient to room near nurses station  Outcome: Progressing     Problem: CARDIOVASCULAR - ADULT  Goal: Maintains optimal cardiac output and hemodynamic stability  Description: INTERVENTIONS:  - Monitor I/O, vital signs and rhythm  - Monitor for S/S and trends of decreased cardiac output  - Administer and titrate ordered vasoactive medications to optimize hemodynamic stability  - Assess quality of pulses, skin color and temperature  - Assess for signs of decreased coronary artery perfusion  - Instruct patient to report change in severity of symptoms  Outcome: Progressing  Goal: Absence of cardiac dysrhythmias or at baseline rhythm  Description: INTERVENTIONS:  - Continuous cardiac monitoring, vital signs, obtain 12 lead EKG if ordered  - Administer antiarrhythmic and heart rate control medications as ordered  - Monitor electrolytes and administer replacement therapy as ordered  Outcome: Progressing     Problem: RESPIRATORY - ADULT  Goal: Achieves optimal ventilation and oxygenation  Description: INTERVENTIONS:  - Assess for changes in respiratory status  - Assess for changes in mentation and behavior  - Position to facilitate oxygenation and minimize respiratory effort  - Oxygen administered by appropriate delivery if ordered  - Initiate smoking cessation education as indicated  - Encourage broncho-pulmonary hygiene including cough, deep breathe, Incentive Spirometry  - Assess the need for suctioning and aspirate as needed  - Assess and instruct to report SOB or any respiratory difficulty  - Respiratory Therapy support as indicated  Outcome: Progressing     Problem: METABOLIC, FLUID AND ELECTROLYTES - ADULT  Goal: Electrolytes maintained within normal limits  Description: INTERVENTIONS:  - Monitor labs and assess patient for signs and symptoms of electrolyte imbalances  - Administer electrolyte replacement as ordered  - Monitor response to electrolyte replacements, including repeat lab results as appropriate  - Instruct patient on fluid and nutrition as appropriate  Outcome: Progressing  Goal: Fluid balance maintained  Description: INTERVENTIONS:  - Monitor labs   - Monitor I/O and WT  - Instruct patient on fluid and nutrition as appropriate  - Assess for signs & symptoms of volume excess or deficit  Outcome: Progressing  Goal: Glucose maintained within target range  Description: INTERVENTIONS:  - Monitor Blood Glucose as ordered  - Assess for signs and symptoms of hyperglycemia and hypoglycemia  - Administer ordered medications to maintain glucose within target range  - Assess nutritional intake and initiate nutrition service referral as needed  Outcome: Progressing

## 2021-07-27 NOTE — RESPIRATORY THERAPY NOTE
RT Ventilator Management Note  Montana Rivera 77 y o  female MRN: [id number]  Unit/Bed#: -01 Encounter: [id number]      Daily Screen    No data found in the last 10 encounters  Physical Exam:          Pt received on HFNC 50L @ 100% via nasal prongs  Pt placed on CPAP 12 @ 70% for HS  Tolerating well  Pt with 1 to 1 in room  VSS/SpO2 93%  Pt placed on bipap 18/12 as per MD request in effort to promote lung recruitment  Pt tolerating well

## 2021-07-27 NOTE — RESPIRATORY THERAPY NOTE
Pt was bronched at bedside for mucus plug w/ Dr Clarke Fairly at [id number]  During procedure pt was taken off bipap and placed on HFNC 100% 60L   Alcides Radha 4cc lidocaine and 10cc saline push delivered during procedure via bronchoscope  Plug was successfully removed  Pt was briefly tubed with a 6 0 for a recruitment maneuver  Tube was removed  Pt back on bipap  18/12 70%  HFNC to resume was 1400 Tx, along with vest therapy  Pt tolerated procedure well

## 2021-07-27 NOTE — ASSESSMENT & PLAN NOTE
Lab Results   Component Value Date    HGBA1C 6 6 (H) 07/19/2021       Recent Labs     07/27/21  0701 07/27/21  1131 07/27/21  1605 07/27/21  2113   POCGLU 216* 199* 243* 265*       Blood Sugar Average: Last 72 hrs:  (P) 234 375   · Not on meds prehospital  · Continue sliding scale insulin  · Started lantus 6 units HS 07/25  · Pt now NPO, may require increase in SSI algorithm until oral intake is re-established

## 2021-07-27 NOTE — OCCUPATIONAL THERAPY NOTE
Occupational Therapy Cancellation Note       Patient Name: Gregg Stafford  DVOCX'B Date: 7/27/2021  Problem List  Principal Problem:    Sepsis due to pneumonia [facility])  Active Problems:    Aspiration pneumonia ([facility] )    Psychiatric disorder    Type 2 diabetes mellitus without complication, without long-term current use of insulin ([facility] )    Acute respiratory failure with hypoxia (HCC)    Obesity (BMI 30 0-34  9)    Seizure disorder ([place] [address] )    Intellectual disability    Mixed incontinence            07/27/21 1103   Note Type   Note Type Treatment   Cancel Reasons Medical status       Chart review completed  Spoke with RN, Haywood Sandhoff who reports Pt was bronched this AM  Pt continues on Bipap at this time and is not appropriate for OT services  Will continue to follow and address as medically appropriate and as schedule allows       Nikos Castelan OTR/L

## 2021-07-27 NOTE — PLAN OF CARE
Problem: MOBILITY - ADULT  Goal: Maintain or return to baseline ADL function  Description: INTERVENTIONS:  -  Assess patient's ability to carry out ADLs; assess patient's baseline for ADL function and identify physical deficits which impact ability to perform ADLs (bathing, care of mouth/teeth, toileting, grooming, dressing, etc )  - Assess/evaluate cause of self-care deficits   - Assess range of motion  - Assess patient's mobility; develop plan if impaired  - Assess patient's need for assistive devices and provide as appropriate  - Encourage maximum independence but intervene and supervise when necessary  - Involve family in performance of ADLs  - Assess for home care needs following discharge   - Consider OT consult to assist with ADL evaluation and planning for discharge  - Provide patient education as appropriate  Outcome: Progressing  Goal: Maintains/Returns to pre admission functional level  Description: INTERVENTIONS:  - Perform BMAT or MOVE assessment daily    - Set and communicate daily mobility goal to care team and patient/family/caregiver  - Collaborate with rehabilitation services on mobility goals if consulted  - Perform Range of Motion 3 times a day  - Reposition patient every 2 hours    - Dangle patient 3 times a day  - Stand patient 3 times a day  - Ambulate patient 3 times a day  - Out of bed to chair 3 times a day   - Out of bed for meals 3 times a day  - Out of bed for toileting  - Record patient progress and toleration of activity level   Outcome: Progressing     Problem: Prexisting or High Potential for Compromised Skin Integrity  Goal: Skin integrity is maintained or improved  Description: INTERVENTIONS:  - Identify patients at risk for skin breakdown  - Assess and monitor skin integrity  - Assess and monitor nutrition and hydration status  - Monitor labs   - Assess for incontinence   - Turn and reposition patient  - Assist with mobility/ambulation  - Relieve pressure over bony prominences  - Avoid friction and shearing  - Provide appropriate hygiene as needed including keeping skin clean and dry  - Evaluate need for skin moisturizer/barrier cream  - Collaborate with interdisciplinary team   - Patient/family teaching  - Consider wound care consult   Outcome: Progressing     Problem: Potential for Falls  Goal: Patient will remain free of falls  Description: INTERVENTIONS:  - Educate patient/family on patient safety including physical limitations  - Instruct patient to call for assistance with activity   - Consult OT/PT to assist with strengthening/mobility   - Keep Call bell within reach  - Keep bed low and locked with side rails adjusted as appropriate  - Keep care items and personal belongings within reach  - Initiate and maintain comfort rounds  - Make Fall Risk Sign visible to staff  - Offer Toileting every 2 Hours, in advance of need  - Initiate/Maintain bed/chair alarm  - Obtain necessary fall risk management equipment:   - Apply yellow socks and bracelet for high fall risk patients  - Consider moving patient to room near nurses station  Outcome: Progressing

## 2021-07-27 NOTE — ASSESSMENT & PLAN NOTE
· POA a/e/b - tachypnea, tachycardia, hypoxia   · Sepsis secondary to pneumonia likely aspiration vs gram-negative  · CXR 7/20 progressive b/l infiltrates R>L  · 7/20 Continue cefepime and metronidazole  Discontinue azithromycin and add Vanco given group home setting and worsening infiltrate on CXR    · 7/22 Due to low grade fevers, clinical deterioration, and positive sputum culture Cefepime changed to Meropenum  · Antibiotics completed:  · Meropenum 07/21 - 07/25  · Vancomycin 07/20 - 0723, d/c due to negative MRSA swab, culture results [facility]  · Metronidazole 07/18 - 07/24  · Azithromycin 07/18, 07/20  · Ceftriaxone 07/18  · Blood cultures - negative  · Flu/RSV - negative  · Sputum culture Gram neg coccobacilli, GNR, and GPC; [facility]  · COVID negative on admission and patient fully vaccinated  · PCT negative: <0 05 > 0 08 > 0 14 > 0 08  · See plan for aspiration below  · 7/26 CT chest - multifocal pneumonia with improvement in right lower lobe with worsening in b/l upper lobes  · awake bronchoscopy 7/27  · 7/27 CXR with left pleural effusion and left base opacity

## 2021-07-27 NOTE — ASSESSMENT & PLAN NOTE
· Cont home Depakote  mg q am and 500 mg q pm  · Changed to IV due to NPO  · Valproic acid level in 7/21 was 6 5  · Resumed PO depakote 07/25 - currently on 250 mg Q 8 hours

## 2021-07-27 NOTE — ASSESSMENT & PLAN NOTE
· Acute respiratory failure/hypoxia secondary to pneumonia with component of pulmonary edema, progressed to dense consolidation with likely mucous plugging  · CTA negative for pulmonary embolism- see plan under PNA  · 7/20 Escalated to midflow then required transfer for HFNC under Critical Care  · 7/21 HFNC + NRB, tolerated off NRB in the evening  · 7/22 progressive hypoxia on HFNC - placed on CPAP with improvement, CXR - progressive infiltrate R middle lobe, highly suggestion of mucous plugging and aspiration  · 7/26 CT chest - multifocal pneumonia with improvement in right lower lobe with worsening in b/l upper lobes, mild interstitial edema, mod b/l atelectasis, small b/l pleural effusions  · Currently alternates HFNC and Bipap 18/12 35% HS  · Goal SpO2 >92%  · Trend ABG as needed  · Diurese with Lasix/Diamox prn  · Goal neg to even fluid balance  · Cont duoneb, 3% saline, chest PT, suction  · Due to cognitive delay, pt is unable to fully participate in aggressive pulmonary toilet  SLP re-evaluation on 07/23 cleared for puree with nectar thick  Diet resumed 07/25  Aspiration precautions    · Likely has undiagnosed sleep apnea  · 7/27 awake bronchoscopy-with mucus plugs in right middle and lower lobes   · 7/27 CXR with left pleural effusion and left basilar opacity

## 2021-07-28 LAB
ANION GAP SERPL CALCULATED.3IONS-SCNC: 2 MMOL/L (ref 4–13)
BUN SERPL-MCNC: 24 MG/DL (ref 5–25)
CALCIUM SERPL-MCNC: 9.2 MG/DL (ref 8.3–10.1)
CHLORIDE SERPL-SCNC: 108 MMOL/L (ref 100–108)
CO2 SERPL-SCNC: 38 MMOL/L (ref 21–32)
CREAT SERPL-MCNC: 0.52 MG/DL (ref 0.6–1.3)
ERYTHROCYTE [DISTWIDTH] IN BLOOD BY AUTOMATED COUNT: 15.1 % (ref 11.6–15.1)
GFR SERPL CREATININE-BSD FRML MDRD: 99 ML/MIN/1.73SQ M
GLUCOSE SERPL-MCNC: 203 MG/DL (ref 65–140)
GLUCOSE SERPL-MCNC: 205 MG/DL (ref 65–140)
GLUCOSE SERPL-MCNC: 257 MG/DL (ref 65–140)
GLUCOSE SERPL-MCNC: 268 MG/DL (ref 65–140)
GLUCOSE SERPL-MCNC: 274 MG/DL (ref 65–140)
HCT VFR BLD AUTO: 29.8 % (ref 34.8–46.1)
HGB BLD-MCNC: 9 G/DL (ref 11.5–15.4)
MCH RBC QN AUTO: 29.8 PG (ref 26.8–34.3)
MCHC RBC AUTO-ENTMCNC: 30.2 G/DL (ref 31.4–37.4)
MCV RBC AUTO: 99 FL (ref 82–98)
PLATELET # BLD AUTO: 293 THOUSANDS/UL (ref 149–390)
PMV BLD AUTO: 8.8 FL (ref 8.9–12.7)
POTASSIUM SERPL-SCNC: 4 MMOL/L (ref 3.5–5.3)
RBC # BLD AUTO: 3.02 MILLION/UL (ref 3.81–5.12)
SODIUM SERPL-SCNC: 148 MMOL/L (ref 136–145)
WBC # BLD AUTO: 6.59 THOUSAND/UL (ref 4.31–10.16)

## 2021-07-28 PROCEDURE — 94668 MNPJ CHEST WALL SBSQ: CPT

## 2021-07-28 PROCEDURE — 85027 COMPLETE CBC AUTOMATED: CPT | Performed by: NURSE PRACTITIONER

## 2021-07-28 PROCEDURE — 94660 CPAP INITIATION&MGMT: CPT

## 2021-07-28 PROCEDURE — 97110 THERAPEUTIC EXERCISES: CPT

## 2021-07-28 PROCEDURE — 99233 SBSQ HOSP IP/OBS HIGH 50: CPT | Performed by: ANESTHESIOLOGY

## 2021-07-28 PROCEDURE — 82948 REAGENT STRIP/BLOOD GLUCOSE: CPT

## 2021-07-28 PROCEDURE — 94760 N-INVAS EAR/PLS OXIMETRY 1: CPT

## 2021-07-28 PROCEDURE — 94669 MECHANICAL CHEST WALL OSCILL: CPT

## 2021-07-28 PROCEDURE — 80048 BASIC METABOLIC PNL TOTAL CA: CPT | Performed by: NURSE PRACTITIONER

## 2021-07-28 PROCEDURE — 97530 THERAPEUTIC ACTIVITIES: CPT

## 2021-07-28 PROCEDURE — 94640 AIRWAY INHALATION TREATMENT: CPT

## 2021-07-28 RX ORDER — BISACODYL 10 MG
10 SUPPOSITORY, RECTAL RECTAL DAILY PRN
Status: DISCONTINUED | OUTPATIENT
Start: 2021-07-28 | End: 2021-08-11 | Stop reason: HOSPADM

## 2021-07-28 RX ORDER — ALBUMIN, HUMAN INJ 5% 5 %
12.5 SOLUTION INTRAVENOUS ONCE
Status: COMPLETED | OUTPATIENT
Start: 2021-07-28 | End: 2021-07-28

## 2021-07-28 RX ORDER — SENNOSIDES 8.6 MG
1 TABLET ORAL
Status: DISCONTINUED | OUTPATIENT
Start: 2021-07-28 | End: 2021-08-11 | Stop reason: HOSPADM

## 2021-07-28 RX ADMIN — SODIUM CHLORIDE SOLN NEBU 3% 4 ML: 3 NEBU SOLN at 13:11

## 2021-07-28 RX ADMIN — OXYBUTYNIN 10 MG: 5 TABLET, FILM COATED, EXTENDED RELEASE ORAL at 09:11

## 2021-07-28 RX ADMIN — STANDARDIZED SENNA CONCENTRATE 8.6 MG: 8.6 TABLET ORAL at 21:29

## 2021-07-28 RX ADMIN — IPRATROPIUM BROMIDE AND ALBUTEROL SULFATE 3 ML: 2.5; .5 SOLUTION RESPIRATORY (INHALATION) at 20:16

## 2021-07-28 RX ADMIN — GABAPENTIN 400 MG: 400 CAPSULE ORAL at 09:11

## 2021-07-28 RX ADMIN — GABAPENTIN 400 MG: 400 CAPSULE ORAL at 21:29

## 2021-07-28 RX ADMIN — DIVALPROEX SODIUM 250 MG: 250 TABLET, DELAYED RELEASE ORAL at 05:42

## 2021-07-28 RX ADMIN — PANTOPRAZOLE SODIUM 40 MG: 40 TABLET, DELAYED RELEASE ORAL at 05:42

## 2021-07-28 RX ADMIN — ASPIRIN 81 MG: 81 TABLET, COATED ORAL at 09:11

## 2021-07-28 RX ADMIN — SALINE NASAL SPRAY 1 SPRAY: 1.5 SOLUTION NASAL at 09:12

## 2021-07-28 RX ADMIN — IPRATROPIUM BROMIDE AND ALBUTEROL SULFATE 3 ML: 2.5; .5 SOLUTION RESPIRATORY (INHALATION) at 02:43

## 2021-07-28 RX ADMIN — IPRATROPIUM BROMIDE AND ALBUTEROL SULFATE 3 ML: 2.5; .5 SOLUTION RESPIRATORY (INHALATION) at 13:11

## 2021-07-28 RX ADMIN — SODIUM CHLORIDE SOLN NEBU 3% 4 ML: 3 NEBU SOLN at 08:04

## 2021-07-28 RX ADMIN — SERTRALINE HYDROCHLORIDE 100 MG: 50 TABLET ORAL at 21:29

## 2021-07-28 RX ADMIN — DIVALPROEX SODIUM 250 MG: 250 TABLET, DELAYED RELEASE ORAL at 21:29

## 2021-07-28 RX ADMIN — INSULIN GLARGINE 6 UNITS: 100 INJECTION, SOLUTION SUBCUTANEOUS at 21:29

## 2021-07-28 RX ADMIN — SALINE NASAL SPRAY 1 SPRAY: 1.5 SOLUTION NASAL at 17:12

## 2021-07-28 RX ADMIN — SODIUM CHLORIDE SOLN NEBU 3% 4 ML: 3 NEBU SOLN at 02:52

## 2021-07-28 RX ADMIN — INSULIN LISPRO 6 UNITS: 100 INJECTION, SOLUTION INTRAVENOUS; SUBCUTANEOUS at 17:12

## 2021-07-28 RX ADMIN — SODIUM CHLORIDE SOLN NEBU 3% 4 ML: 3 NEBU SOLN at 20:16

## 2021-07-28 RX ADMIN — ATORVASTATIN CALCIUM 80 MG: 40 TABLET, FILM COATED ORAL at 17:12

## 2021-07-28 RX ADMIN — SODIUM CHLORIDE, SODIUM LACTATE, POTASSIUM CHLORIDE, AND CALCIUM CHLORIDE 250 ML: .6; .31; .03; .02 INJECTION, SOLUTION INTRAVENOUS at 05:47

## 2021-07-28 RX ADMIN — IPRATROPIUM BROMIDE AND ALBUTEROL SULFATE 3 ML: 2.5; .5 SOLUTION RESPIRATORY (INHALATION) at 08:04

## 2021-07-28 RX ADMIN — ALBUMIN (HUMAN) 12.5 G: 12.5 INJECTION, SOLUTION INTRAVENOUS at 02:24

## 2021-07-28 RX ADMIN — DIVALPROEX SODIUM 250 MG: 250 TABLET, DELAYED RELEASE ORAL at 13:34

## 2021-07-28 RX ADMIN — SALINE NASAL SPRAY 1 SPRAY: 1.5 SOLUTION NASAL at 21:30

## 2021-07-28 RX ADMIN — FERROUS SULFATE TAB 325 MG (65 MG ELEMENTAL FE) 325 MG: 325 (65 FE) TAB at 09:11

## 2021-07-28 RX ADMIN — ENOXAPARIN SODIUM 40 MG: 40 INJECTION SUBCUTANEOUS at 09:11

## 2021-07-28 RX ADMIN — QUETIAPINE FUMARATE 200 MG: 50 TABLET, EXTENDED RELEASE ORAL at 21:29

## 2021-07-28 RX ADMIN — GABAPENTIN 400 MG: 400 CAPSULE ORAL at 17:12

## 2021-07-28 RX ADMIN — INSULIN LISPRO 6 UNITS: 100 INJECTION, SOLUTION INTRAVENOUS; SUBCUTANEOUS at 21:28

## 2021-07-28 NOTE — RESPIRATORY THERAPY NOTE
RT Protocol Note  Lamont Mead 77 y o  female MRN: [id number]  Unit/Bed#: -01 Encounter: [id number]    Assessment    Principal Problem:    Sepsis due to pneumonia [facility])  Active Problems:    Aspiration pneumonia ([place] [address] )    Psychiatric disorder    Type 2 diabetes mellitus without complication, without long-term current use of insulin ([place] [address] )    Acute respiratory failure with hypoxia (HCC)    Obesity (BMI 30 0-34  9)    Seizure disorder ([place] [address] )    Intellectual disability    Mixed incontinence      Home Pulmonary Medications:         Past Medical History:   Diagnosis Date    Anxiety     Diabetes mellitus ([place] [address] )     GERD (gastroesophageal reflux disease)     Hyperlipidemia     Hypertension     Mental disability     Seizure disorder ([place] [address] )      Social History     Socioeconomic History    Marital status: Single     Spouse name: None    Number of children: None    Years of education: None    Highest education level: None   Occupational History    None   Tobacco Use    Smoking status: Never Smoker    Smokeless tobacco: Never Used   Vaping Use    Vaping Use: Never used   Substance and Sexual Activity    Alcohol use: Not Currently    Drug use: Not Currently    Sexual activity: Not Currently   Other Topics Concern    None   Social History Narrative    None     Social Determinants of Health     Financial Resource Strain:     Difficulty of Paying Living Expenses:    Food Insecurity:     Worried About Running Out of Food in the Last Year:     Ran Out of Food in the Last Year:    Transportation Needs:     Lack of Transportation (Medical):      Lack of Transportation (Non-Medical):    Physical Activity:     Days of Exercise per Week:     Minutes of Exercise per Session:    Stress:     Feeling of Stress :    Social Connections:     Frequency of Communication with Friends and Family:     Frequency of Social Gatherings with Friends and Family:     Attends Baptism Services:     Active Member of [employer] or Organizations:     Attends Club or Organization Meetings:     Marital Status:    Intimate Partner Violence:     Fear of Current or Ex-Partner:     Emotionally Abused:     Physically Abused:     Sexually Abused:        Subjective    Subjective Data: CPT with bed    Objective    Physical Exam:   Assessment Type: (P) During-treatment  General Appearance: (P) Drowsy  Respiratory Pattern: (P) Tachypneic  Chest Assessment: (P) Chest expansion symmetrical  Bilateral Breath Sounds: (P) Diminished  Cough: Non-productive  O2 Device: (P) 70% HFNC    Vitals:  Blood pressure 96/52, pulse 74, temperature 98 3 °F (36 8 °C), temperature source Axillary, resp  rate 22, height 5' (1 524 m), weight 81 6 kg (179 lb 14 3 oz), SpO2 (!) 88 %  Results from last 7 days   Lab Units 07/27/21  0910 07/25/21  0415 07/22/21  0346   PH ART  7 483* 7 441   < >   PCO2 ART mm Hg 53 3* 50 4*   < >   PO2 ART mm Hg 99 4 77 0   < >   HCO3 ART mmol/L 39 1* 33 5*   < >   BASE EXC ART mmol/L 13 7 8 1   < >   O2 CONTENT ART mL/dL 14 8* 16 2   < >   O2 HGB, ARTERIAL % 96 6 94 0   < >   ABG SOURCE  Radial, Left  --   --    SOPHIA TEST  Yes Yes   < >   NON VENT ROOM AIR %  --  60  --     < > = values in this interval not displayed  Imaging and other studies: I have personally reviewed pertinent reports        O2 Device: (P) 70% HFNC     Plan       Airway Clearance Plan: Percussive Vest     Resp Comments: Off bipap on HFNC, tolerating vest no desaturations

## 2021-07-28 NOTE — PROGRESS NOTES
[address]  Progress Note - Corinne Stade 1955, 77 y o  female MRN: [id number]  Unit/Bed#: -01 Encounter: [id number]  Primary Care Provider: Raf Stokes MD   Date and time admitted to hospital: 7/18/2021  2:07 PM    * Sepsis due to pneumonia [facility])  Assessment & Plan  · POA a/e/b - tachypnea, tachycardia, hypoxia   · Sepsis secondary to pneumonia likely aspiration vs gram-negative  · CXR 7/20 progressive b/l infiltrates R>L  · 7/20 Continue cefepime and metronidazole  Discontinue azithromycin and add Vanco given group home setting and worsening infiltrate on CXR    · 7/22 Due to low grade fevers, clinical deterioration, and positive sputum culture Cefepime changed to Meropenum  · Antibiotics completed:  · Meropenum 07/21 - 07/25  · Vancomycin 07/20 - 0723, d/c due to negative MRSA swab, culture results MRF  · Metronidazole 07/18 - 07/24  · Azithromycin 07/18, 07/20  · Ceftriaxone 07/18  · Blood cultures - negative  · Flu/RSV - negative  · Sputum culture Gram neg coccobacilli, GNR, and GPC; MRF  · COVID negative on admission and patient fully vaccinated  · PCT negative: <0 05 > 0 08 > 0 14 > 0 08  · See plan for aspiration below  · 7/26 CT chest - multifocal pneumonia with improvement in right lower lobe with worsening in b/l upper lobes  · awake bronchoscopy 7/27  · 7/27 CXR with left pleural effusion and left base opacity    Acute respiratory failure with hypoxia (HCC)  Assessment & Plan  · Acute respiratory failure/hypoxia secondary to pneumonia with component of pulmonary edema, progressed to dense consolidation with likely mucous plugging  · CTA negative for pulmonary embolism- see plan under PNA  · 7/20 Escalated to midflow then required transfer for HFNC under Critical Care  · 7/21 HFNC + NRB, tolerated off NRB in the evening  · 7/22 progressive hypoxia on HFNC - placed on CPAP with improvement, CXR - progressive infiltrate R middle lobe, highly suggestion of mucous plugging and aspiration  · 7/26 CT chest - multifocal pneumonia with improvement in right lower lobe with worsening in b/l upper lobes, mild interstitial edema, mod b/l atelectasis, small b/l pleural effusions  · Currently alternates HFNC and Bipap 18/12 35% HS  · Goal SpO2 >92%  · Trend ABG as needed  · Diurese with Lasix/Diamox prn  · Goal neg to even fluid balance  · Cont duoneb, 3% saline, chest PT, suction  · Due to cognitive delay, pt is unable to fully participate in aggressive pulmonary toilet  SLP re-evaluation on 07/23 cleared for puree with nectar thick  Diet resumed 07/25  Aspiration precautions  · Likely has undiagnosed sleep apnea  · 7/27 awake bronchoscopy-with mucus plugs in right middle and lower lobes   · 7/27 CXR with left pleural effusion and left basilar opacity    Aspiration pneumonia (HCC)  Assessment & Plan  · Chest x-ray showed suspected right middle lobe consolidation  · CTA Chest:   No central pulmonary emboli identified  Significant interval worsening in airspace consolidation and patchy infiltrates  involving the right upper lobe to the greatest degree posterior segment but also elsewhere within the right upper lobe  There is also interval worsening of the consolidation and volume loss in the dependent lower lobes bilaterally  Also  component of chronic volume loss related to elevated right hemidiaphragm  Focally dilated main pulmonary artery  Assess for pulmonary valve disease  · Completed 7 days of antibiotics - see course above  · Aspiration precautions  · Sputum culture - gram neg and gram positive  · Speech therapy saw patient 7/23 recommends mechanically altered/level 1 diet and nectar thick liquids  · Witnessed aspiration 7/21 - made NPO and awaiting re-evaluation by SLP  · Re-evaluation by SLP on 07/23 - same recommendation  Diet resumed on 07/24 when mental status improved to baseline      Intellectual disability  Assessment & Plan  · Cognitive intellectual disability, non-verbal  · Resides in Group home  · Appears to be a baseline  Psychiatric disorder  Assessment & Plan  · Congential Intellectual Disabiltiy, Anxiety, Psychosis- continue gabapentin, quetiapine, lorazepam, and sertraline  · Home regimen of seroquel is 100 mg q am and 1600 with 200 mg q hs  · Home medications held after transfer to  due to somnolence  · Mental status improved and appears at baseline  · Seroquel 200 mg PO HS resumed 07/24   · Lorazepam 0 5 mg BID resumed 07/24 - hold for sedation, low threshold to discontinue  · Sertraline 100 mg PO HS resumed 07/24  · Continue Zyprexa 2 5 mg PO daily prn    Seizure disorder [facility])  Assessment & Plan  · Cont home Depakote  mg q am and 500 mg q pm  · Changed to IV due to NPO  · Valproic acid level in 7/21 was 6 5  · Resumed PO depakote 07/25 - currently on 250 mg Q 8 hours    Obesity (BMI 30 0-34  9)  Assessment & Plan  · Nutrition consulted     Type 2 diabetes mellitus without complication, without long-term current use of insulin [facility])  Assessment & Plan  Lab Results   Component Value Date    HGBA1C 6 6 (H) 07/19/2021       Recent Labs     07/27/21  0701 07/27/21  1131 07/27/21  1605 07/27/21  2113   POCGLU 216* 199* 243* 265*       Blood Sugar Average: Last 72 hrs:  (P) 234 375   · Not on meds prehospital  · Continue sliding scale insulin  · Started lantus 6 units HS 07/25  · Pt now NPO, may require increase in SSI algorithm until oral intake is re-established      ----------------------------------------------------------------------------------------  HPI/24hr events: Nursing reported inadequate urine output overnight and soft BP  Albumin 5% 12 5 g x1 administered is no increase in UO or BP  An additional bolus of  mL ordered to be given this morning  No other acute events were reported overnight  Tolerated BiPAP well       Disposition: Continue Stepdown Level 1 level of care   Code Status: Level 3 - DNAR and DNI  ---------------------------------------------------------------------------------------    Review of Systems  Review of systems was unable to be performed secondary to intectual disability    ---------------------------------------------------------------------------------------  OBJECTIVE    Vitals   Vitals:    21 0100 21 0200 21 0300 21 0400   BP: 93/51 (!) 94/47 (!) 91/49 94/50   BP Location:   Left arm    Pulse: 71 69 75 73   Resp: 20 18 21 20   Temp:   98 2 °F (36 8 °C)    TempSrc:   Temporal    SpO2: 95% 94% 94% 95%   Weight:       Height:         Temp (24hrs), Av 7 °F (37 1 °C), Min:98 2 °F (36 8 °C), Max:99 2 °F (37 3 °C)  Current: Temperature: 98 2 °F (36 8 °C)          Respiratory:  SpO2: SpO2: 95 %  Nasal Cannula O2 Flow Rate (L/min): 70 L/min    Invasive/non-invasive ventilation settings   Respiratory    Lab Data (Last 4 hours)    None         O2/Vent Data (Last 4 hours)    None                Physical Exam  Constitutional:       General: She is not in acute distress  Appearance: She is not ill-appearing or diaphoretic  HENT:      Head: Normocephalic and atraumatic  Right Ear: External ear normal       Left Ear: External ear normal       Nose: Nose normal  No congestion  Mouth/Throat:      Mouth: Mucous membranes are moist       Pharynx: Oropharynx is clear  Eyes:      General: No scleral icterus  Right eye: No discharge  Left eye: No discharge  Extraocular Movements: Extraocular movements intact  Conjunctiva/sclera: Conjunctivae normal       Pupils: Pupils are equal, round, and reactive to light  Cardiovascular:      Rate and Rhythm: Normal rate and regular rhythm  Pulses: Normal pulses  Heart sounds: No friction rub  No gallop  Pulmonary:      Effort: Pulmonary effort is normal  No respiratory distress  Breath sounds: No stridor  Examination of the right-lower field reveals decreased breath sounds  Examination of the left-lower field reveals decreased breath sounds  Decreased breath sounds present  No wheezing, rhonchi or rales  Chest:      Chest wall: No tenderness  Abdominal:      General: Bowel sounds are normal  There is no distension  Palpations: Abdomen is soft  There is no mass  Tenderness: There is no abdominal tenderness  There is no guarding or rebound  Musculoskeletal:         General: No swelling, tenderness, deformity or signs of injury  Normal range of motion  Cervical back: Normal range of motion and neck supple  No rigidity or tenderness  Right lower leg: No edema  Skin:     General: Skin is warm and dry  Capillary Refill: Capillary refill takes less than 2 seconds  Coloration: Skin is not jaundiced or pale  Findings: No rash  Neurological:      General: No focal deficit present  Mental Status: She is alert  Sensory: No sensory deficit  Comments: Nonverbal at baseline           Laboratory and Diagnostics:  Results from last 7 days   Lab Units 07/27/21  0427 07/26/21  0439 07/25/21  0448 07/24/21  0545 07/23/21  0542 07/22/21  0233 07/21/21  0556   WBC Thousand/uL 6 53 5 69 4 99 5 41 5 12 7 84 6 66   HEMOGLOBIN g/dL 9 7* 9 8* 9 6* 9 2* 8 7* 9 7* 9 6*   HEMATOCRIT % 33 2* 33 2* 31 6* 30 0* 27 5* 31 1* 30 4*   PLATELETS Thousands/uL 314 315 276 247 188 161 149   NEUTROS PCT %  --  59 62  --   --  84*  --    MONOS PCT %  --  10 14*  --   --  9  --      Results from last 7 days   Lab Units 07/27/21  0427 07/26/21  1605 07/26/21  0439 07/25/21  0448 07/24/21  0545 07/23/21  0542 07/22/21  1805 07/21/21  0556   SODIUM mmol/L 148* 151* 152* 148* 145 140 138 135*   POTASSIUM mmol/L 4 5 4 2 4 2 3 8 3 4* 4 5 3 7 3 9   CHLORIDE mmol/L 110* 110* 115* 109* 104 105 103 99*   CO2 mmol/L 37* 39* 39* 36* 36* 30 29 31   ANION GAP mmol/L 1* 2* -2* 3* 5 5 6 5   BUN mg/dL 23 22 23 24 25 21 20 12   CREATININE mg/dL 0 51* 0 47* 0 57* 0 56* 0 60 0 48* 0 59* 0 58*   CALCIUM mg/dL 9 4 9 7 9 6 9 4 9 7 9 5 9 8 9 3   GLUCOSE RANDOM mg/dL 269* 171* 180* 266* 153* 142* 140 216*   ALT U/L  --   --   --   --   --   --   --  54   AST U/L  --   --   --   --   --   --   --  49*   ALK PHOS U/L  --   --   --   --   --   --   --  64   ALBUMIN g/dL  --   --   --   --   --   --   --  2 2*   TOTAL BILIRUBIN mg/dL  --   --   --   --   --   --   --  0 39     Results from last 7 days   Lab Units 07/25/21  0448 07/24/21  0545 07/23/21  0542 07/22/21  0233 07/21/21  1600 07/21/21  0556   MAGNESIUM mg/dL 2 2 2 1 1 9 1 9 2 0 1 9   PHOSPHORUS mg/dL 3 0 3 4  --   --   --  3 0                   ABG:  Results from last 7 days   Lab Units 07/27/21  0910   PH ART  7 483*   PCO2 ART mm Hg 53 3*   PO2 ART mm Hg 99 4   HCO3 ART mmol/L 39 1*   BASE EXC ART mmol/L 13 7   ABG SOURCE  Radial, Left     VBG:  Results from last 7 days   Lab Units 07/27/21  0910   ABG SOURCE  Radial, Left     Results from last 7 days   Lab Units 07/27/21  0427 07/26/21  0439 07/22/21  0233 07/21/21  0556   PROCALCITONIN ng/ml <0 05 <0 05 0 08 0 14       Micro  Results from last 7 days   Lab Units 07/21/21  1055   MRSA CULTURE ONLY  No Methicillin Resistant Staphlyococcus aureus (MRSA) isolated       Intake and Output  I/O       07/26 0701 - 07/27 0700 07/27 0701 - 07/28 0700    P  O  900 1320    I V  (mL/kg)  10 (0 1)    IV Piggyback  250    Total Intake(mL/kg) 900 (11 2) 1580 (19 7)    Urine (mL/kg/hr) 725 (0 4) 250 (0 1)    Total Output 725 250    Net +175 +1330          Unmeasured Urine Occurrence 1 x 1 x          Height and Weights   Height: 5' (152 4 cm)  IBW (Ideal Body Weight): 45 5 kg  Body mass index is 34 62 kg/m²  Weight (last 2 days)     Date/Time   Weight    07/27/21 0456   80 4 (177 25)    07/26/21 0600   82 (180 78)                Nutrition       Diet Orders   (From admission, onward)             Start     Ordered    07/27/21 1651  Diet Dysphagia/Modified Consistency; Dysphagia 1-Pureed;  Nectar Thick Liquid; Consistent Carbohydrate Diet Level 1 (4 carb servings/60 grams CHO/meal)  Diet effective midnight     Question Answer Comment   Diet Type Dysphagia/Modified Consistency    Dysphagia/Modified Consistency Dysphagia 1-Pureed    Liquid Modifier Nectar Thick Liquid    Other Restriction(s): Consistent Carbohydrate Diet Level 1 (4 carb servings/60 grams CHO/meal)    RD to adjust diet per protocol?  Yes        07/27/21 1651                  Active Medications  Scheduled Meds:  Current Facility-Administered Medications   Medication Dose Route Frequency Provider Last Rate    acetaminophen  640 mg Oral Q6H PRN Philippe Mcgraw PA-C      aspirin  81 mg Oral Daily Philippe Mcgraw PA-C      atorvastatin  80 mg Oral Daily With Bari Harrison PA-C      divalproex sodium  250 mg Oral [facility] Philippe Mcgraw PA-C      enoxaparin  40 mg Subcutaneous Q24H [address] Bett[address] KevenKRISTOPHER      ferrous sulfate  325 mg Oral Daily With Breakfast Philippe Mcgraw PA-C      gabapentin  400 mg Oral TID Philippe Mcgraw PA-C      insulin glargine  6 Units Subcutaneous HS KRISTOPHER Moreno      insulin lispro  1-5 Units Subcutaneous HS KRISTOPHER Moreno      insulin lispro  1-6 Units Subcutaneous TID AC Erma Harris PA-C      ipratropium-albuterol  3 mL Nebulization Q6H KRISTOPHER Hairston      ketamine  50 mg Intravenous Once Josemanuel Quiñonez MD      lactated ringers  250 mL Intravenous Once [employer] CARLOS      lidocaine  10 mL Topical Once Josemanuel Quiñonez MD      LORazepam  0 5 mg Oral BID PRN KRISTOPHER Carcamo      ondansetron  4 mg Intravenous Q6H PRN Philippe Mcgraw PA-C      oxybutynin  10 mg Oral Daily [place], [place]      pantoprazole  40 mg Oral Early Morning [place], [place]      QUEtiapine  200 mg Oral HS Philippe Mcgraw PA-C      sertraline  100 mg Oral HS Philippe Mcgraw PA-C      sodium chloride  1 spray Each Nare Q1H PRN KRISTOPHER Ibrahim      sodium chloride  1 spray Each Nare TID Sarabjit Burger KRISTOPHER Harmon      sodium chloride  4 mL Nebulization Q6H KRISTOPHER Cason       Continuous Infusions:     PRN Meds:   acetaminophen, 640 mg, Q6H PRN  LORazepam, 0 5 mg, BID PRN  ondansetron, 4 mg, Q6H PRN  sodium chloride, 1 spray, Q1H PRN        Invasive Devices Review  Invasive Devices     Peripheral Intravenous Line            Peripheral IV 07/26/21 Dorsal (posterior); Proximal;Right Forearm 1 day    Peripheral IV 07/27/21 Proximal;Right;Ventral (anterior) Forearm <1 day          Drain            External Urinary Catheter 2 days                Rationale for remaining devices: NA  ---------------------------------------------------------------------------------------  Advance Directive and Living Will:      Power of :    POLST:    ---------------------------------------------------------------------------------------  Care Time Delivered:   No Critical Care time spent       [employer], PA-C      Portions of the record may have been created with voice recognition software  Occasional wrong word or "sound a like" substitutions may have occurred due to the inherent limitations of voice recognition software    Read the chart carefully and recognize, using context, where substitutions have occurred

## 2021-07-28 NOTE — PROGRESS NOTES
Energy needs reassessed  Pt eating % meals per nursing flowsheets  Calorie count completed for 4 meals  On 7/28 pt consumed total of estimated 1125 kcal, 42 g PRO (74% estimated kcal and 93% estimated PRO needs) though was NPO x 1 meal this day  This AM, pt consumed 100% of breakfast estimating 295 kcal and 12 g PRO, appears to eat less at breakfast than other meals  Maintain CCD 1 restriction, dysphagia diet per ST recommendations  No supplements indicated at this time  Can likely d/c calorie count

## 2021-07-28 NOTE — PHYSICAL THERAPY NOTE
PHYSICAL THERAPY TREATMENT NOTE  NAME:  Kenroy Leon  DATE: 07/28/21    Length Of Stay: 10  Performed at least 2 patient identifiers during session: ID [email or web address]    TREATMENT:    07/28/21 1109   PT Last Visit   PT Visit Date 07/28/21   Note Type   Note Type Treatment   Pain Assessment   Pain Assessment Tool FLACC   Pain Rating: FLACC (Rest) - Face 0   Pain Rating: FLACC (Rest) - Legs 0   Pain Rating: FLACC (Rest) - Activity 0   Pain Rating: FLACC (Rest) - Cry 0   Pain Rating: FLACC (Rest) - Consolability 0   Score: FLACC (Rest) 0   Pain Rating: FLACC (Activity) - Face 0   Pain Rating: FLACC (Activity) - Legs 0   Pain Rating: FLACC (Activity) - Activity 0   Pain Rating: FLACC (Activity) - Cry 0   Pain Rating: FLACC (Activity) - Consolability 0   Score: FLACC (Activity) 0   Restrictions/Precautions   Other Precautions Chair Alarm; Bed Alarm; Fall Risk;Telemetry;O2  (Nonverbal, HFNC ~60lpm)   General   Chart Reviewed Yes   Response to Previous Treatment Patient unable to report, no changes reported from family or staff   Family/Caregiver Present No   Cognition   Overall Cognitive Status Impaired   Arousal/Participation Alert; Responsive   Attention Attends with cues to redirect   Following Commands Follows one step commands inconsistently   Comments Pt is non-verbal but is able to follow one step commands at times   Subjective   Subjective Able to nod head yes/no when asked questions   Bed Mobility   Supine to Sit   (modax1->maxax2)   Additional items Assist x 2;HOB elevated; Increased time required;Verbal cues  (Trunk management)   Additional Comments HOB elevated with no bedrails used  Pt is able to move legs to EOB but requires modax1 to initate trunk movement  Pt is unable to scoot to EOB and requires maxax2 for feet to touch ground  Pt is able to hold trunk without  external support  Transfers   Sit to Stand 2  Maximal assistance   Additional items Assist x 2; Increased time required;Verbal cues  (With use of Quick move)   Stand to Sit 2  Maximal assistance   Additional items Assist x 2; Increased time required;Verbal cues   Stand pivot Unable to assess   Additional Comments Use of quick move  Pt requiring maxAx2 for hand placement with quick move and for extension of hips for upright positioning  Pt requiring mod vc for sequencing for all transfers  Attempted STS with RW and maxAx2 with pt unable to maintain upright position and requiring a controlled decent back into recliner  Ambulation/Elevation   Distance Not appropriate   Balance   Static Sitting Fair +   Dynamic Sitting Fair   Static Standing Poor   Dynamic Standing Poor -   Endurance Deficit   Endurance Deficit Yes   Endurance Deficit Description ~60lpm of HFNC, SpO2 in low 90's throught treatment session  Activity Tolerance   Activity Tolerance Patient limited by fatigue   Medical Staff Made Aware PT Marquita Vidal   Nurse Made Aware RN Caryn Morris, Physician notified   Exercises   Quad Sets Sitting;5 reps;AAROM; Bilateral   Hip Flexion Sitting;10 reps;AROM; Bilateral   Hip Abduction Sitting;5 reps;AAROM; Bilateral   Hip Adduction Sitting;5 reps;AAROM; Bilateral   Knee AROM Long Arc Quad Sitting;10 reps;Bilateral;AAROM   Ankle Pumps Sitting;5 reps;AROM; Bilateral   Assessment   Prognosis Good   Problem List Decreased strength;Decreased endurance; Impaired balance;Decreased mobility; Decreased cognition;Decreased safety awareness; Obesity   Barriers to Discharge Decreased caregiver support   Goals   Patient Goals None stated (non-verbal pt)   PT Treatment Day 2   Plan   Treatment/Interventions Functional transfer training;LE strengthening/ROM; Therapeutic exercise; Endurance training;Patient/family training;Equipment eval/education; Bed mobility;Gait training   Progress Slow progress, medical status limitations   PT Frequency   (3-5x/wk)   Recommendation   PT Discharge Recommendation Post acute rehabilitation services   Equipment Recommended   (TBD by rehab)   PT - OK to Discharge Yes   Additional Comments When medically cleared   Additional Comments 2 Pt seated in recliner with LE elevated, chair alarm on, and all needs within reach   [facility] 8 in Bed Without Bedrails 2   Lying on Back to Sitting on Edge of Flat Bed 1   Moving Bed to Chair 1   Standing Up From Chair 1   Walk in Room 1   Climb 3-5 Stairs 1   Basic Mobility Inpatient Raw Score 7   Turning Head Towards Sound 3   Follow Simple Instructions 3   Low Function Basic Mobility Raw Score 13   Low Function Basic Mobility Standardized Score 20 14     The patient's AM-PAC Basic Mobility Inpatient Short Form Raw Score is 7, Standardized Score is    A standardized score less than 42 9 suggests the patient may benefit from discharge to post-acute rehabilitation services  Please also refer to the recommendation of the Physical Therapist for safe discharge planning  Pt seen for PT treatment session this date with interventions consisting of Therapeutic exercise consisting of: AROM and AAROM 5 reps and 10 reps B LE in sitting position and therapeutic activity consisting of training: bed mobility, supine<>sit transfers, sit<>stand transfers and static standing tolerance for ~30" w/ RW for UE support and maxAx2  Pt agreeable to PT treatment session upon arrival, pt found supine in bed w/ HOB elevated, in no apparent distress  Pt requiring MaxAx2 for upright posture and hand placement for transfers with Quick Move but is able to move LE to EOB with max verbal and manual cues for trunk management  Pt able to follow through with therex for short periods at a time with manual cues but is limited due to fatigue  Continue to recommend STR at time of d/c in order to maximize pt's functional independence and safety w/ mobility  Pt continues to be functioning below baseline level, and remains limited 2* factors listed above   PT will continue to see pt while here in order to address the deficits listed above and provide interventions consistent w/ POC in effort to achieve STGs      Betty Giordano, PTA

## 2021-07-28 NOTE — PLAN OF CARE
Pt tolerated HFNC and BIPAP overnight while sleeping  Pt had bronch yesterday  Will monitor pt's respiratory status and continue to recruit lungs  Pt has not voided this shift  Pt's BP and MAP borderline  Pt given 12 5 gms albumin without improvement in BP or voiding  Bladder scan done for 215 ml this am   Pt given 250 ml bolus of LR  Labs pending  Pt's weight has increased from yesterday  Will continue to monitor and intervene as needed to promote outcomes  Problem: MOBILITY - ADULT  Goal: Maintain or return to baseline ADL function  Description: INTERVENTIONS:  -  Assess patient's ability to carry out ADLs; assess patient's baseline for ADL function and identify physical deficits which impact ability to perform ADLs (bathing, care of mouth/teeth, toileting, grooming, dressing, etc )  - Assess/evaluate cause of self-care deficits   - Assess range of motion  - Assess patient's mobility; develop plan if impaired  - Assess patient's need for assistive devices and provide as appropriate  - Encourage maximum independence but intervene and supervise when necessary  - Involve family in performance of ADLs  - Assess for home care needs following discharge   - Consider OT consult to assist with ADL evaluation and planning for discharge  - Provide patient education as appropriate  Outcome: Progressing  Goal: Maintains/Returns to pre admission functional level  Description: INTERVENTIONS:  - Perform BMAT or MOVE assessment daily    - Set and communicate daily mobility goal to care team and patient/family/caregiver  - Collaborate with rehabilitation services on mobility goals if consulted  - Perform Range of Motion 3 times a day  - Reposition patient every 2 hours    - Dangle patient 3 times a day  - Stand patient 3 times a day  - Ambulate patient 3 times a day  - Out of bed to chair 3 times a day   - Out of bed for meals 3 times a day  - Out of bed for toileting  - Record patient progress and toleration of activity level   Outcome: Progressing     Problem: Prexisting or High Potential for Compromised Skin Integrity  Goal: Skin integrity is maintained or improved  Description: INTERVENTIONS:  - Identify patients at risk for skin breakdown  - Assess and monitor skin integrity  - Assess and monitor nutrition and hydration status  - Monitor labs   - Assess for incontinence   - Turn and reposition patient  - Assist with mobility/ambulation  - Relieve pressure over bony prominences  - Avoid friction and shearing  - Provide appropriate hygiene as needed including keeping skin clean and dry  - Evaluate need for skin moisturizer/barrier cream  - Collaborate with interdisciplinary team   - Patient/family teaching  - Consider wound care consult   Outcome: Progressing     Problem: Nutrition/Hydration-ADULT  Goal: Nutrient/Hydration intake appropriate for improving, restoring or maintaining nutritional needs  Description: Monitor and assess patient's nutrition/hydration status for malnutrition  Collaborate with interdisciplinary team and initiate plan and interventions as ordered  Monitor patient's weight and dietary intake as ordered or per policy  Utilize nutrition screening tool and intervene as necessary  Determine patient's food preferences and provide high-protein, high-caloric foods as appropriate       INTERVENTIONS:  - Monitor oral intake, urinary output, labs, and treatment plans  - Assess nutrition and hydration status and recommend course of action  - Evaluate amount of meals eaten  - Assist patient with eating if necessary   - Allow adequate time for meals  - Recommend/ encourage appropriate diets, oral nutritional supplements, and vitamin/mineral supplements  - Order, calculate, and assess calorie counts as needed  - Recommend, monitor, and adjust tube feedings and TPN/PPN based on assessed needs  - Assess need for intravenous fluids  - Provide specific nutrition/hydration education as appropriate  - Include patient/family/caregiver in decisions related to nutrition  Outcome: Progressing     Problem: Potential for Falls  Goal: Patient will remain free of falls  Description: INTERVENTIONS:  - Educate patient/family on patient safety including physical limitations  - Instruct patient to call for assistance with activity   - Consult OT/PT to assist with strengthening/mobility   - Keep Call bell within reach  - Keep bed low and locked with side rails adjusted as appropriate  - Keep care items and personal belongings within reach  - Initiate and maintain comfort rounds  - Make Fall Risk Sign visible to staff  - Offer Toileting every 2 Hours, in advance of need  - Initiate/Maintain bed/chair alarm  - Obtain necessary fall risk management equipment:   - Apply yellow socks and bracelet for high fall risk patients  - Consider moving patient to room near nurses station  Outcome: Progressing     Problem: CARDIOVASCULAR - ADULT  Goal: Maintains optimal cardiac output and hemodynamic stability  Description: INTERVENTIONS:  - Monitor I/O, vital signs and rhythm  - Monitor for S/S and trends of decreased cardiac output  - Administer and titrate ordered vasoactive medications to optimize hemodynamic stability  - Assess quality of pulses, skin color and temperature  - Assess for signs of decreased coronary artery perfusion  - Instruct patient to report change in severity of symptoms  Outcome: Progressing  Goal: Absence of cardiac dysrhythmias or at baseline rhythm  Description: INTERVENTIONS:  - Continuous cardiac monitoring, vital signs, obtain 12 lead EKG if ordered  - Administer antiarrhythmic and heart rate control medications as ordered  - Monitor electrolytes and administer replacement therapy as ordered  Outcome: Progressing     Problem: RESPIRATORY - ADULT  Goal: Achieves optimal ventilation and oxygenation  Description: INTERVENTIONS:  - Assess for changes in respiratory status  - Assess for changes in mentation and behavior  - Position to facilitate oxygenation and minimize respiratory effort  - Oxygen administered by appropriate delivery if ordered  - Initiate smoking cessation education as indicated  - Encourage broncho-pulmonary hygiene including cough, deep breathe, Incentive Spirometry  - Assess the need for suctioning and aspirate as needed  - Assess and instruct to report SOB or any respiratory difficulty  - Respiratory Therapy support as indicated  Outcome: Progressing     Problem: METABOLIC, FLUID AND ELECTROLYTES - ADULT  Goal: Electrolytes maintained within normal limits  Description: INTERVENTIONS:  - Monitor labs and assess patient for signs and symptoms of electrolyte imbalances  - Administer electrolyte replacement as ordered  - Monitor response to electrolyte replacements, including repeat lab results as appropriate  - Instruct patient on fluid and nutrition as appropriate  Outcome: Progressing  Goal: Fluid balance maintained  Description: INTERVENTIONS:  - Monitor labs   - Monitor I/O and WT  - Instruct patient on fluid and nutrition as appropriate  - Assess for signs & symptoms of volume excess or deficit  Outcome: Progressing  Goal: Glucose maintained within target range  Description: INTERVENTIONS:  - Monitor Blood Glucose as ordered  - Assess for signs and symptoms of hyperglycemia and hypoglycemia  - Administer ordered medications to maintain glucose within target range  - Assess nutritional intake and initiate nutrition service referral as needed  Outcome: Progressing

## 2021-07-28 NOTE — PLAN OF CARE
Problem: PHYSICAL THERAPY ADULT  Goal: Performs mobility at highest level of function for planned discharge setting  See evaluation for individualized goals  Description: Treatment/Interventions: Functional transfer training, LE strengthening/ROM, Therapeutic exercise, Endurance training, Patient/family training, Equipment eval/education, Bed mobility, Gait training, Compensatory technique education, Spoke to nursing, Spoke to case management, OT  Equipment Recommended:  (TBD by rehab)       See flowsheet documentation for full assessment, interventions and recommendations  Outcome: Progressing  Note: Prognosis: Good  Problem List: Decreased strength, Decreased endurance, Impaired balance, Decreased mobility, Decreased cognition, Decreased safety awareness, Obesity  Assessment: Pt seen for PT treatment session this date with interventions consisting of Therapeutic exercise consisting of: AROM and AAROM 5 reps and 10 reps B LE in sitting position and therapeutic activity consisting of training: bed mobility, supine<>sit transfers, sit<>stand transfers and static standing tolerance for ~30" w/ RW for UE support and maxAx2  Pt agreeable to PT treatment session upon arrival, pt found supine in bed w/ HOB elevated, in no apparent distress  Pt requiring MaxAx2 for upright posture and hand placement for transfers with Quick Move but is able to move LE to EOB with max verbal and manual cues for trunk management  Pt able to follow through with therex for short periods at a time with manual cues but is limited due to fatigue  Continue to recommend STR at time of d/c in order to maximize pt's functional independence and safety w/ mobility  Pt continues to be functioning below baseline level, and remains limited 2* factors listed above  PT will continue to see pt while here in order to address the deficits listed above and provide interventions consistent w/ POC in effort to achieve STGs    Barriers to Discharge: Decreased caregiver support  Barriers to Discharge Comments:  (Requires increased assistance )     PT Discharge Recommendation: Post acute rehabilitation services     PT - OK to Discharge: Yes    See flowsheet documentation for full assessment

## 2021-07-29 PROBLEM — N39.46 MIXED INCONTINENCE: Chronic | Status: RESOLVED | Noted: 2017-04-12 | Resolved: 2021-07-29

## 2021-07-29 LAB
ANION GAP SERPL CALCULATED.3IONS-SCNC: 0 MMOL/L (ref 4–13)
BUN SERPL-MCNC: 20 MG/DL (ref 5–25)
CALCIUM SERPL-MCNC: 9 MG/DL (ref 8.3–10.1)
CHLORIDE SERPL-SCNC: 110 MMOL/L (ref 100–108)
CO2 SERPL-SCNC: 38 MMOL/L (ref 21–32)
CREAT SERPL-MCNC: 0.56 MG/DL (ref 0.6–1.3)
ERYTHROCYTE [DISTWIDTH] IN BLOOD BY AUTOMATED COUNT: 15.1 % (ref 11.6–15.1)
GFR SERPL CREATININE-BSD FRML MDRD: 97 ML/MIN/1.73SQ M
GLUCOSE SERPL-MCNC: 116 MG/DL (ref 65–140)
GLUCOSE SERPL-MCNC: 195 MG/DL (ref 65–140)
GLUCOSE SERPL-MCNC: 206 MG/DL (ref 65–140)
GLUCOSE SERPL-MCNC: 206 MG/DL (ref 65–140)
GLUCOSE SERPL-MCNC: 254 MG/DL (ref 65–140)
HCT VFR BLD AUTO: 29.4 % (ref 34.8–46.1)
HGB BLD-MCNC: 8.7 G/DL (ref 11.5–15.4)
MCH RBC QN AUTO: 29 PG (ref 26.8–34.3)
MCHC RBC AUTO-ENTMCNC: 29.6 G/DL (ref 31.4–37.4)
MCV RBC AUTO: 98 FL (ref 82–98)
PLATELET # BLD AUTO: 281 THOUSANDS/UL (ref 149–390)
PMV BLD AUTO: 9 FL (ref 8.9–12.7)
POTASSIUM SERPL-SCNC: 4 MMOL/L (ref 3.5–5.3)
RBC # BLD AUTO: 3 MILLION/UL (ref 3.81–5.12)
SODIUM SERPL-SCNC: 148 MMOL/L (ref 136–145)
WBC # BLD AUTO: 6.33 THOUSAND/UL (ref 4.31–10.16)

## 2021-07-29 PROCEDURE — 82948 REAGENT STRIP/BLOOD GLUCOSE: CPT

## 2021-07-29 PROCEDURE — 94660 CPAP INITIATION&MGMT: CPT

## 2021-07-29 PROCEDURE — 99233 SBSQ HOSP IP/OBS HIGH 50: CPT | Performed by: ANESTHESIOLOGY

## 2021-07-29 PROCEDURE — 97110 THERAPEUTIC EXERCISES: CPT | Performed by: PHYSICAL THERAPIST

## 2021-07-29 PROCEDURE — 80048 BASIC METABOLIC PNL TOTAL CA: CPT | Performed by: PHYSICIAN ASSISTANT

## 2021-07-29 PROCEDURE — 94669 MECHANICAL CHEST WALL OSCILL: CPT

## 2021-07-29 PROCEDURE — 94760 N-INVAS EAR/PLS OXIMETRY 1: CPT

## 2021-07-29 PROCEDURE — 97535 SELF CARE MNGMENT TRAINING: CPT

## 2021-07-29 PROCEDURE — 97110 THERAPEUTIC EXERCISES: CPT

## 2021-07-29 PROCEDURE — 85027 COMPLETE CBC AUTOMATED: CPT | Performed by: PHYSICIAN ASSISTANT

## 2021-07-29 PROCEDURE — 94640 AIRWAY INHALATION TREATMENT: CPT

## 2021-07-29 RX ORDER — ACETAZOLAMIDE 250 MG/1
250 TABLET ORAL ONCE
Status: COMPLETED | OUTPATIENT
Start: 2021-07-29 | End: 2021-07-29

## 2021-07-29 RX ORDER — INSULIN GLARGINE 100 [IU]/ML
10 INJECTION, SOLUTION SUBCUTANEOUS
Status: DISCONTINUED | OUTPATIENT
Start: 2021-07-29 | End: 2021-08-11 | Stop reason: HOSPADM

## 2021-07-29 RX ADMIN — ENOXAPARIN SODIUM 40 MG: 40 INJECTION SUBCUTANEOUS at 08:17

## 2021-07-29 RX ADMIN — SODIUM CHLORIDE SOLN NEBU 3% 4 ML: 3 NEBU SOLN at 20:31

## 2021-07-29 RX ADMIN — FERROUS SULFATE TAB 325 MG (65 MG ELEMENTAL FE) 325 MG: 325 (65 FE) TAB at 08:16

## 2021-07-29 RX ADMIN — GABAPENTIN 400 MG: 400 CAPSULE ORAL at 08:16

## 2021-07-29 RX ADMIN — INSULIN LISPRO 4 UNITS: 100 INJECTION, SOLUTION INTRAVENOUS; SUBCUTANEOUS at 08:12

## 2021-07-29 RX ADMIN — OXYBUTYNIN 10 MG: 5 TABLET, FILM COATED, EXTENDED RELEASE ORAL at 08:16

## 2021-07-29 RX ADMIN — SODIUM CHLORIDE SOLN NEBU 3% 4 ML: 3 NEBU SOLN at 02:09

## 2021-07-29 RX ADMIN — PANTOPRAZOLE SODIUM 40 MG: 40 TABLET, DELAYED RELEASE ORAL at 06:34

## 2021-07-29 RX ADMIN — STANDARDIZED SENNA CONCENTRATE 8.6 MG: 8.6 TABLET ORAL at 21:34

## 2021-07-29 RX ADMIN — SALINE NASAL SPRAY 1 SPRAY: 1.5 SOLUTION NASAL at 08:16

## 2021-07-29 RX ADMIN — IPRATROPIUM BROMIDE AND ALBUTEROL SULFATE 3 ML: 2.5; .5 SOLUTION RESPIRATORY (INHALATION) at 02:09

## 2021-07-29 RX ADMIN — INSULIN GLARGINE 10 UNITS: 100 INJECTION, SOLUTION SUBCUTANEOUS at 21:34

## 2021-07-29 RX ADMIN — GABAPENTIN 400 MG: 400 CAPSULE ORAL at 21:33

## 2021-07-29 RX ADMIN — SERTRALINE HYDROCHLORIDE 100 MG: 50 TABLET ORAL at 21:33

## 2021-07-29 RX ADMIN — ACETAZOLAMIDE 250 MG: 250 TABLET ORAL at 09:43

## 2021-07-29 RX ADMIN — SALINE NASAL SPRAY 1 SPRAY: 1.5 SOLUTION NASAL at 22:10

## 2021-07-29 RX ADMIN — ATORVASTATIN CALCIUM 80 MG: 40 TABLET, FILM COATED ORAL at 15:42

## 2021-07-29 RX ADMIN — IPRATROPIUM BROMIDE AND ALBUTEROL SULFATE 3 ML: 2.5; .5 SOLUTION RESPIRATORY (INHALATION) at 13:43

## 2021-07-29 RX ADMIN — ASPIRIN 81 MG: 81 TABLET, COATED ORAL at 08:16

## 2021-07-29 RX ADMIN — QUETIAPINE FUMARATE 200 MG: 50 TABLET, EXTENDED RELEASE ORAL at 21:33

## 2021-07-29 RX ADMIN — GABAPENTIN 400 MG: 400 CAPSULE ORAL at 15:42

## 2021-07-29 RX ADMIN — IPRATROPIUM BROMIDE AND ALBUTEROL SULFATE 3 ML: 2.5; .5 SOLUTION RESPIRATORY (INHALATION) at 20:31

## 2021-07-29 RX ADMIN — DIVALPROEX SODIUM 250 MG: 250 TABLET, DELAYED RELEASE ORAL at 14:33

## 2021-07-29 RX ADMIN — INSULIN LISPRO 4 UNITS: 100 INJECTION, SOLUTION INTRAVENOUS; SUBCUTANEOUS at 16:48

## 2021-07-29 RX ADMIN — IPRATROPIUM BROMIDE AND ALBUTEROL SULFATE 3 ML: 2.5; .5 SOLUTION RESPIRATORY (INHALATION) at 07:18

## 2021-07-29 RX ADMIN — SODIUM CHLORIDE SOLN NEBU 3% 4 ML: 3 NEBU SOLN at 13:43

## 2021-07-29 RX ADMIN — INSULIN LISPRO 4 UNITS: 100 INJECTION, SOLUTION INTRAVENOUS; SUBCUTANEOUS at 11:56

## 2021-07-29 RX ADMIN — DIVALPROEX SODIUM 250 MG: 250 TABLET, DELAYED RELEASE ORAL at 06:34

## 2021-07-29 RX ADMIN — SALINE NASAL SPRAY 1 SPRAY: 1.5 SOLUTION NASAL at 15:43

## 2021-07-29 RX ADMIN — DIVALPROEX SODIUM 250 MG: 250 TABLET, DELAYED RELEASE ORAL at 21:33

## 2021-07-29 NOTE — PHYSICAL THERAPY NOTE
Physical Therapy Progress Note     07/29/21 1402   PT Last Visit   PT Visit Date 07/29/21   Note Type   Note Type Treatment   Pain Assessment   Pain Assessment Tool 0-10   Pain Rating: FLACC (Rest) - Face 0   Pain Rating: FLACC (Rest) - Legs 0   Pain Rating: FLACC (Rest) - Activity 0   Pain Rating: FLACC (Rest) - Cry 0   Pain Rating: FLACC (Rest) - Consolability 0   Score: FLACC (Rest) 0   Pain Rating: FLACC (Activity) - Face 0   Pain Rating: FLACC (Activity) - Legs 0   Pain Rating: FLACC (Activity) - Activity 0   Pain Rating: FLACC (Activity) - Cry 0   Pain Rating: FLACC (Activity) - Consolability 0   Score: FLACC (Activity) 0   Restrictions/Precautions   Other Precautions Cognitive; Chair Alarm;Multiple lines;Telemetry; Fall Risk   General   Chart Reviewed Yes   Response to Previous Treatment Patient with no complaints from previous session  Family/Caregiver Present No   Cognition   Overall Cognitive Status Impaired   Orientation Level Other (Comment)  (pt did not respond to questions)   Subjective   Subjective no complaint  pt sleeing on arrival , difficult to arouse   Endurance Deficit   Endurance Deficit Yes   Endurance Deficit Description HFNC  lethargy   Activity Tolerance   Activity Tolerance Treatment limited secondary to medical complications (Comment)   Nurse Made Aware yes   Exercises   Ankle Pumps Sitting;10 reps;AAROM; Bilateral  (also hip rotation, attempt quad sets)   Assessment   Prognosis Fair   Problem List Decreased strength;Decreased endurance; Impaired balance;Decreased mobility; Decreased cognition; Impaired judgement;Decreased safety awareness; Obesity   Assessment attempted to see pt for PT treatment session  pt asleep on arrival  aroused to touch initally, then fell back to sleep  persistend stimulatoin required for pt participation in ther ex  pt did perform ankle pumps wiht min assist  performed hip rotation in sitting with legs elevated  pt then did not participate in any further ex  session terminated  pt  will need continued skilled PT , recommend rehab  Barriers to Discharge Inaccessible home environment;Decreased caregiver support   Goals   Patient Goals none offered   STG Expiration Date 07/29/21   PT Treatment Day 3   Plan   Treatment/Interventions Functional transfer training;LE strengthening/ROM; Therapeutic exercise; Endurance training;Cognitive reorientation;Patient/family training;Equipment eval/education; Bed mobility;Gait training; Compensatory technique education;Spoke to nursing   Progress Slow progress, medical status limitations   PT Frequency   (3-5x/week)   Recommendation   PT Discharge Recommendation Post acute rehabilitation services   PT - OK to Discharge   (rehab)   [address] [place] [address] Mobility Inpatient   Turning in Bed Without Bedrails 1   Lying on Back to Sitting on Edge of Flat Bed 1   Moving Bed to Chair 1   Standing Up From Chair 1   Walk in Room 1   Climb 3-5 Stairs 1   Basic Mobility Inpatient Raw Score 6   Turning Head Towards Sound 3   Follow Simple Instructions 3   Low Function Basic Mobility Raw Score 12   Low Function Basic Mobility Standardized Score 18 33     An AM-PAC Basic Mobility standardized score less than 42 9 suggests the patient may benefit from discharge to post-acute rehab services      Wilder Ignacio, PT

## 2021-07-29 NOTE — CASE MANAGEMENT
CM met with pt's supervisor at the group home, Harjinder Tucker at pt's bedside  CM discussed STR rec  During this time, CM explained the [address] [place] [address] Process  Diana Han was completed and signed by all parties  She is open to whichever SNF can accommodate the pt  Preference location is [place]  [facility] faxed the [address] [place] [address] paperwork to [facility]  CM sent multiple SNF referrals for review

## 2021-07-29 NOTE — ASSESSMENT & PLAN NOTE
Lab Results   Component Value Date    HGBA1C 6 6 (H) 07/19/2021       Recent Labs     07/28/21  0713 07/28/21  1122 07/28/21  1619 07/28/21  2103   POCGLU 205* 274* 268* 257*       Blood Sugar Average: Last 72 hrs:  (P) 244 5   · Not on meds prehospital  · Continue sliding scale insulin  · Started lantus 6 units HS 07/25  · Pt now NPO, may require increase in SSI algorithm until oral intake is re-established

## 2021-07-29 NOTE — ASSESSMENT & PLAN NOTE
Lab Results   Component Value Date    HGBA1C 6 6 (H) 07/19/2021       Recent Labs     07/29/21  0705 07/29/21  1102 07/29/21  1614 07/29/21 2125   POCGLU 195* 254* 206* 116       Blood Sugar Average: Last 72 hrs:  (P) 241 [id number]   · Not on meds prehospital  · Continue sliding scale insulin  · Started lantus 6 units HS 07/25  · Pt now NPO, may require increase in SSI algorithm until oral intake is re-established  · If blood glucose remains >180, will increase lantus

## 2021-07-29 NOTE — PLAN OF CARE
Pt slowly being weaned down on HFNC  Pt tolerating BiPAP at HS without problems  Pt desats to mid 80's with turning and repositioning but recovers quickly to 90's  Pt tolerating FiO2 40% on BiPAP  Labs drawn and results without major abnormalities  Pt tolerating protocols  Will continue to intervene as needed to promote outcomes  Problem: MOBILITY - ADULT  Goal: Maintain or return to baseline ADL function  Description: INTERVENTIONS:  -  Assess patient's ability to carry out ADLs; assess patient's baseline for ADL function and identify physical deficits which impact ability to perform ADLs (bathing, care of mouth/teeth, toileting, grooming, dressing, etc )  - Assess/evaluate cause of self-care deficits   - Assess range of motion  - Assess patient's mobility; develop plan if impaired  - Assess patient's need for assistive devices and provide as appropriate  - Encourage maximum independence but intervene and supervise when necessary  - Involve family in performance of ADLs  - Assess for home care needs following discharge   - Consider OT consult to assist with ADL evaluation and planning for discharge  - Provide patient education as appropriate  Outcome: Progressing  Goal: Maintains/Returns to pre admission functional level  Description: INTERVENTIONS:  - Perform BMAT or MOVE assessment daily    - Set and communicate daily mobility goal to care team and patient/family/caregiver  - Collaborate with rehabilitation services on mobility goals if consulted  - Perform Range of Motion 3 times a day  - Reposition patient every 2 hours    - Dangle patient 3 times a day  - Stand patient 3 times a day  - Ambulate patient 3 times a day  - Out of bed to chair 3 times a day   - Out of bed for meals 3 times a day  - Out of bed for toileting  - Record patient progress and toleration of activity level   Outcome: Progressing     Problem: Prexisting or High Potential for Compromised Skin Integrity  Goal: Skin integrity is maintained or improved  Description: INTERVENTIONS:  - Identify patients at risk for skin breakdown  - Assess and monitor skin integrity  - Assess and monitor nutrition and hydration status  - Monitor labs   - Assess for incontinence   - Turn and reposition patient  - Assist with mobility/ambulation  - Relieve pressure over bony prominences  - Avoid friction and shearing  - Provide appropriate hygiene as needed including keeping skin clean and dry  - Evaluate need for skin moisturizer/barrier cream  - Collaborate with interdisciplinary team   - Patient/family teaching  - Consider wound care consult   Outcome: Progressing     Problem: Nutrition/Hydration-ADULT  Goal: Nutrient/Hydration intake appropriate for improving, restoring or maintaining nutritional needs  Description: Monitor and assess patient's nutrition/hydration status for malnutrition  Collaborate with interdisciplinary team and initiate plan and interventions as ordered  Monitor patient's weight and dietary intake as ordered or per policy  Utilize nutrition screening tool and intervene as necessary  Determine patient's food preferences and provide high-protein, high-caloric foods as appropriate       INTERVENTIONS:  - Monitor oral intake, urinary output, labs, and treatment plans  - Assess nutrition and hydration status and recommend course of action  - Evaluate amount of meals eaten  - Assist patient with eating if necessary   - Allow adequate time for meals  - Recommend/ encourage appropriate diets, oral nutritional supplements, and vitamin/mineral supplements  - Order, calculate, and assess calorie counts as needed  - Recommend, monitor, and adjust tube feedings and TPN/PPN based on assessed needs  - Assess need for intravenous fluids  - Provide specific nutrition/hydration education as appropriate  - Include patient/family/caregiver in decisions related to nutrition  Outcome: Progressing     Problem: Potential for Falls  Goal: Patient will remain free of falls  Description: INTERVENTIONS:  - Educate patient/family on patient safety including physical limitations  - Instruct patient to call for assistance with activity   - Consult OT/PT to assist with strengthening/mobility   - Keep Call bell within reach  - Keep bed low and locked with side rails adjusted as appropriate  - Keep care items and personal belongings within reach  - Initiate and maintain comfort rounds  - Make Fall Risk Sign visible to staff  - Offer Toileting every 2 Hours, in advance of need  - Initiate/Maintain bed/chair alarm  - Obtain necessary fall risk management equipment:   - Apply yellow socks and bracelet for high fall risk patients  - Consider moving patient to room near nurses station  Outcome: Progressing     Problem: CARDIOVASCULAR - ADULT  Goal: Maintains optimal cardiac output and hemodynamic stability  Description: INTERVENTIONS:  - Monitor I/O, vital signs and rhythm  - Monitor for S/S and trends of decreased cardiac output  - Administer and titrate ordered vasoactive medications to optimize hemodynamic stability  - Assess quality of pulses, skin color and temperature  - Assess for signs of decreased coronary artery perfusion  - Instruct patient to report change in severity of symptoms  Outcome: Progressing  Goal: Absence of cardiac dysrhythmias or at baseline rhythm  Description: INTERVENTIONS:  - Continuous cardiac monitoring, vital signs, obtain 12 lead EKG if ordered  - Administer antiarrhythmic and heart rate control medications as ordered  - Monitor electrolytes and administer replacement therapy as ordered  Outcome: Progressing     Problem: RESPIRATORY - ADULT  Goal: Achieves optimal ventilation and oxygenation  Description: INTERVENTIONS:  - Assess for changes in respiratory status  - Assess for changes in mentation and behavior  - Position to facilitate oxygenation and minimize respiratory effort  - Oxygen administered by appropriate delivery if ordered  - Initiate smoking cessation education as indicated  - Encourage broncho-pulmonary hygiene including cough, deep breathe, Incentive Spirometry  - Assess the need for suctioning and aspirate as needed  - Assess and instruct to report SOB or any respiratory difficulty  - Respiratory Therapy support as indicated  Outcome: Progressing     Problem: METABOLIC, FLUID AND ELECTROLYTES - ADULT  Goal: Electrolytes maintained within normal limits  Description: INTERVENTIONS:  - Monitor labs and assess patient for signs and symptoms of electrolyte imbalances  - Administer electrolyte replacement as ordered  - Monitor response to electrolyte replacements, including repeat lab results as appropriate  - Instruct patient on fluid and nutrition as appropriate  Outcome: Progressing  Goal: Fluid balance maintained  Description: INTERVENTIONS:  - Monitor labs   - Monitor I/O and WT  - Instruct patient on fluid and nutrition as appropriate  - Assess for signs & symptoms of volume excess or deficit  Outcome: Progressing  Goal: Glucose maintained within target range  Description: INTERVENTIONS:  - Monitor Blood Glucose as ordered  - Assess for signs and symptoms of hyperglycemia and hypoglycemia  - Administer ordered medications to maintain glucose within target range  - Assess nutritional intake and initiate nutrition service referral as needed  Outcome: Progressing

## 2021-07-29 NOTE — PLAN OF CARE
Problem: PHYSICAL THERAPY ADULT  Goal: Performs mobility at highest level of function for planned discharge setting  See evaluation for individualized goals  Description: Treatment/Interventions: Functional transfer training, LE strengthening/ROM, Therapeutic exercise, Endurance training, Patient/family training, Equipment eval/education, Bed mobility, Gait training, Compensatory technique education, Spoke to nursing, Spoke to case management, OT  Equipment Recommended:  (TBD by rehab)       See flowsheet documentation for full assessment, interventions and recommendations  Outcome: Not Progressing  Note: Prognosis: Fair  Problem List: Decreased strength, Decreased endurance, Impaired balance, Decreased mobility, Decreased cognition, Impaired judgement, Decreased safety awareness, Obesity  Assessment: attempted to see pt for PT treatment session  pt asleep on arrival  aroused to touch initally, then fell back to sleep  persistend stimulatoin required for pt participation in ther ex  pt did perform ankle pumps wiht min assist  performed hip rotation in sitting with legs elevated  pt then did not participate in any further ex   session terminated  pt  will need continued skilled PT , recommend rehab  Barriers to Discharge: Inaccessible home environment, Decreased caregiver support  Barriers to Discharge Comments:  (Requires increased assistance )     PT Discharge Recommendation: Post acute rehabilitation services     PT - OK to Discharge:  (rehab)    See flowsheet documentation for full assessment

## 2021-07-29 NOTE — ASSESSMENT & PLAN NOTE
· Acute respiratory failure/hypoxia secondary to pneumonia with component of pulmonary edema, progressed to dense consolidation with likely mucous plugging  · CTA negative for pulmonary embolism- see plan under PNA  · 7/20 Escalated to midflow then required transfer for HFNC under Critical Care  · 7/21 HFNC + NRB, tolerated off NRB in the evening  · 7/22 progressive hypoxia on HFNC - placed on CPAP with improvement, CXR - progressive infiltrate R middle lobe, highly suggestion of mucous plugging and aspiration  · 7/26 CT chest - multifocal pneumonia with improvement in right lower lobe with worsening in b/l upper lobes, mild interstitial edema, mod b/l atelectasis, small b/l pleural effusions  · Pt tolerating HFNC at 40L/ 40%, transitioned to midflow   · continue Bipap 18/12 35% HS  · Continue to wean as able   · Goal SpO2 >92%  · Trend ABG as needed  · Pt transitioned to additional 250 of diamox today, likely transition to BID on 7/30  · Pt on oxybutinin - bladder scan revealed   · Goal neg to even fluid balance  · Cont duoneb, 3% saline, chest PT, suction  · Due to cognitive delay, pt is unable to fully participate in aggressive pulmonary toilet  SLP re-evaluation on 07/23 cleared for puree with nectar thick  Diet resumed 07/25  Aspiration precautions    · Likely has undiagnosed sleep apnea  · 7/27 awake bronchoscopy-with mucus plugs in right middle and lower lobes   · 7/27 CXR with left pleural effusion and left basilar opacity

## 2021-07-29 NOTE — PROGRESS NOTES
[address]  Progress Note - Ioana Rape 1955, 77 y o  female MRN: [id number]  Unit/Bed#: -01 Encounter: [id number]  Primary Care Provider: Mona Mederos MD   Date and time admitted to hospital: 7/18/2021  2:07 PM    * Sepsis due to pneumonia [facility])  Assessment & Plan  · POA a/e/b - tachypnea, tachycardia, hypoxia   · Sepsis secondary to pneumonia likely aspiration vs gram-negative  · CXR 7/20 progressive b/l infiltrates R>L  · 7/20 Continue cefepime and metronidazole  Discontinue azithromycin and add Vanco given group home setting and worsening infiltrate on CXR    · 7/22 Due to low grade fevers, clinical deterioration, and positive sputum culture Cefepime changed to Meropenum  · Antibiotics completed:  · Meropenum 07/21 - 07/25  · Vancomycin 07/20 - 0723, d/c due to negative MRSA swab, culture results MRF  · Metronidazole 07/18 - 07/24  · Azithromycin 07/18, 07/20  · Ceftriaxone 07/18  · Blood cultures - negative  · Flu/RSV - negative  · Sputum culture Gram neg coccobacilli, GNR, and GPC; MRF  · COVID negative on admission and patient fully vaccinated  · PCT negative: <0 05 > 0 08 > 0 14 > 0 08  · See plan for aspiration below  · 7/26 CT chest - multifocal pneumonia with improvement in right lower lobe with worsening in b/l upper lobes  · awake bronchoscopy 7/27  · 7/27 CXR with left pleural effusion and left base opacity    Acute respiratory failure with hypoxia (HCC)  Assessment & Plan  · Acute respiratory failure/hypoxia secondary to pneumonia with component of pulmonary edema, progressed to dense consolidation with likely mucous plugging  · CTA negative for pulmonary embolism- see plan under PNA  · 7/20 Escalated to midflow then required transfer for HFNC under Critical Care  · 7/21 HFNC + NRB, tolerated off NRB in the evening  · 7/22 progressive hypoxia on HFNC - placed on CPAP with improvement, CXR - progressive infiltrate R middle lobe, highly suggestion of mucous plugging and aspiration  · 7/26 CT chest - multifocal pneumonia with improvement in right lower lobe with worsening in b/l upper lobes, mild interstitial edema, mod b/l atelectasis, small b/l pleural effusions  · Currently alternates HFNC and Bipap 18/12 35% HS  · Goal SpO2 >92%  · Trend ABG as needed  · Diurese with Lasix/Diamox prn  · Goal neg to even fluid balance  · Cont duoneb, 3% saline, chest PT, suction  · Due to cognitive delay, pt is unable to fully participate in aggressive pulmonary toilet  SLP re-evaluation on 07/23 cleared for puree with nectar thick  Diet resumed 07/25  Aspiration precautions  · Likely has undiagnosed sleep apnea  · 7/27 awake bronchoscopy-with mucus plugs in right middle and lower lobes   · 7/27 CXR with left pleural effusion and left basilar opacity    Aspiration pneumonia (HCC)  Assessment & Plan  · Chest x-ray showed suspected right middle lobe consolidation  · CTA Chest:   No central pulmonary emboli identified  Significant interval worsening in airspace consolidation and patchy infiltrates  involving the right upper lobe to the greatest degree posterior segment but also elsewhere within the right upper lobe  There is also interval worsening of the consolidation and volume loss in the dependent lower lobes bilaterally  Also  component of chronic volume loss related to elevated right hemidiaphragm  Focally dilated main pulmonary artery  Assess for pulmonary valve disease  · Completed 7 days of antibiotics - see course above  · Aspiration precautions  · Sputum culture - gram neg and gram positive  · Speech therapy saw patient 7/23 recommends mechanically altered/level 1 diet and nectar thick liquids  · Witnessed aspiration 7/21 - made NPO and awaiting re-evaluation by SLP  · Re-evaluation by SLP on 07/23 - same recommendation  Diet resumed on 07/24 when mental status improved to baseline      Intellectual disability  Assessment & Plan  · Cognitive intellectual disability, non-verbal  · Resides in Group home  · Appears to be a baseline  Psychiatric disorder  Assessment & Plan  · Congential Intellectual Disabiltiy, Anxiety, Psychosis- continue gabapentin, quetiapine, lorazepam, and sertraline  · Home regimen of seroquel is 100 mg q am and 1600 with 200 mg q hs  · Home medications held after transfer to  due to somnolence  · Mental status improved and appears at baseline  · Seroquel 200 mg PO HS resumed 07/24   · Lorazepam 0 5 mg BID resumed 07/24 - hold for sedation, low threshold to discontinue  · Sertraline 100 mg PO HS resumed 07/24  · Continue Zyprexa 2 5 mg PO daily prn    Seizure disorder [facility])  Assessment & Plan  · Cont home Depakote  mg q am and 500 mg q pm  · Changed to IV due to NPO  · Valproic acid level in 7/21 was 6 5  · Resumed PO depakote 07/25 - currently on 250 mg Q 8 hours    Obesity (BMI 30 0-34  9)  Assessment & Plan  · Nutrition consulted     Type 2 diabetes mellitus without complication, without long-term current use of insulin [facility])  Assessment & Plan  Lab Results   Component Value Date    HGBA1C 6 6 (H) 07/19/2021       Recent Labs     07/28/21  0713 07/28/21  1122 07/28/21  1619 07/28/21  2103   POCGLU 205* 274* 268* 257*       Blood Sugar Average: Last 72 hrs:  (P) 244 5   · Not on meds prehospital  · Continue sliding scale insulin  · Started lantus 6 units HS 07/25  · Pt now NPO, may require increase in SSI algorithm until oral intake is re-established      ----------------------------------------------------------------------------------------  HPI/24hr events: No acute events overnight  Tolerated BiPAP well  Attempt to wean oxygen to HFNC this morning and possibly midflow today  Insulin scale increased yesterday  Humalog algorithm increased yesterday  BMP glucose this morning is 206    An additional 4 units of humalog administered this morning with plan for scheduled coverage this morning with breakfast       Disposition: Continue Stepdown Level 1 level of care   Code Status: Level 3 - DNAR and DNI  ---------------------------------------------------------------------------------------    Review of Systems  Review of systems was unable to be performed secondary to intellectual disability    ---------------------------------------------------------------------------------------  OBJECTIVE    Vitals   Vitals:    21 2300 21 0000 21 0210 21 0400   BP: 104/56 105/55  (!) 93/46   BP Location: Left arm   Left arm   Pulse: 74 81  64   Resp: (!) 24 22  20   Temp: 97 8 °F (36 6 °C)   99 1 °F (37 3 °C)   TempSrc: Temporal   Axillary   SpO2: 92% 93% 92% 93%   Weight:    82 3 kg (181 lb 7 oz)   Height:         Temp (24hrs), Av 2 °F (36 8 °C), Min:97 6 °F (36 4 °C), Max:99 1 °F (37 3 °C)  Current: Temperature: 99 1 °F (37 3 °C)          Respiratory:  SpO2: SpO2: 93 %  Nasal Cannula O2 Flow Rate (L/min): 50 L/min    Invasive/non-invasive ventilation settings   Respiratory    Lab Data (Last 4 hours)    None         O2/Vent Data (Last 4 hours)      210          Non-Invasive Ventilation Mode BiPAP                   Physical Exam  Constitutional:       General: She is not in acute distress  Appearance: She is not ill-appearing or diaphoretic  HENT:      Head: Normocephalic and atraumatic  Right Ear: External ear normal       Left Ear: External ear normal       Nose: Nose normal  No congestion  Mouth/Throat:      Mouth: Mucous membranes are moist       Pharynx: Oropharynx is clear  Eyes:      General: No scleral icterus  Right eye: No discharge  Left eye: No discharge  Extraocular Movements: Extraocular movements intact  Conjunctiva/sclera: Conjunctivae normal       Pupils: Pupils are equal, round, and reactive to light  Cardiovascular:      Rate and Rhythm: Normal rate and regular rhythm  Pulses: Normal pulses  Heart sounds: No friction rub  No gallop  104 105   CO2 mmol/L 38* 37* 39* 39* 36* 36* 30   ANION GAP mmol/L 2* 1* 2* -2* 3* 5 5   BUN mg/dL 24 23 22 23 24 25 21   CREATININE mg/dL 0 52* 0 51* 0 47* 0 57* 0 56* 0 60 0 48*   CALCIUM mg/dL 9 2 9 4 9 7 9 6 9 4 9 7 9 5   GLUCOSE RANDOM mg/dL 203* 269* 171* 180* 266* 153* 142*     Results from last 7 days   Lab Units 07/25/21  0448 07/24/21  0545 07/23/21  0542   MAGNESIUM mg/dL 2 2 2 1 1 9   PHOSPHORUS mg/dL 3 0 3 4  --                    ABG:  Results from last 7 days   Lab Units 07/27/21  0910   PH ART  7 483*   PCO2 ART mm Hg 53 3*   PO2 ART mm Hg 99 4   HCO3 ART mmol/L 39 1*   BASE EXC ART mmol/L 13 7   ABG SOURCE  Radial, Left     VBG:  Results from last 7 days   Lab Units 07/27/21  0910   ABG SOURCE  Radial, Left     Results from last 7 days   Lab Units 07/27/21  0427 07/26/21  0439   PROCALCITONIN ng/ml <0 05 <0 05         Intake and Output  I/O       07/27 0701 - 07/28 0700 07/28 0701 - 07/29 0700    P  O  1320 840    I V  (mL/kg) 20 (0 2) 10 (0 1)    IV Piggyback 500     Total Intake(mL/kg) 1840 (22 5) 850 (10 3)    Urine (mL/kg/hr) 250 (0 1) 500 (0 3)    Total Output 250 500    Net +1590 +350          Unmeasured Urine Occurrence 1 x     Unmeasured Stool Occurrence  1 x          Height and Weights   Height: 5' (152 4 cm)  IBW (Ideal Body Weight): 45 5 kg  Body mass index is 35 43 kg/m²  Weight (last 2 days)     Date/Time   Weight    07/29/21 0400   82 3 (181 44)    07/28/21 0500   81 6 (179 9)    07/27/21 0456   80 4 (177 25)                Nutrition       Diet Orders   (From admission, onward)             Start     Ordered    07/27/21 1651  Diet Dysphagia/Modified Consistency; Dysphagia 1-Pureed;  Nectar Thick Liquid; Consistent Carbohydrate Diet Level 1 (4 carb servings/60 grams CHO/meal)  Diet effective midnight     Question Answer Comment   Diet Type Dysphagia/Modified Consistency    Dysphagia/Modified Consistency Dysphagia 1-Pureed    Liquid Modifier Nectar Thick Liquid    Other Restriction(s): Consistent Carbohydrate Diet Level 1 (4 carb servings/60 grams CHO/meal)    RD to adjust diet per protocol?  Yes        07/27/21 1651                  Active Medications  Scheduled Meds:  Current Facility-Administered Medications   Medication Dose Route Frequency Provider Last Rate    acetaminophen  640 mg Oral Q6H PRN Lisa Sher PA-C      aspirin  81 mg Oral Daily Lisa Sher PA-C      atorvastatin  80 mg Oral Daily With Bari Harrison PA-C      bisacodyl  10 mg Rectal Daily PRN KRISTOPHER Gibbons      divalproex sodium  250 mg Oral [facility] Lisa Sher PA-C      enoxaparin  40 mg Subcutaneous Q24H [facility] Bettina S KevenKRISTOPHER      ferrous sulfate  325 mg Oral Daily With Breakfast Lisa Sher PA-C      gabapentin  400 mg Oral TID Lisa Sher PA-C      insulin glargine  6 Units Subcutaneous HS KRISTOPHER Moreno      insulin lispro  2-12 Units Subcutaneous HS KRISTOPHER Barrios      insulin lispro  2-12 Units Subcutaneous TID AC Deuce Carpio Jr, PA-C      ipratropium-albuterol  3 mL Nebulization Q6H Bettina S KRISTOPHER Baca      ketamine  50 mg Intravenous Once Roberto Molina MD      lidocaine  10 mL Topical Once Roberto Molina MD      LORazepam  0 5 mg Oral BID PRN KRISTOPHER Greenwood      ondansetron  4 mg Intravenous Q6H PRN Lisa Sher PA-C      oxybutynin  10 mg Oral Daily Adan Kovacs      pantoprazole  40 mg Oral Early Morning Lisa Sher PA-C      QUEtiapine  200 mg Oral HS Lisa Sher PA-C      senna  1 tablet Oral HS KRISTOPHER Barrios      sertraline  100 mg Oral HS Lisa Sher PA-C      sodium chloride  1 spray Each Nare Q1H PRN KRISTOPHER Bower      sodium chloride  1 spray Each Nare TID KRISTOPHER Greenwood      sodium chloride  4 mL Nebulization Q6H KRISTOPHER Cason       Continuous Infusions:     PRN Meds:   acetaminophen, 640 mg, Q6H PRN  bisacodyl, 10 mg, Daily PRN  LORazepam, 0 5 mg, BID PRN  ondansetron, 4 mg, Q6H PRN  sodium chloride, 1 spray, Q1H PRN        Invasive Devices Review  Invasive Devices     Peripheral Intravenous Line            Peripheral IV 07/26/21 Dorsal (posterior); Proximal;Right Forearm 2 days          Drain            External Urinary Catheter 3 days                Rationale for remaining devices: NA  ---------------------------------------------------------------------------------------  Advance Directive and Living Will:      Power of :    POLST:    ---------------------------------------------------------------------------------------  Care Time Delivered:   No Critical Care time spent       [employer], PA-C      Portions of the record may have been created with voice recognition software  Occasional wrong word or "sound a like" substitutions may have occurred due to the inherent limitations of voice recognition software    Read the chart carefully and recognize, using context, where substitutions have occurred

## 2021-07-29 NOTE — OCCUPATIONAL THERAPY NOTE
OccupationalTherapy Progress Note       Patient Name: Ioana Marr  [id number] Date: 7/29/2021  Problem List  Principal Problem:    Sepsis due to pneumonia [facility])  Active Problems:    Aspiration pneumonia ([facility] [address] )    Psychiatric disorder    Type 2 diabetes mellitus without complication, without long-term current use of insulin ([facility] )    Acute respiratory failure with hypoxia (HCC)    Obesity (BMI 30 0-34  9)    Seizure disorder [facility])    Intellectual disability            07/29/21 1015   Note Type   Note Type Treatment   Restrictions/Precautions   Other Precautions Cognitive; Chair Alarm; Bed Alarm;O2;Multiple lines;Telemetry  (HFNC)   Pain Assessment   Pain Assessment Tool FLACC   Pain Rating: FLACC (Rest) - Face 0   Pain Rating: FLACC (Rest) - Legs 0   Pain Rating: FLACC (Rest) - Activity 0   Pain Rating: FLACC (Rest) - Cry 0   Pain Rating: FLACC (Rest) - Consolability 0   Score: FLACC (Rest) 0   Pain Rating: FLACC (Activity) - Face 0   Pain Rating: FLACC (Activity) - Legs 0   Pain Rating: FLACC (Activity) - Activity 0   Pain Rating: FLACC (Activity) - Cry 0   Pain Rating: FLACC (Activity) - Consolability 0   Score: FLACC (Activity) 0   ADL   Where Assessed Chair   Grooming Assistance 4  Minimal Assistance   Grooming Deficit Verbal cueing;Supervision/safety; Increased time to complete   Grooming Comments while seated upright in recliner chair   UB Dressing Assistance 4  Minimal Assistance   UB Dressing Deficit Verbal cueing;Supervision/safety; Increased time to complete; Thread RUE; Thread LUE;Pull over head   UB Dressing Comments occasional HOHA for sequencing and initiation  LB Dressing Assistance 1  Total Assistance   LB Dressing Deficit Steadying; Requires assistive device for steadying;Supervision/safety;Verbal cueing; Increased time to complete; Don/doff R sock; Don/doff L sock; Thread RLE into pants; Thread LLE into pants;Pull up over hips   LB Dressing Comments Pt initially attempting to complete sock donning, although when unable to reach feet, Pt handed sock to therapist to complete tasks  Functional Standing Tolerance   Time 15-20 seconds   Activity static standing   Comments Pt completing static standing at RW for ~15-20 seconds d/t posterior lean and increased fatigue  Pt requiring Mod x's 2 to safely maintain static standing with F postural control  Pt noted to have increased participation and performance this date compared to previous sessions  Bed Mobility   Additional Comments Pt seated OOB at start and end of session with call bell in reach, chair alarm intact and all needs met at this time   Transfers   Sit to Stand 4  Minimal assistance   Additional items Assist x 2; Increased time required;Verbal cues   Stand to Sit 4  Minimal assistance   Additional items Assist x 2; Increased time required;Verbal cues   Stand pivot Unable to assess   Additional Comments Pt completing STS from recliner chair @ Min A x's 2  Pt with great participation this date, able to initiate STS with good perofrmance although once standing, Pt with increased fatigue and BLE weakness  Pt able to maintain standing for 15-20 seconds before required to sit  STS completed x3  unable to safely attempt SPT at this time  Right Upper Extremity- Strength   R Shoulder Flexion;ABduction   R Elbow Elbow flexion;Elbow extension   R Position Seated   R Weight/Reps/Sets 1 set of 15   RUE Strength Comment Pt completing BUE HEP while seated uprigh tin recliner chair  exercises focusing on biceps, triceps, adn shoulders/chest  Pt with Good participation and tolerance although requirign AAROM to ensure full ROM and accurate movements   Exercises completed to increase MS in order to increase safety and independence during ADLs and functional transfers   Left Upper Extremity-Strength   L Shoulder Flexion;ABduction   L Elbow Elbow flexion;Elbow extension   L Position Seated   L Weights/Reps/Sets 1 set of 15   LUE Strength Comment Pt completing BUE HEP while seated uprigh tin recliner chair  exercises focusing on biceps, triceps, adn shoulders/chest  Pt with Good participation and tolerance although requirign AAROM to ensure full ROM and accurate movements  Exercises completed to increase MS in order to increase safety and independence during ADLs and functional transfers   Cognition   Overall Cognitive Status Impaired   Arousal/Participation Alert; Responsive; Cooperative   Attention Attends with cues to redirect   Following Commands Follows one step commands with increased time or repetition   Comments Pt with increased alertness and participation this date  Pt noted to be coloring upon entering room  Pt following 1 step commands pretty consistantly  occasionally requiring repeated command  Activity Tolerance   Activity Tolerance Patient limited by fatigue   Medical Staff Made Aware Spoke with RN, Katy   Assessment   Assessment Pt seen for treatment session #1 this date  Pt alert and agreeable to participate at this time  Pt completing STS transfers with increased performance compared to previous sessions  Pt on HFNC at time of evaluation and satting in 90's throughout entirety of session with minimal SOB noted  Pt with increased participation and alertness this date and is actively awake and completing coloring tasks in room comfortably  Pt continues to require extensive assistance for LB ADLs although participation and performance with UB ADLs has improved  Pt demonstrates Good potential to make progress towards goals, limited by decrease activity tolerance, decrease standing balance, decrease sitting balance, decrease performance during ADL tasks, decrease cognition, decrease safety awareness , decrease UB MS, decrease generalized strength, decrease activity engagement and decrease performance during functional transfers   Plan   Treatment Interventions ADL retraining;Functional transfer training;UE strengthening/ROM; Endurance training;Cognitive reorientation;Patient/family training;Equipment evaluation/education; Neuromuscular reeducation; Compensatory technique education;Continued evaluation; Energy conservation; Activityengagement   Goal Expiration Date 07/30/21   OT Treatment Day 1   OT Frequency 3-5x/wk   Recommendation   OT Discharge Recommendation Post acute rehabilitation services   AM-PAC Daily Activity Inpatient   Lower Body Dressing 1   Bathing 1   Toileting 1   Upper Body Dressing 3   Grooming 3   Eating 3   Daily Activity Raw Score 12   Daily Activity Standardized Score (Calc for Raw Score >=11) 30 6   Turning Head Towards Sound 4   Follow Simple Instructions 3   Low Function Daily Activity Raw Score 19   Low Function Daily Activity Standardized Score 31 34   AM-PAC Applied Cognition Inpatient   Following a Speech/Presentation 2   Understanding Ordinary Conversation 3   Taking Medications 1   Remembering Where Things Are Placed or Put Away 1   Remembering List of 4-5 Errands 1   Taking Care of Complicated Tasks 1   Applied Cognition Raw Score 9   Applied Cognition Standardized Score 22 48     Pt goals to be met by 7/30/2021     1  Pt will demonstrate ability to complete grooming/hygiene tasks @ S after set-up  2  Pt will demonstrate ability to complete supine<>sit @ S in order to increase safety and independence during ADL tasks  3  Pt will demonstrate ability to complete UB ADLs including washing/dressing @ S in order to increase performance and participation during meaningful tasks  4  Pt will demonstrate ability to complete LB dressing @ Mod A in order to increase safety and independence during meaningful tasks  5  Pt will demonstrate ability to complete toileting tasks including CM and pericare @ Mod A in order to increase safety and independence during meaningful tasks  6  Pt will demonstrate ability to complete EOB, chair, toilet/commode transfers @ Mod A in order to increase performance and participation during functional tasks    7  Pt will demonstrate ability to stand for 1-2 minutes while maintaining F+ balance with use of RW for UB support PRN  8  Pt will demonstrate ability to tolerate 30-35 minute OT session with no vc'ing for deep breathing or use of energy conservation techniques in order to increase activity tolerance during functional tasks  9  Pt will demonstrate Good carryover of use of energy conservation/compensatory strategies during ADLs and functional tasks in order to increase safety and reduce risk for falls  10  Pt will demonstrate Good attention and participation in continued evaluation of functional ambulation house hold distances in order to assist with safe d/c planning    11  Pt will demonstrate 100% carryover of BUE HEP in order to increase BUE MS and increase performance during functional tasks upon d/c home      Chantelle Ryan OTR/L

## 2021-07-30 LAB
ANION GAP SERPL CALCULATED.3IONS-SCNC: 3 MMOL/L (ref 4–13)
BASOPHILS # BLD AUTO: 0.03 THOUSANDS/ΜL (ref 0–0.1)
BASOPHILS NFR BLD AUTO: 0 % (ref 0–1)
BUN SERPL-MCNC: 13 MG/DL (ref 5–25)
CALCIUM SERPL-MCNC: 8.9 MG/DL (ref 8.3–10.1)
CHLORIDE SERPL-SCNC: 106 MMOL/L (ref 100–108)
CO2 SERPL-SCNC: 32 MMOL/L (ref 21–32)
CREAT SERPL-MCNC: 0.59 MG/DL (ref 0.6–1.3)
EOSINOPHIL # BLD AUTO: 0.23 THOUSAND/ΜL (ref 0–0.61)
EOSINOPHIL NFR BLD AUTO: 3 % (ref 0–6)
ERYTHROCYTE [DISTWIDTH] IN BLOOD BY AUTOMATED COUNT: 14.9 % (ref 11.6–15.1)
GFR SERPL CREATININE-BSD FRML MDRD: 95 ML/MIN/1.73SQ M
GLUCOSE SERPL-MCNC: 171 MG/DL (ref 65–140)
GLUCOSE SERPL-MCNC: 181 MG/DL (ref 65–140)
GLUCOSE SERPL-MCNC: 185 MG/DL (ref 65–140)
GLUCOSE SERPL-MCNC: 219 MG/DL (ref 65–140)
GLUCOSE SERPL-MCNC: 220 MG/DL (ref 65–140)
HCT VFR BLD AUTO: 27.9 % (ref 34.8–46.1)
HGB BLD-MCNC: 8.4 G/DL (ref 11.5–15.4)
IMM GRANULOCYTES # BLD AUTO: 0.08 THOUSAND/UL (ref 0–0.2)
IMM GRANULOCYTES NFR BLD AUTO: 1 % (ref 0–2)
LYMPHOCYTES # BLD AUTO: 1.42 THOUSANDS/ΜL (ref 0.6–4.47)
LYMPHOCYTES NFR BLD AUTO: 21 % (ref 14–44)
MAGNESIUM SERPL-MCNC: 1.9 MG/DL (ref 1.6–2.6)
MCH RBC QN AUTO: 29.3 PG (ref 26.8–34.3)
MCHC RBC AUTO-ENTMCNC: 30.1 G/DL (ref 31.4–37.4)
MCV RBC AUTO: 97 FL (ref 82–98)
MONOCYTES # BLD AUTO: 0.38 THOUSAND/ΜL (ref 0.17–1.22)
MONOCYTES NFR BLD AUTO: 6 % (ref 4–12)
NEUTROPHILS # BLD AUTO: 4.59 THOUSANDS/ΜL (ref 1.85–7.62)
NEUTS SEG NFR BLD AUTO: 69 % (ref 43–75)
NRBC BLD AUTO-RTO: 0 /100 WBCS
PHOSPHATE SERPL-MCNC: 3.7 MG/DL (ref 2.3–4.1)
PLATELET # BLD AUTO: 263 THOUSANDS/UL (ref 149–390)
PMV BLD AUTO: 8.9 FL (ref 8.9–12.7)
POTASSIUM SERPL-SCNC: 4.2 MMOL/L (ref 3.5–5.3)
RBC # BLD AUTO: 2.87 MILLION/UL (ref 3.81–5.12)
SODIUM SERPL-SCNC: 141 MMOL/L (ref 136–145)
WBC # BLD AUTO: 6.73 THOUSAND/UL (ref 4.31–10.16)

## 2021-07-30 PROCEDURE — 94760 N-INVAS EAR/PLS OXIMETRY 1: CPT

## 2021-07-30 PROCEDURE — 94669 MECHANICAL CHEST WALL OSCILL: CPT

## 2021-07-30 PROCEDURE — 97530 THERAPEUTIC ACTIVITIES: CPT

## 2021-07-30 PROCEDURE — 92526 ORAL FUNCTION THERAPY: CPT

## 2021-07-30 PROCEDURE — 94668 MNPJ CHEST WALL SBSQ: CPT

## 2021-07-30 PROCEDURE — 80048 BASIC METABOLIC PNL TOTAL CA: CPT | Performed by: PHYSICIAN ASSISTANT

## 2021-07-30 PROCEDURE — 83735 ASSAY OF MAGNESIUM: CPT | Performed by: PHYSICIAN ASSISTANT

## 2021-07-30 PROCEDURE — 82948 REAGENT STRIP/BLOOD GLUCOSE: CPT

## 2021-07-30 PROCEDURE — 84100 ASSAY OF PHOSPHORUS: CPT | Performed by: PHYSICIAN ASSISTANT

## 2021-07-30 PROCEDURE — 99233 SBSQ HOSP IP/OBS HIGH 50: CPT | Performed by: ANESTHESIOLOGY

## 2021-07-30 PROCEDURE — 94660 CPAP INITIATION&MGMT: CPT

## 2021-07-30 PROCEDURE — 94640 AIRWAY INHALATION TREATMENT: CPT

## 2021-07-30 PROCEDURE — 85025 COMPLETE CBC W/AUTO DIFF WBC: CPT | Performed by: PHYSICIAN ASSISTANT

## 2021-07-30 RX ORDER — FUROSEMIDE 20 MG/1
20 TABLET ORAL ONCE
Status: COMPLETED | OUTPATIENT
Start: 2021-07-30 | End: 2021-07-30

## 2021-07-30 RX ORDER — MAGNESIUM SULFATE HEPTAHYDRATE 40 MG/ML
2 INJECTION, SOLUTION INTRAVENOUS ONCE
Status: COMPLETED | OUTPATIENT
Start: 2021-07-30 | End: 2021-07-30

## 2021-07-30 RX ADMIN — FUROSEMIDE 20 MG: 20 TABLET ORAL at 09:34

## 2021-07-30 RX ADMIN — STANDARDIZED SENNA CONCENTRATE 8.6 MG: 8.6 TABLET ORAL at 22:10

## 2021-07-30 RX ADMIN — SERTRALINE HYDROCHLORIDE 100 MG: 50 TABLET ORAL at 22:11

## 2021-07-30 RX ADMIN — IPRATROPIUM BROMIDE AND ALBUTEROL SULFATE 3 ML: 2.5; .5 SOLUTION RESPIRATORY (INHALATION) at 13:16

## 2021-07-30 RX ADMIN — SODIUM CHLORIDE SOLN NEBU 3% 4 ML: 3 NEBU SOLN at 13:16

## 2021-07-30 RX ADMIN — INSULIN GLARGINE 10 UNITS: 100 INJECTION, SOLUTION SUBCUTANEOUS at 22:12

## 2021-07-30 RX ADMIN — GABAPENTIN 400 MG: 400 CAPSULE ORAL at 17:14

## 2021-07-30 RX ADMIN — ATORVASTATIN CALCIUM 80 MG: 40 TABLET, FILM COATED ORAL at 17:14

## 2021-07-30 RX ADMIN — SALINE NASAL SPRAY 1 SPRAY: 1.5 SOLUTION NASAL at 08:09

## 2021-07-30 RX ADMIN — QUETIAPINE FUMARATE 200 MG: 50 TABLET, EXTENDED RELEASE ORAL at 22:11

## 2021-07-30 RX ADMIN — INSULIN LISPRO 4 UNITS: 100 INJECTION, SOLUTION INTRAVENOUS; SUBCUTANEOUS at 08:07

## 2021-07-30 RX ADMIN — MAGNESIUM SULFATE HEPTAHYDRATE 2 G: 40 INJECTION, SOLUTION INTRAVENOUS at 05:59

## 2021-07-30 RX ADMIN — SODIUM CHLORIDE SOLN NEBU 3% 4 ML: 3 NEBU SOLN at 07:48

## 2021-07-30 RX ADMIN — INSULIN LISPRO 4 UNITS: 100 INJECTION, SOLUTION INTRAVENOUS; SUBCUTANEOUS at 12:05

## 2021-07-30 RX ADMIN — IPRATROPIUM BROMIDE AND ALBUTEROL SULFATE 3 ML: 2.5; .5 SOLUTION RESPIRATORY (INHALATION) at 20:43

## 2021-07-30 RX ADMIN — INSULIN LISPRO 4 UNITS: 100 INJECTION, SOLUTION INTRAVENOUS; SUBCUTANEOUS at 22:10

## 2021-07-30 RX ADMIN — SODIUM CHLORIDE SOLN NEBU 3% 4 ML: 3 NEBU SOLN at 02:22

## 2021-07-30 RX ADMIN — DIVALPROEX SODIUM 250 MG: 250 TABLET, DELAYED RELEASE ORAL at 14:04

## 2021-07-30 RX ADMIN — INSULIN LISPRO 4 UNITS: 100 INJECTION, SOLUTION INTRAVENOUS; SUBCUTANEOUS at 17:12

## 2021-07-30 RX ADMIN — SALINE NASAL SPRAY 1 SPRAY: 1.5 SOLUTION NASAL at 22:04

## 2021-07-30 RX ADMIN — SODIUM CHLORIDE SOLN NEBU 3% 4 ML: 3 NEBU SOLN at 20:43

## 2021-07-30 RX ADMIN — ENOXAPARIN SODIUM 40 MG: 40 INJECTION SUBCUTANEOUS at 08:08

## 2021-07-30 RX ADMIN — GABAPENTIN 400 MG: 400 CAPSULE ORAL at 08:10

## 2021-07-30 RX ADMIN — DIVALPROEX SODIUM 250 MG: 250 TABLET, DELAYED RELEASE ORAL at 22:12

## 2021-07-30 RX ADMIN — DIVALPROEX SODIUM 250 MG: 250 TABLET, DELAYED RELEASE ORAL at 05:35

## 2021-07-30 RX ADMIN — ASPIRIN 81 MG: 81 TABLET, COATED ORAL at 08:10

## 2021-07-30 RX ADMIN — FERROUS SULFATE TAB 325 MG (65 MG ELEMENTAL FE) 325 MG: 325 (65 FE) TAB at 08:10

## 2021-07-30 RX ADMIN — IPRATROPIUM BROMIDE AND ALBUTEROL SULFATE 3 ML: 2.5; .5 SOLUTION RESPIRATORY (INHALATION) at 02:22

## 2021-07-30 RX ADMIN — OXYBUTYNIN 10 MG: 5 TABLET, FILM COATED, EXTENDED RELEASE ORAL at 08:10

## 2021-07-30 RX ADMIN — SALINE NASAL SPRAY 1 SPRAY: 1.5 SOLUTION NASAL at 17:13

## 2021-07-30 RX ADMIN — PANTOPRAZOLE SODIUM 40 MG: 40 TABLET, DELAYED RELEASE ORAL at 05:35

## 2021-07-30 RX ADMIN — GABAPENTIN 400 MG: 400 CAPSULE ORAL at 22:05

## 2021-07-30 RX ADMIN — IPRATROPIUM BROMIDE AND ALBUTEROL SULFATE 3 ML: 2.5; .5 SOLUTION RESPIRATORY (INHALATION) at 07:48

## 2021-07-30 NOTE — PROGRESS NOTES
[address]  Progress Note - Dottie Mustafa 1955, 77 y o  female MRN: [id number]  Unit/Bed#: -01 Encounter: [id number]  Primary Care Provider: Natan Wright MD   Date and time admitted to hospital: 7/18/2021  2:07 PM    * Sepsis due to pneumonia [facility])  Assessment & Plan  · POA a/e/b - tachypnea, tachycardia, hypoxia   · Sepsis secondary to pneumonia likely aspiration vs gram-negative  · CXR 7/20 progressive b/l infiltrates R>L  · 7/20 Continue cefepime and metronidazole  Discontinue azithromycin and add Vanco given group home setting and worsening infiltrate on CXR    · 7/22 Due to low grade fevers, clinical deterioration, and positive sputum culture Cefepime changed to Meropenum  · Antibiotics completed:  · Meropenum 07/21 - 07/25  · Vancomycin 07/20 - 0723, d/c due to negative MRSA swab, culture results MRF  · Metronidazole 07/18 - 07/24  · Azithromycin 07/18, 07/20  · Ceftriaxone 07/18  · Blood cultures - negative  · Flu/RSV - negative  · Sputum culture Gram neg coccobacilli, GNR, and GPC; MRF  · COVID negative on admission and patient fully vaccinated  · PCT negative: <0 05 > 0 08 > 0 14 > 0 08  · See plan for aspiration below  · 7/26 CT chest - multifocal pneumonia with improvement in right lower lobe with worsening in b/l upper lobes  · awake bronchoscopy 7/27  · 7/27 CXR with left pleural effusion and left base opacity    Acute respiratory failure with hypoxia (HCC)  Assessment & Plan  · Acute respiratory failure/hypoxia secondary to pneumonia with component of pulmonary edema, progressed to dense consolidation with likely mucous plugging  · CTA negative for pulmonary embolism- see plan under PNA  · 7/20 Escalated to midflow then required transfer for HFNC under Critical Care  · 7/21 HFNC + NRB, tolerated off NRB in the evening  · 7/22 progressive hypoxia on HFNC - placed on CPAP with improvement, CXR - progressive infiltrate R middle lobe, highly suggestion of mucous plugging and aspiration  · 7/26 CT chest - multifocal pneumonia with improvement in right lower lobe with worsening in b/l upper lobes, mild interstitial edema, mod b/l atelectasis, small b/l pleural effusions  · Pt tolerating HFNC at 40L/ 40%, transitioned to midflow   · continue Bipap 18/12 35% HS  · Continue to wean as able   · Goal SpO2 >92%  · Trend ABG as needed  · Pt transitioned to additional 250 of diamox today, likely transition to BID on 7/30  · Pt on oxybutinin - bladder scan revealed   · Goal neg to even fluid balance  · Cont duoneb, 3% saline, chest PT, suction  · Due to cognitive delay, pt is unable to fully participate in aggressive pulmonary toilet  SLP re-evaluation on 07/23 cleared for puree with nectar thick  Diet resumed 07/25  Aspiration precautions  · Likely has undiagnosed sleep apnea  · 7/27 awake bronchoscopy-with mucus plugs in right middle and lower lobes   · 7/27 CXR with left pleural effusion and left basilar opacity    Aspiration pneumonia (HCC)  Assessment & Plan  · Chest x-ray showed suspected right middle lobe consolidation  · CTA Chest:   No central pulmonary emboli identified  Significant interval worsening in airspace consolidation and patchy infiltrates  involving the right upper lobe to the greatest degree posterior segment but also elsewhere within the right upper lobe  There is also interval worsening of the consolidation and volume loss in the dependent lower lobes bilaterally  Also  component of chronic volume loss related to elevated right hemidiaphragm  Focally dilated main pulmonary artery  Assess for pulmonary valve disease     · Completed 7 days of antibiotics - see course above  · Aspiration precautions  · Sputum culture - gram neg and gram positive  · Speech therapy saw patient 7/23 recommends mechanically altered/level 1 diet and nectar thick liquids  · Witnessed aspiration 7/21 - made NPO and awaiting re-evaluation by SLP  · Re-evaluation by SLP on 07/23 - same recommendation  Diet resumed on 07/24 when mental status improved to baseline  Intellectual disability  Assessment & Plan  · Cognitive intellectual disability, non-verbal  · Resides in Group home  · Appears to be a baseline  Psychiatric disorder  Assessment & Plan  · Congential Intellectual Disabiltiy, Anxiety, Psychosis- continue gabapentin, quetiapine, lorazepam, and sertraline  · Home regimen of seroquel is 100 mg q am and 1600 with 200 mg q hs  · Home medications held after transfer to  due to somnolence  · Mental status improved and appears at baseline  · Seroquel 200 mg PO HS resumed 07/24   · Lorazepam 0 5 mg BID resumed 07/24 - hold for sedation, low threshold to discontinue  · Sertraline 100 mg PO HS resumed 07/24  · Continue Zyprexa 2 5 mg PO daily prn    Seizure disorder [facility])  Assessment & Plan  · Cont home Depakote  mg q am and 500 mg q pm  · Changed to IV due to NPO  · Valproic acid level in 7/21 was 6 5  · Resumed PO depakote 07/25 - currently on 250 mg Q 8 hours    Obesity (BMI 30 0-34  9)  Assessment & Plan  · Nutrition consulted     Type 2 diabetes mellitus without complication, without long-term current use of insulin [facility])  Assessment & Plan  Lab Results   Component Value Date    HGBA1C 6 6 (H) 07/19/2021       Recent Labs     07/29/21  0705 07/29/21  1102 07/29/21  1614 07/29/21  2125   POCGLU 195* 254* 206* 116       Blood Sugar Average: Last 72 hrs:  (P) 396 [id number]   · Not on meds prehospital  · Continue sliding scale insulin  · Started lantus 6 units HS 07/25  · Pt now NPO, may require increase in SSI algorithm until oral intake is re-established  · If blood glucose remains >180, will increase lantus       ----------------------------------------------------------------------------------------  HPI/24hr events: No acute events overnight  Her condition and respiratory status continues to improve as she was able to weaned down to NC O2    Case management is working with family for placement into a SNF  Disposition: Continue Stepdown Level 1 level of care   Code Status: Level 3 - DNAR and DNI  ---------------------------------------------------------------------------------------    Review of Systems  Review of systems was unable to be performed secondary to intellectual disability    ---------------------------------------------------------------------------------------  OBJECTIVE    Vitals   Vitals:    21 2330 21 0200 21 0223 21 0354   BP: (!) 88/49 (!) 96/49  108/54   BP Location: Left arm   Right arm   Pulse: 56 56  (!) 54   Resp: 18 20  18   Temp: 98 5 °F (36 9 °C)   98 8 °F (37 1 °C)   TempSrc: Temporal   Temporal   SpO2: 94% 93% 93% 93%   Weight:       Height:         Temp (24hrs), Av 1 °F (36 7 °C), Min:97 1 °F (36 2 °C), Max:98 8 °F (37 1 °C)  Current: Temperature: 98 8 °F (37 1 °C)          Respiratory:  SpO2: SpO2: 93 %  Nasal Cannula O2 Flow Rate (L/min): 5 L/min    Invasive/non-invasive ventilation settings   Respiratory    Lab Data (Last 4 hours)    None         O2/Vent Data (Last 4 hours)    None                Physical Exam  Constitutional:       General: She is not in acute distress  Appearance: She is not ill-appearing or diaphoretic  HENT:      Head: Normocephalic and atraumatic  Right Ear: External ear normal       Left Ear: External ear normal       Nose: Nose normal  No congestion  Mouth/Throat:      Mouth: Mucous membranes are moist       Pharynx: Oropharynx is clear  Eyes:      General: No scleral icterus  Right eye: No discharge  Left eye: No discharge  Extraocular Movements: Extraocular movements intact  Conjunctiva/sclera: Conjunctivae normal       Pupils: Pupils are equal, round, and reactive to light  Cardiovascular:      Rate and Rhythm: Normal rate and regular rhythm  Pulses: Normal pulses  Heart sounds: No friction rub  No gallop      Pulmonary: Effort: Pulmonary effort is normal       Breath sounds: Examination of the right-lower field reveals decreased breath sounds  Examination of the left-lower field reveals decreased breath sounds  Decreased breath sounds present  Abdominal:      General: Bowel sounds are normal  There is no distension  Palpations: Abdomen is soft  There is no mass  Tenderness: There is no abdominal tenderness  There is no guarding or rebound  Musculoskeletal:         General: No swelling, tenderness, deformity or signs of injury  Normal range of motion  Cervical back: Normal range of motion and neck supple  No rigidity or tenderness  Right lower leg: No edema  Left lower leg: No edema  Skin:     General: Skin is warm and dry  Capillary Refill: Capillary refill takes less than 2 seconds  Coloration: Skin is not jaundiced or pale  Findings: No rash  Neurological:      General: No focal deficit present  Mental Status: She is alert  Comments: Non-verbal at baseline            Laboratory and Diagnostics:  Results from last 7 days   Lab Units 07/29/21 0449 07/28/21  0552 07/27/21  0427 07/26/21  0439 07/25/21  0448 07/24/21  0545 07/23/21  0542   WBC Thousand/uL 6 33 6 59 6 53 5 69 4 99 5 41 5 12   HEMOGLOBIN g/dL 8 7* 9 0* 9 7* 9 8* 9 6* 9 2* 8 7*   HEMATOCRIT % 29 4* 29 8* 33 2* 33 2* 31 6* 30 0* 27 5*   PLATELETS Thousands/uL 281 293 314 315 276 247 188   NEUTROS PCT %  --   --   --  59 62  --   --    MONOS PCT %  --   --   --  10 14*  --   --      Results from last 7 days   Lab Units 07/29/21 0449 07/28/21  0552 07/27/21  0427 07/26/21  1605 07/26/21  0439 07/25/21  0448 07/24/21  0545   SODIUM mmol/L 148* 148* 148* 151* 152* 148* 145   POTASSIUM mmol/L 4 0 4 0 4 5 4 2 4 2 3 8 3 4*   CHLORIDE mmol/L 110* 108 110* 110* 115* 109* 104   CO2 mmol/L 38* 38* 37* 39* 39* 36* 36*   ANION GAP mmol/L 0* 2* 1* 2* -2* 3* 5   BUN mg/dL 20 24 23 22 23 24 25   CREATININE mg/dL 0 56* 0 52* 0 51* 0 47* 0 57* 0 56* 0 60   CALCIUM mg/dL 9 0 9 2 9 4 9 7 9 6 9 4 9 7   GLUCOSE RANDOM mg/dL 206* 203* 269* 171* 180* 266* 153*     Results from last 7 days   Lab Units 07/25/21  0448 07/24/21  0545 07/23/21  0542   MAGNESIUM mg/dL 2 2 2 1 1 9   PHOSPHORUS mg/dL 3 0 3 4  --                    ABG:  Results from last 7 days   Lab Units 07/27/21  0910   PH ART  7 483*   PCO2 ART mm Hg 53 3*   PO2 ART mm Hg 99 4   HCO3 ART mmol/L 39 1*   BASE EXC ART mmol/L 13 7   ABG SOURCE  Radial, Left     VBG:  Results from last 7 days   Lab Units 07/27/21  0910   ABG SOURCE  Radial, Left     Results from last 7 days   Lab Units 07/27/21  0427 07/26/21  0439   PROCALCITONIN ng/ml <0 05 <0 05       Micro        Intake and Output  I/O       07/28 0701 - 07/29 0700 07/29 0701 - 07/30 0700    P  O  840 2350    I V  (mL/kg) 10 (0 1)     Total Intake(mL/kg) 850 (10 3) 2350 (28 6)    Urine (mL/kg/hr) 775 (0 4) 350 (0 2)    Stool 0     Total Output 775 350    Net +75 +2000          Unmeasured Stool Occurrence 2 x           Height and Weights   Height: 5' (152 4 cm)  IBW (Ideal Body Weight): 45 5 kg  Body mass index is 35 43 kg/m²  Weight (last 2 days)     Date/Time   Weight    07/29/21 0400   82 3 (181 44)    07/28/21 0500   81 6 (179 9)                Nutrition       Diet Orders   (From admission, onward)             Start     Ordered    07/27/21 1651  Diet Dysphagia/Modified Consistency; Dysphagia 1-Pureed; Nectar Thick Liquid; Consistent Carbohydrate Diet Level 1 (4 carb servings/60 grams CHO/meal)  Diet effective midnight     Question Answer Comment   Diet Type Dysphagia/Modified Consistency    Dysphagia/Modified Consistency Dysphagia 1-Pureed    Liquid Modifier Nectar Thick Liquid    Other Restriction(s): Consistent Carbohydrate Diet Level 1 (4 carb servings/60 grams CHO/meal)    RD to adjust diet per protocol?  Yes        07/27/21 1651                  Active Medications  Scheduled Meds:  Current Facility-Administered Medications Medication Dose Route Frequency Provider Last Rate    acetaminophen  640 mg Oral Q6H PRN Chris Moura PA-C      aspirin  81 mg Oral Daily [place], [place]      atorvastatin  80 mg Oral Daily With Bari Harrison PA-C      bisacodyl  10 mg Rectal Daily PRN KRISTOPHER Miller      divalproex sodium  250 mg Oral [facility] Chris oMura PA-C      enoxaparin  40 mg Subcutaneous Q24H [address] Bettina [address] KevenKRISTOPHER      ferrous sulfate  325 mg Oral Daily With Breakfast Chris Moura PA-C      gabapentin  400 mg Oral TID Chris Moura PA-C      insulin glargine  10 Units Subcutaneous HS Alana Solano PA-C      insulin lispro  2-12 Units Subcutaneous HS KRISTOPHER Barrios      insulin lispro  2-12 Units Subcutaneous TID AC Deuce Wolf PA-C      insulin lispro  4 Units Subcutaneous TID With Meals Mahsa Carpio Jr, PA-C      ipratropium-albuterol  3 mL Nebulization Q6H KRISTOPHER Hairston      ketamine  50 mg Intravenous Once Gillian Glynn MD      lidocaine  10 mL Topical Once Gillian Glynn MD      LORazepam  0 5 mg Oral BID PRN KRISTOPHER Poon      ondansetron  4 mg Intravenous Q6H PRN Chris Moura PA-C      oxybutynin  10 mg Oral Daily [place], [place]      pantoprazole  40 mg Oral Early Morning Chris Moura PA-C      QUEtiapine  200 mg Oral HS Chris Moura PA-C      senna  1 tablet Oral HS KRISTOPHER Barrios      sertraline  100 mg Oral HS Chris Moura PA-C      sodium chloride  1 spray Each Nare Q1H PRN KRISTOPHER Valerio      sodium chloride  1 spray Each Nare TID KRISTOPHER Poon      sodium chloride  4 mL Nebulization Q6H KRISTOPHER Cason       Continuous Infusions:     PRN Meds:   acetaminophen, 640 mg, Q6H PRN  bisacodyl, 10 mg, Daily PRN  LORazepam, 0 5 mg, BID PRN  ondansetron, 4 mg, Q6H PRN  sodium chloride, 1 spray, Q1H PRN        Invasive Devices Review  Invasive Devices     Peripheral Intravenous Line Peripheral IV 07/29/21 Dorsal (posterior); Left Forearm <1 day          Drain            External Urinary Catheter 4 days                Rationale for remaining devices: NA  ---------------------------------------------------------------------------------------  Advance Directive and Living Will:      Power of :    POLST:    ---------------------------------------------------------------------------------------  Care Time Delivered:   No Critical Care time spent       [facility]      Portions of the record may have been created with voice recognition software  Occasional wrong word or "sound a like" substitutions may have occurred due to the inherent limitations of voice recognition software    Read the chart carefully and recognize, using context, where substitutions have occurred

## 2021-07-30 NOTE — CASE MANAGEMENT
CM called [facility] at [phone] and left vm for Rocky Weber to check on status of Optioning process  [facility] accepted in [facility] once LOC letter is received and once pt is medically cleared

## 2021-07-30 NOTE — OCCUPATIONAL THERAPY NOTE
OccupationalTherapy Treatment Note     Patient Name: Yoana Kamara  MOYWR'A Date: 7/30/2021  Problem List  Principal Problem:    Sepsis due to pneumonia [facility])  Active Problems:    Aspiration pneumonia ([facility] )    Psychiatric disorder    Type 2 diabetes mellitus without complication, without long-term current use of insulin ([facility] )    Acute respiratory failure with hypoxia (HCC)    Obesity (BMI 30 0-34  9)    Seizure disorder ([facility] )    Intellectual disability       07/30/21 1138   Note Type   Note Type Treatment   Restrictions/Precautions   Other Precautions Cognitive;Multiple lines;Telemetry;O2;Fall Risk  (5L O2, intellectual disability)   Pain Assessment   Pain Assessment Tool Pain Assessment not indicated - pt denies pain   Pain Score No Pain   Transfers   Sit to Stand 2  Maximal assistance   Additional items Assist x 2; Increased time required;Verbal cues   Additional Comments Pt attempted to complete 5 STS and required Max A x2  First two trials completed using bed rails to pull up on  Second two trials completed using janeth RW to assist with stand  Last trial complete with the quick move to help position  Pt unable to achieve full stand and continually tried to stand without direction to  Rest breaks required between all standing trials  Pt's O2 dropped during standing trials but inaccurate pleuth  RN and respiratory therapist aware of low O2  Cognition   Overall Cognitive Status Impaired   Arousal/Participation Alert; Cooperative   Attention Attends with cues to redirect   Orientation Level Appropriate for developmental age   Memory Unable to assess   Following Commands Follows one step commands with increased time or repetition   Comments Pt nonverbal at baseline with intellectual disability    Activity Tolerance   Activity Tolerance Patient limited by fatigue   Medical Staff Made Aware Spoke with Dina Lee and RN, [place]   Assessment   Assessment Pt completed OT tx session #2 on this date focused on standing tolerance  Pt alert and agreeable to participate  Pt attempted 5 STS trials with Max A x2 but was unable to achieve full stand  Therapeutic rest breaks required in between each set d/t decreased SpO2  Pt would continue to benefit from post acute rehabilitation services to increase standing tolerance necessary for ADL performance and functional transfers     Plan   Treatment Interventions Functional transfer training   Goal Expiration Date 07/30/21   OT Treatment Day 1   OT Frequency 3-5x/wk   Recommendation   OT Discharge Recommendation Post acute rehabilitation services   AM-PAC Daily Activity Inpatient   Lower Body Dressing 1   Bathing 1   Toileting 1   Upper Body Dressing 2   Grooming 2   Eating 2   Daily Activity Raw Score 9   Turning Head Towards Sound 3   Follow Simple Instructions 3   Low Function Daily Activity Raw Score 15   Low Function Daily Activity Standardized Score 26 28   AM-PAC Applied Cognition Inpatient   Following a Speech/Presentation 2   Understanding Ordinary Conversation 3   Taking Medications 1   Remembering Where Things Are Placed or Put Away 1   Remembering List of 4-5 Errands 1   Taking Care of Complicated Tasks 1   Applied Cognition Raw Score 9   Applied Cognition Standardized Score 22 48     Lanre Nunez, OTR/L

## 2021-07-30 NOTE — ASSESSMENT & PLAN NOTE
· Acute respiratory failure/hypoxia secondary to pneumonia with component of pulmonary edema, progressed to dense consolidation with likely mucous plugging  · CTA negative for pulmonary embolism- see plan under PNA  · 7/20 Escalated to midflow then required transfer for HFNC under Critical Care  · 7/21 HFNC + NRB, tolerated off NRB in the evening  · 7/22 progressive hypoxia on HFNC - placed on CPAP with improvement, CXR - progressive infiltrate R middle lobe, highly suggestion of mucous plugging and aspiration  · 7/26 CT chest - multifocal pneumonia with improvement in right lower lobe with worsening in b/l upper lobes, mild interstitial edema, mod b/l atelectasis, small b/l pleural effusions  · Pt tolerated HFNC at 40L/ 40%, transitioned to midflow on 7/30  · continue Bipap 18/12 35% HS - d/c 7/30  · Continue to wean as able   · Goal SpO2 >92%  · Trend ABG as needed  · Pt transitioned to additional 250 of diamox today, likely transition to BID on 7/30  · Pt on oxybutinin  · Encourage fluids   · Goal neg to even fluid balance  · Cont duoneb, 3% saline, chest PT, suction  · Due to cognitive delay, pt is unable to fully participate in aggressive pulmonary toilet  SLP re-evaluation on 07/23 cleared for puree with nectar thick  Diet resumed 07/25  Aspiration precautions    · Likely has undiagnosed sleep apnea  · 7/27 awake bronchoscopy-with mucus plugs in right middle and lower lobes   · 7/27 CXR with left pleural effusion and left basilar opacity

## 2021-07-30 NOTE — PLAN OF CARE
Problem: OCCUPATIONAL THERAPY ADULT  Goal: Performs self-care activities at highest level of function for planned discharge setting  See evaluation for individualized goals  Description: Treatment Interventions: ADL retraining, Functional transfer training, UE strengthening/ROM, Endurance training, Cognitive reorientation, Patient/family training, Equipment evaluation/education, Neuromuscular reeducation, Compensatory technique education, Continued evaluation, Energy conservation, Activityengagement          See flowsheet documentation for full assessment, interventions and recommendations  Outcome: Not Progressing  Note: Limitation: Decreased ADL status, Decreased UE strength, Decreased Safe judgement during ADL, Decreased endurance, Decreased cognition, Decreased self-care trans, Decreased high-level ADLs  Prognosis: Good  Assessment: Pt completed OT tx session #2 on this date focused on standing tolerance  Pt alert and agreeable to participate  Pt attempted 5 STS trials with Max A x2 but was unable to achieve full stand  Therapeutic rest breaks required in between each set d/t decreased SpO2  Pt would continue to benefit from post acute rehabilitation services to increase standing tolerance necessary for ADL performance and functional transfers       OT Discharge Recommendation: Post acute rehabilitation services

## 2021-07-30 NOTE — PHYSICAL THERAPY NOTE
PHYSICAL THERAPY TREATMENT NOTE  NAME:  aJun Brand  DATE: 07/30/21    Length Of Stay: 12  Performed at least 2 patient identifiers during session: Assigned identification number and ID bracelet    TREATMENT FLOW SHEET:        07/30/21 1137   PT Last Visit   PT Visit Date 07/30/21   Note Type   Note Type Treatment   Pain Assessment   Pain Assessment Tool FLACC   Pain Rating: FLACC (Rest) - Face 0   Pain Rating: FLACC (Rest) - Legs 0   Pain Rating: FLACC (Rest) - Activity 0   Pain Rating: FLACC (Rest) - Cry 0   Pain Rating: FLACC (Rest) - Consolability 0   Score: FLACC (Rest) 0   Pain Rating: FLACC (Activity) - Face 0   Pain Rating: FLACC (Activity) - Legs 0   Pain Rating: FLACC (Activity) - Activity 0   Pain Rating: FLACC (Activity) - Cry 0   Pain Rating: FLACC (Activity) - Consolability 0   Score: FLACC (Activity) 0   Restrictions/Precautions   Other Precautions Cognitive; Chair Alarm; Bed Alarm;Multiple lines;Telemetry;O2;Fall Risk  (5 lpm O2 midflow)   General   Chart Reviewed Yes   Family/Caregiver Present   (at end of session)   Cognition   Following Commands Follows one step commands with increased time or repetition   Subjective   Subjective pt giving thumbs up in response to participating in therapy   Bed Mobility   Additional Comments Pt sitting OOB in recliner chair upon arrival  All needs within reach  increased lateral lean to left  Transfers   Sit to Stand 2  Maximal assistance   Additional items Assist x 2; Increased time required;Verbal cues   Stand to Sit 3  Moderate assistance   Additional items Assist x 2; Increased time required;Verbal cues   Stand pivot Unable to assess   Additional Comments sit<>stand 2x facing bedrail with B hands on bedrail with maxAx2 to achieve full standing  stand to sit with modAx2 fro controlled descent and posiitoning in chair  upon standing, pt wiht increased B knee flexion, unable to maintain standing without maxAx2 and retunred to sitting for safety   Trialed sit<>stand with janeth RW for improved height, achieved standing with maxAx2 2x  pt attempting to stand on own prior, but unable to achieve full standing  static stanidng with B UE support on RW </= 5 seconds prior to needing to return to sitting  use of quick move  required maxAx2 to achieve standing to place pads under patient to facilitate safe positioning in chair  Ambulation/Elevation   Distance unable   Balance   Static Sitting Fair -   Dynamic Sitting Poor +   Static Standing Poor -   Endurance Deficit   Endurance Deficit Yes   Endurance Deficit Description SpO2 at rest on 5 lpm 94%  decreased to low 90s with good pleth, into 80s with standing however inaccurate pleth with pulse ox on toe  minimal to no SIMS noted  Activity Tolerance   Activity Tolerance Patient limited by fatigue   Medical Staff Made Aware OT, Rangel Perez   Nurse Made Aware RNKaty   Assessment   Prognosis Fair   Problem List Decreased strength;Decreased endurance; Impaired balance;Decreased mobility; Decreased cognition; Impaired judgement;Decreased safety awareness; Obesity; Decreased skin integrity   Barriers to Discharge Decreased caregiver support; Inaccessible home environment   Barriers to Discharge Comments requires increased assistance to complete all mobility   Goals   Patient Goals none stated-pt is nonverbal   STG Expiration Date 08/09/21   PT Treatment Day 1   Plan   Treatment/Interventions Functional transfer training;LE strengthening/ROM; Therapeutic exercise; Endurance training;Patient/family training;Equipment eval/education; Bed mobility;Gait training; Compensatory technique education;Spoke to nursing;OT   Progress Slow progress, medical status limitations   PT Frequency Other (Comment)  (3-5x/week)   Recommendation   PT Discharge Recommendation Post acute rehabilitation services   Equipment Recommended   (TBD by rehab)   PT - OK to Discharge   (when medically cleared to rehab)   Additional Comments pt sitting in recliner chair at end of session with all needs within reach  AM-PAC Basic Mobility Inpatient   Turning in Bed Without Bedrails 1   Lying on Back to Sitting on Edge of Flat Bed 1   Moving Bed to Chair 1   Standing Up From Chair 1   Walk in Room 1   Climb 3-5 Stairs 1   Basic Mobility Inpatient Raw Score 6   Turning Head Towards Sound 4   Follow Simple Instructions 3   Low Function Basic Mobility Raw Score 13   Low Function Basic Mobility Standardized Score 20 14       The patient's AM-PAC Basic Mobility Inpatient Short Form Low Function Raw Score 12 , Standardized Score is 18 33  A standardized score less than 42 9 suggests the patient may benefit from discharge to post-acute rehabilitation services  Please also refer to the recommendation of the Physical Therapist for safe discharge planning  Pt tolerated session fairly  She demonstrated minimal to no SIMS and maintained O2 on 5 lpm midflow >/= 90% with good pleth  However, pt required significantly increased assistance to achieve standing  She is unable to maintain standing to trial wt shifting needing to return to sitting immediately  She has not made progress toward STGs as she is limited by complex medical course with increased supplemental O2 needs  Her need for supplemental O2 is decreasing, however she continues to require increased assistance for all mobility  She is limited by decreased strength, balance, endurance and cognitive deficits  She will continue to benefit from PT services to maximize LOF  Updated STGs below  Goals: Pt will: Perform bed mobility tasks with modAx1 to reposition in bed and prepare for transfers  Pt will perform transfers with consistent modAx1 for sit<>stand and maxAx1 for spt with RW to decrease burden of care, decrease risk for falls and improve ease of transfers and prepare for ambulation   Pt will ambulate with RW for >/= 10' with  maxAx1-2  to decrease burden of care, decrease risk for falls, improve ease of transfers, improve activity tolerance and improve gait quality and to access home environment  Pt will increase B LE strength >/= 1/2 MMT grade to facilitate functional mobility       Rivera Martinez, PT,DPT

## 2021-07-30 NOTE — SPEECH THERAPY NOTE
Speech/Language Pathology Progress Note    Patient Name: Yesi Monzon  [id number] Date: 7/30/2021    Subjective:  Pt awake and alert, seen for ST at the end of her lunch  Objective:  Pt seen for diagnostic swallow therapy  Current diet is puree and NTLs  Caregiver from group home present during session  Due to pt's impulsive rate of intake and suspected delayed pharyngeal swallow with reduced hyolaryngeal elevation, it is recommended to continue on current diet  Suspect increased risk for aspiration with thin liquids  Per caregiver pt was on puree at group home previously when she had all of her teeth pulled  Once she healed she was advanced back to chopped diet, however caregiver feels puree may have been more appropriate  No one at pt's group home on thickened liquids, however other residents are on puree  Caregiver was educated on liquid consistencies, and pre thickened liquids vs added powdered thickener  Also educated on reasoning for thickened liquids due to suspected aspiration PNA  Assessment:  Pt is tolerating puree diet and NTLs, recommend continuing same  Plan/Recommendations:  Continue puree diet and NTL  ST will continue to follow

## 2021-07-30 NOTE — PLAN OF CARE
Problem: PHYSICAL THERAPY ADULT  Goal: Performs mobility at highest level of function for planned discharge setting  See evaluation for individualized goals  Description: Treatment/Interventions: Functional transfer training, LE strengthening/ROM, Therapeutic exercise, Endurance training, Patient/family training, Equipment eval/education, Bed mobility, Gait training, Compensatory technique education, Spoke to nursing, Spoke to case management, OT  Equipment Recommended:  (TBD by rehab)       See flowsheet documentation for full assessment, interventions and recommendations  Outcome: Not Progressing  Note: Prognosis: Fair  Problem List: Decreased strength, Decreased endurance, Impaired balance, Decreased mobility, Decreased cognition, Impaired judgement, Decreased safety awareness, Obesity, Decreased skin integrity  Assessment: Pt tolerated session fairly  She demonstrated minimal to no SIMS and maintained O2 on 5 lpm midflow >/= 90% with good pleth  However, pt required significantly increased assistance to achieve standing  She is unable to maintain standing to trial wt shifting needing to return to sitting immediately  She has not made progress toward STGs as she is limited by complex medical course with increased supplemental O2 needs  Her need for supplemental O2 is decreasing, however she continues to require increased assistance for all mobility  She is limited by decreased strength, balance, endurance and cognitive deficits  She will continue to benefit from PT services to maximize LOF  Barriers to Discharge: Decreased caregiver support, Inaccessible home environment  Barriers to Discharge Comments: requires increased assistance to complete all mobility     PT Discharge Recommendation: Post acute rehabilitation services     PT - OK to Discharge:  (when medically cleared to rehab)    See flowsheet documentation for full assessment

## 2021-07-30 NOTE — PLAN OF CARE
Problem: MOBILITY - ADULT  Goal: Maintain or return to baseline ADL function  Description: INTERVENTIONS:  -  Assess patient's ability to carry out ADLs; assess patient's baseline for ADL function and identify physical deficits which impact ability to perform ADLs (bathing, care of mouth/teeth, toileting, grooming, dressing, etc )  - Assess/evaluate cause of self-care deficits   - Assess range of motion  - Assess patient's mobility; develop plan if impaired  - Assess patient's need for assistive devices and provide as appropriate  - Encourage maximum independence but intervene and supervise when necessary  - Involve family in performance of ADLs  - Assess for home care needs following discharge   - Consider OT consult to assist with ADL evaluation and planning for discharge  - Provide patient education as appropriate  Outcome: Progressing  Goal: Maintains/Returns to pre admission functional level  Description: INTERVENTIONS:  - Perform BMAT or MOVE assessment daily    - Set and communicate daily mobility goal to care team and patient/family/caregiver  - Collaborate with rehabilitation services on mobility goals if consulted  - Perform Range of Motion 3 times a day  - Reposition patient every 2 hours    - Dangle patient 3 times a day  - Stand patient 3 times a day  - Ambulate patient 3 times a day  - Out of bed to chair 3 times a day   - Out of bed for meals 3 times a day  - Out of bed for toileting  - Record patient progress and toleration of activity level   Outcome: Progressing

## 2021-07-31 LAB
GLUCOSE SERPL-MCNC: 155 MG/DL (ref 65–140)
GLUCOSE SERPL-MCNC: 158 MG/DL (ref 65–140)
GLUCOSE SERPL-MCNC: 176 MG/DL (ref 65–140)
GLUCOSE SERPL-MCNC: 183 MG/DL (ref 65–140)

## 2021-07-31 PROCEDURE — 82948 REAGENT STRIP/BLOOD GLUCOSE: CPT

## 2021-07-31 PROCEDURE — 99233 SBSQ HOSP IP/OBS HIGH 50: CPT | Performed by: ANESTHESIOLOGY

## 2021-07-31 PROCEDURE — 94640 AIRWAY INHALATION TREATMENT: CPT

## 2021-07-31 PROCEDURE — 94760 N-INVAS EAR/PLS OXIMETRY 1: CPT

## 2021-07-31 RX ORDER — IPRATROPIUM BROMIDE AND ALBUTEROL SULFATE 2.5; .5 MG/3ML; MG/3ML
3 SOLUTION RESPIRATORY (INHALATION) EVERY 4 HOURS PRN
Status: DISCONTINUED | OUTPATIENT
Start: 2021-07-31 | End: 2021-08-11 | Stop reason: HOSPADM

## 2021-07-31 RX ADMIN — ATORVASTATIN CALCIUM 80 MG: 40 TABLET, FILM COATED ORAL at 15:43

## 2021-07-31 RX ADMIN — INSULIN LISPRO 4 UNITS: 100 INJECTION, SOLUTION INTRAVENOUS; SUBCUTANEOUS at 12:05

## 2021-07-31 RX ADMIN — ENOXAPARIN SODIUM 40 MG: 40 INJECTION SUBCUTANEOUS at 08:42

## 2021-07-31 RX ADMIN — SALINE NASAL SPRAY 1 SPRAY: 1.5 SOLUTION NASAL at 08:43

## 2021-07-31 RX ADMIN — INSULIN LISPRO 2 UNITS: 100 INJECTION, SOLUTION INTRAVENOUS; SUBCUTANEOUS at 21:57

## 2021-07-31 RX ADMIN — STANDARDIZED SENNA CONCENTRATE 8.6 MG: 8.6 TABLET ORAL at 21:56

## 2021-07-31 RX ADMIN — DIVALPROEX SODIUM 250 MG: 250 TABLET, DELAYED RELEASE ORAL at 05:09

## 2021-07-31 RX ADMIN — SODIUM CHLORIDE SOLN NEBU 3% 4 ML: 3 NEBU SOLN at 02:35

## 2021-07-31 RX ADMIN — GABAPENTIN 400 MG: 400 CAPSULE ORAL at 08:43

## 2021-07-31 RX ADMIN — DIVALPROEX SODIUM 250 MG: 250 TABLET, DELAYED RELEASE ORAL at 21:56

## 2021-07-31 RX ADMIN — INSULIN GLARGINE 10 UNITS: 100 INJECTION, SOLUTION SUBCUTANEOUS at 21:57

## 2021-07-31 RX ADMIN — SERTRALINE HYDROCHLORIDE 100 MG: 50 TABLET ORAL at 21:56

## 2021-07-31 RX ADMIN — GABAPENTIN 400 MG: 400 CAPSULE ORAL at 21:56

## 2021-07-31 RX ADMIN — IPRATROPIUM BROMIDE AND ALBUTEROL SULFATE 3 ML: 2.5; .5 SOLUTION RESPIRATORY (INHALATION) at 02:35

## 2021-07-31 RX ADMIN — GABAPENTIN 400 MG: 400 CAPSULE ORAL at 15:43

## 2021-07-31 RX ADMIN — DIVALPROEX SODIUM 250 MG: 250 TABLET, DELAYED RELEASE ORAL at 15:00

## 2021-07-31 RX ADMIN — INSULIN LISPRO 4 UNITS: 100 INJECTION, SOLUTION INTRAVENOUS; SUBCUTANEOUS at 17:02

## 2021-07-31 RX ADMIN — FERROUS SULFATE TAB 325 MG (65 MG ELEMENTAL FE) 325 MG: 325 (65 FE) TAB at 08:45

## 2021-07-31 RX ADMIN — OXYBUTYNIN 10 MG: 5 TABLET, FILM COATED, EXTENDED RELEASE ORAL at 08:43

## 2021-07-31 RX ADMIN — QUETIAPINE FUMARATE 200 MG: 50 TABLET, EXTENDED RELEASE ORAL at 21:56

## 2021-07-31 RX ADMIN — ASPIRIN 81 MG: 81 TABLET, COATED ORAL at 08:43

## 2021-07-31 RX ADMIN — PANTOPRAZOLE SODIUM 40 MG: 40 TABLET, DELAYED RELEASE ORAL at 05:09

## 2021-07-31 RX ADMIN — INSULIN LISPRO 4 UNITS: 100 INJECTION, SOLUTION INTRAVENOUS; SUBCUTANEOUS at 08:43

## 2021-07-31 NOTE — ASSESSMENT & PLAN NOTE
Lab Results   Component Value Date    HGBA1C 6 6 (H) 07/19/2021       Recent Labs     07/30/21  0729 07/30/21  1202 07/30/21  1600 07/30/21  2209   POCGLU 171* 181* 219* 220*       Blood Sugar Average: Last 72 hrs:  (P) 563 [id number]   · Not on meds prehospital  · Continue sliding scale insulin  · Started lantus 6 units HS 07/25  · Pt now NPO, may require increase in SSI algorithm until oral intake is re-established  · If blood glucose remains >180, will increase lantus

## 2021-07-31 NOTE — PROGRESS NOTES
[address]  Progress Note - Galilea Hippo 1955, 77 y o  female MRN: [id number]  Unit/Bed#: -01 Encounter: [id number]  Primary Care Provider: Nba Baez MD   Date and time admitted to hospital: 7/18/2021  2:07 PM    * Sepsis due to pneumonia [facility])  Assessment & Plan  · POA a/e/b - tachypnea, tachycardia, hypoxia   · Sepsis secondary to pneumonia likely aspiration vs gram-negative  · CXR 7/20 progressive b/l infiltrates R>L  · 7/20 Continue cefepime and metronidazole  Discontinue azithromycin and add Vanco given group home setting and worsening infiltrate on CXR    · 7/22 Due to low grade fevers, clinical deterioration, and positive sputum culture Cefepime changed to Meropenum  · Antibiotics completed:  · Meropenum 07/21 - 07/25  · Vancomycin 07/20 - 0723, d/c due to negative MRSA swab, culture results MRF  · Metronidazole 07/18 - 07/24  · Azithromycin 07/18, 07/20  · Ceftriaxone 07/18  · Blood cultures - negative  · Flu/RSV - negative  · Sputum culture Gram neg coccobacilli, GNR, and GPC; MRF  · COVID negative on admission and patient fully vaccinated  · PCT negative: <0 05 > 0 08 > 0 14 > 0 08  · See plan for aspiration below  · 7/26 CT chest - multifocal pneumonia with improvement in right lower lobe with worsening in b/l upper lobes  · awake bronchoscopy 7/27  · 7/27 CXR with left pleural effusion and left base opacity    Acute respiratory failure with hypoxia (HCC)  Assessment & Plan  · Acute respiratory failure/hypoxia secondary to pneumonia with component of pulmonary edema, progressed to dense consolidation with likely mucous plugging  · CTA negative for pulmonary embolism- see plan under PNA  · 7/20 Escalated to midflow then required transfer for HFNC under Critical Care  · 7/21 HFNC + NRB, tolerated off NRB in the evening  · 7/22 progressive hypoxia on HFNC - placed on CPAP with improvement, CXR - progressive infiltrate R middle lobe, highly suggestion of mucous plugging and aspiration  · 7/26 CT chest - multifocal pneumonia with improvement in right lower lobe with worsening in b/l upper lobes, mild interstitial edema, mod b/l atelectasis, small b/l pleural effusions  · Pt tolerated HFNC at 40L/ 40%, transitioned to midflow on 7/30  · continue Bipap 18/12 35% HS - d/c 7/30  · Continue to wean as able   · Goal SpO2 >92%  · Trend ABG as needed  · Pt transitioned to additional 250 of diamox today, likely transition to BID on 7/30  · Pt on oxybutinin  · Encourage fluids   · Goal neg to even fluid balance  · Cont duoneb, 3% saline, chest PT, suction  · Due to cognitive delay, pt is unable to fully participate in aggressive pulmonary toilet  SLP re-evaluation on 07/23 cleared for puree with nectar thick  Diet resumed 07/25  Aspiration precautions  · Likely has undiagnosed sleep apnea  · 7/27 awake bronchoscopy-with mucus plugs in right middle and lower lobes   · 7/27 CXR with left pleural effusion and left basilar opacity    Aspiration pneumonia (HCC)  Assessment & Plan  · Chest x-ray showed suspected right middle lobe consolidation  · CTA Chest:   No central pulmonary emboli identified  Significant interval worsening in airspace consolidation and patchy infiltrates  involving the right upper lobe to the greatest degree posterior segment but also elsewhere within the right upper lobe  There is also interval worsening of the consolidation and volume loss in the dependent lower lobes bilaterally  Also  component of chronic volume loss related to elevated right hemidiaphragm  Focally dilated main pulmonary artery  Assess for pulmonary valve disease     · Completed 7 days of antibiotics - see course above  · Aspiration precautions  · Sputum culture - gram neg and gram positive  · Speech therapy saw patient 7/23 recommends mechanically altered/level 1 diet and nectar thick liquids  · Witnessed aspiration 7/21 - made NPO and awaiting re-evaluation by SLP  · Re-evaluation by SLP on 07/23 - same recommendation  Diet resumed on 07/24 when mental status improved to baseline  Intellectual disability  Assessment & Plan  · Cognitive intellectual disability, non-verbal  · Resides in Group home  · Appears to be a baseline  Psychiatric disorder  Assessment & Plan  · Congential Intellectual Disabiltiy, Anxiety, Psychosis- continue gabapentin, quetiapine, lorazepam, and sertraline  · Home regimen of seroquel is 100 mg q am and 1600 with 200 mg q hs  · Home medications held after transfer to  due to somnolence  · Mental status improved and appears at baseline  · Seroquel 200 mg PO HS resumed 07/24   · Lorazepam 0 5 mg BID resumed 07/24 - hold for sedation, low threshold to discontinue  · Sertraline 100 mg PO HS resumed 07/24  · Continue Zyprexa 2 5 mg PO daily prn    Seizure disorder [facility])  Assessment & Plan  · Cont home Depakote  mg q am and 500 mg q pm  · Changed to IV due to NPO  · Valproic acid level in 7/21 was 6 5  · Resumed PO depakote 07/25 - currently on 250 mg Q 8 hours    Obesity (BMI 30 0-34  9)  Assessment & Plan  · Nutrition consulted     Type 2 diabetes mellitus without complication, without long-term current use of insulin [facility])  Assessment & Plan  Lab Results   Component Value Date    HGBA1C 6 6 (H) 07/19/2021       Recent Labs     07/30/21  0729 07/30/21  1202 07/30/21  1600 07/30/21  2209   POCGLU 171* 181* 219* 220*       Blood Sugar Average: Last 72 hrs:  (P) 845 [id number]   · Not on meds prehospital  · Continue sliding scale insulin  · Started lantus 6 units HS 07/25  · Pt now NPO, may require increase in SSI algorithm until oral intake is re-established  · If blood glucose remains >180, will increase lantus       ----------------------------------------------------------------------------------------  HPI/24hr events: She tolerated midflow NC 5L for most of the day on 7/30 after being weaned off of HFNC    Overnight, it was noted she became hypoxic when she removed her NC and during chest physiotherapy  There were no other acute events overnight and she is awaiting placement in acute rehab per Case Management  Disposition: Discharge pending rehab placement  Continue Med Surg  Code Status: Level 3 - DNAR and DNI  ---------------------------------------------------------------------------------------    Review of Systems  Review of systems was unable to be performed secondary to intellectual disability   ---------------------------------------------------------------------------------------  OBJECTIVE    Vitals   Vitals:    21 1556 21 2043 21 2210 21 0235   BP: 119/57  (!) 108/48    BP Location: Left arm  Right arm    Pulse: 72  63    Resp: 20  20    Temp: 98 4 °F (36 9 °C)  98 °F (36 7 °C)    TempSrc: Axillary  Temporal    SpO2: 94% 97% 96% 94%   Weight:       Height:         Temp (24hrs), Av 2 °F (36 8 °C), Min:97 6 °F (36 4 °C), Max:98 8 °F (37 1 °C)  Current: Temperature: 98 °F (36 7 °C)          Respiratory:  SpO2: SpO2: 94 %  Nasal Cannula O2 Flow Rate (L/min): 6 L/min    Invasive/non-invasive ventilation settings   Respiratory    Lab Data (Last 4 hours)    None         O2/Vent Data (Last 4 hours)    None                Physical Exam  Constitutional:       General: She is not in acute distress  Appearance: She is obese  She is not ill-appearing or diaphoretic  HENT:      Head: Normocephalic and atraumatic  Right Ear: External ear normal       Left Ear: External ear normal       Nose: Nose normal  No congestion  Mouth/Throat:      Mouth: Mucous membranes are moist       Pharynx: Oropharynx is clear  Eyes:      General: No scleral icterus  Right eye: No discharge  Left eye: No discharge  Extraocular Movements: Extraocular movements intact  Conjunctiva/sclera: Conjunctivae normal       Pupils: Pupils are equal, round, and reactive to light     Cardiovascular:      Rate and Rhythm: Normal rate and regular rhythm  Pulmonary:      Effort: Pulmonary effort is normal  No respiratory distress  Breath sounds: No stridor  Examination of the right-lower field reveals decreased breath sounds  Examination of the left-lower field reveals decreased breath sounds  Decreased breath sounds present  No wheezing, rhonchi or rales  Chest:      Chest wall: No tenderness  Abdominal:      General: Bowel sounds are normal  There is no distension  Palpations: Abdomen is soft  There is no mass  Tenderness: There is no abdominal tenderness  There is no guarding or rebound  Musculoskeletal:         General: No swelling, tenderness, deformity or signs of injury  Normal range of motion  Cervical back: Normal range of motion and neck supple  No rigidity or tenderness  Right lower leg: No edema  Left lower leg: No edema  Skin:     General: Skin is warm and dry  Capillary Refill: Capillary refill takes less than 2 seconds  Coloration: Skin is not jaundiced or pale  Findings: No rash  Neurological:      General: No focal deficit present  Mental Status: She is alert  Sensory: No sensory deficit  Comments: Non-verbal at baseline           Laboratory and Diagnostics:  Results from last 7 days   Lab Units 07/30/21 0454 07/29/21 0449 07/28/21  0552 07/27/21  0427 07/26/21  0439 07/25/21  0448 07/24/21  0545   WBC Thousand/uL 6 73 6 33 6 59 6 53 5 69 4 99 5 41   HEMOGLOBIN g/dL 8 4* 8 7* 9 0* 9 7* 9 8* 9 6* 9 2*   HEMATOCRIT % 27 9* 29 4* 29 8* 33 2* 33 2* 31 6* 30 0*   PLATELETS Thousands/uL 263 281 293 314 315 276 247   NEUTROS PCT % 69  --   --   --  59 62  --    MONOS PCT % 6  --   --   --  10 14*  --      Results from last 7 days   Lab Units 07/30/21 0454 07/29/21 0449 07/28/21  0552 07/27/21  0427 07/26/21  1605 07/26/21  0439 07/25/21  0448   SODIUM mmol/L 141 148* 148* 148* 151* 152* 148*   POTASSIUM mmol/L 4 2 4 0 4 0 4 5 4 2 4 2 3 8   CHLORIDE mmol/L 106 110* 108 110* 110* 115* 109*   CO2 mmol/L 32 38* 38* 37* 39* 39* 36*   ANION GAP mmol/L 3* 0* 2* 1* 2* -2* 3*   BUN mg/dL 13 20 24 23 22 23 24   CREATININE mg/dL 0 59* 0 56* 0 52* 0 51* 0 47* 0 57* 0 56*   CALCIUM mg/dL 8 9 9 0 9 2 9 4 9 7 9 6 9 4   GLUCOSE RANDOM mg/dL 185* 206* 203* 269* 171* 180* 266*     Results from last 7 days   Lab Units 07/30/21  0454 07/25/21  0448 07/24/21  0545   MAGNESIUM mg/dL 1 9 2 2 2 1   PHOSPHORUS mg/dL 3 7 3 0 3 4                   ABG:  Results from last 7 days   Lab Units 07/27/21  0910   PH ART  7 483*   PCO2 ART mm Hg 53 3*   PO2 ART mm Hg 99 4   HCO3 ART mmol/L 39 1*   BASE EXC ART mmol/L 13 7   ABG SOURCE  Radial, Left     VBG:  Results from last 7 days   Lab Units 07/27/21  0910   ABG SOURCE  Radial, Left     Results from last 7 days   Lab Units 07/27/21  0427 07/26/21  0439   PROCALCITONIN ng/ml <0 05 <0 05       Micro          Intake and Output  I/O       07/29 0701 - 07/30 0700 07/30 0701 - 07/31 0700    P  O  2350 1200    Total Intake(mL/kg) 2350 (27 9) 1200 (14 2)    Urine (mL/kg/hr) 600 (0 3) 950 (0 5)    Total Output 600 950    Net +1750 +250                Height and Weights   Height: 5' (152 4 cm)  IBW (Ideal Body Weight): 45 5 kg  Body mass index is 36 3 kg/m²  Weight (last 2 days)     Date/Time   Weight    07/30/21 0544   84 3 (185 85)    07/29/21 0400   82 3 (181 44)                Nutrition       Diet Orders   (From admission, onward)             Start     Ordered    07/27/21 1651  Diet Dysphagia/Modified Consistency; Dysphagia 1-Pureed; Nectar Thick Liquid; Consistent Carbohydrate Diet Level 1 (4 carb servings/60 grams CHO/meal)  Diet effective midnight     Question Answer Comment   Diet Type Dysphagia/Modified Consistency    Dysphagia/Modified Consistency Dysphagia 1-Pureed    Liquid Modifier Nectar Thick Liquid    Other Restriction(s): Consistent Carbohydrate Diet Level 1 (4 carb servings/60 grams CHO/meal)    RD to adjust diet per protocol?  Yes 07/27/21 1651                  Active Medications  Scheduled Meds:  Current Facility-Administered Medications   Medication Dose Route Frequency Provider Last Rate    acetaminophen  640 mg Oral Q6H PRN Corrinne Rist, PA-C      aspirin  81 mg Oral Daily Dave Rodriguez PA-C      atorvastatin  80 mg Oral Daily With Bari Harrison PA-C      bisacodyl  10 mg Rectal Daily PRN KRISTOPHER Aparicio      divalproex sodium  250 mg Oral [facility] Corrinclari Giordano PA-C      enoxaparin  40 mg Subcutaneous Q24H [facility] Bettina S KevenKRISTOPHER      ferrous sulfate  325 mg Oral Daily With Breakfast Corrinne Rist, PA-C      gabapentin  400 mg Oral TID Corrinne Rist, PA-C      insulin glargine  10 Units Subcutaneous HS Alana Solano PA-C      insulin lispro  2-12 Units Subcutaneous HS KRISTOPHER Barrios      insulin lispro  2-12 Units Subcutaneous TID AC Deuce Wolf PA-C      insulin lispro  4 Units Subcutaneous TID With Meals Alessio Carpio Jr, PA-C      ipratropium-albuterol  3 mL Nebulization Q6H KRISTOPHER Hairston      ketamine  50 mg Intravenous Once Peng Yost MD      lidocaine  10 mL Topical Once Peng Yost MD      LORazepam  0 5 mg Oral BID PRN KRISTOPHER Brwone      ondansetron  4 mg Intravenous Q6H PRN Corrinclari Giordano PA-C      oxybutynin  10 mg Oral Daily Corrinne Rist, [place]      pantoprazole  40 mg Oral Early Morning Corrinne Rist, PA-C      QUEtiapine  200 mg Oral HS Corrinne RisCARLOS bullard      senna  1 tablet Oral HS KRISTOPHER Barrios      sertraline  100 mg Oral HS Corrinclari Giordano PA-C      sodium chloride  1 spray Each Nare Q1H PRN KRISTOPHER Pendleton      sodium chloride  1 spray Each Nare TID KRISTOPHER Browne      sodium chloride  4 mL Nebulization Q6H KRSITOPHER Cason       Continuous Infusions:     PRN Meds:   acetaminophen, 640 mg, Q6H PRN  bisacodyl, 10 mg, Daily PRN  LORazepam, 0 5 mg, BID PRN  ondansetron, 4 mg, Q6H PRN  sodium chloride, 1 spray, Q1H PRN        Invasive Devices Review  Invasive Devices     Peripheral Intravenous Line            Peripheral IV 07/29/21 Dorsal (posterior); Left Forearm 1 day          Drain            External Urinary Catheter 4 days                Rationale for remaining devices: NA  ---------------------------------------------------------------------------------------  Advance Directive and Living Will:      Power of :    POLST:    ---------------------------------------------------------------------------------------  Care Time Delivered:   No Critical Care time spent       [employer], PA-C      Portions of the record may have been created with voice recognition software  Occasional wrong word or "sound a like" substitutions may have occurred due to the inherent limitations of voice recognition software    Read the chart carefully and recognize, using context, where substitutions have occurred

## 2021-07-31 NOTE — RESPIRATORY THERAPY NOTE
RT Protocol Note  Tamara Oriental orthodox 77 y o  female MRN: [id number]  Unit/Bed#: -01 Encounter: [id number]    Assessment    Principal Problem:    Sepsis due to pneumonia [facility])  Active Problems:    Aspiration pneumonia ([place] [address] )    Psychiatric disorder    Type 2 diabetes mellitus without complication, without long-term current use of insulin ([place] [address] )    Acute respiratory failure with hypoxia (HCC)    Obesity (BMI 30 0-34  9)    Seizure disorder ([place] [address] )    Intellectual disability      Home Pulmonary Medications:  none       Past Medical History:   Diagnosis Date    Anxiety     Diabetes mellitus ([place] [address] )     GERD (gastroesophageal reflux disease)     Hyperlipidemia     Hypertension     Mental disability     Mixed incontinence 4/12/2017    Seizure disorder ([place] [address] )      Social History     Socioeconomic History    Marital status: Single     Spouse name: None    Number of children: None    Years of education: None    Highest education level: None   Occupational History    None   Tobacco Use    Smoking status: Never Smoker    Smokeless tobacco: Never Used   Vaping Use    Vaping Use: Never used   Substance and Sexual Activity    Alcohol use: Not Currently    Drug use: Not Currently    Sexual activity: Not Currently   Other Topics Concern    None   Social History Narrative    None     Social Determinants of Health     Financial Resource Strain:     Difficulty of Paying Living Expenses:    Food Insecurity:     Worried About Running Out of Food in the Last Year:     Ran Out of Food in the Last Year:    Transportation Needs:     Lack of Transportation (Medical):      Lack of Transportation (Non-Medical):    Physical Activity:     Days of Exercise per Week:     Minutes of Exercise per Session:    Stress:     Feeling of Stress :    Social Connections:     Frequency of Communication with Friends and Family:     Frequency of Social Gatherings with Friends and Family:     Attends Yazidism Services:     Active Member of Clubs or Organizations:     Attends Club or Organization Meetings:     Marital Status:    Intimate Partner Violence:     Fear of Current or Ex-Partner:     Emotionally Abused:     Physically Abused:     Sexually Abused:        Subjective    Subjective Data: CPT with bed  Patient continues to have periods of SpO2 desaturation during CPT    Objective    Physical Exam:   Assessment Type: During-treatment  General Appearance: Awake, Alert  Respiratory Pattern: Normal  Chest Assessment: Chest expansion symmetrical  Bilateral Breath Sounds: Clear, Diminished  Cough: Non-productive  O2 Device: 6L midflow    Vitals:  Blood pressure (!) 108/48, pulse 63, temperature 98 °F (36 7 °C), temperature source Temporal, resp  rate 20, height 5' (1 524 m), weight 84 3 kg (185 lb 13 6 oz), SpO2 94 %      Results from last 7 days   Lab Units 07/27/21  0910 07/25/21  0415   PH ART  7 483* 7 441   PCO2 ART mm Hg 53 3* 50 4*   PO2 ART mm Hg 99 4 77 0   HCO3 ART mmol/L 39 1* 33 5*   BASE EXC ART mmol/L 13 7 8 1   O2 CONTENT ART mL/dL 14 8* 16 2   O2 HGB, ARTERIAL % 96 6 94 0   ABG SOURCE  Radial, Left  --    SOPHIA TEST  Yes Yes   NON VENT ROOM AIR %  --  60       Imaging and other studies: performed greater than three days prior    O2 Device: 6L midflow     Plan    Respiratory Plan: Mild Distress pathway  Airway Clearance Plan: Percussive Vest     Resp Comments: patient woke for mask placement then fell back to sleep

## 2021-07-31 NOTE — RESPIRATORY THERAPY NOTE
RT Protocol Note  Palma Teixeira 77 y o  female MRN: [id number]  Unit/Bed#: -01 Encounter: [id number]    Assessment    Principal Problem:    Sepsis due to pneumonia [facility])  Active Problems:    Aspiration pneumonia ([place] [address] )    Psychiatric disorder    Type 2 diabetes mellitus without complication, without long-term current use of insulin ([place] [address] )    Acute respiratory failure with hypoxia (HCC)    Obesity (BMI 30 0-34  9)    Seizure disorder ([place] [address] )    Intellectual disability      Home Pulmonary Medications:       Past Medical History:   Diagnosis Date    Anxiety     Diabetes mellitus ([place] [address] )     GERD (gastroesophageal reflux disease)     Hyperlipidemia     Hypertension     Mental disability     Mixed incontinence 4/12/2017    Seizure disorder ([place] [address] )      Social History     Socioeconomic History    Marital status: Single     Spouse name: None    Number of children: None    Years of education: None    Highest education level: None   Occupational History    None   Tobacco Use    Smoking status: Never Smoker    Smokeless tobacco: Never Used   Vaping Use    Vaping Use: Never used   Substance and Sexual Activity    Alcohol use: Not Currently    Drug use: Not Currently    Sexual activity: Not Currently   Other Topics Concern    None   Social History Narrative    None     Social Determinants of Health     Financial Resource Strain:     Difficulty of Paying Living Expenses:    Food Insecurity:     Worried About Running Out of Food in the Last Year:     Ran Out of Food in the Last Year:    Transportation Needs:     Lack of Transportation (Medical):      Lack of Transportation (Non-Medical):    Physical Activity:     Days of Exercise per Week:     Minutes of Exercise per Session:    Stress:     Feeling of Stress :    Social Connections:     Frequency of Communication with Friends and Family:     Frequency of Social Gatherings with Friends and Family:     Attends Catholic Services:     Active Member of Clubs or Organizations:     Attends Club or Organization Meetings:     Marital Status:    Intimate Partner Violence:     Fear of Current or Ex-Partner:     Emotionally Abused:     Physically Abused:     Sexually Abused:        Subjective    Subjective Data: Pt nonverbal at baseline  Objective    Physical Exam:   Assessment Type: Assess only  General Appearance: Alert, Awake  Respiratory Pattern: Normal  Chest Assessment: Chest expansion symmetrical  Bilateral Breath Sounds: Diminished  O2 Device: 6LPM NC    Vitals:  Blood pressure 107/53, pulse 60, temperature 98 °F (36 7 °C), temperature source Oral, resp  rate 18, height 5' (1 524 m), weight 84 9 kg (187 lb 2 7 oz), SpO2 96 %  Results from last 7 days   Lab Units 07/27/21  0910 07/25/21  0415   PH ART  7 483* 7 441   PCO2 ART mm Hg 53 3* 50 4*   PO2 ART mm Hg 99 4 77 0   HCO3 ART mmol/L 39 1* 33 5*   BASE EXC ART mmol/L 13 7 8 1   O2 CONTENT ART mL/dL 14 8* 16 2   O2 HGB, ARTERIAL % 96 6 94 0   ABG SOURCE  Radial, Left  --    SOPHIA TEST  Yes Yes   NON VENT ROOM AIR %  --  60       Imaging and other studies: I have personally reviewed pertinent reports  O2 Device: 6LPM NC     Plan    Respiratory Plan: Mild Distress pathway  Airway Clearance Plan: Percussive Vest     Resp Comments: Pt taken off midflow and placed on 6LPM NC, tolerating well

## 2021-08-01 PROBLEM — D64.9 ACUTE ANEMIA: Status: ACTIVE | Noted: 2021-08-01

## 2021-08-01 LAB
ANION GAP SERPL CALCULATED.3IONS-SCNC: 1 MMOL/L (ref 4–13)
BUN SERPL-MCNC: 12 MG/DL (ref 5–25)
CALCIUM SERPL-MCNC: 9.2 MG/DL (ref 8.3–10.1)
CHLORIDE SERPL-SCNC: 108 MMOL/L (ref 100–108)
CO2 SERPL-SCNC: 35 MMOL/L (ref 21–32)
CREAT SERPL-MCNC: 0.5 MG/DL (ref 0.6–1.3)
ERYTHROCYTE [DISTWIDTH] IN BLOOD BY AUTOMATED COUNT: 15.1 % (ref 11.6–15.1)
FERRITIN SERPL-MCNC: 350 NG/ML (ref 8–388)
GFR SERPL CREATININE-BSD FRML MDRD: 100 ML/MIN/1.73SQ M
GLUCOSE SERPL-MCNC: 115 MG/DL (ref 65–140)
GLUCOSE SERPL-MCNC: 149 MG/DL (ref 65–140)
GLUCOSE SERPL-MCNC: 159 MG/DL (ref 65–140)
GLUCOSE SERPL-MCNC: 161 MG/DL (ref 65–140)
HCT VFR BLD AUTO: 29.7 % (ref 34.8–46.1)
HGB BLD-MCNC: 8.9 G/DL (ref 11.5–15.4)
IRON SATN MFR SERPL: 32 %
IRON SERPL-MCNC: 57 UG/DL (ref 50–170)
MCH RBC QN AUTO: 29 PG (ref 26.8–34.3)
MCHC RBC AUTO-ENTMCNC: 30 G/DL (ref 31.4–37.4)
MCV RBC AUTO: 97 FL (ref 82–98)
PLATELET # BLD AUTO: 291 THOUSANDS/UL (ref 149–390)
PMV BLD AUTO: 8.9 FL (ref 8.9–12.7)
POTASSIUM SERPL-SCNC: 4 MMOL/L (ref 3.5–5.3)
RBC # BLD AUTO: 3.07 MILLION/UL (ref 3.81–5.12)
SODIUM SERPL-SCNC: 144 MMOL/L (ref 136–145)
TIBC SERPL-MCNC: 176 UG/DL (ref 250–450)
VIT B12 SERPL-MCNC: 579 PG/ML (ref 100–900)
WBC # BLD AUTO: 5.62 THOUSAND/UL (ref 4.31–10.16)

## 2021-08-01 PROCEDURE — 83540 ASSAY OF IRON: CPT | Performed by: FAMILY MEDICINE

## 2021-08-01 PROCEDURE — 85027 COMPLETE CBC AUTOMATED: CPT | Performed by: INTERNAL MEDICINE

## 2021-08-01 PROCEDURE — 99232 SBSQ HOSP IP/OBS MODERATE 35: CPT | Performed by: FAMILY MEDICINE

## 2021-08-01 PROCEDURE — 82607 VITAMIN B-12: CPT | Performed by: FAMILY MEDICINE

## 2021-08-01 PROCEDURE — 82948 REAGENT STRIP/BLOOD GLUCOSE: CPT

## 2021-08-01 PROCEDURE — 80048 BASIC METABOLIC PNL TOTAL CA: CPT | Performed by: INTERNAL MEDICINE

## 2021-08-01 PROCEDURE — 82728 ASSAY OF FERRITIN: CPT | Performed by: FAMILY MEDICINE

## 2021-08-01 PROCEDURE — 83550 IRON BINDING TEST: CPT | Performed by: FAMILY MEDICINE

## 2021-08-01 RX ADMIN — QUETIAPINE FUMARATE 200 MG: 50 TABLET, EXTENDED RELEASE ORAL at 21:14

## 2021-08-01 RX ADMIN — ASPIRIN 81 MG: 81 TABLET, COATED ORAL at 08:21

## 2021-08-01 RX ADMIN — GABAPENTIN 400 MG: 400 CAPSULE ORAL at 21:15

## 2021-08-01 RX ADMIN — OXYBUTYNIN 10 MG: 5 TABLET, FILM COATED, EXTENDED RELEASE ORAL at 08:21

## 2021-08-01 RX ADMIN — INSULIN LISPRO 2 UNITS: 100 INJECTION, SOLUTION INTRAVENOUS; SUBCUTANEOUS at 21:15

## 2021-08-01 RX ADMIN — ENOXAPARIN SODIUM 40 MG: 40 INJECTION SUBCUTANEOUS at 08:21

## 2021-08-01 RX ADMIN — INSULIN LISPRO 4 UNITS: 100 INJECTION, SOLUTION INTRAVENOUS; SUBCUTANEOUS at 12:48

## 2021-08-01 RX ADMIN — GABAPENTIN 400 MG: 400 CAPSULE ORAL at 15:49

## 2021-08-01 RX ADMIN — DIVALPROEX SODIUM 250 MG: 250 TABLET, DELAYED RELEASE ORAL at 05:04

## 2021-08-01 RX ADMIN — ATORVASTATIN CALCIUM 80 MG: 40 TABLET, FILM COATED ORAL at 15:49

## 2021-08-01 RX ADMIN — DIVALPROEX SODIUM 250 MG: 250 TABLET, DELAYED RELEASE ORAL at 21:15

## 2021-08-01 RX ADMIN — FERROUS SULFATE TAB 325 MG (65 MG ELEMENTAL FE) 325 MG: 325 (65 FE) TAB at 08:21

## 2021-08-01 RX ADMIN — SERTRALINE HYDROCHLORIDE 100 MG: 50 TABLET ORAL at 21:14

## 2021-08-01 RX ADMIN — INSULIN LISPRO 4 UNITS: 100 INJECTION, SOLUTION INTRAVENOUS; SUBCUTANEOUS at 08:22

## 2021-08-01 RX ADMIN — PANTOPRAZOLE SODIUM 40 MG: 40 TABLET, DELAYED RELEASE ORAL at 05:04

## 2021-08-01 RX ADMIN — DIVALPROEX SODIUM 250 MG: 250 TABLET, DELAYED RELEASE ORAL at 14:42

## 2021-08-01 RX ADMIN — GABAPENTIN 400 MG: 400 CAPSULE ORAL at 08:21

## 2021-08-01 RX ADMIN — INSULIN GLARGINE 10 UNITS: 100 INJECTION, SOLUTION SUBCUTANEOUS at 21:15

## 2021-08-01 NOTE — RESPIRATORY THERAPY NOTE
RT Protocol Note  Daniella Marshall 77 y o  female MRN: [id number]  Unit/Bed#: -01 Encounter: [id number]    Assessment    Principal Problem:    Sepsis due to pneumonia [facility])  Active Problems:    Aspiration pneumonia ([place] [address] )    Psychiatric disorder    Type 2 diabetes mellitus without complication, without long-term current use of insulin ([place] [address] )    Acute respiratory failure with hypoxia (HCC)    Obesity (BMI 30 0-34  9)    Seizure disorder ([place] [address] )    Intellectual disability      Home Pulmonary Medications:  none       Past Medical History:   Diagnosis Date    Anxiety     Diabetes mellitus ([place] [address] )     GERD (gastroesophageal reflux disease)     Hyperlipidemia     Hypertension     Mental disability     Mixed incontinence 4/12/2017    Seizure disorder ([place] [address] )      Social History     Socioeconomic History    Marital status: Single     Spouse name: None    Number of children: None    Years of education: None    Highest education level: None   Occupational History    None   Tobacco Use    Smoking status: Never Smoker    Smokeless tobacco: Never Used   Vaping Use    Vaping Use: Never used   Substance and Sexual Activity    Alcohol use: Not Currently    Drug use: Not Currently    Sexual activity: Not Currently   Other Topics Concern    None   Social History Narrative    None     Social Determinants of Health     Financial Resource Strain:     Difficulty of Paying Living Expenses:    Food Insecurity:     Worried About Running Out of Food in the Last Year:     Ran Out of Food in the Last Year:    Transportation Needs:     Lack of Transportation (Medical):      Lack of Transportation (Non-Medical):    Physical Activity:     Days of Exercise per Week:     Minutes of Exercise per Session:    Stress:     Feeling of Stress :    Social Connections:     Frequency of Communication with Friends and Family:     Frequency of Social Gatherings with Friends and Family:     Attends Faith Services:     Active Member of Clubs or Organizations:     Attends Club or Organization Meetings:     Marital Status:    Intimate Partner Violence:     Fear of Current or Ex-Partner:     Emotionally Abused:     Physically Abused:     Sexually Abused:        Subjective    Subjective Data: Pt nonverbal at baseline  Objective    Physical Exam:   Assessment Type: Assess only  General Appearance: Alert, Awake  Respiratory Pattern: Normal  Chest Assessment: Chest expansion symmetrical  Bilateral Breath Sounds: Diminished, Clear  Cough: Non-productive  O2 Device: 2L nasal cannula    Vitals:  Blood pressure 121/58, pulse 78, temperature 97 8 °F (36 6 °C), temperature source Temporal, resp  rate 18, height 5' (1 524 m), weight 84 9 kg (187 lb 2 7 oz), SpO2 92 %  Results from last 7 days   Lab Units 07/27/21  0910 07/25/21  0415   PH ART  7 483* 7 441   PCO2 ART mm Hg 53 3* 50 4*   PO2 ART mm Hg 99 4 77 0   HCO3 ART mmol/L 39 1* 33 5*   BASE EXC ART mmol/L 13 7 8 1   O2 CONTENT ART mL/dL 14 8* 16 2   O2 HGB, ARTERIAL % 96 6 94 0   ABG SOURCE  Radial, Left  --    SOPHIA TEST  Yes Yes   NON VENT ROOM AIR %  --  60       Imaging and other studies: performed greater than three days prior    O2 Device: 2L nasal cannula     Plan    Respiratory Plan: Mild Distress pathway  Airway Clearance Plan: Percussive Vest     Resp Comments: Pt taken off midflow and placed on 6LPM NC, tolerating well

## 2021-08-01 NOTE — ASSESSMENT & PLAN NOTE
· She presented with hemoglobin greater than 10 previously she had a normal hemoglobin drop down to 8 4 today improved to 8 9 she had a fecal occult done which was negative for proceed with vitamin B12 and iron panel  Repeat a level tomorrow there is no evidence of gross bleed  · Continue ferrous sulfate 325 mg p o   Daily along with stool softeners

## 2021-08-01 NOTE — PROGRESS NOTES
[address]  Progress Note - Mile Winston 1955, 77 y o  female MRN: [id number]  Unit/Bed#: -01 Encounter: [id number]  Primary Care Provider: Nola Wiley MD   Date and time admitted to hospital: 7/18/2021  2:07 PM    * Sepsis due to pneumonia [facility])  Assessment & Plan  · Sepsis secondary to pneumonia likely aspiration resolved status post antibiotics  Results from last 7 days   Lab Units 07/20/21  0521 07/18/21 2032   PROCALCITONIN ng/ml 0 08 <0 05       Acute anemia  Assessment & Plan  · She presented with hemoglobin greater than 10 previously she had a normal hemoglobin drop down to 8 4 today improved to 8 9 she had a fecal occult done which was negative for proceed with vitamin B12 and iron panel  Repeat a level tomorrow there is no evidence of gross bleed  · Continue ferrous sulfate 325 mg p o  Daily along with stool softeners    Intellectual disability  Assessment & Plan  · At baseline resides in group home nonverbal    Seizure disorder [facility])  Assessment & Plan  · Continue Depakote    Acute respiratory failure with hypoxia (HCC)  Assessment & Plan  · Acute respiratory failure/hypoxia secondary to pneumonia  · CTA negative for pulmonary embolism  · Patient mid hospitalization had worsening hypoxia and was transferred to critical care service requiring high-flow and BiPAP at night  · 7/27 awake bronchoscopy-with mucus plugs in right middle and lower lobes   · Patient is down on 2 L nasal cannula without any evidence of coughing with current diet    Will repeat a chest x-ray portable tomorrow she still has some rales    Gastroesophageal reflux disease without esophagitis  Assessment & Plan  · Continue pantoprazole    Type 2 diabetes mellitus without complication, without long-term current use of insulin [facility])  Assessment & Plan  Lab Results   Component Value Date    HGBA1C 6 6 (H) 07/19/2021     Recent Labs     07/20/21  0734 07/20/21  1104 07/20/21  1617 21   POCGLU 180* 253* 272* 255*     · Continue sliding scale insulin sugar stable continue Lantus 10 at night Humalog 4 units t i d  With meals she eats adequately and sliding scale    Psychiatric disorder  Assessment & Plan  · Mood disorder continue depakote gabapentin quetiapine lorazepam and sertraline, depakote    Aspiration pneumonia (HCC)  Assessment & Plan  · Chest x-ray showed suspected right middle lobe I had ordered a CT chest looks like right middle lobe suspect aspiration  · Completed 7 days of antibiotics - see course above  · Aspiration precautions  · Sputum culture - gram neg and gram positive  · Speech therapy saw patient  recommends mechanically altered/level 1 diet and nectar thick liquids      VTE Pharmacologic Prophylaxis: VTE Score: 3 Moderate Risk (Score 3-4) - Pharmacological DVT Prophylaxis Ordered: enoxaparin (Lovenox)  Patient Centered Rounds: I performed bedside rounds with nursing staff today  Discussions with Specialists or Other Care Team Provider:  Discussed with nursing    Education and Discussions with Family / Patient: Updated  (brother) via phone  Time Spent for Care: 30 minutes  More than 50% of total time spent on counseling and coordination of care as described above  Current Length of Stay: 14 day(s)  Current Patient Status: Inpatient   Certification Statement: The patient will continue to require additional inpatient hospital stay due to Acute hypoxic respiratory failure  Discharge Plan: Anticipate discharge in 24-48 hrs to rehab facility      Code Status: Level 3 - DNAR and DNI    Subjective:   Patient seen and examined nonverbal discussed with nursing doing well    Objective:     Vitals:   Temp (24hrs), Av °F (36 7 °C), Min:97 3 °F (36 3 °C), Max:98 7 °F (37 1 °C)    Temp:  [97 3 °F (36 3 °C)-98 7 °F (37 1 °C)] 98 °F (36 7 °C)  HR:  [60-78] 68  Resp:  [18-20] 20  BP: (107-121)/(53-58) 113/56  SpO2:  [92 %-96 %] 94 %  Body mass index is 35 69 kg/m²  Input and Output Summary (last 24 hours): Intake/Output Summary (Last 24 hours) at 8/1/2021 0844  Last data filed at 8/1/2021 0827  Gross per 24 hour   Intake 480 ml   Output 900 ml   Net -420 ml       Physical Exam:   Physical Exam  Vitals and nursing note reviewed  Constitutional:       General: She is not in acute distress  Appearance: She is well-developed  She is obese  HENT:      Head: Normocephalic and atraumatic  Eyes:      Conjunctiva/sclera: Conjunctivae normal    Cardiovascular:      Rate and Rhythm: Normal rate and regular rhythm  Heart sounds: No murmur heard  Pulmonary:      Effort: Pulmonary effort is normal  No respiratory distress  Breath sounds: Rales present  Abdominal:      Palpations: Abdomen is soft  Tenderness: There is no abdominal tenderness  Musculoskeletal:         General: No swelling  Cervical back: Neck supple  Skin:     General: Skin is warm and dry  Neurological:      Mental Status: She is alert  Mental status is at baseline        Comments: Nonverbal         Additional Data:     Labs:  Results from last 7 days   Lab Units 08/01/21  0500 07/30/21  0454   WBC Thousand/uL 5 62 6 73   HEMOGLOBIN g/dL 8 9* 8 4*   HEMATOCRIT % 29 7* 27 9*   PLATELETS Thousands/uL 291 263   NEUTROS PCT %  --  69   LYMPHS PCT %  --  21   MONOS PCT %  --  6   EOS PCT %  --  3     Results from last 7 days   Lab Units 08/01/21  0500   SODIUM mmol/L 144   POTASSIUM mmol/L 4 0   CHLORIDE mmol/L 108   CO2 mmol/L 35*   BUN mg/dL 12   CREATININE mg/dL 0 50*   ANION GAP mmol/L 1*   CALCIUM mg/dL 9 2   GLUCOSE RANDOM mg/dL 115         Results from last 7 days   Lab Units 08/01/21  0731 07/31/21  2148 07/31/21  1657 07/31/21  1202 07/31/21  0834 07/30/21  2209 07/30/21  1600 07/30/21  1202 07/30/21  0729 07/29/21  2125 07/29/21  1614 07/29/21  1102   POC GLUCOSE mg/dl 149* 155* 183* 176* 158* 220* 219* 181* 171* 116 206* 254*         Results from last 7 days   Lab Units 07/27/21  0427 07/26/21  0439   PROCALCITONIN ng/ml <0 05 <0 05       Lines/Drains:  Invasive Devices     Peripheral Intravenous Line            Peripheral IV 07/31/21 Distal;Dorsal (posterior); Right Forearm <1 day          Drain            External Urinary Catheter 6 days                      Imaging: Reviewed radiology reports from this admission including: chest xray    Recent Cultures (last 7 days):         Last 24 Hours Medication List:   Current Facility-Administered Medications   Medication Dose Route Frequency Provider Last Rate    acetaminophen  640 mg Oral Q6H PRN Jacinto Prima Ramella, CRNP      aspirin  81 mg Oral Daily Jacinto Prima Ramella, CRNP      atorvastatin  80 mg Oral Daily With [facility] Ramella, CRNP      bisacodyl  10 mg Rectal Daily PRN Jacinto Prima Ramella, CRNP      divalproex sodium  250 mg Oral Q8H [address] Alva Castro, CRNP      enoxaparin  40 mg Subcutaneous Q24H [address] Alva Castro, JONATHANNP      ferrous sulfate  325 mg Oral Daily With Breakfast Jacinto Prima Ramella, CRNP      gabapentin  400 mg Oral TID Jacinto Prima Ramella, CRNP      insulin glargine  10 Units Subcutaneous HS Jacinto Prima Ramella, CRNP      insulin lispro  2-12 Units Subcutaneous HS Jacinto Prima Ramella, CRNP      insulin lispro  2-12 Units Subcutaneous TID AC Alva Shankar Ramella, CRNP      insulin lispro  4 Units Subcutaneous TID With Meals Jacinto Prima Ramella, CRNP      ipratropium-albuterol  3 mL Nebulization Q4H PRN Jacinto Prima Ramella, CRNP      LORazepam  0 5 mg Oral BID PRN Jacinto Prima Ramella, CRNP      ondansetron  4 mg Intravenous Q6H PRN Junius Mini, CRNP      oxybutynin  10 mg Oral Daily Jacinto Prima Ramella, CRNP      pantoprazole  40 mg Oral Early Morning Jacinto Prima Ramella, CRNP      QUEtiapine  200 mg Oral HS Junius Mini, CRNP      senna  1 tablet Oral HS Jacinto Prima Ramella, CRNP      sertraline  100 mg Oral HS Junius Mini, CRNP  sodium chloride  1 spray Each Nare Q1H PRN KRISTOPHER Kendrick          Today, Patient Was Seen By: Giana Falcon MD    **Please Note: This note may have been constructed using a voice recognition system  **

## 2021-08-02 ENCOUNTER — APPOINTMENT (INPATIENT)
Dept: RADIOLOGY | Facility: HOSPITAL | Age: 66
DRG: 871 | End: 2021-08-02
Payer: MEDICARE

## 2021-08-02 LAB
ANION GAP SERPL CALCULATED.3IONS-SCNC: 4 MMOL/L (ref 4–13)
BASOPHILS # BLD AUTO: 0.02 THOUSANDS/ΜL (ref 0–0.1)
BASOPHILS NFR BLD AUTO: 0 % (ref 0–1)
BUN SERPL-MCNC: 9 MG/DL (ref 5–25)
CALCIUM SERPL-MCNC: 9.3 MG/DL (ref 8.3–10.1)
CHLORIDE SERPL-SCNC: 106 MMOL/L (ref 100–108)
CO2 SERPL-SCNC: 34 MMOL/L (ref 21–32)
CREAT SERPL-MCNC: 0.49 MG/DL (ref 0.6–1.3)
EOSINOPHIL # BLD AUTO: 0.14 THOUSAND/ΜL (ref 0–0.61)
EOSINOPHIL NFR BLD AUTO: 3 % (ref 0–6)
ERYTHROCYTE [DISTWIDTH] IN BLOOD BY AUTOMATED COUNT: 15.1 % (ref 11.6–15.1)
GFR SERPL CREATININE-BSD FRML MDRD: 101 ML/MIN/1.73SQ M
GLUCOSE SERPL-MCNC: 125 MG/DL (ref 65–140)
GLUCOSE SERPL-MCNC: 150 MG/DL (ref 65–140)
GLUCOSE SERPL-MCNC: 156 MG/DL (ref 65–140)
GLUCOSE SERPL-MCNC: 166 MG/DL (ref 65–140)
GLUCOSE SERPL-MCNC: 167 MG/DL (ref 65–140)
GLUCOSE SERPL-MCNC: 184 MG/DL (ref 65–140)
HCT VFR BLD AUTO: 29.6 % (ref 34.8–46.1)
HGB BLD-MCNC: 9.1 G/DL (ref 11.5–15.4)
IMM GRANULOCYTES # BLD AUTO: 0.06 THOUSAND/UL (ref 0–0.2)
IMM GRANULOCYTES NFR BLD AUTO: 1 % (ref 0–2)
LYMPHOCYTES # BLD AUTO: 1.58 THOUSANDS/ΜL (ref 0.6–4.47)
LYMPHOCYTES NFR BLD AUTO: 32 % (ref 14–44)
MCH RBC QN AUTO: 29.5 PG (ref 26.8–34.3)
MCHC RBC AUTO-ENTMCNC: 30.7 G/DL (ref 31.4–37.4)
MCV RBC AUTO: 96 FL (ref 82–98)
MONOCYTES # BLD AUTO: 0.32 THOUSAND/ΜL (ref 0.17–1.22)
MONOCYTES NFR BLD AUTO: 7 % (ref 4–12)
NEUTROPHILS # BLD AUTO: 2.79 THOUSANDS/ΜL (ref 1.85–7.62)
NEUTS SEG NFR BLD AUTO: 57 % (ref 43–75)
NRBC BLD AUTO-RTO: 0 /100 WBCS
PLATELET # BLD AUTO: 300 THOUSANDS/UL (ref 149–390)
PMV BLD AUTO: 9.1 FL (ref 8.9–12.7)
POTASSIUM SERPL-SCNC: 4.3 MMOL/L (ref 3.5–5.3)
RBC # BLD AUTO: 3.08 MILLION/UL (ref 3.81–5.12)
SODIUM SERPL-SCNC: 144 MMOL/L (ref 136–145)
WBC # BLD AUTO: 4.91 THOUSAND/UL (ref 4.31–10.16)

## 2021-08-02 PROCEDURE — 71045 X-RAY EXAM CHEST 1 VIEW: CPT

## 2021-08-02 PROCEDURE — 94760 N-INVAS EAR/PLS OXIMETRY 1: CPT

## 2021-08-02 PROCEDURE — 85025 COMPLETE CBC W/AUTO DIFF WBC: CPT | Performed by: FAMILY MEDICINE

## 2021-08-02 PROCEDURE — 80048 BASIC METABOLIC PNL TOTAL CA: CPT | Performed by: FAMILY MEDICINE

## 2021-08-02 PROCEDURE — 82948 REAGENT STRIP/BLOOD GLUCOSE: CPT

## 2021-08-02 PROCEDURE — 92526 ORAL FUNCTION THERAPY: CPT

## 2021-08-02 PROCEDURE — 99232 SBSQ HOSP IP/OBS MODERATE 35: CPT | Performed by: FAMILY MEDICINE

## 2021-08-02 RX ORDER — FUROSEMIDE 10 MG/ML
20 INJECTION INTRAMUSCULAR; INTRAVENOUS ONCE
Status: COMPLETED | OUTPATIENT
Start: 2021-08-02 | End: 2021-08-02

## 2021-08-02 RX ADMIN — FERROUS SULFATE TAB 325 MG (65 MG ELEMENTAL FE) 325 MG: 325 (65 FE) TAB at 06:44

## 2021-08-02 RX ADMIN — DIVALPROEX SODIUM 250 MG: 250 TABLET, DELAYED RELEASE ORAL at 06:44

## 2021-08-02 RX ADMIN — ATORVASTATIN CALCIUM 80 MG: 40 TABLET, FILM COATED ORAL at 15:35

## 2021-08-02 RX ADMIN — GABAPENTIN 400 MG: 400 CAPSULE ORAL at 08:05

## 2021-08-02 RX ADMIN — SERTRALINE HYDROCHLORIDE 100 MG: 50 TABLET ORAL at 21:23

## 2021-08-02 RX ADMIN — GABAPENTIN 400 MG: 400 CAPSULE ORAL at 21:23

## 2021-08-02 RX ADMIN — QUETIAPINE FUMARATE 200 MG: 50 TABLET, EXTENDED RELEASE ORAL at 21:23

## 2021-08-02 RX ADMIN — STANDARDIZED SENNA CONCENTRATE 8.6 MG: 8.6 TABLET ORAL at 21:23

## 2021-08-02 RX ADMIN — FUROSEMIDE 20 MG: 10 INJECTION, SOLUTION INTRAMUSCULAR; INTRAVENOUS at 08:19

## 2021-08-02 RX ADMIN — PANTOPRAZOLE SODIUM 40 MG: 40 TABLET, DELAYED RELEASE ORAL at 06:44

## 2021-08-02 RX ADMIN — GABAPENTIN 400 MG: 400 CAPSULE ORAL at 15:35

## 2021-08-02 RX ADMIN — DIVALPROEX SODIUM 250 MG: 250 TABLET, DELAYED RELEASE ORAL at 13:13

## 2021-08-02 RX ADMIN — INSULIN GLARGINE 10 UNITS: 100 INJECTION, SOLUTION SUBCUTANEOUS at 21:23

## 2021-08-02 RX ADMIN — OXYBUTYNIN 10 MG: 5 TABLET, FILM COATED, EXTENDED RELEASE ORAL at 08:05

## 2021-08-02 RX ADMIN — DIVALPROEX SODIUM 250 MG: 250 TABLET, DELAYED RELEASE ORAL at 21:23

## 2021-08-02 RX ADMIN — INSULIN LISPRO 4 UNITS: 100 INJECTION, SOLUTION INTRAVENOUS; SUBCUTANEOUS at 15:34

## 2021-08-02 RX ADMIN — ENOXAPARIN SODIUM 40 MG: 40 INJECTION SUBCUTANEOUS at 08:05

## 2021-08-02 RX ADMIN — INSULIN LISPRO 4 UNITS: 100 INJECTION, SOLUTION INTRAVENOUS; SUBCUTANEOUS at 08:05

## 2021-08-02 RX ADMIN — ASPIRIN 81 MG: 81 TABLET, COATED ORAL at 08:05

## 2021-08-02 RX ADMIN — INSULIN LISPRO 4 UNITS: 100 INJECTION, SOLUTION INTRAVENOUS; SUBCUTANEOUS at 11:29

## 2021-08-02 NOTE — CASE MANAGEMENT
Case Management Progress Note    Patient name Gregg Stafford  Location /-01 MRN [id number]  : 1955 Date 2021       LOS (days): 15  Geometric Mean LOS (GMLOS) (days): 4 80  Days to GMLOS:-10 1        BUNDLE:      OBJECTIVE:  Pt is a 77y o  year old Single, white or  [1], female with Adventism preference of Non-Mormonism admitted on 2021  2:07 PM  Pt is admitted to -01 at [address] [place] [address] with complaints of Sepsis due to pneumonia ([facility] [address] )   Current admission status: Inpatient  Preferred Pharmacy:   [address] [place] [address] [place] [address]  Phone: [phone] Fax: [phone]    Primary Care Provider: Francesca Dinh MD    Primary Insurance: [place] [employer]  Secondary Insurance: MEDICARE    PROGRESS NOTE:    Per team, patient is not ready for dc-  Pt remains congested  On 2 liters of 02  Cm called [facility] of Aging to check status of Option Process  Case has Not been completed as of yet  DC plan pending [facility], once medically stable and Completed Option Paperwork

## 2021-08-02 NOTE — ASSESSMENT & PLAN NOTE
Lab Results   Component Value Date    HGBA1C 6 6 (H) 07/19/2021     Recent Labs     08/01/21  1109 08/01/21  1548 08/01/21  2114 08/02/21  0708   POCGLU 161* 159* 156* 150*     · Continue sliding scale insulin sugar stable continue Lantus 10 at night Humalog 4 units t i d   With meals she eats adequately and sliding scale

## 2021-08-02 NOTE — RESPIRATORY THERAPY NOTE
RT Protocol Note  Portia Younger 77 y o  female MRN: [id number]  Unit/Bed#: -01 Encounter: [id number]    Assessment    Principal Problem:    Sepsis due to pneumonia [facility])  Active Problems:    Aspiration pneumonia ([place] [address] )    Psychiatric disorder    Type 2 diabetes mellitus without complication, without long-term current use of insulin ([place] [address] )    Acute respiratory failure with hypoxia (HCC)    Obesity (BMI 30 0-34  9)    Seizure disorder ([facility])    Intellectual disability    Acute anemia      Home Pulmonary Medications:  none       Past Medical History:   Diagnosis Date    Anxiety     Diabetes mellitus ([place] [address] )     GERD (gastroesophageal reflux disease)     Hyperlipidemia     Hypertension     Mental disability     Mixed incontinence 4/12/2017    Seizure disorder ([place] [address] )      Social History     Socioeconomic History    Marital status: Single     Spouse name: None    Number of children: None    Years of education: None    Highest education level: None   Occupational History    None   Tobacco Use    Smoking status: Never Smoker    Smokeless tobacco: Never Used   Vaping Use    Vaping Use: Never used   Substance and Sexual Activity    Alcohol use: Not Currently    Drug use: Not Currently    Sexual activity: Not Currently   Other Topics Concern    None   Social History Narrative    None     Social Determinants of Health     Financial Resource Strain:     Difficulty of Paying Living Expenses:    Food Insecurity:     Worried About Running Out of Food in the Last Year:     Ran Out of Food in the Last Year:    Transportation Needs:     Lack of Transportation (Medical):      Lack of Transportation (Non-Medical):    Physical Activity:     Days of Exercise per Week:     Minutes of Exercise per Session:    Stress:     Feeling of Stress :    Social Connections:     Frequency of Communication with Friends and Family:     Frequency of Social Gatherings with Friends and Family:     Attends Christian Services:     Active Member of Clubs or Organizations:     Attends Club or Organization Meetings:     Marital Status:    Intimate Partner Violence:     Fear of Current or Ex-Partner:     Emotionally Abused:     Physically Abused:     Sexually Abused:        Subjective    Subjective Data: Pt nonverbal at baseline  Objective    Physical Exam:   Assessment Type: Assess only  General Appearance: Sleeping  Respiratory Pattern: Normal  Chest Assessment: Chest expansion symmetrical  Bilateral Breath Sounds: Diminished, Clear  Cough: None  O2 Device: 4L nasal cannula    Vitals:  Blood pressure 116/55, pulse 63, temperature 98 5 °F (36 9 °C), temperature source Oral, resp  rate 19, height 5' (1 524 m), weight 82 9 kg (182 lb 12 2 oz), SpO2 92 %      Results from last 7 days   Lab Units 07/27/21  0910   PH ART  7 483*   PCO2 ART mm Hg 53 3*   PO2 ART mm Hg 99 4   HCO3 ART mmol/L 39 1*   BASE EXC ART mmol/L 13 7   O2 CONTENT ART mL/dL 14 8*   O2 HGB, ARTERIAL % 96 6   ABG SOURCE  Radial, Left   SOPHIA TEST  Yes       Imaging and other studies: Pending    O2 Device: 4L nasal cannula     Plan    Respiratory Plan: No distress/Pulmonary history  Airway Clearance Plan: Percussive Vest     Resp Comments: Nurse reports increasing O2 liter flow due to patient removing cannula resulting in decreased SpO2

## 2021-08-02 NOTE — PLAN OF CARE
Speech/Language Pathology Progress Note     Patient Name: Gordon SIDDIQUI Date: 8/2/2021    Subjective: Awake and alert  Upright in chair  Objective: Dr Obed Henry requested re-evaluation due to recent worsening CXR to r/o aspiration (refer to imaging for formal results)  Pt's current diet is puree and NTLs  Seen with trials of puree, NTL, and HTLs  Presented mass trials that would simulate meal consumption (>40 between all consistencies)  Puree trials yielded oral opening to presentation of spoon  Functional formulation and transfer present  Oral clearance obtained  Suspect a timely palpated swallow  Withheld next trial intermittently to observe if a double swallow were to occur, not present  SpO2 level was stable and no episodes of difficulty  Compared tolerance of NT and HT via spoon and cup  Extraction was adequate  Formulation and transfer timely and no deficits  Suspect an intermittent delayed swallow per palpation but no episodes of overt s/s and again stable SpO2 levels  Single swallow per trial  Pt did benefit from single sips and slowed rate as pt requests rapid intake via opening of oral cavity for next trial      Assessment: Continue diet of puree and NTLs  Strategies: liquids by cup, slow rate, small bites/sips    Plan/Recommendations: Conversed with Dr Obed Henry regarding results from bedside dysphagia session  Based upon hx of dysphagia, aspiration pneumonia, and concerning CXR video swallow study to be completed (anticipated next date) to r/o silent aspiration       [address] [place], CCC-SLP

## 2021-08-02 NOTE — ASSESSMENT & PLAN NOTE
· Sepsis secondary to pneumonia likely aspiration resolved status post antibiotics       Results from last 7 days   Lab Units 07/27/21  0427   PROCALCITONIN ng/ml <0 05

## 2021-08-02 NOTE — PROGRESS NOTES
[address]  Progress Note - Lesia Garcia 1955, 77 y o  female MRN: [id number]  Unit/Bed#: -01 Encounter: [id number]  Primary Care Provider: Alma Beaver MD   Date and time admitted to hospital: 7/18/2021  2:07 PM    * Sepsis due to pneumonia [facility])  Assessment & Plan  · Sepsis secondary to pneumonia likely aspiration resolved status post antibiotics  Results from last 7 days   Lab Units 07/27/21  0427   PROCALCITONIN ng/ml <0 05       Acute anemia  Assessment & Plan  · She presented with hemoglobin greater than 10 previously she had a normal hemoglobin drop down to 8 4 today improved to 8 9 she had a fecal occult done which was negative for proceed with vitamin B12 and iron panel  Repeat a level tomorrow there is no evidence of gross bleed  · Continue ferrous sulfate 325 mg p o  Daily along with stool softeners    Intellectual disability  Assessment & Plan  · At baseline resides in group home nonverbal    Seizure disorder [facility])  Assessment & Plan  · Continue Depakote    Acute respiratory failure with hypoxia (HCC)  Assessment & Plan  · Acute respiratory failure/hypoxia secondary to pneumonia  · CTA negative for pulmonary embolism  · Patient mid hospitalization had worsening hypoxia and was transferred to critical care service requiring high-flow and BiPAP at night  · 7/27 awake bronchoscopy-with mucus plugs in right middle and lower lobes   · Patient is down on 2 L nasal cannula without any evidence of coughing with current diet    Chest x-ray repeated today looks worse all of cephalization there is also increased and left lower lobe effusion will ask Pulmonary to evaluate as her oxygen also went up to 4 L will give a dose of Lasix await final read white count is normal no fevers    Type 2 diabetes mellitus without complication, without long-term current use of insulin [facility])  Assessment & Plan  Lab Results   Component Value Date    HGBA1C 6 6 (H) 07/19/2021 Recent Labs     21  1109 21  1548 21  2114 21  0708   POCGLU 161* 159* 156* 150*     · Continue sliding scale insulin sugar stable continue Lantus 10 at night Humalog 4 units t i d  With meals she eats adequately and sliding scale    Psychiatric disorder  Assessment & Plan  · Mood disorder continue depakote gabapentin quetiapine lorazepam and sertraline, depakote    Aspiration pneumonia (HCC)  Assessment & Plan  · Chest x-ray showed suspected right middle lobe I had ordered a CT chest looks like right middle lobe suspect aspiration  · Completed 7 days of antibiotics - see course above  · Aspiration precautions  · Sputum culture - gram neg and gram positive  · Speech therapy saw patient  recommends mechanically altered/level 1 diet and nectar thick liquids        VTE Pharmacologic Prophylaxis: VTE Score: 3 Moderate Risk (Score 3-4) - Pharmacological DVT Prophylaxis Ordered: enoxaparin (Lovenox)  Patient Centered Rounds: I performed bedside rounds with nursing staff today  Discussions with Specialists or Other Care Team Provider:  Discussed with case management    Education and Discussions with Family / Patient: Updated  (brother) via phone  Time Spent for Care: 30 minutes  More than 50% of total time spent on counseling and coordination of care as described above  Current Length of Stay: 15 day(s)  Current Patient Status: Inpatient   Certification Statement: The patient will continue to require additional inpatient hospital stay due to Hypoxia  Discharge Plan: Anticipate discharge in 24-48 hrs to discharge location to be determined pending rehab evaluations      Code Status: Level 3 - DNAR and DNI    Subjective:   Patient seen and examined nonverbal per nursing doing well    Objective:     Vitals:   Temp (24hrs), Av 4 °F (36 9 °C), Min:98 1 °F (36 7 °C), Max:98 6 °F (37 °C)    Temp:  [98 1 °F (36 7 °C)-98 6 °F (37 °C)] 98 6 °F (37 °C)  HR:  Sharon Justice 56  Resp:  [18-20] 18  BP: (109-134)/(52-73) 109/52  SpO2:  [91 %-94 %] 91 %  Body mass index is 35 65 kg/m²  Input and Output Summary (last 24 hours): Intake/Output Summary (Last 24 hours) at 8/2/2021 1016  Last data filed at 8/2/2021 0901  Gross per 24 hour   Intake 780 ml   Output 1950 ml   Net -1170 ml       Physical Exam:   Physical Exam  Vitals and nursing note reviewed  Constitutional:       General: She is not in acute distress  Appearance: She is well-developed  HENT:      Head: Normocephalic and atraumatic  Eyes:      Conjunctiva/sclera: Conjunctivae normal    Cardiovascular:      Rate and Rhythm: Normal rate and regular rhythm  Heart sounds: No murmur heard  Pulmonary:      Effort: Pulmonary effort is normal  No respiratory distress  Breath sounds: Rales present  Abdominal:      Palpations: Abdomen is soft  Tenderness: There is no abdominal tenderness  Musculoskeletal:         General: No swelling  Cervical back: Neck supple  Skin:     General: Skin is warm and dry  Neurological:      Mental Status: She is alert  Mental status is at baseline           Additional Data:     Labs:  Results from last 7 days   Lab Units 08/02/21  0440   WBC Thousand/uL 4 91   HEMOGLOBIN g/dL 9 1*   HEMATOCRIT % 29 6*   PLATELETS Thousands/uL 300   NEUTROS PCT % 57   LYMPHS PCT % 32   MONOS PCT % 7   EOS PCT % 3     Results from last 7 days   Lab Units 08/02/21  0440   SODIUM mmol/L 144   POTASSIUM mmol/L 4 3   CHLORIDE mmol/L 106   CO2 mmol/L 34*   BUN mg/dL 9   CREATININE mg/dL 0 49*   ANION GAP mmol/L 4   CALCIUM mg/dL 9 3   GLUCOSE RANDOM mg/dL 167*         Results from last 7 days   Lab Units 08/02/21  0708 08/01/21  2114 08/01/21  1548 08/01/21  1109 08/01/21  0731 07/31/21  2148 07/31/21  1657 07/31/21  1202 07/31/21  0834 07/30/21  2209 07/30/21  1600 07/30/21  1202   POC GLUCOSE mg/dl 150* 156* 159* 161* 149* 155* 183* 176* 158* 220* 219* 181*         Results from last 7 days   Lab Units 07/27/21  0427   PROCALCITONIN ng/ml <0 05       Lines/Drains:  Invasive Devices     Drain            External Urinary Catheter 7 days                      Imaging: Personally reviewed the following imaging: chest xray    Recent Cultures (last 7 days):         Last 24 Hours Medication List:   Current Facility-Administered Medications   Medication Dose Route Frequency Provider Last Rate    acetaminophen  640 mg Oral Q6H PRN Jane Felling Ramella, CRNP      aspirin  81 mg Oral Daily Jane Felling Ramella, CRNP      atorvastatin  80 mg Oral Daily With [employer] Ramella, CRNP      bisacodyl  10 mg Rectal Daily PRN Jane Felling Ramella, CRNP      divalproex sodium  250 mg Oral Q8H [address] Alva Castro, CRNP      enoxaparin  40 mg Subcutaneous Q24H [address] Alva Castro, CRNP      ferrous sulfate  325 mg Oral Daily With Breakfast Jane Felling Ramella, CRNP      gabapentin  400 mg Oral TID Jane Felling Ramella, CRNP      insulin glargine  10 Units Subcutaneous HS Jane Felling Ramella, CRNP      insulin lispro  2-12 Units Subcutaneous HS Jane Felling Ramella, CRNP      insulin lispro  2-12 Units Subcutaneous TID AC Alva Shankar Ramella, CRNP      insulin lispro  4 Units Subcutaneous TID With Meals Jane Felling Ramella, CRNP      ipratropium-albuterol  3 mL Nebulization Q4H PRN Jane Felling Ramella, CRNP      LORazepam  0 5 mg Oral BID PRN Jane Felling Ramella, CRNP      ondansetron  4 mg Intravenous Q6H PRN Katerine Villarreal, CRNP      oxybutynin  10 mg Oral Daily Jane Felling Ramella, CRNP      pantoprazole  40 mg Oral Early Morning Jane Felling Ramella, CRNP      QUEtiapine  200 mg Oral HS Katerine Silk, CRNP      senna  1 tablet Oral HS Jane Felling Ramella, CRNP      sertraline  100 mg Oral HS Katerine Silk, CRNP      sodium chloride  1 spray Each Nare Q1H PRN Katerine Villarreal, JONATHANNP          Today, Patient Was Seen By: Citlaly Berry MD    **Please Note: This note may have been constructed using a voice recognition system  **

## 2021-08-02 NOTE — ASSESSMENT & PLAN NOTE
· Acute respiratory failure/hypoxia secondary to pneumonia  · CTA negative for pulmonary embolism  · Patient mid hospitalization had worsening hypoxia and was transferred to critical care service requiring high-flow and BiPAP at night  · 7/27 awake bronchoscopy-with mucus plugs in right middle and lower lobes   · Patient is down on 2 L nasal cannula without any evidence of coughing with current diet    Chest x-ray repeated today looks worse all of cephalization there is also increased and left lower lobe effusion will ask Pulmonary to evaluate as her oxygen also went up to 4 L will give a dose of Lasix await final read white count is normal no fevers

## 2021-08-03 ENCOUNTER — APPOINTMENT (INPATIENT)
Dept: RADIOLOGY | Facility: HOSPITAL | Age: 66
DRG: 871 | End: 2021-08-03
Payer: MEDICARE

## 2021-08-03 LAB
ANION GAP SERPL CALCULATED.3IONS-SCNC: 3 MMOL/L (ref 4–13)
BUN SERPL-MCNC: 7 MG/DL (ref 5–25)
CALCIUM SERPL-MCNC: 9.2 MG/DL (ref 8.3–10.1)
CHLORIDE SERPL-SCNC: 106 MMOL/L (ref 100–108)
CO2 SERPL-SCNC: 36 MMOL/L (ref 21–32)
CREAT SERPL-MCNC: 0.59 MG/DL (ref 0.6–1.3)
GFR SERPL CREATININE-BSD FRML MDRD: 95 ML/MIN/1.73SQ M
GLUCOSE SERPL-MCNC: 122 MG/DL (ref 65–140)
GLUCOSE SERPL-MCNC: 133 MG/DL (ref 65–140)
GLUCOSE SERPL-MCNC: 143 MG/DL (ref 65–140)
GLUCOSE SERPL-MCNC: 147 MG/DL (ref 65–140)
GLUCOSE SERPL-MCNC: 208 MG/DL (ref 65–140)
NT-PROBNP SERPL-MCNC: 131 PG/ML
POTASSIUM SERPL-SCNC: 4.1 MMOL/L (ref 3.5–5.3)
SODIUM SERPL-SCNC: 145 MMOL/L (ref 136–145)

## 2021-08-03 PROCEDURE — 99232 SBSQ HOSP IP/OBS MODERATE 35: CPT | Performed by: FAMILY MEDICINE

## 2021-08-03 PROCEDURE — 93308 TTE F-UP OR LMTD: CPT | Performed by: INTERNAL MEDICINE

## 2021-08-03 PROCEDURE — 92611 MOTION FLUOROSCOPY/SWALLOW: CPT

## 2021-08-03 PROCEDURE — 82948 REAGENT STRIP/BLOOD GLUCOSE: CPT

## 2021-08-03 PROCEDURE — 83880 ASSAY OF NATRIURETIC PEPTIDE: CPT | Performed by: FAMILY MEDICINE

## 2021-08-03 PROCEDURE — 99222 1ST HOSP IP/OBS MODERATE 55: CPT | Performed by: INTERNAL MEDICINE

## 2021-08-03 PROCEDURE — 97530 THERAPEUTIC ACTIVITIES: CPT

## 2021-08-03 PROCEDURE — 97110 THERAPEUTIC EXERCISES: CPT

## 2021-08-03 PROCEDURE — 80048 BASIC METABOLIC PNL TOTAL CA: CPT | Performed by: FAMILY MEDICINE

## 2021-08-03 PROCEDURE — 94760 N-INVAS EAR/PLS OXIMETRY 1: CPT

## 2021-08-03 PROCEDURE — 74230 X-RAY XM SWLNG FUNCJ C+: CPT

## 2021-08-03 RX ADMIN — STANDARDIZED SENNA CONCENTRATE 8.6 MG: 8.6 TABLET ORAL at 22:15

## 2021-08-03 RX ADMIN — INSULIN LISPRO 4 UNITS: 100 INJECTION, SOLUTION INTRAVENOUS; SUBCUTANEOUS at 17:28

## 2021-08-03 RX ADMIN — SERTRALINE HYDROCHLORIDE 100 MG: 50 TABLET ORAL at 22:16

## 2021-08-03 RX ADMIN — DIVALPROEX SODIUM 250 MG: 250 TABLET, DELAYED RELEASE ORAL at 05:54

## 2021-08-03 RX ADMIN — PANTOPRAZOLE SODIUM 40 MG: 40 TABLET, DELAYED RELEASE ORAL at 05:54

## 2021-08-03 RX ADMIN — GABAPENTIN 400 MG: 400 CAPSULE ORAL at 08:19

## 2021-08-03 RX ADMIN — DIVALPROEX SODIUM 250 MG: 250 TABLET, DELAYED RELEASE ORAL at 22:16

## 2021-08-03 RX ADMIN — QUETIAPINE FUMARATE 200 MG: 50 TABLET, EXTENDED RELEASE ORAL at 22:16

## 2021-08-03 RX ADMIN — ASPIRIN 81 MG: 81 TABLET, COATED ORAL at 08:19

## 2021-08-03 RX ADMIN — GABAPENTIN 400 MG: 400 CAPSULE ORAL at 16:30

## 2021-08-03 RX ADMIN — ENOXAPARIN SODIUM 40 MG: 40 INJECTION SUBCUTANEOUS at 08:19

## 2021-08-03 RX ADMIN — GABAPENTIN 400 MG: 400 CAPSULE ORAL at 22:20

## 2021-08-03 RX ADMIN — INSULIN LISPRO 4 UNITS: 100 INJECTION, SOLUTION INTRAVENOUS; SUBCUTANEOUS at 12:02

## 2021-08-03 RX ADMIN — OXYBUTYNIN 10 MG: 5 TABLET, FILM COATED, EXTENDED RELEASE ORAL at 08:19

## 2021-08-03 RX ADMIN — INSULIN LISPRO 4 UNITS: 100 INJECTION, SOLUTION INTRAVENOUS; SUBCUTANEOUS at 08:19

## 2021-08-03 RX ADMIN — INSULIN GLARGINE 10 UNITS: 100 INJECTION, SOLUTION SUBCUTANEOUS at 22:16

## 2021-08-03 RX ADMIN — FERROUS SULFATE TAB 325 MG (65 MG ELEMENTAL FE) 325 MG: 325 (65 FE) TAB at 08:19

## 2021-08-03 RX ADMIN — ATORVASTATIN CALCIUM 80 MG: 40 TABLET, FILM COATED ORAL at 17:27

## 2021-08-03 RX ADMIN — DIVALPROEX SODIUM 250 MG: 250 TABLET, DELAYED RELEASE ORAL at 14:48

## 2021-08-03 NOTE — ASSESSMENT & PLAN NOTE
· Acute respiratory failure/hypoxia secondary to pneumonia  · CTA negative for pulmonary embolism  · Patient mid hospitalization had worsening hypoxia and was transferred to critical care service requiring high-flow and BiPAP at night  · 7/27 awake bronchoscopy-with mucus plugs in right middle and lower lobes   · Patient is down on 2 L nasal cannula without any evidence of coughing with current diet  Chest x-ray repeated today looks worse all of cephalization there is also increased and left lower lobe effusion will ask Pulmonary to evaluate as her oxygen also went up to 4 L will give a dose of Lasix await final read white count is normal no fevers-> today she is down to 1 L she did receive Lasix on 8 /2 her proBNP is actually low await Pulmonary to evaluate lungs sound better today she does have some contraction alkalosis do not think any more Lasix is needed today also will do a video swallow to assess for silent aspiration

## 2021-08-03 NOTE — PLAN OF CARE
Problem: PHYSICAL THERAPY ADULT  Goal: Performs mobility at highest level of function for planned discharge setting  See evaluation for individualized goals  Description: Treatment/Interventions: Functional transfer training, LE strengthening/ROM, Therapeutic exercise, Endurance training, Patient/family training, Equipment eval/education, Bed mobility, Gait training, Compensatory technique education, Spoke to nursing, Spoke to case management, OT  Equipment Recommended:  (TBD by rehab)       See flowsheet documentation for full assessment, interventions and recommendations  Outcome: Progressing  Note: Prognosis: Fair  Problem List: Decreased strength, Decreased endurance, Impaired balance, Decreased mobility, Decreased cognition, Impaired judgement, Decreased safety awareness, Obesity, Decreased skin integrity  Assessment: Pt seen for PT treatment session this date with interventions consisting of Therapeutic exercise consisting of: AROM and AAROM 10 reps B LE in sitting position and therapeutic activity consisting of training: sit<>stand transfers and static standing tolerance for ~1' minutes w/ RW for UE support  Pt agreeable to PT treatment session upon arrival, pt found seated OOB in recliner, in no apparent distress  In comparison to previous session, pt requiring less assistance with STS transfers with an attempt for ambulation with weight shifting  Pt able to weight shift onto LLE for RLE stepping but was not able to weight shift onto RLE to step with LLE  Pt able to follow with therex but required AAROM to go through full range of motion for appropriate technique  Continue to recommend STR at time of d/c in order to maximize pt's functional independence and safety w/ mobility  Pt continues to be functioning below baseline level, and remains limited 2* factors listed above   PT will continue to see pt while here in order to address the deficits listed above and provide interventions consistent w/ POC in

## 2021-08-03 NOTE — ASSESSMENT & PLAN NOTE
Lab Results   Component Value Date    HGBA1C 6 6 (H) 07/19/2021     Recent Labs     08/02/21  1102 08/02/21  1531 08/02/21  2122 08/03/21  0738   POCGLU 184* 166* 125 143*     · Continue sliding scale insulin sugar stable continue Lantus 10 at night Humalog 4 units t i d   With meals she eats adequately and sliding scale

## 2021-08-03 NOTE — PROGRESS NOTES
[address]  Progress Note - Adrianna Austin 1955, 77 y o  female MRN: [id number]  Unit/Bed#: -01 Encounter: [id number]  Primary Care Provider: Citlaly Anderson MD   Date and time admitted to hospital: 7/18/2021  2:07 PM    * Sepsis due to pneumonia [facility])  Assessment & Plan  · Sepsis secondary to pneumonia likely aspiration resolved status post antibiotics  Acute anemia  Assessment & Plan  · She presented with hemoglobin greater than 10 previously she had a normal hemoglobin drop down to 8 4 today improved to 8 9 she had a fecal occult done which was negative for proceed with vitamin B12 and iron panel  Improved to 9 1 vitamin B12 is normal iron panel compliant with anemia of chronic disease  Continue ferrous sulfate 325 mg p o  Daily along with stool softeners    Intellectual disability  Assessment & Plan  · At baseline resides in group home nonverbal    Seizure disorder [facility])  Assessment & Plan  · Continue Depakote    Acute respiratory failure with hypoxia (HCC)  Assessment & Plan  · Acute respiratory failure/hypoxia secondary to pneumonia  · CTA negative for pulmonary embolism  · Patient mid hospitalization had worsening hypoxia and was transferred to critical care service requiring high-flow and BiPAP at night  · 7/27 awake bronchoscopy-with mucus plugs in right middle and lower lobes   · Patient is down on 2 L nasal cannula without any evidence of coughing with current diet    Chest x-ray repeated today looks worse all of cephalization there is also increased and left lower lobe effusion will ask Pulmonary to evaluate as her oxygen also went up to 4 L will give a dose of Lasix await final read white count is normal no fevers-> today she is down to 1 L she did receive Lasix on 8 /2 her proBNP is actually low await Pulmonary to evaluate lungs sound better today she does have some contraction alkalosis do not think any more Lasix is needed today also will do a video swallow to assess for silent aspiration  Type 2 diabetes mellitus without complication, without long-term current use of insulin [facility])  Assessment & Plan  Lab Results   Component Value Date    HGBA1C 6 6 (H) 07/19/2021     Recent Labs     08/02/21  1102 08/02/21  1531 08/02/21  2122 08/03/21  0738   POCGLU 184* 166* 125 143*     · Continue sliding scale insulin sugar stable continue Lantus 10 at night Humalog 4 units t i d  With meals she eats adequately and sliding scale    Psychiatric disorder  Assessment & Plan  · Mood disorder continue depakote gabapentin quetiapine lorazepam and sertraline, depakote    Aspiration pneumonia (HCC)  Assessment & Plan  · Chest x-ray showed suspected right middle lobe I had ordered a CT chest looks like right middle lobe suspect aspiration  · Completed 7 days of antibiotics - see course above  · Aspiration precautions  · Sputum culture - gram neg and gram positive  · Speech therapy saw patient 7/23 recommends mechanically altered/level 1 diet and nectar thick liquids speech has evaluated her on on 08/02 she was doing fine but I did place an order in the going to be doing video swallow to assess for any silent aspiration  VTE Pharmacologic Prophylaxis: VTE Score: 3 Moderate Risk (Score 3-4) - Pharmacological DVT Prophylaxis Ordered: enoxaparin (Lovenox)  Patient Centered Rounds: I performed bedside rounds with nursing staff today  Discussions with Specialists or Other Care Team Provider:  Discussed with case management will discussed with Pulmonary    Education and Discussions with Family / Patient: Updated  (brother) via phone  Time Spent for Care: 30 minutes  More than 50% of total time spent on counseling and coordination of care as described above      Current Length of Stay: 16 day(s)  Current Patient Status: Inpatient   Certification Statement: The patient will continue to require additional inpatient hospital stay due to Acute hypoxia  Discharge Plan: Anticipate discharge in 24-48 hrs to discharge location to be determined pending rehab evaluations  Code Status: Level 3 - DNAR and DNI    Subjective:   Patient seen and examined nonverbal doing well with diet as discussed with nursing    Objective:     Vitals:   Temp (24hrs), Av 5 °F (36 9 °C), Min:98 4 °F (36 9 °C), Max:98 8 °F (37 1 °C)    Temp:  [98 4 °F (36 9 °C)-98 8 °F (37 1 °C)] 98 4 °F (36 9 °C)  HR:  [54-60] 58  Resp:  [15-21] 21  BP: (100-108)/(51-64) 103/51  SpO2:  [92 %-97 %] 97 %  Body mass index is 34 49 kg/m²  Input and Output Summary (last 24 hours): Intake/Output Summary (Last 24 hours) at 8/3/2021 1004  Last data filed at 8/3/2021 0801  Gross per 24 hour   Intake 960 ml   Output 1400 ml   Net -440 ml       Physical Exam:   Physical Exam  Vitals and nursing note reviewed  Constitutional:       General: She is not in acute distress  Appearance: She is well-developed  HENT:      Head: Normocephalic and atraumatic  Eyes:      Conjunctiva/sclera: Conjunctivae normal    Cardiovascular:      Rate and Rhythm: Normal rate and regular rhythm  Heart sounds: No murmur heard  Pulmonary:      Effort: Pulmonary effort is normal  No respiratory distress  Breath sounds: Normal breath sounds  Comments: She is not doing very goal following directions and breathing they are decreased although I do not hear as much crackles as I did before  Abdominal:      General: There is no distension  Palpations: Abdomen is soft  Tenderness: There is no abdominal tenderness  Musculoskeletal:         General: No swelling  Cervical back: Neck supple  Skin:     General: Skin is warm and dry  Neurological:      General: No focal deficit present  Mental Status: She is alert and oriented to person, place, and time  Mental status is at baseline     Psychiatric:         Mood and Affect: Mood normal          Additional Data:     Labs:  Results from last 7 days   Lab Units 08/02/21  0440   WBC Thousand/uL 4 91   HEMOGLOBIN g/dL 9 1*   HEMATOCRIT % 29 6*   PLATELETS Thousands/uL 300   NEUTROS PCT % 57   LYMPHS PCT % 32   MONOS PCT % 7   EOS PCT % 3     Results from last 7 days   Lab Units 08/03/21  0502   SODIUM mmol/L 145   POTASSIUM mmol/L 4 1   CHLORIDE mmol/L 106   CO2 mmol/L 36*   BUN mg/dL 7   CREATININE mg/dL 0 59*   ANION GAP mmol/L 3*   CALCIUM mg/dL 9 2   GLUCOSE RANDOM mg/dL 133         Results from last 7 days   Lab Units 08/03/21  0738 08/02/21  2122 08/02/21  1531 08/02/21  1102 08/02/21  0708 08/01/21  2114 08/01/21  1548 08/01/21  1109 08/01/21  0731 07/31/21  2148 07/31/21  1657 07/31/21  1202   POC GLUCOSE mg/dl 143* 125 166* 184* 150* 156* 159* 161* 149* 155* 183* 176*               Lines/Drains:  Invasive Devices     Drain            External Urinary Catheter 8 days                      Imaging: Reviewed radiology reports from this admission including: chest xray    Recent Cultures (last 7 days):         Last 24 Hours Medication List:   Current Facility-Administered Medications   Medication Dose Route Frequency Provider Last Rate    acetaminophen  640 mg Oral Q6H PRN KRISTOPHER Diaz      aspirin  81 mg Oral Daily KRISTOPHER Diaz      atorvastatin  80 mg Oral Daily With [facility] [employer] [facility] [employer] Ramella, KRISTOPHER      bisacodyl  10 mg Rectal Daily PRN KRISTOPHER Diaz      divalproex sodium  250 mg Oral Q8H [address] KRISTOPHER Ojeda      enoxaparin  40 mg Subcutaneous Q24H [address] KRISTOPHER Ojeda      ferrous sulfate  325 mg Oral Daily With Breakfast KRISTOPHER Diaz      gabapentin  400 mg Oral TID KRISTOPHER Diaz      insulin glargine  10 Units Subcutaneous HS Shana Harmon, KRISTOPHER      insulin lispro  2-12 Units Subcutaneous HS KRISTOPHER Diaz      insulin lispro  2-12 Units Subcutaneous TID AC KRISTOPHER Cason      insulin lispro  4 Units Subcutaneous TID With Meals [facility] Ramella, CRNP      ipratropium-albuterol  3 mL Nebulization Q4H PRN [facility], CRNP      LORazepam  0 5 mg Oral BID PRN [facility], CRNP      ondansetron  4 mg Intravenous Q6H PRN [facility], CRNP      oxybutynin  10 mg Oral Daily [facility], CRNP      pantoprazole  40 mg Oral Early Morning [facility], CRNP      QUEtiapine  200 mg Oral HS Juancarlos Johnston, CRNP      senna  1 tablet Oral HS [facility], CRNP      sertraline  100 mg Oral HS [facility], CRNP      sodium chloride  1 spray Each Nare Q1H PRN Juancarlos Johnston, KRISTOPHER          Today, Patient Was Seen By: Emma Abdi MD    **Please Note: This note may have been constructed using a voice recognition system  **

## 2021-08-03 NOTE — PROCEDURES
Speech Pathology Videofluoroscopic Swallow Study    Patient Name: Sydnie Acosta    HBJKV'V Date: 8/3/2021     Problem List  Principal Problem:    Sepsis due to pneumonia [facility])  Active Problems:    Aspiration pneumonia ([facility] )    Psychiatric disorder    Type 2 diabetes mellitus without complication, without long-term current use of insulin ([facility] )    Acute respiratory failure with hypoxia (HCC)    Obesity (BMI 30 0-34  9)    Seizure disorder ([facility] )    Intellectual disability    Acute anemia      Past Medical History  Past Medical History:   Diagnosis Date    Anxiety     Diabetes mellitus ([facility] )     GERD (gastroesophageal reflux disease)     Hyperlipidemia     Hypertension     Mental disability     Mixed incontinence 4/12/2017    Seizure disorder ([place] [address] )        General Information;  Pt is a 77 y o  female with a PMH remarkable for aspiration pneumonia, psychiatric disorder, ID, GERD, and seizure disorder  Pt resides at a group home, where she was on soft/chopped diet and thin liquids prior to admission  Diet was modified following bedside evaluation, and pt is now on puree diet with NTLs  Current concerns for dysphagia include suspicion for silent aspiration  VBS was recommended to assess oropharyngeal stage swallowing skills at this time  Pt was viewed in lateral position and was given trials of pureed, soft moist (apple sauce, chopped banana), honey (moderately thick), nectar (mildly thick), and thin liquid  Oral stage:  Pt presented with mild-moderate oral stage dysphagia  Rapid bolus transfer observed  Incomplete/absent mastication of soft solid banana prior to swallow  Oral control appeared reduced with premature spillage over the base of tongue  Pharyngeal stage:  Pt presented with mild pharyngeal dysphagia  Swallowing initiation was mildly delayed  Hyolaryngeal rise and anterior displacement were mildly reduced   View of upper airway was limited due to pt's high shoulder placement, however airway closure/protection appeared only mildly reduced  Transient penetration noted with thin liquids and NTLs, however unable to confirm aspiration  Aspiration did not appear to occur however NTL may still be safer to minimize risk of aspiration  Tongue base retraction and pharyngeal constriction appeared adequate  Aspiration Response and Efficacy: Aspiration not seen, cannot be fully ruled out due to limited view  Esophageal stage:  Esophageal screening was completed and unremarkable  Assessment Summary:    Pt presents with mild-moderate oropharyngeal dysphagia characterized by reduced mastication and rapid transfer, premature spillage with mild swallow delay and mildly reduced hyolaryngeal elevation  View of upper airway was limited due to pt's high shoulder placement, however airway closure/protection appeared only mildly reduced  Transient penetration noted with thin liquids and NTLs, unable to r/o trace aspiration  Aspiration did not appear to occur however NTL may still be safer to minimize risk  Note: Images are available for review in PACS as desired      Recommendations:   Recommended Diet:  puree/level 1 diet and nectar thick liquids   Recommended Form of Medications: crushed with puree   Aspiration precautions and compensatory swallowing strategies: upright posture, only feed when fully alert, slow rate of feeding, small bites/sips and alternating bites and sips  SLP Dysphagia therapy recommended: brief f/u    Results Reviewed with: RN and MD

## 2021-08-03 NOTE — RESPIRATORY THERAPY NOTE
RT Protocol Note  Ruth Weaver 77 y o  female MRN: [id number]  Unit/Bed#: -01 Encounter: [id number]    Assessment    Principal Problem:    Sepsis due to pneumonia [facility])  Active Problems:    Aspiration pneumonia ([place] [address] )    Psychiatric disorder    Type 2 diabetes mellitus without complication, without long-term current use of insulin ([place] [address] )    Acute respiratory failure with hypoxia (HCC)    Obesity (BMI 30 0-34  9)    Seizure disorder ([facility])    Intellectual disability    Acute anemia      Home Pulmonary Medications:  none       Past Medical History:   Diagnosis Date    Anxiety     Diabetes mellitus ([place] [address] )     GERD (gastroesophageal reflux disease)     Hyperlipidemia     Hypertension     Mental disability     Mixed incontinence 4/12/2017    Seizure disorder ([place] [address] )      Social History     Socioeconomic History    Marital status: Single     Spouse name: None    Number of children: None    Years of education: None    Highest education level: None   Occupational History    None   Tobacco Use    Smoking status: Never Smoker    Smokeless tobacco: Never Used   Vaping Use    Vaping Use: Never used   Substance and Sexual Activity    Alcohol use: Not Currently    Drug use: Not Currently    Sexual activity: Not Currently   Other Topics Concern    None   Social History Narrative    None     Social Determinants of Health     Financial Resource Strain:     Difficulty of Paying Living Expenses:    Food Insecurity:     Worried About Running Out of Food in the Last Year:     Ran Out of Food in the Last Year:    Transportation Needs:     Lack of Transportation (Medical):      Lack of Transportation (Non-Medical):    Physical Activity:     Days of Exercise per Week:     Minutes of Exercise per Session:    Stress:     Feeling of Stress :    Social Connections:     Frequency of Communication with Friends and Family:     Frequency of Social Gatherings with Friends and Family:     Attends Restorationism Services:     Active Member of Clubs or Organizations:     Attends Club or Organization Meetings:     Marital Status:    Intimate Partner Violence:     Fear of Current or Ex-Partner:     Emotionally Abused:     Physically Abused:     Sexually Abused:        Subjective    Subjective Data: Pt nonverbal at baseline  Objective    Physical Exam:   Assessment Type: Assess only  General Appearance: Sleeping  Respiratory Pattern: Normal  Chest Assessment: Chest expansion symmetrical  Bilateral Breath Sounds: Clear, Diminished  Cough: None  O2 Device: 1L nasal cannula    Vitals:  Blood pressure 100/64, pulse 59, temperature 98 4 °F (36 9 °C), temperature source Axillary, resp  rate 18, height 5' (1 524 m), weight 82 8 kg (182 lb 8 7 oz), SpO2 96 %  Results from last 7 days   Lab Units 07/27/21  0910   PH ART  7 483*   PCO2 ART mm Hg 53 3*   PO2 ART mm Hg 99 4   HCO3 ART mmol/L 39 1*   BASE EXC ART mmol/L 13 7   O2 CONTENT ART mL/dL 14 8*   O2 HGB, ARTERIAL % 96 6   ABG SOURCE  Radial, Left   SOPHIA TEST  Yes       Imaging and other studies: Worsening pleural-parenchymal density at the left lung base  Pneumonia is possible      Some groundglass opacities in the right lung could be related to hypoventilation or perhaps some subtle edema or other areas of pneumonia      O2 Device: 1L nasal cannula     Plan    Respiratory Plan: Mild Distress pathway  Airway Clearance Plan: Percussive Vest     Resp Comments: Pulsox via therapist pulsox, monitor not connected to patient

## 2021-08-03 NOTE — CONSULTS
Pulmonary Medicine-Consultation  [facility] 77 y o  female MRN: [id number]  Unit/Bed#: -01 Encounter: [id number]      Assessment/Plan:  Hypoxemic respiratory failure  · Appears to be heart failure/volume overload related given the improvement with active diuresis  · Bedside lung ultrasound showing mild to moderate left pleural effusion, without septation free-flowing  · Patient is -1 6 L for the last 24 hours, given diuretics with Lasix 20 mg IV yesterday morning    Recommendations  · May continue diuresis, for goal of -1 to 2 L for the next 24 hours  · Does not appear to be new pneumonia, will monitor off antibiotics  · No current indication for thoracentesis, fluid appears to be small to medium    Left-sided pleural effusion  · Likely heart failure/volume overload related  · Bedside lung ultrasound showing effusion is mild to moderate, no septations  · Continue diuresis as above    Recent aspiration pneumonia  · Appears to be treated effectively  · No signs of recurrence  · Repeat speech eval showed normal swallowing function  · aspiration precautions    History of Present Illness   Physician Requesting Consult: Berta Chavez MD    Reason for Consult / Principal Problem:  Concern for LLL pleural effusion/new pneumonia    HPI:   77 y o  female with a h/o DM2, HTN, intellectual disability, reflux, seizure disorder  Hospitalized 7/18 with sepsis, suspect due to aspiration pneumonia  Noted worsening hypoxemia, required to non-rebreather support on 07/20 for which she was upgraded to ICU for increased oxygen requirements/high-flow nasal cannula alternating with BiPAP  Had a witnessed aspiration on 07/21  Bronchoscopy performed 7/27, noted mucus plugging at the RML/RLL  Clinically improved/pneumonia appeared to be resolved on 07/29 with the antibiotics    Initially was on cefepime/metronidazole, due to clinical deterioration, switched to meropenem, positive sputum Gram stain for coccobacilli, however cultures grew 4+ mixed maricarmen    Downgraded to Internal Medicine 8/1  Noted increase oxygen requirement from 2 LPM to 4 LPM yesterday  Chest x-ray showed possible LLL pneumonia/pleural effusion  This a m , noted improving oxygenation down to 1 LPM   She had diuretics yesterday, Lasix 20 IV total of -1 6 L for the last 24 hours  Patient awake, a baseline of mentation/nonverbal looking around  Does not appear to be in acute distress    Inpatient consult to Pulmonology  Consult performed by: Dominga Monroe MD  Consult ordered by: Giana Falcon MD          Review of Systems  Unable to obtain    Historical Information   Past Medical History:   Diagnosis Date    Anxiety     Diabetes mellitus ([facility] )     GERD (gastroesophageal reflux disease)     Hyperlipidemia     Hypertension     Mental disability     Mixed incontinence 4/12/2017    Seizure disorder ([facility] )      History reviewed  No pertinent surgical history  Social History   Social History     Substance and Sexual Activity   Alcohol Use Not Currently     Social History     Substance and Sexual Activity   Drug Use Not Currently     Social History     Tobacco Use   Smoking Status Never Smoker   Smokeless Tobacco Never Used     E-Cigarette/Vaping    E-Cigarette Use Never User      E-Cigarette/Vaping Substances         Family History: Unavailable    Meds/Allergies   all current active meds have been reviewed    No Known Allergies    Objective   Vitals: Blood pressure 100/64, pulse 59, temperature 98 4 °F (36 9 °C), temperature source Axillary, resp  rate 18, height 5' (1 524 m), weight 80 1 kg (176 lb 9 4 oz), SpO2 96 %  ,Body mass index is 34 49 kg/m²      Intake/Output Summary (Last 24 hours) at 8/3/2021 0724  Last data filed at 8/3/2021 0501  Gross per 24 hour   Intake 480 ml   Output 2150 ml   Net -1670 ml     Invasive Devices     Drain            External Urinary Catheter 8 days                Physical Exam  Gen: not in acute distress, Body mass index is 34 49 kg/m²  HEENT: supple, no adenopathy, PERRLA  Chest: normal respiratory efforts, diminished breath sounds at the bases, otherwise clear breath sounds bilaterally  CV: RRR, no murmurs appreciated  Abdomen: soft, non tender  Extremities: no edema  Skin:  Unremarkable  Neuro:  Nonverbal at baseline, no observed focal deficits        Lab Results: I have personally reviewed pertinent lab results  Imaging Studies: I have personally reviewed pertinent films in PACS  CT chest 07/26/2021-small bilateral pleural effusion, bilateral bibasilar atelectasis  RUL/RML ground-glass opacities, this appear to be improved from prior imaging  EKG, Pathology, and Other Studies: I have personally reviewed pertinent films in PACS     Pulmonary Results (PFTs, PSG): None in file      None    Portions of the record may have been created with voice recognition software  Occasional wrong word or "sound a like" substitutions may have occurred due to the inherent limitations of voice recognition software  Read the chart carefully and recognize, using context, where substitutions have occurred

## 2021-08-03 NOTE — PROCEDURES
POC Cardiac US    Date/Time: 8/3/2021 5:57 PM  Performed by: Tonya Rodriguez MD  Authorized by: Tonya Rodriguez MD     Patient location:  ICU  Procedure details:     Exam Type:  Diagnostic    Indications comment:  Concern for parapneumonic effusion/new hypoxemia yesterday    Image quality: diagnostic      Image availability:  Images available in PACS and video obtained  Pulmonary findings:     Left Lung Findings: left pleural effusion present and left lung sliding      Left Lung Findings comment:  Small to moderate amount  Fluid appears to be free-flowing without septations

## 2021-08-03 NOTE — PLAN OF CARE
Problem: MOBILITY - ADULT  Goal: Maintain or return to baseline ADL function  Description: INTERVENTIONS:  -  Assess patient's ability to carry out ADLs; assess patient's baseline for ADL function and identify physical deficits which impact ability to perform ADLs (bathing, care of mouth/teeth, toileting, grooming, dressing, etc )  - Assess/evaluate cause of self-care deficits   - Assess range of motion  - Assess patient's mobility; develop plan if impaired  - Assess patient's need for assistive devices and provide as appropriate  - Encourage maximum independence but intervene and supervise when necessary  - Involve family in performance of ADLs  - Assess for home care needs following discharge   - Consider OT consult to assist with ADL evaluation and planning for discharge  - Provide patient education as appropriate  Outcome: Progressing  Goal: Maintains/Returns to pre admission functional level  Description: INTERVENTIONS:  - Perform BMAT or MOVE assessment daily    - Set and communicate daily mobility goal to care team and patient/family/caregiver  - Collaborate with rehabilitation services on mobility goals if consulted  - Perform Range of Motion 3 times a day  - Reposition patient every 2 hours    - Dangle patient 3 times a day  - Stand patient 3 times a day  - Ambulate patient 3 times a day  - Out of bed to chair 3 times a day   - Out of bed for meals 3 times a day  - Out of bed for toileting  - Record patient progress and toleration of activity level   Outcome: Progressing     Problem: Prexisting or High Potential for Compromised Skin Integrity  Goal: Skin integrity is maintained or improved  Description: INTERVENTIONS:  - Identify patients at risk for skin breakdown  - Assess and monitor skin integrity  - Assess and monitor nutrition and hydration status  - Monitor labs   - Assess for incontinence   - Turn and reposition patient  - Assist with mobility/ambulation  - Relieve pressure over bony prominences  - Avoid friction and shearing  - Provide appropriate hygiene as needed including keeping skin clean and dry  - Evaluate need for skin moisturizer/barrier cream  - Collaborate with interdisciplinary team   - Patient/family teaching  - Consider wound care consult   Outcome: Progressing     Problem: RESPIRATORY - ADULT  Goal: Achieves optimal ventilation and oxygenation  Description: INTERVENTIONS:  - Assess for changes in respiratory status  - Assess for changes in mentation and behavior  - Position to facilitate oxygenation and minimize respiratory effort  - Oxygen administered by appropriate delivery if ordered  - Initiate smoking cessation education as indicated  - Encourage broncho-pulmonary hygiene including cough, deep breathe, Incentive Spirometry  - Assess the need for suctioning and aspirate as needed  - Assess and instruct to report SOB or any respiratory difficulty  - Respiratory Therapy support as indicated  Outcome: Progressing

## 2021-08-03 NOTE — ASSESSMENT & PLAN NOTE
· Chest x-ray showed suspected right middle lobe I had ordered a CT chest looks like right middle lobe suspect aspiration  · Completed 7 days of antibiotics - see course above  · Aspiration precautions  · Sputum culture - gram neg and gram positive  · Speech therapy saw patient 7/23 recommends mechanically altered/level 1 diet and nectar thick liquids speech has evaluated her on on 08/02 she was doing fine but I did place an order in the going to be doing video swallow to assess for any silent aspiration

## 2021-08-03 NOTE — PHYSICAL THERAPY NOTE
PHYSICAL THERAPY TREATMENT NOTE  NAME:  Ruth Weaver  DATE: 08/03/21    Length Of Stay: 16  Performed at least 2 patient identifiers during session: Name, Birthday and ID bracelet    TREATMENT:    08/03/21 1106   PT Last Visit   PT Visit Date 08/03/21   Note Type   Note Type Treatment   Pain Assessment   Pain Assessment Tool FLACC   Pain Score No Pain   Pain Rating: FLACC (Rest) - Face 0   Pain Rating: FLACC (Rest) - Legs 0   Pain Rating: FLACC (Rest) - Activity 0   Pain Rating: FLACC (Rest) - Cry 0   Pain Rating: FLACC (Rest) - Consolability 0   Score: FLACC (Rest) 0   Pain Rating: FLACC (Activity) - Face 0   Pain Rating: FLACC (Activity) - Legs 0   Pain Rating: FLACC (Activity) - Activity 0   Pain Rating: FLACC (Activity) - Cry 0   Pain Rating: FLACC (Activity) - Consolability 0   Score: FLACC (Activity) 0   Restrictions/Precautions   Other Precautions O2;Fall Risk; Chair Alarm; Bed Alarm;Cognitive  (Nonverbal)   General   Chart Reviewed Yes   Response to Previous Treatment Patient unable to report, no changes reported from family or staff   Family/Caregiver Present No   Cognition   Arousal/Participation Alert; Cooperative   Orientation Level Appropriate for developmental age   Following Commands Follows one step commands inconsistently   Comments Pt is nonverbal   Subjective   Subjective Able to shake head yes/no for rewsponses   Bed Mobility   Additional Comments Pt sitting OOB in recliner upon arrival and at end of session with chair alarm on and all needs within reach  Transfers   Sit to Stand   (minAx2->maxAx2)   Additional items Assist x 2; Increased time required;Verbal cues   Stand to Sit 2  Maximal assistance   Additional items Assist x 2; Increased time required;Verbal cues   Stand pivot Unable to assess   Additional Comments STS with use of RW and MinAx2 -> MaxAx2 for extension of hips and to maintain standing for ~1'  MaxAx2 for stand to sit for controlled decent back into chair   Attempted ambulation with pt able to take one step with RLE but unable to move LLE depsite vc  Ambulation/Elevation   Distance Unable to test   Balance   Static Sitting Fair -   Dynamic Sitting Poor +   Static Standing Poor   Dynamic Standing Poor -   Endurance Deficit   Endurance Deficit Yes   Endurance Deficit Description SpO2 at 2lpm   Activity Tolerance   Activity Tolerance Patient limited by fatigue   Medical Staff Made Aware PCA Tia   Exercises   Heelslides Sitting;10 reps;AAROM; Bilateral  (Long sitting)   Hip Flexion Sitting;10 reps;AROM; Bilateral   Hip Abduction Sitting;10 reps;AAROM; Right;AROM; Left  (Long sitting)   Hip Adduction Sitting;10 reps;AROM; Bilateral  (Long sitting)   Knee AROM Long Arc Quad Sitting;10 reps;AROM; Bilateral   Ankle Pumps Sitting;10 reps;AROM; Bilateral   Assessment   Prognosis Fair   Problem List Decreased strength;Decreased endurance; Impaired balance;Decreased mobility; Decreased cognition; Impaired judgement;Decreased safety awareness; Obesity; Decreased skin integrity   Barriers to Discharge Decreased caregiver support; Inaccessible home environment   Goals   Patient Goals None stated- pt is nonverbal   PT Treatment Day 2   Plan   Treatment/Interventions Functional transfer training;LE strengthening/ROM; Therapeutic exercise; Endurance training;Patient/family training;Equipment eval/education; Bed mobility;Gait training   Progress Slow progress, cognitive deficits   PT Frequency   (3-5x/wk)   Recommendation   PT Discharge Recommendation Post acute rehabilitation services   PT - OK to Discharge Yes   Additional Comments When medically cleared   Additional Comments 2 Pt seated in recliner with LE elevated, chair alarm on, and all needs within reach     AM-PAC Basic Mobility Inpatient   Turning in Bed Without Bedrails 1   Lying on Back to Sitting on Edge of Flat Bed 1   Moving Bed to Chair 2   Standing Up From Chair 3   Walk in Room 1   Climb 3-5 Stairs 1   Basic Mobility Inpatient Raw Score 9 Turning Head Towards Sound 4   Follow Simple Instructions 3   Low Function Basic Mobility Raw Score 16   Low Function Basic Mobility Standardized Score 25 72       The patient's AM-PAC Basic Mobility Inpatient Short Form Raw Score is 9, Standardized Score is  25 72 A standardized score less than 42 9 suggests the patient may benefit from discharge to post-acute rehabilitation services  Please also refer to the recommendation of the Physical Therapist for safe discharge planning  Pt seen for PT treatment session this date with interventions consisting of Therapeutic exercise consisting of: AROM and AAROM 10 reps B LE in sitting position and therapeutic activity consisting of training: sit<>stand transfers and static standing tolerance for ~1' minutes w/ RW for UE support  Pt agreeable to PT treatment session upon arrival, pt found seated OOB in recliner, in no apparent distress  In comparison to previous session, pt requiring less assistance with STS transfers with an attempt for ambulation with weight shifting  Pt able to weight shift onto LLE for RLE stepping but was not able to weight shift onto RLE to step with LLE  Pt able to follow with therex but required AAROM to go through full range of motion for appropriate technique  Continue to recommend STR at time of d/c in order to maximize pt's functional independence and safety w/ mobility  Pt continues to be functioning below baseline level, and remains limited 2* factors listed above  PT will continue to see pt while here in order to address the deficits listed above and provide interventions consistent w/ POC in effort to achieve STGs      Zachary Barrios PTA

## 2021-08-03 NOTE — ASSESSMENT & PLAN NOTE
· She presented with hemoglobin greater than 10 previously she had a normal hemoglobin drop down to 8 4 today improved to 8 9 she had a fecal occult done which was negative for proceed with vitamin B12 and iron panel  Improved to 9 1 vitamin B12 is normal iron panel compliant with anemia of chronic disease  Continue ferrous sulfate 325 mg p o   Daily along with stool softeners

## 2021-08-04 LAB
GLUCOSE SERPL-MCNC: 129 MG/DL (ref 65–140)
GLUCOSE SERPL-MCNC: 136 MG/DL (ref 65–140)
GLUCOSE SERPL-MCNC: 153 MG/DL (ref 65–140)
GLUCOSE SERPL-MCNC: 154 MG/DL (ref 65–140)

## 2021-08-04 PROCEDURE — 82948 REAGENT STRIP/BLOOD GLUCOSE: CPT

## 2021-08-04 PROCEDURE — 99232 SBSQ HOSP IP/OBS MODERATE 35: CPT | Performed by: INTERNAL MEDICINE

## 2021-08-04 PROCEDURE — 92526 ORAL FUNCTION THERAPY: CPT

## 2021-08-04 RX ORDER — TORSEMIDE 10 MG/1
10 TABLET ORAL DAILY
Status: DISCONTINUED | OUTPATIENT
Start: 2021-08-04 | End: 2021-08-11 | Stop reason: HOSPADM

## 2021-08-04 RX ADMIN — PANTOPRAZOLE SODIUM 40 MG: 40 TABLET, DELAYED RELEASE ORAL at 05:31

## 2021-08-04 RX ADMIN — GABAPENTIN 400 MG: 400 CAPSULE ORAL at 16:21

## 2021-08-04 RX ADMIN — GABAPENTIN 400 MG: 400 CAPSULE ORAL at 08:11

## 2021-08-04 RX ADMIN — INSULIN LISPRO 4 UNITS: 100 INJECTION, SOLUTION INTRAVENOUS; SUBCUTANEOUS at 12:14

## 2021-08-04 RX ADMIN — INSULIN LISPRO 4 UNITS: 100 INJECTION, SOLUTION INTRAVENOUS; SUBCUTANEOUS at 17:23

## 2021-08-04 RX ADMIN — DIVALPROEX SODIUM 250 MG: 250 TABLET, DELAYED RELEASE ORAL at 05:31

## 2021-08-04 RX ADMIN — SERTRALINE HYDROCHLORIDE 100 MG: 50 TABLET ORAL at 21:12

## 2021-08-04 RX ADMIN — ATORVASTATIN CALCIUM 80 MG: 40 TABLET, FILM COATED ORAL at 16:21

## 2021-08-04 RX ADMIN — TORSEMIDE 10 MG: 10 TABLET ORAL at 14:59

## 2021-08-04 RX ADMIN — INSULIN LISPRO 4 UNITS: 100 INJECTION, SOLUTION INTRAVENOUS; SUBCUTANEOUS at 08:12

## 2021-08-04 RX ADMIN — DIVALPROEX SODIUM 250 MG: 250 TABLET, DELAYED RELEASE ORAL at 14:54

## 2021-08-04 RX ADMIN — INSULIN GLARGINE 10 UNITS: 100 INJECTION, SOLUTION SUBCUTANEOUS at 21:12

## 2021-08-04 RX ADMIN — FERROUS SULFATE TAB 325 MG (65 MG ELEMENTAL FE) 325 MG: 325 (65 FE) TAB at 08:11

## 2021-08-04 RX ADMIN — DIVALPROEX SODIUM 250 MG: 250 TABLET, DELAYED RELEASE ORAL at 21:13

## 2021-08-04 RX ADMIN — OXYBUTYNIN 10 MG: 5 TABLET, FILM COATED, EXTENDED RELEASE ORAL at 08:11

## 2021-08-04 RX ADMIN — STANDARDIZED SENNA CONCENTRATE 8.6 MG: 8.6 TABLET ORAL at 21:12

## 2021-08-04 RX ADMIN — GABAPENTIN 400 MG: 400 CAPSULE ORAL at 21:12

## 2021-08-04 RX ADMIN — QUETIAPINE FUMARATE 200 MG: 50 TABLET, EXTENDED RELEASE ORAL at 21:12

## 2021-08-04 RX ADMIN — INSULIN LISPRO 2 UNITS: 100 INJECTION, SOLUTION INTRAVENOUS; SUBCUTANEOUS at 21:11

## 2021-08-04 RX ADMIN — ASPIRIN 81 MG: 81 TABLET, COATED ORAL at 08:11

## 2021-08-04 RX ADMIN — ENOXAPARIN SODIUM 40 MG: 40 INJECTION SUBCUTANEOUS at 08:11

## 2021-08-04 NOTE — CASE MANAGEMENT
Case Management Progress Note    Patient name Talita Brooks  Location /-01 MRN [id number]  : 1955 Date 2021       LOS (days): 17  Geometric Mean LOS (GMLOS) (days): 4 80  Days to GMLOS:-12        BUNDLE:      OBJECTIVE:  Pt is a 77y o  year old Single, white or  [1], female with Taoism preference of Non-Taoist admitted on 2021  2:07 PM  Pt is admitted to -01 at [address] [place] [address] with complaints of Sepsis due to pneumonia ([facility] )   Current admission status: Inpatient  Preferred Pharmacy:   [address] [place] [address] [place] [address]  Phone: [phone] Fax: [phone]    Primary Care Provider: Darleen Abarca MD    Primary Insurance: [place] [employer]  Secondary Insurance: MEDICARE    PROGRESS NOTE:    CM called to [facility] to find out where we are in the Level 2 assessment  Call received from RUDY looking for the [address]  CM was able to find the IDD for [facility] and faxed this to her  Await approval from the [place]  2 PM Called to patients brother and his wife, reviewed we are waiting for approval of the State and plan is to go to [facility] upon stability  Mrs Pandey Olszewski in agreement of the plan  GENESIS reviewed dc IMM and mailed a copy to The [address] [place] [address]  CM called  Amrita Stephens to update her on Plan of care

## 2021-08-04 NOTE — ASSESSMENT & PLAN NOTE
· Sepsis secondary to presumed aspiration pneumonia finished course of antibiotics with:  Meropenem and metronidazole; was on vancomycin    · Has been re-evaluated by speech pathologist and diet advanced currently to pureed and nectar thickened liquids

## 2021-08-04 NOTE — PROGRESS NOTES
[address]  Progress Note - Daniella Marshall 1955, 77 y o  female MRN: [id number]  Unit/Bed#: -01 Encounter: [id number]  Primary Care Provider: Bebe Weber MD   Date and time admitted to hospital: 7/18/2021  2:07 PM    * Sepsis due to pneumonia [facility])  Assessment & Plan  · Sepsis secondary to presumed aspiration pneumonia finished course of antibiotics with:  Meropenem and metronidazole; was on vancomycin  · Has been re-evaluated by speech pathologist and diet advanced currently to pureed and nectar thickened liquids    Acute respiratory failure with hypoxia ([address] )  Assessment & Plan  · Secondary to pneumonia currently down to 1 L  · In ICU did require bronchoscopy with suction of mucus plugs  · Seen by pulmonology  Will continue diuretics but with low-dose torsemide  · CTA negative for pulmonary embolism    Intellectual disability  Assessment & Plan  · Nonverbal at baseline resides in group home    Seizure disorder [facility])  Assessment & Plan  · Continue depakote and gabapentin    Obesity (BMI 30 0-34  9)  Assessment & Plan  · Body mass index is 35 69 kg/m²  Type 2 diabetes mellitus without complication, without long-term current use of insulin [facility])  Assessment & Plan  Lab Results   Component Value Date    HGBA1C 6 6 (H) 07/19/2021     Recent Labs     08/03/21  1628 08/03/21  2219 08/04/21  0801 08/04/21  1109   POCGLU 122 147* 153* 129     · Will continue glargine with lispro 4 units t i d  Psychiatric disorder  Assessment & Plan  · Mood disorder continue depakote gabapentin quetiapine lorazepam sertraline      VTE Pharmacologic Prophylaxis:  High Risk (Score >/= 5) - Pharmacological DVT Prophylaxis Ordered: Enoxaparin (Lovenox)  Sequential Compression Devices Ordered  Patient Centered Rounds: I have performed bedside rounds with nursing staff today    Discussions with Specialists or Other Care Team Provider:  Case management    Education and Discussions with Family / Patient:  Discussed with brother on telephone    Time Spent for Care: 25 mins  More than 50% of total time spent on counseling and coordination of care as described above  Current Length of Stay: 17 day(s)  Current Patient Status: Inpatient   Certification Statement: The patient will continue to require additional inpatient hospital stay due to placement  Discharge Plan / Estimated Discharge Date: Medically stable awaiting rehab placement    Code Status: Level 3 - DNAR and DNI      Subjective:   Patient seen and examined  No new complaints, down to 1 L  appears comfortable and folding papers    Objective:   Vitals: Blood pressure 115/57, pulse 70, temperature (!) 97 4 °F (36 3 °C), temperature source Temporal, resp  rate 22, height 5' (1 524 m), weight 82 9 kg (182 lb 12 2 oz), SpO2 95 %  Physical Exam  Vitals reviewed  Constitutional:       General: She is not in acute distress  Appearance: Normal appearance  Eyes:      General: No scleral icterus  Cardiovascular:      Rate and Rhythm: Regular rhythm  Heart sounds: Normal heart sounds  Pulmonary:      Breath sounds: Decreased breath sounds present  No wheezing  Abdominal:      General: Bowel sounds are normal       Palpations: Abdomen is soft  Tenderness: There is no guarding or rebound  Musculoskeletal:         General: No swelling  Skin:     General: Skin is warm  Neurological:      Mental Status: She is alert        Comments: Mute       Additional Data:   Labs:  Results from last 7 days   Lab Units 08/02/21  0440 08/01/21  0500 07/30/21  0454 07/29/21  0449   WBC Thousand/uL 4 91 5 62 6 73 6 33   HEMOGLOBIN g/dL 9 1* 8 9* 8 4* 8 7*   HEMATOCRIT % 29 6* 29 7* 27 9* 29 4*   MCV fL 96 97 97 98   PLATELETS Thousands/uL 300 291 263 281     Results from last 7 days   Lab Units 08/03/21  0502 08/02/21  0440 08/01/21  0500 07/30/21  0454 07/29/21  0449   SODIUM mmol/L 145 144 144 141 148*   POTASSIUM mmol/L 4 1 4 3 4 0 4 2 4  0   CHLORIDE mmol/L 106 106 108 106 110*   CO2 mmol/L 36* 34* 35* 32 38*   ANION GAP mmol/L 3* 4 1* 3* 0*   BUN mg/dL 7 9 12 13 20   CREATININE mg/dL 0 59* 0 49* 0 50* 0 59* 0 56*   CALCIUM mg/dL 9 2 9 3 9 2 8 9 9 0   EGFR ml/min/1 73sq m 95 101 100 95 97   GLUCOSE RANDOM mg/dL 133 167* 115 185* 206*     Results from last 7 days   Lab Units 07/30/21  0454   MAGNESIUM mg/dL 1 9   PHOSPHORUS mg/dL 3 7         Results from last 7 days   Lab Units 08/03/21  0502   NT-PRO BNP pg/mL 131*          Results from last 7 days   Lab Units 08/04/21  1109 08/04/21  0801 08/03/21  2219 08/03/21  1628 08/03/21  1112 08/03/21  0738 08/02/21  2122 08/02/21  1531 08/02/21  1102 08/02/21  0708 08/01/21  2114 08/01/21  1548   POC GLUCOSE mg/dl 129 153* 147* 122 208* 143* 125 166* 184* 150* 156* 159*             * I Have Reviewed All Lab Data Listed Above  Cultures:                   Lines/Drains:  Invasive Devices     Drain            External Urinary Catheter 9 days              Telemetry:      Imaging:  Imaging Reports Reviewed Today Include:   XR chest portable    Result Date: 7/22/2021  Impression: Worsening right upper lobe pneumonia  Workstation performed: [id number]     XR chest portable    Result Date: 7/21/2021  Impression: Progressive bilateral infiltrates, right greater than left Workstation performed: [id number]     XR chest 1 view portable    Result Date: 7/19/2021  Impression: No acute cardiopulmonary disease  Workstation performed: [id number]     CT chest wo contrast    Result Date: 7/18/2021  Impression: Significant elevation of the right hemidiaphragm with volume loss and consolidation involving the right middle lobe and right lower lobe with appearance favoring atelectasis  Further detail limited by both patient motion and lack of contrast  Dilated central pulmonary arteries as above   Workstation performed: [id number]     CTA chest pe study    Result Date: 7/20/2021  Impression: No central pulmonary emboli identified  Significant interval worsening in airspace consolidation and patchy infiltrates involving the right upper lobe to the greatest degree posterior segment but also elsewhere within the right upper lobe  There is also interval worsening of the consolidation  and volume loss in the dependent lower lobes bilaterally  Also component of chronic volume loss related to elevated right hemidiaphragm  Focally dilated main pulmonary artery  Assess for pulmonary valve disease   Workstation performed: [id number]     Scheduled Meds:  Current Facility-Administered Medications   Medication Dose Route Frequency Provider Last Rate    acetaminophen  640 mg Oral Q6H PRN Gisselle Yazidism Ramella, CRNP      aspirin  81 mg Oral Daily Gisselle Yazidism Ramella, CRNP      atorvastatin  80 mg Oral Daily With [facility] Ramella, CRNP      bisacodyl  10 mg Rectal Daily PRN Gisselle Yazidism Ramella, CRNP      divalproex sodium  250 mg Oral [address] Alva Castro, KRISTOPHER      enoxaparin  40 mg Subcutaneous Q24H [address] Alva Castro, KRISTOPHER      ferrous sulfate  325 mg Oral Daily With Breakfast Gisselle Yazidism Ramella, CRNP      gabapentin  400 mg Oral TID Gisselle Yazidism Ramella, CRNP      insulin glargine  10 Units Subcutaneous HS Gisselle Yazidism Ramella, CRNP      insulin lispro  2-12 Units Subcutaneous HS Gisselle Yazidism Ramella, CRNP      insulin lispro  2-12 Units Subcutaneous TID AC Alva Harmon, CRNP      insulin lispro  4 Units Subcutaneous TID With Meals Gisselle Yazidism Ramella, CRNP      ipratropium-albuterol  3 mL Nebulization Q4H PRN Gisselle Yazidism Ramella, CRNP      LORazepam  0 5 mg Oral BID PRN Gisselle Yazidism Ramella, CRNP      ondansetron  4 mg Intravenous Q6H PRN Yara Lars, CRNP      oxybutynin  10 mg Oral Daily Gisselle Yazidism Ramella, CRNP      pantoprazole  40 mg Oral Early Morning Yara Lars, CRNP      QUEtiapine  200 mg Oral HS Yara Lars, CRNP      senna  1 tablet Oral HS Yara Lars, KRISTOPHER      sertraline  100 mg Oral HS KRISTOPHER Espinoza      sodium chloride  1 spray Each Nare Q1H PRN KRISTOPHER Gutierrez DO [address] Internal Medicine  Hospitalist    ** Please Note: This note has been constructed using a voice recognition system   **

## 2021-08-04 NOTE — SPEECH THERAPY NOTE
Speech/Language Pathology Progress Note    Patient Name: Mile Winston  [id number] Date: 8/4/2021     Subjective:  Pt awake and alert, sitting upright in chair at bedside  Objective:  Pt seen for diagnostic swallow therapy  She had a modified barium swallow study yesterday 8/3/21 followed by recommendation for puree and NTL as least restrictive/safest diet  Pt was offered a snack today of apple sauce and NTL juice  When given the cup, pt was noted to take large, rapid, consecutive gulps  She was poorly responsive to verbal cueing, and cup had to be removed to prevent her from finishing the entire drink at once  Rapid transfer and swallow noted with apple sauce  No s/s aspiration despite rapid ingestion  Assessment:  Puree/NTL diet remains appropriate  Pt benefits from feeding assistance to decrease her rate of intake  Plan/Recommendations:  Continue puree diet and NTLs  Supervise/assist with meals  Okay to d/c ST services at this time

## 2021-08-04 NOTE — ASSESSMENT & PLAN NOTE
· Secondary to pneumonia currently down to 1 L  · In ICU did require bronchoscopy with suction of mucus plugs  · Seen by pulmonology    Will continue diuretics but with low-dose torsemide  · CTA negative for pulmonary embolism

## 2021-08-04 NOTE — ASSESSMENT & PLAN NOTE
Lab Results   Component Value Date    HGBA1C 6 6 (H) 07/19/2021     Recent Labs     08/03/21  1628 08/03/21  2219 08/04/21  0801 08/04/21  1109   POCGLU 122 147* 153* 129     · Will continue glargine with lispro 4 units t i d

## 2021-08-05 LAB
ALBUMIN SERPL BCP-MCNC: 2.4 G/DL (ref 3.5–5)
ALP SERPL-CCNC: 53 U/L (ref 46–116)
ALT SERPL W P-5'-P-CCNC: 31 U/L (ref 12–78)
ANION GAP SERPL CALCULATED.3IONS-SCNC: 2 MMOL/L (ref 4–13)
AST SERPL W P-5'-P-CCNC: 12 U/L (ref 5–45)
BILIRUB SERPL-MCNC: 0.35 MG/DL (ref 0.2–1)
BUN SERPL-MCNC: 12 MG/DL (ref 5–25)
CALCIUM ALBUM COR SERPL-MCNC: 10.1 MG/DL (ref 8.3–10.1)
CALCIUM SERPL-MCNC: 8.8 MG/DL (ref 8.3–10.1)
CHLORIDE SERPL-SCNC: 105 MMOL/L (ref 100–108)
CO2 SERPL-SCNC: 34 MMOL/L (ref 21–32)
CREAT SERPL-MCNC: 0.57 MG/DL (ref 0.6–1.3)
ERYTHROCYTE [DISTWIDTH] IN BLOOD BY AUTOMATED COUNT: 15.4 % (ref 11.6–15.1)
GFR SERPL CREATININE-BSD FRML MDRD: 96 ML/MIN/1.73SQ M
GLUCOSE SERPL-MCNC: 113 MG/DL (ref 65–140)
GLUCOSE SERPL-MCNC: 118 MG/DL (ref 65–140)
GLUCOSE SERPL-MCNC: 162 MG/DL (ref 65–140)
GLUCOSE SERPL-MCNC: 180 MG/DL (ref 65–140)
GLUCOSE SERPL-MCNC: 205 MG/DL (ref 65–140)
HCT VFR BLD AUTO: 29.5 % (ref 34.8–46.1)
HGB BLD-MCNC: 8.8 G/DL (ref 11.5–15.4)
MCH RBC QN AUTO: 28.7 PG (ref 26.8–34.3)
MCHC RBC AUTO-ENTMCNC: 29.8 G/DL (ref 31.4–37.4)
MCV RBC AUTO: 96 FL (ref 82–98)
PLATELET # BLD AUTO: 290 THOUSANDS/UL (ref 149–390)
PMV BLD AUTO: 9.2 FL (ref 8.9–12.7)
POTASSIUM SERPL-SCNC: 3.9 MMOL/L (ref 3.5–5.3)
PROT SERPL-MCNC: 5.8 G/DL (ref 6.4–8.2)
RBC # BLD AUTO: 3.07 MILLION/UL (ref 3.81–5.12)
SODIUM SERPL-SCNC: 141 MMOL/L (ref 136–145)
WBC # BLD AUTO: 4.43 THOUSAND/UL (ref 4.31–10.16)

## 2021-08-05 PROCEDURE — 85027 COMPLETE CBC AUTOMATED: CPT | Performed by: INTERNAL MEDICINE

## 2021-08-05 PROCEDURE — 94760 N-INVAS EAR/PLS OXIMETRY 1: CPT

## 2021-08-05 PROCEDURE — 80053 COMPREHEN METABOLIC PANEL: CPT | Performed by: INTERNAL MEDICINE

## 2021-08-05 PROCEDURE — 99232 SBSQ HOSP IP/OBS MODERATE 35: CPT | Performed by: PHYSICIAN ASSISTANT

## 2021-08-05 PROCEDURE — 99232 SBSQ HOSP IP/OBS MODERATE 35: CPT | Performed by: INTERNAL MEDICINE

## 2021-08-05 PROCEDURE — 82948 REAGENT STRIP/BLOOD GLUCOSE: CPT

## 2021-08-05 RX ADMIN — INSULIN GLARGINE 10 UNITS: 100 INJECTION, SOLUTION SUBCUTANEOUS at 21:09

## 2021-08-05 RX ADMIN — SERTRALINE HYDROCHLORIDE 100 MG: 50 TABLET ORAL at 21:09

## 2021-08-05 RX ADMIN — INSULIN LISPRO 4 UNITS: 100 INJECTION, SOLUTION INTRAVENOUS; SUBCUTANEOUS at 17:00

## 2021-08-05 RX ADMIN — GABAPENTIN 400 MG: 400 CAPSULE ORAL at 16:40

## 2021-08-05 RX ADMIN — ASPIRIN 81 MG: 81 TABLET, COATED ORAL at 08:30

## 2021-08-05 RX ADMIN — INSULIN LISPRO 4 UNITS: 100 INJECTION, SOLUTION INTRAVENOUS; SUBCUTANEOUS at 11:59

## 2021-08-05 RX ADMIN — GABAPENTIN 400 MG: 400 CAPSULE ORAL at 08:30

## 2021-08-05 RX ADMIN — DIVALPROEX SODIUM 250 MG: 250 TABLET, DELAYED RELEASE ORAL at 14:01

## 2021-08-05 RX ADMIN — QUETIAPINE FUMARATE 200 MG: 50 TABLET, EXTENDED RELEASE ORAL at 21:09

## 2021-08-05 RX ADMIN — DIVALPROEX SODIUM 250 MG: 250 TABLET, DELAYED RELEASE ORAL at 05:25

## 2021-08-05 RX ADMIN — OXYBUTYNIN 10 MG: 5 TABLET, FILM COATED, EXTENDED RELEASE ORAL at 08:30

## 2021-08-05 RX ADMIN — PANTOPRAZOLE SODIUM 40 MG: 40 TABLET, DELAYED RELEASE ORAL at 05:25

## 2021-08-05 RX ADMIN — INSULIN LISPRO 4 UNITS: 100 INJECTION, SOLUTION INTRAVENOUS; SUBCUTANEOUS at 21:10

## 2021-08-05 RX ADMIN — GABAPENTIN 400 MG: 400 CAPSULE ORAL at 21:09

## 2021-08-05 RX ADMIN — ENOXAPARIN SODIUM 40 MG: 40 INJECTION SUBCUTANEOUS at 08:30

## 2021-08-05 RX ADMIN — TORSEMIDE 10 MG: 10 TABLET ORAL at 08:29

## 2021-08-05 RX ADMIN — DIVALPROEX SODIUM 250 MG: 250 TABLET, DELAYED RELEASE ORAL at 21:09

## 2021-08-05 RX ADMIN — ATORVASTATIN CALCIUM 80 MG: 40 TABLET, FILM COATED ORAL at 16:40

## 2021-08-05 RX ADMIN — STANDARDIZED SENNA CONCENTRATE 8.6 MG: 8.6 TABLET ORAL at 21:09

## 2021-08-05 RX ADMIN — FERROUS SULFATE TAB 325 MG (65 MG ELEMENTAL FE) 325 MG: 325 (65 FE) TAB at 08:29

## 2021-08-05 NOTE — RESPIRATORY THERAPY NOTE
RT Protocol Note  Nadege Stevenson 77 y o  female MRN: [id number]  Unit/Bed#: -01 Encounter: [id number]    Assessment    Principal Problem:    Sepsis due to pneumonia [facility])  Active Problems:    Psychiatric disorder    Type 2 diabetes mellitus without complication, without long-term current use of insulin ([place] [address] )    Acute respiratory failure with hypoxia (HCC)    Obesity (BMI 30 0-34  9)    Seizure disorder ([place] [address] )    Intellectual disability      Home Pulmonary Medications:         Past Medical History:   Diagnosis Date    Anxiety     Diabetes mellitus ([place] [address] )     GERD (gastroesophageal reflux disease)     Hyperlipidemia     Hypertension     Mental disability     Mixed incontinence 4/12/2017    Seizure disorder ([place] [address] )      Social History     Socioeconomic History    Marital status: Single     Spouse name: None    Number of children: None    Years of education: None    Highest education level: None   Occupational History    None   Tobacco Use    Smoking status: Never Smoker    Smokeless tobacco: Never Used   Vaping Use    Vaping Use: Never used   Substance and Sexual Activity    Alcohol use: Not Currently    Drug use: Not Currently    Sexual activity: Not Currently   Other Topics Concern    None   Social History Narrative    None     Social Determinants of Health     Financial Resource Strain:     Difficulty of Paying Living Expenses:    Food Insecurity:     Worried About Running Out of Food in the Last Year:     Ran Out of Food in the Last Year:    Transportation Needs:     Lack of Transportation (Medical):      Lack of Transportation (Non-Medical):    Physical Activity:     Days of Exercise per Week:     Minutes of Exercise per Session:    Stress:     Feeling of Stress :    Social Connections:     Frequency of Communication with Friends and Family:     Frequency of Social Gatherings with Friends and Family:     Attends Church Services:     Active Member of Clubs or Organizations:  Attends Club or Organization Meetings:     Marital Status:    Intimate Partner Violence:     Fear of Current or Ex-Partner:     Emotionally Abused:     Physically Abused:     Sexually Abused:        Subjective    Subjective Data: Pt nonverbal at baseline but follows most commands  Objective    Physical Exam:   Assessment Type: Assess only  General Appearance: Alert, Awake  Respiratory Pattern: Normal  Chest Assessment: Chest expansion symmetrical  Bilateral Breath Sounds: Diminished  Cough: None  O2 Device: 2lPM NC    Vitals:  Blood pressure 102/54, pulse 66, temperature (!) 97 °F (36 1 °C), temperature source Temporal, resp  rate 20, height 5' (1 524 m), weight 82 3 kg (181 lb 7 oz), SpO2 94 %  Imaging and other studies: I have personally reviewed pertinent reports  O2 Device: 2lPM NC     Plan    Respiratory Plan: Mild Distress pathway  Airway Clearance Plan: Percussive Vest     Resp Comments: Pt resting comfotably on NC, no wheezing, no resp  distress, no prn udn tx needed  Pt trialed on RA SpO2 84%  Pt placed back on 2LPM NC for SpO2 94%

## 2021-08-05 NOTE — ASSESSMENT & PLAN NOTE
· Secondary to pneumonia currently down to 1 L  · In ICU did require bronchoscopy with suction of mucus plugs  · Seen by pulmonology    Will continue diuretics with low-dose torsemide  · CTA negative for pulmonary embolism

## 2021-08-05 NOTE — CASE MANAGEMENT
[address] [employer] letter received  Information has been forwarded to the [employer] for further evaluation in regards to pt's intellectual disability  Awaiting letter from [employer] of [employer]

## 2021-08-05 NOTE — ASSESSMENT & PLAN NOTE
Lab Results   Component Value Date    HGBA1C 6 6 (H) 07/19/2021     Recent Labs     08/04/21  1559 08/04/21  2108 08/05/21  0731 08/05/21  1131   POCGLU 136 154* 118 162*     · Will continue glargine 10 units with lispro 4 units t i d

## 2021-08-05 NOTE — PROGRESS NOTES
Progress Note - Pulmonary   Denita Vital 77 y o  female MRN: [id number]  Unit/Bed#: -01 Encounter: [id number]      Assessment  / Plan :  1  Acute hypoxic respiratory failure  · Patient's titrated down to 2 liters/minute nasal cannula  She is tolerating it well  She appears in no acute distress upon my evaluation today  · Patient will need continued diuresis as outlined by primary care team   · Continue to wean oxygen as tolerated  Maintain SpO2 greater than or equal to 88%  2  Left-sided pleural effusion  · Likely heart failure/volume overload related  · My attending physician performed a bedside ultrasound on Tuesday  There showed a mild-to-moderate effusion without any septations  · Continue diuresis  · Patient appears comfortable today  No further intervention    3  Recurrent aspiration  · Appears to be treated effectively  No signs of recurrence  Patient was seen by speech and swallow which showed normal swallowing function  Continue aspiration precautions as outlined by primary team     4  Nonverbal at baseline      Subjective:   Miss Kenroy Thomson is laying in bed upon my evaluation today  She is pleasantly eating lunch without difficulty  She does shake her head yes or no to my questions  She otherwise is nonverbal   She does appear in no acute distress and is resting comfortably  Objective:   Vitals: Blood pressure 102/54, pulse 66, temperature (!) 97 °F (36 1 °C), temperature source Temporal, resp  rate 20, height 5' (1 524 m), weight 82 3 kg (181 lb 7 oz), SpO2 94 %  , 2 liters/minute, Body mass index is 35 43 kg/m²        Intake/Output Summary (Last 24 hours) at 8/5/2021 1334  Last data filed at 8/5/2021 1001  Gross per 24 hour   Intake 360 ml   Output 1850 ml   Net -1490 ml         Physical Exam  Gen: Awake, alert, oriented x 3, no acute distress  HEENT: Mucous membranes moist, no oral lesions, no thrush  NECK: No accessory muscle use, JVP not elevated  Cardiac: Regular, single S1, single S2, no murmurs, no rubs, no gallops  Lungs:  Breath sounds decreased bilaterally throughout all lung fields without any wheezes, rales, rhonchi  Abdomen: normoactive bowel sounds, soft nontender, nondistended, no rebound or rigidity, no guarding  Extremities: no cyanosis, no clubbing, no edema    Labs: I have personally reviewed pertinent lab results  , ABG: No results found for: PHART, SVO6SXY, PO2ART, WZN4YJH, J7EOINHM, BEART, SOURCE, BNP: No results found for: BNP, CBC:   Lab Results   Component Value Date    WBC 4 43 08/05/2021    HGB 8 8 (L) 08/05/2021    HCT 29 5 (L) 08/05/2021    MCV 96 08/05/2021     08/05/2021    MCH 28 7 08/05/2021    MCHC 29 8 (L) 08/05/2021    RDW 15 4 (H) 08/05/2021    MPV 9 2 08/05/2021   , CMP:   Lab Results   Component Value Date    SODIUM 141 08/05/2021    K 3 9 08/05/2021     08/05/2021    CO2 34 (H) 08/05/2021    BUN 12 08/05/2021    CREATININE 0 57 (L) 08/05/2021    CALCIUM 8 8 08/05/2021    AST 12 08/05/2021    ALT 31 08/05/2021    ALKPHOS 53 08/05/2021    EGFR 96 08/05/2021   , PT/INR: No results found for: PT, INR, Troponin: No results found for: TROPONINI     Imaging and other studies: I have personally reviewed pertinent reports      Chest x-ray 08/02/2021    Kierra Marie PA-C

## 2021-08-05 NOTE — PROGRESS NOTES
[address]  Progress Note - Palma Teixeira 1955, 77 y o  female MRN: [id number]  Unit/Bed#: -01 Encounter: [id number]  Primary Care Provider: Xiomy De Jesus MD   Date and time admitted to hospital: 7/18/2021  2:07 PM    * Sepsis due to pneumonia [facility])  Assessment & Plan  · Sepsis secondary to presumed aspiration pneumonia finished course of antibiotics with:  meropenem and metronidazole; was on vancomycin  · Has been re-evaluated by speech pathologist and diet advanced currently to pureed and nectar thickened liquids    Acute respiratory failure with hypoxia ([facility] [address] )  Assessment & Plan  · Secondary to pneumonia currently down to 1 L  · In ICU did require bronchoscopy with suction of mucus plugs  · Seen by pulmonology  Will continue diuretics with low-dose torsemide  · CTA negative for pulmonary embolism    Intellectual disability  Assessment & Plan  · Nonverbal at baseline resides in group home    Seizure disorder [facility])  Assessment & Plan  · Continue depakote and gabapentin    Obesity (BMI 30 0-34  9)  Assessment & Plan  · Body mass index is 35 43 kg/m²  Type 2 diabetes mellitus without complication, without long-term current use of insulin [facility])  Assessment & Plan  Lab Results   Component Value Date    HGBA1C 6 6 (H) 07/19/2021     Recent Labs     08/04/21  1559 08/04/21  2108 08/05/21  0731 08/05/21  1131   POCGLU 136 154* 118 162*     · Will continue glargine 10 units with lispro 4 units t i d  Psychiatric disorder  Assessment & Plan  · Mood disorder continue depakote gabapentin quetiapine lorazepam sertraline      VTE Pharmacologic Prophylaxis: VTE Score: 3 Moderate Risk (Score 3-4) - Pharmacological DVT Prophylaxis Ordered: Enoxaparin (Lovenox)  Patient Centered Rounds: I have performed bedside rounds with nursing staff today    Discussions with Specialists or Other Care Team Provider:  Case management    Education and Discussions with Family / Patient:  bedside    Time Spent for Care: 25 mins  More than 50% of total time spent on counseling and coordination of care as described above  Current Length of Stay: 18 day(s)  Current Patient Status: Inpatient   Certification Statement: The patient will continue to require additional inpatient hospital stay due to awaiting placement  Discharge Plan / Estimated Discharge Date: 24-48 hours to rehab    Code Status: Level 3 - DNAR and DNI      Subjective:   Patient seen and examined   bedside, mentation appears to be at baseline  No complaints  Objective:   Vitals: Blood pressure 102/54, pulse 66, temperature (!) 97 °F (36 1 °C), temperature source Temporal, resp  rate 20, height 5' (1 524 m), weight 82 3 kg (181 lb 7 oz), SpO2 94 %  Physical Exam  Vitals reviewed  Constitutional:       General: She is not in acute distress  Appearance: Normal appearance  HENT:      Head: Atraumatic  Mouth/Throat:      Mouth: Mucous membranes are moist    Eyes:      General: No scleral icterus  Cardiovascular:      Rate and Rhythm: Normal rate and regular rhythm  Heart sounds: Normal heart sounds  Pulmonary:      Breath sounds: Decreased breath sounds present  No wheezing  Abdominal:      General: Bowel sounds are normal       Palpations: Abdomen is soft  Tenderness: There is no guarding or rebound  Musculoskeletal:         General: No swelling  Skin:     General: Skin is warm  Neurological:      Mental Status: She is alert  Mental status is at baseline  Cranial Nerves: No cranial nerve deficit         Additional Data:   Labs:  Results from last 7 days   Lab Units 08/05/21  0508 08/02/21  0440 08/01/21  0500 07/30/21  0454   WBC Thousand/uL 4 43 4 91 5 62 6 73   HEMOGLOBIN g/dL 8 8* 9 1* 8 9* 8 4*   HEMATOCRIT % 29 5* 29 6* 29 7* 27 9*   MCV fL 96 96 97 97   PLATELETS Thousands/uL 290 300 291 263     Results from last 7 days   Lab Units 08/05/21  0508 08/03/21  0502 08/02/21  0440 08/01/21  0500 07/30/21  0454   SODIUM mmol/L 141 145 144 144 141   POTASSIUM mmol/L 3 9 4 1 4 3 4 0 4 2   CHLORIDE mmol/L 105 106 106 108 106   CO2 mmol/L 34* 36* 34* 35* 32   ANION GAP mmol/L 2* 3* 4 1* 3*   BUN mg/dL 12 7 9 12 13   CREATININE mg/dL 0 57* 0 59* 0 49* 0 50* 0 59*   CALCIUM mg/dL 8 8 9 2 9 3 9 2 8 9   ALBUMIN g/dL 2 4*  --   --   --   --    TOTAL BILIRUBIN mg/dL 0 35  --   --   --   --    ALK PHOS U/L 53  --   --   --   --    ALT U/L 31  --   --   --   --    AST U/L 12  --   --   --   --    EGFR ml/min/1 73sq m 96 95 101 100 95   GLUCOSE RANDOM mg/dL 113 133 167* 115 185*     Results from last 7 days   Lab Units 07/30/21  0454   MAGNESIUM mg/dL 1 9   PHOSPHORUS mg/dL 3 7         Results from last 7 days   Lab Units 08/03/21  0502   NT-PRO BNP pg/mL 131*          Results from last 7 days   Lab Units 08/05/21  1131 08/05/21  0731 08/04/21  2108 08/04/21  1559 08/04/21  1109 08/04/21  0801 08/03/21  2219 08/03/21  1628 08/03/21  1112 08/03/21  0738 08/02/21  2122 08/02/21  1531   POC GLUCOSE mg/dl 162* 118 154* 136 129 153* 147* 122 208* 143* 125 166*             * I Have Reviewed All Lab Data Listed Above  Cultures:                   Lines/Drains:  Invasive Devices     Drain            External Urinary Catheter 10 days              Telemetry:      Imaging:  Imaging Reports Reviewed Today Include:   XR chest portable    Result Date: 7/22/2021  Impression: Worsening right upper lobe pneumonia  Workstation performed: [id number]     XR chest portable    Result Date: 7/21/2021  Impression: Progressive bilateral infiltrates, right greater than left Workstation performed: [id number]     XR chest 1 view portable    Result Date: 7/19/2021  Impression: No acute cardiopulmonary disease   Workstation performed: [id number]     CT chest wo contrast    Result Date: 7/18/2021  Impression: Significant elevation of the right hemidiaphragm with volume loss and consolidation involving the right middle lobe and right lower lobe with appearance favoring atelectasis  Further detail limited by both patient motion and lack of contrast  Dilated central pulmonary arteries as above  Workstation performed: [id number]     CTA chest pe study    Result Date: 7/20/2021  Impression: No central pulmonary emboli identified  Significant interval worsening in airspace consolidation and patchy infiltrates involving the right upper lobe to the greatest degree posterior segment but also elsewhere within the right upper lobe  There is also interval worsening of the consolidation  and volume loss in the dependent lower lobes bilaterally  Also component of chronic volume loss related to elevated right hemidiaphragm  Focally dilated main pulmonary artery  Assess for pulmonary valve disease   Workstation performed: [id number]     Scheduled Meds:  Current Facility-Administered Medications   Medication Dose Route Frequency Provider Last Rate    acetaminophen  640 mg Oral Q6H PRN KRISTOPHER Poon      aspirin  81 mg Oral Daily KRISTOPHER Poon      atorvastatin  80 mg Oral Daily With [facility] KRISTOPHER Harmon      bisacodyl  10 mg Rectal Daily PRN KRISTOPHER Poon      divalproex sodium  250 mg Oral [facility] KRISTOPHER Ojeda      enoxaparin  40 mg Subcutaneous Q24H [address] KRISTOPHER Ojeda      ferrous sulfate  325 mg Oral Daily With Breakfast KRISTOPHER Poon      gabapentin  400 mg Oral TID KRISTOPHER Poon      insulin glargine  10 Units Subcutaneous HS KRISTOPHER Poon      insulin lispro  2-12 Units Subcutaneous HS KRISTOPHER Poon      insulin lispro  2-12 Units Subcutaneous TID AC KRISTOPHER Cason      insulin lispro  4 Units Subcutaneous TID With Meals KRISTOPHER Poon      ipratropium-albuterol  3 mL Nebulization Q4H PRN KRISTOPHER Poon      LORazepam  0 5 mg Oral BID PRN Khanh Bonus, CRNP      ondansetron  4 mg Intravenous Q6H PRN Lyndia Bevels, CRNP      oxybutynin  10 mg Oral Daily Cristina Fairchild Ramella, CRNP      pantoprazole  40 mg Oral Early Morning Cristina Fairchild [place], CRNP      QUEtiapine  200 mg Oral HS Lyndia Bevels, CRNP      senna  1 tablet Oral HS Cristina Fairchild Ramella, CRNP      sertraline  100 mg Oral HS Cristina Fairchild Ramella, CRNP      sodium chloride  1 spray Each Nare Q1H PRN Cristina Fairchild Ramella, CRNP      torsemide  10 mg Oral Daily Joy Pride, DO Joy Pride, DO [address] Internal Medicine  Hospitalist    ** Please Note: This note has been constructed using a voice recognition system   **

## 2021-08-05 NOTE — PHYSICIAN ADVISOR
Current patient class: Inpatient  The patient is currently on Hospital Day: 23      The patient was admitted to the hospital at  5:05 PM on 7/18/21 for the following diagnosis:  Cough [R05]  Pneumonia [J18 9]  Hypoxic [R09 02]     CMS OUTLIER STAY REVIEW    After review of the relevant documentation, labs, vital signs and test results, the patient is appropriate for CONTINUED INPATIENT ADMISSION  The patient continues to remain hospitalized receiving acute medical care  The patient has surpassed the expected duration of stay, however given the clinical condition, need for further acute care management, the patient is appropriate to remain in an inpatient status  The patient still being actively managed, and does have unresolved medical issues requiring further hospitalization  This review is conducted at 20 days, to help satisfy the requirements for significant outlier stay review as per CMS  Given the current condition of this patient, the patient satisfies this review was determination for continued inpatient stay  Rationale is as follows: The patient is a 77 yrs old Female who presented to the ED at 7/18/2021  2:07 PM with a chief complaint of Cough (cough)   Patient came in for sepsis secondary to pneumonia  Patient with acute hypoxemic respiratory failure who is still currently on oxygen which is being weaned  The patient was in the ICU and did require bronchoscopy at 1 point with suction of mucus plugs  Patient was put on meropenem and metronidazole  She is actively being seen by speech therapy as well  Pulmonology continues to follow the patient as well  Given the above the patient has been receiving ongoing acute inpatient medical services throughout the hospitalization and still continues to require it      The patients vitals on arrival were   ED Triage Vitals   Temperature Pulse Respirations Blood Pressure SpO2   07/18/21 1428 07/18/21 1428 07/18/21 1428 07/18/21 1721 07/18/21 1428   97 9 °F (36 6 °C) 80 16 117/57 94 %      Temp Source Heart Rate Source Patient Position - Orthostatic VS BP Location FiO2 (%)   07/18/21 1721 07/18/21 1721 07/18/21 1721 07/18/21 1721 07/20/21 2200   Oral Monitor Lying Right arm 100      Pain Score       07/18/21 1722       No Pain           Past Medical History:   Diagnosis Date    Anxiety     Diabetes mellitus ([facility] )     GERD (gastroesophageal reflux disease)     Hyperlipidemia     Hypertension     Mental disability     Mixed incontinence 4/12/2017    Seizure disorder ([facility] )      History reviewed  No pertinent surgical history          Consults have been placed to:   IP CONSULT TO CASE MANAGEMENT  IP CONSULT TO PHARMACY  IP CONSULT TO CASE MANAGEMENT  IP CONSULT TO PULMONOLOGY    Vitals:    08/05/21 0700 08/05/21 0745 08/05/21 0800 08/05/21 1024   BP: (!) 81/45   102/54   BP Location: Right arm   Right arm   Pulse: 60  58 66   Resp: 20 20     Temp: (!) 97 °F (36 1 °C)      TempSrc: Temporal      SpO2: 92% 94% 95% 94%   Weight:       Height:           Most recent labs:    Recent Labs     08/05/21  0508   WBC 4 43   HGB 8 8*   HCT 29 5*      K 3 9   CALCIUM 8 8   BUN 12   CREATININE 0 57*   AST 12   ALT 31   ALKPHOS 53       Scheduled Meds:  Current Facility-Administered Medications   Medication Dose Route Frequency Provider Last Rate    acetaminophen  640 mg Oral Q6H PRN KRISTOPHER Bob      aspirin  81 mg Oral Daily KRISTOPHER Bob      atorvastatin  80 mg Oral Daily With [facility] KRISTOPHER Harmon      bisacodyl  10 mg Rectal Daily PRN KRISTOPHER Bob      divalproex sodium  250 mg Oral [facility] KRISTOPHER Ojeda      enoxaparin  40 mg Subcutaneous Q24H [address] KRISTOPHER Ojeda      ferrous sulfate  325 mg Oral Daily With Breakfast KRISTOPHER Bob      gabapentin  400 mg Oral TID KRISTOPHER Bob      insulin glargine  10 Units Subcutaneous HS KRISTOPHER Anthony  insulin lispro  2-12 Units Subcutaneous HS Handy Jeff Davis Ramella, CRNP      insulin lispro  2-12 Units Subcutaneous TID AC Alva Harmon, CRNP      insulin lispro  4 Units Subcutaneous TID With Meals Handy Jeff Davis Ramella, CRNP      ipratropium-albuterol  3 mL Nebulization Q4H PRN Handy Jeff Davis Ramella, CRNP      LORazepam  0 5 mg Oral BID PRN Handy Jeff Davis Ramella, CRNP      ondansetron  4 mg Intravenous Q6H PRN Handy Jeff Davis Ramella, CRNP      oxybutynin  10 mg Oral Daily Handy Jeff Davis Ramella, CRNP      pantoprazole  40 mg Oral Early Morning [place], CRNP      QUEtiapine  200 mg Oral HS Colonel [place], CRNP      senna  1 tablet Oral HS Handy Jeff Davis Ramella, CRNP      sertraline  100 mg Oral HS Colonel [place], CRNP      sodium chloride  1 spray Each Nare Q1H PRN Handy Jeff Davis Ramella, CRNP      torsemide  10 mg Oral Daily Roel Joya DO       Continuous Infusions:   PRN Meds:   acetaminophen    bisacodyl    ipratropium-albuterol    LORazepam    ondansetron    sodium chloride    Surgical procedures (if appropriate):

## 2021-08-05 NOTE — CASE MANAGEMENT
GENESIS called [facility] to check on the status of the LOC letter  CM spoke with Yue Davis and she reported that it is still processing and pending approval  Once the letter is received, pt will be able to go to [facility]

## 2021-08-06 PROBLEM — F39 MOOD DISORDER (HCC): Status: ACTIVE | Noted: 2021-07-18

## 2021-08-06 LAB
GLUCOSE SERPL-MCNC: 125 MG/DL (ref 65–140)
GLUCOSE SERPL-MCNC: 133 MG/DL (ref 65–140)
GLUCOSE SERPL-MCNC: 138 MG/DL (ref 65–140)
GLUCOSE SERPL-MCNC: 144 MG/DL (ref 65–140)

## 2021-08-06 PROCEDURE — 82948 REAGENT STRIP/BLOOD GLUCOSE: CPT

## 2021-08-06 PROCEDURE — 99232 SBSQ HOSP IP/OBS MODERATE 35: CPT | Performed by: INTERNAL MEDICINE

## 2021-08-06 PROCEDURE — 97530 THERAPEUTIC ACTIVITIES: CPT

## 2021-08-06 PROCEDURE — 97110 THERAPEUTIC EXERCISES: CPT

## 2021-08-06 RX ADMIN — ENOXAPARIN SODIUM 40 MG: 40 INJECTION SUBCUTANEOUS at 08:01

## 2021-08-06 RX ADMIN — INSULIN LISPRO 4 UNITS: 100 INJECTION, SOLUTION INTRAVENOUS; SUBCUTANEOUS at 08:01

## 2021-08-06 RX ADMIN — INSULIN GLARGINE 10 UNITS: 100 INJECTION, SOLUTION SUBCUTANEOUS at 21:02

## 2021-08-06 RX ADMIN — SERTRALINE HYDROCHLORIDE 100 MG: 50 TABLET ORAL at 21:01

## 2021-08-06 RX ADMIN — GABAPENTIN 400 MG: 400 CAPSULE ORAL at 08:01

## 2021-08-06 RX ADMIN — ASPIRIN 81 MG: 81 TABLET, COATED ORAL at 08:01

## 2021-08-06 RX ADMIN — GABAPENTIN 400 MG: 400 CAPSULE ORAL at 16:43

## 2021-08-06 RX ADMIN — OXYBUTYNIN 10 MG: 5 TABLET, FILM COATED, EXTENDED RELEASE ORAL at 08:01

## 2021-08-06 RX ADMIN — DIVALPROEX SODIUM 250 MG: 250 TABLET, DELAYED RELEASE ORAL at 21:01

## 2021-08-06 RX ADMIN — DIVALPROEX SODIUM 250 MG: 250 TABLET, DELAYED RELEASE ORAL at 06:10

## 2021-08-06 RX ADMIN — INSULIN LISPRO 4 UNITS: 100 INJECTION, SOLUTION INTRAVENOUS; SUBCUTANEOUS at 16:43

## 2021-08-06 RX ADMIN — QUETIAPINE FUMARATE 200 MG: 50 TABLET, EXTENDED RELEASE ORAL at 21:01

## 2021-08-06 RX ADMIN — PANTOPRAZOLE SODIUM 40 MG: 40 TABLET, DELAYED RELEASE ORAL at 06:10

## 2021-08-06 RX ADMIN — INSULIN LISPRO 4 UNITS: 100 INJECTION, SOLUTION INTRAVENOUS; SUBCUTANEOUS at 11:37

## 2021-08-06 RX ADMIN — ATORVASTATIN CALCIUM 80 MG: 40 TABLET, FILM COATED ORAL at 16:43

## 2021-08-06 RX ADMIN — TORSEMIDE 10 MG: 10 TABLET ORAL at 08:01

## 2021-08-06 RX ADMIN — FERROUS SULFATE TAB 325 MG (65 MG ELEMENTAL FE) 325 MG: 325 (65 FE) TAB at 08:01

## 2021-08-06 RX ADMIN — DIVALPROEX SODIUM 250 MG: 250 TABLET, DELAYED RELEASE ORAL at 13:49

## 2021-08-06 RX ADMIN — GABAPENTIN 400 MG: 400 CAPSULE ORAL at 21:01

## 2021-08-06 RX ADMIN — STANDARDIZED SENNA CONCENTRATE 8.6 MG: 8.6 TABLET ORAL at 21:01

## 2021-08-06 NOTE — PROGRESS NOTES
[address]  Progress Note - Anna Pass 1955, 77 y o  female MRN: [id number]  Unit/Bed#: -01 Encounter: [id number]  Primary Care Provider: Tarun Gallego MD   Date and time admitted to hospital: 7/18/2021  2:07 PM    * Sepsis due to pneumonia [facility])  Assessment & Plan  · Sepsis secondary to presumed aspiration pneumonia finished course of antibiotics with: meropenem and metronidazole; was on vancomycin  · Has been re-evaluated by speech pathologist and diet advanced to pureed and nectar thickened liquids    Acute respiratory failure with hypoxia ([facility] [address] )  Assessment & Plan  · Secondary to pneumonia currently down to 1 - 1 5 L  · In ICU did require bronchoscopy with suction of mucus plugs  · Seen by pulmonology  Will continue diuretics with low-dose torsemide  · CTA negative for pulmonary embolism    Intellectual disability  Assessment & Plan  · Nonverbal at baseline resides in group home    Seizure disorder [facility])  Assessment & Plan  · Continue depakote and gabapentin    Obesity (BMI 30 0-34  9)  Assessment & Plan  · Body mass index is 34 75 kg/m²  Type 2 diabetes mellitus without complication, without long-term current use of insulin [facility])  Assessment & Plan  Lab Results   Component Value Date    HGBA1C 6 6 (H) 07/19/2021     Recent Labs     08/05/21  1602 08/05/21  2104 08/06/21  0800 08/06/21  1137   POCGLU 180* 205* 125 138     · Will continue glargine 10 units with lispro 4 units t i d     Mood disorder (HCC)  Assessment & Plan  · Mood disorder continue depakote gabapentin quetiapine lorazepam sertraline      VTE Pharmacologic Prophylaxis: VTE Score: 3 Moderate Risk (Score 3-4) - Pharmacological DVT Prophylaxis Ordered: Enoxaparin (Lovenox)  Patient Centered Rounds: I have performed bedside rounds with nursing staff today    Discussions with Specialists or Other Care Team Provider:  Case management    Education and Discussions with Family / Patient:  Left voicemail for brother    Time Spent for Care: 25 mins  More than 50% of total time spent on counseling and coordination of care as described above  Current Length of Stay: 19 day(s)  Current Patient Status: Inpatient   Certification Statement: The patient will continue to require additional inpatient hospital stay due to awaiting rehab placement  Discharge Plan / Estimated Discharge Date: Awaiting rehab placement    Code Status: Level 3 - DNAR and DNI      Subjective:   Patient seen and examined  No new complaints  No events noted    Objective:   Vitals: Blood pressure 106/53, pulse 66, temperature (!) 97 1 °F (36 2 °C), temperature source Temporal, resp  rate 16, height 5' (1 524 m), weight 80 7 kg (177 lb 14 6 oz), SpO2 96 %  Physical Exam  Vitals reviewed  Constitutional:       General: She is not in acute distress  Appearance: Normal appearance  Eyes:      General: No scleral icterus  Cardiovascular:      Rate and Rhythm: Regular rhythm  Heart sounds: Normal heart sounds  Pulmonary:      Breath sounds: Decreased breath sounds present  No wheezing  Abdominal:      General: Bowel sounds are normal       Palpations: Abdomen is soft  Tenderness: There is no guarding or rebound  Musculoskeletal:         General: No swelling  Skin:     General: Skin is warm  Neurological:      Mental Status: She is alert     Psychiatric:         Mood and Affect: Mood normal        Additional Data:   Labs:  Results from last 7 days   Lab Units 08/05/21  0508 08/02/21  0440 08/01/21  0500   WBC Thousand/uL 4 43 4 91 5 62   HEMOGLOBIN g/dL 8 8* 9 1* 8 9*   HEMATOCRIT % 29 5* 29 6* 29 7*   MCV fL 96 96 97   PLATELETS Thousands/uL 290 300 291     Results from last 7 days   Lab Units 08/05/21  0508 08/03/21  0502 08/02/21  0440 08/01/21  0500   SODIUM mmol/L 141 145 144 144   POTASSIUM mmol/L 3 9 4 1 4 3 4 0   CHLORIDE mmol/L 105 106 106 108   CO2 mmol/L 34* 36* 34* 35*   ANION GAP mmol/L 2* 3* 4 1* BUN mg/dL 12 7 9 12   CREATININE mg/dL 0 57* 0 59* 0 49* 0 50*   CALCIUM mg/dL 8 8 9 2 9 3 9 2   ALBUMIN g/dL 2 4*  --   --   --    TOTAL BILIRUBIN mg/dL 0 35  --   --   --    ALK PHOS U/L 53  --   --   --    ALT U/L 31  --   --   --    AST U/L 12  --   --   --    EGFR ml/min/1 73sq m 96 95 101 100   GLUCOSE RANDOM mg/dL 113 133 167* 115             Results from last 7 days   Lab Units 08/03/21  0502   NT-PRO BNP pg/mL 131*          Results from last 7 days   Lab Units 08/06/21  1137 08/06/21  0800 08/05/21  2104 08/05/21  1602 08/05/21  1131 08/05/21  0731 08/04/21  2108 08/04/21  1559 08/04/21  1109 08/04/21  0801 08/03/21  2219 08/03/21  1628   POC GLUCOSE mg/dl 138 125 205* 180* 162* 118 154* 136 129 153* 147* 122             * I Have Reviewed All Lab Data Listed Above  Cultures:                   Lines/Drains:  Invasive Devices     Drain            External Urinary Catheter 11 days              Telemetry:      Imaging:  Imaging Reports Reviewed Today Include:   XR chest portable    Result Date: 7/22/2021  Impression: Worsening right upper lobe pneumonia  Workstation performed: [id number]     XR chest portable    Result Date: 7/21/2021  Impression: Progressive bilateral infiltrates, right greater than left Workstation performed: [id number]     XR chest 1 view portable    Result Date: 7/19/2021  Impression: No acute cardiopulmonary disease  Workstation performed: [id number]     CT chest wo contrast    Result Date: 7/18/2021  Impression: Significant elevation of the right hemidiaphragm with volume loss and consolidation involving the right middle lobe and right lower lobe with appearance favoring atelectasis  Further detail limited by both patient motion and lack of contrast  Dilated central pulmonary arteries as above  Workstation performed: [id number]     CTA chest pe study    Result Date: 7/20/2021  Impression: No central pulmonary emboli identified   Significant interval worsening in airspace consolidation and patchy infiltrates involving the right upper lobe to the greatest degree posterior segment but also elsewhere within the right upper lobe  There is also interval worsening of the consolidation  and volume loss in the dependent lower lobes bilaterally  Also component of chronic volume loss related to elevated right hemidiaphragm  Focally dilated main pulmonary artery  Assess for pulmonary valve disease   Workstation performed: [id number]     Scheduled Meds:  Current Facility-Administered Medications   Medication Dose Route Frequency Provider Last Rate    acetaminophen  640 mg Oral Q6H PRN Guido Taniya Ramella, CRNP      aspirin  81 mg Oral Daily Guidoping Marcanoella, CRNP      atorvastatin  80 mg Oral Daily With [facility] Ramella, CRNP      bisacodyl  10 mg Rectal Daily PRN Guido Monahan Ramella, CRNP      divalproex sodium  250 mg Oral [facility] Alva Castro, CRNP      enoxaparin  40 mg Subcutaneous Q24H [facility] Alva Castro, JONATHANNP      ferrous sulfate  325 mg Oral Daily With Breakfast Guido Harmon, CRNP      gabapentin  400 mg Oral TID Guido Harmon, CRNP      insulin glargine  10 Units Subcutaneous HS Guido Marcanoella, CRNP      insulin lispro  2-12 Units Subcutaneous HS Guido Marcanoella, CRNP      insulin lispro  2-12 Units Subcutaneous TID AC Alva Harmon, CRNP      insulin lispro  4 Units Subcutaneous TID With Meals Guido Harmon, CRNP      ipratropium-albuterol  3 mL Nebulization Q4H PRN Guido Harmon, CRNP      LORazepam  0 5 mg Oral BID PRN Guido Harmon, CRNP      ondansetron  4 mg Intravenous Q6H PRN Zan Tam, CRNP      oxybutynin  10 mg Oral Daily Guido Harmon, CRNP      pantoprazole  40 mg Oral Early Morning Zan Tam, CRNP      QUEtiapine  200 mg Oral HS Zan Tam, CRNP      senna  1 tablet Oral HS Guido Harmon, CRNP      sertraline  100 mg Oral HS Guido [facility] KRISTOPHER Harmon      sodium chloride  1 spray Each Nare Q1H PRN KRISTOPHER Atkinson      torsemide  10 mg Oral Daily DO Buster Ruth,   [facility] Internal Medicine  Hospitalist    ** Please Note: This note has been constructed using a voice recognition system   **

## 2021-08-06 NOTE — PLAN OF CARE
Problem: PHYSICAL THERAPY ADULT  Goal: Performs mobility at highest level of function for planned discharge setting  See evaluation for individualized goals  Description: Treatment/Interventions: Functional transfer training, LE strengthening/ROM, Therapeutic exercise, Endurance training, Patient/family training, Equipment eval/education, Bed mobility, Gait training, Compensatory technique education, Spoke to nursing, Spoke to case management, OT  Equipment Recommended:  (TBD by rehab)       See flowsheet documentation for full assessment, interventions and recommendations  Outcome: Progressing  Note: Prognosis: Fair  Problem List: Decreased strength, Decreased endurance, Impaired balance, Decreased mobility, Decreased cognition, Impaired judgement, Decreased safety awareness, Obesity, Decreased skin integrity  Assessment: Pt seen for PT treatment session this date with interventions consisting of Therapeutic exercise therapeutic activity consisting of training: bed mobility, supine<>sit transfers and sit<>stand transfers  Pt agreeable to PT treatment session upon arrival, pt found supine in bed w/ HOB elevated, in no apparent distress  In comparison to previous session, pt continuing to requiring increased assistance with all mobility  Attempted STS with RW with pt unable to maintain upright positioning and requiring the Quick move for transfer into recliner  Pt requiring vc and manual cues for therex for appropriate techniquPost session: pt returned BTB, chair alarm engaged and all needs in reach Continue to recommend STR at time of d/c in order to maximize pt's functional independence and safety w/ mobility  Pt continues to be functioning below baseline level, and remains limited 2* factors listed above  PT will continue to see pt while here in order to address the deficits listed above and provide interventions consistent w/ POC in effort to achieve STGs    Barriers to Discharge: Decreased caregiver support, Inaccessible home environment  Barriers to Discharge Comments: Requires increased assistance with all mobility     PT Discharge Recommendation: Post acute rehabilitation services     PT - OK to Discharge: Yes    See flowsheet documentation for full assessment

## 2021-08-06 NOTE — ASSESSMENT & PLAN NOTE
· Sepsis secondary to presumed aspiration pneumonia finished course of antibiotics with: meropenem and metronidazole; was on vancomycin    · Has been re-evaluated by speech pathologist and diet advanced to pureed and nectar thickened liquids

## 2021-08-06 NOTE — ASSESSMENT & PLAN NOTE
· Secondary to pneumonia currently down to 1 - 1 5 L  · In ICU did require bronchoscopy with suction of mucus plugs  · Seen by pulmonology    Will continue diuretics with low-dose torsemide  · CTA negative for pulmonary embolism

## 2021-08-06 NOTE — CASE MANAGEMENT
Case Management Progress Note    Patient name Rtuh Weaver  Location /-01 MRN [id number]  : 1955 Date 2021       LOS (days): 19  Geometric Mean LOS (GMLOS) (days): 4 80  Days to GMLOS:-14 1        BUNDLE:      OBJECTIVE:  Pt is a 77y o  year old Single, white or  [1], female with Uatsdin preference of Non-Restoration admitted on 2021  2:07 PM  Pt is admitted to - at [address] [place] [address] with complaints of Sepsis due to pneumonia ([facility] )   Current admission status: Inpatient  Preferred Pharmacy:   [address] [place] [address], [facility] [address]  [address] [facility] [address]  Phone: [phone] Fax: [phone]    Primary Care Provider: Shameka Torres MD    Primary Insurance: MEDICARE  Secondary Insurance: [place] [employer]    Barrier to [employer]- Await Approval Letter from [place]  For IDD    Updated [place] that we received the [address] Letter approval

## 2021-08-06 NOTE — PHYSICAL THERAPY NOTE
PHYSICAL THERAPY TREATMENT NOTE  NAME:  Madison Askew  DATE: 08/06/21    Length Of Stay: 19  Performed at least 2 patient identifiers during session: Name and Birthday    TREATMENT:    08/06/21 1121   PT Last Visit   PT Visit Date 08/06/21   Note Type   Note Type Treatment   Pain Assessment   Pain Assessment Tool FLACC   Pain Score No Pain   Pain Rating: FLACC (Rest) - Face 0   Pain Rating: FLACC (Rest) - Legs 0   Pain Rating: FLACC (Rest) - Activity 0   Pain Rating: FLACC (Rest) - Cry 0   Pain Rating: FLACC (Rest) - Consolability 0   Score: FLACC (Rest) 0   Pain Rating: FLACC (Activity) - Face 0   Pain Rating: FLACC (Activity) - Legs 0   Pain Rating: FLACC (Activity) - Activity 0   Pain Rating: FLACC (Activity) - Cry 0   Pain Rating: FLACC (Activity) - Consolability 0   Score: FLACC (Activity) 0   Restrictions/Precautions   Other Precautions O2;Fall Risk; Chair Alarm; Bed Alarm;Cognitive   General   Chart Reviewed Yes   Response to Previous Treatment Patient unable to report, no changes reported from family or staff   Family/Caregiver Present No   Cognition   Arousal/Participation Alert; Responsive   Orientation Level Appropriate for developmental age   Following Commands Follows one step commands with increased time or repetition   Subjective   Subjective Pt nonverbal, can shake head yes/no   Bed Mobility   Supine to Sit 3  Moderate assistance   Additional items HOB elevated; Increased time required;Verbal cues;LE management;Assist x 2  (Trunk management)   Additional Comments Pt requring modAx2 for supine to sit for trunk and LE management  Pt with increased lean to R with verbal and manual cues for upright posture  Transfers   Sit to Stand   (MaxAx2->MinAx2)   Additional items Assist x 2; Increased time required;Verbal cues   Stand to Sit 4  Minimal assistance   Additional items Assist x 2; Increased time required;Verbal cues   Stand pivot Unable to assess   Additional Comments Intial STS attempt with RW with maxAx2 for upright posture and maintain standing with pt becoming fearful and minAx2 to sit  Second STS attempted with Quick move and minAx2  Ambulation/Elevation   Distance Unable to test   Balance   Static Sitting Fair -   Dynamic Sitting Poor +   Static Standing Poor   Dynamic Standing Poor -   Endurance Deficit   Endurance Deficit Yes   Endurance Deficit Description Pt on 02   Activity Tolerance   Activity Tolerance Patient limited by fatigue   Nurse Made Aware RN Ivana   Exercises   Heelslides Sitting;5 reps;AAROM; Bilateral   Hip Flexion Sitting;10 reps;AROM; Bilateral   Hip Abduction Sitting;10 reps;AAROM; Bilateral   Hip Adduction Sitting;10 reps;AAROM; Bilateral   Knee AROM Long Arc Quad Sitting;15 reps;AROM; Bilateral   Ankle Pumps Sitting;10 reps;AROM; Bilateral   Assessment   Prognosis Fair   Problem List Decreased strength;Decreased endurance; Impaired balance;Decreased mobility; Decreased cognition; Impaired judgement;Decreased safety awareness; Obesity; Decreased skin integrity   Barriers to Discharge Decreased caregiver support; Inaccessible home environment   Barriers to Discharge Comments Requires increased assistance with all mobility   Goals   Patient Goals None stated   PT Treatment Day 3   Plan   Treatment/Interventions Functional transfer training;LE strengthening/ROM; Therapeutic exercise; Endurance training;Cognitive reorientation;Equipment eval/education; Bed mobility;Gait training   Progress Slow progress, cognitive deficits   PT Frequency   (3-5x/wk)   Recommendation   PT Discharge Recommendation Post acute rehabilitation services   Equipment Recommended   (TBD by rehab)   PT - OK to Discharge Yes   Additional Comments When medically cleared   Additional Comments 2 Pt seated in recliner with LE elevated, chair alarm on, and all needs within reach at end of session     AM-PAC Basic Mobility Inpatient   Turning in Bed Without Bedrails 1   Lying on Back to Sitting on Edge of Flat Bed 1   Moving Bed to Chair 1   Standing Up From Chair 2   Walk in Room 1   Climb 3-5 Stairs 1   Basic Mobility Inpatient Raw Score 7   Turning Head Towards Sound 4   Follow Simple Instructions 3   Low Function Basic Mobility Raw Score 14   Low Function Basic Mobility Standardized Score 22 01     The patient's AM-PAC Basic Mobility Inpatient Short Form Raw Score is 7, Standardized Score is 22 01  A standardized score less than 42 9 suggests the patient may benefit from discharge to post-acute rehabilitation services  Please also refer to the recommendation of the Physical Therapist for safe discharge planning  Pt seen for PT treatment session this date with interventions consisting of Therapeutic exercise therapeutic activity consisting of training: bed mobility, supine<>sit transfers and sit<>stand transfers  Pt agreeable to PT treatment session upon arrival, pt found supine in bed w/ HOB elevated, in no apparent distress  In comparison to previous session, pt continuing to requiring increased assistance with all mobility  Attempted STS with RW with pt unable to maintain upright positioning and requiring the Quick move for transfer into recliner  Pt requiring vc and manual cues for therex for appropriate techniquPost session: pt returned BTB, chair alarm engaged and all needs in reach Continue to recommend STR at time of d/c in order to maximize pt's functional independence and safety w/ mobility  Pt continues to be functioning below baseline level, and remains limited 2* factors listed above  PT will continue to see pt while here in order to address the deficits listed above and provide interventions consistent w/ POC in effort to achieve STGs      Amrita Smith PTA

## 2021-08-06 NOTE — PROGRESS NOTES
Progress Note - Pulmonary   Denita Amanuel 77 y o  female MRN: [id number]  Unit/Bed#: -01 Encounter: [id number]      Assessment/Plan:    · Acute hypoxic respiratory failure - overall improving  Titrate O2 to maintain saturations above 88%  · Left-sided pleural effusion, likely due to volume overload - diuresis as tolerated  No indication for thoracentesis    · Recurrent aspiration - continue aspiration precautions  Pulmonary status is stable  We will sign off  Please call with questions  Subjective:   Patient does not offer any complaints  She is lying in bed in no distress  Shakes her head no when asked if she has shortness of breath    Objective:     Vitals: Blood pressure 106/53, pulse 66, temperature (!) 97 1 °F (36 2 °C), temperature source Temporal, resp  rate 16, height 5' (1 524 m), weight 80 7 kg (177 lb 14 6 oz), SpO2 96 %  , 1 L per minute, Body mass index is 34 75 kg/m²  Intake/Output Summary (Last 24 hours) at 8/6/2021 1049  Last data filed at 8/6/2021 0801  Gross per 24 hour   Intake 600 ml   Output 700 ml   Net -100 ml       Physical Exam:      General:  Awake, alert, no distress   HEENT: No scleral icterus, EOMI, moist mucosa    Heart:  Regular rate and rhythm, no murmur   Lungs: Poor inspiratory effort   Abdomen: Soft, nontender, normal bowel sounds   Extremities: No clubbing, cyanosis or edema    Labs: I have personally reviewed pertinent lab results      Results from last 7 days   Lab Units 08/05/21  0508 08/02/21  0440 08/01/21  0500   WBC Thousand/uL 4 43 4 91 5 62   HEMOGLOBIN g/dL 8 8* 9 1* 8 9*   HEMATOCRIT % 29 5* 29 6* 29 7*   PLATELETS Thousands/uL 290 300 291         Results from last 7 days   Lab Units 08/05/21  0508 08/03/21  0502 08/02/21  0440   POTASSIUM mmol/L 3 9 4 1 4 3   CHLORIDE mmol/L 105 106 106   CO2 mmol/L 34* 36* 34*   BUN mg/dL 12 7 9   CREATININE mg/dL 0 57* 0 59* 0 49*   CALCIUM mg/dL 8 8 9 2 9 3   ALK PHOS U/L 53  --   --    ALT U/L 31  -- --    AST U/L 12  --   --

## 2021-08-06 NOTE — ASSESSMENT & PLAN NOTE
Lab Results   Component Value Date    HGBA1C 6 6 (H) 07/19/2021     Recent Labs     08/05/21  1602 08/05/21  2104 08/06/21  0800 08/06/21  1137   POCGLU 180* 205* 125 138     · Will continue glargine 10 units with lispro 4 units t i d

## 2021-08-07 LAB
GLUCOSE SERPL-MCNC: 124 MG/DL (ref 65–140)
GLUCOSE SERPL-MCNC: 155 MG/DL (ref 65–140)
GLUCOSE SERPL-MCNC: 157 MG/DL (ref 65–140)
GLUCOSE SERPL-MCNC: 197 MG/DL (ref 65–140)

## 2021-08-07 PROCEDURE — 99232 SBSQ HOSP IP/OBS MODERATE 35: CPT | Performed by: INTERNAL MEDICINE

## 2021-08-07 PROCEDURE — 82948 REAGENT STRIP/BLOOD GLUCOSE: CPT

## 2021-08-07 RX ADMIN — SERTRALINE HYDROCHLORIDE 100 MG: 50 TABLET ORAL at 21:44

## 2021-08-07 RX ADMIN — INSULIN LISPRO 4 UNITS: 100 INJECTION, SOLUTION INTRAVENOUS; SUBCUTANEOUS at 11:59

## 2021-08-07 RX ADMIN — DIVALPROEX SODIUM 250 MG: 250 TABLET, DELAYED RELEASE ORAL at 15:25

## 2021-08-07 RX ADMIN — DIVALPROEX SODIUM 250 MG: 250 TABLET, DELAYED RELEASE ORAL at 05:53

## 2021-08-07 RX ADMIN — QUETIAPINE FUMARATE 200 MG: 50 TABLET, EXTENDED RELEASE ORAL at 21:44

## 2021-08-07 RX ADMIN — GABAPENTIN 400 MG: 400 CAPSULE ORAL at 08:20

## 2021-08-07 RX ADMIN — ASPIRIN 81 MG: 81 TABLET, COATED ORAL at 08:20

## 2021-08-07 RX ADMIN — INSULIN GLARGINE 10 UNITS: 100 INJECTION, SOLUTION SUBCUTANEOUS at 21:44

## 2021-08-07 RX ADMIN — FERROUS SULFATE TAB 325 MG (65 MG ELEMENTAL FE) 325 MG: 325 (65 FE) TAB at 08:20

## 2021-08-07 RX ADMIN — DIVALPROEX SODIUM 250 MG: 250 TABLET, DELAYED RELEASE ORAL at 21:44

## 2021-08-07 RX ADMIN — GABAPENTIN 400 MG: 400 CAPSULE ORAL at 15:25

## 2021-08-07 RX ADMIN — INSULIN LISPRO 4 UNITS: 100 INJECTION, SOLUTION INTRAVENOUS; SUBCUTANEOUS at 16:26

## 2021-08-07 RX ADMIN — ATORVASTATIN CALCIUM 80 MG: 40 TABLET, FILM COATED ORAL at 16:26

## 2021-08-07 RX ADMIN — OXYBUTYNIN 10 MG: 5 TABLET, FILM COATED, EXTENDED RELEASE ORAL at 08:20

## 2021-08-07 RX ADMIN — PANTOPRAZOLE SODIUM 40 MG: 40 TABLET, DELAYED RELEASE ORAL at 05:53

## 2021-08-07 RX ADMIN — INSULIN LISPRO 4 UNITS: 100 INJECTION, SOLUTION INTRAVENOUS; SUBCUTANEOUS at 08:20

## 2021-08-07 RX ADMIN — ENOXAPARIN SODIUM 40 MG: 40 INJECTION SUBCUTANEOUS at 08:19

## 2021-08-07 RX ADMIN — INSULIN LISPRO 2 UNITS: 100 INJECTION, SOLUTION INTRAVENOUS; SUBCUTANEOUS at 21:45

## 2021-08-07 RX ADMIN — TORSEMIDE 10 MG: 10 TABLET ORAL at 08:20

## 2021-08-07 RX ADMIN — GABAPENTIN 400 MG: 400 CAPSULE ORAL at 21:44

## 2021-08-07 NOTE — PROGRESS NOTES
[address]  Progress Note - Lesia Garcia 1955, 77 y o  female MRN: [id number]  Unit/Bed#: -01 Encounter: [id number]  Primary Care Provider: Alma Beaver MD   Date and time admitted to hospital: 7/18/2021  2:07 PM    * Sepsis due to pneumonia [facility])  Assessment & Plan  · Sepsis secondary to presumed aspiration pneumonia finished course of antibiotics with: meropenem and metronidazole; was on vancomycin  · Has been re-evaluated by speech pathologist and diet advanced to pureed and nectar thickened liquids    Acute respiratory failure with hypoxia ([facility] [address] )  Assessment & Plan  · Secondary to pneumonia currently down to 1 - 1 5 L  · In ICU did require bronchoscopy with suction of mucus plugs  · Seen by pulmonology  Will continue diuretics with low-dose torsemide  · CTA negative for pulmonary embolism    Intellectual disability  Assessment & Plan  · Nonverbal at baseline resides in group home    Seizure disorder [facility])  Assessment & Plan  · Continue depakote and gabapentin    Obesity (BMI 30 0-34  9)  Assessment & Plan  · Body mass index is 34 75 kg/m²  Type 2 diabetes mellitus without complication, without long-term current use of insulin [facility])  Assessment & Plan  Lab Results   Component Value Date    HGBA1C 6 6 (H) 07/19/2021     Recent Labs     08/06/21  1137 08/06/21  1642 08/06/21  2101 08/07/21  0817   POCGLU 138 133 144* 124     · Will continue glargine 10 units with lispro 4 units t i d     Mood disorder (HCC)  Assessment & Plan  · Mood disorder continue depakote gabapentin quetiapine lorazepam sertraline      VTE Pharmacologic Prophylaxis: VTE Score: 3 Moderate Risk (Score 3-4) - Pharmacological DVT Prophylaxis Ordered: Enoxaparin (Lovenox)  Patient Centered Rounds: I have performed bedside rounds with nursing staff today    Discussions with Specialists or Other Care Team Provider:  Case management    Education and Discussions with Family / Patient:  Discussed with brother on telephone    Time Spent for Care: 25 mins  More than 50% of total time spent on counseling and coordination of care as described above  Current Length of Stay: 20 day(s)  Current Patient Status: Inpatient   Certification Statement: The patient will continue to require additional inpatient hospital stay due to awaiting placement  Discharge Plan / Estimated Discharge Date: Medically stable awaiting [facility] clearance for SNF placement    Code Status: Level 3 - DNAR and DNI      Subjective:   Patient seen and examined  No new complaints, denies any pain    Objective:   Vitals: Blood pressure 102/52, pulse 71, temperature 98 1 °F (36 7 °C), temperature source Temporal, resp  rate 16, height 5' (1 524 m), weight 80 7 kg (177 lb 14 6 oz), SpO2 95 %  Physical Exam  Vitals reviewed  Constitutional:       Appearance: Normal appearance  She is not ill-appearing or toxic-appearing  Cardiovascular:      Rate and Rhythm: Regular rhythm  Heart sounds: Normal heart sounds  Pulmonary:      Breath sounds: Decreased breath sounds present  No wheezing  Comments: Fine crackles bibasilar  Abdominal:      General: Bowel sounds are normal       Palpations: Abdomen is soft  Tenderness: There is no guarding or rebound  Musculoskeletal:         General: No swelling  Skin:     General: Skin is warm  Neurological:      Mental Status: She is alert  Mental status is at baseline     Psychiatric:         Mood and Affect: Mood normal        Additional Data:   Labs:  Results from last 7 days   Lab Units 08/05/21  0508 08/02/21  0440 08/01/21  0500   WBC Thousand/uL 4 43 4 91 5 62   HEMOGLOBIN g/dL 8 8* 9 1* 8 9*   HEMATOCRIT % 29 5* 29 6* 29 7*   MCV fL 96 96 97   PLATELETS Thousands/uL 290 300 291     Results from last 7 days   Lab Units 08/05/21  0508 08/03/21  0502 08/02/21  0440 08/01/21  0500   SODIUM mmol/L 141 145 144 144   POTASSIUM mmol/L 3 9 4 1 4 3 4 0   CHLORIDE mmol/L 105 106 106 108   CO2 mmol/L 34* 36* 34* 35*   ANION GAP mmol/L 2* 3* 4 1*   BUN mg/dL 12 7 9 12   CREATININE mg/dL 0 57* 0 59* 0 49* 0 50*   CALCIUM mg/dL 8 8 9 2 9 3 9 2   ALBUMIN g/dL 2 4*  --   --   --    TOTAL BILIRUBIN mg/dL 0 35  --   --   --    ALK PHOS U/L 53  --   --   --    ALT U/L 31  --   --   --    AST U/L 12  --   --   --    EGFR ml/min/1 73sq m 96 95 101 100   GLUCOSE RANDOM mg/dL 113 133 167* 115             Results from last 7 days   Lab Units 08/03/21  0502   NT-PRO BNP pg/mL 131*          Results from last 7 days   Lab Units 08/07/21  0817 08/06/21  2101 08/06/21  1642 08/06/21  1137 08/06/21  0800 08/05/21  2104 08/05/21  1602 08/05/21  1131 08/05/21  0731 08/04/21  2108 08/04/21  1559 08/04/21  1109   POC GLUCOSE mg/dl 124 144* 133 138 125 205* 180* 162* 118 154* 136 129             * I Have Reviewed All Lab Data Listed Above  Cultures:                   Lines/Drains:  Invasive Devices     Drain            External Urinary Catheter 12 days              Telemetry:      Imaging:  Imaging Reports Reviewed Today Include:   XR chest portable    Result Date: 7/22/2021  Impression: Worsening right upper lobe pneumonia  Workstation performed: [id number]     XR chest portable    Result Date: 7/21/2021  Impression: Progressive bilateral infiltrates, right greater than left Workstation performed: [id number]     XR chest 1 view portable    Result Date: 7/19/2021  Impression: No acute cardiopulmonary disease  Workstation performed: [id number]     CT chest wo contrast    Result Date: 7/18/2021  Impression: Significant elevation of the right hemidiaphragm with volume loss and consolidation involving the right middle lobe and right lower lobe with appearance favoring atelectasis  Further detail limited by both patient motion and lack of contrast  Dilated central pulmonary arteries as above  Workstation performed: [id number]     CTA chest pe study    Result Date: 7/20/2021  Impression: No central pulmonary emboli identified  Significant interval worsening in airspace consolidation and patchy infiltrates involving the right upper lobe to the greatest degree posterior segment but also elsewhere within the right upper lobe  There is also interval worsening of the consolidation  and volume loss in the dependent lower lobes bilaterally  Also component of chronic volume loss related to elevated right hemidiaphragm  Focally dilated main pulmonary artery  Assess for pulmonary valve disease   Workstation performed: [id number]     Scheduled Meds:  Current Facility-Administered Medications   Medication Dose Route Frequency Provider Last Rate    acetaminophen  640 mg Oral Q6H PRN Birda Cool Ramella, CRNP      aspirin  81 mg Oral Daily Birda Cool Ramella, CRNP      atorvastatin  80 mg Oral Daily With [place] [facility] Ramella, CRJANELLE      bisacodyl  10 mg Rectal Daily PRN Birda Cool Ramella, KRISTOPHER      divalproex sodium  250 mg Oral [facility] KRISTOPHER Ojeda      enoxaparin  40 mg Subcutaneous Q24H [address] KRISTOPHER Ojeda      ferrous sulfate  325 mg Oral Daily With Breakfast Birda Cool Ramella, KRISTOPHER      gabapentin  400 mg Oral TID Birda Cool Ramella, KRISTOPHER      insulin glargine  10 Units Subcutaneous HS Birda Cool Ramella, CRNP      insulin lispro  2-12 Units Subcutaneous HS Birda Cool Ramella, CRNP      insulin lispro  2-12 Units Subcutaneous TID AC Alva Harmon, KRISTOPHER      insulin lispro  4 Units Subcutaneous TID With Meals Birda Cool Ramella, KRISTOPHER      ipratropium-albuterol  3 mL Nebulization Q4H PRN Birda Cool Ramella, KRISTOPHER      LORazepam  0 5 mg Oral BID PRN Birda Cool Ramella, KRISTOPHER      ondansetron  4 mg Intravenous Q6H PRN KRISTOPHER Giordano      oxybutynin  10 mg Oral Daily Birda Cool Ramella, KRISTOPHER      pantoprazole  40 mg Oral Early Morning KRISTOPHER Giordano      QUEtiapine  200 mg Oral HS KRISTOPHER Giordano      senna  1 tablet Oral HS KRISTOPHER Giordano      sertraline  100 mg Oral HS KRISTOPHER Sanchez      sodium chloride  1 spray Each Nare Q1H PRN KRISTOPHER Figueroa      torsemide  10 mg Oral Daily Brittany Overall, DO Brittany Campuzano, DO [address] [facility]  Hospitalist    ** Please Note: This note has been constructed using a voice recognition system   **

## 2021-08-07 NOTE — ASSESSMENT & PLAN NOTE
Lab Results   Component Value Date    HGBA1C 6 6 (H) 07/19/2021     Recent Labs     08/06/21  1137 08/06/21  1642 08/06/21  2101 08/07/21  0817   POCGLU 138 133 144* 124     · Will continue glargine 10 units with lispro 4 units t i d

## 2021-08-07 NOTE — ASSESSMENT & PLAN NOTE
Lab Results   Component Value Date    HGBA1C 6 6 (H) 07/19/2021     Recent Labs     08/07/21  1624 08/07/21  2124 08/08/21  0958 08/08/21  1212   POCGLU 197* 157* 108 145*     · Stable continue glargine 10 units with lispro 4 units t i d

## 2021-08-08 LAB
GLUCOSE SERPL-MCNC: 108 MG/DL (ref 65–140)
GLUCOSE SERPL-MCNC: 143 MG/DL (ref 65–140)
GLUCOSE SERPL-MCNC: 145 MG/DL (ref 65–140)
GLUCOSE SERPL-MCNC: 154 MG/DL (ref 65–140)

## 2021-08-08 PROCEDURE — 82948 REAGENT STRIP/BLOOD GLUCOSE: CPT

## 2021-08-08 PROCEDURE — 99232 SBSQ HOSP IP/OBS MODERATE 35: CPT | Performed by: INTERNAL MEDICINE

## 2021-08-08 RX ADMIN — DIVALPROEX SODIUM 250 MG: 250 TABLET, DELAYED RELEASE ORAL at 21:24

## 2021-08-08 RX ADMIN — ENOXAPARIN SODIUM 40 MG: 40 INJECTION SUBCUTANEOUS at 10:00

## 2021-08-08 RX ADMIN — INSULIN LISPRO 4 UNITS: 100 INJECTION, SOLUTION INTRAVENOUS; SUBCUTANEOUS at 17:09

## 2021-08-08 RX ADMIN — SERTRALINE HYDROCHLORIDE 100 MG: 50 TABLET ORAL at 21:23

## 2021-08-08 RX ADMIN — PANTOPRAZOLE SODIUM 40 MG: 40 TABLET, DELAYED RELEASE ORAL at 05:29

## 2021-08-08 RX ADMIN — INSULIN GLARGINE 10 UNITS: 100 INJECTION, SOLUTION SUBCUTANEOUS at 21:23

## 2021-08-08 RX ADMIN — OXYBUTYNIN 10 MG: 5 TABLET, FILM COATED, EXTENDED RELEASE ORAL at 10:00

## 2021-08-08 RX ADMIN — INSULIN LISPRO 4 UNITS: 100 INJECTION, SOLUTION INTRAVENOUS; SUBCUTANEOUS at 12:14

## 2021-08-08 RX ADMIN — ATORVASTATIN CALCIUM 80 MG: 40 TABLET, FILM COATED ORAL at 16:01

## 2021-08-08 RX ADMIN — TORSEMIDE 10 MG: 10 TABLET ORAL at 10:00

## 2021-08-08 RX ADMIN — GABAPENTIN 400 MG: 400 CAPSULE ORAL at 10:00

## 2021-08-08 RX ADMIN — GABAPENTIN 400 MG: 400 CAPSULE ORAL at 21:24

## 2021-08-08 RX ADMIN — GABAPENTIN 400 MG: 400 CAPSULE ORAL at 16:01

## 2021-08-08 RX ADMIN — INSULIN LISPRO 4 UNITS: 100 INJECTION, SOLUTION INTRAVENOUS; SUBCUTANEOUS at 10:00

## 2021-08-08 RX ADMIN — DIVALPROEX SODIUM 250 MG: 250 TABLET, DELAYED RELEASE ORAL at 16:01

## 2021-08-08 RX ADMIN — FERROUS SULFATE TAB 325 MG (65 MG ELEMENTAL FE) 325 MG: 325 (65 FE) TAB at 10:00

## 2021-08-08 RX ADMIN — DIVALPROEX SODIUM 250 MG: 250 TABLET, DELAYED RELEASE ORAL at 05:29

## 2021-08-08 RX ADMIN — ASPIRIN 81 MG: 81 TABLET, COATED ORAL at 10:00

## 2021-08-08 RX ADMIN — QUETIAPINE FUMARATE 200 MG: 50 TABLET, EXTENDED RELEASE ORAL at 21:23

## 2021-08-08 RX ADMIN — INSULIN LISPRO 2 UNITS: 100 INJECTION, SOLUTION INTRAVENOUS; SUBCUTANEOUS at 22:12

## 2021-08-08 NOTE — ASSESSMENT & PLAN NOTE
· Awaiting clearance from [facility] for rehab placement  · Per nursing today, still requires assist of two and very shaky

## 2021-08-08 NOTE — PROGRESS NOTES
[address]  Progress Note - Montana Rivera 1955, 77 y o  female MRN: [id number]  Unit/Bed#: -01 Encounter: [id number]  Primary Care Provider: Victor Hugo Hernandez MD   Date and time admitted to hospital: 7/18/2021  2:07 PM    * Sepsis due to pneumonia [facility])  Assessment & Plan  · Sepsis secondary to presumed aspiration pneumonia finished course of antibiotics with: meropenem and metronidazole; was on vancomycin  · Has been re-evaluated by speech pathologist and diet advanced to pureed and nectar thickened liquids    Acute respiratory failure with hypoxia ([address] )  Assessment & Plan  · Secondary to pneumonia currently down to 1 - 1 5 L  · In ICU did require bronchoscopy with suction of mucus plugs  · Seen by pulmonology  Will continue diuretics with low-dose torsemide  · CTA negative for pulmonary embolism    Intellectual disability  Assessment & Plan  · Nonverbal at baseline resides in group home    Ambulatory dysfunction  Assessment & Plan  · Awaiting clearance from [facility] for rehab placement    Seizure disorder [facility])  Assessment & Plan  · Continue depakote and gabapentin    Obesity (BMI 30 0-34  9)  Assessment & Plan  · Body mass index is 34 75 kg/m²  Type 2 diabetes mellitus without complication, without long-term current use of insulin [facility])  Assessment & Plan  Lab Results   Component Value Date    HGBA1C 6 6 (H) 07/19/2021     Recent Labs     08/07/21  1624 08/07/21  2124 08/08/21  0958 08/08/21  1212   POCGLU 197* 157* 108 145*     · Stable continue glargine 10 units with lispro 4 units t i d     Mood disorder (HCC)  Assessment & Plan  · Mood disorder continue depakote gabapentin quetiapine lorazepam sertraline      VTE Pharmacologic Prophylaxis: VTE Score: 3 Moderate Risk (Score 3-4) - Pharmacological DVT Prophylaxis Ordered: Enoxaparin (Lovenox)  Patient Centered Rounds: I have performed bedside rounds with nursing staff today    Discussions with Specialists or Other Care Team Provider:     Education and Discussions with Family / Patient:     Time Spent for Care: 25 mins  More than 50% of total time spent on counseling and coordination of care as described above  Current Length of Stay: 21 day(s)  Current Patient Status: Inpatient   Certification Statement: The patient will continue to require additional inpatient hospital stay due to awaiting clearance for rehab placement  Discharge Plan / Estimated Discharge Date: Awaiting clearance for rehab placement    Code Status: Level 3 - DNAR and DNI      Subjective:   Patient seen and examined  No new complaints; no events reported by nursing    Objective:   Vitals: Blood pressure 117/63, pulse 59, temperature (!) 97 1 °F (36 2 °C), temperature source Temporal, resp  rate 16, height 5' (1 524 m), weight 80 7 kg (177 lb 14 6 oz), SpO2 95 %  Physical Exam  Vitals reviewed  Constitutional:       General: She is not in acute distress  Eyes:      General: No scleral icterus  Cardiovascular:      Rate and Rhythm: Regular rhythm  Heart sounds: Normal heart sounds  Pulmonary:      Breath sounds: Decreased breath sounds present  No wheezing  Abdominal:      General: Bowel sounds are normal       Palpations: Abdomen is soft  Tenderness: There is no guarding or rebound  Musculoskeletal:         General: No swelling  Skin:     General: Skin is warm  Neurological:      Mental Status: She is alert  Mental status is at baseline     Psychiatric:         Mood and Affect: Mood normal        Additional Data:   Labs:  Results from last 7 days   Lab Units 08/05/21  0508 08/02/21  0440   WBC Thousand/uL 4 43 4 91   HEMOGLOBIN g/dL 8 8* 9 1*   HEMATOCRIT % 29 5* 29 6*   MCV fL 96 96   PLATELETS Thousands/uL 290 300     Results from last 7 days   Lab Units 08/05/21  0508 08/03/21  0502 08/02/21  0440   SODIUM mmol/L 141 145 144   POTASSIUM mmol/L 3 9 4 1 4 3   CHLORIDE mmol/L 105 106 106   CO2 mmol/L 34* 36* 34*   ANION GAP mmol/L 2* 3* 4   BUN mg/dL 12 7 9   CREATININE mg/dL 0 57* 0 59* 0 49*   CALCIUM mg/dL 8 8 9 2 9 3   ALBUMIN g/dL 2 4*  --   --    TOTAL BILIRUBIN mg/dL 0 35  --   --    ALK PHOS U/L 53  --   --    ALT U/L 31  --   --    AST U/L 12  --   --    EGFR ml/min/1 73sq m 96 95 101   GLUCOSE RANDOM mg/dL 113 133 167*             Results from last 7 days   Lab Units 08/03/21  0502   NT-PRO BNP pg/mL 131*          Results from last 7 days   Lab Units 08/08/21  1212 08/08/21  0958 08/07/21  2124 08/07/21  1624 08/07/21  1156 08/07/21  0817 08/06/21  2101 08/06/21  1642 08/06/21  1137 08/06/21  0800 08/05/21  2104 08/05/21  1602   POC GLUCOSE mg/dl 145* 108 157* 197* 155* 124 144* 133 138 125 205* 180*             * I Have Reviewed All Lab Data Listed Above  Cultures:                   Lines/Drains:  Invasive Devices     Drain            External Urinary Catheter 13 days              Telemetry:      Imaging:  Imaging Reports Reviewed Today Include:   XR chest portable    Result Date: 7/22/2021  Impression: Worsening right upper lobe pneumonia  Workstation performed: [id number]     XR chest portable    Result Date: 7/21/2021  Impression: Progressive bilateral infiltrates, right greater than left Workstation performed: [id number]     XR chest 1 view portable    Result Date: 7/19/2021  Impression: No acute cardiopulmonary disease  Workstation performed: [id number]     CT chest wo contrast    Result Date: 7/18/2021  Impression: Significant elevation of the right hemidiaphragm with volume loss and consolidation involving the right middle lobe and right lower lobe with appearance favoring atelectasis  Further detail limited by both patient motion and lack of contrast  Dilated central pulmonary arteries as above  Workstation performed: [id number]     CTA chest pe study    Result Date: 7/20/2021  Impression: No central pulmonary emboli identified   Significant interval worsening in airspace consolidation and patchy infiltrates involving the right upper lobe to the greatest degree posterior segment but also elsewhere within the right upper lobe  There is also interval worsening of the consolidation  and volume loss in the dependent lower lobes bilaterally  Also component of chronic volume loss related to elevated right hemidiaphragm  Focally dilated main pulmonary artery  Assess for pulmonary valve disease   Workstation performed: [id number]     Scheduled Meds:  Current Facility-Administered Medications   Medication Dose Route Frequency Provider Last Rate    acetaminophen  640 mg Oral Q6H PRN Sweeney Crumble Ramella, CRNP      aspirin  81 mg Oral Daily Sweeney Crumble Ramella, CRNP      atorvastatin  80 mg Oral Daily With [facility] Ramella, CRNP      bisacodyl  10 mg Rectal Daily PRN Sweeney Crumble Ramella, CRNP      divalproex sodium  250 mg Oral [facility] Alva Castro, KRISTOPHER      enoxaparin  40 mg Subcutaneous Q24H [address] Alva Castro, JONATHANNP      ferrous sulfate  325 mg Oral Daily With Breakfast Sweeney Crumble Ramella, CRNP      gabapentin  400 mg Oral TID Gilbert Crumble Ramella, CRNP      insulin glargine  10 Units Subcutaneous HS Gilbert Crumble Ramella, CRNP      insulin lispro  2-12 Units Subcutaneous HS Gilbert Crumble Ramella, CRNP      insulin lispro  2-12 Units Subcutaneous TID AC Alva Harmon, CRNP      insulin lispro  4 Units Subcutaneous TID With Meals Sweeney Crumble Ramella, CRNP      ipratropium-albuterol  3 mL Nebulization Q4H PRN Sweeney Crumble Ramella, CRNP      LORazepam  0 5 mg Oral BID PRN Gilbert Crumble Ramella, CRNP      ondansetron  4 mg Intravenous Q6H PRN Yazana Jessica, CRNP      oxybutynin  10 mg Oral Daily Sweeney Crumble Ramella, CRNP      pantoprazole  40 mg Oral Early Morning Willia Jessica, CRNP      QUEtiapine  200 mg Oral HS Willia Jessica, CRNP      senna  1 tablet Oral HS Sweeney Crumble Ramella, CRNP      sertraline  100 mg Oral HS Sweeney Crumble Ramella, CRNP      sodium chloride  1 spray Each Nare Q1H PRN KRISTOPHER Greenwood      torsemide  10 mg Oral Daily Oliverio Juárez, DO Oliverio Juárez, DO [address] [facility]  Hospitalist    ** Please Note: This note has been constructed using a voice recognition system   **

## 2021-08-08 NOTE — ASSESSMENT & PLAN NOTE
· Secondary to pneumonia being trialed on room air today  · In ICU did require bronchoscopy with suction of mucus plugs  · Seen by pulmonology    Will continue diuretics with low-dose torsemide  · CTA negative for pulmonary embolism

## 2021-08-08 NOTE — ASSESSMENT & PLAN NOTE
Lab Results   Component Value Date    HGBA1C 6 6 (H) 07/19/2021     Recent Labs     08/08/21  1212 08/08/21  1606 08/08/21 2059 08/09/21  0806   POCGLU 145* 143* 154* 125     · Stable continue glargine 10 units with lispro 4 units t i d

## 2021-08-09 LAB
GLUCOSE SERPL-MCNC: 125 MG/DL (ref 65–140)
GLUCOSE SERPL-MCNC: 134 MG/DL (ref 65–140)
GLUCOSE SERPL-MCNC: 157 MG/DL (ref 65–140)
GLUCOSE SERPL-MCNC: 157 MG/DL (ref 65–140)

## 2021-08-09 PROCEDURE — 97530 THERAPEUTIC ACTIVITIES: CPT

## 2021-08-09 PROCEDURE — 97110 THERAPEUTIC EXERCISES: CPT

## 2021-08-09 PROCEDURE — 82948 REAGENT STRIP/BLOOD GLUCOSE: CPT

## 2021-08-09 PROCEDURE — 99232 SBSQ HOSP IP/OBS MODERATE 35: CPT | Performed by: INTERNAL MEDICINE

## 2021-08-09 RX ADMIN — GABAPENTIN 400 MG: 400 CAPSULE ORAL at 16:08

## 2021-08-09 RX ADMIN — ENOXAPARIN SODIUM 40 MG: 40 INJECTION SUBCUTANEOUS at 08:11

## 2021-08-09 RX ADMIN — DIVALPROEX SODIUM 250 MG: 250 TABLET, DELAYED RELEASE ORAL at 21:15

## 2021-08-09 RX ADMIN — SERTRALINE HYDROCHLORIDE 100 MG: 50 TABLET ORAL at 21:15

## 2021-08-09 RX ADMIN — GABAPENTIN 400 MG: 400 CAPSULE ORAL at 08:11

## 2021-08-09 RX ADMIN — DIVALPROEX SODIUM 250 MG: 250 TABLET, DELAYED RELEASE ORAL at 15:15

## 2021-08-09 RX ADMIN — OXYBUTYNIN 10 MG: 5 TABLET, FILM COATED, EXTENDED RELEASE ORAL at 08:11

## 2021-08-09 RX ADMIN — ASPIRIN 81 MG: 81 TABLET, COATED ORAL at 08:11

## 2021-08-09 RX ADMIN — FERROUS SULFATE TAB 325 MG (65 MG ELEMENTAL FE) 325 MG: 325 (65 FE) TAB at 08:11

## 2021-08-09 RX ADMIN — INSULIN LISPRO 4 UNITS: 100 INJECTION, SOLUTION INTRAVENOUS; SUBCUTANEOUS at 16:09

## 2021-08-09 RX ADMIN — INSULIN LISPRO 4 UNITS: 100 INJECTION, SOLUTION INTRAVENOUS; SUBCUTANEOUS at 08:11

## 2021-08-09 RX ADMIN — ATORVASTATIN CALCIUM 80 MG: 40 TABLET, FILM COATED ORAL at 16:08

## 2021-08-09 RX ADMIN — QUETIAPINE FUMARATE 200 MG: 50 TABLET, EXTENDED RELEASE ORAL at 21:15

## 2021-08-09 RX ADMIN — STANDARDIZED SENNA CONCENTRATE 8.6 MG: 8.6 TABLET ORAL at 21:15

## 2021-08-09 RX ADMIN — INSULIN LISPRO 4 UNITS: 100 INJECTION, SOLUTION INTRAVENOUS; SUBCUTANEOUS at 11:59

## 2021-08-09 RX ADMIN — TORSEMIDE 10 MG: 10 TABLET ORAL at 08:11

## 2021-08-09 RX ADMIN — INSULIN LISPRO 2 UNITS: 100 INJECTION, SOLUTION INTRAVENOUS; SUBCUTANEOUS at 21:16

## 2021-08-09 RX ADMIN — GABAPENTIN 400 MG: 400 CAPSULE ORAL at 21:15

## 2021-08-09 RX ADMIN — INSULIN GLARGINE 10 UNITS: 100 INJECTION, SOLUTION SUBCUTANEOUS at 21:15

## 2021-08-09 RX ADMIN — DIVALPROEX SODIUM 250 MG: 250 TABLET, DELAYED RELEASE ORAL at 05:23

## 2021-08-09 RX ADMIN — PANTOPRAZOLE SODIUM 40 MG: 40 TABLET, DELAYED RELEASE ORAL at 05:23

## 2021-08-09 NOTE — PLAN OF CARE
Pt slept through shift on  0 5 to 1L NC for sats mid 80's to 90's  Pt medically stable for d/c but awaiting paperwork for ECF placement due to her IDD  Will continue to monitor pt and intervene as needed  Problem: MOBILITY - ADULT  Goal: Maintain or return to baseline ADL function  Description: INTERVENTIONS:  -  Assess patient's ability to carry out ADLs; assess patient's baseline for ADL function and identify physical deficits which impact ability to perform ADLs (bathing, care of mouth/teeth, toileting, grooming, dressing, etc )  - Assess/evaluate cause of self-care deficits   - Assess range of motion  - Assess patient's mobility; develop plan if impaired  - Assess patient's need for assistive devices and provide as appropriate  - Encourage maximum independence but intervene and supervise when necessary  - Involve family in performance of ADLs  - Assess for home care needs following discharge   - Consider OT consult to assist with ADL evaluation and planning for discharge  - Provide patient education as appropriate  Outcome: Adequate for Discharge  Goal: Maintains/Returns to pre admission functional level  Description: INTERVENTIONS:  - Perform BMAT or MOVE assessment daily    - Set and communicate daily mobility goal to care team and patient/family/caregiver  - Collaborate with rehabilitation services on mobility goals if consulted  - Perform Range of Motion 3 times a day  - Reposition patient every 2 hours    - Dangle patient 3 times a day  - Stand patient 3 times a day  - Ambulate patient 3 times a day  - Out of bed to chair 3 times a day   - Out of bed for meals 3 times a day  - Out of bed for toileting  - Record patient progress and toleration of activity level   Outcome: Adequate for Discharge     Problem: Prexisting or High Potential for Compromised Skin Integrity  Goal: Skin integrity is maintained or improved  Description: INTERVENTIONS:  - Identify patients at risk for skin breakdown  - Assess and monitor skin integrity  - Assess and monitor nutrition and hydration status  - Monitor labs   - Assess for incontinence   - Turn and reposition patient  - Assist with mobility/ambulation  - Relieve pressure over bony prominences  - Avoid friction and shearing  - Provide appropriate hygiene as needed including keeping skin clean and dry  - Evaluate need for skin moisturizer/barrier cream  - Collaborate with interdisciplinary team   - Patient/family teaching  - Consider wound care consult   Outcome: Adequate for Discharge     Problem: Nutrition/Hydration-ADULT  Goal: Nutrient/Hydration intake appropriate for improving, restoring or maintaining nutritional needs  Description: Monitor and assess patient's nutrition/hydration status for malnutrition  Collaborate with interdisciplinary team and initiate plan and interventions as ordered  Monitor patient's weight and dietary intake as ordered or per policy  Utilize nutrition screening tool and intervene as necessary  Determine patient's food preferences and provide high-protein, high-caloric foods as appropriate       INTERVENTIONS:  - Monitor oral intake, urinary output, labs, and treatment plans  - Assess nutrition and hydration status and recommend course of action  - Evaluate amount of meals eaten  - Assist patient with eating if necessary   - Allow adequate time for meals  - Recommend/ encourage appropriate diets, oral nutritional supplements, and vitamin/mineral supplements  - Order, calculate, and assess calorie counts as needed  - Recommend, monitor, and adjust tube feedings and TPN/PPN based on assessed needs  - Assess need for intravenous fluids  - Provide specific nutrition/hydration education as appropriate  - Include patient/family/caregiver in decisions related to nutrition  Outcome: Adequate for Discharge     Problem: Potential for Falls  Goal: Patient will remain free of falls  Description: INTERVENTIONS:  - Educate patient/family on patient safety including physical limitations  - Instruct patient to call for assistance with activity   - Consult OT/PT to assist with strengthening/mobility   - Keep Call bell within reach  - Keep bed low and locked with side rails adjusted as appropriate  - Keep care items and personal belongings within reach  - Initiate and maintain comfort rounds  - Make Fall Risk Sign visible to staff  - Offer Toileting every 2 Hours, in advance of need  - Initiate/Maintain bed/chair alarm  - Obtain necessary fall risk management equipment:   - Apply yellow socks and bracelet for high fall risk patients  - Consider moving patient to room near nurses station  Outcome: Adequate for Discharge     Problem: RESPIRATORY - ADULT  Goal: Achieves optimal ventilation and oxygenation  Description: INTERVENTIONS:  - Assess for changes in respiratory status  - Assess for changes in mentation and behavior  - Position to facilitate oxygenation and minimize respiratory effort  - Oxygen administered by appropriate delivery if ordered  - Initiate smoking cessation education as indicated  - Encourage broncho-pulmonary hygiene including cough, deep breathe, Incentive Spirometry  - Assess the need for suctioning and aspirate as needed  - Assess and instruct to report SOB or any respiratory difficulty  - Respiratory Therapy support as indicated  Outcome: Adequate for Discharge     Problem: METABOLIC, FLUID AND ELECTROLYTES - ADULT  Goal: Glucose maintained within target range  Description: INTERVENTIONS:  - Monitor Blood Glucose as ordered  - Assess for signs and symptoms of hyperglycemia and hypoglycemia  - Administer ordered medications to maintain glucose within target range  - Assess nutritional intake and initiate nutrition service referral as needed  Outcome: Adequate for Discharge     Problem: PAIN - ADULT  Goal: Verbalizes/displays adequate comfort level or baseline comfort level  Description: Interventions:  - Encourage patient to monitor pain and request assistance  - Assess pain using appropriate pain scale  - Administer analgesics based on type and severity of pain and evaluate response  - Implement non-pharmacological measures as appropriate and evaluate response  - Consider cultural and social influences on pain and pain management  - Notify physician/advanced practitioner if interventions unsuccessful or patient reports new pain  Outcome: Adequate for Discharge     Problem: INFECTION - ADULT  Goal: Absence or prevention of progression during hospitalization  Description: INTERVENTIONS:  - Assess and monitor for signs and symptoms of infection  - Monitor lab/diagnostic results  - Monitor all insertion sites, i e  indwelling lines, tubes, and drains  - Monitor endotracheal if appropriate and nasal secretions for changes in amount and color  - [place] appropriate cooling/warming therapies per order  - Administer medications as ordered  - Instruct and encourage patient and family to use good hand hygiene technique  - Identify and instruct in appropriate isolation precautions for identified infection/condition  Outcome: Adequate for Discharge     Problem: SAFETY ADULT  Goal: Maintain or return to baseline ADL function  Description: INTERVENTIONS:  -  Assess patient's ability to carry out ADLs; assess patient's baseline for ADL function and identify physical deficits which impact ability to perform ADLs (bathing, care of mouth/teeth, toileting, grooming, dressing, etc )  - Assess/evaluate cause of self-care deficits   - Assess range of motion  - Assess patient's mobility; develop plan if impaired  - Assess patient's need for assistive devices and provide as appropriate  - Encourage maximum independence but intervene and supervise when necessary  - Involve family in performance of ADLs  - Assess for home care needs following discharge   - Consider OT consult to assist with ADL evaluation and planning for discharge  - Provide patient education as appropriate  Outcome: Adequate for Discharge  Goal: Maintains/Returns to pre admission functional level  Description: INTERVENTIONS:  - Perform BMAT or MOVE assessment daily    - Set and communicate daily mobility goal to care team and patient/family/caregiver  - Collaborate with rehabilitation services on mobility goals if consulted  - Perform Range of Motion 3 times a day  - Reposition patient every 2 hours    - Dangle patient 3 times a day  - Stand patient 3 times a day  - Ambulate patient 3 times a day  - Out of bed to chair 3 times a day   - Out of bed for meals 3 times a day  - Out of bed for toileting  - Record patient progress and toleration of activity level   Outcome: Adequate for Discharge  Goal: Patient will remain free of falls  Description: INTERVENTIONS:  - Educate patient/family on patient safety including physical limitations  - Instruct patient to call for assistance with activity   - Consult OT/PT to assist with strengthening/mobility   - Keep Call bell within reach  - Keep bed low and locked with side rails adjusted as appropriate  - Keep care items and personal belongings within reach  - Initiate and maintain comfort rounds  - Make Fall Risk Sign visible to staff  - Offer Toileting every 2 Hours, in advance of need  - Initiate/Maintain bed/chair alarm  - Obtain necessary fall risk management equipment:   - Apply yellow socks and bracelet for high fall risk patients  - Consider moving patient to room near nurses station  Outcome: Adequate for Discharge     Problem: DISCHARGE PLANNING  Goal: Discharge to home or other facility with appropriate resources  Description: INTERVENTIONS:  - Identify barriers to discharge w/patient and caregiver  - Arrange for needed discharge resources and transportation as appropriate  - Identify discharge learning needs (meds, wound care, etc )  - Arrange for interpretive services to assist at discharge as needed  - Refer to Case Management Department for coordinating discharge planning if the patient needs post-hospital services based on physician/advanced practitioner order or complex needs related to functional status, cognitive ability, or social support system  Outcome: Adequate for Discharge     Problem: Knowledge Deficit  Goal: Patient/family/caregiver demonstrates understanding of disease process, treatment plan, medications, and discharge instructions  Description: Complete learning assessment and assess knowledge base    Interventions:  - Provide teaching at level of understanding  - Provide teaching via preferred learning methods  Outcome: Adequate for Discharge

## 2021-08-09 NOTE — OCCUPATIONAL THERAPY NOTE
Occupational Therapy Treatment Note     Patient Name: Yesi VEGA Date: 8/9/2021  Problem List  Principal Problem:    Sepsis due to pneumonia [facility])  Active Problems:    Mood disorder ([facility] [address] )    Type 2 diabetes mellitus without complication, without long-term current use of insulin ([facility] )    Acute respiratory failure with hypoxia (HCC)    Obesity (BMI 30 0-34  9)    Seizure disorder [facility])    Ambulatory dysfunction    Intellectual disability       08/09/21 1225   Note Type   Note Type Treatment   Restrictions/Precautions   Other Precautions O2;Fall Risk; Chair Alarm; Bed Alarm;Cognitive;Multiple lines   Pain Assessment   Pain Assessment Tool FLACC   Pain Rating: FLACC (Rest) - Face 0   Pain Rating: FLACC (Rest) - Legs 0   Pain Rating: FLACC (Rest) - Activity 0   Pain Rating: FLACC (Rest) - Cry 0   Pain Rating: FLACC (Rest) - Consolability 0   Score: FLACC (Rest) 0   Pain Rating: FLACC (Activity) - Face 0   Pain Rating: FLACC (Activity) - Legs 0   Pain Rating: FLACC (Activity) - Activity 0   Pain Rating: FLACC (Activity) - Cry 0   Pain Rating: FLACC (Activity) - Consolability 0   Score: FLACC (Activity) 0   Functional Standing Tolerance   Time 1st trial: 1 minute; 2nd trial: 15 seconds   Activity static standing   Comments Pt completed two standing trials at   Pt was able to stand for approximately 1 minute and then 15 seconds, respectively  Pt required Mod A x2 to remain standing and max verbal cueing for upright posture  Therapeutic rest breaks required after each trial    Transfers   Sit to Stand 2  Maximal assistance   Additional items Assist x 2; Increased time required;Verbal cues   Stand to Sit 4  Minimal assistance   Additional items Assist x 2; Increased time required; Impulsive   Additional Comments Pt required Max A x2 to complete two STS trials at [facility]  Min A x2 to sit as pt demonstrates impulsivity  Pt required Max A x3 to position in recliner chair   Pt seated in recliner chair at end of session with call bell within reach  All needs met at this time,   Right Upper Extremity- Strength   RUE Strength Comment Pt completed 3 sets of 10 repetitions of the following exercises to increase BUE MS necessary for functional transfers and ADL performance: elbow flexion and elbow extension  Pt required max verbal cueing, visual demonstration and tactile cueing for proper form  Therapeutic rest breaks required in between each set d/t fatigue   Cognition   Overall Cognitive Status Impaired   Arousal/Participation Alert; Responsive; Cooperative   Attention Attends with cues to redirect   Orientation Level Appropriate for developmental age   Memory Unable to assess   Following Commands Follows one step commands with increased time or repetition   Comments Pt nonverbal with ID diagnosis   Activity Tolerance   Activity Tolerance Patient limited by fatigue   Medical Staff Made Aware Spoke with RN, Tg Khalil   Assessment   Assessment Pt completed OT tx session #2 on this date focused on functional transfer status and UE strengthening  Pt completed 3 sets of 10 repetitions of elbow flexion and extension with a light resistance theraband to increase BUE MS necessary for functional transfers  Pt able to complete 2 standing trials of 1 minute and again of 15 seconds, respectively  Pt required Max A x2 to transfer from STS and Mod A x2 to remain standing d/t safety concerns  Max verbal cueing required for upright posture while standing at RW  Pt would continue to benefit from post acute rehabilitation services at this time to increase functional transfer status     Plan   Treatment Interventions Functional transfer training;UE strengthening/ROM   Goal Expiration Date 08/19/21   OT Treatment Day 2   OT Frequency 3-5x/wk   Recommendation   OT Discharge Recommendation Post acute rehabilitation services   AM-PAC Daily Activity Inpatient   Lower Body Dressing 1   Bathing 1   Toileting 1   Upper Body Dressing 2   Grooming 2   Eating 2   Daily Activity Raw Score 9   Turning Head Towards Sound 3   Follow Simple Instructions 3   Low Function Daily Activity Raw Score 15   Low Function Daily Activity Standardized Score 26 28   AM-PAC Applied Cognition Inpatient   Following a Speech/Presentation 2   Understanding Ordinary Conversation 3   Taking Medications 1   Remembering Where Things Are Placed or Put Away 1   Remembering List of 4-5 Errands 1   Taking Care of Complicated Tasks 1   Applied Cognition Raw Score 9   Applied Cognition Standardized Score 22 48       Pt goals revised on 8/9/2021 and to be met by 8/19/2021    1  Pt will demonstrate ability to complete grooming/hygiene tasks @ S after set-up  2  Pt will demonstrate ability to complete supine<>sit @ S in order to increase safety and independence during ADL tasks  3  Pt will demonstrate ability to complete UB ADLs including washing/dressing @ Min A in order to increase performance and participation during meaningful tasks  4  Pt will demonstrate ability to complete LB dressing @ Min A in order to increase safety and independence during meaningful tasks  5  Pt will demonstrate ability to complete toileting tasks including CM and pericare @ Mod A in order to increase safety and independence during meaningful tasks  6  Pt will demonstrate ability to complete EOB, chair, toilet/commode transfers @ Min A x1 in order to increase performance and participation during functional tasks  7  Pt will demonstrate ability to stand for 2 minutes while maintaining good balance with use of RW for UB support PRN and decreased verbal cueing for upright posture  8  Pt will demonstrate ability to tolerate 30-35 minute OT session with no vc'ing for deep breathing or use of energy conservation techniques in order to increase activity tolerance during functional tasks     9  Pt will demonstrate Good carryover of use of energy conservation/compensatory strategies during ADLs and functional tasks in order to increase safety and reduce risk for falls  10  Pt will demonstrate Good attention and participation in continued evaluation of functional ambulation house hold distances in order to assist with safe d/c planning  11  Pt will attend to continued cognitive assessments 100% of the time in order to provide most appropriate d/c recommendations  12  Pt will demonstrate 100% carryover of BUE HEP in order to increase BUE MS and increase performance during functional tasks upon d/c home      Ariadna Moncada, OTR/L

## 2021-08-09 NOTE — PROGRESS NOTES
[address]  Progress Note - Sydnie Acosta 1955, 77 y o  female MRN: [id number]  Unit/Bed#: -01 Encounter: [id number]  Primary Care Provider: Corrina Olivarez MD   Date and time admitted to hospital: 7/18/2021  2:07 PM    * Sepsis due to pneumonia [facility])  Assessment & Plan  · Sepsis secondary to presumed aspiration pneumonia finished course of antibiotics with: meropenem and metronidazole; was on vancomycin  · Has been re-evaluated by speech pathologist and diet advanced to pureed and nectar thickened liquids    Acute respiratory failure with hypoxia ([facility] )  Assessment & Plan  · Secondary to pneumonia being trialed on room air today  · In ICU did require bronchoscopy with suction of mucus plugs  · Seen by pulmonology  Will continue diuretics with low-dose torsemide  · CTA negative for pulmonary embolism    Intellectual disability  Assessment & Plan  · Nonverbal at baseline resides in group home    Ambulatory dysfunction  Assessment & Plan  · Awaiting clearance from [facility] for rehab placement  · Per nursing today, still requires assist of two and very shaky    Seizure disorder ([facility] )  Assessment & Plan  · Continue depakote and gabapentin    Obesity (BMI 30 0-34  9)  Assessment & Plan  · Body mass index is 34 75 kg/m²  Type 2 diabetes mellitus without complication, without long-term current use of insulin [facility])  Assessment & Plan  Lab Results   Component Value Date    HGBA1C 6 6 (H) 07/19/2021     Recent Labs     08/08/21  1212 08/08/21  1606 08/08/21  2059 08/09/21  0806   POCGLU 145* 143* 154* 125     · Stable continue glargine 10 units with lispro 4 units t i d     Mood disorder (HCC)  Assessment & Plan  · Mood disorder continue depakote gabapentin quetiapine lorazepam sertraline      VTE Pharmacologic Prophylaxis: VTE Score: 3 Moderate Risk (Score 3-4) - Pharmacological DVT Prophylaxis Ordered: Enoxaparin (Lovenox)      Patient Centered Rounds: I have performed bedside rounds with nursing staff today  Discussions with Specialists or Other Care Team Provider:  Case management    Education and Discussions with Family / Patient:  Discussed with sister-in-law on telephone    Time Spent for Care: 25 mins  More than 50% of total time spent on counseling and coordination of care as described above  Current Length of Stay: 22 day(s)  Current Patient Status: Inpatient   Certification Statement: The patient will continue to require additional inpatient hospital stay due to awaiting placement  Discharge Plan / Estimated Discharge Date: Awaiting clearance from [facility] for placement to rehab    Code Status: Level 3 - DNAR and DNI      Subjective:   Patient seen and examined  Still debilitated, awaiting placement    Objective:   Vitals: Blood pressure 99/53, pulse 59, temperature 98 °F (36 7 °C), temperature source Oral, resp  rate 20, height 5' (1 524 m), weight 80 7 kg (177 lb 14 6 oz), SpO2 95 %  Physical Exam  Vitals reviewed  Constitutional:       General: She is not in acute distress  Appearance: Normal appearance  Eyes:      General: No scleral icterus  Cardiovascular:      Rate and Rhythm: Regular rhythm  Heart sounds: Normal heart sounds  Pulmonary:      Breath sounds: Decreased breath sounds present  No wheezing  Abdominal:      Tenderness: There is no guarding or rebound  Musculoskeletal:         General: No swelling  Skin:     General: Skin is warm  Neurological:      Mental Status: She is alert  Mental status is at baseline     Psychiatric:         Mood and Affect: Mood normal        Additional Data:   Labs:  Results from last 7 days   Lab Units 08/05/21  0508   WBC Thousand/uL 4 43   HEMOGLOBIN g/dL 8 8*   HEMATOCRIT % 29 5*   MCV fL 96   PLATELETS Thousands/uL 290     Results from last 7 days   Lab Units 08/05/21  0508 08/03/21  0502   SODIUM mmol/L 141 145   POTASSIUM mmol/L 3 9 4 1   CHLORIDE mmol/L 105 106   CO2 mmol/L 34* 36*   ANION GAP mmol/L 2* 3*   BUN mg/dL 12 7   CREATININE mg/dL 0 57* 0 59*   CALCIUM mg/dL 8 8 9 2   ALBUMIN g/dL 2 4*  --    TOTAL BILIRUBIN mg/dL 0 35  --    ALK PHOS U/L 53  --    ALT U/L 31  --    AST U/L 12  --    EGFR ml/min/1 73sq m 96 95   GLUCOSE RANDOM mg/dL 113 133             Results from last 7 days   Lab Units 08/03/21  0502   NT-PRO BNP pg/mL 131*          Results from last 7 days   Lab Units 08/09/21  0806 08/08/21  2059 08/08/21  1606 08/08/21  1212 08/08/21  0958 08/07/21  2124 08/07/21  1624 08/07/21  1156 08/07/21  0817 08/06/21  2101 08/06/21  1642 08/06/21  1137   POC GLUCOSE mg/dl 125 154* 143* 145* 108 157* 197* 155* 124 144* 133 138             * I Have Reviewed All Lab Data Listed Above  Cultures:                   Lines/Drains:  Invasive Devices     Drain            External Urinary Catheter 14 days              Telemetry:      Imaging:  Imaging Reports Reviewed Today Include:   XR chest portable    Result Date: 7/22/2021  Impression: Worsening right upper lobe pneumonia  Workstation performed: [id number]     XR chest portable    Result Date: 7/21/2021  Impression: Progressive bilateral infiltrates, right greater than left Workstation performed: [id number]     XR chest 1 view portable    Result Date: 7/19/2021  Impression: No acute cardiopulmonary disease  Workstation performed: [id number]     CT chest wo contrast    Result Date: 7/18/2021  Impression: Significant elevation of the right hemidiaphragm with volume loss and consolidation involving the right middle lobe and right lower lobe with appearance favoring atelectasis  Further detail limited by both patient motion and lack of contrast  Dilated central pulmonary arteries as above  Workstation performed: [id number]     CTA chest pe study    Result Date: 7/20/2021  Impression: No central pulmonary emboli identified   Significant interval worsening in airspace consolidation and patchy infiltrates involving the right upper lobe to the greatest degree posterior segment but also elsewhere within the right upper lobe  There is also interval worsening of the consolidation  and volume loss in the dependent lower lobes bilaterally  Also component of chronic volume loss related to elevated right hemidiaphragm  Focally dilated main pulmonary artery  Assess for pulmonary valve disease   Workstation performed: [id number]     Scheduled Meds:  Current Facility-Administered Medications   Medication Dose Route Frequency Provider Last Rate    acetaminophen  640 mg Oral Q6H PRN Harleen Harmon, CRNP      aspirin  81 mg Oral Daily Harleenharjinder Harmon, JONATHANNP      atorvastatin  80 mg Oral Daily With [facility] Ramella, CRNP      bisacodyl  10 mg Rectal Daily PRN Harleenharjinder Harmon, CRNP      divalproex sodium  250 mg Oral [facility] Alva Castro, KRISTOPHER      enoxaparin  40 mg Subcutaneous Q24H [address] Alva Castro, KRISTOPHER      ferrous sulfate  325 mg Oral Daily With Breakfast Harleen Harmon, KRISTOPHER      gabapentin  400 mg Oral TID Harleenharjinder Harmon, KRISTOPHER      insulin glargine  10 Units Subcutaneous HS Harleenharjinder Harmon, CRNP      insulin lispro  2-12 Units Subcutaneous HS Harleenharjinder Harmon, CRNP      insulin lispro  2-12 Units Subcutaneous TID AC Alva Harmon, KRISTOPHER      insulin lispro  4 Units Subcutaneous TID With Meals Harleen Harmon, KRISTOPHER      ipratropium-albuterol  3 mL Nebulization Q4H PRN Harleen Harmon, JONATHANNP      LORazepam  0 5 mg Oral BID PRN Harleenharjinder Harmon, CRNP      ondansetron  4 mg Intravenous Q6H PRN Edy Morita, CRNP      oxybutynin  10 mg Oral Daily Harleenharjinder Harmon, CRNP      pantoprazole  40 mg Oral Early Morning Edy Morita, CRNP      QUEtiapine  200 mg Oral HS Edy Morita, CRNP      senna  1 tablet Oral HS Harleenmelody Harmon, CRNP      sertraline  100 mg Oral HS Edy Morita, CRNP      sodium chloride  1 spray Each Nare Q1H PRN Edy Elba, CRNP      torsemide  10 mg Oral Daily Emi Griffiths, DO Emi Griffiths, DO [address] [facility]  Hospitalist    ** Please Note: This note has been constructed using a voice recognition system   **

## 2021-08-09 NOTE — PLAN OF CARE
Problem: OCCUPATIONAL THERAPY ADULT  Goal: Performs self-care activities at highest level of function for planned discharge setting  See evaluation for individualized goals  Description: Treatment Interventions: ADL retraining, Functional transfer training, UE strengthening/ROM, Endurance training, Cognitive reorientation, Patient/family training, Equipment evaluation/education, Neuromuscular reeducation, Compensatory technique education, Continued evaluation, Energy conservation, Activityengagement       See flowsheet documentation for full assessment, interventions and recommendations  Outcome: Progressing  Note: Limitation: Decreased ADL status, Decreased UE strength, Decreased Safe judgement during ADL, Decreased endurance, Decreased cognition, Decreased self-care trans, Decreased high-level ADLs  Prognosis: Good  Assessment: Pt completed OT tx session #2 on this date focused on functional transfer status and UE strengthening  Pt completed 3 sets of 10 repetitions of elbow flexion and extension with a light resistance theraband to increase BUE MS necessary for functional transfers  Pt able to complete 2 standing trials of 1 minute and again of 15 seconds, respectively  Pt required Max A x2 to transfer from STS and Mod A x2 to remain standing d/t safety concerns  Max verbal cueing required for upright posture while standing at   Pt would continue to benefit from post acute rehabilitation services at this time to increase functional transfer status       OT Discharge Recommendation: Post acute rehabilitation services

## 2021-08-09 NOTE — CASE MANAGEMENT
Case Management Progress Note    Patient name Galilea Alcazar  Location /-01 MRN [id number]  : 1955 Date 2021       LOS (days): 22  Geometric Mean LOS (GMLOS) (days): 4 80  Days to GMLOS:-16 9        Barrier to dc- Await Approval Letter from [facility]  For IDD  DC Plan is [place]  Transportation will be BLS- on 1 liter of   - will need COVID screen

## 2021-08-10 LAB
ALBUMIN SERPL BCP-MCNC: 2.4 G/DL (ref 3.5–5)
ALP SERPL-CCNC: 61 U/L (ref 46–116)
ALT SERPL W P-5'-P-CCNC: 22 U/L (ref 12–78)
ANION GAP SERPL CALCULATED.3IONS-SCNC: 2 MMOL/L (ref 4–13)
AST SERPL W P-5'-P-CCNC: 16 U/L (ref 5–45)
BILIRUB SERPL-MCNC: 0.3 MG/DL (ref 0.2–1)
BUN SERPL-MCNC: 18 MG/DL (ref 5–25)
CALCIUM ALBUM COR SERPL-MCNC: 10.4 MG/DL (ref 8.3–10.1)
CALCIUM SERPL-MCNC: 9.1 MG/DL (ref 8.3–10.1)
CHLORIDE SERPL-SCNC: 104 MMOL/L (ref 100–108)
CO2 SERPL-SCNC: 35 MMOL/L (ref 21–32)
CREAT SERPL-MCNC: 0.62 MG/DL (ref 0.6–1.3)
ERYTHROCYTE [DISTWIDTH] IN BLOOD BY AUTOMATED COUNT: 15 % (ref 11.6–15.1)
GFR SERPL CREATININE-BSD FRML MDRD: 94 ML/MIN/1.73SQ M
GLUCOSE SERPL-MCNC: 125 MG/DL (ref 65–140)
GLUCOSE SERPL-MCNC: 147 MG/DL (ref 65–140)
GLUCOSE SERPL-MCNC: 167 MG/DL (ref 65–140)
GLUCOSE SERPL-MCNC: 167 MG/DL (ref 65–140)
GLUCOSE SERPL-MCNC: 169 MG/DL (ref 65–140)
HCT VFR BLD AUTO: 32.3 % (ref 34.8–46.1)
HGB BLD-MCNC: 10 G/DL (ref 11.5–15.4)
MCH RBC QN AUTO: 29.7 PG (ref 26.8–34.3)
MCHC RBC AUTO-ENTMCNC: 31 G/DL (ref 31.4–37.4)
MCV RBC AUTO: 96 FL (ref 82–98)
PLATELET # BLD AUTO: 219 THOUSANDS/UL (ref 149–390)
PMV BLD AUTO: 9.6 FL (ref 8.9–12.7)
POTASSIUM SERPL-SCNC: 4.2 MMOL/L (ref 3.5–5.3)
PROT SERPL-MCNC: 5.9 G/DL (ref 6.4–8.2)
RBC # BLD AUTO: 3.37 MILLION/UL (ref 3.81–5.12)
SARS-COV-2 RNA RESP QL NAA+PROBE: NEGATIVE
SODIUM SERPL-SCNC: 141 MMOL/L (ref 136–145)
WBC # BLD AUTO: 4.43 THOUSAND/UL (ref 4.31–10.16)

## 2021-08-10 PROCEDURE — U0005 INFEC AGEN DETEC AMPLI PROBE: HCPCS | Performed by: FAMILY MEDICINE

## 2021-08-10 PROCEDURE — U0003 INFECTIOUS AGENT DETECTION BY NUCLEIC ACID (DNA OR RNA); SEVERE ACUTE RESPIRATORY SYNDROME CORONAVIRUS 2 (SARS-COV-2) (CORONAVIRUS DISEASE [COVID-19]), AMPLIFIED PROBE TECHNIQUE, MAKING USE OF HIGH THROUGHPUT TECHNOLOGIES AS DESCRIBED BY CMS-2020-01-R: HCPCS | Performed by: FAMILY MEDICINE

## 2021-08-10 PROCEDURE — 97116 GAIT TRAINING THERAPY: CPT

## 2021-08-10 PROCEDURE — 94760 N-INVAS EAR/PLS OXIMETRY 1: CPT

## 2021-08-10 PROCEDURE — 99232 SBSQ HOSP IP/OBS MODERATE 35: CPT | Performed by: FAMILY MEDICINE

## 2021-08-10 PROCEDURE — 82948 REAGENT STRIP/BLOOD GLUCOSE: CPT

## 2021-08-10 PROCEDURE — 80053 COMPREHEN METABOLIC PANEL: CPT | Performed by: INTERNAL MEDICINE

## 2021-08-10 PROCEDURE — 97110 THERAPEUTIC EXERCISES: CPT

## 2021-08-10 PROCEDURE — 85027 COMPLETE CBC AUTOMATED: CPT | Performed by: INTERNAL MEDICINE

## 2021-08-10 RX ADMIN — GABAPENTIN 400 MG: 400 CAPSULE ORAL at 09:01

## 2021-08-10 RX ADMIN — INSULIN LISPRO 4 UNITS: 100 INJECTION, SOLUTION INTRAVENOUS; SUBCUTANEOUS at 16:19

## 2021-08-10 RX ADMIN — STANDARDIZED SENNA CONCENTRATE 8.6 MG: 8.6 TABLET ORAL at 21:14

## 2021-08-10 RX ADMIN — GABAPENTIN 400 MG: 400 CAPSULE ORAL at 21:14

## 2021-08-10 RX ADMIN — SERTRALINE HYDROCHLORIDE 100 MG: 50 TABLET ORAL at 21:14

## 2021-08-10 RX ADMIN — DIVALPROEX SODIUM 250 MG: 250 TABLET, DELAYED RELEASE ORAL at 21:14

## 2021-08-10 RX ADMIN — TORSEMIDE 10 MG: 10 TABLET ORAL at 09:01

## 2021-08-10 RX ADMIN — OXYBUTYNIN 10 MG: 5 TABLET, FILM COATED, EXTENDED RELEASE ORAL at 09:01

## 2021-08-10 RX ADMIN — INSULIN LISPRO 2 UNITS: 100 INJECTION, SOLUTION INTRAVENOUS; SUBCUTANEOUS at 21:18

## 2021-08-10 RX ADMIN — ASPIRIN 81 MG: 81 TABLET, COATED ORAL at 09:01

## 2021-08-10 RX ADMIN — GABAPENTIN 400 MG: 400 CAPSULE ORAL at 16:19

## 2021-08-10 RX ADMIN — ATORVASTATIN CALCIUM 80 MG: 40 TABLET, FILM COATED ORAL at 16:19

## 2021-08-10 RX ADMIN — QUETIAPINE FUMARATE 200 MG: 50 TABLET, EXTENDED RELEASE ORAL at 21:14

## 2021-08-10 RX ADMIN — FERROUS SULFATE TAB 325 MG (65 MG ELEMENTAL FE) 325 MG: 325 (65 FE) TAB at 09:01

## 2021-08-10 RX ADMIN — INSULIN LISPRO 4 UNITS: 100 INJECTION, SOLUTION INTRAVENOUS; SUBCUTANEOUS at 09:01

## 2021-08-10 RX ADMIN — ENOXAPARIN SODIUM 40 MG: 40 INJECTION SUBCUTANEOUS at 09:01

## 2021-08-10 RX ADMIN — INSULIN LISPRO 4 UNITS: 100 INJECTION, SOLUTION INTRAVENOUS; SUBCUTANEOUS at 11:58

## 2021-08-10 RX ADMIN — INSULIN GLARGINE 10 UNITS: 100 INJECTION, SOLUTION SUBCUTANEOUS at 21:14

## 2021-08-10 RX ADMIN — DIVALPROEX SODIUM 250 MG: 250 TABLET, DELAYED RELEASE ORAL at 14:30

## 2021-08-10 NOTE — PLAN OF CARE
Problem: RESPIRATORY - ADULT  Goal: Achieves optimal ventilation and oxygenation  Description: INTERVENTIONS:  - Assess for changes in respiratory status  - Assess for changes in mentation and behavior  - Position to facilitate oxygenation and minimize respiratory effort  - Oxygen administered by appropriate delivery if ordered  - Initiate smoking cessation education as indicated  - Encourage broncho-pulmonary hygiene including cough, deep breathe, Incentive Spirometry  - Assess the need for suctioning and aspirate as needed  - Assess and instruct to report SOB or any respiratory difficulty  - Respiratory Therapy support as indicated  Outcome: Progressing     Problem: Potential for Falls  Goal: Patient will remain free of falls  Description: INTERVENTIONS:  -  Assess patient's ability to carry out ADLs; assess patient's baseline for ADL function and identify physical deficits which impact ability to perform ADLs (bathing, care of mouth/teeth, toileting, grooming, dressing, etc )  - Assess/evaluate cause of self-care deficits   - Assess range of motion  - Assess patient's mobility; develop plan if impaired  - Assess patient's need for assistive devices and provide as appropriate  - Encourage maximum independence but intervene and supervise when necessary  - Involve family in performance of ADLs  - Assess for home care needs following discharge   - Consider OT consult to assist with ADL evaluation and planning for discharge  - Provide patient education as appropriate  Outcome: Progressing     Problem: MOBILITY - ADULT  Goal: Maintain or return to baseline ADL function  Description: INTERVENTIONS:  -  Assess patient's ability to carry out ADLs; assess patient's baseline for ADL function and identify physical deficits which impact ability to perform ADLs (bathing, care of mouth/teeth, toileting, grooming, dressing, etc )  - Assess/evaluate cause of self-care deficits   - Assess range of motion  - Assess patient's mobility; develop plan if impaired  - Assess patient's need for assistive devices and provide as appropriate  - Encourage maximum independence but intervene and supervise when necessary  - Involve family in performance of ADLs  - Assess for home care needs following discharge   - Consider OT consult to assist with ADL evaluation and planning for discharge  - Provide patient education as appropriate  Outcome: Progressing  Goal: Maintains/Returns to pre admission functional level  Description: INTERVENTIONS:  - Perform BMAT or MOVE assessment daily    - Set and communicate daily mobility goal to care team and patient/family/caregiver  - Collaborate with rehabilitation services on mobility goals if consulted  - Perform Range of Motion 3 times a day  - Reposition patient every 2 hours    - Dangle patient 3 times a day  - Stand patient 3 times a day  - Ambulate patient 3 times a day  - Out of bed to chair 3 times a day   - Out of bed for meals 3 times a day  - Out of bed for toileting  - Record patient progress and toleration of activity level   Outcome: Progressing     Problem: Prexisting or High Potential for Compromised Skin Integrity  Goal: Skin integrity is maintained or improved  Description: INTERVENTIONS:  - Identify patients at risk for skin breakdown  - Assess and monitor skin integrity  - Assess and monitor nutrition and hydration status  - Monitor labs   - Assess for incontinence   - Turn and reposition patient  - Assist with mobility/ambulation  - Relieve pressure over bony prominences  - Avoid friction and shearing  - Provide appropriate hygiene as needed including keeping skin clean and dry  - Evaluate need for skin moisturizer/barrier cream  - Collaborate with interdisciplinary team   - Patient/family teaching  - Consider wound care consult   Outcome: Progressing

## 2021-08-10 NOTE — TRANSPORTATION MEDICAL NECESSITY
Section I - General Information    Name of Patient: Corinne Stade                 : 1955    Medicare #: [id number]  Transport Date: 08/10/21 (PCS is valid for round trips on this date and for all repetitive trips in the 60-day range as noted below )  Origin: [address] [place] [address] West: [address]  Is the pt's stay covered under Medicare Part A (PPS/DRG)   [x]     Closest appropriate facility? If no, why is transport to more distant facility required? Yes  If hospice pt, is this transport related to pt's terminal illness? No       Section II - Medical Necessity Questionnaire  Ambulance transportation is medically necessary only if other means of transport are contraindicated or would be potentially harmful to the patient  To meet this requirement, the patient must either be "bed confined" or suffer from a condition such that transport by means other than ambulance is contraindicated by the patient's condition  The following questions must be answered by the medical professional signing below for this form to be valid:    1)  Describe the MEDICAL CONDITION (physical and/or mental) of this patient AT [address] [place] [address] that requires the patient to be transported in an ambulance and why transport by other means is contraindicated by the patient's condition: sepsis related to PNA    2) Is the patient "bed confined" as defined below? No  To be "be confined" the patient must satisfy all three of the following conditions: (1) unable to get up from bed without Assistance; AND (2) unable to ambulate; AND (3) unable to sit in a chair or wheelchair  3) Can this patient safely be transported by car or wheelchair van (i e , seated during transport without a medical attendant or monitoring)?    No    4) In addition to completing questions 1-3 above, please check any of the following conditions that apply*: *Note: supporting documentation for any boxes checked must be maintained in the patient's medical records  If hosp-hosp transfer, describe services needed at 2nd facility not available at 1st facility? Requires oxygen-unable to self administer  1 liter of 02  Inconsistently follows commands  Attendant required    Section III - Signature of Physician or Healthcare Professional  I certify that the above information is true and correct based on my evaluation of this patient, and represent that the patient requires transport by ambulance and that other forms of transport are contraindicated  I understand that this information will be used by the Centers for Medicare and Medicaid Services (CMS) to support the determination of medical necessity for ambulance services, and I represent that I have personal knowledge of the patient's condition at time of transport  []  If this box is checked, I also certify that the patient is physically or mentally incapable of signing the ambulance service's claim and that the institution with which I am affiliated has furnished care, services, or assistance to the patient  My signature below is made on behalf of the patient pursuant to 42 CFR §424 36(b)(4)  In accordance with 42 CFR §424 37, the specific reason(s) that the patient is physically or mentally incapable of signing the claim form is as follows:       Signature of Physician* or Healthcare Professional______________________________________________________________  Signature Date 08/10/21 (For scheduled repetitive transports, this form is not valid for transports performed more than 60 days after this date)    Printed Name & Credentials of Physician or Healthcare Professional (MD, DO, RN, etc )____Irais Ramírez RN ____________________________  *Form must be signed by patient's attending physician for scheduled, repetitive transports   For non-repetitive, unscheduled ambulance transports, if unable to obtain the signature of the attending physician, any of the following may sign (choose appropriate option below)  [] Physician Assistant []  Clinical Nurse Specialist []  Registered Nurse  []  Nurse Practitioner  [] Discharge Planner

## 2021-08-10 NOTE — ASSESSMENT & PLAN NOTE
· Awaiting clearance from [facility] for rehab placement  · Per nursing today, still requires assist of two and very shaky

## 2021-08-10 NOTE — PHYSICAL THERAPY NOTE
PHYSICAL THERAPY TREATMENT NOTE  NAME:  Ivan Lung  DATE: 08/10/21    Length Of Stay: 23  Performed at least 2 patient identifiers during session: Name and Birthday    TREATMENT:    08/10/21 1412   PT Last Visit   PT Visit Date 08/10/21   Note Type   Note Type Treatment   Pain Assessment   Pain Assessment Tool Pain Assessment not indicated - pt denies pain   Pain Score No Pain   Pain Rating: FLACC (Rest) - Face 0   Pain Rating: FLACC (Rest) - Legs 0   Pain Rating: FLACC (Rest) - Activity 0   Pain Rating: FLACC (Rest) - Cry 0   Pain Rating: FLACC (Rest) - Consolability 0   Score: FLACC (Rest) 0   Pain Rating: FLACC (Activity) - Face 0   Pain Rating: FLACC (Activity) - Legs 0   Pain Rating: FLACC (Activity) - Activity 0   Pain Rating: FLACC (Activity) - Cry 0   Pain Rating: FLACC (Activity) - Consolability 0   Score: FLACC (Activity) 0   Restrictions/Precautions   Other Precautions Fall Risk;Bed Alarm; Chair Alarm;Cognitive;Multiple lines   General   Chart Reviewed Yes   Response to Previous Treatment Patient unable to report, no changes reported from family or staff   Family/Caregiver Present No   Cognition   Overall Cognitive Status Impaired   Arousal/Participation Alert; Responsive; Cooperative   Following Commands Follows one step commands with increased time or repetition   Subjective   Subjective Pt nonverbal, able to shake head yes/no and give thumbs up   Bed Mobility   Additional Comments Pt seated OOB in recliner upon arrival and at end of session with LE elevated, chair alarm on, and all needs within reach   Transfers   Sit to Stand 2  Maximal assistance   Additional items Assist x 2; Increased time required;Verbal cues   Stand to Sit 4  Minimal assistance   Additional items Assist x 2; Increased time required;Verbal cues   Stand pivot Unable to assess   Additional Comments Use of RW for transfers   Pt requires MaxAx2 to maintain standing position with vc for weight shifting from L to R in preperation for ambulation  Ambulation/Elevation   Gait pattern Excessively slow; Step to;Short stride;Decreased foot clearance   Gait Assistance 2  Maximal assist   Additional items Assist x 2;Verbal cues   Assistive Device Rolling walker   Distance 3'x1 with RW and MaxAx2 for RW management, weight shifting, and to maintain upright posture  Vc for foot placement  Chair follow with third person as pt is impulsive to sit  Balance   Static Sitting Fair -   Dynamic Sitting Poor +   Static Standing Poor +   Dynamic Standing Poor   Ambulatory Poor -   Endurance Deficit   Endurance Deficit Yes   Endurance Deficit Description Fatigue, pt on room air   Activity Tolerance   Activity Tolerance Patient limited by fatigue   Nurse Made Aware Quinn LERMA   Exercises   Heelslides Sitting;10 reps;Bilateral;AAROM   Hip Flexion Sitting;10 reps;AROM; Bilateral   Hip Abduction Sitting;10 reps;AAROM; Bilateral   Hip Adduction Sitting;10 reps;AAROM; Bilateral   Knee AROM Long Arc Quad Sitting;10 reps;AROM; Bilateral   Ankle Pumps Sitting;10 reps;AROM; Bilateral   Assessment   Prognosis Fair   Problem List Decreased strength;Decreased endurance; Impaired balance;Decreased mobility; Decreased cognition; Impaired judgement;Decreased safety awareness; Obesity; Decreased skin integrity   Barriers to Discharge Decreased caregiver support; Inaccessible home environment   Barriers to Discharge Comments Requires increased assistance with all mobility   Goals   Patient Goals None stated   PT Treatment Day 4   Plan   Treatment/Interventions Functional transfer training;LE strengthening/ROM; Therapeutic exercise; Endurance training;Cognitive reorientation;Patient/family training;Equipment eval/education; Bed mobility;Gait training   Progress Progressing toward goals   PT Frequency   (3-5x/wk)   Recommendation   PT Discharge Recommendation Post acute rehabilitation services   Equipment Recommended   (TBD by rehab)   PT - OK to Discharge Yes   Additional Comments When medically cleared   Additional Comments 2 Pt seated in recliner with LE elevated, chair alarm on, and all needs within reach at end of session   Alberto 8 in Bed Without Bedrails 1   Lying on Back to Sitting on Edge of Flat Bed 1   Moving Bed to Chair 1   Standing Up From Chair 1   Walk in Room 1   Climb 3-5 Stairs 1   Basic Mobility Inpatient Raw Score 6   Turning Head Towards Sound 4   Follow Simple Instructions 3   Low Function Basic Mobility Raw Score 13   Low Function Basic Mobility Standardized Score 20 14       The patient's AM-PAC Basic Mobility Inpatient Short Form Raw Score is 6, Standardized Score is 20  14  A standardized score greater than 42 9 suggests the patient may benefit from discharge to post-acute rehabilitation services  Please also refer to the recommendation of the Physical Therapist for safe discharge planning  Pt seen for PT treatment session this date with interventions consisting of gait training w/ emphasis on improving pt's ability to ambulate level surfaces x 3'x1 with max A of 2 provided by therapist with RW, Therapeutic exercise consisting of: AROM and AAROM 10 reps B LE in sitting position and therapeutic activity consisting of training: sit<>stand transfers  Pt agreeable to PT treatment session upon arrival, pt found seated OOB in recliner, in no apparent distress  In comparison to previous session, pt with improvements in ambulation distances and with technique with therex performed today  Pt continues to require manual and verbal cues for mobility and technique with therex  Dorathy Infield Post session: pt returned back to recliner, chair alarm engaged and all needs in reach Continue to recommend STR at time of d/c in order to maximize pt's functional independence and safety w/ mobility  Pt continues to be functioning below baseline level, and remains limited 2* factors listed above   PT will continue to see pt while here in order to address the deficits listed above and provide interventions consistent w/ POC in effort to achieve STGs      Spencer Plata PTA

## 2021-08-10 NOTE — PHYSICAL THERAPY NOTE
PHYSICAL THERAPY REASSESSMENT NOTE          Patient Name: Alicia Kemp  WWABM'L Date: 8/10/2021     Pt has received PT services consisting of therapeutic exercise for LE strengthening, therapeutic activity with bed mobility and transfers  Pt hasn't been able to complete ambulation trial or spt as she is significantly fearful of falling, sitting impulsively  She has required modAx2 for bed mobility, maximal assistance of 2 to minimal assistance of 2 for transfers, unable to spt requiring quick move and unable to ambulate  She has not made progress toward STGs limited by decreased strength, balance, endurance and increased fearfulness of falling  She will continue to benefit from PT services 3-5x/wk to maximize LOF  Updated STGs below for 8/20/2021  Goals: Pt will: Perform bed mobility tasks with consistent min A of 1 to reposition in bed and prepare for transfers  Pt will perform transfers with modAx1 for sit<>stand and modAx2 for spt to decrease burden of care, decrease risk for falls and improve activity tolerance and prepare for ambulation  Pt will ambulate with RW for >/= 10' with  modAx2  to decrease burden of care, decrease risk for falls, improve activity tolerance and improve gait quality and to access home environment  Pt will increase B LE strength >/= 1/2 MMT grade to facilitate functional mobility

## 2021-08-10 NOTE — CASE MANAGEMENT
Case Management ED Discharge Planning Note    Patient name Yaritza Ponce  Location /-01 MRN [id number]  : 1955 Date 8/10/2021       Previous Admission - Discharge Date:1/3/21   Previous Discharge Diagnosis:  There are no discharge diagnoses documented for the most recent discharge          -Received call from Blayne Morgan at [facility] and [facility] that they received the Signed approval IDD letter for [employer]  She is going to faxed this letter to us  MD and Nursing is aware- COVID screen is needed  CM called and informed Elder Abreu and patients brother that the letter was received and if [facility] can accept today, Georgiack Ileana will be dc today  Reviewed with Carmen Newton and Kiarra Carey the DC IMM - aware of 4 hours to appeal as CM was unsure when the letter was coming from [employer]- they understood the rights, received a copy prior to today and reviewed them  They feel patient is medically ready for dc and have no intention of appealing the dc  'Verbalized understanding, received, they do not want a copy  Place verbal review DC IMM in the scan bin  Medical Necessity for transportation was completed  Copy available for transport team and a copy was placed in bin to be scanned into the chart  Letter was received from the [place]- ( IDD Letter of approval) CM uploaded into [facility]  Await to see if [facility] can accept today

## 2021-08-10 NOTE — ASSESSMENT & PLAN NOTE
Lab Results   Component Value Date    HGBA1C 6 6 (H) 07/19/2021     Recent Labs     08/09/21  1157 08/09/21  1606 08/09/21  2109 08/10/21  0732   POCGLU 157* 134 157* 125     · Stable continue glargine 10 units with lispro 4 units t i d

## 2021-08-10 NOTE — PROGRESS NOTES
[address]  Progress Note - Palma Teixeira 1955, 77 y o  female MRN: [id number]  Unit/Bed#: -01 Encounter: [id number]  Primary Care Provider: Xiomy De Jesus MD   Date and time admitted to hospital: 7/18/2021  2:07 PM    * Sepsis due to pneumonia [facility])  Assessment & Plan  · Sepsis secondary to presumed aspiration pneumonia finished course of antibiotics with: meropenem and metronidazole; was on vancomycin  · Has been re-evaluated by speech pathologist and diet advanced to pureed and nectar thickened liquids    Intellectual disability  Assessment & Plan  · Nonverbal at baseline resides in group home    Ambulatory dysfunction  Assessment & Plan  · Awaiting clearance from [facility] for rehab placement  · Per nursing today, still requires assist of two and very shaky    Seizure disorder ([address] )  Assessment & Plan  · Continue depakote and gabapentin    Obesity (BMI 30 0-34  9)  Assessment & Plan  · Body mass index is 34 75 kg/m²  Acute respiratory failure with hypoxia [facility])  Assessment & Plan  · Secondary to pneumonia being trialed on room air today  · In ICU did require bronchoscopy with suction of mucus plugs  · Seen by pulmonology  Will continue diuretics with low-dose torsemide  · CTA negative for pulmonary embolism    Type 2 diabetes mellitus without complication, without long-term current use of insulin [facility])  Assessment & Plan  Lab Results   Component Value Date    HGBA1C 6 6 (H) 07/19/2021     Recent Labs     08/09/21  1157 08/09/21  1606 08/09/21  2109 08/10/21  0732   POCGLU 157* 134 157* 125     · Stable continue glargine 10 units with lispro 4 units t i d     Mood disorder (HCC)  Assessment & Plan  · Mood disorder continue depakote gabapentin quetiapine lorazepam sertraline          VTE Pharmacologic Prophylaxis: VTE Score: 3 Moderate Risk (Score 3-4) - Pharmacological DVT Prophylaxis Ordered: enoxaparin (Lovenox)      Patient Centered Rounds: I performed bedside rounds with nursing staff today  Discussions with Specialists or Other Care Team Provider: cm    Education and Discussions with Family / Patient: patient     Time Spent for Care: 30 minutes  More than 50% of total time spent on counseling and coordination of care as described above  Current Length of Stay: 23 day(s)  Current Patient Status: Inpatient   Certification Statement: The patient will continue to require additional inpatient hospital stay due to Placement  Discharge Plan: Anticipate discharge in 24-48 hrs to rehab facility  Code Status: Level 3 - DNAR and DNI    Subjective:   Patient seen and examined no issues as discussed with nursing as patient is nonverbal    Objective:     Vitals:   Temp (24hrs), Av 3 °F (36 8 °C), Min:97 8 °F (36 6 °C), Max:98 6 °F (37 °C)    Temp:  [97 8 °F (36 6 °C)-98 6 °F (37 °C)] 97 8 °F (36 6 °C)  HR:  [64-70] 65  Resp:  [19-20] 19  BP: ()/(53-59) 93/53  SpO2:  [96 %-98 %] 96 %  Body mass index is 34 75 kg/m²  Input and Output Summary (last 24 hours): Intake/Output Summary (Last 24 hours) at 8/10/2021 1111  Last data filed at 8/10/2021 1001  Gross per 24 hour   Intake 840 ml   Output 950 ml   Net -110 ml       Physical Exam:   Physical Exam  Vitals and nursing note reviewed  Constitutional:       General: She is not in acute distress  Appearance: She is well-developed  HENT:      Head: Normocephalic and atraumatic  Eyes:      Conjunctiva/sclera: Conjunctivae normal    Cardiovascular:      Rate and Rhythm: Normal rate and regular rhythm  Heart sounds: No murmur heard  Pulmonary:      Effort: Pulmonary effort is normal  No respiratory distress  Breath sounds: Normal breath sounds  Abdominal:      Palpations: Abdomen is soft  Tenderness: There is no abdominal tenderness  Musculoskeletal:         General: No swelling  Cervical back: Neck supple  Skin:     General: Skin is warm and dry     Neurological:      Mental Status: She is alert  Mental status is at baseline           Additional Data:     Labs:  Results from last 7 days   Lab Units 08/10/21  0455   WBC Thousand/uL 4 43   HEMOGLOBIN g/dL 10 0*   HEMATOCRIT % 32 3*   PLATELETS Thousands/uL 219     Results from last 7 days   Lab Units 08/10/21  0455   SODIUM mmol/L 141   POTASSIUM mmol/L 4 2   CHLORIDE mmol/L 104   CO2 mmol/L 35*   BUN mg/dL 18   CREATININE mg/dL 0 62   ANION GAP mmol/L 2*   CALCIUM mg/dL 9 1   ALBUMIN g/dL 2 4*   TOTAL BILIRUBIN mg/dL 0 30   ALK PHOS U/L 61   ALT U/L 22   AST U/L 16   GLUCOSE RANDOM mg/dL 167*         Results from last 7 days   Lab Units 08/10/21  0732 08/09/21  2109 08/09/21  1606 08/09/21  1157 08/09/21  0806 08/08/21  2059 08/08/21  1606 08/08/21  1212 08/08/21  0958 08/07/21  2124 08/07/21  1624 08/07/21  1156   POC GLUCOSE mg/dl 125 157* 134 157* 125 154* 143* 145* 108 157* 197* 155*               Lines/Drains:  Invasive Devices     Drain            External Urinary Catheter 15 days                      Imaging: Reviewed radiology reports from this admission including: chest CT scan    Recent Cultures (last 7 days):         Last 24 Hours Medication List:   Current Facility-Administered Medications   Medication Dose Route Frequency Provider Last Rate    acetaminophen  640 mg Oral Q6H PRN KRISTOPHER Alaniz      aspirin  81 mg Oral Daily KRISTOPHER Alaniz      atorvastatin  80 mg Oral Daily With [facility] KRISTOPHER Harmon      bisacodyl  10 mg Rectal Daily PRN KRISTOPHER Alaniz      divalproex sodium  250 mg Oral Q8H [facility] KRISTOPHER Ojeda      enoxaparin  40 mg Subcutaneous Q24H [facility] KRISTOPHER Ojeda      ferrous sulfate  325 mg Oral Daily With Breakfast KIRSTOPHER Alaniz      gabapentin  400 mg Oral TID KRISTOPHER Alaniz      insulin glargine  10 Units Subcutaneous HS KRISTOPHER Alaniz      insulin lispro  2-12 Units Subcutaneous HS Reynaldo Najjar, CRNP  insulin lispro  2-12 Units Subcutaneous TID AC Alva Harmon, CRNP      insulin lispro  4 Units Subcutaneous TID With Meals [facility], CRNP      ipratropium-albuterol  3 mL Nebulization Q4H PRN [facility], CRNP      LORazepam  0 5 mg Oral BID PRN [facility] Ram[facility], CRNP      ondansetron  4 mg Intravenous Q6H PRN [facility] Ram[facility], CRNP      oxybutynin  10 mg Oral Daily [facility] Ram[facility], CRNP      pantoprazole  40 mg Oral Early Morning [facility], CRNP      QUEtiapine  200 mg Oral HS [facility], CRNP      senna  1 tablet Oral HS [facility] Ramella, CRNP      sertraline  100 mg Oral HS [facility], CRNP      sodium chloride  1 spray Each Nare Q1H PRN [facility], CRNP      torsemide  10 mg Oral Daily Hussain Grayson DO          Today, Patient Was Seen By: Chrissie Stokes MD    **Please Note: This note may have been constructed using a voice recognition system  **

## 2021-08-10 NOTE — PLAN OF CARE
Problem: PHYSICAL THERAPY ADULT  Goal: Performs mobility at highest level of function for planned discharge setting  See evaluation for individualized goals  Description: Treatment/Interventions: Functional transfer training, LE strengthening/ROM, Therapeutic exercise, Endurance training, Patient/family training, Equipment eval/education, Bed mobility, Gait training, Compensatory technique education, Spoke to nursing, Spoke to case management, OT  Equipment Recommended:  (TBD by rehab)       See flowsheet documentation for full assessment, interventions and recommendations  Outcome: Progressing  Note: Prognosis: Fair  Problem List: Decreased strength, Decreased endurance, Impaired balance, Decreased mobility, Decreased cognition, Impaired judgement, Decreased safety awareness, Obesity, Decreased skin integrity  Assessment: Pt seen for PT treatment session this date with interventions consisting of gait training w/ emphasis on improving pt's ability to ambulate level surfaces x 3'x1 with max A of 2 provided by therapist with RW, Therapeutic exercise consisting of: AROM and AAROM 10 reps B LE in sitting position and therapeutic activity consisting of training: sit<>stand transfers  Pt agreeable to PT treatment session upon arrival, pt found seated OOB in recliner, in no apparent distress  In comparison to previous session, pt with improvements in ambulation distances and with technique with therex performed today  Pt continues to require manual and verbal cues for mobility and technique with therex  Angelica Ayala Post session: pt returned back to recliner, chair alarm engaged and all needs in reach Continue to recommend STR at time of d/c in order to maximize pt's functional independence and safety w/ mobility  Pt continues to be functioning below baseline level, and remains limited 2* factors listed above   PT will continue to see pt while here in order to address the deficits listed above and provide interventions consistent w/ POC in effort to achieve STGs  Barriers to Discharge: Decreased caregiver support, Inaccessible home environment  Barriers to Discharge Comments: Requires increased assistance with all mobility     PT Discharge Recommendation: Post acute rehabilitation services     PT - OK to Discharge: Yes    See flowsheet documentation for full assessment

## 2021-08-11 VITALS
RESPIRATION RATE: 21 BRPM | WEIGHT: 177.91 LBS | OXYGEN SATURATION: 98 % | DIASTOLIC BLOOD PRESSURE: 56 MMHG | BODY MASS INDEX: 34.93 KG/M2 | HEART RATE: 87 BPM | SYSTOLIC BLOOD PRESSURE: 112 MMHG | TEMPERATURE: 97.5 F | HEIGHT: 60 IN

## 2021-08-11 LAB
GLUCOSE SERPL-MCNC: 124 MG/DL (ref 65–140)
GLUCOSE SERPL-MCNC: 130 MG/DL (ref 65–140)

## 2021-08-11 PROCEDURE — 82948 REAGENT STRIP/BLOOD GLUCOSE: CPT

## 2021-08-11 PROCEDURE — 99239 HOSP IP/OBS DSCHRG MGMT >30: CPT | Performed by: FAMILY MEDICINE

## 2021-08-11 RX ORDER — ECHINACEA PURPUREA EXTRACT 125 MG
1 TABLET ORAL
Refills: 0
Start: 2021-08-11 | End: 2022-05-21

## 2021-08-11 RX ORDER — QUETIAPINE 200 MG/1
200 TABLET, FILM COATED, EXTENDED RELEASE ORAL
Refills: 0
Start: 2021-08-11

## 2021-08-11 RX ORDER — TORSEMIDE 10 MG/1
10 TABLET ORAL DAILY
Refills: 0
Start: 2021-08-12

## 2021-08-11 RX ORDER — IPRATROPIUM BROMIDE AND ALBUTEROL SULFATE 2.5; .5 MG/3ML; MG/3ML
3 SOLUTION RESPIRATORY (INHALATION) EVERY 4 HOURS PRN
Refills: 0
Start: 2021-08-11 | End: 2022-05-21

## 2021-08-11 RX ORDER — LORAZEPAM 0.5 MG/1
0.5 TABLET ORAL 2 TIMES DAILY PRN
Qty: 15 TABLET | Refills: 0 | Status: SHIPPED | OUTPATIENT
Start: 2021-08-11 | End: 2021-11-20 | Stop reason: HOSPADM

## 2021-08-11 RX ORDER — SERTRALINE HYDROCHLORIDE 100 MG/1
100 TABLET, FILM COATED ORAL
Refills: 0
Start: 2021-08-11

## 2021-08-11 RX ORDER — GABAPENTIN 400 MG/1
400 CAPSULE ORAL 3 TIMES DAILY
Refills: 0
Start: 2021-08-11 | End: 2021-11-20 | Stop reason: HOSPADM

## 2021-08-11 RX ORDER — SENNOSIDES 8.6 MG
8.6 TABLET ORAL
Refills: 0
Start: 2021-08-11

## 2021-08-11 RX ORDER — INSULIN GLARGINE 100 [IU]/ML
10 INJECTION, SOLUTION SUBCUTANEOUS
Qty: 10 ML | Refills: 0
Start: 2021-08-11 | End: 2021-11-20 | Stop reason: HOSPADM

## 2021-08-11 RX ORDER — ACETAMINOPHEN 160 MG/1
640 BAR, CHEWABLE ORAL EVERY 6 HOURS PRN
Refills: 0
Start: 2021-08-11 | End: 2022-06-02

## 2021-08-11 RX ADMIN — INSULIN LISPRO 4 UNITS: 100 INJECTION, SOLUTION INTRAVENOUS; SUBCUTANEOUS at 11:50

## 2021-08-11 RX ADMIN — ENOXAPARIN SODIUM 40 MG: 40 INJECTION SUBCUTANEOUS at 08:20

## 2021-08-11 RX ADMIN — OXYBUTYNIN 10 MG: 5 TABLET, FILM COATED, EXTENDED RELEASE ORAL at 08:20

## 2021-08-11 RX ADMIN — TORSEMIDE 10 MG: 10 TABLET ORAL at 08:20

## 2021-08-11 RX ADMIN — PANTOPRAZOLE SODIUM 40 MG: 40 TABLET, DELAYED RELEASE ORAL at 06:06

## 2021-08-11 RX ADMIN — ASPIRIN 81 MG: 81 TABLET, COATED ORAL at 08:20

## 2021-08-11 RX ADMIN — GABAPENTIN 400 MG: 400 CAPSULE ORAL at 08:20

## 2021-08-11 RX ADMIN — INSULIN LISPRO 4 UNITS: 100 INJECTION, SOLUTION INTRAVENOUS; SUBCUTANEOUS at 08:20

## 2021-08-11 RX ADMIN — FERROUS SULFATE TAB 325 MG (65 MG ELEMENTAL FE) 325 MG: 325 (65 FE) TAB at 08:20

## 2021-08-11 RX ADMIN — DIVALPROEX SODIUM 250 MG: 250 TABLET, DELAYED RELEASE ORAL at 06:06

## 2021-08-11 NOTE — CASE MANAGEMENT
Case Management Progress Note    Patient name Gordon Zaragoza  Location /-01 MRN [id number]  : 1955 Date 2021       LOS (days): 24  Geometric Mean LOS (GMLOS) (days): 4 80  Days to GMLOS:-18 9          PROGRESS NOTE:    Ready for dc:  E[facility] did not call me back yesterday for acceptance  Called [facility] and received a call back from [employer]  Reviewed case and they can accept today  Reviewed letters from MH/IDD were received, they were uploaded in [address]  Also faxed letters to  [address] at [phone] with completed PASSR and COVID results  Kaylan Tineo at [facility] for BLS transport  Await a call back with confirmation of  time  Updated Nursing confirmed transport at 1200 with [facility]  CM notified Nursing and Facility of  time  [facility]

## 2021-08-11 NOTE — PLAN OF CARE
Pt ready for d/c today to [facility]  COVID test negative  Awaiting final acceptance and transport time  Pt slept through shift without problems  Pt on 1L NC while sleeping  Problem: MOBILITY - ADULT  Goal: Maintain or return to baseline ADL function  Description: INTERVENTIONS:  -  Assess patient's ability to carry out ADLs; assess patient's baseline for ADL function and identify physical deficits which impact ability to perform ADLs (bathing, care of mouth/teeth, toileting, grooming, dressing, etc )  - Assess/evaluate cause of self-care deficits   - Assess range of motion  - Assess patient's mobility; develop plan if impaired  - Assess patient's need for assistive devices and provide as appropriate  - Encourage maximum independence but intervene and supervise when necessary  - Involve family in performance of ADLs  - Assess for home care needs following discharge   - Consider OT consult to assist with ADL evaluation and planning for discharge  - Provide patient education as appropriate  Outcome: Adequate for Discharge  Goal: Maintains/Returns to pre admission functional level  Description: INTERVENTIONS:  - Perform BMAT or MOVE assessment daily    - Set and communicate daily mobility goal to care team and patient/family/caregiver  - Collaborate with rehabilitation services on mobility goals if consulted  - Perform Range of Motion 3 times a day  - Reposition patient every 2 hours    - Dangle patient 3 times a day  - Stand patient 3 times a day  - Ambulate patient 3 times a day  - Out of bed to chair 3 times a day   - Out of bed for meals 3 times a day  - Out of bed for toileting  - Record patient progress and toleration of activity level   Outcome: Adequate for Discharge     Problem: Prexisting or High Potential for Compromised Skin Integrity  Goal: Skin integrity is maintained or improved  Description: INTERVENTIONS:  - Identify patients at risk for skin breakdown  - Assess and monitor skin integrity  - Assess and monitor nutrition and hydration status  - Monitor labs   - Assess for incontinence   - Turn and reposition patient  - Assist with mobility/ambulation  - Relieve pressure over bony prominences  - Avoid friction and shearing  - Provide appropriate hygiene as needed including keeping skin clean and dry  - Evaluate need for skin moisturizer/barrier cream  - Collaborate with interdisciplinary team   - Patient/family teaching  - Consider wound care consult   Outcome: Adequate for Discharge     Problem: Nutrition/Hydration-ADULT  Goal: Nutrient/Hydration intake appropriate for improving, restoring or maintaining nutritional needs  Description: Monitor and assess patient's nutrition/hydration status for malnutrition  Collaborate with interdisciplinary team and initiate plan and interventions as ordered  Monitor patient's weight and dietary intake as ordered or per policy  Utilize nutrition screening tool and intervene as necessary  Determine patient's food preferences and provide high-protein, high-caloric foods as appropriate       INTERVENTIONS:  - Monitor oral intake, urinary output, labs, and treatment plans  - Assess nutrition and hydration status and recommend course of action  - Evaluate amount of meals eaten  - Assist patient with eating if necessary   - Allow adequate time for meals  - Recommend/ encourage appropriate diets, oral nutritional supplements, and vitamin/mineral supplements  - Order, calculate, and assess calorie counts as needed  - Recommend, monitor, and adjust tube feedings and TPN/PPN based on assessed needs  - Assess need for intravenous fluids  - Provide specific nutrition/hydration education as appropriate  - Include patient/family/caregiver in decisions related to nutrition  Outcome: Adequate for Discharge     Problem: Potential for Falls  Goal: Patient will remain free of falls  Description: INTERVENTIONS:  - Educate patient/family on patient safety including physical limitations  - Instruct patient to call for assistance with activity   - Consult OT/PT to assist with strengthening/mobility   - Keep Call bell within reach  - Keep bed low and locked with side rails adjusted as appropriate  - Keep care items and personal belongings within reach  - Initiate and maintain comfort rounds  - Make Fall Risk Sign visible to staff  - Offer Toileting every 2 Hours, in advance of need  - Initiate/Maintain bed/chair alarm  - Obtain necessary fall risk management equipment:   - Apply yellow socks and bracelet for high fall risk patients  - Consider moving patient to room near nurses station  Outcome: Adequate for Discharge     Problem: RESPIRATORY - ADULT  Goal: Achieves optimal ventilation and oxygenation  Description: INTERVENTIONS:  - Assess for changes in respiratory status  - Assess for changes in mentation and behavior  - Position to facilitate oxygenation and minimize respiratory effort  - Oxygen administered by appropriate delivery if ordered  - Initiate smoking cessation education as indicated  - Encourage broncho-pulmonary hygiene including cough, deep breathe, Incentive Spirometry  - Assess the need for suctioning and aspirate as needed  - Assess and instruct to report SOB or any respiratory difficulty  - Respiratory Therapy support as indicated  Outcome: Adequate for Discharge     Problem: METABOLIC, FLUID AND ELECTROLYTES - ADULT  Goal: Glucose maintained within target range  Description: INTERVENTIONS:  - Monitor Blood Glucose as ordered  - Assess for signs and symptoms of hyperglycemia and hypoglycemia  - Administer ordered medications to maintain glucose within target range  - Assess nutritional intake and initiate nutrition service referral as needed  Outcome: Adequate for Discharge     Problem: PAIN - ADULT  Goal: Verbalizes/displays adequate comfort level or baseline comfort level  Description: Interventions:  - Encourage patient to monitor pain and request assistance  - Assess pain using appropriate pain scale  - Administer analgesics based on type and severity of pain and evaluate response  - Implement non-pharmacological measures as appropriate and evaluate response  - Consider cultural and social influences on pain and pain management  - Notify physician/advanced practitioner if interventions unsuccessful or patient reports new pain  Outcome: Adequate for Discharge     Problem: INFECTION - ADULT  Goal: Absence or prevention of progression during hospitalization  Description: INTERVENTIONS:  - Assess and monitor for signs and symptoms of infection  - Monitor lab/diagnostic results  - Monitor all insertion sites, i e  indwelling lines, tubes, and drains  - Monitor endotracheal if appropriate and nasal secretions for changes in amount and color  - [place] appropriate cooling/warming therapies per order  - Administer medications as ordered  - Instruct and encourage patient and family to use good hand hygiene technique  - Identify and instruct in appropriate isolation precautions for identified infection/condition  Outcome: Adequate for Discharge     Problem: SAFETY ADULT  Goal: Maintain or return to baseline ADL function  Description: INTERVENTIONS:  -  Assess patient's ability to carry out ADLs; assess patient's baseline for ADL function and identify physical deficits which impact ability to perform ADLs (bathing, care of mouth/teeth, toileting, grooming, dressing, etc )  - Assess/evaluate cause of self-care deficits   - Assess range of motion  - Assess patient's mobility; develop plan if impaired  - Assess patient's need for assistive devices and provide as appropriate  - Encourage maximum independence but intervene and supervise when necessary  - Involve family in performance of ADLs  - Assess for home care needs following discharge   - Consider OT consult to assist with ADL evaluation and planning for discharge  - Provide patient education as appropriate  Outcome: Adequate for Discharge  Goal: Maintains/Returns to pre admission functional level  Description: INTERVENTIONS:  - Perform BMAT or MOVE assessment daily    - Set and communicate daily mobility goal to care team and patient/family/caregiver  - Collaborate with rehabilitation services on mobility goals if consulted  - Perform Range of Motion 3 times a day  - Reposition patient every 2 hours    - Dangle patient 3 times a day  - Stand patient 3 times a day  - Ambulate patient 3 times a day  - Out of bed to chair 3 times a day   - Out of bed for meals 3 times a day  - Out of bed for toileting  - Record patient progress and toleration of activity level   Outcome: Adequate for Discharge  Goal: Patient will remain free of falls  Description: INTERVENTIONS:  - Educate patient/family on patient safety including physical limitations  - Instruct patient to call for assistance with activity   - Consult OT/PT to assist with strengthening/mobility   - Keep Call bell within reach  - Keep bed low and locked with side rails adjusted as appropriate  - Keep care items and personal belongings within reach  - Initiate and maintain comfort rounds  - Make Fall Risk Sign visible to staff  - Offer Toileting every 2 Hours, in advance of need  - Initiate/Maintain bed/chair alarm  - Obtain necessary fall risk management equipment:   - Apply yellow socks and bracelet for high fall risk patients  - Consider moving patient to room near nurses station  Outcome: Adequate for Discharge     Problem: DISCHARGE PLANNING  Goal: Discharge to home or other facility with appropriate resources  Description: INTERVENTIONS:  - Identify barriers to discharge w/patient and caregiver  - Arrange for needed discharge resources and transportation as appropriate  - Identify discharge learning needs (meds, wound care, etc )  - Arrange for interpretive services to assist at discharge as needed  - Refer to Case Management Department for coordinating discharge planning if the patient needs post-hospital services based on physician/advanced practitioner order or complex needs related to functional status, cognitive ability, or social support system  Outcome: Adequate for Discharge     Problem: Knowledge Deficit  Goal: Patient/family/caregiver demonstrates understanding of disease process, treatment plan, medications, and discharge instructions  Description: Complete learning assessment and assess knowledge base    Interventions:  - Provide teaching at level of understanding  - Provide teaching via preferred learning methods  Outcome: Adequate for Discharge

## 2021-08-11 NOTE — ASSESSMENT & PLAN NOTE
Lab Results   Component Value Date    HGBA1C 6 6 (H) 07/19/2021     Recent Labs     08/10/21  1115 08/10/21  1609 08/10/21  2109 08/11/21  0754   POCGLU 169* 147* 167* 124     · Stable continue glargine 10 units with lispro 4 units t i d

## 2021-08-11 NOTE — ASSESSMENT & PLAN NOTE
· Secondary to pneumonia being trialed on room air today  · In ICU did require bronchoscopy with suction of mucus plugs  · Seen by pulmonology    Will continue diuretics with low-dose torsemide- labs stable doing well on 1liter of oxygen   · CTA negative for pulmonary embolism

## 2021-08-11 NOTE — DISCHARGE SUMMARY
[address]  Discharge- Mile Winston 1955, 77 y o  female MRN: [id number]  Unit/Bed#: -01 Encounter: [id number]  Primary Care Provider: Nola Wiley MD   Date and time admitted to hospital: 7/18/2021  2:07 PM    * Sepsis due to pneumonia [facility])  Assessment & Plan  · Sepsis secondary to presumed aspiration pneumonia finished course of antibiotics with: meropenem and metronidazole; was on vancomycin  · Has been re-evaluated by speech pathologist and diet advanced to pureed and nectar thickened liquids    Intellectual disability  Assessment & Plan  · Nonverbal at baseline resides in group home    Ambulatory dysfunction  Assessment & Plan  · DC to SN[facility]  ·     Seizure disorder (HCC)  Assessment & Plan  · Continue depakote and gabapentin    Obesity (BMI 30 0-34  9)  Assessment & Plan  · Body mass index is 34 75 kg/m²  Acute respiratory failure with hypoxia [facility])  Assessment & Plan  · Secondary to pneumonia being trialed on room air today  · In ICU did require bronchoscopy with suction of mucus plugs  · Seen by pulmonology    Will continue diuretics with low-dose torsemide- labs stable doing well on 1liter of oxygen   · CTA negative for pulmonary embolism    Type 2 diabetes mellitus without complication, without long-term current use of insulin [facility])  Assessment & Plan  Lab Results   Component Value Date    HGBA1C 6 6 (H) 07/19/2021     Recent Labs     08/10/21  1115 08/10/21  1609 08/10/21  2109 08/11/21  0754   POCGLU 169* 147* 167* 124     · Stable continue glargine 10 units with lispro 4 units t i d     Mood disorder (HCC)  Assessment & Plan  · Mood disorder continue depakote gabapentin quetiapine lorazepam sertraline        Medical Problems     Resolved Problems  Date Reviewed: 8/11/2021    None              Discharging Physician / Practitioner: James Vides MD  PCP: Nola Wiley MD  Admission Date:   Admission Orders (From admission, onward) Ordered        07/18/21 1705  Inpatient Admission  Once                   Discharge Date: 08/11/21    Consultations During Hospital Stay:  · Pulmonary  · Critical care  · Speech therapy    Procedures Performed:   Bronchoscopy at bedside-Extensive Mucous plugging in RLL and one of divisions of RML  LLL no mucous plugging observed  Patient did have vigorous cough with instillation of topical lidocaine which may have cleared LLL plugging  ETT recruitment maneurver successful  Lung visually recruited under ultrasound      Significant Findings / Test Results:   · Ct chest w/o contrast 7/18-Significant elevation of the right hemidiaphragm with volume loss and consolidation involving the right middle lobe and right lower lobe with appearance favoring atelectasis  Further detail limited by both patient motion and lack of contrast      Dilated central pulmonary arteries as above    cta chest  7/20-No central pulmonary emboli identified      · Significant interval worsening in airspace consolidation and patchy infiltrates involving the right upper lobe to the greatest degree posterior segment but also elsewhere within the right upper lobe  There is also interval worsening of the consolidation   and volume loss in the dependent lower lobes bilaterally  Also component of chronic volume loss related to elevated right hemidiaphragm      Focally dilated main pulmonary artery  Assess for pulmonary valve disease  · Venous duplex is negative  · Ct chest w/o contrast 7/26-No endobronchial lesions with well aerated right middle lobe      Redemonstration of multifocal bilateral pneumonia, improving in the superior segment right lower lobe and worsening in both upper lobes      Mild interstitial edema      Moderate bilateral lower lobe atelectasis, improving on the right and worsening on the left      Small pleural effusions, slightly increased on the right and new on the left    · Video swallow with speech negative for silent aspiration  · 2D echo- lb EF 60%  Left ventricular diastolic function parameters were normal  Incidental Findings:   · See above     Test Results Pending at Discharge (will require follow up): · None     Outpatient Tests Requested:  · None    Complications: Worsening hypoxia    Reason for Admission:  Sepsis secondary to aspiration pneumonia    Hospital Course:   Kaushik Andrade is a 77 y o  female patient who originally presented to the hospital on 7/18/2021 due to sepsis secondary to aspiration pneumonia as evidenced on the CT compliant with cough started on IV antibiotics with pulmonary toileting  Throughout the hospital stay patient decompensated with worsening hypoxia worsening pneumonia and was transferred to critical care service she had an awake bronchoscopy done which revealed mucous plugging which have removed  Patient also found to have effusions diuresed as well very well and eventually placed on low-dose torsemide doing well patient completed 7 day course of antibiotics 2D echo with normal systolic and diastolic function  Patient eventually has come down to 1 L of oxygen and had a video swallow with speech without any evidence of silent aspiration and she continued to do well with dysphagia pureed nectar thick diet  Patient evaluated by PT OT felt needs to be placed into SNF for rehab for which she was accepted to [facility] will be discharged there        Please see above list of diagnoses and related plan for additional information       Condition at Discharge: stable    Discharge Day Visit / Exam:   Subjective:  Patient seen and examined no overnight events nonverbal  Vitals: Blood Pressure: 112/56 (08/11/21 0700)  Pulse: 87 (08/11/21 0700)  Temperature: 97 5 °F (36 4 °C) (08/11/21 0700)  Temp Source: Temporal (08/11/21 0700)  Respirations: 21 (08/11/21 0700)  Height: 5' (152 4 cm) (07/28/21 0856)  Weight - Scale: 80 7 kg (177 lb 14 6 oz) (08/06/21 0600)  SpO2: 98 % (08/11/21 0700)  Exam: Physical Exam  Vitals and nursing note reviewed  Constitutional:       General: She is not in acute distress  Appearance: She is well-developed  HENT:      Head: Normocephalic and atraumatic  Eyes:      Conjunctiva/sclera: Conjunctivae normal    Cardiovascular:      Rate and Rhythm: Normal rate and regular rhythm  Heart sounds: No murmur heard  Pulmonary:      Effort: Pulmonary effort is normal  No respiratory distress  Breath sounds: No wheezing or rales  Comments: Poor effort there is decrease in breath sounds  Abdominal:      General: There is no distension  Palpations: Abdomen is soft  Tenderness: There is no abdominal tenderness  Musculoskeletal:         General: No swelling  Cervical back: Neck supple  Skin:     General: Skin is warm and dry  Neurological:      Mental Status: She is alert  Mental status is at baseline  Comments: Nonverbal but follows commands   Psychiatric:         Mood and Affect: Mood normal           Discussion with Family: Updated  (brother) via phone  Discharge instructions/Information to patient and family:   See after visit summary for information provided to patient and family  Provisions for Follow-Up Care:  See after visit summary for information related to follow-up care and any pertinent home health orders  Disposition:   [facility] Readmission: no     Discharge Statement:  I spent >35 minutes discharging the patient  This time was spent on the day of discharge  I had direct contact with the patient on the day of discharge  Greater than 50% of the total time was spent examining patient, answering all patient questions, arranging and discussing plan of care with patient as well as directly providing post-discharge instructions  Additional time then spent on discharge activities      Discharge Medications:  See after visit summary for reconciled discharge medications provided to patient and/or family        **Please Note: This note may have been constructed using a voice recognition system**

## 2021-11-06 ENCOUNTER — HOSPITAL ENCOUNTER (INPATIENT)
Facility: HOSPITAL | Age: 66
LOS: 7 days | DRG: 871 | End: 2021-11-13
Attending: EMERGENCY MEDICINE | Admitting: FAMILY MEDICINE
Payer: MEDICARE

## 2021-11-06 ENCOUNTER — APPOINTMENT (EMERGENCY)
Dept: RADIOLOGY | Facility: HOSPITAL | Age: 66
DRG: 871 | End: 2021-11-06
Payer: MEDICARE

## 2021-11-06 DIAGNOSIS — J69.0 ASPIRATION PNEUMONIA (HCC): ICD-10-CM

## 2021-11-06 DIAGNOSIS — R09.02 HYPOXIA: Primary | ICD-10-CM

## 2021-11-06 DIAGNOSIS — J18.9 SEPSIS DUE TO PNEUMONIA (HCC): ICD-10-CM

## 2021-11-06 DIAGNOSIS — U07.1 COVID-19: ICD-10-CM

## 2021-11-06 DIAGNOSIS — A41.9 SEPSIS DUE TO PNEUMONIA (HCC): ICD-10-CM

## 2021-11-06 PROBLEM — R13.10 DYSPHAGIA: Status: ACTIVE | Noted: 2021-11-06

## 2021-11-06 PROBLEM — E87.1 HYPONATREMIA: Status: ACTIVE | Noted: 2021-11-06

## 2021-11-06 LAB
ALBUMIN SERPL BCP-MCNC: 3.4 G/DL (ref 3.5–5)
ALP SERPL-CCNC: 58 U/L (ref 46–116)
ALT SERPL W P-5'-P-CCNC: 17 U/L (ref 12–78)
ANION GAP SERPL CALCULATED.3IONS-SCNC: 11 MMOL/L (ref 4–13)
AST SERPL W P-5'-P-CCNC: 23 U/L (ref 5–45)
BASOPHILS # BLD AUTO: 0.02 THOUSANDS/ΜL (ref 0–0.1)
BASOPHILS NFR BLD AUTO: 0 % (ref 0–1)
BILIRUB SERPL-MCNC: 0.48 MG/DL (ref 0.2–1)
BUN SERPL-MCNC: 16 MG/DL (ref 5–25)
CALCIUM ALBUM COR SERPL-MCNC: 9.4 MG/DL (ref 8.3–10.1)
CALCIUM SERPL-MCNC: 8.9 MG/DL (ref 8.3–10.1)
CHLORIDE SERPL-SCNC: 87 MMOL/L (ref 100–108)
CO2 SERPL-SCNC: 29 MMOL/L (ref 21–32)
CREAT SERPL-MCNC: 1.02 MG/DL (ref 0.6–1.3)
EOSINOPHIL # BLD AUTO: 0.01 THOUSAND/ΜL (ref 0–0.61)
EOSINOPHIL NFR BLD AUTO: 0 % (ref 0–6)
ERYTHROCYTE [DISTWIDTH] IN BLOOD BY AUTOMATED COUNT: 16.7 % (ref 11.6–15.1)
FLUAV RNA RESP QL NAA+PROBE: NEGATIVE
FLUBV RNA RESP QL NAA+PROBE: NEGATIVE
GFR SERPL CREATININE-BSD FRML MDRD: 57 ML/MIN/1.73SQ M
GLUCOSE SERPL-MCNC: 283 MG/DL (ref 65–140)
HCT VFR BLD AUTO: 33 % (ref 34.8–46.1)
HGB BLD-MCNC: 10.5 G/DL (ref 11.5–15.4)
IMM GRANULOCYTES # BLD AUTO: 0.04 THOUSAND/UL (ref 0–0.2)
IMM GRANULOCYTES NFR BLD AUTO: 1 % (ref 0–2)
LYMPHOCYTES # BLD AUTO: 0.71 THOUSANDS/ΜL (ref 0.6–4.47)
LYMPHOCYTES NFR BLD AUTO: 11 % (ref 14–44)
MCH RBC QN AUTO: 28.8 PG (ref 26.8–34.3)
MCHC RBC AUTO-ENTMCNC: 31.8 G/DL (ref 31.4–37.4)
MCV RBC AUTO: 91 FL (ref 82–98)
MONOCYTES # BLD AUTO: 0.6 THOUSAND/ΜL (ref 0.17–1.22)
MONOCYTES NFR BLD AUTO: 9 % (ref 4–12)
NEUTROPHILS # BLD AUTO: 5.32 THOUSANDS/ΜL (ref 1.85–7.62)
NEUTS SEG NFR BLD AUTO: 79 % (ref 43–75)
NRBC BLD AUTO-RTO: 0 /100 WBCS
NT-PROBNP SERPL-MCNC: 176 PG/ML
PLATELET # BLD AUTO: 162 THOUSANDS/UL (ref 149–390)
PMV BLD AUTO: 9.7 FL (ref 8.9–12.7)
POTASSIUM SERPL-SCNC: 4.1 MMOL/L (ref 3.5–5.3)
PROT SERPL-MCNC: 7.5 G/DL (ref 6.4–8.2)
RBC # BLD AUTO: 3.64 MILLION/UL (ref 3.81–5.12)
RSV RNA RESP QL NAA+PROBE: NEGATIVE
SARS-COV-2 RNA RESP QL NAA+PROBE: POSITIVE
SODIUM SERPL-SCNC: 127 MMOL/L (ref 136–145)
TROPONIN I SERPL-MCNC: <0.02 NG/ML
VALPROATE SERPL-MCNC: 72 UG/ML (ref 50–100)
WBC # BLD AUTO: 6.7 THOUSAND/UL (ref 4.31–10.16)

## 2021-11-06 PROCEDURE — 99285 EMERGENCY DEPT VISIT HI MDM: CPT | Performed by: EMERGENCY MEDICINE

## 2021-11-06 PROCEDURE — 85025 COMPLETE CBC W/AUTO DIFF WBC: CPT | Performed by: EMERGENCY MEDICINE

## 2021-11-06 PROCEDURE — 82948 REAGENT STRIP/BLOOD GLUCOSE: CPT

## 2021-11-06 PROCEDURE — 83880 ASSAY OF NATRIURETIC PEPTIDE: CPT | Performed by: EMERGENCY MEDICINE

## 2021-11-06 PROCEDURE — 71046 X-RAY EXAM CHEST 2 VIEWS: CPT

## 2021-11-06 PROCEDURE — 87040 BLOOD CULTURE FOR BACTERIA: CPT | Performed by: EMERGENCY MEDICINE

## 2021-11-06 PROCEDURE — 36415 COLL VENOUS BLD VENIPUNCTURE: CPT | Performed by: EMERGENCY MEDICINE

## 2021-11-06 PROCEDURE — 84484 ASSAY OF TROPONIN QUANT: CPT | Performed by: EMERGENCY MEDICINE

## 2021-11-06 PROCEDURE — 93005 ELECTROCARDIOGRAM TRACING: CPT

## 2021-11-06 PROCEDURE — 99285 EMERGENCY DEPT VISIT HI MDM: CPT

## 2021-11-06 PROCEDURE — 99222 1ST HOSP IP/OBS MODERATE 55: CPT | Performed by: PHYSICIAN ASSISTANT

## 2021-11-06 PROCEDURE — 80053 COMPREHEN METABOLIC PANEL: CPT | Performed by: EMERGENCY MEDICINE

## 2021-11-06 PROCEDURE — 80164 ASSAY DIPROPYLACETIC ACD TOT: CPT | Performed by: PHYSICIAN ASSISTANT

## 2021-11-06 PROCEDURE — 96374 THER/PROPH/DIAG INJ IV PUSH: CPT

## 2021-11-06 PROCEDURE — 94640 AIRWAY INHALATION TREATMENT: CPT

## 2021-11-06 PROCEDURE — 0241U HB NFCT DS VIR RESP RNA 4 TRGT: CPT | Performed by: EMERGENCY MEDICINE

## 2021-11-06 RX ORDER — CEFEPIME HYDROCHLORIDE 2 G/50ML
2000 INJECTION, SOLUTION INTRAVENOUS ONCE
Status: COMPLETED | OUTPATIENT
Start: 2021-11-06 | End: 2021-11-06

## 2021-11-06 RX ORDER — SULFAMETHOXAZOLE AND TRIMETHOPRIM 800; 160 MG/1; MG/1
1 TABLET ORAL EVERY 12 HOURS SCHEDULED
COMMUNITY
End: 2021-11-20 | Stop reason: HOSPADM

## 2021-11-06 RX ORDER — FUROSEMIDE 10 MG/ML
20 INJECTION INTRAMUSCULAR; INTRAVENOUS ONCE
Status: DISCONTINUED | OUTPATIENT
Start: 2021-11-06 | End: 2021-11-06

## 2021-11-06 RX ORDER — IPRATROPIUM BROMIDE AND ALBUTEROL SULFATE 2.5; .5 MG/3ML; MG/3ML
3 SOLUTION RESPIRATORY (INHALATION) ONCE
Status: COMPLETED | OUTPATIENT
Start: 2021-11-06 | End: 2021-11-06

## 2021-11-06 RX ORDER — FERROUS SULFATE 220 (44)/5
220 ELIXIR ORAL DAILY
COMMUNITY
End: 2021-11-20 | Stop reason: HOSPADM

## 2021-11-06 RX ORDER — FUROSEMIDE 10 MG/ML
40 INJECTION INTRAMUSCULAR; INTRAVENOUS ONCE
Status: COMPLETED | OUTPATIENT
Start: 2021-11-06 | End: 2021-11-06

## 2021-11-06 RX ORDER — GLIPIZIDE AND METFORMIN HCL 2.5; 5 MG/1; MG/1
1 TABLET, FILM COATED ORAL
COMMUNITY
End: 2021-11-20 | Stop reason: HOSPADM

## 2021-11-06 RX ORDER — AMPICILLIN TRIHYDRATE 500 MG
CAPSULE ORAL
COMMUNITY

## 2021-11-07 ENCOUNTER — APPOINTMENT (INPATIENT)
Dept: CT IMAGING | Facility: HOSPITAL | Age: 66
DRG: 871 | End: 2021-11-07
Payer: MEDICARE

## 2021-11-07 LAB
ANION GAP SERPL CALCULATED.3IONS-SCNC: 5 MMOL/L (ref 4–13)
ATRIAL RATE: 103 BPM
ATRIAL RATE: 89 BPM
BASOPHILS # BLD AUTO: 0.01 THOUSANDS/ΜL (ref 0–0.1)
BASOPHILS NFR BLD AUTO: 0 % (ref 0–1)
BILIRUB UR QL STRIP: NEGATIVE
BUN SERPL-MCNC: 13 MG/DL (ref 5–25)
CALCIUM SERPL-MCNC: 8.6 MG/DL (ref 8.3–10.1)
CHLORIDE SERPL-SCNC: 90 MMOL/L (ref 100–108)
CK SERPL-CCNC: 68 U/L (ref 26–192)
CLARITY UR: CLEAR
CO2 SERPL-SCNC: 35 MMOL/L (ref 21–32)
COLOR UR: YELLOW
CREAT SERPL-MCNC: 0.74 MG/DL (ref 0.6–1.3)
D DIMER PPP FEU-MCNC: 0.56 UG/ML FEU
EOSINOPHIL # BLD AUTO: 0.01 THOUSAND/ΜL (ref 0–0.61)
EOSINOPHIL NFR BLD AUTO: 0 % (ref 0–6)
ERYTHROCYTE [DISTWIDTH] IN BLOOD BY AUTOMATED COUNT: 16.7 % (ref 11.6–15.1)
GFR SERPL CREATININE-BSD FRML MDRD: 84 ML/MIN/1.73SQ M
GLUCOSE SERPL-MCNC: 115 MG/DL (ref 65–140)
GLUCOSE SERPL-MCNC: 118 MG/DL (ref 65–140)
GLUCOSE SERPL-MCNC: 140 MG/DL (ref 65–140)
GLUCOSE SERPL-MCNC: 141 MG/DL (ref 65–140)
GLUCOSE SERPL-MCNC: 282 MG/DL (ref 65–140)
GLUCOSE UR STRIP-MCNC: NEGATIVE MG/DL
HCT VFR BLD AUTO: 28.4 % (ref 34.8–46.1)
HGB BLD-MCNC: 9.3 G/DL (ref 11.5–15.4)
HGB UR QL STRIP.AUTO: NEGATIVE
IMM GRANULOCYTES # BLD AUTO: 0.03 THOUSAND/UL (ref 0–0.2)
IMM GRANULOCYTES NFR BLD AUTO: 1 % (ref 0–2)
KETONES UR STRIP-MCNC: NEGATIVE MG/DL
L PNEUMO1 AG UR QL IA.RAPID: NEGATIVE
LACTATE SERPL-SCNC: 0.9 MMOL/L (ref 0.5–2)
LEUKOCYTE ESTERASE UR QL STRIP: NEGATIVE
LYMPHOCYTES # BLD AUTO: 0.83 THOUSANDS/ΜL (ref 0.6–4.47)
LYMPHOCYTES NFR BLD AUTO: 15 % (ref 14–44)
MCH RBC QN AUTO: 29.1 PG (ref 26.8–34.3)
MCHC RBC AUTO-ENTMCNC: 32.7 G/DL (ref 31.4–37.4)
MCV RBC AUTO: 89 FL (ref 82–98)
MONOCYTES # BLD AUTO: 0.69 THOUSAND/ΜL (ref 0.17–1.22)
MONOCYTES NFR BLD AUTO: 13 % (ref 4–12)
NEUTROPHILS # BLD AUTO: 3.93 THOUSANDS/ΜL (ref 1.85–7.62)
NEUTS SEG NFR BLD AUTO: 71 % (ref 43–75)
NITRITE UR QL STRIP: NEGATIVE
NRBC BLD AUTO-RTO: 0 /100 WBCS
P AXIS: 43 DEGREES
P AXIS: 50 DEGREES
PH UR STRIP.AUTO: 7 [PH]
PLATELET # BLD AUTO: 147 THOUSANDS/UL (ref 149–390)
PMV BLD AUTO: 9.6 FL (ref 8.9–12.7)
POTASSIUM SERPL-SCNC: 3.3 MMOL/L (ref 3.5–5.3)
PR INTERVAL: 144 MS
PR INTERVAL: 150 MS
PROCALCITONIN SERPL-MCNC: <0.05 NG/ML
PROT UR STRIP-MCNC: NEGATIVE MG/DL
QRS AXIS: 40 DEGREES
QRS AXIS: 46 DEGREES
QRSD INTERVAL: 86 MS
QRSD INTERVAL: 96 MS
QT INTERVAL: 326 MS
QT INTERVAL: 382 MS
QTC INTERVAL: 427 MS
QTC INTERVAL: 464 MS
RBC # BLD AUTO: 3.2 MILLION/UL (ref 3.81–5.12)
S PNEUM AG UR QL: NEGATIVE
SODIUM SERPL-SCNC: 130 MMOL/L (ref 136–145)
SP GR UR STRIP.AUTO: 1.01 (ref 1–1.03)
T WAVE AXIS: 74 DEGREES
T WAVE AXIS: 80 DEGREES
UROBILINOGEN UR QL STRIP.AUTO: 0.2 E.U./DL
VENTRICULAR RATE: 103 BPM
VENTRICULAR RATE: 89 BPM
WBC # BLD AUTO: 5.5 THOUSAND/UL (ref 4.31–10.16)

## 2021-11-07 PROCEDURE — 83605 ASSAY OF LACTIC ACID: CPT | Performed by: PHYSICIAN ASSISTANT

## 2021-11-07 PROCEDURE — G1004 CDSM NDSC: HCPCS

## 2021-11-07 PROCEDURE — 94760 N-INVAS EAR/PLS OXIMETRY 1: CPT

## 2021-11-07 PROCEDURE — 87449 NOS EACH ORGANISM AG IA: CPT | Performed by: PHYSICIAN ASSISTANT

## 2021-11-07 PROCEDURE — 93005 ELECTROCARDIOGRAM TRACING: CPT

## 2021-11-07 PROCEDURE — 82948 REAGENT STRIP/BLOOD GLUCOSE: CPT

## 2021-11-07 PROCEDURE — 71250 CT THORAX DX C-: CPT

## 2021-11-07 PROCEDURE — XW033E5 INTRODUCTION OF REMDESIVIR ANTI-INFECTIVE INTO PERIPHERAL VEIN, PERCUTANEOUS APPROACH, NEW TECHNOLOGY GROUP 5: ICD-10-PCS | Performed by: INTERNAL MEDICINE

## 2021-11-07 PROCEDURE — 82550 ASSAY OF CK (CPK): CPT | Performed by: PHYSICIAN ASSISTANT

## 2021-11-07 PROCEDURE — 85379 FIBRIN DEGRADATION QUANT: CPT | Performed by: PHYSICIAN ASSISTANT

## 2021-11-07 PROCEDURE — 84145 PROCALCITONIN (PCT): CPT | Performed by: PHYSICIAN ASSISTANT

## 2021-11-07 PROCEDURE — 80048 BASIC METABOLIC PNL TOTAL CA: CPT | Performed by: FAMILY MEDICINE

## 2021-11-07 PROCEDURE — 81003 URINALYSIS AUTO W/O SCOPE: CPT | Performed by: PHYSICIAN ASSISTANT

## 2021-11-07 PROCEDURE — 99232 SBSQ HOSP IP/OBS MODERATE 35: CPT | Performed by: FAMILY MEDICINE

## 2021-11-07 PROCEDURE — 85025 COMPLETE CBC W/AUTO DIFF WBC: CPT | Performed by: FAMILY MEDICINE

## 2021-11-07 RX ORDER — DIVALPROEX SODIUM 125 MG/1
250 CAPSULE, COATED PELLETS ORAL EVERY MORNING
Status: DISCONTINUED | OUTPATIENT
Start: 2021-11-07 | End: 2021-11-13 | Stop reason: HOSPADM

## 2021-11-07 RX ORDER — ONDANSETRON 2 MG/ML
4 INJECTION INTRAMUSCULAR; INTRAVENOUS EVERY 6 HOURS PRN
Status: DISCONTINUED | OUTPATIENT
Start: 2021-11-07 | End: 2021-11-13 | Stop reason: HOSPADM

## 2021-11-07 RX ORDER — DEXAMETHASONE SODIUM PHOSPHATE 4 MG/ML
6 INJECTION, SOLUTION INTRA-ARTICULAR; INTRALESIONAL; INTRAMUSCULAR; INTRAVENOUS; SOFT TISSUE EVERY 24 HOURS
Status: DISCONTINUED | OUTPATIENT
Start: 2021-11-07 | End: 2021-11-11

## 2021-11-07 RX ORDER — DIVALPROEX SODIUM 500 MG/1
500 TABLET, DELAYED RELEASE ORAL
Status: DISCONTINUED | OUTPATIENT
Start: 2021-11-07 | End: 2021-11-07

## 2021-11-07 RX ORDER — ASPIRIN 81 MG/1
81 TABLET, CHEWABLE ORAL DAILY
Status: DISCONTINUED | OUTPATIENT
Start: 2021-11-07 | End: 2021-11-13 | Stop reason: HOSPADM

## 2021-11-07 RX ORDER — DIVALPROEX SODIUM 125 MG/1
500 CAPSULE, COATED PELLETS ORAL
Status: DISCONTINUED | OUTPATIENT
Start: 2021-11-07 | End: 2021-11-13 | Stop reason: HOSPADM

## 2021-11-07 RX ORDER — SENNOSIDES 8.6 MG
1 TABLET ORAL
Status: DISCONTINUED | OUTPATIENT
Start: 2021-11-07 | End: 2021-11-13 | Stop reason: HOSPADM

## 2021-11-07 RX ORDER — INSULIN GLARGINE 100 [IU]/ML
10 INJECTION, SOLUTION SUBCUTANEOUS
Status: DISCONTINUED | OUTPATIENT
Start: 2021-11-07 | End: 2021-11-13 | Stop reason: HOSPADM

## 2021-11-07 RX ORDER — PANTOPRAZOLE SODIUM 20 MG/1
20 TABLET, DELAYED RELEASE ORAL
Status: DISCONTINUED | OUTPATIENT
Start: 2021-11-07 | End: 2021-11-13 | Stop reason: HOSPADM

## 2021-11-07 RX ORDER — DIVALPROEX SODIUM 250 MG/1
250 TABLET, DELAYED RELEASE ORAL EVERY MORNING
Status: DISCONTINUED | OUTPATIENT
Start: 2021-11-07 | End: 2021-11-07

## 2021-11-07 RX ORDER — LORAZEPAM 0.5 MG/1
0.5 TABLET ORAL 2 TIMES DAILY PRN
Status: DISCONTINUED | OUTPATIENT
Start: 2021-11-07 | End: 2021-11-13 | Stop reason: HOSPADM

## 2021-11-07 RX ORDER — TORSEMIDE 20 MG/1
10 TABLET ORAL DAILY
Status: DISCONTINUED | OUTPATIENT
Start: 2021-11-07 | End: 2021-11-07

## 2021-11-07 RX ORDER — ASPIRIN 81 MG/1
81 TABLET ORAL DAILY
Status: DISCONTINUED | OUTPATIENT
Start: 2021-11-07 | End: 2021-11-07

## 2021-11-07 RX ORDER — ATORVASTATIN CALCIUM 40 MG/1
80 TABLET, FILM COATED ORAL
Status: DISCONTINUED | OUTPATIENT
Start: 2021-11-07 | End: 2021-11-13 | Stop reason: HOSPADM

## 2021-11-07 RX ORDER — POTASSIUM CHLORIDE 20 MEQ/1
20 TABLET, EXTENDED RELEASE ORAL ONCE
Status: COMPLETED | OUTPATIENT
Start: 2021-11-07 | End: 2021-11-07

## 2021-11-07 RX ORDER — QUETIAPINE FUMARATE 100 MG/1
100 TABLET, FILM COATED ORAL 2 TIMES DAILY
Status: DISCONTINUED | OUTPATIENT
Start: 2021-11-07 | End: 2021-11-13 | Stop reason: HOSPADM

## 2021-11-07 RX ORDER — CEFEPIME HYDROCHLORIDE 2 G/50ML
2000 INJECTION, SOLUTION INTRAVENOUS EVERY 12 HOURS
Status: DISCONTINUED | OUTPATIENT
Start: 2021-11-07 | End: 2021-11-09

## 2021-11-07 RX ORDER — QUETIAPINE FUMARATE 200 MG/1
200 TABLET, FILM COATED ORAL
Status: DISCONTINUED | OUTPATIENT
Start: 2021-11-07 | End: 2021-11-13 | Stop reason: HOSPADM

## 2021-11-07 RX ORDER — DOXYCYCLINE HYCLATE 50 MG/1
324 CAPSULE, GELATIN COATED ORAL
Status: DISCONTINUED | OUTPATIENT
Start: 2021-11-07 | End: 2021-11-13 | Stop reason: HOSPADM

## 2021-11-07 RX ORDER — SENNOSIDES 8.6 MG
8.6 TABLET ORAL
Status: DISCONTINUED | OUTPATIENT
Start: 2021-11-07 | End: 2021-11-07 | Stop reason: SDUPTHER

## 2021-11-07 RX ORDER — ECHINACEA PURPUREA EXTRACT 125 MG
1 TABLET ORAL
Status: DISCONTINUED | OUTPATIENT
Start: 2021-11-07 | End: 2021-11-13 | Stop reason: HOSPADM

## 2021-11-07 RX ORDER — SERTRALINE HYDROCHLORIDE 100 MG/1
100 TABLET, FILM COATED ORAL 2 TIMES DAILY
Status: DISCONTINUED | OUTPATIENT
Start: 2021-11-07 | End: 2021-11-13 | Stop reason: HOSPADM

## 2021-11-07 RX ORDER — GABAPENTIN 400 MG/1
800 CAPSULE ORAL 3 TIMES DAILY
Status: DISCONTINUED | OUTPATIENT
Start: 2021-11-07 | End: 2021-11-13 | Stop reason: HOSPADM

## 2021-11-07 RX ORDER — ACETAMINOPHEN 325 MG/1
650 TABLET ORAL EVERY 6 HOURS PRN
Status: DISCONTINUED | OUTPATIENT
Start: 2021-11-07 | End: 2021-11-13 | Stop reason: HOSPADM

## 2021-11-07 RX ADMIN — DEXAMETHASONE SODIUM PHOSPHATE 6 MG: 4 INJECTION INTRA-ARTICULAR; INTRALESIONAL; INTRAMUSCULAR; INTRAVENOUS; SOFT TISSUE at 04:23

## 2021-11-07 RX ADMIN — CEFEPIME HYDROCHLORIDE 2000 MG: 2 INJECTION, SOLUTION INTRAVENOUS at 10:21

## 2021-11-07 RX ADMIN — FERROUS GLUCONATE 324 MG: 324 TABLET ORAL at 08:33

## 2021-11-07 RX ADMIN — QUETIAPINE FUMARATE 100 MG: 100 TABLET ORAL at 08:34

## 2021-11-07 RX ADMIN — TORSEMIDE 10 MG: 20 TABLET ORAL at 08:33

## 2021-11-07 RX ADMIN — INSULIN LISPRO 2 UNITS: 100 INJECTION, SOLUTION INTRAVENOUS; SUBCUTANEOUS at 12:06

## 2021-11-07 RX ADMIN — INSULIN LISPRO 4 UNITS: 100 INJECTION, SOLUTION INTRAVENOUS; SUBCUTANEOUS at 09:04

## 2021-11-07 RX ADMIN — ENOXAPARIN SODIUM 30 MG: 30 INJECTION SUBCUTANEOUS at 22:00

## 2021-11-07 RX ADMIN — REMDESIVIR 200 MG: 100 INJECTION, POWDER, LYOPHILIZED, FOR SOLUTION INTRAVENOUS at 04:23

## 2021-11-07 RX ADMIN — DIVALPROEX SODIUM 500 MG: 125 CAPSULE, COATED PELLETS ORAL at 21:54

## 2021-11-07 RX ADMIN — POTASSIUM CHLORIDE 20 MEQ: 1500 TABLET, EXTENDED RELEASE ORAL at 12:27

## 2021-11-07 RX ADMIN — INSULIN LISPRO 4 UNITS: 100 INJECTION, SOLUTION INTRAVENOUS; SUBCUTANEOUS at 16:18

## 2021-11-07 RX ADMIN — SERTRALINE HYDROCHLORIDE 100 MG: 100 TABLET ORAL at 21:54

## 2021-11-07 RX ADMIN — GABAPENTIN 800 MG: 400 CAPSULE ORAL at 16:18

## 2021-11-07 RX ADMIN — ACETAMINOPHEN 650 MG: 325 TABLET, FILM COATED ORAL at 16:18

## 2021-11-07 RX ADMIN — INSULIN LISPRO 4 UNITS: 100 INJECTION, SOLUTION INTRAVENOUS; SUBCUTANEOUS at 12:06

## 2021-11-07 RX ADMIN — ATORVASTATIN CALCIUM 80 MG: 40 TABLET, FILM COATED ORAL at 16:18

## 2021-11-07 RX ADMIN — GABAPENTIN 800 MG: 400 CAPSULE ORAL at 08:34

## 2021-11-07 RX ADMIN — QUETIAPINE FUMARATE 100 MG: 100 TABLET ORAL at 16:18

## 2021-11-07 RX ADMIN — PANTOPRAZOLE SODIUM 20 MG: 20 TABLET, DELAYED RELEASE ORAL at 05:27

## 2021-11-07 RX ADMIN — ACETAMINOPHEN 650 MG: 325 TABLET, FILM COATED ORAL at 21:54

## 2021-11-07 RX ADMIN — QUETIAPINE FUMARATE 200 MG: 200 TABLET ORAL at 21:54

## 2021-11-07 RX ADMIN — INSULIN GLARGINE 10 UNITS: 100 INJECTION, SOLUTION SUBCUTANEOUS at 22:00

## 2021-11-07 RX ADMIN — SERTRALINE HYDROCHLORIDE 100 MG: 100 TABLET ORAL at 08:33

## 2021-11-07 RX ADMIN — CEFEPIME HYDROCHLORIDE 2000 MG: 2 INJECTION, SOLUTION INTRAVENOUS at 22:01

## 2021-11-07 RX ADMIN — DIVALPROEX SODIUM 250 MG: 125 CAPSULE, COATED PELLETS ORAL at 08:34

## 2021-11-07 RX ADMIN — ASPIRIN 81 MG CHEWABLE TABLET 81 MG: 81 TABLET CHEWABLE at 08:33

## 2021-11-07 RX ADMIN — ACETAMINOPHEN 650 MG: 325 TABLET, FILM COATED ORAL at 00:30

## 2021-11-07 RX ADMIN — ENOXAPARIN SODIUM 30 MG: 30 INJECTION SUBCUTANEOUS at 08:33

## 2021-11-07 RX ADMIN — GABAPENTIN 800 MG: 400 CAPSULE ORAL at 21:54

## 2021-11-08 LAB
ALBUMIN SERPL BCP-MCNC: 2.8 G/DL (ref 3.5–5)
ALP SERPL-CCNC: 44 U/L (ref 46–116)
ALT SERPL W P-5'-P-CCNC: 15 U/L (ref 12–78)
ANION GAP SERPL CALCULATED.3IONS-SCNC: 3 MMOL/L (ref 4–13)
AST SERPL W P-5'-P-CCNC: 29 U/L (ref 5–45)
BASOPHILS # BLD AUTO: 0.01 THOUSANDS/ΜL (ref 0–0.1)
BASOPHILS NFR BLD AUTO: 0 % (ref 0–1)
BILIRUB SERPL-MCNC: 0.54 MG/DL (ref 0.2–1)
BUN SERPL-MCNC: 15 MG/DL (ref 5–25)
CALCIUM ALBUM COR SERPL-MCNC: 10.3 MG/DL (ref 8.3–10.1)
CALCIUM SERPL-MCNC: 9.3 MG/DL (ref 8.3–10.1)
CHLORIDE SERPL-SCNC: 93 MMOL/L (ref 100–108)
CO2 SERPL-SCNC: 38 MMOL/L (ref 21–32)
CREAT SERPL-MCNC: 0.6 MG/DL (ref 0.6–1.3)
EOSINOPHIL # BLD AUTO: 0.01 THOUSAND/ΜL (ref 0–0.61)
EOSINOPHIL NFR BLD AUTO: 0 % (ref 0–6)
ERYTHROCYTE [DISTWIDTH] IN BLOOD BY AUTOMATED COUNT: 16.9 % (ref 11.6–15.1)
GFR SERPL CREATININE-BSD FRML MDRD: 95 ML/MIN/1.73SQ M
GLUCOSE SERPL-MCNC: 118 MG/DL (ref 65–140)
GLUCOSE SERPL-MCNC: 161 MG/DL (ref 65–140)
GLUCOSE SERPL-MCNC: 163 MG/DL (ref 65–140)
GLUCOSE SERPL-MCNC: 204 MG/DL (ref 65–140)
GLUCOSE SERPL-MCNC: 90 MG/DL (ref 65–140)
HCT VFR BLD AUTO: 31.4 % (ref 34.8–46.1)
HGB BLD-MCNC: 9.6 G/DL (ref 11.5–15.4)
IMM GRANULOCYTES # BLD AUTO: 0.03 THOUSAND/UL (ref 0–0.2)
IMM GRANULOCYTES NFR BLD AUTO: 1 % (ref 0–2)
LYMPHOCYTES # BLD AUTO: 0.97 THOUSANDS/ΜL (ref 0.6–4.47)
LYMPHOCYTES NFR BLD AUTO: 17 % (ref 14–44)
MCH RBC QN AUTO: 28.1 PG (ref 26.8–34.3)
MCHC RBC AUTO-ENTMCNC: 30.6 G/DL (ref 31.4–37.4)
MCV RBC AUTO: 92 FL (ref 82–98)
MONOCYTES # BLD AUTO: 0.48 THOUSAND/ΜL (ref 0.17–1.22)
MONOCYTES NFR BLD AUTO: 9 % (ref 4–12)
NEUTROPHILS # BLD AUTO: 4.1 THOUSANDS/ΜL (ref 1.85–7.62)
NEUTS SEG NFR BLD AUTO: 73 % (ref 43–75)
NRBC BLD AUTO-RTO: 0 /100 WBCS
PLATELET # BLD AUTO: 155 THOUSANDS/UL (ref 149–390)
PMV BLD AUTO: 9 FL (ref 8.9–12.7)
POTASSIUM SERPL-SCNC: 3.8 MMOL/L (ref 3.5–5.3)
PROCALCITONIN SERPL-MCNC: <0.05 NG/ML
PROT SERPL-MCNC: 6.9 G/DL (ref 6.4–8.2)
RBC # BLD AUTO: 3.42 MILLION/UL (ref 3.81–5.12)
SODIUM SERPL-SCNC: 134 MMOL/L (ref 136–145)
WBC # BLD AUTO: 5.6 THOUSAND/UL (ref 4.31–10.16)

## 2021-11-08 PROCEDURE — 80053 COMPREHEN METABOLIC PANEL: CPT | Performed by: FAMILY MEDICINE

## 2021-11-08 PROCEDURE — 97167 OT EVAL HIGH COMPLEX 60 MIN: CPT

## 2021-11-08 PROCEDURE — 84145 PROCALCITONIN (PCT): CPT | Performed by: PHYSICIAN ASSISTANT

## 2021-11-08 PROCEDURE — 99232 SBSQ HOSP IP/OBS MODERATE 35: CPT | Performed by: FAMILY MEDICINE

## 2021-11-08 PROCEDURE — 85025 COMPLETE CBC W/AUTO DIFF WBC: CPT | Performed by: FAMILY MEDICINE

## 2021-11-08 PROCEDURE — 97163 PT EVAL HIGH COMPLEX 45 MIN: CPT

## 2021-11-08 PROCEDURE — 82948 REAGENT STRIP/BLOOD GLUCOSE: CPT

## 2021-11-08 PROCEDURE — 92610 EVALUATE SWALLOWING FUNCTION: CPT

## 2021-11-08 RX ADMIN — INSULIN GLARGINE 10 UNITS: 100 INJECTION, SOLUTION SUBCUTANEOUS at 21:02

## 2021-11-08 RX ADMIN — INSULIN LISPRO 4 UNITS: 100 INJECTION, SOLUTION INTRAVENOUS; SUBCUTANEOUS at 11:17

## 2021-11-08 RX ADMIN — ENOXAPARIN SODIUM 30 MG: 30 INJECTION SUBCUTANEOUS at 21:02

## 2021-11-08 RX ADMIN — FERROUS GLUCONATE 324 MG: 324 TABLET ORAL at 08:17

## 2021-11-08 RX ADMIN — DIVALPROEX SODIUM 250 MG: 125 CAPSULE, COATED PELLETS ORAL at 08:17

## 2021-11-08 RX ADMIN — SERTRALINE HYDROCHLORIDE 100 MG: 100 TABLET ORAL at 21:02

## 2021-11-08 RX ADMIN — ATORVASTATIN CALCIUM 80 MG: 40 TABLET, FILM COATED ORAL at 16:16

## 2021-11-08 RX ADMIN — QUETIAPINE FUMARATE 200 MG: 200 TABLET ORAL at 21:02

## 2021-11-08 RX ADMIN — ASPIRIN 81 MG CHEWABLE TABLET 81 MG: 81 TABLET CHEWABLE at 08:17

## 2021-11-08 RX ADMIN — ENOXAPARIN SODIUM 30 MG: 30 INJECTION SUBCUTANEOUS at 08:17

## 2021-11-08 RX ADMIN — DIVALPROEX SODIUM 500 MG: 125 CAPSULE, COATED PELLETS ORAL at 21:03

## 2021-11-08 RX ADMIN — DEXAMETHASONE SODIUM PHOSPHATE 6 MG: 4 INJECTION INTRA-ARTICULAR; INTRALESIONAL; INTRAMUSCULAR; INTRAVENOUS; SOFT TISSUE at 04:22

## 2021-11-08 RX ADMIN — INSULIN LISPRO 1 UNITS: 100 INJECTION, SOLUTION INTRAVENOUS; SUBCUTANEOUS at 16:16

## 2021-11-08 RX ADMIN — REMDESIVIR 100 MG: 100 INJECTION, POWDER, LYOPHILIZED, FOR SOLUTION INTRAVENOUS at 09:44

## 2021-11-08 RX ADMIN — INSULIN LISPRO 4 UNITS: 100 INJECTION, SOLUTION INTRAVENOUS; SUBCUTANEOUS at 08:18

## 2021-11-08 RX ADMIN — QUETIAPINE FUMARATE 100 MG: 100 TABLET ORAL at 16:16

## 2021-11-08 RX ADMIN — INSULIN LISPRO 1 UNITS: 100 INJECTION, SOLUTION INTRAVENOUS; SUBCUTANEOUS at 11:17

## 2021-11-08 RX ADMIN — INSULIN LISPRO 1 UNITS: 100 INJECTION, SOLUTION INTRAVENOUS; SUBCUTANEOUS at 21:02

## 2021-11-08 RX ADMIN — CEFEPIME HYDROCHLORIDE 2000 MG: 2 INJECTION, SOLUTION INTRAVENOUS at 21:01

## 2021-11-08 RX ADMIN — INSULIN LISPRO 4 UNITS: 100 INJECTION, SOLUTION INTRAVENOUS; SUBCUTANEOUS at 16:17

## 2021-11-08 RX ADMIN — CEFEPIME HYDROCHLORIDE 2000 MG: 2 INJECTION, SOLUTION INTRAVENOUS at 10:31

## 2021-11-08 RX ADMIN — SERTRALINE HYDROCHLORIDE 100 MG: 100 TABLET ORAL at 08:17

## 2021-11-08 RX ADMIN — GABAPENTIN 800 MG: 400 CAPSULE ORAL at 16:16

## 2021-11-08 RX ADMIN — GABAPENTIN 800 MG: 400 CAPSULE ORAL at 21:03

## 2021-11-08 RX ADMIN — PANTOPRAZOLE SODIUM 20 MG: 20 TABLET, DELAYED RELEASE ORAL at 05:24

## 2021-11-08 RX ADMIN — QUETIAPINE FUMARATE 100 MG: 100 TABLET ORAL at 08:17

## 2021-11-08 RX ADMIN — GABAPENTIN 800 MG: 400 CAPSULE ORAL at 08:17

## 2021-11-09 LAB
ALBUMIN SERPL BCP-MCNC: 2.5 G/DL (ref 3.5–5)
ALP SERPL-CCNC: 42 U/L (ref 46–116)
ALT SERPL W P-5'-P-CCNC: 15 U/L (ref 12–78)
ANION GAP SERPL CALCULATED.3IONS-SCNC: 1 MMOL/L (ref 4–13)
AST SERPL W P-5'-P-CCNC: 21 U/L (ref 5–45)
BASOPHILS # BLD AUTO: 0.01 THOUSANDS/ΜL (ref 0–0.1)
BASOPHILS NFR BLD AUTO: 0 % (ref 0–1)
BILIRUB SERPL-MCNC: 0.38 MG/DL (ref 0.2–1)
BUN SERPL-MCNC: 18 MG/DL (ref 5–25)
CALCIUM ALBUM COR SERPL-MCNC: 10.2 MG/DL (ref 8.3–10.1)
CALCIUM SERPL-MCNC: 9 MG/DL (ref 8.3–10.1)
CHLORIDE SERPL-SCNC: 98 MMOL/L (ref 100–108)
CO2 SERPL-SCNC: 37 MMOL/L (ref 21–32)
CREAT SERPL-MCNC: 0.58 MG/DL (ref 0.6–1.3)
CRP SERPL QL: 91.6 MG/L
EOSINOPHIL # BLD AUTO: 0.04 THOUSAND/ΜL (ref 0–0.61)
EOSINOPHIL NFR BLD AUTO: 1 % (ref 0–6)
ERYTHROCYTE [DISTWIDTH] IN BLOOD BY AUTOMATED COUNT: 16.7 % (ref 11.6–15.1)
GFR SERPL CREATININE-BSD FRML MDRD: 96 ML/MIN/1.73SQ M
GLUCOSE SERPL-MCNC: 113 MG/DL (ref 65–140)
GLUCOSE SERPL-MCNC: 119 MG/DL (ref 65–140)
GLUCOSE SERPL-MCNC: 161 MG/DL (ref 65–140)
GLUCOSE SERPL-MCNC: 192 MG/DL (ref 65–140)
GLUCOSE SERPL-MCNC: 226 MG/DL (ref 65–140)
HCT VFR BLD AUTO: 29.5 % (ref 34.8–46.1)
HGB BLD-MCNC: 9.2 G/DL (ref 11.5–15.4)
IMM GRANULOCYTES # BLD AUTO: 0.03 THOUSAND/UL (ref 0–0.2)
IMM GRANULOCYTES NFR BLD AUTO: 1 % (ref 0–2)
LYMPHOCYTES # BLD AUTO: 0.72 THOUSANDS/ΜL (ref 0.6–4.47)
LYMPHOCYTES NFR BLD AUTO: 14 % (ref 14–44)
MCH RBC QN AUTO: 28.7 PG (ref 26.8–34.3)
MCHC RBC AUTO-ENTMCNC: 31.2 G/DL (ref 31.4–37.4)
MCV RBC AUTO: 92 FL (ref 82–98)
MONOCYTES # BLD AUTO: 0.34 THOUSAND/ΜL (ref 0.17–1.22)
MONOCYTES NFR BLD AUTO: 7 % (ref 4–12)
NEUTROPHILS # BLD AUTO: 3.86 THOUSANDS/ΜL (ref 1.85–7.62)
NEUTS SEG NFR BLD AUTO: 77 % (ref 43–75)
NRBC BLD AUTO-RTO: 0 /100 WBCS
PLATELET # BLD AUTO: 164 THOUSANDS/UL (ref 149–390)
PMV BLD AUTO: 9.5 FL (ref 8.9–12.7)
POTASSIUM SERPL-SCNC: 4.2 MMOL/L (ref 3.5–5.3)
PROT SERPL-MCNC: 6.2 G/DL (ref 6.4–8.2)
RBC # BLD AUTO: 3.21 MILLION/UL (ref 3.81–5.12)
SODIUM SERPL-SCNC: 136 MMOL/L (ref 136–145)
WBC # BLD AUTO: 5 THOUSAND/UL (ref 4.31–10.16)

## 2021-11-09 PROCEDURE — 99232 SBSQ HOSP IP/OBS MODERATE 35: CPT | Performed by: FAMILY MEDICINE

## 2021-11-09 PROCEDURE — 82948 REAGENT STRIP/BLOOD GLUCOSE: CPT

## 2021-11-09 PROCEDURE — 80053 COMPREHEN METABOLIC PANEL: CPT | Performed by: FAMILY MEDICINE

## 2021-11-09 PROCEDURE — 85025 COMPLETE CBC W/AUTO DIFF WBC: CPT | Performed by: FAMILY MEDICINE

## 2021-11-09 PROCEDURE — 86140 C-REACTIVE PROTEIN: CPT | Performed by: FAMILY MEDICINE

## 2021-11-09 RX ORDER — GUAIFENESIN 600 MG
600 TABLET, EXTENDED RELEASE 12 HR ORAL EVERY 12 HOURS SCHEDULED
Status: DISCONTINUED | OUTPATIENT
Start: 2021-11-09 | End: 2021-11-13 | Stop reason: HOSPADM

## 2021-11-09 RX ADMIN — GUAIFENESIN 600 MG: 600 TABLET, EXTENDED RELEASE ORAL at 11:43

## 2021-11-09 RX ADMIN — INSULIN LISPRO 4 UNITS: 100 INJECTION, SOLUTION INTRAVENOUS; SUBCUTANEOUS at 08:59

## 2021-11-09 RX ADMIN — GABAPENTIN 800 MG: 400 CAPSULE ORAL at 21:06

## 2021-11-09 RX ADMIN — QUETIAPINE FUMARATE 100 MG: 100 TABLET ORAL at 08:54

## 2021-11-09 RX ADMIN — DEXAMETHASONE SODIUM PHOSPHATE 6 MG: 4 INJECTION INTRA-ARTICULAR; INTRALESIONAL; INTRAMUSCULAR; INTRAVENOUS; SOFT TISSUE at 03:43

## 2021-11-09 RX ADMIN — PANTOPRAZOLE SODIUM 20 MG: 20 TABLET, DELAYED RELEASE ORAL at 05:01

## 2021-11-09 RX ADMIN — INSULIN LISPRO 1 UNITS: 100 INJECTION, SOLUTION INTRAVENOUS; SUBCUTANEOUS at 21:10

## 2021-11-09 RX ADMIN — QUETIAPINE FUMARATE 200 MG: 200 TABLET ORAL at 21:06

## 2021-11-09 RX ADMIN — GABAPENTIN 800 MG: 400 CAPSULE ORAL at 08:54

## 2021-11-09 RX ADMIN — QUETIAPINE FUMARATE 100 MG: 100 TABLET ORAL at 16:50

## 2021-11-09 RX ADMIN — INSULIN LISPRO 4 UNITS: 100 INJECTION, SOLUTION INTRAVENOUS; SUBCUTANEOUS at 16:52

## 2021-11-09 RX ADMIN — REMDESIVIR 100 MG: 100 INJECTION, POWDER, LYOPHILIZED, FOR SOLUTION INTRAVENOUS at 09:47

## 2021-11-09 RX ADMIN — DIVALPROEX SODIUM 500 MG: 125 CAPSULE, COATED PELLETS ORAL at 21:06

## 2021-11-09 RX ADMIN — INSULIN LISPRO 2 UNITS: 100 INJECTION, SOLUTION INTRAVENOUS; SUBCUTANEOUS at 11:46

## 2021-11-09 RX ADMIN — ENOXAPARIN SODIUM 30 MG: 30 INJECTION SUBCUTANEOUS at 08:53

## 2021-11-09 RX ADMIN — INSULIN GLARGINE 10 UNITS: 100 INJECTION, SOLUTION SUBCUTANEOUS at 21:10

## 2021-11-09 RX ADMIN — INSULIN LISPRO 4 UNITS: 100 INJECTION, SOLUTION INTRAVENOUS; SUBCUTANEOUS at 11:49

## 2021-11-09 RX ADMIN — FERROUS GLUCONATE 324 MG: 324 TABLET ORAL at 09:06

## 2021-11-09 RX ADMIN — ASPIRIN 81 MG CHEWABLE TABLET 81 MG: 81 TABLET CHEWABLE at 08:54

## 2021-11-09 RX ADMIN — DIVALPROEX SODIUM 250 MG: 125 CAPSULE, COATED PELLETS ORAL at 08:54

## 2021-11-09 RX ADMIN — CEFEPIME HYDROCHLORIDE 2000 MG: 2 INJECTION, SOLUTION INTRAVENOUS at 10:34

## 2021-11-09 RX ADMIN — SERTRALINE HYDROCHLORIDE 100 MG: 100 TABLET ORAL at 08:54

## 2021-11-09 RX ADMIN — INSULIN LISPRO 1 UNITS: 100 INJECTION, SOLUTION INTRAVENOUS; SUBCUTANEOUS at 08:59

## 2021-11-09 RX ADMIN — GUAIFENESIN 600 MG: 600 TABLET, EXTENDED RELEASE ORAL at 21:28

## 2021-11-09 RX ADMIN — ATORVASTATIN CALCIUM 80 MG: 40 TABLET, FILM COATED ORAL at 16:50

## 2021-11-09 RX ADMIN — GABAPENTIN 800 MG: 400 CAPSULE ORAL at 16:50

## 2021-11-09 RX ADMIN — ENOXAPARIN SODIUM 30 MG: 30 INJECTION SUBCUTANEOUS at 21:11

## 2021-11-09 RX ADMIN — SERTRALINE HYDROCHLORIDE 100 MG: 100 TABLET ORAL at 21:06

## 2021-11-10 LAB
ALBUMIN SERPL BCP-MCNC: 2.4 G/DL (ref 3.5–5)
ALP SERPL-CCNC: 43 U/L (ref 46–116)
ALT SERPL W P-5'-P-CCNC: 17 U/L (ref 12–78)
ANION GAP SERPL CALCULATED.3IONS-SCNC: 1 MMOL/L (ref 4–13)
AST SERPL W P-5'-P-CCNC: 15 U/L (ref 5–45)
BASE EX.OXY STD BLDV CALC-SCNC: 91.2 % (ref 60–80)
BASE EXCESS BLDV CALC-SCNC: 9.9 MMOL/L
BASOPHILS # BLD AUTO: 0.01 THOUSANDS/ΜL (ref 0–0.1)
BASOPHILS NFR BLD AUTO: 0 % (ref 0–1)
BILIRUB SERPL-MCNC: 0.3 MG/DL (ref 0.2–1)
BUN SERPL-MCNC: 16 MG/DL (ref 5–25)
CALCIUM ALBUM COR SERPL-MCNC: 10.2 MG/DL (ref 8.3–10.1)
CALCIUM SERPL-MCNC: 8.9 MG/DL (ref 8.3–10.1)
CHLORIDE SERPL-SCNC: 99 MMOL/L (ref 100–108)
CO2 SERPL-SCNC: 35 MMOL/L (ref 21–32)
CREAT SERPL-MCNC: 0.52 MG/DL (ref 0.6–1.3)
EOSINOPHIL # BLD AUTO: 0.05 THOUSAND/ΜL (ref 0–0.61)
EOSINOPHIL NFR BLD AUTO: 2 % (ref 0–6)
ERYTHROCYTE [DISTWIDTH] IN BLOOD BY AUTOMATED COUNT: 16.6 % (ref 11.6–15.1)
GFR SERPL CREATININE-BSD FRML MDRD: 99 ML/MIN/1.73SQ M
GLUCOSE SERPL-MCNC: 115 MG/DL (ref 65–140)
GLUCOSE SERPL-MCNC: 134 MG/DL (ref 65–140)
GLUCOSE SERPL-MCNC: 213 MG/DL (ref 65–140)
GLUCOSE SERPL-MCNC: 229 MG/DL (ref 65–140)
GLUCOSE SERPL-MCNC: 276 MG/DL (ref 65–140)
HCO3 BLDV-SCNC: 34.6 MMOL/L (ref 24–30)
HCT VFR BLD AUTO: 27.2 % (ref 34.8–46.1)
HGB BLD-MCNC: 8.5 G/DL (ref 11.5–15.4)
IMM GRANULOCYTES # BLD AUTO: 0.04 THOUSAND/UL (ref 0–0.2)
IMM GRANULOCYTES NFR BLD AUTO: 1 % (ref 0–2)
LYMPHOCYTES # BLD AUTO: 0.99 THOUSANDS/ΜL (ref 0.6–4.47)
LYMPHOCYTES NFR BLD AUTO: 32 % (ref 14–44)
MCH RBC QN AUTO: 28.7 PG (ref 26.8–34.3)
MCHC RBC AUTO-ENTMCNC: 31.3 G/DL (ref 31.4–37.4)
MCV RBC AUTO: 92 FL (ref 82–98)
MONOCYTES # BLD AUTO: 0.26 THOUSAND/ΜL (ref 0.17–1.22)
MONOCYTES NFR BLD AUTO: 8 % (ref 4–12)
NEUTROPHILS # BLD AUTO: 1.78 THOUSANDS/ΜL (ref 1.85–7.62)
NEUTS SEG NFR BLD AUTO: 57 % (ref 43–75)
NRBC BLD AUTO-RTO: 0 /100 WBCS
O2 CT BLDV-SCNC: 12.1 ML/DL
PCO2 BLDV: 47.7 MM HG (ref 42–50)
PH BLDV: 7.48 [PH] (ref 7.3–7.4)
PLATELET # BLD AUTO: 177 THOUSANDS/UL (ref 149–390)
PMV BLD AUTO: 9.1 FL (ref 8.9–12.7)
PO2 BLDV: 65.3 MM HG (ref 35–45)
POTASSIUM SERPL-SCNC: 4 MMOL/L (ref 3.5–5.3)
PROT SERPL-MCNC: 5.8 G/DL (ref 6.4–8.2)
RBC # BLD AUTO: 2.96 MILLION/UL (ref 3.81–5.12)
SODIUM SERPL-SCNC: 135 MMOL/L (ref 136–145)
WBC # BLD AUTO: 3.13 THOUSAND/UL (ref 4.31–10.16)

## 2021-11-10 PROCEDURE — 82948 REAGENT STRIP/BLOOD GLUCOSE: CPT

## 2021-11-10 PROCEDURE — 80053 COMPREHEN METABOLIC PANEL: CPT | Performed by: FAMILY MEDICINE

## 2021-11-10 PROCEDURE — 99232 SBSQ HOSP IP/OBS MODERATE 35: CPT | Performed by: FAMILY MEDICINE

## 2021-11-10 PROCEDURE — 85025 COMPLETE CBC W/AUTO DIFF WBC: CPT | Performed by: FAMILY MEDICINE

## 2021-11-10 PROCEDURE — 82805 BLOOD GASES W/O2 SATURATION: CPT | Performed by: FAMILY MEDICINE

## 2021-11-10 RX ORDER — TORSEMIDE 20 MG/1
10 TABLET ORAL DAILY
Status: DISCONTINUED | OUTPATIENT
Start: 2021-11-10 | End: 2021-11-13 | Stop reason: HOSPADM

## 2021-11-10 RX ADMIN — INSULIN LISPRO 4 UNITS: 100 INJECTION, SOLUTION INTRAVENOUS; SUBCUTANEOUS at 08:38

## 2021-11-10 RX ADMIN — GABAPENTIN 800 MG: 400 CAPSULE ORAL at 08:37

## 2021-11-10 RX ADMIN — SERTRALINE HYDROCHLORIDE 100 MG: 100 TABLET ORAL at 08:37

## 2021-11-10 RX ADMIN — GUAIFENESIN 600 MG: 600 TABLET, EXTENDED RELEASE ORAL at 08:38

## 2021-11-10 RX ADMIN — ATORVASTATIN CALCIUM 80 MG: 40 TABLET, FILM COATED ORAL at 16:32

## 2021-11-10 RX ADMIN — FERROUS GLUCONATE 324 MG: 324 TABLET ORAL at 08:37

## 2021-11-10 RX ADMIN — ENOXAPARIN SODIUM 30 MG: 30 INJECTION SUBCUTANEOUS at 08:38

## 2021-11-10 RX ADMIN — DIVALPROEX SODIUM 500 MG: 125 CAPSULE, COATED PELLETS ORAL at 21:56

## 2021-11-10 RX ADMIN — QUETIAPINE FUMARATE 200 MG: 200 TABLET ORAL at 21:56

## 2021-11-10 RX ADMIN — QUETIAPINE FUMARATE 100 MG: 100 TABLET ORAL at 08:37

## 2021-11-10 RX ADMIN — INSULIN LISPRO 2 UNITS: 100 INJECTION, SOLUTION INTRAVENOUS; SUBCUTANEOUS at 16:32

## 2021-11-10 RX ADMIN — GABAPENTIN 800 MG: 400 CAPSULE ORAL at 21:56

## 2021-11-10 RX ADMIN — PANTOPRAZOLE SODIUM 20 MG: 20 TABLET, DELAYED RELEASE ORAL at 06:07

## 2021-11-10 RX ADMIN — INSULIN LISPRO 4 UNITS: 100 INJECTION, SOLUTION INTRAVENOUS; SUBCUTANEOUS at 11:09

## 2021-11-10 RX ADMIN — GUAIFENESIN 600 MG: 600 TABLET, EXTENDED RELEASE ORAL at 21:57

## 2021-11-10 RX ADMIN — QUETIAPINE FUMARATE 100 MG: 100 TABLET ORAL at 16:32

## 2021-11-10 RX ADMIN — DIVALPROEX SODIUM 250 MG: 125 CAPSULE, COATED PELLETS ORAL at 08:37

## 2021-11-10 RX ADMIN — INSULIN GLARGINE 10 UNITS: 100 INJECTION, SOLUTION SUBCUTANEOUS at 21:56

## 2021-11-10 RX ADMIN — INSULIN LISPRO 4 UNITS: 100 INJECTION, SOLUTION INTRAVENOUS; SUBCUTANEOUS at 16:32

## 2021-11-10 RX ADMIN — GABAPENTIN 800 MG: 400 CAPSULE ORAL at 16:32

## 2021-11-10 RX ADMIN — INSULIN LISPRO 1 UNITS: 100 INJECTION, SOLUTION INTRAVENOUS; SUBCUTANEOUS at 21:57

## 2021-11-10 RX ADMIN — DEXAMETHASONE SODIUM PHOSPHATE 6 MG: 4 INJECTION INTRA-ARTICULAR; INTRALESIONAL; INTRAMUSCULAR; INTRAVENOUS; SOFT TISSUE at 06:24

## 2021-11-10 RX ADMIN — INSULIN LISPRO 2 UNITS: 100 INJECTION, SOLUTION INTRAVENOUS; SUBCUTANEOUS at 11:09

## 2021-11-10 RX ADMIN — ENOXAPARIN SODIUM 30 MG: 30 INJECTION SUBCUTANEOUS at 21:56

## 2021-11-10 RX ADMIN — ASPIRIN 81 MG CHEWABLE TABLET 81 MG: 81 TABLET CHEWABLE at 08:37

## 2021-11-10 RX ADMIN — SERTRALINE HYDROCHLORIDE 100 MG: 100 TABLET ORAL at 21:57

## 2021-11-10 RX ADMIN — TORSEMIDE 10 MG: 20 TABLET ORAL at 14:40

## 2021-11-10 RX ADMIN — REMDESIVIR 100 MG: 100 INJECTION, POWDER, LYOPHILIZED, FOR SOLUTION INTRAVENOUS at 08:36

## 2021-11-11 PROBLEM — E87.1 HYPONATREMIA: Status: RESOLVED | Noted: 2021-11-06 | Resolved: 2021-11-11

## 2021-11-11 LAB
ALBUMIN SERPL BCP-MCNC: 2.4 G/DL (ref 3.5–5)
ALP SERPL-CCNC: 47 U/L (ref 46–116)
ALT SERPL W P-5'-P-CCNC: 19 U/L (ref 12–78)
ANION GAP SERPL CALCULATED.3IONS-SCNC: 5 MMOL/L (ref 4–13)
ANISOCYTOSIS BLD QL SMEAR: PRESENT
AST SERPL W P-5'-P-CCNC: 16 U/L (ref 5–45)
BASOPHILS # BLD MANUAL: 0 THOUSAND/UL (ref 0–0.1)
BASOPHILS NFR MAR MANUAL: 0 % (ref 0–1)
BILIRUB SERPL-MCNC: 0.32 MG/DL (ref 0.2–1)
BUN SERPL-MCNC: 24 MG/DL (ref 5–25)
CALCIUM ALBUM COR SERPL-MCNC: 10.3 MG/DL (ref 8.3–10.1)
CALCIUM SERPL-MCNC: 9 MG/DL (ref 8.3–10.1)
CHLORIDE SERPL-SCNC: 100 MMOL/L (ref 100–108)
CO2 SERPL-SCNC: 35 MMOL/L (ref 21–32)
CREAT SERPL-MCNC: 0.69 MG/DL (ref 0.6–1.3)
DACRYOCYTES BLD QL SMEAR: PRESENT
EOSINOPHIL # BLD MANUAL: 0 THOUSAND/UL (ref 0–0.4)
EOSINOPHIL NFR BLD MANUAL: 0 % (ref 0–6)
ERYTHROCYTE [DISTWIDTH] IN BLOOD BY AUTOMATED COUNT: 16.3 % (ref 11.6–15.1)
GFR SERPL CREATININE-BSD FRML MDRD: 90 ML/MIN/1.73SQ M
GLUCOSE SERPL-MCNC: 152 MG/DL (ref 65–140)
GLUCOSE SERPL-MCNC: 156 MG/DL (ref 65–140)
GLUCOSE SERPL-MCNC: 175 MG/DL (ref 65–140)
GLUCOSE SERPL-MCNC: 209 MG/DL (ref 65–140)
GLUCOSE SERPL-MCNC: 236 MG/DL (ref 65–140)
HCT VFR BLD AUTO: 29.3 % (ref 34.8–46.1)
HGB BLD-MCNC: 9.1 G/DL (ref 11.5–15.4)
HYPERCHROMIA BLD QL SMEAR: PRESENT
LG PLATELETS BLD QL SMEAR: PRESENT
LYMPHOCYTES # BLD AUTO: 0.9 THOUSAND/UL (ref 0.6–4.47)
LYMPHOCYTES # BLD AUTO: 24 % (ref 14–44)
MCH RBC QN AUTO: 28.6 PG (ref 26.8–34.3)
MCHC RBC AUTO-ENTMCNC: 31.1 G/DL (ref 31.4–37.4)
MCV RBC AUTO: 92 FL (ref 82–98)
MONOCYTES # BLD AUTO: 0.11 THOUSAND/UL (ref 0–1.22)
MONOCYTES NFR BLD: 3 % (ref 4–12)
MYELOCYTES NFR BLD MANUAL: 2 % (ref 0–1)
NEUTROPHILS # BLD MANUAL: 2.66 THOUSAND/UL (ref 1.85–7.62)
NEUTS BAND NFR BLD MANUAL: 4 % (ref 0–8)
NEUTS SEG NFR BLD AUTO: 67 % (ref 43–75)
OVALOCYTES BLD QL SMEAR: PRESENT
PLATELET # BLD AUTO: 208 THOUSANDS/UL (ref 149–390)
PLATELET BLD QL SMEAR: ADEQUATE
PMV BLD AUTO: 9.4 FL (ref 8.9–12.7)
POLYCHROMASIA BLD QL SMEAR: PRESENT
POTASSIUM SERPL-SCNC: 4 MMOL/L (ref 3.5–5.3)
PROT SERPL-MCNC: 6.2 G/DL (ref 6.4–8.2)
RBC # BLD AUTO: 3.18 MILLION/UL (ref 3.81–5.12)
RBC MORPH BLD: PRESENT
SODIUM SERPL-SCNC: 140 MMOL/L (ref 136–145)
WBC # BLD AUTO: 3.75 THOUSAND/UL (ref 4.31–10.16)

## 2021-11-11 PROCEDURE — 82272 OCCULT BLD FECES 1-3 TESTS: CPT | Performed by: FAMILY MEDICINE

## 2021-11-11 PROCEDURE — 82948 REAGENT STRIP/BLOOD GLUCOSE: CPT

## 2021-11-11 PROCEDURE — 97530 THERAPEUTIC ACTIVITIES: CPT

## 2021-11-11 PROCEDURE — 99232 SBSQ HOSP IP/OBS MODERATE 35: CPT | Performed by: INTERNAL MEDICINE

## 2021-11-11 PROCEDURE — 80053 COMPREHEN METABOLIC PANEL: CPT | Performed by: FAMILY MEDICINE

## 2021-11-11 PROCEDURE — 85025 COMPLETE CBC W/AUTO DIFF WBC: CPT | Performed by: FAMILY MEDICINE

## 2021-11-11 PROCEDURE — 85007 BL SMEAR W/DIFF WBC COUNT: CPT | Performed by: FAMILY MEDICINE

## 2021-11-11 PROCEDURE — 85027 COMPLETE CBC AUTOMATED: CPT | Performed by: FAMILY MEDICINE

## 2021-11-11 RX ADMIN — QUETIAPINE FUMARATE 200 MG: 200 TABLET ORAL at 21:13

## 2021-11-11 RX ADMIN — GABAPENTIN 800 MG: 400 CAPSULE ORAL at 21:13

## 2021-11-11 RX ADMIN — GUAIFENESIN 600 MG: 600 TABLET, EXTENDED RELEASE ORAL at 21:13

## 2021-11-11 RX ADMIN — SERTRALINE HYDROCHLORIDE 100 MG: 100 TABLET ORAL at 08:24

## 2021-11-11 RX ADMIN — GABAPENTIN 800 MG: 400 CAPSULE ORAL at 08:24

## 2021-11-11 RX ADMIN — ENOXAPARIN SODIUM 30 MG: 30 INJECTION SUBCUTANEOUS at 08:24

## 2021-11-11 RX ADMIN — QUETIAPINE FUMARATE 100 MG: 100 TABLET ORAL at 08:25

## 2021-11-11 RX ADMIN — SERTRALINE HYDROCHLORIDE 100 MG: 100 TABLET ORAL at 21:13

## 2021-11-11 RX ADMIN — ASPIRIN 81 MG CHEWABLE TABLET 81 MG: 81 TABLET CHEWABLE at 08:24

## 2021-11-11 RX ADMIN — INSULIN LISPRO 1 UNITS: 100 INJECTION, SOLUTION INTRAVENOUS; SUBCUTANEOUS at 08:23

## 2021-11-11 RX ADMIN — INSULIN LISPRO 1 UNITS: 100 INJECTION, SOLUTION INTRAVENOUS; SUBCUTANEOUS at 21:13

## 2021-11-11 RX ADMIN — GABAPENTIN 800 MG: 400 CAPSULE ORAL at 15:36

## 2021-11-11 RX ADMIN — ATORVASTATIN CALCIUM 80 MG: 40 TABLET, FILM COATED ORAL at 15:36

## 2021-11-11 RX ADMIN — INSULIN LISPRO 4 UNITS: 100 INJECTION, SOLUTION INTRAVENOUS; SUBCUTANEOUS at 08:23

## 2021-11-11 RX ADMIN — INSULIN LISPRO 2 UNITS: 100 INJECTION, SOLUTION INTRAVENOUS; SUBCUTANEOUS at 15:36

## 2021-11-11 RX ADMIN — FERROUS GLUCONATE 324 MG: 324 TABLET ORAL at 08:24

## 2021-11-11 RX ADMIN — DIVALPROEX SODIUM 500 MG: 125 CAPSULE, COATED PELLETS ORAL at 21:13

## 2021-11-11 RX ADMIN — INSULIN GLARGINE 10 UNITS: 100 INJECTION, SOLUTION SUBCUTANEOUS at 21:12

## 2021-11-11 RX ADMIN — DIVALPROEX SODIUM 250 MG: 125 CAPSULE, COATED PELLETS ORAL at 08:24

## 2021-11-11 RX ADMIN — INSULIN LISPRO 4 UNITS: 100 INJECTION, SOLUTION INTRAVENOUS; SUBCUTANEOUS at 15:36

## 2021-11-11 RX ADMIN — INSULIN LISPRO 1 UNITS: 100 INJECTION, SOLUTION INTRAVENOUS; SUBCUTANEOUS at 11:06

## 2021-11-11 RX ADMIN — INSULIN LISPRO 4 UNITS: 100 INJECTION, SOLUTION INTRAVENOUS; SUBCUTANEOUS at 11:05

## 2021-11-11 RX ADMIN — REMDESIVIR 100 MG: 100 INJECTION, POWDER, LYOPHILIZED, FOR SOLUTION INTRAVENOUS at 11:05

## 2021-11-11 RX ADMIN — TORSEMIDE 10 MG: 20 TABLET ORAL at 08:24

## 2021-11-11 RX ADMIN — GUAIFENESIN 600 MG: 600 TABLET, EXTENDED RELEASE ORAL at 08:24

## 2021-11-11 RX ADMIN — PANTOPRAZOLE SODIUM 20 MG: 20 TABLET, DELAYED RELEASE ORAL at 05:28

## 2021-11-11 RX ADMIN — DEXAMETHASONE SODIUM PHOSPHATE 6 MG: 4 INJECTION INTRA-ARTICULAR; INTRALESIONAL; INTRAMUSCULAR; INTRAVENOUS; SOFT TISSUE at 02:16

## 2021-11-11 RX ADMIN — ENOXAPARIN SODIUM 30 MG: 30 INJECTION SUBCUTANEOUS at 21:13

## 2021-11-11 RX ADMIN — QUETIAPINE FUMARATE 100 MG: 100 TABLET ORAL at 15:36

## 2021-11-12 LAB
BACTERIA BLD CULT: NORMAL
BACTERIA BLD CULT: NORMAL
GLUCOSE SERPL-MCNC: 102 MG/DL (ref 65–140)
GLUCOSE SERPL-MCNC: 105 MG/DL (ref 65–140)
GLUCOSE SERPL-MCNC: 139 MG/DL (ref 65–140)
GLUCOSE SERPL-MCNC: 201 MG/DL (ref 65–140)

## 2021-11-12 PROCEDURE — 99239 HOSP IP/OBS DSCHRG MGMT >30: CPT | Performed by: INTERNAL MEDICINE

## 2021-11-12 PROCEDURE — 82948 REAGENT STRIP/BLOOD GLUCOSE: CPT

## 2021-11-12 PROCEDURE — 97530 THERAPEUTIC ACTIVITIES: CPT

## 2021-11-12 RX ORDER — ASPIRIN 81 MG/1
81 TABLET, CHEWABLE ORAL DAILY
Status: CANCELLED | OUTPATIENT
Start: 2021-11-12

## 2021-11-12 RX ORDER — INSULIN GLARGINE 100 [IU]/ML
10 INJECTION, SOLUTION SUBCUTANEOUS
Status: CANCELLED | OUTPATIENT
Start: 2021-11-12

## 2021-11-12 RX ORDER — GUAIFENESIN 600 MG
600 TABLET, EXTENDED RELEASE 12 HR ORAL EVERY 12 HOURS SCHEDULED
Status: CANCELLED | OUTPATIENT
Start: 2021-11-12

## 2021-11-12 RX ORDER — ONDANSETRON 2 MG/ML
4 INJECTION INTRAMUSCULAR; INTRAVENOUS EVERY 6 HOURS PRN
Status: CANCELLED | OUTPATIENT
Start: 2021-11-12

## 2021-11-12 RX ORDER — DIVALPROEX SODIUM 125 MG/1
250 CAPSULE, COATED PELLETS ORAL EVERY MORNING
Status: CANCELLED | OUTPATIENT
Start: 2021-11-13

## 2021-11-12 RX ORDER — TORSEMIDE 20 MG/1
10 TABLET ORAL DAILY
Status: CANCELLED | OUTPATIENT
Start: 2021-11-12

## 2021-11-12 RX ORDER — LORAZEPAM 0.5 MG/1
0.5 TABLET ORAL 2 TIMES DAILY PRN
Status: CANCELLED | OUTPATIENT
Start: 2021-11-12

## 2021-11-12 RX ORDER — DIVALPROEX SODIUM 125 MG/1
500 CAPSULE, COATED PELLETS ORAL
Status: CANCELLED | OUTPATIENT
Start: 2021-11-12

## 2021-11-12 RX ORDER — SERTRALINE HYDROCHLORIDE 100 MG/1
100 TABLET, FILM COATED ORAL 2 TIMES DAILY
Status: CANCELLED | OUTPATIENT
Start: 2021-11-12

## 2021-11-12 RX ORDER — GABAPENTIN 400 MG/1
800 CAPSULE ORAL 3 TIMES DAILY
Status: CANCELLED | OUTPATIENT
Start: 2021-11-12

## 2021-11-12 RX ORDER — QUETIAPINE FUMARATE 200 MG/1
200 TABLET, FILM COATED ORAL
Status: CANCELLED | OUTPATIENT
Start: 2021-11-12

## 2021-11-12 RX ORDER — DOXYCYCLINE HYCLATE 50 MG/1
324 CAPSULE, GELATIN COATED ORAL
Status: CANCELLED | OUTPATIENT
Start: 2021-11-13

## 2021-11-12 RX ORDER — PANTOPRAZOLE SODIUM 20 MG/1
20 TABLET, DELAYED RELEASE ORAL
Status: CANCELLED | OUTPATIENT
Start: 2021-11-13

## 2021-11-12 RX ORDER — ATORVASTATIN CALCIUM 40 MG/1
80 TABLET, FILM COATED ORAL
Status: CANCELLED | OUTPATIENT
Start: 2021-11-12

## 2021-11-12 RX ORDER — ECHINACEA PURPUREA EXTRACT 125 MG
1 TABLET ORAL
Status: CANCELLED | OUTPATIENT
Start: 2021-11-12

## 2021-11-12 RX ORDER — ACETAMINOPHEN 325 MG/1
650 TABLET ORAL EVERY 6 HOURS PRN
Status: CANCELLED | OUTPATIENT
Start: 2021-11-12

## 2021-11-12 RX ORDER — QUETIAPINE FUMARATE 100 MG/1
100 TABLET, FILM COATED ORAL 2 TIMES DAILY
Status: CANCELLED | OUTPATIENT
Start: 2021-11-12

## 2021-11-12 RX ORDER — SENNOSIDES 8.6 MG
1 TABLET ORAL
Status: CANCELLED | OUTPATIENT
Start: 2021-11-12

## 2021-11-12 RX ADMIN — ENOXAPARIN SODIUM 30 MG: 30 INJECTION SUBCUTANEOUS at 21:52

## 2021-11-12 RX ADMIN — ASPIRIN 81 MG CHEWABLE TABLET 81 MG: 81 TABLET CHEWABLE at 08:09

## 2021-11-12 RX ADMIN — GABAPENTIN 800 MG: 400 CAPSULE ORAL at 08:09

## 2021-11-12 RX ADMIN — ATORVASTATIN CALCIUM 80 MG: 40 TABLET, FILM COATED ORAL at 16:47

## 2021-11-12 RX ADMIN — ENOXAPARIN SODIUM 30 MG: 30 INJECTION SUBCUTANEOUS at 08:09

## 2021-11-12 RX ADMIN — GABAPENTIN 800 MG: 400 CAPSULE ORAL at 21:52

## 2021-11-12 RX ADMIN — QUETIAPINE FUMARATE 100 MG: 100 TABLET ORAL at 16:47

## 2021-11-12 RX ADMIN — DIVALPROEX SODIUM 500 MG: 125 CAPSULE, COATED PELLETS ORAL at 21:52

## 2021-11-12 RX ADMIN — GUAIFENESIN 600 MG: 600 TABLET, EXTENDED RELEASE ORAL at 21:53

## 2021-11-12 RX ADMIN — FERROUS GLUCONATE 324 MG: 324 TABLET ORAL at 08:10

## 2021-11-12 RX ADMIN — GUAIFENESIN 600 MG: 600 TABLET, EXTENDED RELEASE ORAL at 08:10

## 2021-11-12 RX ADMIN — QUETIAPINE FUMARATE 100 MG: 100 TABLET ORAL at 08:10

## 2021-11-12 RX ADMIN — SERTRALINE HYDROCHLORIDE 100 MG: 100 TABLET ORAL at 21:53

## 2021-11-12 RX ADMIN — INSULIN LISPRO 1 UNITS: 100 INJECTION, SOLUTION INTRAVENOUS; SUBCUTANEOUS at 21:53

## 2021-11-12 RX ADMIN — DIVALPROEX SODIUM 250 MG: 125 CAPSULE, COATED PELLETS ORAL at 08:09

## 2021-11-12 RX ADMIN — PANTOPRAZOLE SODIUM 20 MG: 20 TABLET, DELAYED RELEASE ORAL at 06:04

## 2021-11-12 RX ADMIN — INSULIN LISPRO 4 UNITS: 100 INJECTION, SOLUTION INTRAVENOUS; SUBCUTANEOUS at 11:35

## 2021-11-12 RX ADMIN — SERTRALINE HYDROCHLORIDE 100 MG: 100 TABLET ORAL at 08:10

## 2021-11-12 RX ADMIN — GABAPENTIN 800 MG: 400 CAPSULE ORAL at 16:47

## 2021-11-12 RX ADMIN — INSULIN GLARGINE 10 UNITS: 100 INJECTION, SOLUTION SUBCUTANEOUS at 21:53

## 2021-11-12 RX ADMIN — QUETIAPINE FUMARATE 200 MG: 200 TABLET ORAL at 21:52

## 2021-11-12 RX ADMIN — INSULIN LISPRO 4 UNITS: 100 INJECTION, SOLUTION INTRAVENOUS; SUBCUTANEOUS at 16:46

## 2021-11-12 RX ADMIN — INSULIN LISPRO 4 UNITS: 100 INJECTION, SOLUTION INTRAVENOUS; SUBCUTANEOUS at 08:09

## 2021-11-13 ENCOUNTER — HOSPITAL ENCOUNTER (INPATIENT)
Facility: HOSPITAL | Age: 66
LOS: 7 days | Discharge: NURSING, REHAB OR LONG-TERM CARE | DRG: 177 | End: 2021-11-20
Attending: STUDENT IN AN ORGANIZED HEALTH CARE EDUCATION/TRAINING PROGRAM | Admitting: STUDENT IN AN ORGANIZED HEALTH CARE EDUCATION/TRAINING PROGRAM
Payer: MEDICARE

## 2021-11-13 VITALS
WEIGHT: 186.73 LBS | TEMPERATURE: 98.2 F | BODY MASS INDEX: 36.66 KG/M2 | OXYGEN SATURATION: 93 % | RESPIRATION RATE: 20 BRPM | DIASTOLIC BLOOD PRESSURE: 45 MMHG | HEART RATE: 68 BPM | HEIGHT: 60 IN | SYSTOLIC BLOOD PRESSURE: 106 MMHG

## 2021-11-13 DIAGNOSIS — E11.9 TYPE 2 DIABETES MELLITUS WITHOUT COMPLICATION, WITHOUT LONG-TERM CURRENT USE OF INSULIN (HCC): Chronic | ICD-10-CM

## 2021-11-13 DIAGNOSIS — F39 MOOD DISORDER (HCC): ICD-10-CM

## 2021-11-13 DIAGNOSIS — A41.9 SEPSIS DUE TO PNEUMONIA (HCC): Primary | ICD-10-CM

## 2021-11-13 DIAGNOSIS — J18.9 SEPSIS DUE TO PNEUMONIA (HCC): Primary | ICD-10-CM

## 2021-11-13 DIAGNOSIS — S91.309A WOUND OF FOOT: ICD-10-CM

## 2021-11-13 DIAGNOSIS — G40.909 SEIZURE DISORDER (HCC): Chronic | ICD-10-CM

## 2021-11-13 LAB
GLUCOSE SERPL-MCNC: 106 MG/DL (ref 65–140)
GLUCOSE SERPL-MCNC: 118 MG/DL (ref 65–140)
GLUCOSE SERPL-MCNC: 80 MG/DL (ref 65–140)

## 2021-11-13 PROCEDURE — 99223 1ST HOSP IP/OBS HIGH 75: CPT | Performed by: STUDENT IN AN ORGANIZED HEALTH CARE EDUCATION/TRAINING PROGRAM

## 2021-11-13 PROCEDURE — 82948 REAGENT STRIP/BLOOD GLUCOSE: CPT

## 2021-11-13 RX ORDER — GABAPENTIN 400 MG/1
800 CAPSULE ORAL 3 TIMES DAILY
Status: DISCONTINUED | OUTPATIENT
Start: 2021-11-13 | End: 2021-11-20 | Stop reason: HOSPADM

## 2021-11-13 RX ORDER — DOXYCYCLINE HYCLATE 50 MG/1
324 CAPSULE, GELATIN COATED ORAL
Status: DISCONTINUED | OUTPATIENT
Start: 2021-11-14 | End: 2021-11-20 | Stop reason: HOSPADM

## 2021-11-13 RX ORDER — QUETIAPINE 200 MG/1
200 TABLET, FILM COATED, EXTENDED RELEASE ORAL
Status: DISCONTINUED | OUTPATIENT
Start: 2021-11-13 | End: 2021-11-20 | Stop reason: HOSPADM

## 2021-11-13 RX ORDER — INSULIN GLARGINE 100 [IU]/ML
10 INJECTION, SOLUTION SUBCUTANEOUS
Status: DISCONTINUED | OUTPATIENT
Start: 2021-11-13 | End: 2021-11-16

## 2021-11-13 RX ORDER — ATORVASTATIN CALCIUM 40 MG/1
40 TABLET, FILM COATED ORAL
Status: DISCONTINUED | OUTPATIENT
Start: 2021-11-13 | End: 2021-11-20 | Stop reason: HOSPADM

## 2021-11-13 RX ORDER — DIVALPROEX SODIUM 250 MG/1
250 TABLET, DELAYED RELEASE ORAL EVERY 12 HOURS SCHEDULED
Status: DISCONTINUED | OUTPATIENT
Start: 2021-11-13 | End: 2021-11-20 | Stop reason: HOSPADM

## 2021-11-13 RX ORDER — SENNOSIDES 8.6 MG
8.6 TABLET ORAL
Status: DISCONTINUED | OUTPATIENT
Start: 2021-11-13 | End: 2021-11-20 | Stop reason: HOSPADM

## 2021-11-13 RX ORDER — ASPIRIN 81 MG/1
81 TABLET ORAL DAILY
Status: DISCONTINUED | OUTPATIENT
Start: 2021-11-13 | End: 2021-11-20 | Stop reason: HOSPADM

## 2021-11-13 RX ORDER — SERTRALINE HYDROCHLORIDE 100 MG/1
100 TABLET, FILM COATED ORAL
Status: DISCONTINUED | OUTPATIENT
Start: 2021-11-13 | End: 2021-11-20 | Stop reason: HOSPADM

## 2021-11-13 RX ORDER — IPRATROPIUM BROMIDE AND ALBUTEROL SULFATE 2.5; .5 MG/3ML; MG/3ML
3 SOLUTION RESPIRATORY (INHALATION) EVERY 4 HOURS PRN
Status: DISCONTINUED | OUTPATIENT
Start: 2021-11-13 | End: 2021-11-20 | Stop reason: HOSPADM

## 2021-11-13 RX ORDER — PANTOPRAZOLE SODIUM 20 MG/1
20 TABLET, DELAYED RELEASE ORAL
Status: DISCONTINUED | OUTPATIENT
Start: 2021-11-14 | End: 2021-11-20 | Stop reason: HOSPADM

## 2021-11-13 RX ORDER — TORSEMIDE 10 MG/1
10 TABLET ORAL DAILY
Status: DISCONTINUED | OUTPATIENT
Start: 2021-11-13 | End: 2021-11-16

## 2021-11-13 RX ORDER — ACETAMINOPHEN 325 MG/1
650 TABLET ORAL EVERY 6 HOURS PRN
Status: DISCONTINUED | OUTPATIENT
Start: 2021-11-13 | End: 2021-11-20 | Stop reason: HOSPADM

## 2021-11-13 RX ADMIN — DIVALPROEX SODIUM 250 MG: 250 TABLET, DELAYED RELEASE ORAL at 22:19

## 2021-11-13 RX ADMIN — ASPIRIN 81 MG CHEWABLE TABLET 81 MG: 81 TABLET CHEWABLE at 08:14

## 2021-11-13 RX ADMIN — INSULIN LISPRO 4 UNITS: 100 INJECTION, SOLUTION INTRAVENOUS; SUBCUTANEOUS at 08:14

## 2021-11-13 RX ADMIN — QUETIAPINE FUMARATE 200 MG: 200 TABLET, EXTENDED RELEASE ORAL at 22:21

## 2021-11-13 RX ADMIN — INSULIN GLARGINE 10 UNITS: 100 INJECTION, SOLUTION SUBCUTANEOUS at 22:19

## 2021-11-13 RX ADMIN — FERROUS GLUCONATE 324 MG: 324 TABLET ORAL at 08:14

## 2021-11-13 RX ADMIN — TORSEMIDE 10 MG: 10 TABLET ORAL at 17:11

## 2021-11-13 RX ADMIN — DIVALPROEX SODIUM 250 MG: 125 CAPSULE, COATED PELLETS ORAL at 08:13

## 2021-11-13 RX ADMIN — ATORVASTATIN CALCIUM 40 MG: 40 TABLET, FILM COATED ORAL at 17:11

## 2021-11-13 RX ADMIN — GUAIFENESIN 600 MG: 600 TABLET, EXTENDED RELEASE ORAL at 08:14

## 2021-11-13 RX ADMIN — SENNOSIDES 8.6 MG: 8.6 TABLET, FILM COATED ORAL at 22:19

## 2021-11-13 RX ADMIN — PANTOPRAZOLE SODIUM 20 MG: 20 TABLET, DELAYED RELEASE ORAL at 05:01

## 2021-11-13 RX ADMIN — METFORMIN HYDROCHLORIDE: 500 TABLET ORAL at 17:11

## 2021-11-13 RX ADMIN — GABAPENTIN 800 MG: 400 CAPSULE ORAL at 17:11

## 2021-11-13 RX ADMIN — ENOXAPARIN SODIUM 40 MG: 40 INJECTION SUBCUTANEOUS at 17:11

## 2021-11-13 RX ADMIN — GABAPENTIN 800 MG: 400 CAPSULE ORAL at 08:14

## 2021-11-13 RX ADMIN — TORSEMIDE 10 MG: 20 TABLET ORAL at 08:13

## 2021-11-13 RX ADMIN — ASPIRIN 81 MG: 81 TABLET, COATED ORAL at 17:11

## 2021-11-13 RX ADMIN — ENOXAPARIN SODIUM 30 MG: 30 INJECTION SUBCUTANEOUS at 08:14

## 2021-11-13 RX ADMIN — QUETIAPINE FUMARATE 100 MG: 100 TABLET ORAL at 08:15

## 2021-11-13 RX ADMIN — SERTRALINE HYDROCHLORIDE 100 MG: 100 TABLET ORAL at 08:14

## 2021-11-13 RX ADMIN — SERTRALINE HYDROCHLORIDE 100 MG: 100 TABLET ORAL at 22:19

## 2021-11-13 RX ADMIN — GABAPENTIN 800 MG: 400 CAPSULE ORAL at 22:18

## 2021-11-14 LAB
GLUCOSE SERPL-MCNC: 188 MG/DL (ref 65–140)
HEMOCCULT STL QL: NEGATIVE
HEMOCCULT STL QL: NORMAL
HEMOCCULT STL QL: NORMAL

## 2021-11-14 PROCEDURE — 99232 SBSQ HOSP IP/OBS MODERATE 35: CPT | Performed by: NURSE PRACTITIONER

## 2021-11-14 PROCEDURE — 82948 REAGENT STRIP/BLOOD GLUCOSE: CPT

## 2021-11-14 RX ADMIN — PANTOPRAZOLE SODIUM 20 MG: 20 TABLET, DELAYED RELEASE ORAL at 05:15

## 2021-11-14 RX ADMIN — SENNOSIDES 8.6 MG: 8.6 TABLET, FILM COATED ORAL at 22:11

## 2021-11-14 RX ADMIN — DIVALPROEX SODIUM 250 MG: 250 TABLET, DELAYED RELEASE ORAL at 08:21

## 2021-11-14 RX ADMIN — DIVALPROEX SODIUM 250 MG: 250 TABLET, DELAYED RELEASE ORAL at 22:11

## 2021-11-14 RX ADMIN — GABAPENTIN 800 MG: 400 CAPSULE ORAL at 08:21

## 2021-11-14 RX ADMIN — GABAPENTIN 800 MG: 400 CAPSULE ORAL at 16:20

## 2021-11-14 RX ADMIN — ATORVASTATIN CALCIUM 40 MG: 40 TABLET, FILM COATED ORAL at 16:20

## 2021-11-14 RX ADMIN — QUETIAPINE FUMARATE 200 MG: 200 TABLET, EXTENDED RELEASE ORAL at 22:15

## 2021-11-14 RX ADMIN — TORSEMIDE 10 MG: 10 TABLET ORAL at 08:21

## 2021-11-14 RX ADMIN — GABAPENTIN 800 MG: 400 CAPSULE ORAL at 22:10

## 2021-11-14 RX ADMIN — ASPIRIN 81 MG: 81 TABLET, COATED ORAL at 08:20

## 2021-11-14 RX ADMIN — FERROUS GLUCONATE 324 MG: 324 TABLET ORAL at 08:21

## 2021-11-14 RX ADMIN — ENOXAPARIN SODIUM 40 MG: 40 INJECTION SUBCUTANEOUS at 08:21

## 2021-11-14 RX ADMIN — INSULIN GLARGINE 10 UNITS: 100 INJECTION, SOLUTION SUBCUTANEOUS at 22:11

## 2021-11-14 RX ADMIN — SERTRALINE HYDROCHLORIDE 100 MG: 100 TABLET ORAL at 22:11

## 2021-11-15 ENCOUNTER — APPOINTMENT (INPATIENT)
Dept: RADIOLOGY | Facility: HOSPITAL | Age: 66
DRG: 177 | End: 2021-11-15
Payer: MEDICARE

## 2021-11-15 LAB
ALBUMIN SERPL BCP-MCNC: 2.8 G/DL (ref 3.5–5)
ALP SERPL-CCNC: 48 U/L (ref 46–116)
ALT SERPL W P-5'-P-CCNC: 22 U/L (ref 12–78)
ANION GAP SERPL CALCULATED.3IONS-SCNC: 4 MMOL/L (ref 4–13)
ANISOCYTOSIS BLD QL SMEAR: PRESENT
AST SERPL W P-5'-P-CCNC: 16 U/L (ref 5–45)
BASOPHILS # BLD MANUAL: 0 THOUSAND/UL (ref 0–0.1)
BASOPHILS NFR MAR MANUAL: 0 % (ref 0–1)
BILIRUB SERPL-MCNC: 0.51 MG/DL (ref 0.2–1)
BUN SERPL-MCNC: 17 MG/DL (ref 5–25)
CALCIUM ALBUM COR SERPL-MCNC: 10.6 MG/DL (ref 8.3–10.1)
CALCIUM SERPL-MCNC: 9.6 MG/DL (ref 8.3–10.1)
CHLORIDE SERPL-SCNC: 99 MMOL/L (ref 100–108)
CO2 SERPL-SCNC: 37 MMOL/L (ref 21–32)
CREAT SERPL-MCNC: 0.6 MG/DL (ref 0.6–1.3)
CRP SERPL QL: 7.5 MG/L
D DIMER PPP FEU-MCNC: 0.28 UG/ML FEU
EOSINOPHIL # BLD MANUAL: 0 THOUSAND/UL (ref 0–0.4)
EOSINOPHIL NFR BLD MANUAL: 0 % (ref 0–6)
ERYTHROCYTE [DISTWIDTH] IN BLOOD BY AUTOMATED COUNT: 17 % (ref 11.6–15.1)
EST. AVERAGE GLUCOSE BLD GHB EST-MCNC: 120 MG/DL
GFR SERPL CREATININE-BSD FRML MDRD: 95 ML/MIN/1.73SQ M
GLUCOSE SERPL-MCNC: 131 MG/DL (ref 65–140)
GLUCOSE SERPL-MCNC: 167 MG/DL (ref 65–140)
HBA1C MFR BLD: 5.8 %
HCT VFR BLD AUTO: 31.6 % (ref 34.8–46.1)
HGB BLD-MCNC: 9.3 G/DL (ref 11.5–15.4)
LG PLATELETS BLD QL SMEAR: PRESENT
LYMPHOCYTES # BLD AUTO: 1.49 THOUSAND/UL (ref 0.6–4.47)
LYMPHOCYTES # BLD AUTO: 37 % (ref 14–44)
MAGNESIUM SERPL-MCNC: 2.3 MG/DL (ref 1.6–2.6)
MCH RBC QN AUTO: 28 PG (ref 26.8–34.3)
MCHC RBC AUTO-ENTMCNC: 29.4 G/DL (ref 31.4–37.4)
MCV RBC AUTO: 95 FL (ref 82–98)
METAMYELOCYTES NFR BLD MANUAL: 2 % (ref 0–1)
MONOCYTES # BLD AUTO: 0.4 THOUSAND/UL (ref 0–1.22)
MONOCYTES NFR BLD: 10 % (ref 4–12)
MYELOCYTES NFR BLD MANUAL: 1 % (ref 0–1)
NEUTROPHILS # BLD MANUAL: 1.98 THOUSAND/UL (ref 1.85–7.62)
NEUTS BAND NFR BLD MANUAL: 1 % (ref 0–8)
NEUTS SEG NFR BLD AUTO: 48 % (ref 43–75)
OVALOCYTES BLD QL SMEAR: PRESENT
PLATELET # BLD AUTO: 259 THOUSANDS/UL (ref 149–390)
PLATELET BLD QL SMEAR: ADEQUATE
PMV BLD AUTO: 8.7 FL (ref 8.9–12.7)
POLYCHROMASIA BLD QL SMEAR: PRESENT
POTASSIUM SERPL-SCNC: 3.6 MMOL/L (ref 3.5–5.3)
PROCALCITONIN SERPL-MCNC: <0.05 NG/ML
PROT SERPL-MCNC: 6.4 G/DL (ref 6.4–8.2)
RBC # BLD AUTO: 3.32 MILLION/UL (ref 3.81–5.12)
RBC MORPH BLD: PRESENT
SODIUM SERPL-SCNC: 140 MMOL/L (ref 136–145)
VARIANT LYMPHS # BLD AUTO: 1 %
WBC # BLD AUTO: 4.04 THOUSAND/UL (ref 4.31–10.16)

## 2021-11-15 PROCEDURE — 85027 COMPLETE CBC AUTOMATED: CPT | Performed by: NURSE PRACTITIONER

## 2021-11-15 PROCEDURE — 80053 COMPREHEN METABOLIC PANEL: CPT | Performed by: NURSE PRACTITIONER

## 2021-11-15 PROCEDURE — 83036 HEMOGLOBIN GLYCOSYLATED A1C: CPT | Performed by: NURSE PRACTITIONER

## 2021-11-15 PROCEDURE — 84145 PROCALCITONIN (PCT): CPT | Performed by: NURSE PRACTITIONER

## 2021-11-15 PROCEDURE — 83735 ASSAY OF MAGNESIUM: CPT | Performed by: NURSE PRACTITIONER

## 2021-11-15 PROCEDURE — 99232 SBSQ HOSP IP/OBS MODERATE 35: CPT | Performed by: NURSE PRACTITIONER

## 2021-11-15 PROCEDURE — 71045 X-RAY EXAM CHEST 1 VIEW: CPT

## 2021-11-15 PROCEDURE — 86140 C-REACTIVE PROTEIN: CPT | Performed by: NURSE PRACTITIONER

## 2021-11-15 PROCEDURE — 82948 REAGENT STRIP/BLOOD GLUCOSE: CPT

## 2021-11-15 PROCEDURE — 85007 BL SMEAR W/DIFF WBC COUNT: CPT | Performed by: NURSE PRACTITIONER

## 2021-11-15 PROCEDURE — 85379 FIBRIN DEGRADATION QUANT: CPT | Performed by: NURSE PRACTITIONER

## 2021-11-15 RX ORDER — POTASSIUM CHLORIDE 20 MEQ/1
40 TABLET, EXTENDED RELEASE ORAL ONCE
Status: COMPLETED | OUTPATIENT
Start: 2021-11-15 | End: 2021-11-15

## 2021-11-15 RX ADMIN — ATORVASTATIN CALCIUM 40 MG: 40 TABLET, FILM COATED ORAL at 15:31

## 2021-11-15 RX ADMIN — SERTRALINE HYDROCHLORIDE 100 MG: 100 TABLET ORAL at 21:31

## 2021-11-15 RX ADMIN — ENOXAPARIN SODIUM 40 MG: 40 INJECTION SUBCUTANEOUS at 08:01

## 2021-11-15 RX ADMIN — INSULIN GLARGINE 10 UNITS: 100 INJECTION, SOLUTION SUBCUTANEOUS at 21:31

## 2021-11-15 RX ADMIN — ASPIRIN 81 MG: 81 TABLET, COATED ORAL at 08:00

## 2021-11-15 RX ADMIN — GABAPENTIN 800 MG: 400 CAPSULE ORAL at 15:06

## 2021-11-15 RX ADMIN — DIVALPROEX SODIUM 250 MG: 250 TABLET, DELAYED RELEASE ORAL at 21:31

## 2021-11-15 RX ADMIN — POTASSIUM CHLORIDE 40 MEQ: 1500 TABLET, EXTENDED RELEASE ORAL at 11:58

## 2021-11-15 RX ADMIN — DIVALPROEX SODIUM 250 MG: 250 TABLET, DELAYED RELEASE ORAL at 08:00

## 2021-11-15 RX ADMIN — SENNOSIDES 8.6 MG: 8.6 TABLET, FILM COATED ORAL at 21:31

## 2021-11-15 RX ADMIN — FERROUS GLUCONATE 324 MG: 324 TABLET ORAL at 08:00

## 2021-11-15 RX ADMIN — GABAPENTIN 800 MG: 400 CAPSULE ORAL at 21:31

## 2021-11-15 RX ADMIN — QUETIAPINE FUMARATE 200 MG: 200 TABLET, EXTENDED RELEASE ORAL at 21:36

## 2021-11-15 RX ADMIN — TORSEMIDE 10 MG: 10 TABLET ORAL at 08:01

## 2021-11-15 RX ADMIN — GABAPENTIN 800 MG: 400 CAPSULE ORAL at 08:00

## 2021-11-15 RX ADMIN — PANTOPRAZOLE SODIUM 20 MG: 20 TABLET, DELAYED RELEASE ORAL at 05:41

## 2021-11-16 LAB
ALBUMIN SERPL BCP-MCNC: 2.9 G/DL (ref 3.5–5)
ALP SERPL-CCNC: 49 U/L (ref 46–116)
ALT SERPL W P-5'-P-CCNC: 22 U/L (ref 12–78)
ANION GAP SERPL CALCULATED.3IONS-SCNC: 4 MMOL/L (ref 4–13)
AST SERPL W P-5'-P-CCNC: 17 U/L (ref 5–45)
BASOPHILS # BLD AUTO: 0.01 THOUSANDS/ΜL (ref 0–0.1)
BASOPHILS NFR BLD AUTO: 0 % (ref 0–1)
BILIRUB SERPL-MCNC: 0.45 MG/DL (ref 0.2–1)
BUN SERPL-MCNC: 17 MG/DL (ref 5–25)
CALCIUM ALBUM COR SERPL-MCNC: 10.8 MG/DL (ref 8.3–10.1)
CALCIUM SERPL-MCNC: 9.9 MG/DL (ref 8.3–10.1)
CHLORIDE SERPL-SCNC: 101 MMOL/L (ref 100–108)
CO2 SERPL-SCNC: 35 MMOL/L (ref 21–32)
CREAT SERPL-MCNC: 0.69 MG/DL (ref 0.6–1.3)
EOSINOPHIL # BLD AUTO: 0.09 THOUSAND/ΜL (ref 0–0.61)
EOSINOPHIL NFR BLD AUTO: 2 % (ref 0–6)
ERYTHROCYTE [DISTWIDTH] IN BLOOD BY AUTOMATED COUNT: 16.7 % (ref 11.6–15.1)
GFR SERPL CREATININE-BSD FRML MDRD: 90 ML/MIN/1.73SQ M
GLUCOSE SERPL-MCNC: 150 MG/DL (ref 65–140)
GLUCOSE SERPL-MCNC: 157 MG/DL (ref 65–140)
GLUCOSE SERPL-MCNC: 163 MG/DL (ref 65–140)
GLUCOSE SERPL-MCNC: 174 MG/DL (ref 65–140)
HCT VFR BLD AUTO: 33.3 % (ref 34.8–46.1)
HGB BLD-MCNC: 10 G/DL (ref 11.5–15.4)
IMM GRANULOCYTES # BLD AUTO: 0.05 THOUSAND/UL (ref 0–0.2)
IMM GRANULOCYTES NFR BLD AUTO: 1 % (ref 0–2)
LYMPHOCYTES # BLD AUTO: 1 THOUSANDS/ΜL (ref 0.6–4.47)
LYMPHOCYTES NFR BLD AUTO: 26 % (ref 14–44)
MCH RBC QN AUTO: 28.8 PG (ref 26.8–34.3)
MCHC RBC AUTO-ENTMCNC: 30 G/DL (ref 31.4–37.4)
MCV RBC AUTO: 96 FL (ref 82–98)
MONOCYTES # BLD AUTO: 0.28 THOUSAND/ΜL (ref 0.17–1.22)
MONOCYTES NFR BLD AUTO: 7 % (ref 4–12)
NEUTROPHILS # BLD AUTO: 2.37 THOUSANDS/ΜL (ref 1.85–7.62)
NEUTS SEG NFR BLD AUTO: 64 % (ref 43–75)
NRBC BLD AUTO-RTO: 0 /100 WBCS
PLATELET # BLD AUTO: 258 THOUSANDS/UL (ref 149–390)
PMV BLD AUTO: 9.1 FL (ref 8.9–12.7)
POTASSIUM SERPL-SCNC: 3.8 MMOL/L (ref 3.5–5.3)
PROT SERPL-MCNC: 6.6 G/DL (ref 6.4–8.2)
RBC # BLD AUTO: 3.47 MILLION/UL (ref 3.81–5.12)
SODIUM SERPL-SCNC: 140 MMOL/L (ref 136–145)
WBC # BLD AUTO: 3.8 THOUSAND/UL (ref 4.31–10.16)

## 2021-11-16 PROCEDURE — 99232 SBSQ HOSP IP/OBS MODERATE 35: CPT | Performed by: PHYSICIAN ASSISTANT

## 2021-11-16 PROCEDURE — 82948 REAGENT STRIP/BLOOD GLUCOSE: CPT

## 2021-11-16 PROCEDURE — 85025 COMPLETE CBC W/AUTO DIFF WBC: CPT | Performed by: PHYSICIAN ASSISTANT

## 2021-11-16 PROCEDURE — 80053 COMPREHEN METABOLIC PANEL: CPT | Performed by: PHYSICIAN ASSISTANT

## 2021-11-16 PROCEDURE — 97163 PT EVAL HIGH COMPLEX 45 MIN: CPT

## 2021-11-16 RX ADMIN — QUETIAPINE FUMARATE 200 MG: 200 TABLET, EXTENDED RELEASE ORAL at 21:54

## 2021-11-16 RX ADMIN — GABAPENTIN 800 MG: 400 CAPSULE ORAL at 21:53

## 2021-11-16 RX ADMIN — SERTRALINE HYDROCHLORIDE 100 MG: 100 TABLET ORAL at 21:53

## 2021-11-16 RX ADMIN — DIVALPROEX SODIUM 250 MG: 250 TABLET, DELAYED RELEASE ORAL at 09:52

## 2021-11-16 RX ADMIN — INSULIN LISPRO 1 UNITS: 100 INJECTION, SOLUTION INTRAVENOUS; SUBCUTANEOUS at 18:23

## 2021-11-16 RX ADMIN — TORSEMIDE 10 MG: 10 TABLET ORAL at 09:51

## 2021-11-16 RX ADMIN — DIVALPROEX SODIUM 250 MG: 250 TABLET, DELAYED RELEASE ORAL at 21:53

## 2021-11-16 RX ADMIN — GABAPENTIN 800 MG: 400 CAPSULE ORAL at 09:54

## 2021-11-16 RX ADMIN — GABAPENTIN 800 MG: 400 CAPSULE ORAL at 18:23

## 2021-11-16 RX ADMIN — INSULIN LISPRO 1 UNITS: 100 INJECTION, SOLUTION INTRAVENOUS; SUBCUTANEOUS at 22:07

## 2021-11-16 RX ADMIN — ATORVASTATIN CALCIUM 40 MG: 40 TABLET, FILM COATED ORAL at 18:23

## 2021-11-16 RX ADMIN — ASPIRIN 81 MG: 81 TABLET, COATED ORAL at 09:51

## 2021-11-16 RX ADMIN — ENOXAPARIN SODIUM 40 MG: 40 INJECTION SUBCUTANEOUS at 09:54

## 2021-11-16 RX ADMIN — PANTOPRAZOLE SODIUM 20 MG: 20 TABLET, DELAYED RELEASE ORAL at 06:13

## 2021-11-16 RX ADMIN — FERROUS GLUCONATE 324 MG: 324 TABLET ORAL at 09:51

## 2021-11-17 LAB
GLUCOSE SERPL-MCNC: 126 MG/DL (ref 65–140)
GLUCOSE SERPL-MCNC: 182 MG/DL (ref 65–140)
GLUCOSE SERPL-MCNC: 185 MG/DL (ref 65–140)
GLUCOSE SERPL-MCNC: 225 MG/DL (ref 65–140)

## 2021-11-17 PROCEDURE — 82948 REAGENT STRIP/BLOOD GLUCOSE: CPT

## 2021-11-17 PROCEDURE — 99232 SBSQ HOSP IP/OBS MODERATE 35: CPT | Performed by: PHYSICIAN ASSISTANT

## 2021-11-17 RX ADMIN — INSULIN LISPRO 2 UNITS: 100 INJECTION, SOLUTION INTRAVENOUS; SUBCUTANEOUS at 11:30

## 2021-11-17 RX ADMIN — GABAPENTIN 800 MG: 400 CAPSULE ORAL at 08:51

## 2021-11-17 RX ADMIN — SERTRALINE HYDROCHLORIDE 100 MG: 100 TABLET ORAL at 22:13

## 2021-11-17 RX ADMIN — GABAPENTIN 800 MG: 400 CAPSULE ORAL at 22:13

## 2021-11-17 RX ADMIN — SENNOSIDES 8.6 MG: 8.6 TABLET, FILM COATED ORAL at 22:13

## 2021-11-17 RX ADMIN — GABAPENTIN 800 MG: 400 CAPSULE ORAL at 16:38

## 2021-11-17 RX ADMIN — ATORVASTATIN CALCIUM 40 MG: 40 TABLET, FILM COATED ORAL at 16:38

## 2021-11-17 RX ADMIN — QUETIAPINE FUMARATE 200 MG: 200 TABLET, EXTENDED RELEASE ORAL at 22:14

## 2021-11-17 RX ADMIN — PANTOPRAZOLE SODIUM 20 MG: 20 TABLET, DELAYED RELEASE ORAL at 06:56

## 2021-11-17 RX ADMIN — FERROUS GLUCONATE 324 MG: 324 TABLET ORAL at 08:51

## 2021-11-17 RX ADMIN — ENOXAPARIN SODIUM 40 MG: 40 INJECTION SUBCUTANEOUS at 08:50

## 2021-11-17 RX ADMIN — DIVALPROEX SODIUM 250 MG: 250 TABLET, DELAYED RELEASE ORAL at 22:14

## 2021-11-17 RX ADMIN — DIVALPROEX SODIUM 250 MG: 250 TABLET, DELAYED RELEASE ORAL at 08:52

## 2021-11-17 RX ADMIN — INSULIN LISPRO 1 UNITS: 100 INJECTION, SOLUTION INTRAVENOUS; SUBCUTANEOUS at 16:36

## 2021-11-17 RX ADMIN — ASPIRIN 81 MG: 81 TABLET, COATED ORAL at 08:51

## 2021-11-17 RX ADMIN — INSULIN LISPRO 1 UNITS: 100 INJECTION, SOLUTION INTRAVENOUS; SUBCUTANEOUS at 22:15

## 2021-11-18 LAB
GLUCOSE SERPL-MCNC: 131 MG/DL (ref 65–140)
GLUCOSE SERPL-MCNC: 134 MG/DL (ref 65–140)
GLUCOSE SERPL-MCNC: 162 MG/DL (ref 65–140)
GLUCOSE SERPL-MCNC: 200 MG/DL (ref 65–140)

## 2021-11-18 PROCEDURE — 94760 N-INVAS EAR/PLS OXIMETRY 1: CPT

## 2021-11-18 PROCEDURE — 99232 SBSQ HOSP IP/OBS MODERATE 35: CPT | Performed by: NURSE PRACTITIONER

## 2021-11-18 PROCEDURE — 97167 OT EVAL HIGH COMPLEX 60 MIN: CPT

## 2021-11-18 PROCEDURE — 82948 REAGENT STRIP/BLOOD GLUCOSE: CPT

## 2021-11-18 RX ORDER — BISACODYL 10 MG
10 SUPPOSITORY, RECTAL RECTAL ONCE
Status: COMPLETED | OUTPATIENT
Start: 2021-11-18 | End: 2021-11-18

## 2021-11-18 RX ORDER — INSULIN GLARGINE 100 [IU]/ML
8 INJECTION, SOLUTION SUBCUTANEOUS
Status: DISCONTINUED | OUTPATIENT
Start: 2021-11-18 | End: 2021-11-20 | Stop reason: HOSPADM

## 2021-11-18 RX ADMIN — SERTRALINE HYDROCHLORIDE 100 MG: 100 TABLET ORAL at 22:02

## 2021-11-18 RX ADMIN — ASPIRIN 81 MG: 81 TABLET, COATED ORAL at 08:58

## 2021-11-18 RX ADMIN — INSULIN LISPRO 1 UNITS: 100 INJECTION, SOLUTION INTRAVENOUS; SUBCUTANEOUS at 12:30

## 2021-11-18 RX ADMIN — ENOXAPARIN SODIUM 40 MG: 40 INJECTION SUBCUTANEOUS at 08:58

## 2021-11-18 RX ADMIN — GABAPENTIN 800 MG: 400 CAPSULE ORAL at 08:58

## 2021-11-18 RX ADMIN — GABAPENTIN 800 MG: 400 CAPSULE ORAL at 15:50

## 2021-11-18 RX ADMIN — FERROUS GLUCONATE 324 MG: 324 TABLET ORAL at 08:58

## 2021-11-18 RX ADMIN — GABAPENTIN 800 MG: 400 CAPSULE ORAL at 22:00

## 2021-11-18 RX ADMIN — QUETIAPINE FUMARATE 200 MG: 200 TABLET, EXTENDED RELEASE ORAL at 22:02

## 2021-11-18 RX ADMIN — BISACODYL 10 MG: 10 SUPPOSITORY RECTAL at 14:55

## 2021-11-18 RX ADMIN — INSULIN GLARGINE 8 UNITS: 100 INJECTION, SOLUTION SUBCUTANEOUS at 22:07

## 2021-11-18 RX ADMIN — INSULIN LISPRO 1 UNITS: 100 INJECTION, SOLUTION INTRAVENOUS; SUBCUTANEOUS at 22:05

## 2021-11-18 RX ADMIN — ATORVASTATIN CALCIUM 40 MG: 40 TABLET, FILM COATED ORAL at 15:46

## 2021-11-18 RX ADMIN — DIVALPROEX SODIUM 250 MG: 250 TABLET, DELAYED RELEASE ORAL at 22:00

## 2021-11-18 RX ADMIN — SENNOSIDES 8.6 MG: 8.6 TABLET, FILM COATED ORAL at 22:01

## 2021-11-18 RX ADMIN — PANTOPRAZOLE SODIUM 20 MG: 20 TABLET, DELAYED RELEASE ORAL at 05:30

## 2021-11-18 RX ADMIN — DIVALPROEX SODIUM 250 MG: 250 TABLET, DELAYED RELEASE ORAL at 08:58

## 2021-11-19 LAB
GLUCOSE SERPL-MCNC: 133 MG/DL (ref 65–140)
GLUCOSE SERPL-MCNC: 169 MG/DL (ref 65–140)
GLUCOSE SERPL-MCNC: 169 MG/DL (ref 65–140)
GLUCOSE SERPL-MCNC: 198 MG/DL (ref 65–140)

## 2021-11-19 PROCEDURE — 99232 SBSQ HOSP IP/OBS MODERATE 35: CPT | Performed by: NURSE PRACTITIONER

## 2021-11-19 PROCEDURE — 97110 THERAPEUTIC EXERCISES: CPT

## 2021-11-19 PROCEDURE — 82948 REAGENT STRIP/BLOOD GLUCOSE: CPT

## 2021-11-19 RX ADMIN — SENNOSIDES 8.6 MG: 8.6 TABLET, FILM COATED ORAL at 22:00

## 2021-11-19 RX ADMIN — FERROUS GLUCONATE 324 MG: 324 TABLET ORAL at 10:40

## 2021-11-19 RX ADMIN — ASPIRIN 81 MG: 81 TABLET, COATED ORAL at 10:40

## 2021-11-19 RX ADMIN — DIVALPROEX SODIUM 250 MG: 250 TABLET, DELAYED RELEASE ORAL at 10:40

## 2021-11-19 RX ADMIN — GABAPENTIN 800 MG: 400 CAPSULE ORAL at 10:40

## 2021-11-19 RX ADMIN — GABAPENTIN 800 MG: 400 CAPSULE ORAL at 18:08

## 2021-11-19 RX ADMIN — ENOXAPARIN SODIUM 40 MG: 40 INJECTION SUBCUTANEOUS at 10:40

## 2021-11-19 RX ADMIN — INSULIN LISPRO 1 UNITS: 100 INJECTION, SOLUTION INTRAVENOUS; SUBCUTANEOUS at 18:08

## 2021-11-19 RX ADMIN — INSULIN GLARGINE 8 UNITS: 100 INJECTION, SOLUTION SUBCUTANEOUS at 22:00

## 2021-11-19 RX ADMIN — QUETIAPINE FUMARATE 200 MG: 200 TABLET, EXTENDED RELEASE ORAL at 23:00

## 2021-11-19 RX ADMIN — INSULIN LISPRO 1 UNITS: 100 INJECTION, SOLUTION INTRAVENOUS; SUBCUTANEOUS at 22:00

## 2021-11-19 RX ADMIN — ATORVASTATIN CALCIUM 40 MG: 40 TABLET, FILM COATED ORAL at 18:08

## 2021-11-19 RX ADMIN — INSULIN LISPRO 1 UNITS: 100 INJECTION, SOLUTION INTRAVENOUS; SUBCUTANEOUS at 11:57

## 2021-11-19 RX ADMIN — PANTOPRAZOLE SODIUM 20 MG: 20 TABLET, DELAYED RELEASE ORAL at 06:23

## 2021-11-19 RX ADMIN — SERTRALINE HYDROCHLORIDE 100 MG: 100 TABLET ORAL at 22:00

## 2021-11-19 RX ADMIN — DIVALPROEX SODIUM 250 MG: 250 TABLET, DELAYED RELEASE ORAL at 22:00

## 2021-11-19 RX ADMIN — GABAPENTIN 800 MG: 400 CAPSULE ORAL at 22:00

## 2021-11-20 VITALS
WEIGHT: 187 LBS | DIASTOLIC BLOOD PRESSURE: 61 MMHG | RESPIRATION RATE: 16 BRPM | HEIGHT: 60 IN | OXYGEN SATURATION: 97 % | SYSTOLIC BLOOD PRESSURE: 131 MMHG | HEART RATE: 53 BPM | BODY MASS INDEX: 36.71 KG/M2 | TEMPERATURE: 96.4 F

## 2021-11-20 PROBLEM — U07.1 COVID-19: Status: RESOLVED | Noted: 2021-11-06 | Resolved: 2021-11-20

## 2021-11-20 PROBLEM — J18.9 SEPSIS DUE TO PNEUMONIA (HCC): Status: RESOLVED | Noted: 2021-07-18 | Resolved: 2021-11-20

## 2021-11-20 PROBLEM — A41.9 SEPSIS DUE TO PNEUMONIA (HCC): Status: RESOLVED | Noted: 2021-07-18 | Resolved: 2021-11-20

## 2021-11-20 LAB
ANION GAP SERPL CALCULATED.3IONS-SCNC: 6 MMOL/L (ref 4–13)
BUN SERPL-MCNC: 16 MG/DL (ref 5–25)
CALCIUM SERPL-MCNC: 9.9 MG/DL (ref 8.3–10.1)
CHLORIDE SERPL-SCNC: 106 MMOL/L (ref 100–108)
CO2 SERPL-SCNC: 33 MMOL/L (ref 21–32)
CREAT SERPL-MCNC: 0.63 MG/DL (ref 0.6–1.3)
ERYTHROCYTE [DISTWIDTH] IN BLOOD BY AUTOMATED COUNT: 16 % (ref 11.6–15.1)
GFR SERPL CREATININE-BSD FRML MDRD: 93 ML/MIN/1.73SQ M
GLUCOSE SERPL-MCNC: 123 MG/DL (ref 65–140)
GLUCOSE SERPL-MCNC: 131 MG/DL (ref 65–140)
GLUCOSE SERPL-MCNC: 209 MG/DL (ref 65–140)
HCT VFR BLD AUTO: 30.6 % (ref 34.8–46.1)
HGB BLD-MCNC: 9.2 G/DL (ref 11.5–15.4)
MCH RBC QN AUTO: 28.3 PG (ref 26.8–34.3)
MCHC RBC AUTO-ENTMCNC: 30.1 G/DL (ref 31.4–37.4)
MCV RBC AUTO: 94 FL (ref 82–98)
PLATELET # BLD AUTO: 238 THOUSANDS/UL (ref 149–390)
PMV BLD AUTO: 9.3 FL (ref 8.9–12.7)
POTASSIUM SERPL-SCNC: 3.8 MMOL/L (ref 3.5–5.3)
RBC # BLD AUTO: 3.25 MILLION/UL (ref 3.81–5.12)
SODIUM SERPL-SCNC: 145 MMOL/L (ref 136–145)
WBC # BLD AUTO: 5.05 THOUSAND/UL (ref 4.31–10.16)

## 2021-11-20 PROCEDURE — 82948 REAGENT STRIP/BLOOD GLUCOSE: CPT

## 2021-11-20 PROCEDURE — 99239 HOSP IP/OBS DSCHRG MGMT >30: CPT | Performed by: INTERNAL MEDICINE

## 2021-11-20 PROCEDURE — 85027 COMPLETE CBC AUTOMATED: CPT | Performed by: NURSE PRACTITIONER

## 2021-11-20 PROCEDURE — 80048 BASIC METABOLIC PNL TOTAL CA: CPT | Performed by: NURSE PRACTITIONER

## 2021-11-20 RX ORDER — INSULIN GLARGINE 100 [IU]/ML
8 INJECTION, SOLUTION SUBCUTANEOUS
Qty: 10 ML | Refills: 0
Start: 2021-11-20 | End: 2022-05-21

## 2021-11-20 RX ORDER — GABAPENTIN 400 MG/1
800 CAPSULE ORAL 3 TIMES DAILY
Refills: 0
Start: 2021-11-20 | End: 2022-06-02

## 2021-11-20 RX ADMIN — PANTOPRAZOLE SODIUM 20 MG: 20 TABLET, DELAYED RELEASE ORAL at 06:20

## 2021-11-20 RX ADMIN — GABAPENTIN 800 MG: 400 CAPSULE ORAL at 10:13

## 2021-11-20 RX ADMIN — ENOXAPARIN SODIUM 40 MG: 40 INJECTION SUBCUTANEOUS at 10:13

## 2021-11-20 RX ADMIN — ASPIRIN 81 MG: 81 TABLET, COATED ORAL at 10:13

## 2021-11-20 RX ADMIN — DIVALPROEX SODIUM 250 MG: 250 TABLET, DELAYED RELEASE ORAL at 10:14

## 2021-11-20 RX ADMIN — FERROUS GLUCONATE 324 MG: 324 TABLET ORAL at 10:13

## 2021-11-20 RX ADMIN — INSULIN LISPRO 1 UNITS: 100 INJECTION, SOLUTION INTRAVENOUS; SUBCUTANEOUS at 12:45

## 2022-05-19 ENCOUNTER — APPOINTMENT (EMERGENCY)
Dept: CT IMAGING | Facility: HOSPITAL | Age: 67
DRG: 689 | End: 2022-05-19
Payer: MEDICARE

## 2022-05-19 ENCOUNTER — HOSPITAL ENCOUNTER (INPATIENT)
Facility: HOSPITAL | Age: 67
LOS: 2 days | Discharge: HOME HEALTH | DRG: 689 | End: 2022-05-21
Attending: EMERGENCY MEDICINE | Admitting: FAMILY MEDICINE
Payer: MEDICARE

## 2022-05-19 ENCOUNTER — APPOINTMENT (EMERGENCY)
Dept: RADIOLOGY | Facility: HOSPITAL | Age: 67
DRG: 689 | End: 2022-05-19
Payer: MEDICARE

## 2022-05-19 DIAGNOSIS — T68.XXXA HYPOTHERMIA: ICD-10-CM

## 2022-05-19 DIAGNOSIS — E03.9 HYPOTHYROID: ICD-10-CM

## 2022-05-19 DIAGNOSIS — A41.9 SEPSIS (HCC): Primary | ICD-10-CM

## 2022-05-19 DIAGNOSIS — F79 INTELLECTUAL DISABILITY: Chronic | ICD-10-CM

## 2022-05-19 DIAGNOSIS — R33.9 URINARY RETENTION: ICD-10-CM

## 2022-05-19 DIAGNOSIS — N30.90 CYSTITIS WITHOUT HEMATURIA: ICD-10-CM

## 2022-05-19 DIAGNOSIS — G40.909 SEIZURE DISORDER (HCC): Chronic | ICD-10-CM

## 2022-05-19 PROBLEM — G93.41 ACUTE METABOLIC ENCEPHALOPATHY: Status: ACTIVE | Noted: 2022-05-19

## 2022-05-19 LAB
2HR DELTA HS TROPONIN: 1 NG/L
4HR DELTA HS TROPONIN: 1 NG/L
ABO GROUP BLD: NORMAL
ABO GROUP BLD: NORMAL
ALBUMIN SERPL BCP-MCNC: 3.2 G/DL (ref 3.5–5)
ALP SERPL-CCNC: 51 U/L (ref 46–116)
ALT SERPL W P-5'-P-CCNC: 25 U/L (ref 12–78)
AMMONIA PLAS-SCNC: <10 UMOL/L (ref 11–35)
ANION GAP SERPL CALCULATED.3IONS-SCNC: 5 MMOL/L (ref 4–13)
APTT PPP: 27 SECONDS (ref 23–37)
AST SERPL W P-5'-P-CCNC: 13 U/L (ref 5–45)
BACTERIA UR QL AUTO: ABNORMAL /HPF
BASE EX.OXY STD BLDV CALC-SCNC: 58.8 % (ref 60–80)
BASE EXCESS BLDV CALC-SCNC: 4.4 MMOL/L
BASOPHILS # BLD AUTO: 0.01 THOUSANDS/ΜL (ref 0–0.1)
BASOPHILS NFR BLD AUTO: 0 % (ref 0–1)
BETA-HYDROXYBUTYRATE: 0.2 MMOL/L
BILIRUB SERPL-MCNC: 0.34 MG/DL (ref 0.2–1)
BILIRUB UR QL STRIP: NEGATIVE
BLD GP AB SCN SERPL QL: NEGATIVE
BUN SERPL-MCNC: 6 MG/DL (ref 5–25)
CALCIUM ALBUM COR SERPL-MCNC: 10.4 MG/DL (ref 8.3–10.1)
CALCIUM SERPL-MCNC: 9.8 MG/DL (ref 8.3–10.1)
CARDIAC TROPONIN I PNL SERPL HS: 3 NG/L
CARDIAC TROPONIN I PNL SERPL HS: 4 NG/L
CARDIAC TROPONIN I PNL SERPL HS: 4 NG/L
CHLORIDE SERPL-SCNC: 97 MMOL/L (ref 100–108)
CLARITY UR: CLEAR
CO2 SERPL-SCNC: 32 MMOL/L (ref 21–32)
COLOR UR: COLORLESS
CREAT SERPL-MCNC: 0.6 MG/DL (ref 0.6–1.3)
EOSINOPHIL # BLD AUTO: 0.14 THOUSAND/ΜL (ref 0–0.61)
EOSINOPHIL NFR BLD AUTO: 3 % (ref 0–6)
ERYTHROCYTE [DISTWIDTH] IN BLOOD BY AUTOMATED COUNT: 15.4 % (ref 11.6–15.1)
FLUAV RNA RESP QL NAA+PROBE: NEGATIVE
FLUBV RNA RESP QL NAA+PROBE: NEGATIVE
GFR SERPL CREATININE-BSD FRML MDRD: 93 ML/MIN/1.73SQ M
GLUCOSE SERPL-MCNC: 113 MG/DL (ref 65–140)
GLUCOSE SERPL-MCNC: 153 MG/DL (ref 65–140)
GLUCOSE SERPL-MCNC: 169 MG/DL (ref 65–140)
GLUCOSE SERPL-MCNC: 55 MG/DL (ref 65–140)
GLUCOSE SERPL-MCNC: 58 MG/DL (ref 65–140)
GLUCOSE UR STRIP-MCNC: NEGATIVE MG/DL
HCO3 BLDV-SCNC: 31 MMOL/L (ref 24–30)
HCT VFR BLD AUTO: 34 % (ref 34.8–46.1)
HGB BLD-MCNC: 10.6 G/DL (ref 11.5–15.4)
HGB UR QL STRIP.AUTO: NEGATIVE
HYALINE CASTS #/AREA URNS LPF: ABNORMAL /LPF
IMM GRANULOCYTES # BLD AUTO: 0.03 THOUSAND/UL (ref 0–0.2)
IMM GRANULOCYTES NFR BLD AUTO: 1 % (ref 0–2)
INR PPP: 0.92 (ref 0.84–1.19)
KETONES UR STRIP-MCNC: NEGATIVE MG/DL
LACTATE SERPL-SCNC: 1 MMOL/L (ref 0.5–2)
LACTATE SERPL-SCNC: 2.2 MMOL/L (ref 0.5–2)
LEUKOCYTE ESTERASE UR QL STRIP: NEGATIVE
LYMPHOCYTES # BLD AUTO: 1.39 THOUSANDS/ΜL (ref 0.6–4.47)
LYMPHOCYTES NFR BLD AUTO: 31 % (ref 14–44)
MAGNESIUM SERPL-MCNC: 1.4 MG/DL (ref 1.6–2.6)
MCH RBC QN AUTO: 29.3 PG (ref 26.8–34.3)
MCHC RBC AUTO-ENTMCNC: 31.2 G/DL (ref 31.4–37.4)
MCV RBC AUTO: 94 FL (ref 82–98)
MONOCYTES # BLD AUTO: 0.4 THOUSAND/ΜL (ref 0.17–1.22)
MONOCYTES NFR BLD AUTO: 9 % (ref 4–12)
NEUTROPHILS # BLD AUTO: 2.5 THOUSANDS/ΜL (ref 1.85–7.62)
NEUTS SEG NFR BLD AUTO: 56 % (ref 43–75)
NITRITE UR QL STRIP: POSITIVE
NON-SQ EPI CELLS URNS QL MICRO: ABNORMAL /HPF
NRBC BLD AUTO-RTO: 0 /100 WBCS
NT-PROBNP SERPL-MCNC: 105 PG/ML
O2 CT BLDV-SCNC: 9.8 ML/DL
PCO2 BLDV: 55.3 MM HG (ref 42–50)
PH BLDV: 7.37 [PH] (ref 7.3–7.4)
PH UR STRIP.AUTO: 5.5 [PH]
PLATELET # BLD AUTO: 208 THOUSANDS/UL (ref 149–390)
PMV BLD AUTO: 8.8 FL (ref 8.9–12.7)
PO2 BLDV: 34 MM HG (ref 35–45)
POTASSIUM SERPL-SCNC: 3.7 MMOL/L (ref 3.5–5.3)
PROCALCITONIN SERPL-MCNC: <0.05 NG/ML
PROT SERPL-MCNC: 6.8 G/DL (ref 6.4–8.2)
PROT UR STRIP-MCNC: NEGATIVE MG/DL
PROTHROMBIN TIME: 12.3 SECONDS (ref 11.6–14.5)
RBC # BLD AUTO: 3.62 MILLION/UL (ref 3.81–5.12)
RBC #/AREA URNS AUTO: ABNORMAL /HPF
RH BLD: POSITIVE
RH BLD: POSITIVE
RSV RNA RESP QL NAA+PROBE: NEGATIVE
SARS-COV-2 RNA RESP QL NAA+PROBE: NEGATIVE
SODIUM SERPL-SCNC: 134 MMOL/L (ref 136–145)
SP GR UR STRIP.AUTO: 1.01 (ref 1–1.03)
SPECIMEN EXPIRATION DATE: NORMAL
TSH SERPL DL<=0.05 MIU/L-ACNC: 1.41 UIU/ML (ref 0.45–4.5)
UROBILINOGEN UR QL STRIP.AUTO: 0.2 E.U./DL
VALPROATE SERPL-MCNC: 72 UG/ML (ref 50–100)
WBC # BLD AUTO: 4.47 THOUSAND/UL (ref 4.31–10.16)
WBC #/AREA URNS AUTO: ABNORMAL /HPF

## 2022-05-19 PROCEDURE — 83880 ASSAY OF NATRIURETIC PEPTIDE: CPT | Performed by: EMERGENCY MEDICINE

## 2022-05-19 PROCEDURE — 84443 ASSAY THYROID STIM HORMONE: CPT | Performed by: EMERGENCY MEDICINE

## 2022-05-19 PROCEDURE — 71045 X-RAY EXAM CHEST 1 VIEW: CPT

## 2022-05-19 PROCEDURE — 70450 CT HEAD/BRAIN W/O DYE: CPT

## 2022-05-19 PROCEDURE — 87040 BLOOD CULTURE FOR BACTERIA: CPT | Performed by: EMERGENCY MEDICINE

## 2022-05-19 PROCEDURE — 83735 ASSAY OF MAGNESIUM: CPT | Performed by: EMERGENCY MEDICINE

## 2022-05-19 PROCEDURE — 96361 HYDRATE IV INFUSION ADD-ON: CPT

## 2022-05-19 PROCEDURE — 87077 CULTURE AEROBIC IDENTIFY: CPT | Performed by: EMERGENCY MEDICINE

## 2022-05-19 PROCEDURE — 86901 BLOOD TYPING SEROLOGIC RH(D): CPT | Performed by: EMERGENCY MEDICINE

## 2022-05-19 PROCEDURE — 87086 URINE CULTURE/COLONY COUNT: CPT | Performed by: EMERGENCY MEDICINE

## 2022-05-19 PROCEDURE — 83605 ASSAY OF LACTIC ACID: CPT | Performed by: EMERGENCY MEDICINE

## 2022-05-19 PROCEDURE — 84484 ASSAY OF TROPONIN QUANT: CPT

## 2022-05-19 PROCEDURE — 82948 REAGENT STRIP/BLOOD GLUCOSE: CPT

## 2022-05-19 PROCEDURE — 99285 EMERGENCY DEPT VISIT HI MDM: CPT | Performed by: EMERGENCY MEDICINE

## 2022-05-19 PROCEDURE — 84484 ASSAY OF TROPONIN QUANT: CPT | Performed by: EMERGENCY MEDICINE

## 2022-05-19 PROCEDURE — 80053 COMPREHEN METABOLIC PANEL: CPT | Performed by: EMERGENCY MEDICINE

## 2022-05-19 PROCEDURE — 0241U HB NFCT DS VIR RESP RNA 4 TRGT: CPT | Performed by: EMERGENCY MEDICINE

## 2022-05-19 PROCEDURE — 85025 COMPLETE CBC W/AUTO DIFF WBC: CPT | Performed by: EMERGENCY MEDICINE

## 2022-05-19 PROCEDURE — 82010 KETONE BODYS QUAN: CPT | Performed by: EMERGENCY MEDICINE

## 2022-05-19 PROCEDURE — 86850 RBC ANTIBODY SCREEN: CPT | Performed by: EMERGENCY MEDICINE

## 2022-05-19 PROCEDURE — 99285 EMERGENCY DEPT VISIT HI MDM: CPT

## 2022-05-19 PROCEDURE — 99223 1ST HOSP IP/OBS HIGH 75: CPT | Performed by: FAMILY MEDICINE

## 2022-05-19 PROCEDURE — 82805 BLOOD GASES W/O2 SATURATION: CPT | Performed by: EMERGENCY MEDICINE

## 2022-05-19 PROCEDURE — 87040 BLOOD CULTURE FOR BACTERIA: CPT

## 2022-05-19 PROCEDURE — 74176 CT ABD & PELVIS W/O CONTRAST: CPT

## 2022-05-19 PROCEDURE — 87186 SC STD MICRODIL/AGAR DIL: CPT | Performed by: EMERGENCY MEDICINE

## 2022-05-19 PROCEDURE — 36415 COLL VENOUS BLD VENIPUNCTURE: CPT | Performed by: EMERGENCY MEDICINE

## 2022-05-19 PROCEDURE — 80164 ASSAY DIPROPYLACETIC ACD TOT: CPT | Performed by: EMERGENCY MEDICINE

## 2022-05-19 PROCEDURE — 86900 BLOOD TYPING SEROLOGIC ABO: CPT | Performed by: EMERGENCY MEDICINE

## 2022-05-19 PROCEDURE — 93005 ELECTROCARDIOGRAM TRACING: CPT

## 2022-05-19 PROCEDURE — 85730 THROMBOPLASTIN TIME PARTIAL: CPT | Performed by: EMERGENCY MEDICINE

## 2022-05-19 PROCEDURE — 82140 ASSAY OF AMMONIA: CPT | Performed by: EMERGENCY MEDICINE

## 2022-05-19 PROCEDURE — 84145 PROCALCITONIN (PCT): CPT

## 2022-05-19 PROCEDURE — 96365 THER/PROPH/DIAG IV INF INIT: CPT

## 2022-05-19 PROCEDURE — 81001 URINALYSIS AUTO W/SCOPE: CPT | Performed by: EMERGENCY MEDICINE

## 2022-05-19 PROCEDURE — 85610 PROTHROMBIN TIME: CPT | Performed by: EMERGENCY MEDICINE

## 2022-05-19 RX ORDER — DOCUSATE SODIUM 100 MG/1
100 CAPSULE, LIQUID FILLED ORAL 2 TIMES DAILY
COMMUNITY

## 2022-05-19 RX ORDER — MAGNESIUM SULFATE HEPTAHYDRATE 40 MG/ML
4 INJECTION, SOLUTION INTRAVENOUS ONCE
Status: COMPLETED | OUTPATIENT
Start: 2022-05-19 | End: 2022-05-19

## 2022-05-19 RX ORDER — SIMETHICONE 20 MG/.3ML
40 EMULSION ORAL EVERY 6 HOURS PRN
Status: DISCONTINUED | OUTPATIENT
Start: 2022-05-19 | End: 2022-05-21 | Stop reason: HOSPADM

## 2022-05-19 RX ORDER — GABAPENTIN 400 MG/1
800 CAPSULE ORAL 3 TIMES DAILY
Status: DISCONTINUED | OUTPATIENT
Start: 2022-05-19 | End: 2022-05-21 | Stop reason: HOSPADM

## 2022-05-19 RX ORDER — TAMSULOSIN HYDROCHLORIDE 0.4 MG/1
0.4 CAPSULE ORAL
Status: DISCONTINUED | OUTPATIENT
Start: 2022-05-19 | End: 2022-05-21 | Stop reason: HOSPADM

## 2022-05-19 RX ORDER — QUETIAPINE FUMARATE 100 MG/1
100 TABLET, FILM COATED ORAL 2 TIMES DAILY
Status: DISCONTINUED | OUTPATIENT
Start: 2022-05-19 | End: 2022-05-21 | Stop reason: HOSPADM

## 2022-05-19 RX ORDER — LORAZEPAM 0.5 MG/1
0.5 TABLET ORAL 2 TIMES DAILY
COMMUNITY

## 2022-05-19 RX ORDER — ACETAMINOPHEN 160 MG/1
640 BAR, CHEWABLE ORAL EVERY 6 HOURS PRN
Status: DISCONTINUED | OUTPATIENT
Start: 2022-05-19 | End: 2022-05-21 | Stop reason: HOSPADM

## 2022-05-19 RX ORDER — HEPARIN SODIUM 5000 [USP'U]/ML
5000 INJECTION, SOLUTION INTRAVENOUS; SUBCUTANEOUS EVERY 8 HOURS SCHEDULED
Status: DISCONTINUED | OUTPATIENT
Start: 2022-05-19 | End: 2022-05-20

## 2022-05-19 RX ORDER — TORSEMIDE 20 MG/1
10 TABLET ORAL DAILY
Status: DISCONTINUED | OUTPATIENT
Start: 2022-05-20 | End: 2022-05-19

## 2022-05-19 RX ORDER — SERTRALINE HYDROCHLORIDE 100 MG/1
100 TABLET, FILM COATED ORAL 2 TIMES DAILY
Status: DISCONTINUED | OUTPATIENT
Start: 2022-05-19 | End: 2022-05-21 | Stop reason: HOSPADM

## 2022-05-19 RX ORDER — METHENAMINE HIPPURATE 1000 MG/1
1 TABLET ORAL 2 TIMES DAILY WITH MEALS
Status: DISCONTINUED | OUTPATIENT
Start: 2022-05-19 | End: 2022-05-19

## 2022-05-19 RX ORDER — QUETIAPINE FUMARATE 100 MG/1
100 TABLET, FILM COATED ORAL 2 TIMES DAILY
COMMUNITY

## 2022-05-19 RX ORDER — ATORVASTATIN CALCIUM 40 MG/1
80 TABLET, FILM COATED ORAL
Status: DISCONTINUED | OUTPATIENT
Start: 2022-05-19 | End: 2022-05-21 | Stop reason: HOSPADM

## 2022-05-19 RX ORDER — PANTOPRAZOLE SODIUM 20 MG/1
20 TABLET, DELAYED RELEASE ORAL
Status: DISCONTINUED | OUTPATIENT
Start: 2022-05-20 | End: 2022-05-21 | Stop reason: HOSPADM

## 2022-05-19 RX ORDER — SENNOSIDES 8.6 MG
8.6 TABLET ORAL
Status: DISCONTINUED | OUTPATIENT
Start: 2022-05-19 | End: 2022-05-21 | Stop reason: HOSPADM

## 2022-05-19 RX ORDER — GLIPIZIDE AND METFORMIN HCL 2.5; 25 MG/1; MG/1
1 TABLET, FILM COATED ORAL
COMMUNITY

## 2022-05-19 RX ORDER — METHENAMINE HIPPURATE 1000 MG/1
1 TABLET ORAL 2 TIMES DAILY WITH MEALS
Status: ON HOLD | COMMUNITY
End: 2022-05-21 | Stop reason: SDUPTHER

## 2022-05-19 RX ORDER — CEFTRIAXONE 1 G/50ML
1000 INJECTION, SOLUTION INTRAVENOUS EVERY 24 HOURS
Status: DISCONTINUED | OUTPATIENT
Start: 2022-05-20 | End: 2022-05-21 | Stop reason: HOSPADM

## 2022-05-19 RX ORDER — DEXTROSE MONOHYDRATE 25 G/50ML
25 INJECTION, SOLUTION INTRAVENOUS ONCE
Status: DISCONTINUED | OUTPATIENT
Start: 2022-05-19 | End: 2022-05-19 | Stop reason: CLARIF

## 2022-05-19 RX ORDER — DIVALPROEX SODIUM 250 MG/1
250 TABLET, DELAYED RELEASE ORAL EVERY MORNING
Status: DISCONTINUED | OUTPATIENT
Start: 2022-05-20 | End: 2022-05-21 | Stop reason: HOSPADM

## 2022-05-19 RX ORDER — DEXTROSE MONOHYDRATE 100 MG/ML
125 INJECTION, SOLUTION INTRAVENOUS ONCE
Status: COMPLETED | OUTPATIENT
Start: 2022-05-19 | End: 2022-05-19

## 2022-05-19 RX ORDER — DOXYCYCLINE HYCLATE 50 MG/1
324 CAPSULE, GELATIN COATED ORAL
Status: DISCONTINUED | OUTPATIENT
Start: 2022-05-20 | End: 2022-05-21 | Stop reason: HOSPADM

## 2022-05-19 RX ORDER — DIVALPROEX SODIUM 500 MG/1
500 TABLET, DELAYED RELEASE ORAL EVERY EVENING
Status: DISCONTINUED | OUTPATIENT
Start: 2022-05-19 | End: 2022-05-21 | Stop reason: HOSPADM

## 2022-05-19 RX ORDER — LEVOTHYROXINE SODIUM 0.05 MG/1
50 TABLET ORAL
Status: DISCONTINUED | OUTPATIENT
Start: 2022-05-20 | End: 2022-05-21 | Stop reason: HOSPADM

## 2022-05-19 RX ORDER — QUETIAPINE FUMARATE 200 MG/1
200 TABLET, FILM COATED ORAL
Status: DISCONTINUED | OUTPATIENT
Start: 2022-05-19 | End: 2022-05-21 | Stop reason: HOSPADM

## 2022-05-19 RX ORDER — OXYBUTYNIN CHLORIDE 5 MG/1
10 TABLET, EXTENDED RELEASE ORAL DAILY
Status: DISCONTINUED | OUTPATIENT
Start: 2022-05-20 | End: 2022-05-19

## 2022-05-19 RX ORDER — CEFTRIAXONE 1 G/50ML
1000 INJECTION, SOLUTION INTRAVENOUS ONCE
Status: COMPLETED | OUTPATIENT
Start: 2022-05-19 | End: 2022-05-19

## 2022-05-19 RX ORDER — ASPIRIN 81 MG/1
81 TABLET ORAL DAILY
Status: DISCONTINUED | OUTPATIENT
Start: 2022-05-20 | End: 2022-05-21 | Stop reason: HOSPADM

## 2022-05-19 RX ORDER — INSULIN LISPRO 100 [IU]/ML
1-6 INJECTION, SOLUTION INTRAVENOUS; SUBCUTANEOUS
Status: DISCONTINUED | OUTPATIENT
Start: 2022-05-19 | End: 2022-05-21 | Stop reason: HOSPADM

## 2022-05-19 RX ADMIN — CEFTRIAXONE 1000 MG: 1 INJECTION, SOLUTION INTRAVENOUS at 14:54

## 2022-05-19 RX ADMIN — QUETIAPINE FUMARATE 100 MG: 100 TABLET ORAL at 18:29

## 2022-05-19 RX ADMIN — THIAMINE HYDROCHLORIDE 200 MG: 100 INJECTION, SOLUTION INTRAMUSCULAR; INTRAVENOUS at 18:38

## 2022-05-19 RX ADMIN — DEXTROSE 125 ML: 10 SOLUTION INTRAVENOUS at 19:12

## 2022-05-19 RX ADMIN — GABAPENTIN 800 MG: 400 CAPSULE ORAL at 21:37

## 2022-05-19 RX ADMIN — QUETIAPINE FUMARATE 200 MG: 200 TABLET ORAL at 21:37

## 2022-05-19 RX ADMIN — STANDARDIZED SENNA CONCENTRATE 8.6 MG: 8.6 TABLET ORAL at 21:38

## 2022-05-19 RX ADMIN — SODIUM CHLORIDE 1000 ML: 0.9 INJECTION, SOLUTION INTRAVENOUS at 11:50

## 2022-05-19 RX ADMIN — DIVALPROEX SODIUM 500 MG: 500 TABLET, DELAYED RELEASE ORAL at 18:29

## 2022-05-19 RX ADMIN — MAGNESIUM SULFATE HEPTAHYDRATE 4 G: 40 INJECTION, SOLUTION INTRAVENOUS at 19:35

## 2022-05-19 RX ADMIN — SERTRALINE 100 MG: 100 TABLET, FILM COATED ORAL at 21:38

## 2022-05-19 RX ADMIN — ATORVASTATIN CALCIUM 80 MG: 40 TABLET, FILM COATED ORAL at 18:29

## 2022-05-19 RX ADMIN — TAMSULOSIN HYDROCHLORIDE 0.4 MG: 0.4 CAPSULE ORAL at 18:29

## 2022-05-19 RX ADMIN — HEPARIN SODIUM 5000 UNITS: 5000 INJECTION INTRAVENOUS; SUBCUTANEOUS at 18:29

## 2022-05-19 NOTE — ASSESSMENT & PLAN NOTE
· Hypothermic, lethargy  Decreased oral intake    Incontinence since return from [facility], caregiver reports foul smelling urine  · UA positive for nitrates, minimal cast  · Of note is on HIPREX outpatient which can alter UA presentation  · Hold Hiprex, and detrol non-formulary do not want to start oxybutynin can cause urinary retention  · Start flomax  · Received 1 dose of Rocephin in ED, continue  · Perrin placed in ED

## 2022-05-19 NOTE — ED PROVIDER NOTES
History  Chief Complaint   Patient presents with    Lethargy     Pt from group home  Staff reporting increased lethargy "past couple days " Staff reports concern for possible UTI  Pt is developmentally delayed and is non verbal at baseline  47-year-old female from group home is nonverbal and staff notes worsening somnolence over the past few days for example she is typically able of eat/ feed self, but not recently      History provided by:  EMS personnel  History limited by:  Patient nonverbal and acuity of condition      Prior to Admission Medications   Prescriptions Last Dose Informant Patient Reported? Taking? Cranberry 450 MG CAPS 5/18/2022 at Unknown time  Yes Yes   Sig: Take by mouth   Ergocalciferol (VITAMIN D2 PO) 5/18/2022 at Unknown time  Yes Yes   Sig: Take 1 25 mg by mouth once a week   LORazepam (ATIVAN) 0 5 mg tablet 5/18/2022 at Unknown time  Yes Yes   Sig: Take 0 5 mg by mouth in the morning and 0 5 mg in the evening  QUEtiapine (SEROquel XR) 200 mg 24 hr tablet 5/18/2022 at Unknown time  No Yes   Sig: Take 1 tablet (200 mg total) by mouth daily at bedtime   Patient taking differently: Take 200 mg by mouth daily at bedtime 1 tab at 8pm   QUEtiapine (SEROquel) 100 mg tablet 5/18/2022 at Unknown time  Yes Yes   Sig: Take 100 mg by mouth in the morning and 100 mg in the evening   8am, 4pm    acetaminophen (TYLENOL) 160 MG chewable tablet Unknown at Unknown time  No No   Sig: Chew 4 tablets (640 mg total) every 6 (six) hours as needed for mild pain or fever   aspirin (ECOTRIN LOW STRENGTH) 81 mg EC tablet 5/18/2022 at Unknown time  Yes Yes   Sig: Take 81 mg by mouth daily   bisacodyl (DULCOLAX) 5 mg EC tablet 5/18/2022 at Unknown time  Yes Yes   Sig: Take 5 mg by mouth daily as needed for constipation   divalproex sodium (DEPAKOTE) 250 mg EC tablet 5/18/2022 at Unknown time  Yes Yes   Sig: Take 250 mg by mouth every 12 (twelve) hours 1 tab AM (8am), 2 tabs 8pm   ferrous gluconate (FERGON) 324 mg tablet 5/18/2022 at Unknown time  Yes Yes   Sig: Take 324 mg by mouth daily with breakfast   gabapentin (NEURONTIN) 400 mg capsule 5/18/2022 at Unknown time  No Yes   Sig: Take 2 capsules (800 mg total) by mouth 3 (three) times a day   glipiZIDE-metFORMIN (METAGLIP) 2 5-250 MG per tablet 5/18/2022 at Unknown time  Yes Yes   Sig: Take 1 tablet by mouth in the morning and 1 tablet in the evening  Take before meals  insulin glargine (LANTUS) 100 units/mL subcutaneous injection Not Taking at Unknown time  No No   Sig: Inject 8 Units under the skin daily at bedtime   Patient not taking: Reported on 5/19/2022   insulin lispro (HumaLOG) 100 units/mL injection Not Taking at Unknown time  No No   Sig: Inject 1-5 Units under the skin daily at bedtime   Patient not taking: Reported on 5/19/2022   insulin lispro (HumaLOG) 100 units/mL injection Not Taking at Unknown time  No No   Sig: Inject 1-5 Units under the skin 3 (three) times a day before meals   Patient not taking: Reported on 5/19/2022   ipratropium-albuterol (DUO-NEB) 0 5-2 5 mg/3 mL nebulizer solution Not Taking at Unknown time  No No   Sig: Take 3 mL by nebulization every 4 (four) hours as needed for wheezing or shortness of breath   Patient not taking: Reported on 5/19/2022   methenamine hippurate (HIPREX) 1 g tablet 5/18/2022 at Unknown time  Yes Yes   Sig: Take 1 g by mouth in the morning and 1 g in the evening  Take with meals     nystatin (MYCOSTATIN) powder Not Taking at Unknown time  Yes No   Sig: Apply topically daily   Patient not taking: Reported on 5/19/2022   omeprazole (PriLOSEC) 20 mg delayed release capsule 5/18/2022 at Unknown time  Yes Yes   Sig: Take 20 mg by mouth daily   rosuvastatin (CRESTOR) 40 MG tablet 5/18/2022 at Unknown time  Yes Yes   Sig: Take 40 mg by mouth daily   senna (SENOKOT) 8 6 mg 5/18/2022 at Unknown time  No Yes   Sig: Take 1 tablet (8 6 mg total) by mouth daily at bedtime   sertraline (ZOLOFT) 100 mg tablet 5/18/2022 at Unknown time  No Yes   Sig: Take 1 tablet (100 mg total) by mouth daily at bedtime   Patient taking differently: Take 100 mg by mouth in the morning and 100 mg before bedtime  BID    sodium chloride (OCEAN) 0 65 % nasal spray Not Taking at Unknown time  No No   Si spray into each nostril every hour as needed for congestion   Patient not taking: Reported on 2022   tolterodine (DETROL LA) 4 mg 24 hr capsule 2022 at Unknown time  Yes Yes   Sig: Take 2 mg by mouth in the morning and 2 mg before bedtime  torsemide (DEMADEX) 10 mg tablet 2022 at Unknown time  No Yes   Sig: Take 1 tablet (10 mg total) by mouth daily      Facility-Administered Medications: None       Past Medical History:   Diagnosis Date    Anxiety     Diabetes mellitus ([facility] )     GERD (gastroesophageal reflux disease)     Hyperlipidemia     Hypertension     Mental disability     Mixed incontinence 2017    Seizure disorder ([facility] )        No past surgical history on file  No family history on file  I have reviewed and agree with the history as documented  E-Cigarette/Vaping    E-Cigarette Use Never User      E-Cigarette/Vaping Substances     Social History     Tobacco Use    Smoking status: Never Smoker    Smokeless tobacco: Never Used   Vaping Use    Vaping Use: Never used   Substance Use Topics    Alcohol use: Not Currently    Drug use: Not Currently       Review of Systems   Unable to perform ROS: Patient nonverbal       Physical Exam  Physical Exam  Vitals and nursing note reviewed  Constitutional:       General: She is not in acute distress  Appearance: She is obese  She is not toxic-appearing  Comments: Pleasant, comfortable-appearing, appears to be sleeping, wakes to name stated loudly, gazes, attempts to speak   HENT:      Head: Normocephalic and atraumatic  Nose: Nose normal       Mouth/Throat:      Mouth: Mucous membranes are dry     Eyes:      Conjunctiva/sclera: Conjunctivae normal  Pupils: Pupils are equal, round, and reactive to light  Cardiovascular:      Rate and Rhythm: Normal rate and regular rhythm  Heart sounds: Normal heart sounds  Pulmonary:      Effort: Pulmonary effort is normal       Breath sounds: Normal breath sounds  Abdominal:      General: Bowel sounds are normal  There is no distension  Palpations: Abdomen is soft  Tenderness: There is no abdominal tenderness  There is no guarding or rebound  Musculoskeletal:         General: No tenderness or deformity  Normal range of motion  Cervical back: Neck supple  No rigidity  Comments: Nonpitting edema legs bilaterally   Skin:     General: Skin is warm and dry  Neurological:      General: No focal deficit present  Mental Status: She is alert  Comments: Opens eyes to name, does not follow commands, good tone, extremities range well without discomfort, will maintain positioning and then drift to bed   Psychiatric:         Behavior: Behavior normal          Thought Content:  Thought content normal          Judgment: Judgment normal          Vital Signs  ED Triage Vitals [05/19/22 1039]   Temperature Pulse Respirations Blood Pressure SpO2   (!) 97 °F (36 1 °C) 78 18 155/58 97 %      Temp Source Heart Rate Source Patient Position - Orthostatic VS BP Location FiO2 (%)   Temporal Monitor Lying Left arm --      Pain Score       --           Vitals:    05/19/22 1530 05/19/22 1545 05/19/22 1611 05/19/22 1630   BP: 119/55  114/56 139/59   Pulse: 71 72 71 78   Patient Position - Orthostatic VS:             Visual Acuity  Visual Acuity    Flowsheet Row Most Recent Value   L Pupil Size (mm) 4   R Pupil Size (mm) 4          ED Medications  Medications   sodium chloride 0 9 % bolus 1,000 mL (0 mL Intravenous Stopped 5/19/22 1455)   cefTRIAXone (ROCEPHIN) IVPB (premix in dextrose) 1,000 mg 50 mL (0 mg Intravenous Stopped 5/19/22 1524)       Diagnostic Studies  Results Reviewed     Procedure Component Value Units Date/Time    Ammonia [[id number]]  (Abnormal) Collected: 05/19/22 1524    Lab Status: Final result Specimen: Blood from Arm, Left Updated: 05/19/22 1609     Ammonia <10 umol/L     HS Troponin I 2hr [[id number]]  (Normal) Collected: 05/19/22 1524    Lab Status: Final result Specimen: Blood from Arm, Left Updated: 05/19/22 1607     hs TnI 2hr 4 ng/L      Delta 2hr hsTnI 1 ng/L     HS Troponin I 4hr [[id number]]     Lab Status: No result Specimen: Blood     Lactic acid 2 Hours [[id number]]  (Normal) Collected: 05/19/22 1524    Lab Status: Final result Specimen: Blood from Arm, Left Updated: 05/19/22 1551     LACTIC ACID 1 0 mmol/L     Narrative:      Result may be elevated if tourniquet was used during collection  Urine culture [[id number]] Collected: 05/19/22 1535    Lab Status: In process Specimen: Urine, Clean Catch Updated: 05/19/22 1535    Urine Microscopic [[id number]]  (Abnormal) Collected: 05/19/22 1212    Lab Status: Final result Specimen: Urine, Straight Cath Updated: 05/19/22 1317     RBC, UA None Seen /hpf      WBC, UA 2-4 /hpf      Epithelial Cells Occasional /hpf      Bacteria, UA Occasional /hpf      Hyaline Casts, UA 0-1 /lpf     TSH [[id number]]  (Normal) Collected: 05/19/22 1204    Lab Status: Final result Specimen: Blood from Arm, Left Updated: 05/19/22 1310     TSH 3RD GENERATON 1 409 uIU/mL     Narrative:      Patients undergoing fluorescein dye angiography may retain small amounts of fluorescein in the body for 48-72 hours post procedure  Samples containing fluorescein can produce falsely depressed TSH values  If the patient had this procedure,a specimen should be resubmitted post fluorescein clearance        Valproic acid level, total [[id number]]  (Normal) Collected: 05/19/22 1204    Lab Status: Final result Specimen: Blood from Arm, Left Updated: 05/19/22 1310     Valproic Acid, Total 72 ug/mL     UA w Reflex to Microscopic w Reflex to Culture [[id number]]  (Abnormal) Collected: 05/19/22 1212 Lab Status: Final result Specimen: Urine, Straight Cath Updated: 05/19/22 1307     Color, UA Colorless     Clarity, UA Clear     Specific Gravity, UA 1 010     pH, UA 5 5     Leukocytes, UA Negative     Nitrite, UA Positive     Protein, UA Negative mg/dl      Glucose, UA Negative mg/dl      Ketones, UA Negative mg/dl      Urobilinogen, UA 0 2 E U /dl      Bilirubin, UA Negative     Blood, UA Negative    HS Troponin 0hr (reflex protocol) [[id number]]  (Normal) Collected: 05/19/22 1204    Lab Status: Final result Specimen: Blood from Arm, Left Updated: 05/19/22 1257     hs TnI 0hr 3 ng/L     Magnesium [[id number]]  (Abnormal) Collected: 05/19/22 1204    Lab Status: Final result Specimen: Blood from Arm, Left Updated: 05/19/22 1254     Magnesium 1 4 mg/dL     NT-BNP PRO [[id number]]  (Normal) Collected: 05/19/22 1204    Lab Status: Final result Specimen: Blood from Arm, Left Updated: 05/19/22 1254     NT-proBNP 105 pg/mL     Comprehensive metabolic panel [[id number]]  (Abnormal) Collected: 05/19/22 1204    Lab Status: Final result Specimen: Blood from Arm, Left Updated: 05/19/22 1253     Sodium 134 mmol/L      Potassium 3 7 mmol/L      Chloride 97 mmol/L      CO2 32 mmol/L      ANION GAP 5 mmol/L      BUN 6 mg/dL      Creatinine 0 60 mg/dL      Glucose 113 mg/dL      Calcium 9 8 mg/dL      Corrected Calcium 10 4 mg/dL      AST 13 U/L      ALT 25 U/L      Alkaline Phosphatase 51 U/L      Total Protein 6 8 g/dL      Albumin 3 2 g/dL      Total Bilirubin 0 34 mg/dL      eGFR 93 ml/min/1 73sq m     Narrative:      [place] guidelines for Chronic Kidney Disease (CKD):     Stage 1 with normal or high GFR (GFR > 90 mL/min/1 73 square meters)    Stage 2 Mild CKD (GFR = 60-89 mL/min/1 73 square meters)    Stage 3A Moderate CKD (GFR = 45-59 mL/min/1 73 square meters)    Stage 3B Moderate CKD (GFR = 30-44 mL/min/1 73 square meters)    Stage 4 Severe CKD (GFR = 15-29 mL/min/1 73 square meters)    Stage 5 End Stage CKD (GFR <15 mL/min/1 73 square meters)  Note: GFR calculation is accurate only with a steady state creatinine    Lactic acid [[id number]]  (Abnormal) Collected: 05/19/22 1204    Lab Status: Final result Specimen: Blood from Arm, Left Updated: 05/19/22 1247     LACTIC ACID 2 2 mmol/L     Narrative:      Result may be elevated if tourniquet was used during collection      Protime-INR [[id number]]  (Normal) Collected: 05/19/22 1204    Lab Status: Final result Specimen: Blood from Arm, Left Updated: 05/19/22 1243     Protime 12 3 seconds      INR 0 92    APTT [[id number]]  (Normal) Collected: 05/19/22 1204    Lab Status: Final result Specimen: Blood from Arm, Left Updated: 05/19/22 1243     PTT 27 seconds     Beta Hydroxybutyrate [[id number]]  (Normal) Collected: 05/19/22 1204    Lab Status: Final result Specimen: Blood from Arm, Left Updated: 05/19/22 1237     BETA-HYDROXYBUTYRATE 0 2 mmol/L     Blood gas, venous [[id number]]  (Abnormal) Collected: 05/19/22 1204    Lab Status: Final result Specimen: Blood from Arm, Left Updated: 05/19/22 1230     pH, Eliot 7 366     pCO2, Eliot 55 3 mm Hg      pO2, Eliot 34 0 mm Hg      HCO3, Eliot 31 0 mmol/L      Base Excess, Eliot 4 4 mmol/L      O2 Content, Eliot 9 8 ml/dL      O2 HGB, VENOUS 58 8 %     CBC and differential [[id number]]  (Abnormal) Collected: 05/19/22 1204    Lab Status: Final result Specimen: Blood from Arm, Left Updated: 05/19/22 1229     WBC 4 47 Thousand/uL      RBC 3 62 Million/uL      Hemoglobin 10 6 g/dL      Hematocrit 34 0 %      MCV 94 fL      MCH 29 3 pg      MCHC 31 2 g/dL      RDW 15 4 %      MPV 8 8 fL      Platelets 559 Thousands/uL      nRBC 0 /100 WBCs      Neutrophils Relative 56 %      Immat GRANS % 1 %      Lymphocytes Relative 31 %      Monocytes Relative 9 %      Eosinophils Relative 3 %      Basophils Relative 0 %      Neutrophils Absolute 2 50 Thousands/µL      Immature Grans Absolute 0 03 Thousand/uL      Lymphocytes Absolute 1 39 Thousands/µL Monocytes Absolute 0 40 Thousand/µL      Eosinophils Absolute 0 14 Thousand/µL      Basophils Absolute 0 01 Thousands/µL     COVID/FLU/RSV - 2 hour TAT [[id number]]  (Normal) Collected: 05/19/22 1142    Lab Status: Final result Specimen: Nares from Nose Updated: 05/19/22 1223     SARS-CoV-2 Negative     INFLUENZA A PCR Negative     INFLUENZA B PCR Negative     RSV PCR Negative    Narrative:      FOR PEDIATRIC PATIENTS - copy/paste COVID Guidelines URL to browser: [email or web address]  [email or web address]    SARS-CoV-2 assay is a Nucleic Acid Amplification assay intended for the  qualitative detection of nucleic acid from SARS-CoV-2 in nasopharyngeal  swabs  Results are for the presumptive identification of SARS-CoV-2 RNA  Positive results are indicative of infection with SARS-CoV-2, the virus  causing COVID-19, but do not rule out bacterial infection or co-infection  with other viruses  Laboratories within the [place] and its  territories are required to report all positive results to the appropriate  public health authorities  Negative results do not preclude SARS-CoV-2  infection and should not be used as the sole basis for treatment or other  patient management decisions  Negative results must be combined with  clinical observations, patient history, and epidemiological information  This test has not been FDA cleared or approved  This test has been authorized by FDA under an Emergency Use Authorization  (EUA)  This test is only authorized for the duration of time the  declaration that circumstances exist justifying the authorization of the  emergency use of an in vitro diagnostic tests for detection of SARS-CoV-2  virus and/or diagnosis of COVID-19 infection under section 564(b)(1) of  the Act, 21 U  S C  859YUD-1(D)(0), unless the authorization is terminated  or revoked sooner  The test has been validated but independent review by FDA  and CLIA is pending      Test performed using [employer] GeneXpert: This RT-PCR assay targets N2,  a region unique to SARS-CoV-2  A conserved region in the E-gene was chosen  for pan-Sarbecovirus detection which includes SARS-CoV-2  Blood culture #2 [[id number]] Collected: 05/19/22 1204    Lab Status: In process Specimen: Blood from Arm, Left Updated: 05/19/22 1219    Blood culture #1 [[id number]]     Lab Status: No result Specimen: Blood                  CT abdomen pelvis wo contrast   Final Result by Orestes Lawrence MD (05/19 1459)      No acute inflammatory process in the abdomen or pelvis  Workstation performed: [id number]         CT head without contrast   Final Result by Clemente Mohr MD (05/19 1301)      No acute intracranial hemorrhage or mass effect  Moderate ventriculomegaly mildly out of proportion to the dilatation of the subarachnoid spaces which could indicate underlying normal pressure hydrocephalus  Clinical correlation advised  Workstation performed: [id number]         XR chest 1 view portable   ED Interpretation by Apolonio Leyden, DO (05/19 1210)   No active disease, similar compared to prior      Final Result by Zoe Severino MD (05/19 1328)      No radiographic evidence of acute intrathoracic process or significant interval change  Workstation performed: [id number]                    Procedures  Procedures         ED Course  ED Course as of 05/19/22 1704   Thu May 19, 2022   1113 EKG 10:47 a m   Normal sinus rhythm rate 76 normal axis lower voltage anteroseptal ST wave changes no reciprocal changes interpreted by me   1216 Caregiver bedside, notes recently received from nursing home this past week, less active  Currently, more alert and interactive   1216 Nursing notes straight cath with urinary incontinence and 900 mL   1239 Nursing notes rectal temperature 95°, started warming   1300 Magnesium(!): 1 4   1300 LACTIC ACID(!!): 2 2   1300 NT-proBNP: 105   1300 Sodium(!): 134   1316 Nitrite, UA(!): Positive   1316 Leukocytes, UA: Negative   1316 TSH 3RD GENERATON: 1 409   1316 VALPROIC ACID TOTAL: 72   1341 WBC, UA: 2-4   1349 HM Rozenberg TT                               SBIRT 22yo+    Flowsheet Row Most Recent Value   SBIRT (23 yo +)    In order to provide better care to our patients, we are screening all of our patients for alcohol and drug use  Would it be okay to ask you these screening questions? Unable to answer at this time Filed at: 05/19/2022 1108                    MDM    Disposition  Final diagnoses:   Sepsis [facility])   Urinary retention   Hypothermia     Time reflects when diagnosis was documented in both MDM as applicable and the Disposition within this note     Time User Action Codes Description Comment    5/19/2022  1:49 PM Whipple Lennox Add [A41 9] Sepsis ([facility] [address] )     5/19/2022  1:50 PM Whipple Lennox Add [R33 9] Urinary retention     5/19/2022  1:50 PM Whipple Lennox Add Zoey Payment  XXXA] Hypothermia       ED Disposition     ED Disposition   Admit    Condition   Stable    Date/Time   Thu May 19, 2022  1:50 PM    Comment              Follow-up Information    None         Patient's Medications   Discharge Prescriptions    No medications on file       No discharge procedures on file      PDMP Review       Value Time User    PDMP Reviewed  Yes 11/13/2021  3:26  [facility]           ED Provider  Electronically Signed by           Erickson Freedman DO  05/19/22 2951

## 2022-05-19 NOTE — PLAN OF CARE
Problem: Potential for Falls  Goal: Patient will remain free of falls  Description: INTERVENTIONS:  - Educate patient/family on patient safety including physical limitations  - Instruct patient to call for assistance with activity   - Consult OT/PT to assist with strengthening/mobility   - Keep Call bell within reach  - Keep bed low and locked with side rails adjusted as appropriate  - Keep care items and personal belongings within reach  - Initiate and maintain comfort rounds  - Make Fall Risk Sign visible to staff  - Offer Toileting every  Hours, in advance of need  - Initiate/Maintain alarm  - Obtain necessary fall risk management equipment:   - Apply yellow socks and bracelet for high fall risk patients  - Consider moving patient to room near nurses station  Outcome: Not Progressing     Problem: MOBILITY - ADULT  Goal: Maintain or return to baseline ADL function  Description: INTERVENTIONS:  -  Assess patient's ability to carry out ADLs; assess patient's baseline for ADL function and identify physical deficits which impact ability to perform ADLs (bathing, care of mouth/teeth, toileting, grooming, dressing, etc )  - Assess/evaluate cause of self-care deficits   - Assess range of motion  - Assess patient's mobility; develop plan if impaired  - Assess patient's need for assistive devices and provide as appropriate  - Encourage maximum independence but intervene and supervise when necessary  - Involve family in performance of ADLs  - Assess for home care needs following discharge   - Consider OT consult to assist with ADL evaluation and planning for discharge  - Provide patient education as appropriate  Outcome: Not Progressing  Goal: Maintains/Returns to pre admission functional level  Description: INTERVENTIONS:  - Perform BMAT or MOVE assessment daily    - Set and communicate daily mobility goal to care team and patient/family/caregiver     - Collaborate with rehabilitation services on mobility goals if consulted  - Perform Range of Motion  times a day  - Reposition patient every  hours    - Dangle patient  times a day  - Stand patient  times a day  - Ambulate patient  times a day  - Out of bed to chair  times a day   - Out of bed for meals  times a day  - Out of bed for toileting  - Record patient progress and toleration of activity level   Outcome: Not Progressing     Problem: Prexisting or High Potential for Compromised Skin Integrity  Goal: Skin integrity is maintained or improved  Description: INTERVENTIONS:  - Identify patients at risk for skin breakdown  - Assess and monitor skin integrity  - Assess and monitor nutrition and hydration status  - Monitor labs   - Assess for incontinence   - Turn and reposition patient  - Assist with mobility/ambulation  - Relieve pressure over bony prominences  - Avoid friction and shearing  - Provide appropriate hygiene as needed including keeping skin clean and dry  - Evaluate need for skin moisturizer/barrier cream  - Collaborate with interdisciplinary team   - Patient/family teaching  - Consider wound care consult   Outcome: Not Progressing

## 2022-05-19 NOTE — ED NOTES
Patients temp now 95 8, Dr Carrion made aware, patient placed on cory saqiber       Urvashi Mack, MARIA GUADALUPE  05/19/22 7575

## 2022-05-19 NOTE — H&P
[address] 1955, 74 y o  female MRN: [id number]  Unit/Bed#: -01 Encounter: [id number]  Primary Care Provider: Manda Mason MD   Date and time admitted to hospital: 5/19/2022 10:34 AM    * Acute metabolic encephalopathy  Assessment & Plan  · Discharge from [facility] 1 week ago, increasing lethargy, weakness, decreased oral intake  Hypothermic 94 8 on presentation  Likely secondary to cystitis  · UA, nitrates positive, 0-1 casts- of note patient is taking HIPREX outpatient  · Check procalcitonin, thiamine, valproic acid level normal, lactic acid elevated recheck in AM  · Ammonia <10, check cortisol in a m  · CT head- dilated ventricles also noted in prior CTs  · ProBNP normal  · TSH normal- was on Synthroid at [facility] recent PCP visit continued  · Will give x1 dose 200 mg IV thiamine  · CT chest abdomen pelvis-no acute inflammatory processes, gas and stool present -start bowel regime, mylicon   · Chest x-ray  · Blood cultures in process    Cystitis without hematuria  Assessment & Plan  · Hypothermic, lethargy  Decreased oral intake    Incontinence since return from [facility], caregiver reports foul smelling urine  · UA positive for nitrates, minimal cast  · Of note is on HIPREX outpatient which can alter UA presentation  · Hold Hiprex, and detrol non-formulary do not want to start oxybutynin can cause urinary retention  · Start flomax  · Received 1 dose of Rocephin in ED, continue  · Perrin placed in ED    Dysphagia  Assessment & Plan  · Continue puree diet, thin liquids  · Aspiration precautions  · Assistance with meals    Chronic anemia  Assessment & Plan  · Hemoglobin 10, baseline appears to be 8-10  · Continue supplementation    Intellectual disability  Assessment & Plan  · Nonverbal at baseline, lives in group home  · Recently discharged with lorazepam 0 5 b i d -hold during admission due to lethargy  · Continue Seroquel, sertraline    Hyperlipidemia  Assessment & Plan  · Continue statin    Seizure disorder (HCC)  Assessment & Plan  · Valproic acid level normal  · Continue Depakote 250mg BID, 500mg HS    Gastroesophageal reflux disease without esophagitis  Assessment & Plan  · Omeprazole outpatient, continued as Protonix    Type 2 diabetes mellitus without complication, without long-term current use of insulin (HCC)  Assessment & Plan  Lab Results   Component Value Date    HGBA1C 7 6 (H) 04/08/2022       No results for input(s): POCGLU in the last 72 hours  Blood Sugar Average: Last 72 hrs:  ·  caregiver reports was on Lantus and insulin while at [facility], discharged with metaglip and blood sugars have been uncontrolled with high and low glucoses since return to custodial  · Humalog sliding scale t i d  A c   · Hypoglycemia protocol      VTE Pharmacologic Prophylaxis:   Moderate Risk (Score 3-4) - Pharmacological DVT Prophylaxis Ordered: heparin  Code Status: Level 1 - Full Code   Discussion with family: caregiver at bedside, discussed treatment plan  Anticipated Length of Stay: Patient will be admitted on an inpatient basis with an anticipated length of stay of greater than 2 midnights secondary to Acute metabolic encephalopathy, cystitis, hypothermia  Total Time for Visit, including Counseling / Coordination of Care: 60 minutes Greater than 50% of this total time spent on direct patient counseling and coordination of care  Chief Complaint:  Lethargy, weakness, poor oral intake since discharge from nursing facility 1 week ago    History of Present Illness:  Bridgett Payne is a 74 y o  female with a PMH of HLD, intellectual disability, dysphagia, DM 2, seizures, GERD who presents with increasing lethargy, weakness, reduced oral intake since discharge from nursing facility 1 week ago to group home    Caregiver reports patien didt not eating breakfast this morning and is unsure if she took any of her morning medications  She also reports incontinence since return to group home, with foul-smelling urine  TSH recently checked it at PCP as patient was on Synthroid at [facility] and not discharged with it, Synthroid was resumed  Caregiver reports patient has very poor oral intake, did not eat today, and typically loves eating and this is very unlike her  Also reports glucoses have been uncontrolled with highs and lows since discharge was previously on Lantus and sliding scale insulin, now only on Metaglip  Review of Systems:  Review of Systems   Unable to perform ROS: Patient nonverbal   Caregiver reports lethargy, change in appetite, foul-smelling urine, incontinence, last bowel movement Tuesday    Past Medical and Surgical History:   Past Medical History:   Diagnosis Date    Anxiety     Diabetes mellitus ([facility] [address] )     GERD (gastroesophageal reflux disease)     Hyperlipidemia     Hypertension     Mental disability     Mixed incontinence 4/12/2017    Seizure disorder ([facility] )        No past surgical history on file  Meds/Allergies:  Prior to Admission medications    Medication Sig Start Date End Date Taking?  Authorizing Provider   aspirin (ECOTRIN LOW STRENGTH) 81 mg EC tablet Take 81 mg by mouth daily   Yes Historical Provider, MD   bisacodyl (DULCOLAX) 5 mg EC tablet Take 5 mg by mouth daily as needed for constipation   Yes Historical Provider, MD   Cranberry 450 MG CAPS Take by mouth   Yes Historical Provider, MD   divalproex sodium (DEPAKOTE) 250 mg EC tablet Take 250 mg by mouth every 12 (twelve) hours 1 tab AM (8am), 2 tabs 8pm   Yes Historical Provider, MD   Ergocalciferol (VITAMIN D2 PO) Take 1 25 mg by mouth once a week   Yes Historical Provider, MD   ferrous gluconate (FERGON) 324 mg tablet Take 324 mg by mouth daily with breakfast   Yes Historical Provider, MD   gabapentin (NEURONTIN) 400 mg capsule Take 2 capsules (800 mg total) by mouth 3 (three) times a day 11/20/21  Yes Loraine Nova MD glipiZIDE-metFORMIN (METAGLIP) 2 5-250 MG per tablet Take 1 tablet by mouth in the morning and 1 tablet in the evening  Take before meals  Yes Historical Provider, MD   LORazepam (ATIVAN) 0 5 mg tablet Take 0 5 mg by mouth in the morning and 0 5 mg in the evening  Yes Historical Provider, MD   methenamine hippurate (HIPREX) 1 g tablet Take 1 g by mouth in the morning and 1 g in the evening  Take with meals  Yes Historical Provider, MD   omeprazole (PriLOSEC) 20 mg delayed release capsule Take 20 mg by mouth daily 6/22/21 6/22/22 Yes Historical Provider, MD   QUEtiapine (SEROquel XR) 200 mg 24 hr tablet Take 1 tablet (200 mg total) by mouth daily at bedtime  Patient taking differently: Take 200 mg by mouth daily at bedtime 1 tab at 8pm 8/11/21  Yes Elen Caballero MD   QUEtiapine (SEROquel) 100 mg tablet Take 100 mg by mouth in the morning and 100 mg in the evening  8am, 4pm    Yes Historical Provider, MD   rosuvastatin (CRESTOR) 40 MG tablet Take 40 mg by mouth daily   Yes Historical Provider, MD   senna (SENOKOT) 8 6 mg Take 1 tablet (8 6 mg total) by mouth daily at bedtime 8/11/21  Yes Elen Caballero MD   sertraline (ZOLOFT) 100 mg tablet Take 1 tablet (100 mg total) by mouth daily at bedtime  Patient taking differently: Take 100 mg by mouth in the morning and 100 mg before bedtime  BID  8/11/21  Yes Elen Caballero MD   tolterodine (DETROL LA) 4 mg 24 hr capsule Take 2 mg by mouth in the morning and 2 mg before bedtime     Yes Historical Provider, MD   torsemide (DEMADEX) 10 mg tablet Take 1 tablet (10 mg total) by mouth daily 8/12/21  Yes Elen Caballero MD   acetaminophen (TYLENOL) 160 MG chewable tablet Chew 4 tablets (640 mg total) every 6 (six) hours as needed for mild pain or fever 8/11/21   Elen Caballero MD   insulin glargine (LANTUS) 100 units/mL subcutaneous injection Inject 8 Units under the skin daily at bedtime  Patient not taking: Reported on 5/19/2022 11/20/21   Bry Sandy MD insulin lispro (HumaLOG) 100 units/mL injection Inject 1-5 Units under the skin daily at bedtime  Patient not taking: Reported on 5/19/2022 11/20/21   Romayne Sickle, MD   insulin lispro (HumaLOG) 100 units/mL injection Inject 1-5 Units under the skin 3 (three) times a day before meals  Patient not taking: Reported on 5/19/2022 11/20/21   Romayne Sickle, MD   ipratropium-albuterol (DUO-NEB) 0 5-2 5 mg/3 mL nebulizer solution Take 3 mL by nebulization every 4 (four) hours as needed for wheezing or shortness of breath  Patient not taking: Reported on 5/19/2022 8/11/21   Nilam Baptiste MD   nystatin (MYCOSTATIN) powder Apply topically daily  Patient not taking: Reported on 5/19/2022 6/30/21 6/30/22  Historical Provider, MD   sodium chloride (OCEAN) 0 65 % nasal spray 1 spray into each nostril every hour as needed for congestion  Patient not taking: Reported on 5/19/2022 8/11/21   Nilam Baptiste MD     Medication list provided by caregiver from group home  Allergies: Allergies   Allergen Reactions    Actos [Pioglitazone] Other (See Comments)     unkown        Social History:  Marital Status: Single   Occupation: n/a  Patient Pre-hospital Living Situation: Skilled Nursing Facility: group home  Patient Pre-hospital Level of Mobility: unable to be assessed at time of evaluation  Patient Pre-hospital Diet Restrictions:  Dysphagia puree, thin liquid  Substance Use History:   Social History     Substance and Sexual Activity   Alcohol Use Not Currently     Social History     Tobacco Use   Smoking Status Never Smoker   Smokeless Tobacco Never Used     Social History     Substance and Sexual Activity   Drug Use Not Currently       Family History:  No family history on file      Physical Exam:     Vitals:   Blood Pressure: 135/69 (05/19/22 1719)  Pulse: 74 (05/19/22 1719)  Temperature: 97 7 °F (36 5 °C) (05/19/22 1719)  Temp Source: Bladder (05/19/22 1630)  Respirations: 21 (05/19/22 1719)  Weight - Scale: 80 3 kg (177 lb) (05/19/22 1039)  SpO2: 97 % (05/19/22 1719)    Physical Exam  Vitals and nursing note reviewed  Constitutional:       General: She is not in acute distress  Appearance: She is well-developed  She is obese  HENT:      Head: Normocephalic and atraumatic  Mouth/Throat:      Mouth: Mucous membranes are moist    Eyes:      Conjunctiva/sclera: Conjunctivae normal       Pupils: Pupils are equal, round, and reactive to light  Cardiovascular:      Rate and Rhythm: Normal rate and regular rhythm  Heart sounds: No murmur heard  Pulmonary:      Effort: Pulmonary effort is normal  No respiratory distress  Breath sounds: Normal breath sounds  No wheezing, rhonchi or rales  Abdominal:      General: Bowel sounds are normal  There is no distension  Palpations: Abdomen is soft  Tenderness: There is no abdominal tenderness  Musculoskeletal:      Cervical back: Neck supple  Right lower leg: Edema (trace) present  Left lower leg: Edema (trace) present  Skin:     General: Skin is warm and dry  Capillary Refill: Capillary refill takes less than 2 seconds  Neurological:      Mental Status: She is alert  Mental status is at baseline  GCS: GCS eye subscore is 4  GCS verbal subscore is 1  GCS motor subscore is 5        Comments: Non-verbal at baseline  Alert, spontaneous movement all extremities            Additional Data:     Lab Results:  Results from last 7 days   Lab Units 05/19/22  1204   WBC Thousand/uL 4 47   HEMOGLOBIN g/dL 10 6*   HEMATOCRIT % 34 0*   PLATELETS Thousands/uL 208   NEUTROS PCT % 56   LYMPHS PCT % 31   MONOS PCT % 9   EOS PCT % 3     Results from last 7 days   Lab Units 05/19/22  1204   SODIUM mmol/L 134*   POTASSIUM mmol/L 3 7   CHLORIDE mmol/L 97*   CO2 mmol/L 32   BUN mg/dL 6   CREATININE mg/dL 0 60   ANION GAP mmol/L 5   CALCIUM mg/dL 9 8   ALBUMIN g/dL 3 2*   TOTAL BILIRUBIN mg/dL 0 34   ALK PHOS U/L 51   ALT U/L 25   AST U/L 13   GLUCOSE RANDOM mg/dL 113     Results from last 7 days   Lab Units 05/19/22  1204   INR  0 92             Results from last 7 days   Lab Units 05/19/22  1524 05/19/22  1204   LACTIC ACID mmol/L 1 0 2 2*       Imaging: Reviewed radiology reports from this admission including: chest CT scan, abdominal/pelvic CT and CT head  CT abdomen pelvis wo contrast   Final Result by Rosemary Nolasco MD (05/19 1459)      No acute inflammatory process in the abdomen or pelvis  Workstation performed: [id number]         CT head without contrast   Final Result by Blair Delcid MD (05/19 1301)      No acute intracranial hemorrhage or mass effect  Moderate ventriculomegaly mildly out of proportion to the dilatation of the subarachnoid spaces which could indicate underlying normal pressure hydrocephalus  Clinical correlation advised  Workstation performed: [id number]         XR chest 1 view portable   ED Interpretation by Parag Guardado DO (05/19 1210)   No active disease, similar compared to prior      Final Result by Douglas Santiago MD (05/19 1328)      No radiographic evidence of acute intrathoracic process or significant interval change  Workstation performed: [id number]             EKG and Other Studies Reviewed on Admission:   · EKG: Sinus Tachycardia  HR 74     ** Please Note: This note has been constructed using a voice recognition system   **

## 2022-05-19 NOTE — ASSESSMENT & PLAN NOTE
Lab Results   Component Value Date    HGBA1C 7 6 (H) 04/08/2022       No results for input(s): POCGLU in the last 72 hours      Blood Sugar Average: Last 72 hrs:  ·  caregiver reports was on Lantus and insulin while at [facility], discharged with metaglip and blood sugars have been uncontrolled with high and low glucoses [facility]  · Humalog sliding scale t i d  A c   · Hypoglycemia protocol

## 2022-05-19 NOTE — ASSESSMENT & PLAN NOTE
· Nonverbal at baseline, lives in group home  · Recently discharged with lorazepam 0 5 b i d -hold during admission due to lethargy  · Continue Seroquel, sertraline

## 2022-05-19 NOTE — ASSESSMENT & PLAN NOTE
· Discharge from [facility] 1 week ago, increasing lethargy, weakness, decreased oral intake  Hypothermic 94 8 on presentation  Likely secondary to cystitis  · UA, nitrates positive, 0-1 casts- of note patient is taking HIPREX outpatient  · Check procalcitonin, thiamine, valproic acid level normal, lactic acid elevated recheck in AM  · Ammonia <10, check cortisol in a m    · CT head- dilated ventricles also noted in prior CTs  · ProBNP normal  · TSH normal- was on Synthroid at [facility] recent PCP visit continued  · Will give x1 dose 200 mg IV thiamine  · CT chest abdomen pelvis-no acute inflammatory processes, gas and stool present -start bowel regime, mylicon   · Chest x-ray  · Blood cultures in process

## 2022-05-19 NOTE — ED NOTES
Ismael Oliveros made aware patient was straight cathed for 900ml and her brief was also saturated with urine        Doreen Bales RN  05/19/22 8947

## 2022-05-20 LAB
ANION GAP SERPL CALCULATED.3IONS-SCNC: 5 MMOL/L (ref 4–13)
ATRIAL RATE: 76 BPM
BASOPHILS # BLD AUTO: 0.01 THOUSANDS/ΜL (ref 0–0.1)
BASOPHILS NFR BLD AUTO: 0 % (ref 0–1)
BUN SERPL-MCNC: 5 MG/DL (ref 5–25)
CALCIUM SERPL-MCNC: 9.6 MG/DL (ref 8.3–10.1)
CHLORIDE SERPL-SCNC: 99 MMOL/L (ref 100–108)
CO2 SERPL-SCNC: 31 MMOL/L (ref 21–32)
CORTIS SERPL-MCNC: 6 UG/DL
CREAT SERPL-MCNC: 0.53 MG/DL (ref 0.6–1.3)
EOSINOPHIL # BLD AUTO: 0.17 THOUSAND/ΜL (ref 0–0.61)
EOSINOPHIL NFR BLD AUTO: 5 % (ref 0–6)
ERYTHROCYTE [DISTWIDTH] IN BLOOD BY AUTOMATED COUNT: 15.1 % (ref 11.6–15.1)
GFR SERPL CREATININE-BSD FRML MDRD: 97 ML/MIN/1.73SQ M
GLUCOSE SERPL-MCNC: 152 MG/DL (ref 65–140)
GLUCOSE SERPL-MCNC: 159 MG/DL (ref 65–140)
GLUCOSE SERPL-MCNC: 196 MG/DL (ref 65–140)
GLUCOSE SERPL-MCNC: 199 MG/DL (ref 65–140)
GLUCOSE SERPL-MCNC: 207 MG/DL (ref 65–140)
HCT VFR BLD AUTO: 33.6 % (ref 34.8–46.1)
HGB BLD-MCNC: 10.7 G/DL (ref 11.5–15.4)
IMM GRANULOCYTES # BLD AUTO: 0.04 THOUSAND/UL (ref 0–0.2)
IMM GRANULOCYTES NFR BLD AUTO: 1 % (ref 0–2)
LYMPHOCYTES # BLD AUTO: 1.3 THOUSANDS/ΜL (ref 0.6–4.47)
LYMPHOCYTES NFR BLD AUTO: 34 % (ref 14–44)
MAGNESIUM SERPL-MCNC: 2.3 MG/DL (ref 1.6–2.6)
MCH RBC QN AUTO: 29.5 PG (ref 26.8–34.3)
MCHC RBC AUTO-ENTMCNC: 31.8 G/DL (ref 31.4–37.4)
MCV RBC AUTO: 93 FL (ref 82–98)
MONOCYTES # BLD AUTO: 0.29 THOUSAND/ΜL (ref 0.17–1.22)
MONOCYTES NFR BLD AUTO: 8 % (ref 4–12)
NEUTROPHILS # BLD AUTO: 1.98 THOUSANDS/ΜL (ref 1.85–7.62)
NEUTS SEG NFR BLD AUTO: 52 % (ref 43–75)
NRBC BLD AUTO-RTO: 0 /100 WBCS
P AXIS: 58 DEGREES
PLATELET # BLD AUTO: 186 THOUSANDS/UL (ref 149–390)
PMV BLD AUTO: 8.6 FL (ref 8.9–12.7)
POTASSIUM SERPL-SCNC: 3.9 MMOL/L (ref 3.5–5.3)
PR INTERVAL: 158 MS
QRS AXIS: 44 DEGREES
QRSD INTERVAL: 96 MS
QT INTERVAL: 376 MS
QTC INTERVAL: 423 MS
RBC # BLD AUTO: 3.63 MILLION/UL (ref 3.81–5.12)
SODIUM SERPL-SCNC: 135 MMOL/L (ref 136–145)
T WAVE AXIS: 69 DEGREES
VENTRICULAR RATE: 76 BPM
WBC # BLD AUTO: 3.79 THOUSAND/UL (ref 4.31–10.16)

## 2022-05-20 PROCEDURE — 97163 PT EVAL HIGH COMPLEX 45 MIN: CPT

## 2022-05-20 PROCEDURE — 83735 ASSAY OF MAGNESIUM: CPT

## 2022-05-20 PROCEDURE — 82533 TOTAL CORTISOL: CPT

## 2022-05-20 PROCEDURE — 80048 BASIC METABOLIC PNL TOTAL CA: CPT

## 2022-05-20 PROCEDURE — 97167 OT EVAL HIGH COMPLEX 60 MIN: CPT

## 2022-05-20 PROCEDURE — 85025 COMPLETE CBC W/AUTO DIFF WBC: CPT | Performed by: FAMILY MEDICINE

## 2022-05-20 PROCEDURE — 99232 SBSQ HOSP IP/OBS MODERATE 35: CPT

## 2022-05-20 PROCEDURE — 82948 REAGENT STRIP/BLOOD GLUCOSE: CPT

## 2022-05-20 PROCEDURE — 97530 THERAPEUTIC ACTIVITIES: CPT

## 2022-05-20 RX ORDER — HEPARIN SODIUM 5000 [USP'U]/ML
5000 INJECTION, SOLUTION INTRAVENOUS; SUBCUTANEOUS EVERY 8 HOURS
Status: DISCONTINUED | OUTPATIENT
Start: 2022-05-20 | End: 2022-05-21 | Stop reason: HOSPADM

## 2022-05-20 RX ADMIN — SERTRALINE 100 MG: 100 TABLET, FILM COATED ORAL at 22:38

## 2022-05-20 RX ADMIN — STANDARDIZED SENNA CONCENTRATE 8.6 MG: 8.6 TABLET ORAL at 22:38

## 2022-05-20 RX ADMIN — QUETIAPINE FUMARATE 100 MG: 100 TABLET ORAL at 08:31

## 2022-05-20 RX ADMIN — HEPARIN SODIUM 5000 UNITS: 5000 INJECTION INTRAVENOUS; SUBCUTANEOUS at 05:20

## 2022-05-20 RX ADMIN — SERTRALINE 100 MG: 100 TABLET, FILM COATED ORAL at 08:31

## 2022-05-20 RX ADMIN — ATORVASTATIN CALCIUM 80 MG: 40 TABLET, FILM COATED ORAL at 16:56

## 2022-05-20 RX ADMIN — QUETIAPINE FUMARATE 100 MG: 100 TABLET ORAL at 16:56

## 2022-05-20 RX ADMIN — LEVOTHYROXINE SODIUM 50 MCG: 50 TABLET ORAL at 06:42

## 2022-05-20 RX ADMIN — GABAPENTIN 800 MG: 400 CAPSULE ORAL at 08:31

## 2022-05-20 RX ADMIN — INSULIN LISPRO 2 UNITS: 100 INJECTION, SOLUTION INTRAVENOUS; SUBCUTANEOUS at 11:35

## 2022-05-20 RX ADMIN — HEPARIN SODIUM 5000 UNITS: 5000 INJECTION INTRAVENOUS; SUBCUTANEOUS at 22:38

## 2022-05-20 RX ADMIN — FERROUS GLUCONATE 324 MG: 324 TABLET ORAL at 08:31

## 2022-05-20 RX ADMIN — GABAPENTIN 800 MG: 400 CAPSULE ORAL at 16:56

## 2022-05-20 RX ADMIN — PANTOPRAZOLE SODIUM 20 MG: 20 TABLET, DELAYED RELEASE ORAL at 05:20

## 2022-05-20 RX ADMIN — INSULIN LISPRO 1 UNITS: 100 INJECTION, SOLUTION INTRAVENOUS; SUBCUTANEOUS at 08:30

## 2022-05-20 RX ADMIN — HEPARIN SODIUM 5000 UNITS: 5000 INJECTION INTRAVENOUS; SUBCUTANEOUS at 13:18

## 2022-05-20 RX ADMIN — DIVALPROEX SODIUM 500 MG: 500 TABLET, DELAYED RELEASE ORAL at 16:56

## 2022-05-20 RX ADMIN — INSULIN LISPRO 2 UNITS: 100 INJECTION, SOLUTION INTRAVENOUS; SUBCUTANEOUS at 16:56

## 2022-05-20 RX ADMIN — DIVALPROEX SODIUM 250 MG: 250 TABLET, DELAYED RELEASE ORAL at 08:31

## 2022-05-20 RX ADMIN — ASPIRIN 81 MG: 81 TABLET, COATED ORAL at 08:31

## 2022-05-20 RX ADMIN — TAMSULOSIN HYDROCHLORIDE 0.4 MG: 0.4 CAPSULE ORAL at 16:56

## 2022-05-20 RX ADMIN — GABAPENTIN 800 MG: 400 CAPSULE ORAL at 22:38

## 2022-05-20 RX ADMIN — QUETIAPINE FUMARATE 200 MG: 200 TABLET ORAL at 22:38

## 2022-05-20 RX ADMIN — CEFTRIAXONE 1000 MG: 1 INJECTION, SOLUTION INTRAVENOUS at 13:19

## 2022-05-20 NOTE — PHYSICAL THERAPY NOTE
PHYSICAL THERAPY EVALUATION  NAME:  Jenny Perera  DATE: 05/20/22    AGE:   74 y o  Mrn:   [id number]  ADMIT DX:  Urinary retention [R33 9]  Hypothermia [T68  XXXA]  Lethargy [R53 83]  Sepsis ([facility] ) [A41 9]    Past Medical History:   Diagnosis Date    Anxiety     Diabetes mellitus ([place] [address] )     GERD (gastroesophageal reflux disease)     Hyperlipidemia     Hypertension     Mental disability     Mixed incontinence 4/12/2017    Seizure disorder ([place] [address] )      Length Of Stay: 1  Performed at least 2 patient identifiers during session: Assigned identification number and ID [id number]  PHYSICAL THERAPY EVALUATION :    05/20/22 0733   PT Last Visit   PT Visit Date 05/20/22   Note Type   Note type Evaluation   Pain Assessment   Pain Assessment Tool FLACC   Pain Rating: FLACC (Rest) - Face 0   Pain Rating: FLACC (Rest) - Legs 0   Pain Rating: FLACC (Rest) - Activity 0   Pain Rating: FLACC (Rest) - Cry 0   Pain Rating: FLACC (Rest) - Consolability 0   Score: FLACC (Rest) 0   Pain Rating: FLACC (Activity) - Face 0   Pain Rating: FLACC (Activity) - Legs 0   Pain Rating: FLACC (Activity) - Activity 0   Pain Rating: FLACC (Activity) - Cry 0   Pain Rating: FLACC (Activity) - Consolability 0   Score: FLACC (Activity) 0   Restrictions/Precautions   Other Precautions Chair Alarm; Bed Alarm; Fall Risk   Home Living   Type of Home Group Home   Additional Comments Per previous admission PT evaluations July 2021: pt ambulates with assistance with railing  There are railings all over the group home  Pt uses a wheelchair out in the community and goes to Day Care Monday, Tuesday and Thursday  However patient had admission in November 2021 for Covid Pneumonia and was discharged to SNF for rehab  Most recently, patient was discharged from [facility] about a week ago per notes and returned to group home  Has had increased lethargy at group home  Prior Function   Level of [place] Needs assistance with ADLs and functional mobility; Needs assistance with IADLs   Lives With Facility staff   Receives Help From   (facility staff)   ADL Assistance Needs assistance   IADLs Needs assistance   Comments pt is non-verbal at baseline  Lives in a group home with 24/7 assistance available  General   Additional Pertinent History increased B LE edema   Cognition   Orientation Level Unable to assess   Following Commands Follows one step commands with increased time or repetition   Comments able to follow 1 step simple commands during session  alert throughout  Subjective   Subjective pt shook head no when asked if she had pain   RLE Assessment   RLE Assessment X   RLE Overall AROM   R Hip Flexion AROM minimal, PROM WNL   R Hip ABduction minimal AROM, PROm WNL   R Knee Flexion WFL   R Knee Extension ~-15 degrees   R Ankle Dorsiflexion WFL   Strength RLE   RLE Overall Strength 2-/5   LLE Assessment   LLE Assessment X   LLE Overall AROM   L Hip Flexion AROM minimal, PROM WNL   L Hip ABduction AROM minimal, PROM WNL   L Knee Flexion WFL   L Knee Extension ~-30 degrees   L Ankle Dorsiflexion WFL   Strength LLE   LLE Overall Strength 2-/5   Bed Mobility   Supine to Sit 2  Maximal assistance   Additional items Assist x 2; Increased time required;Verbal cues;LE management  (trunk management)   Additional Comments HOB elevated > 30 degrees  requires maxAx2 to achieve sitting EOB  pt with inc left lateral lean, corrected with verbal cues  max manual cues to scoot toward EOB  Transfers   Sit to Stand   (maxAx2 to 3/4 standing)   Additional items Increased time required;Verbal cues   Stand to Sit 2  Maximal assistance   Additional items Assist x 2; Increased time required;Verbal cues   Stand pivot Unable to assess   Additional Comments attempted sit<>stand with RW with B hands on RW with patient only able to achieve 3/4 standing despite maxAx2  manual and verbal cues for upright posture  pt with inc knee and hip flexion  returned to sitting for safety  unable to complete spt    Balance   Static Sitting Fair   Dynamic Sitting Fair -   Static Standing Poor -   Activity Tolerance   Activity Tolerance Patient limited by fatigue   Medical Staff Made Aware Christopher LATHAM   Nurse Made Aware RN   Assessment   Prognosis Fair   Problem List Decreased strength;Decreased endurance; Impaired balance;Decreased mobility; Impaired judgement;Decreased safety awareness;Decreased cognition;Obesity   Barriers to Discharge Decreased caregiver support; Inaccessible home environment   Barriers to Discharge Comments requires increased assistance to complete all mobility   Goals   Patient Goals none stated-pt is nonverbal   STG Expiration Date 06/03/22   PT Treatment Day 1   Plan   Treatment/Interventions Functional transfer training;LE strengthening/ROM; Therapeutic exercise; Endurance training;Patient/family training;Equipment eval/education; Bed mobility;Gait training; Compensatory technique education;Spoke to nursing;Spoke to case management;OT   PT Frequency 3-5x/wk   Recommendation   PT Discharge Recommendation Post acute rehabilitation services   Equipment Recommended   (TBD by rehab)   AM-PAC Basic Mobility Inpatient   Turning in Bed Without Bedrails 2   Lying on Back to Sitting on Edge of Flat Bed 1   Moving Bed to Chair 1   Standing Up From Chair 1   Walk in Room 1   Climb 3-5 Stairs 1   Basic Mobility Inpatient Raw Score 7   Turning Head Towards Sound 3   Follow Simple Instructions 3   Low Function Basic Mobility Raw Score 13   Low Function Basic Mobility Standardized Score 20 14   Highest Level Of Mobility   JH-HLM Goal 2: Bed activities/Dependent transfer   JH-HLM Achieved 4: Move to chair/commode   Additional Treatment Session   Start Time 0748   End Time 0758   Treatment Assessment Pt tolerated session fairly  She continues to require increased assistance with all mobility  She was able to complete multiple squat pivotrs with maxAx2 to sit in recliner chair   She is limited by decreased strength, balance, endruance  Continue PT services to increase mobility as able  Equipment Use no AD  completed multiple squat pivot transfers with maxAx2 to patient's right to drop arm recliner with manual and verbal cues for technique  completed sit to stand without AD with maxAx2 to > 1/2 standing and then completed sit ot stand with RW to 3/4 standing with maxAx2  manual cues for wt shifting and upright posture  pt positioned in chair with UEs supported and LEs elevated  End of Consult   Patient Position at End of Consult Bedside chair;Bed/Chair alarm activated; All needs within reach   End of Consult Comments transfers with nursing with stretcher board to/from drop arm recliner  Pt requires PT /OT co-treat and co-eval due to signficant assistance with mobility and cognitive-behavioral impairments  (Please find full objective findings from PT assessment regarding body systems outlined above)  Assessment: Pt is a 74 y o  female seen for PT evaluation s/p admission to [address] [place] [address] on 5/19/2022 with Acute metabolic encephalopathy  Order placed for PT services    Upon evaluation: Pt is presenting with impaired functional mobility due to decreased strength, decreased endurance, impaired balance, impaired cognition, decreased safety awareness, impaired judgment, fall risk and LE edema requiring maximal of 2 assistance for bed mobility and maximal of 2 to achieve 3/4 standing with RW  Pt's clinical presentation is currently unpredictable given the functional mobility deficits above, especially weakness, edema of extremities, decreased endurance, decreased activity tolerance, decreased functional mobility tolerance, decreased safety awareness, impaired judgement and decreased cognition, coupled with fall risks as indicated by AM-PAC 6-Clicks: 7/14 as well as impaired balance, polypharmacy, impaired judgement and decreased safety awareness and combined with medical complications of abnormal H&H, abnormal WBCs, abnormal blood sugars, abnormal sodium values, need for input for mobility technique/safety and abnormal lactic acid, acute metabolic encephalopathy, hypothermia,acute cystitis, acute urinary retention now with Perrin catheter  Pt's PMHx and comorbidities that may affect physical performance and progress include: DM and seizures, intellectual disability, dysphagia, chronic anemia, anxiety, HTN  Personal factors affecting pt at time of IE include: inaccessible home environment, limited home support, inability to perform IADLs, inability to perform ADLs, inability to navigate level surfaces without external assistance, inability to ambulate household distances and limited insight into impairments  Pt will benefit from continued skilled PT services to address deficits as defined above and to maximize level of functional mobility to facilitate return toward PLOF and improved QOL  From PT/mobility standpoint, recommendation at time of d/c would be Short term rehab pending progress in order to reduce fall risk and maximize pt's functional independence and consistency with mobility in order to facilitate return to PLOF  Recommend trial with walker next 1-2 sessions and ther ex next 1-2 sessions  The patient's AM-PAC Basic Mobility Inpatient Short Form Raw Score is 7  A Raw score of less than or equal to 16 suggests the patient may benefit from discharge to post-acute rehabilitation services  Please also refer to the recommendation of the Physical Therapist for safe discharge planning  Goals: Pt will: Perform bed mobility tasks with consistent modAx1 to reposition in bed and prepare for transfers  Pt will perform transfers with consistent modAx1 to decrease burden of care, decrease risk for falls and improve activity tolerance and prepare for ambulation   Pt will ambulate with LRAD for >/= 15' with  consistent modAx1-2  to decrease burden of care, decrease risk for falls and improve activity tolerance and to access home environment  Pt will increase B LE strength >/= 1/2 MMT grade to facilitate functional mobility        Keren Montes De Oca, PT,DPT

## 2022-05-20 NOTE — PLAN OF CARE
Problem: Potential for Falls  Goal: Patient will remain free of falls  Description: INTERVENTIONS:  - Educate patient/family on patient safety including physical limitations  - Instruct patient to call for assistance with activity   - Consult OT/PT to assist with strengthening/mobility   - Keep Call bell within reach  - Keep bed low and locked with side rails adjusted as appropriate  - Keep care items and personal belongings within reach  - Initiate and maintain comfort rounds  - Make Fall Risk Sign visible to staff  - Offer Toileting every Hours, in advance of need  - Initiate/Maintain alarm  - Obtain necessary fall risk management equipment:   - Apply yellow socks and bracelet for high fall risk patients  - Consider moving patient to room near nurses station  Outcome: Progressing     Problem: MOBILITY - ADULT  Goal: Maintain or return to baseline ADL function  Description: INTERVENTIONS:  -  Assess patient's ability to carry out ADLs; assess patient's baseline for ADL function and identify physical deficits which impact ability to perform ADLs (bathing, care of mouth/teeth, toileting, grooming, dressing, etc )  - Assess/evaluate cause of self-care deficits   - Assess range of motion  - Assess patient's mobility; develop plan if impaired  - Assess patient's need for assistive devices and provide as appropriate  - Encourage maximum independence but intervene and supervise when necessary  - Involve family in performance of ADLs  - Assess for home care needs following discharge   - Consider OT consult to assist with ADL evaluation and planning for discharge  - Provide patient education as appropriate  Outcome: Progressing     Problem: Prexisting or High Potential for Compromised Skin Integrity  Goal: Skin integrity is maintained or improved  Description: INTERVENTIONS:  - Identify patients at risk for skin breakdown  - Assess and monitor skin integrity  - Assess and monitor nutrition and hydration status  - Monitor labs   - Assess for incontinence   - Turn and reposition patient  - Assist with mobility/ambulation  - Relieve pressure over bony prominences  - Avoid friction and shearing  - Provide appropriate hygiene as needed including keeping skin clean and dry  - Evaluate need for skin moisturizer/barrier cream  - Collaborate with interdisciplinary team   - Patient/family teaching  - Consider wound care consult   Outcome: Progressing     Problem: PAIN - ADULT  Goal: Verbalizes/displays adequate comfort level or baseline comfort level  Description: Interventions:  - Encourage patient to monitor pain and request assistance  - Assess pain using appropriate pain scale  - Administer analgesics based on type and severity of pain and evaluate response  - Implement non-pharmacological measures as appropriate and evaluate response  - Consider cultural and social influences on pain and pain management  - Notify physician/advanced practitioner if interventions unsuccessful or patient reports new pain  Outcome: Progressing     Problem: INFECTION - ADULT  Goal: Absence or prevention of progression during hospitalization  Description: INTERVENTIONS:  - Assess and monitor for signs and symptoms of infection  - Monitor lab/diagnostic results  - Monitor all insertion sites, i e  indwelling lines, tubes, and drains  - Monitor endotracheal if appropriate and nasal secretions for changes in amount and color  - [place] appropriate cooling/warming therapies per order  - Administer medications as ordered  - Instruct and encourage patient and family to use good hand hygiene technique  - Identify and instruct in appropriate isolation precautions for identified infection/condition  Outcome: Progressing  Goal: Absence of fever/infection during neutropenic period  Description: INTERVENTIONS:  - Monitor WBC    Outcome: Progressing     Problem: SAFETY ADULT  Goal: Patient will remain free of falls  Description: INTERVENTIONS:  - Educate patient/family on patient safety including physical limitations  - Instruct patient to call for assistance with activity   - Consult OT/PT to assist with strengthening/mobility   - Keep Call bell within reach  - Keep bed low and locked with side rails adjusted as appropriate  - Keep care items and personal belongings within reach  - Initiate and maintain comfort rounds  - Make Fall Risk Sign visible to staff  - Offer Toileting every Hours, in advance of need  - Initiate/Maintain alarm  - Obtain necessary fall risk management equipment:   - Apply yellow socks and bracelet for high fall risk patients  - Consider moving patient to room near nurses station  Outcome: Progressing  Goal: Maintain or return to baseline ADL function  Description: INTERVENTIONS:  -  Assess patient's ability to carry out ADLs; assess patient's baseline for ADL function and identify physical deficits which impact ability to perform ADLs (bathing, care of mouth/teeth, toileting, grooming, dressing, etc )  - Assess/evaluate cause of self-care deficits   - Assess range of motion  - Assess patient's mobility; develop plan if impaired  - Assess patient's need for assistive devices and provide as appropriate  - Encourage maximum independence but intervene and supervise when necessary  - Involve family in performance of ADLs  - Assess for home care needs following discharge   - Consider OT consult to assist with ADL evaluation and planning for discharge  - Provide patient education as appropriate  Outcome: Progressing    Problem: DISCHARGE PLANNING  Goal: Discharge to home or other facility with appropriate resources  Description: INTERVENTIONS:  - Identify barriers to discharge w/patient and caregiver  - Arrange for needed discharge resources and transportation as appropriate  - Identify discharge learning needs (meds, wound care, etc )  - Arrange for interpretive services to assist at discharge as needed  - Refer to Case Management Department for coordinating discharge planning if the patient needs post-hospital services based on physician/advanced practitioner order or complex needs related to functional status, cognitive ability, or social support system  Outcome: Progressing     Problem: Knowledge Deficit  Goal: Patient/family/caregiver demonstrates understanding of disease process, treatment plan, medications, and discharge instructions  Description: Complete learning assessment and assess knowledge base    Interventions:  - Provide teaching at level of understanding  - Provide teaching via preferred learning methods  Outcome: Progressing     Problem: RESPIRATORY - ADULT  Goal: Achieves optimal ventilation and oxygenation  Description: INTERVENTIONS:  - Assess for changes in respiratory status  - Assess for changes in mentation and behavior  - Position to facilitate oxygenation and minimize respiratory effort  - Oxygen administered by appropriate delivery if ordered  - Initiate smoking cessation education as indicated  - Encourage broncho-pulmonary hygiene including cough, deep breathe, Incentive Spirometry  - Assess the need for suctioning and aspirate as needed  - Assess and instruct to report SOB or any respiratory difficulty  - Respiratory Therapy support as indicated  Outcome: Progressing     Problem: MUSCULOSKELETAL - ADULT  Goal: Maintain or return mobility to safest level of function  Description: INTERVENTIONS:  - Assess patient's ability to carry out ADLs; assess patient's baseline for ADL function and identify physical deficits which impact ability to perform ADLs (bathing, care of mouth/teeth, toileting, grooming, dressing, etc )  - Assess/evaluate cause of self-care deficits   - Assess range of motion  - Assess patient's mobility  - Assess patient's need for assistive devices and provide as appropriate  - Encourage maximum independence but intervene and supervise when necessary  - Involve family in performance of ADLs  - Assess for home care needs following discharge   - Consider OT consult to assist with ADL evaluation and planning for discharge  - Provide patient education as appropriate  Outcome: Progressing

## 2022-05-20 NOTE — ASSESSMENT & PLAN NOTE
· Hypothermic, lethargy  Decreased oral intake    Incontinence since return from [facility], caregiver reports foul smelling urine  · UA positive for nitrates, minimal cast  · Of note is on HIPREX outpatient which can alter UA presentation  · Hold Hiprex, and detrol non-formulary do not want to start oxybutynin since it can cause urinary retention  · Start flomax  · Received 1 dose of Rocephin in ED, continue  · Perrin placed in ED  · Will attempt discontinuation of Perrin catheter and start voiding trial, case management will discuss with group home if able to take catheter if needed

## 2022-05-20 NOTE — PLAN OF CARE
Problem: PHYSICAL THERAPY ADULT  Goal: Performs mobility at highest level of function for planned discharge setting  See evaluation for individualized goals  Description: Treatment/Interventions: Functional transfer training, LE strengthening/ROM, Therapeutic exercise, Endurance training, Patient/family training, Equipment eval/education, Bed mobility, Gait training, Compensatory technique education, Spoke to nursing, Spoke to case management, OT  Equipment Recommended:  (TBD by rehab)       See flowsheet documentation for full assessment, interventions and recommendations  Note: Prognosis: Fair  Problem List: Decreased strength, Decreased endurance, Impaired balance, Decreased mobility, Impaired judgement, Decreased safety awareness, Decreased cognition, Obesity  Assessment: Pt is a 74 y o  female seen for PT evaluation s/p admission to [address] [place] [address] on 5/19/2022 with Acute metabolic encephalopathy  Order placed for PT services    Upon evaluation: Pt is presenting with impaired functional mobility due to decreased strength, decreased endurance, impaired balance, impaired cognition, decreased safety awareness, impaired judgment, fall risk and LE edema requiring maximal of 2 assistance for bed mobility and maximal of 2 to achieve 3/4 standing with RW  Pt's clinical presentation is currently unpredictable given the functional mobility deficits above, especially weakness, edema of extremities, decreased endurance, decreased activity tolerance, decreased functional mobility tolerance, decreased safety awareness, impaired judgement and decreased cognition, coupled with fall risks as indicated by AM-PAC 6-Clicks: 6/73 as well as impaired balance, polypharmacy, impaired judgement and decreased safety awareness and combined with medical complications of abnormal H&H, abnormal WBCs, abnormal blood sugars, abnormal sodium values, need for input for mobility technique/safety and abnormal lactic acid, acute metabolic encephalopathy, hypothermia,acute cystitis, acute urinary retention now with Perrin catheter  Pt's PMHx and comorbidities that may affect physical performance and progress include: DM and seizures, intellectual disability, dysphagia, chronic anemia, anxiety, HTN  Personal factors affecting pt at time of IE include: inaccessible home environment, limited home support, inability to perform IADLs, inability to perform ADLs, inability to navigate level surfaces without external assistance, inability to ambulate household distances and limited insight into impairments  Pt will benefit from continued skilled PT services to address deficits as defined above and to maximize level of functional mobility to facilitate return toward PLOF and improved QOL  From PT/mobility standpoint, recommendation at time of d/c would be Short term rehab pending progress in order to reduce fall risk and maximize pt's functional independence and consistency with mobility in order to facilitate return to PLOF  Recommend trial with walker next 1-2 sessions and ther ex next 1-2 sessions  Barriers to Discharge: Decreased caregiver support, Inaccessible home environment  Barriers to Discharge Comments: requires increased assistance to complete all mobility     PT Discharge Recommendation: Post acute rehabilitation services          See flowsheet documentation for full assessment

## 2022-05-20 NOTE — PROGRESS NOTES
[address]  Progress Note - Nida Camara 1955, 74 y o  female MRN: [id number]  Unit/Bed#: -01 Encounter: [id number]  Primary Care Provider: Romayne Harvest, MD   Date and time admitted to hospital: 5/19/2022 10:34 AM    * Acute metabolic encephalopathy  Assessment & Plan  · Discharge from [facility] 1 week ago, increasing lethargy, weakness, decreased oral intake  Hypothermic 94 8 on presentation  Likely secondary to cystitis  · UA, nitrates positive, 0-1 casts- of note patient is taking HIPREX outpatient  · Check procalcitonin, thiamine, valproic acid level normal, lactic acid elevated recheck in AM  · Ammonia <10, check cortisol in a m  · CT head- dilated ventricles also noted in prior CTs  · ProBNP normal  · TSH normal- was on Synthroid at [facility] recent PCP visit continued  · Will give x1 dose 200 mg IV thiamine  · CT chest abdomen pelvis-no acute inflammatory processes, gas and stool present -start bowel regime, mylicon   · Chest x-ray-no acute cardiopulmonary processes  · Blood cultures -in process  · Episode of hypoglycemia POC 50s, 25 g of D50 given with improvement >100  · Patient has returned to her baseline    Cystitis without hematuria  Assessment & Plan  · Hypothermic, lethargy  Decreased oral intake    Incontinence since return from [facility], caregiver reports foul smelling urine  · UA positive for nitrates, minimal cast  · Of note is on HIPREX outpatient which can alter UA presentation  · Hold Hiprex, and detrol non-formulary do not want to start oxybutynin since it can cause urinary retention  · Start flomax  · Received 1 dose of Rocephin in ED, continue  · Perrin placed in ED  · Will attempt discontinuation of Perrin catheter and start voiding trial, case management will discuss with group home if able to take catheter if needed    Dysphagia  Assessment & Plan  · Continue puree diet, thin liquids  · Aspiration precautions  · Assistance with meals    Chronic anemia  Assessment & Plan  · Hemoglobin 10, baseline appears to be 8-10  · Continue supplementation    Intellectual disability  Assessment & Plan  · Nonverbal at baseline, lives in group home  · Recently discharged with lorazepam 0 5 b i d -hold during admission due to lethargy  · Continue Seroquel, sertraline    Hyperlipidemia  Assessment & Plan  · Continue statin    Seizure disorder (HCC)  Assessment & Plan  · Valproic acid level normal  · Continue Depakote 250mg BID, 500mg HS    Gastroesophageal reflux disease without esophagitis  Assessment & Plan  · Omeprazole outpatient, continued as Protonix    Type 2 diabetes mellitus without complication, without long-term current use of insulin [facility])  Assessment & Plan  Lab Results   Component Value Date    HGBA1C 7 6 (H) 04/08/2022       Recent Labs     05/19/22  1932 05/19/22  2111 05/20/22  0716 05/20/22  1132   POCGLU 169* 153* 159* 207*       Blood Sugar Average: Last 72 hrs:  · (P) 133 5 caregiver reports was on Lantus and insulin while at [facility], discharged with metaglip and blood sugars have been uncontrolled with high and low glucoses since return to [facility]  · Humalog sliding scale t i d  A c   · Hypoglycemia protocol  · Reduced oral intake since returning to [facility], did not eat breakfast yesterday  In ED  arrival to floor glucose 50s given 25 g D50 with improvement greater than 100  No additional episodes of hypoglycemia           VTE Pharmacologic Prophylaxis: VTE Score: 3 Moderate Risk (Score 3-4) - Pharmacological DVT Prophylaxis Ordered: heparin  Patient Centered Rounds: I performed bedside rounds with nursing staff today  Discussions with Specialists or Other Care Team Provider: case management    Education and Discussions with Family / Patient: Updated  (hank at [facility]) via phone  Time Spent for Care: 45 minutes   More than 50% of total time spent on counseling and coordination of care as described above  Current Length of Stay: 1 day(s)  Current Patient Status: Inpatient   Certification Statement: The patient will continue to require additional inpatient hospital stay due to cystitis,  plan to return to group home tomorrow  Discharge Plan: Anticipate discharge tomorrow to group home  Code Status: Level 1 - Full Code    Subjective:   Nonverbal at baseline, patient as breakfast tray sitting for appears to have eaten 100% of breakfast provided  Very alert this morning  Objective:     Vitals:   Temp (24hrs), Av 6 °F (35 9 °C), Min:95 9 °F (35 5 °C), Max:97 7 °F (36 5 °C)    Temp:  [95 9 °F (35 5 °C)-97 7 °F (36 5 °C)] 97 3 °F (36 3 °C)  HR:  [69-80] 71  Resp:  [11-21] 15  BP: (106-139)/(54-69) 133/66  SpO2:  [87 %-97 %] 97 %  Body mass index is 34 14 kg/m²  Input and Output Summary (last 24 hours): Intake/Output Summary (Last 24 hours) at 2022 1405  Last data filed at 2022 1300  Gross per 24 hour   Intake 3216 ml   Output 550 ml   Net 2666 ml       Physical Exam:   Physical Exam  Vitals and nursing note reviewed  Constitutional:       General: She is not in acute distress  Appearance: She is well-developed  She is obese  HENT:      Head: Normocephalic and atraumatic  Mouth/Throat:      Mouth: Mucous membranes are moist    Eyes:      Extraocular Movements: Extraocular movements intact  Conjunctiva/sclera: Conjunctivae normal       Pupils: Pupils are equal, round, and reactive to light  Cardiovascular:      Rate and Rhythm: Normal rate and regular rhythm  Pulses: Normal pulses  Heart sounds: Normal heart sounds  No murmur heard  Pulmonary:      Effort: Pulmonary effort is normal  No respiratory distress  Breath sounds: Normal breath sounds  Abdominal:      General: Bowel sounds are normal  There is no distension  Palpations: Abdomen is soft  Tenderness: There is no abdominal tenderness  There is no guarding  Musculoskeletal:      Cervical back: Neck supple  Skin:     General: Skin is warm and dry  Capillary Refill: Capillary refill takes less than 2 seconds  Neurological:      Mental Status: She is alert  Mental status is at baseline  Comments: Non-verbal at baseline   Psychiatric:         Mood and Affect: Mood normal          Thought Content: Thought content normal           Additional Data:     Labs:  Results from last 7 days   Lab Units 05/20/22  0500   WBC Thousand/uL 3 79*   HEMOGLOBIN g/dL 10 7*   HEMATOCRIT % 33 6*   PLATELETS Thousands/uL 186   NEUTROS PCT % 52   LYMPHS PCT % 34   MONOS PCT % 8   EOS PCT % 5     Results from last 7 days   Lab Units 05/20/22  0500 05/19/22  1204   SODIUM mmol/L 135* 134*   POTASSIUM mmol/L 3 9 3 7   CHLORIDE mmol/L 99* 97*   CO2 mmol/L 31 32   BUN mg/dL 5 6   CREATININE mg/dL 0 53* 0 60   ANION GAP mmol/L 5 5   CALCIUM mg/dL 9 6 9 8   ALBUMIN g/dL  --  3 2*   TOTAL BILIRUBIN mg/dL  --  0 34   ALK PHOS U/L  --  51   ALT U/L  --  25   AST U/L  --  13   GLUCOSE RANDOM mg/dL 152* 113     Results from last 7 days   Lab Units 05/19/22  1204   INR  0 92     Results from last 7 days   Lab Units 05/20/22  1132 05/20/22  0716 05/19/22  2111 05/19/22  1932 05/19/22  1849 05/19/22  1834   POC GLUCOSE mg/dl 207* 159* 153* 169* 55* 58*         Results from last 7 days   Lab Units 05/19/22  1822 05/19/22  1524 05/19/22  1204   LACTIC ACID mmol/L  --  1 0 2 2*   PROCALCITONIN ng/ml <0 05  --   --        Lines/Drains:  Invasive Devices  Report    Peripheral Intravenous Line  Duration           Peripheral IV 05/19/22 Right Forearm 1 day          Drain  Duration           Urethral Catheter 1 day              Urinary Catheter:  Goal for removal: plan to remove nicole now and initiate voiding trial               Imaging: No pertinent imaging reviewed      Recent Cultures (last 7 days):   Results from last 7 days   Lab Units 05/19/22  1822 05/19/22  1204   BLOOD CULTURE  Received in Microbiology Lab  Culture in Progress  Received in Microbiology Lab  Culture in Progress  Last 24 Hours Medication List:   Current Facility-Administered Medications   Medication Dose Route Frequency Provider Last Rate    acetaminophen  640 mg Oral Q6H PRN KRISTOPHER Chiu      aspirin  81 mg Oral Daily Kati Patricio, CRNP      atorvastatin  80 mg Oral Daily With [employer], CRNP      bisacodyl  5 mg Oral Daily PRN Katirichard Patricio, CRNP      cefTRIAXone  1,000 mg Intravenous Q24H Kati Patricio, CRNP 1,000 mg (05/20/22 1319)    divalproex sodium  250 mg Oral QAM Kati Patricio, CRNP      divalproex sodium  500 mg Oral QPM Kati Patricio, CRNP      ferrous gluconate  324 mg Oral Daily With Breakfast Kati Patricio, KRISTOPHER      gabapentin  800 mg Oral TID Kati Patricio, KRISTOPHER      heparin (porcine)  5,000 Units Subcutaneous Q8H Kati Patricio, CRNP      insulin lispro  1-6 Units Subcutaneous TID AC Kati Patricio, CRJANELLE      levothyroxine  50 mcg Oral Early Morning Kati Patricio, CRJANELLE      pantoprazole  20 mg Oral Early Morning Kati Patricio, CRNP      QUEtiapine  100 mg Oral BID Kati Patricio, CRJANELLE      QUEtiapine  200 mg Oral HS Kati Patricio, KRISTOPHER      senna  8 6 mg Oral HS Kati Patricio, CRJANELLE      sertraline  100 mg Oral BID Kati Patricio, KRISTOPHER      simethicone  40 mg Oral Q6H PRN Kati Patricio, KRISTOPHER      tamsulosin  0 4 mg Oral Daily With Dinner KRISTOPHER Chiu          Today, Patient Was Seen By: KRISTOPHER Chiu    **Please Note: This note may have been constructed using a voice recognition system  **

## 2022-05-20 NOTE — ASSESSMENT & PLAN NOTE
Lab Results   Component Value Date    HGBA1C 7 6 (H) 04/08/2022       Recent Labs     05/19/22  1932 05/19/22  2111 05/20/22  0716 05/20/22  1132   POCGLU 169* 153* 159* 207*       Blood Sugar Average: Last 72 hrs:  · (P) 133 5 caregiver reports was on Lantus and insulin while at [facility], discharged with metaglip and blood sugars have been uncontrolled with high and low glucoses [facility]  · Humalog sliding scale t i d  A c   · Hypoglycemia protocol  · Reduced oral intake [facility], did not eat breakfast yesterday  In ED  arrival to floor glucose 50s given 25 g D50 with improvement greater than 100   No additional episodes of hypoglycemia

## 2022-05-20 NOTE — ASSESSMENT & PLAN NOTE
· Discharge from [facility] 1 week ago, increasing lethargy, weakness, decreased oral intake  Hypothermic 94 8 on presentation  Likely secondary to cystitis  · UA, nitrates positive, 0-1 casts- of note patient is taking HIPREX outpatient  · Check procalcitonin, thiamine, valproic acid level normal, lactic acid elevated recheck in AM  · Ammonia <10, check cortisol in a m    · CT head- dilated ventricles also noted in prior CTs  · ProBNP normal  · TSH normal- was on Synthroid at [facility] recent PCP visit continued  · Will give x1 dose 200 mg IV thiamine  · CT chest abdomen pelvis-no acute inflammatory processes, gas and stool present -start bowel regime, mylicon   · Chest x-ray-no acute cardiopulmonary processes  · Blood cultures -in process  · Episode of hypoglycemia POC 50s, 25 g of D50 given with improvement >100  · Patient has returned to her baseline

## 2022-05-20 NOTE — PLAN OF CARE
Problem: PHYSICAL THERAPY ADULT  Goal: Performs mobility at highest level of function for planned discharge setting  See evaluation for individualized goals  Description: Treatment/Interventions: Functional transfer training, LE strengthening/ROM, Therapeutic exercise, Endurance training, Patient/family training, Equipment eval/education, Bed mobility, Gait training, Compensatory technique education, Spoke to nursing, Spoke to case management, OT  Equipment Recommended:  (TBD by rehab)       See flowsheet documentation for full assessment, interventions and recommendations  9/41/[id number] by Markus Hopkins PT  Note: Prognosis: Fair  Problem List: Decreased strength, Decreased endurance, Impaired balance, Decreased mobility, Impaired judgement, Decreased safety awareness, Decreased cognition, Obesity  Pt tolerated session fairly  She continues to require increased assistance with all mobility  She was able to complete multiple squat pivotrs with maxAx2 to sit in recliner chair  She is limited by decreased strength, balance, endruance  Continue PT services to increase mobility as able  Barriers to Discharge: Decreased caregiver support, Inaccessible home environment  Barriers to Discharge Comments: requires increased assistance to complete all mobility     PT Discharge Recommendation: Post acute rehabilitation services          See flowsheet documentation for full assessment

## 2022-05-20 NOTE — PLAN OF CARE
Problem: OCCUPATIONAL THERAPY ADULT  Goal: Performs self-care activities at highest level of function for planned discharge setting  See evaluation for individualized goals  Description: Treatment Interventions: ADL retraining, Functional transfer training, UE strengthening/ROM, Endurance training, Cognitive reorientation, Patient/family training, Equipment evaluation/education, Neuromuscular reeducation, Compensatory technique education, Continued evaluation, Energy conservation, Activityengagement          See flowsheet documentation for full assessment, interventions and recommendations  Note: Limitation: Decreased ADL status, Decreased UE strength, Decreased Safe judgement during ADL, Decreased cognition, Decreased endurance, Decreased self-care trans, Decreased high-level ADLs  Prognosis: Good  Assessment: Pt is a 74 y o  female, admitted to [facility] 5/19/2022 d/t experiencing lethargy, weakness,a dn decreased food intake  Dx: acute metabolic encephalopathy  Pt with PMHx impacting their performance during ADL tasks, including: hyperlipidemia, intellectual disability, dysphagia, DM, seizures, GERD, anxiety, mixed incontinence  Prior to admission to the hospital Pt was performing ADLs with physical assistance  IADLs with physical assistance  Functional transfers/ambulation with physical assistance  Cognitive status was PTA was impaired  OT order placed to assess Pt's ADLs, cognitive status, and performance during functional tasks in order to maximize safety and independence while making most appropriate d/c recommendations   Pt's clinical presentation is currently unstable/unpredictable given new onset deficits that effect Pt's occupational performance and ability to safely return to PLOF including decrease activity tolerance, decrease standing balance, decrease sitting balance, decrease performance during ADL tasks, decrease cognition, decrease safety awareness , decrease UB MS, decrease generalized strength, decrease activity engagement, decrease performance during functional transfers, high fall risk, limited insight to deficits and inability to express needs combined with medical complications of change in mental status, abnormal H&H, abnormal CBC, abnormal sodium values, abnormal CO2 values, edema/swelling, multiple readmissions, incontinence and need for input for mobility technique/safety  Personal factors affecting Pt at time of initial evaluation include: behavioral pattern, level of education, inability to perform current job functions, inability to perform IADLs, inability to perform ADLs, new need for AD, inability to ambulate household distances, inability to navigate community distances, limited insight into impairments, decreased initiation and engagement, difficulty communicating, recent fall(s)/fall history and questionable non-compliance  Pt will benefit from continued skilled OT services to address deficits as defined above and to maximize level independence/participation during ADLs and functional tasks to facilitate return toward PLOF and improved quality of life  From an occupational therapy standpoint, recommendation at time of d/c would be post acute rehab       OT Discharge Recommendation: Post acute rehabilitation services

## 2022-05-20 NOTE — CASE MANAGEMENT
Case Management Discharge Planning Note    Patient name Nida Camara  Location [address] [id number]/-[id number] MRN [id number]  : 1955 Date 2022       Current Admission Date: 2022  Current Admission Diagnosis:Acute metabolic encephalopathy   Patient Active Problem List    Diagnosis Date Noted    Acute metabolic encephalopathy     Cystitis without hematuria 2022    Dysphagia 2021    Chronic anemia 2021    Chronically elevated right hemidiaphragm 2021    Obesity (BMI 30 0-34 9) 2021    Seizure disorder ([facility] ) 2021    Hyperlipidemia 2021    [facility] with [facility] 2021    Aspiration pneumonia ([facility] ) 2021    Mood disorder ([facility] ) 2021    Type 2 diabetes mellitus without complication, without long-term current use of insulin ([facility] ) 2020    Hypertension 2019    Gastroesophageal reflux disease without esophagitis 2018    Ambulatory dysfunction 2017    Intellectual disability 2017      LOS (days): 1  Geometric Mean LOS (GMLOS) (days): 3 80  Days to GMLOS:2 8     OBJECTIVE:  Risk of Unplanned Readmission Score: 22 8         Current admission status: Inpatient   Preferred Pharmacy:   [address] [place] [address] [facility] [address]  Phone: [phone] Fax: [phone]    Primary Care Provider: Romayne Harvest, MD    Primary Insurance: MEDICARE  Secondary Insurance: [address] [place], [place] [address] DETAILS:        CM met with patient to let her know she will be returning to Group Home tomorrow but I do not believe patient understood

## 2022-05-20 NOTE — CASE MANAGEMENT
Case Management Assessment & Discharge Planning Note    Patient name Maurilio Cm  Location [id number] MRN [id number]  : 1955 Date 2022       Current Admission Date: 2022  Current Admission Diagnosis:Acute metabolic encephalopathy   Patient Active Problem List    Diagnosis Date Noted    Acute metabolic encephalopathy     Cystitis without hematuria 2022    Dysphagia 2021    Chronic anemia 2021    Chronically elevated right hemidiaphragm 2021    Obesity (BMI 30 0-34 9) 2021    Seizure disorder ([facility] ) 2021    Hyperlipidemia 2021    Acute respiratory failure with hypoxia ([facility] ) 2021    Aspiration pneumonia ([facility] ) 2021    Mood disorder ([facility] ) 2021    Type 2 diabetes mellitus without complication, without long-term current use of insulin ([facility] ) 2020    Hypertension 2019    Gastroesophageal reflux disease without esophagitis 2018    Ambulatory dysfunction 2017    Intellectual disability 2017      LOS (days): 1  Geometric Mean LOS (GMLOS) (days): 3 80  Days to GMLOS:2 8     OBJECTIVE:    Risk of Unplanned Readmission Score: 22 8         Current admission status: Inpatient       Preferred Pharmacy:   [address] [place] [address]  Phone: [phone] Fax: [phone]    Primary Care Provider: Mckenna Johnston MD    Primary Insurance: MEDICARE  Secondary Insurance: Marry aMx 118:  [address] [employer]     [facility] ( [address] [place] [address] Representative   Primary Phone: [phone] (Mobile)                         Readmission Root Cause  30 Day Readmission: No    Patient Information  Admitted from[de-identified] Other (comment) (Group Home)  Mental Status: Alert  During Assessment patient was accompanied by:  Other-Comment  Assessment information provided by[de-identified] Other - please comment Caro Salgado, [address])  Primary Caregiver: Other (Comment)  Caregiver's Name[de-identified] Group Home Staff  Caregiver's Relationship to Patient[de-identified] Other (Specify) (Group Home Caregivers)  Caregiver's Telephone Number[de-identified] see face sheet  Support Systems: Other (Comment) (Group Home staff)  [place] of Residence: [facility] do you live in?: [employer]  Type of Current Residence: Other (Comment) ([address])  In the last 12 months, was there a time when you were not able to pay the mortgage or rent on time?: No  In the last 12 months, how many places have you lived?: 2  In the last 12 months, was there a time when you did not have a steady place to sleep or slept in a shelter (including now)?: No  Homeless/housing insecurity resource given?: N/A  Living Arrangements: Other (Comment) ([address])  Is patient a ?: No    Activities of Daily Living Prior to Admission  Functional Status: Assistance  Completes ADLs independently?: No  Level of ADL dependence: Assistance  Ambulates independently?: No  Level of ambulatory dependence: Assistance  Does patient use assisted devices?: Yes  Assisted Devices (DME) used:  Other (Comment) (Sit to Stand)  Does patient currently own DME?: Yes  What DME does the patient currently own?: Other (Comment) (Sit to Stand)  Does patient have a history of Outpatient Therapy (PT/OT)?: No  Does the patient have a history of Short-Term Rehab?: Yes ([place])  Does patient have a history of HHC?: Yes  Does patient currently have [facility]?: Yes ([address])    [address] [facility]  Type of Current Home Care Services: [facility][de-identified] [facility] Provider[de-identified] PCP    Patient Information Continued  Income Source: SSI/SSD  Does patient have prescription coverage?: Yes  Within the past 12 months, you worried that your food would run out before you got the money to buy more : Never true  Within the past 12 months, the food you bought just didn't last and you didn't have money to get more : Never true  Food insecurity resource given?: N/A  Does patient receive dialysis treatments?: No  Does patient have a history of substance abuse?: No  Does patient have a history of Mental Health Diagnosis?: No         Means of Transportation  Means of Transport to Appts[de-identified] Other (Comment) (Group Home Staff)  In the past 12 months, has lack of transportation kept you from medical appointments or from getting medications?: No  In the past 12 months, has lack of transportation kept you from meetings, work, or from getting things needed for daily living?: No  Was application for public transport provided?: N/A        DISCHARGE DETAILS:    Discharge planning discussed with[de-identified] Nelsy Giraldo staff  Freedom of Choice: Yes  Comments - [place] of Choice: [facility]  CM contacted family/caregiver?: Yes (Nelsy Giraldo)  Were Treatment Team discharge recommendations reviewed with patient/caregiver?: Yes  Did patient/caregiver verbalize understanding of patient care needs?: Yes  Were patient/caregiver advised of the risks associated with not following Treatment Team discharge recommendations?: Yes    Contacts  Patient Contacts: [facility] [place]  Relationship to Patient[de-identified] Other (Comment)  Contact Method: Phone  Phone Number: see face sheet  Reason/Outcome: Continuity of Care, Discharge [address]         Is the patient interested in [facility] at discharge?: Yes  [address] requested[de-identified] Physical Therapy, Occupational Therapy, [address] Name[de-identified] 33 [facility] Provider[de-identified] PCP  Home Health Services Needed[de-identified] Strengthening/Theraputic Exercises to Improve Function, Evaluate Functional Status and Safety  Homebound Criteria Met[de-identified] Requires the Assistance of Another Person for Safe Ambulation or to Leave the Home  Supporting Clincal Findings[de-identified] Limited Endurance    DME Referral Provided  Referral made for DME?: No    Other Referral/Resources/Interventions Provided:  Interventions: [facility]  Referral Comments: [facility]         Treatment Team Recommendation: Group Home, Home with Home Health Care  Discharge Destination Plan[de-identified] Group Home, [facility]  Transport at Discharge : Other (Comment) (Group Home staff)      GENESIS contatcted Annie Bright, group home director, to discuss patients functional status  Annie Bright indicated patient walked several months ago but was placed at [facility] after discharge from hospital and was there for 6 months  Annie Bright indicated patients insurance stopped paying for therapy at [facility] and patient declined so staff started bringing patient home for visits with anticipation of full time return home  Patient returned home one week ago with sit to stand at [facility] and [facility] for PT, OT and VN  CM discussed our therapy department recommendation of SNF for patient  Annie Bright reitterated patient has no SNF therapy days left on her insurance and they recommend patient return to [address] [place] [address] with current services in place as patient has [facility] for PT, OT and VN  Annie Bright indicated group home staff are able to  pateint tomorrow if medically cleared  CM forwarded PT and OT notes to [facility] through [facility]  CM emailed H&P, PT and OT notes and medication list to Annie Bright at [email or web address]

## 2022-05-20 NOTE — OCCUPATIONAL THERAPY NOTE
Occupational Therapy Evaluation     Evaluation: 7430-3461  Treatment: 2429-2817    Patient Name: Mei Regalado  LPCNN'L Date: 5/20/2022  Problem List  Principal Problem:    Acute metabolic encephalopathy  Active Problems:    Type 2 diabetes mellitus without complication, without long-term current use of insulin (HCC)    Gastroesophageal reflux disease without esophagitis    Seizure disorder (HCC)    Hyperlipidemia    Intellectual disability    Chronic anemia    Dysphagia    Cystitis without hematuria    Past Medical History  Past Medical History:   Diagnosis Date    Anxiety     Diabetes mellitus ([address] )     GERD (gastroesophageal reflux disease)     Hyperlipidemia     Hypertension     Mental disability     Mixed incontinence 4/12/2017    Seizure disorder [facility])      Past Surgical History  No past surgical history on file  05/20/22 0734   Note Type   Note type Evaluation   Restrictions/Precautions   Other Precautions Cognitive; Chair Alarm; Fall Risk  (nicole cath)   Pain Assessment   Pain Assessment Tool FLACC   Pain Rating: FLACC (Rest) - Face 0   Pain Rating: FLACC (Rest) - Legs 0   Pain Rating: FLACC (Rest) - Activity 0   Pain Rating: FLACC (Rest) - Cry 0   Pain Rating: FLACC (Rest) - Consolability 0   Score: FLACC (Rest) 0   Pain Rating: FLACC (Activity) - Face 0   Pain Rating: FLACC (Activity) - Legs 0   Pain Rating: FLACC (Activity) - Activity 0   Pain Rating: FLACC (Activity) - Cry 0   Pain Rating: FLACC (Activity) - Consolability 0   Score: FLACC (Activity) 0   Home Living   Type of Home Group Home   Additional Comments Pt is non-verbal, unable to obtain PLOF or home set-up at this time  Pt does have 24/7 assistance from facility staff and a RW and w/c available   ADL   Where Assessed Edge of bed   Eating Assistance 5  Supervision/Setup   Eating Deficit Setup   UB Dressing Assistance 4  Minimal Assistance   UB Dressing Deficit Verbal cueing;Supervision/safety; Increased time to complete; Thread RUE;Thread LUE;Pull over head;Pull around back  (HOHA)   LB Dressing Assistance 1  Total Assistance   LB Dressing Deficit Steadying; Requires assistive device for steadying;Verbal cueing;Supervision/safety; Don/doff R sock; Don/doff L sock; Thread RLE into pants; Thread LLE into pants;Pull up over hips   Additional Comments Pt completing ADL tasks while seated at EOB  UB Dressing @ Min A for [facility] for initiation and sequencing through task and vc'ing for follow through  Pt maintaining F balnace while seated unsupported  Pt requiring Total A for LB dressing including donning socks/pants around feet and CM around waist while standing with RW   Bed Mobility   Supine to Sit 2  Maximal assistance   Additional items Assist x 2;HOB elevated; Bedrails; Increased time required;LE management;Verbal cues  (trunk management)   Additional Comments HOB elevated, use of bedrails PRN  Pt initially with good participation then requiring Max x's 2 to fully achieve supine>sit  pt seated at EOB and maitnaining F balance for 5 minutes   Transfers   Sit to Stand 2  Maximal assistance  (achieved 3/4 stand)   Additional items Assist x 2; Increased time required;Verbal cues   Stand to Sit 2  Maximal assistance   Additional items Assist x 2; Increased time required;Verbal cues   Stand pivot Unable to assess   Sit pivot 2  Maximal assistance   Additional items Assist x 2; Increased time required;Verbal cues   Additional Comments Pt completing functional transfers with use of RW for UB support  Pt initially completing STS from EOB>RW @ Max x's 2 and able to achieve 3/4 stand  Pt's B knees noted to be shaking and unable to fully stand upright despite BUE supported on RW  Pt then returned to sitting  unable to safety assess SPT at this time  Please refer to treatmnet note for performance during sit pivot transfer to drop arm recliner     Balance   Static Sitting Fair   Dynamic Sitting Fair -   Static Standing Poor   Dynamic Standing Poor -   Activity Tolerance   Activity Tolerance Patient limited by fatigue   Medical Staff Made Aware Spoke with PT, Laura Mikie   Nurse Made Aware Spoke with RN   RUE Assessment   RUE Assessment WFL   LUE Assessment   LUE Assessment WFL   Hand Function   Gross Motor Coordination Functional   Fine Motor Coordination Functional   Sensation   Light Touch No apparent deficits   Cognition   Overall Cognitive Status Impaired   Arousal/Participation Alert; Responsive; Cooperative   Attention Attends with cues to redirect   Orientation Level Unable to assess   Memory Unable to assess   Following Commands Follows one step commands with increased time or repetition   Comments Pt very pleasant during session  following commands and alert during entire session  Assessment   Limitation Decreased ADL status; Decreased UE strength;Decreased Safe judgement during ADL;Decreased cognition;Decreased endurance;Decreased self-care trans;Decreased high-level ADLs   Prognosis Good   Assessment Pt is a 74 y o  female, admitted to [address] [place] [address] 5/19/2022 d/t experiencing lethargy, weakness,a dn decreased food intake  Dx: acute metabolic encephalopathy  Pt with PMHx impacting their performance during ADL tasks, including: hyperlipidemia, intellectual disability, dysphagia, DM, seizures, GERD, anxiety, mixed incontinence  Prior to admission to the hospital Pt was performing ADLs with physical assistance  IADLs with physical assistance  Functional transfers/ambulation with physical assistance  Cognitive status was PTA was impaired  OT order placed to assess Pt's ADLs, cognitive status, and performance during functional tasks in order to maximize safety and independence while making most appropriate d/c recommendations   Pt's clinical presentation is currently unstable/unpredictable given new onset deficits that effect Pt's occupational performance and ability to safely return to OF including decrease activity tolerance, decrease standing balance, decrease sitting balance, decrease performance during ADL tasks, decrease cognition, decrease safety awareness , decrease UB MS, decrease generalized strength, decrease activity engagement, decrease performance during functional transfers, high fall risk, limited insight to deficits and inability to express needs combined with medical complications of change in mental status, abnormal H&H, abnormal CBC, abnormal sodium values, abnormal CO2 values, edema/swelling, multiple readmissions, incontinence and need for input for mobility technique/safety  Personal factors affecting Pt at time of initial evaluation include: behavioral pattern, level of education, inability to perform current job functions, inability to perform IADLs, inability to perform ADLs, new need for AD, inability to ambulate household distances, inability to navigate community distances, limited insight into impairments, decreased initiation and engagement, difficulty communicating, recent fall(s)/fall history and questionable non-compliance  Pt will benefit from continued skilled OT services to address deficits as defined above and to maximize level independence/participation during ADLs and functional tasks to facilitate return toward PLOF and improved quality of life  From an occupational therapy standpoint, recommendation at time of d/c would be post acute rehab  Plan   Treatment Interventions ADL retraining;Functional transfer training;UE strengthening/ROM; Endurance training;Cognitive reorientation;Patient/family training;Equipment evaluation/education; Neuromuscular reeducation; Compensatory technique education;Continued evaluation; Energy conservation; Activityengagement   Goal Expiration Date 06/03/22   OT Treatment Day 1   OT Frequency 3-5x/wk   Additional Treatment Session   Start Time 0750   End Time 0759   Treatment Assessment Pt seen for treatmnet session #1 this date  Pt alert and agreeable to participate at this time   PT completing sit pivot transfer from EOB to drop arm recliner with Max x's 2  2 therapist standing on either side of Pt with B knees and feet blocked  Pt holding to arms and completing 3 lateral sit pivots to recliner with good tolerance although minimal standing achieved at this time  Pt then requiring Max A for repositioning in recliner chair  Once Pt properly positioned in reclinre chair, Pt was provided with meal tray  Pt demonstrating ability to open sugar and creamer and pour into coffee and mix with minimal difficulty noted  Pt then comleting scooping food to mouth with no physical assistnace although Minimal amounts of spillage noted  Pt's chair alarm intact, call bell in reach, and all needs met at this time  Additional Treatment Day 1   Recommendation   OT Discharge Recommendation Post acute rehabilitation services   AM-PAC Daily Activity Inpatient   Lower Body Dressing 1   Bathing 1   Toileting 1   Upper Body Dressing 3   Grooming 3   Eating 4   Daily Activity Raw Score 13   Daily Activity Standardized Score (Calc for Raw Score >=11) 32 03   AM-PAC Applied Cognition Inpatient   Following a Speech/Presentation 2   Understanding Ordinary Conversation 3   Taking Medications 1   Remembering Where Things Are Placed or Put Away 2   Remembering List of 4-5 Errands 1   Taking Care of Complicated Tasks 1   Applied Cognition Raw Score 10   Applied Cognition Standardized Score 24 98     The patient's raw score on the AM-PAC Daily Activity inpatient short form is 13, standardized score is 32 03, less than 39 4  Patients at this level are likely to benefit from DC to post-acute rehabilitation services  Please refer to the recommendation of the Occupational Therapist for safe DC planning  Pt goals to be met by 6/3/2022    Pt will demonstrate ability to complete grooming/hygiene tasks @ S after set-up  Pt will demonstrate ability to complete supine<>sit @ S in order to increase safety and independence during ADL tasks    Pt will demonstrate ability to complete UB ADLs including washing/dressing @ S in order to increase performance and participation during meaningful tasks  Pt will demonstrate ability to complete LB dressing @ Min A in order to increase safety and independence during meaningful tasks  Pt will demonstrate ability to complete toileting tasks including CM and pericare @ Min A in order to increase safety and independence during meaningful tasks  Pt will demonstrate ability to complete EOB, chair, toilet/commode transfers @ Min A in order to increase performance and participation during functional tasks  Pt will demonstrate ability to stand for 1-2 minutes while maintaining F+ balance with use of RW for UB support PRN  Pt will demonstrate ability to tolerate 30-35 minute OT session with no vc'ing for deep breathing or use of energy conservation techniques in order to increase activity tolerance during functional tasks  Pt will demonstrate Good carryover of use of energy conservation/compensatory strategies during ADLs and functional tasks in order to increase safety and reduce risk for falls  Pt will demonstrate Good attention and participation in continued evaluation of functional ambulation house hold distances in order to assist with safe d/c planning  Pt will attend to continued cognitive assessments 100% of the time in order to provide most appropriate d/c recommendations  Pt will follow 100% simple 1-step commands and be A&O x2 consistently with environmental cues to increase participation in functional activities  Pt will identify 3 areas of interest/hobbies and 1 intervention on how to incorporate into daily life in order to increase interaction with environment and peers as well as increase participation in meaningful tasks  Pt will demonstrate 100% carryover of BUE HEP in order to increase BUE MS and increase performance during functional tasks upon d/c home      Layo Yen OTR/L

## 2022-05-21 VITALS
HEART RATE: 84 BPM | BODY MASS INDEX: 28.39 KG/M2 | OXYGEN SATURATION: 93 % | TEMPERATURE: 97.2 F | RESPIRATION RATE: 19 BRPM | HEIGHT: 60 IN | DIASTOLIC BLOOD PRESSURE: 61 MMHG | WEIGHT: 144.62 LBS | SYSTOLIC BLOOD PRESSURE: 102 MMHG

## 2022-05-21 PROBLEM — R33.8 ACUTE URINARY RETENTION: Status: ACTIVE | Noted: 2022-05-21

## 2022-05-21 PROBLEM — R60.0 LOWER EXTREMITY EDEMA: Status: ACTIVE | Noted: 2022-05-21

## 2022-05-21 LAB
ALBUMIN SERPL BCP-MCNC: 2.6 G/DL (ref 3.5–5)
ALP SERPL-CCNC: 45 U/L (ref 46–116)
ALT SERPL W P-5'-P-CCNC: 17 U/L (ref 12–78)
ANION GAP SERPL CALCULATED.3IONS-SCNC: 5 MMOL/L (ref 4–13)
AST SERPL W P-5'-P-CCNC: 13 U/L (ref 5–45)
BACTERIA UR CULT: ABNORMAL
BASOPHILS # BLD AUTO: 0.03 THOUSANDS/ΜL (ref 0–0.1)
BASOPHILS NFR BLD AUTO: 1 % (ref 0–1)
BILIRUB SERPL-MCNC: 0.23 MG/DL (ref 0.2–1)
BUN SERPL-MCNC: 10 MG/DL (ref 5–25)
CALCIUM ALBUM COR SERPL-MCNC: 10.3 MG/DL (ref 8.3–10.1)
CALCIUM SERPL-MCNC: 9.2 MG/DL (ref 8.3–10.1)
CHLORIDE SERPL-SCNC: 99 MMOL/L (ref 100–108)
CO2 SERPL-SCNC: 30 MMOL/L (ref 21–32)
CREAT SERPL-MCNC: 0.45 MG/DL (ref 0.6–1.3)
EOSINOPHIL # BLD AUTO: 0.22 THOUSAND/ΜL (ref 0–0.61)
EOSINOPHIL NFR BLD AUTO: 5 % (ref 0–6)
ERYTHROCYTE [DISTWIDTH] IN BLOOD BY AUTOMATED COUNT: 15.3 % (ref 11.6–15.1)
GFR SERPL CREATININE-BSD FRML MDRD: 103 ML/MIN/1.73SQ M
GLUCOSE SERPL-MCNC: 128 MG/DL (ref 65–140)
GLUCOSE SERPL-MCNC: 139 MG/DL (ref 65–140)
GLUCOSE SERPL-MCNC: 242 MG/DL (ref 65–140)
HCT VFR BLD AUTO: 32.1 % (ref 34.8–46.1)
HGB BLD-MCNC: 10.1 G/DL (ref 11.5–15.4)
IMM GRANULOCYTES # BLD AUTO: 0.09 THOUSAND/UL (ref 0–0.2)
IMM GRANULOCYTES NFR BLD AUTO: 2 % (ref 0–2)
LYMPHOCYTES # BLD AUTO: 1.58 THOUSANDS/ΜL (ref 0.6–4.47)
LYMPHOCYTES NFR BLD AUTO: 35 % (ref 14–44)
MCH RBC QN AUTO: 29.6 PG (ref 26.8–34.3)
MCHC RBC AUTO-ENTMCNC: 31.5 G/DL (ref 31.4–37.4)
MCV RBC AUTO: 94 FL (ref 82–98)
MONOCYTES # BLD AUTO: 0.39 THOUSAND/ΜL (ref 0.17–1.22)
MONOCYTES NFR BLD AUTO: 9 % (ref 4–12)
NEUTROPHILS # BLD AUTO: 2.21 THOUSANDS/ΜL (ref 1.85–7.62)
NEUTS SEG NFR BLD AUTO: 48 % (ref 43–75)
NRBC BLD AUTO-RTO: 0 /100 WBCS
PLATELET # BLD AUTO: 185 THOUSANDS/UL (ref 149–390)
PMV BLD AUTO: 8.6 FL (ref 8.9–12.7)
POTASSIUM SERPL-SCNC: 4.2 MMOL/L (ref 3.5–5.3)
PROT SERPL-MCNC: 5.7 G/DL (ref 6.4–8.2)
RBC # BLD AUTO: 3.41 MILLION/UL (ref 3.81–5.12)
SODIUM SERPL-SCNC: 134 MMOL/L (ref 136–145)
WBC # BLD AUTO: 4.52 THOUSAND/UL (ref 4.31–10.16)

## 2022-05-21 PROCEDURE — 82948 REAGENT STRIP/BLOOD GLUCOSE: CPT

## 2022-05-21 PROCEDURE — 99239 HOSP IP/OBS DSCHRG MGMT >30: CPT | Performed by: FAMILY MEDICINE

## 2022-05-21 PROCEDURE — 85025 COMPLETE CBC W/AUTO DIFF WBC: CPT

## 2022-05-21 PROCEDURE — 80053 COMPREHEN METABOLIC PANEL: CPT

## 2022-05-21 RX ORDER — TORSEMIDE 20 MG/1
10 TABLET ORAL DAILY
Status: DISCONTINUED | OUTPATIENT
Start: 2022-05-21 | End: 2022-05-21 | Stop reason: HOSPADM

## 2022-05-21 RX ORDER — CEPHALEXIN 500 MG/1
500 CAPSULE ORAL EVERY 6 HOURS SCHEDULED
Qty: 20 CAPSULE | Refills: 0 | Status: SHIPPED | OUTPATIENT
Start: 2022-05-21 | End: 2022-05-26

## 2022-05-21 RX ORDER — METHENAMINE HIPPURATE 1000 MG/1
1 TABLET ORAL 2 TIMES DAILY WITH MEALS
Refills: 0
Start: 2022-05-21

## 2022-05-21 RX ORDER — TAMSULOSIN HYDROCHLORIDE 0.4 MG/1
0.4 CAPSULE ORAL
Qty: 30 CAPSULE | Refills: 0 | Status: SHIPPED | OUTPATIENT
Start: 2022-05-21

## 2022-05-21 RX ORDER — LEVOTHYROXINE SODIUM 0.05 MG/1
50 TABLET ORAL
Refills: 0
Start: 2022-05-21

## 2022-05-21 RX ADMIN — SERTRALINE 100 MG: 100 TABLET, FILM COATED ORAL at 08:03

## 2022-05-21 RX ADMIN — LEVOTHYROXINE SODIUM 50 MCG: 50 TABLET ORAL at 05:43

## 2022-05-21 RX ADMIN — INSULIN LISPRO 3 UNITS: 100 INJECTION, SOLUTION INTRAVENOUS; SUBCUTANEOUS at 11:28

## 2022-05-21 RX ADMIN — PANTOPRAZOLE SODIUM 20 MG: 20 TABLET, DELAYED RELEASE ORAL at 05:43

## 2022-05-21 RX ADMIN — DIVALPROEX SODIUM 250 MG: 250 TABLET, DELAYED RELEASE ORAL at 08:03

## 2022-05-21 RX ADMIN — HEPARIN SODIUM 5000 UNITS: 5000 INJECTION INTRAVENOUS; SUBCUTANEOUS at 05:43

## 2022-05-21 RX ADMIN — FERROUS GLUCONATE 324 MG: 324 TABLET ORAL at 08:03

## 2022-05-21 RX ADMIN — ASPIRIN 81 MG: 81 TABLET, COATED ORAL at 08:03

## 2022-05-21 RX ADMIN — QUETIAPINE FUMARATE 100 MG: 100 TABLET ORAL at 08:03

## 2022-05-21 RX ADMIN — GABAPENTIN 800 MG: 400 CAPSULE ORAL at 08:03

## 2022-05-21 NOTE — ASSESSMENT & PLAN NOTE
· Nonverbal at baseline, lives in group home  · Continue her lorazepam has been taking for some time  · Continue Seroquel, sertraline

## 2022-05-21 NOTE — ASSESSMENT & PLAN NOTE
· On torsemide history of  She has trace edema today on bilateral lower extremities    Restart her torsemide 10 mg daily

## 2022-05-21 NOTE — ASSESSMENT & PLAN NOTE
Lab Results   Component Value Date    HGBA1C 7 6 (H) 04/08/2022       Recent Labs     05/20/22  1132 05/20/22  1610 05/20/22  2112 05/21/22  0719   POCGLU 207* 199* 196* 139       Blood Sugar Average: Last 72 hrs:  · (P) 358 [id number] caregiver reports was on Lantus and insulin while at [facility], discharged with metaglip and blood sugars have been uncontrolled with high and low glucoses since return to skilled nursing  · Humalog sliding scale t i d  A c   · Hypoglycemia protocol  · Reduced oral intake since returning to skilled nursing, did not eat breakfast yesterday  In ED  arrival to floor glucose 50s given 25 g D50 with improvement greater than 100  No additional episodes of hypoglycemia may resume her outpatient oral medications    Sugars have been stable she is eating fine

## 2022-05-21 NOTE — PLAN OF CARE
Problem: Potential for Falls  Goal: Patient will remain free of falls  Description: INTERVENTIONS:  - Educate patient/family on patient safety including physical limitations  - Instruct patient to call for assistance with activity   - Consult OT/PT to assist with strengthening/mobility   - Keep Call bell within reach  - Keep bed low and locked with side rails adjusted as appropriate  - Keep care items and personal belongings within reach  - Initiate and maintain comfort rounds  - Make Fall Risk Sign visible to staff  - Offer Toileting every 2 Hours, in advance of need  - Initiate/Maintain bed alarm  - Obtain necessary fall risk management equipment:   - Apply yellow socks and bracelet for high fall risk patients  - Consider moving patient to room near nurses station  Outcome: Progressing     Problem: MOBILITY - ADULT  Goal: Maintain or return to baseline ADL function  Description: INTERVENTIONS:  -  Assess patient's ability to carry out ADLs; assess patient's baseline for ADL function and identify physical deficits which impact ability to perform ADLs (bathing, care of mouth/teeth, toileting, grooming, dressing, etc )  - Assess/evaluate cause of self-care deficits   - Assess range of motion  - Assess patient's mobility; develop plan if impaired  - Assess patient's need for assistive devices and provide as appropriate  - Encourage maximum independence but intervene and supervise when necessary  - Involve family in performance of ADLs  - Assess for home care needs following discharge   - Consider OT consult to assist with ADL evaluation and planning for discharge  - Provide patient education as appropriate  Outcome: Progressing  Goal: Maintains/Returns to pre admission functional level  Description: INTERVENTIONS:  - Perform BMAT or MOVE assessment daily    - Set and communicate daily mobility goal to care team and patient/family/caregiver     - Collaborate with rehabilitation services on mobility goals if consulted  - Perform Range of Motion 3 times a day  - Reposition patient every 2 hours    - Dangle patient 3 times a day  - Stand patient 3 times a day  - Ambulate patient 3 times a day  - Out of bed to chair 3 times a day   - Out of bed for meals 3 times a day  - Out of bed for toileting  - Record patient progress and toleration of activity level   Outcome: Progressing     Problem: Prexisting or High Potential for Compromised Skin Integrity  Goal: Skin integrity is maintained or improved  Description: INTERVENTIONS:  - Identify patients at risk for skin breakdown  - Assess and monitor skin integrity  - Assess and monitor nutrition and hydration status  - Monitor labs   - Assess for incontinence   - Turn and reposition patient  - Assist with mobility/ambulation  - Relieve pressure over bony prominences  - Avoid friction and shearing  - Provide appropriate hygiene as needed including keeping skin clean and dry  - Evaluate need for skin moisturizer/barrier cream  - Collaborate with interdisciplinary team   - Patient/family teaching  - Consider wound care consult   Outcome: Progressing     Problem: PAIN - ADULT  Goal: Verbalizes/displays adequate comfort level or baseline comfort level  Description: Interventions:  - Encourage patient to monitor pain and request assistance  - Assess pain using appropriate pain scale  - Administer analgesics based on type and severity of pain and evaluate response  - Implement non-pharmacological measures as appropriate and evaluate response  - Consider cultural and social influences on pain and pain management  - Notify physician/advanced practitioner if interventions unsuccessful or patient reports new pain  Outcome: Progressing     Problem: INFECTION - ADULT  Goal: Absence or prevention of progression during hospitalization  Description: INTERVENTIONS:  - Assess and monitor for signs and symptoms of infection  - Monitor lab/diagnostic results  - Monitor all insertion sites, i e  indwelling lines, tubes, and drains  - Monitor endotracheal if appropriate and nasal secretions for changes in amount and color  - [place] appropriate cooling/warming therapies per order  - Administer medications as ordered  - Instruct and encourage patient and family to use good hand hygiene technique  - Identify and instruct in appropriate isolation precautions for identified infection/condition  Outcome: Progressing  Goal: Absence of fever/infection during neutropenic period  Description: INTERVENTIONS:  - Monitor WBC    Outcome: Progressing     Problem: SAFETY ADULT  Goal: Patient will remain free of falls  Description: INTERVENTIONS:  - Educate patient/family on patient safety including physical limitations  - Instruct patient to call for assistance with activity   - Consult OT/PT to assist with strengthening/mobility   - Keep Call bell within reach  - Keep bed low and locked with side rails adjusted as appropriate  - Keep care items and personal belongings within reach  - Initiate and maintain comfort rounds  - Make Fall Risk Sign visible to staff  - Offer Toileting every 2 Hours, in advance of need  - Initiate/Maintain bed alarm  - Obtain necessary fall risk management equipment:   - Apply yellow socks and bracelet for high fall risk patients  - Consider moving patient to room near nurses station  Outcome: Progressing  Goal: Maintain or return to baseline ADL function  Description: INTERVENTIONS:  -  Assess patient's ability to carry out ADLs; assess patient's baseline for ADL function and identify physical deficits which impact ability to perform ADLs (bathing, care of mouth/teeth, toileting, grooming, dressing, etc )  - Assess/evaluate cause of self-care deficits   - Assess range of motion  - Assess patient's mobility; develop plan if impaired  - Assess patient's need for assistive devices and provide as appropriate  - Encourage maximum independence but intervene and supervise when necessary  - Involve family in performance of ADLs  - Assess for home care needs following discharge   - Consider OT consult to assist with ADL evaluation and planning for discharge  - Provide patient education as appropriate  Outcome: Progressing  Goal: Maintains/Returns to pre admission functional level  Description: INTERVENTIONS:  - Perform BMAT or MOVE assessment daily    - Set and communicate daily mobility goal to care team and patient/family/caregiver  - Collaborate with rehabilitation services on mobility goals if consulted  - Perform Range of Motion 3 times a day  - Reposition patient every 2 hours  - Dangle patient 3 times a day  - Stand patient 3 times a day  - Ambulate patient 3 times a day  - Out of bed to chair 3 times a day   - Out of bed for meals 3 times a day  - Out of bed for toileting  - Record patient progress and toleration of activity level   Outcome: Progressing     Problem: DISCHARGE PLANNING  Goal: Discharge to home or other facility with appropriate resources  Description: INTERVENTIONS:  - Identify barriers to discharge w/patient and caregiver  - Arrange for needed discharge resources and transportation as appropriate  - Identify discharge learning needs (meds, wound care, etc )  - Arrange for interpretive services to assist at discharge as needed  - Refer to Case Management Department for coordinating discharge planning if the patient needs post-hospital services based on physician/advanced practitioner order or complex needs related to functional status, cognitive ability, or social support system  Outcome: Progressing     Problem: Knowledge Deficit  Goal: Patient/family/caregiver demonstrates understanding of disease process, treatment plan, medications, and discharge instructions  Description: Complete learning assessment and assess knowledge base    Interventions:  - Provide teaching at level of understanding  - Provide teaching via preferred learning methods  Outcome: Progressing     Problem: RESPIRATORY - ADULT  Goal: Achieves optimal ventilation and oxygenation  Description: INTERVENTIONS:  - Assess for changes in respiratory status  - Assess for changes in mentation and behavior  - Position to facilitate oxygenation and minimize respiratory effort  - Oxygen administered by appropriate delivery if ordered  - Initiate smoking cessation education as indicated  - Encourage broncho-pulmonary hygiene including cough, deep breathe, Incentive Spirometry  - Assess the need for suctioning and aspirate as needed  - Assess and instruct to report SOB or any respiratory difficulty  - Respiratory Therapy support as indicated  Outcome: Progressing     Problem: MUSCULOSKELETAL - ADULT  Goal: Maintain or return mobility to safest level of function  Description: INTERVENTIONS:  - Assess patient's ability to carry out ADLs; assess patient's baseline for ADL function and identify physical deficits which impact ability to perform ADLs (bathing, care of mouth/teeth, toileting, grooming, dressing, etc )  - Assess/evaluate cause of self-care deficits   - Assess range of motion  - Assess patient's mobility  - Assess patient's need for assistive devices and provide as appropriate  - Encourage maximum independence but intervene and supervise when necessary  - Involve family in performance of ADLs  - Assess for home care needs following discharge   - Consider OT consult to assist with ADL evaluation and planning for discharge  - Provide patient education as appropriate  Outcome: Progressing

## 2022-05-21 NOTE — ASSESSMENT & PLAN NOTE
· Resolved she voided well today post was residual was 11 secondary to acute cystitis continue antibiotics continue Flomax well on antibiotics a then Flomax can be discontinued and monitored

## 2022-05-21 NOTE — ASSESSMENT & PLAN NOTE
· Discharge from [facility] 1 week ago, increasing lethargy, weakness, decreased oral intake  Hypothermic 94 8 on presentation  Secondary to acute cystitis urine culture showing positive for greater than 100,000 gram-negative talia  · UA, nitrates positive, 0-1 casts- of note patient is taking HIPREX outpatient  · Check procalcitonin normal, thiamine, valproic acid level normal, lactic acid elevated recheck in AM  · Ammonia <10, check cortisol in a m  · CT head- dilated ventricles also noted in prior CTs  · ProBNP normal  · TSH normal- was on Synthroid at [facility] recent PCP visit continued  · Will give x1 dose 200 mg IV thiamine  · CT chest abdomen pelvis-no acute inflammatory processes, gas and stool present -start bowel regime, mylicon   · Chest x-ray-no acute cardiopulmonary processes  · Sugars are stable blood cultures are negative    · Patient has returned to her baseline  · I discussed with staff today she is eating by herself 100%

## 2022-05-21 NOTE — ASSESSMENT & PLAN NOTE
· Preliminary urine cultures greater than 100,000 of Gram-negative rods changed to Keflex discontinue Rocephin will treat with 5 days  Urinary retention has resolved she voided well today without any postvoid residual greater than 50 she was in her bladder 11   Will follow-up urine culture final results if antibiotics needs to be switched to something else will call the group home   · Well taking Keflex needs to hold hipprex

## 2022-05-21 NOTE — DISCHARGE SUMMARY
[address]  Discharge- Marilu Gooden 1955, 74 y o  female MRN: [id number]  Unit/Bed#: -01 Encounter: [id number]  Primary Care Provider: Carlos Aj MD   Date and time admitted to hospital: 5/19/2022 10:34 AM    Lower extremity edema  Assessment & Plan  · On torsemide history of  She has trace edema today on bilateral lower extremities  Restart her torsemide 10 mg daily    Acute urinary retention  Assessment & Plan  · Resolved she voided well today post was residual was 11 secondary to acute cystitis continue antibiotics continue Flomax well on antibiotics a then Flomax can be discontinued and monitored    Cystitis without hematuria  Assessment & Plan  · Preliminary urine cultures greater than 100,000 of Gram-negative rods changed to Keflex discontinue Rocephin will treat with 5 days  Urinary retention has resolved she voided well today without any postvoid residual greater than 50 she was in her bladder 11   Will follow-up urine culture final results if antibiotics needs to be switched to something else will call the group home   · Well taking Keflex needs to hold hipprex    Dysphagia  Assessment & Plan  · Continue puree diet, thin liquids  · Aspiration precautions  · Assistance with meals eating well    Chronic anemia  Assessment & Plan  · Hemoglobin 10, baseline appears to be 8-10  · Continue supplementation    Intellectual disability  Assessment & Plan  · Nonverbal at baseline, lives in group home  · Continue her lorazepam has been taking for some time  · Continue Seroquel, sertraline    Hyperlipidemia  Assessment & Plan  · Continue statin    Seizure disorder (HCC)  Assessment & Plan  · Valproic acid level normal  · Continue Depakote 250mg BID, 500mg HS    Gastroesophageal reflux disease without esophagitis  Assessment & Plan  · Omeprazole outpatient, continued as Protonix    Type 2 diabetes mellitus without complication, without long-term current use of insulin [facility])  Assessment & Plan  Lab Results   Component Value Date    HGBA1C 7 6 (H) 04/08/2022       Recent Labs     05/20/22  1132 05/20/22  1610 05/20/22  2112 05/21/22  0719   POCGLU 207* 199* 196* 139       Blood Sugar Average: Last 72 hrs:  · (P) 579 [id number] caregiver reports was on Lantus and insulin while at [facility], discharged with metaglip and blood sugars have been uncontrolled with high and low glucoses since return to [facility]  · Humalog sliding scale t i d  A c   · Hypoglycemia protocol  · Reduced oral intake since returning to [facility], did not eat breakfast yesterday  In ED  arrival to floor glucose 50s given 25 g D50 with improvement greater than 100  No additional episodes of hypoglycemia may resume her outpatient oral medications  Sugars have been stable she is eating fine    * Acute metabolic encephalopathy  Assessment & Plan  · Discharge from [facility] 1 week ago, increasing lethargy, weakness, decreased oral intake  Hypothermic 94 8 on presentation  Secondary to acute cystitis urine culture showing positive for greater than 100,000 gram-negative talia  · UA, nitrates positive, 0-1 casts- of note patient is taking HIPREX outpatient  · Check procalcitonin normal, thiamine, valproic acid level normal, lactic acid elevated recheck in AM  · Ammonia <10, check cortisol in a m  · CT head- dilated ventricles also noted in prior CTs  · ProBNP normal  · TSH normal- was on Synthroid at [facility] recent PCP visit continued  · Will give x1 dose 200 mg IV thiamine  · CT chest abdomen pelvis-no acute inflammatory processes, gas and stool present -start bowel regime, mylicon   · Chest x-ray-no acute cardiopulmonary processes  · Sugars are stable blood cultures are negative    · Patient has returned to her baseline  · I discussed with staff today she is eating by herself 100%    Medical Problems             Resolved Problems  Date Reviewed: 5/21/2022   None               Discharging Physician / Practitioner: Elen Caballero MD  PCP: Lisbeth Guajardo MD  Admission Date:   Admission Orders (From admission, onward)     Ordered        05/19/22 1523  INPATIENT ADMISSION  Once                      Discharge Date: 05/21/22    Consultations During Hospital Stay:  · None    Procedures Performed:   · None    Significant Findings / Test Results:   · Chest x-ray normal  · CT head- No acute intracranial hemorrhage or mass effect  Moderate ventriculomegaly mildly out of proportion to the dilatation of the subarachnoid spaces which could indicate underlying normal pressure hydrocephalus  Clinical correlation advised  · CT abdomen and pelvis without contrast- no acute inflammatory process in the abdomen or pelvis    Incidental Findings:   · None     Test Results Pending at Discharge (will require follow up): · Final urine culture preliminary- greater than 100,000 gram-negative talia     Outpatient Tests Requested:  · None    Complications:  None    Reason for Admission:  Encephalopathy and hypothermia    Hospital Course:   Josi Barahona is a 74 y o  female patient who originally presented to the hospital on 5/19/2022 due to encephalopathy and hypothermia and urinary retention secondary to acute cystitis as evident on UA and with reflex to culture  Started on antibiotics otherwise the rest of the workup was essentially normal   Hypothermia resolved patient was returned back to baseline nonverbal eating by herself very well  Urinary retention has resolved Perrin was pulled and she voided without no significant postvoid residual   Patient was started on Flomax should continue while on antibiotics on for cystitis  Then can be discontinued and monitored how she does  Patient is medically clear to be discharged back to the group home  hipprex should be held while on keflex        Please see above list of diagnoses and related plan for additional information       Condition at Discharge: stable    Discharge Day Visit / Exam:   Subjective:  Seen and examined not in distress no issues reported  Vitals: Blood Pressure: 102/61 (05/21/22 1114)  Pulse: 84 (05/21/22 1114)  Temperature: (!) 97 2 °F (36 2 °C) (05/21/22 0718)  Temp Source: Bladder (05/19/22 1630)  Respirations: 19 (05/21/22 0718)  Height: 5' (152 4 cm) (05/19/22 1719)  Weight - Scale: 65 6 kg (144 lb 10 oz) (05/21/22 0559)  SpO2: 93 % (05/21/22 1114)  Exam:   Physical Exam  Vitals and nursing note reviewed  Constitutional:       General: She is not in acute distress  Appearance: She is well-developed  HENT:      Head: Normocephalic and atraumatic  Eyes:      Conjunctiva/sclera: Conjunctivae normal    Cardiovascular:      Rate and Rhythm: Normal rate and regular rhythm  Heart sounds: No murmur heard  Pulmonary:      Effort: Pulmonary effort is normal  No respiratory distress  Breath sounds: Normal breath sounds  No wheezing or rales  Abdominal:      General: There is no distension  Palpations: Abdomen is soft  Tenderness: There is no abdominal tenderness  Musculoskeletal:         General: Swelling (trace b/l ) present  Cervical back: Neck supple  Skin:     General: Skin is warm and dry  Neurological:      Mental Status: She is alert  Mental status is at baseline  Discussion with Family: Updated  (brother) via phone  Discharge instructions/Information to patient and family:   See after visit summary for information provided to patient and family  Provisions for Follow-Up Care:  See after visit summary for information related to follow-up care and any pertinent home health orders  Disposition:   Home with [facility] (Reminder: Complete face to face encounter)    Planned Readmission: anticipate no      Discharge Statement:  I spent >35 minutes discharging the patient  This time was spent on the day of discharge  I had direct contact with the patient on the day of discharge   Greater than 50% of the total time was spent examining patient, answering all patient questions, arranging and discussing plan of care with patient as well as directly providing post-discharge instructions  Additional time then spent on discharge activities  Discharge Medications:  See after visit summary for reconciled discharge medications provided to patient and/or family        **Please Note: This note may have been constructed using a voice recognition system**

## 2022-05-24 LAB — BACTERIA BLD CULT: NORMAL

## 2022-05-25 LAB — BACTERIA BLD CULT: NORMAL

## 2022-05-27 NOTE — CASE MANAGEMENT
Case Management Progress Note    Patient name Alejandro Meyers  Location /- MRN [id number]  : 1955 Date 8/10/2021       LOS (days): 23  Geometric Mean LOS (GMLOS) (days): 4 80  Days to GMLOS:-18        BUNDLE:      OBJECTIVE:  Pt is a 77y o  year old Single, white or  [1], female with Voodoo preference of Non-Jain admitted on 2021  2:07 PM  Pt is admitted to - at [address] [place] [address] with complaints of Sepsis due to pneumonia ([facility] [address] )   Current admission status: Inpatient  Preferred Pharmacy:   [address] [place] [address] [address] [address]  Phone: [phone] Fax: [phone]    Primary Care Provider: Arlyn Larsen MD    Primary Insurance: MEDICARE  Secondary Insurance: [place] [employer]    PROGRESS NOTE:    Called Office of Aging to see if they received the approval letter ( IDD) for patric Nicholas determination  DC Plan is [place]    Transportation will be BLS- on 1 liter of   - will need COVID screen     Lab at bedside to obt. Blood cultures.      Britta Mccormick RN  05/26/22 2022

## 2022-05-31 ENCOUNTER — APPOINTMENT (EMERGENCY)
Dept: RADIOLOGY | Facility: HOSPITAL | Age: 67
End: 2022-05-31
Payer: MEDICARE

## 2022-05-31 ENCOUNTER — HOSPITAL ENCOUNTER (EMERGENCY)
Facility: HOSPITAL | Age: 67
Discharge: HOME | End: 2022-05-31
Attending: STUDENT IN AN ORGANIZED HEALTH CARE EDUCATION/TRAINING PROGRAM
Payer: MEDICARE

## 2022-05-31 VITALS
WEIGHT: 100 LBS | TEMPERATURE: 97.9 F | SYSTOLIC BLOOD PRESSURE: 138 MMHG | RESPIRATION RATE: 18 BRPM | HEIGHT: 60 IN | OXYGEN SATURATION: 96 % | BODY MASS INDEX: 19.63 KG/M2 | DIASTOLIC BLOOD PRESSURE: 84 MMHG | HEART RATE: 81 BPM

## 2022-05-31 DIAGNOSIS — S62.663A CLOSED NONDISPLACED FRACTURE OF DISTAL PHALANX OF LEFT MIDDLE FINGER, INITIAL ENCOUNTER: ICD-10-CM

## 2022-05-31 DIAGNOSIS — S69.92XA INJURY OF NAIL BED OF FINGER OF LEFT HAND, INITIAL ENCOUNTER: ICD-10-CM

## 2022-05-31 DIAGNOSIS — S61.309A AVULSION OF FINGERNAIL, INITIAL ENCOUNTER: Primary | ICD-10-CM

## 2022-05-31 DIAGNOSIS — S60.10XA SUBUNGUAL HEMATOMA OF DIGIT OF HAND, INITIAL ENCOUNTER: ICD-10-CM

## 2022-05-31 PROCEDURE — 96365 THER/PROPH/DIAG IV INF INIT: CPT

## 2022-05-31 PROCEDURE — 99285 EMERGENCY DEPT VISIT HI MDM: CPT | Performed by: STUDENT IN AN ORGANIZED HEALTH CARE EDUCATION/TRAINING PROGRAM

## 2022-05-31 PROCEDURE — 99283 EMERGENCY DEPT VISIT LOW MDM: CPT

## 2022-05-31 PROCEDURE — 73140 X-RAY EXAM OF FINGER(S): CPT

## 2022-05-31 PROCEDURE — 11730 AVULSION NAIL PLATE SIMPLE 1: CPT | Performed by: STUDENT IN AN ORGANIZED HEALTH CARE EDUCATION/TRAINING PROGRAM

## 2022-05-31 PROCEDURE — 73130 X-RAY EXAM OF HAND: CPT

## 2022-05-31 RX ORDER — CEFAZOLIN SODIUM 2 G/50ML
2000 SOLUTION INTRAVENOUS ONCE
Status: COMPLETED | OUTPATIENT
Start: 2022-05-31 | End: 2022-05-31

## 2022-05-31 RX ORDER — BUPIVACAINE HYDROCHLORIDE 5 MG/ML
30 INJECTION, SOLUTION EPIDURAL; INTRACAUDAL ONCE
Status: COMPLETED | OUTPATIENT
Start: 2022-05-31 | End: 2022-05-31

## 2022-05-31 RX ORDER — CEPHALEXIN 500 MG/1
500 CAPSULE ORAL EVERY 6 HOURS SCHEDULED
Qty: 20 CAPSULE | Refills: 0 | Status: SHIPPED | OUTPATIENT
Start: 2022-05-31 | End: 2022-06-05

## 2022-05-31 RX ORDER — BUPIVACAINE HYDROCHLORIDE 5 MG/ML
10 INJECTION, SOLUTION EPIDURAL; INTRACAUDAL ONCE
Status: DISCONTINUED | OUTPATIENT
Start: 2022-05-31 | End: 2022-05-31 | Stop reason: SDUPTHER

## 2022-05-31 RX ORDER — BACITRACIN, NEOMYCIN, POLYMYXIN B 400; 3.5; 5 [USP'U]/G; MG/G; [USP'U]/G
1 OINTMENT TOPICAL ONCE
Status: COMPLETED | OUTPATIENT
Start: 2022-05-31 | End: 2022-05-31

## 2022-05-31 RX ADMIN — CEFAZOLIN SODIUM 2000 MG: 2 SOLUTION INTRAVENOUS at 21:00

## 2022-05-31 RX ADMIN — NEOMYCIN AND POLYMYXIN B SULFATES AND BACITRACIN ZINC 1 SMALL APPLICATION: 400; 3.5; 5 OINTMENT TOPICAL at 23:23

## 2022-05-31 RX ADMIN — BUPIVACAINE HYDROCHLORIDE 30 ML: 5 INJECTION, SOLUTION EPIDURAL; INTRACAUDAL at 23:23

## 2022-05-31 NOTE — ED PROVIDER NOTES
History  Chief Complaint   Patient presents with    Finger Pain     Pt from group home with injury to L middle finger, care giver unsure how patient obtained injuey       History provided by:  Caregiver  Hand Pain  Location:  Left distal hand/middle finger  Quality:  Unable to specify   Severity:  Unable to specify  Onset quality:  Unable to specify  Context:  Hand/finger injury without known cause  Associated symptoms: no rash       74year old F  Presents to the ED following a left hand/middle finger injury  No one knows how the injury occurred  She was evaluated at urgent care who recommended ED evaluation  The patient is non verbal and resides at a group home  Per chart review, it appears that the patient sustained the injury on 5/24 or 25  There was call made to the PCP  Prior to Admission Medications   Prescriptions Last Dose Informant Patient Reported? Taking? Cranberry 450 MG CAPS   Yes No   Sig: Take by mouth   Ergocalciferol (VITAMIN D2 PO)   Yes No   Sig: Take 1 25 mg by mouth once a week   LORazepam (ATIVAN) 0 5 mg tablet   Yes No   Sig: Take 0 5 mg by mouth in the morning and 0 5 mg in the evening  QUEtiapine (SEROquel XR) 200 mg 24 hr tablet   No No   Sig: Take 1 tablet (200 mg total) by mouth daily at bedtime   Patient taking differently: Take 200 mg by mouth daily at bedtime 1 tab at 8pm   QUEtiapine (SEROquel) 100 mg tablet   Yes No   Sig: Take 100 mg by mouth in the morning and 100 mg in the evening   8am, 4pm    acetaminophen (TYLENOL) 160 MG chewable tablet   No No   Sig: Chew 4 tablets (640 mg total) every 6 (six) hours as needed for mild pain or fever   aspirin (ECOTRIN LOW STRENGTH) 81 mg EC tablet   Yes No   Sig: Take 81 mg by mouth daily   bisacodyl (DULCOLAX) 5 mg EC tablet   Yes No   Sig: Take 5 mg by mouth daily as needed for constipation   divalproex sodium (DEPAKOTE) 250 mg EC tablet   Yes No   Sig: Take 250 mg by mouth every 12 (twelve) hours 1 tab AM (8am), 2 tabs 8pm docusate sodium (COLACE) 100 mg capsule  Outside Facility (Specify) Yes No   Sig: Take 100 mg by mouth in the morning and 100 mg in the evening  ferrous gluconate (FERGON) 324 mg tablet   Yes No   Sig: Take 324 mg by mouth daily with breakfast   gabapentin (NEURONTIN) 400 mg capsule   No No   Sig: Take 2 capsules (800 mg total) by mouth 3 (three) times a day   glipiZIDE-metFORMIN (METAGLIP) 2 5-250 MG per tablet   Yes No   Sig: Take 1 tablet by mouth in the morning and 1 tablet in the evening  Take before meals  levothyroxine 50 mcg tablet   No No   Sig: Take 1 tablet (50 mcg total) by mouth daily in the early morning   methenamine hippurate (HIPREX) 1 g tablet   No No   Sig: Take 1 tablet (1 g total) by mouth in the morning and 1 tablet (1 g total) in the evening  Take with meals  Restart that after finishing with keflex  omeprazole (PriLOSEC) 20 mg delayed release capsule   Yes No   Sig: Take 20 mg by mouth daily   rosuvastatin (CRESTOR) 40 MG tablet   Yes No   Sig: Take 40 mg by mouth daily   senna (SENOKOT) 8 6 mg   No No   Sig: Take 1 tablet (8 6 mg total) by mouth daily at bedtime   sertraline (ZOLOFT) 100 mg tablet   No No   Sig: Take 1 tablet (100 mg total) by mouth daily at bedtime   Patient taking differently: Take 100 mg by mouth in the morning and 100 mg before bedtime  BID    tamsulosin (FLOMAX) 0 4 mg   No No   Sig: Take 1 capsule (0 4 mg total) by mouth daily with dinner   tolterodine (DETROL LA) 4 mg 24 hr capsule   Yes No   Sig: Take 2 mg by mouth in the morning and 2 mg before bedtime     torsemide (DEMADEX) 10 mg tablet   No No   Sig: Take 1 tablet (10 mg total) by mouth daily      Facility-Administered Medications: None       Past Medical History:   Diagnosis Date    Anxiety     Diabetes mellitus ([facility] )     GERD (gastroesophageal reflux disease)     Hyperlipidemia     Hypertension     Mental disability     Mixed incontinence 4/12/2017    Seizure disorder ([facility] )        History reviewed  No pertinent surgical history  History reviewed  No pertinent family history  I have reviewed and agree with the history as documented  E-Cigarette/Vaping    E-Cigarette Use Never User      E-Cigarette/Vaping Substances     Social History     Tobacco Use    Smoking status: Never Smoker    Smokeless tobacco: Never Used   Vaping Use    Vaping Use: Never used   Substance Use Topics    Alcohol use: Not Currently    Drug use: Not Currently     Review of Systems   Unable to perform ROS: Patient nonverbal   Musculoskeletal: Positive for arthralgias and joint swelling  Skin: Positive for color change and wound  Negative for pallor and rash  Physical Exam  Physical Exam  Vitals and nursing note reviewed  Constitutional:       General: She is not in acute distress  Appearance: She is not ill-appearing or toxic-appearing  HENT:      Head: Normocephalic and atraumatic  Right Ear: External ear normal       Left Ear: External ear normal    Eyes:      General:         Right eye: No discharge  Left eye: No discharge  Extraocular Movements: Extraocular movements intact  Conjunctiva/sclera: Conjunctivae normal    Cardiovascular:      Rate and Rhythm: Normal rate and regular rhythm  Pulses: Normal pulses  Heart sounds: Normal heart sounds  No murmur heard  Pulmonary:      Effort: Pulmonary effort is normal  No respiratory distress  Breath sounds: Normal breath sounds  No stridor  No wheezing, rhonchi or rales  Chest:      Chest wall: No tenderness  Abdominal:      General: Bowel sounds are normal       Palpations: Abdomen is soft  Tenderness: There is no abdominal tenderness  There is no right CVA tenderness, left CVA tenderness, guarding or rebound  Musculoskeletal:         General: Swelling, tenderness and signs of injury present        Comments: Bruising (different stages of healing) along the dorsal/distal aspect of the left hand in the area of the 3th metacarpal  There is bruising along the dorsal aspect of the third digit  Small laceration along the base of the distal finger just proximal to the nail  + subungual hematoma  Skin:     Capillary Refill: Capillary refill takes less than 2 seconds  Coloration: Skin is not jaundiced or pale  Findings: Bruising present  No erythema, lesion or rash  Neurological:      Mental Status: Mental status is at baseline  Vital Signs  ED Triage Vitals [05/31/22 1845]   Temperature Pulse Respirations Blood Pressure SpO2   97 9 °F (36 6 °C) 81 18 138/84 96 %      Temp Source Heart Rate Source Patient Position - Orthostatic VS BP Location FiO2 (%)   Tympanic -- -- -- --      Pain Score       --           Vitals:    05/31/22 1845   BP: 138/84   Pulse: 81     ED Medications  Medications   ceFAZolin (ANCEF) IVPB (premix in dextrose) 2,000 mg 50 mL (0 mg Intravenous Stopped 5/31/22 2208)   bupivacaine (PF) (MARCAINE) 0 5 % injection 30 mL (30 mL Injection Given by Other 5/31/22 2323)   neomycin-bacitracin-polymyxin b (NEOSPORIN) ointment 1 small application (1 small application Topical Given 5/31/22 2323)     Diagnostic Studies  Results Reviewed     None             XR finger third digit-middle LEFT   ED Interpretation by Warden Doretha DO (05/31 1928)   Third distal phalanx fracture      XR hand 3+ views LEFT   ED Interpretation by Warden Doretha DO (05/31 1928)   Third distal phalanx fracture             Procedures  Digital Block  Performed by: Warden Doretha DO  Authorized by: Warden Doretha DO     Procedure date/time:  5/31/2022 10:30 PM  Consent:     Consent obtained:  Verbal    Consent given by:  Guardian    Risks discussed:   Allergic reaction, infection, swelling, unsuccessful block, pain and bleeding    Alternatives discussed:  No treatment, delayed treatment and referral  Indications:     Indications:  Procedural anesthesia  Location:     Block location:  Finger    Finger blocked:  L long finger  Pre-procedure details:     Neurovascular status: intact      Skin preparation:  Alcohol  Procedure details (see MAR for exact dosages):     Needle gauge:  25 G    Anesthetic injected:  Bupivacaine 0 5% w/o epi    Technique:  Metacarpal block    Injection procedure:  Anatomic landmarks identified, incremental injection, negative aspiration for blood, anatomic landmarks palpated and introduced needle  Post-procedure details:     Outcome:  Anesthesia achieved    Patient tolerance of procedure: Tolerated well, no immediate complications  Nail removal    Date/Time: 5/31/2022 10:30 PM  Performed by: Macey Daniel DO  Authorized by: Macey Daniel DO     Patient location:  ED  Indications / Diagnosis:  Nail avulsion, subungual hematoma, open fracture  Universal Protocol:  Consent: Verbal consent obtained  Consent given by: guardian  Patient understanding: patient states understanding of the procedure being performed  Site marked: the operative site was marked    Location:     Hand:  L long finger  Pre-procedure details:     Skin preparation:  ChloraPrep  Nails trimmed:     Number of nails trimmed:  1  Post-procedure details:     Dressing:  Splint, antibiotic ointment and 4x4 sterile gauze    Patient tolerance of procedure: Tolerated well, no immediate complications  Laceration repair    Date/Time: 5/31/2022 10:30 PM  Performed by: Macey Daniel DO  Authorized by: Macye Daniel DO   Consent: Verbal consent obtained    Consent given by: guardian  Patient understanding: patient states understanding of the procedure being performed  Site marked: the operative site was marked  Body area: upper extremity  Location details: left long finger  Laceration length: 0 5 cm  Tendon involvement: none  Nerve involvement: none    Sedation:  Patient sedated: no      Wound Dehiscence:  Superficial Wound Dehiscence: simple closure      Procedure Details:  Irrigation solution: saline  Irrigation method: syringe  Amount of cleaning: extensive  Skin closure: glue  Approximation: close  Approximation difficulty: simple  Patient tolerance: patient tolerated the procedure well with no immediate complications    Orthopedic injury treatment    Date/Time: 5/31/2022 10:30 PM  Performed by: Hilario Richey DO  Authorized by: Hilario Richey DO     Patient Location:  ED  [place] Protocol:  Consent: Verbal consent obtained  Consent given by: guardian      Injury location:  Finger  Location details:  Left long finger  Injury type:  Fracture  Fracture type: distal phalanx    MCP joint involved?: No    Any IP joint involved?: No    Neurovascular status: Neurovascularly intact    Distal perfusion: normal    Neurological function: normal    Range of motion: reduced    Splint type:  Finger splint, static  Supplies used:  Aluminum splint  Neurovascular status: Neurovascularly intact    Distal perfusion: normal    Neurological function: normal    Range of motion: unchanged    Patient tolerance:  Patient tolerated the procedure well with no immediate complications         ED Course  ED Course as of 06/01/22 0251   Tue May 31, 2022   1958 Reaching out to Hand surgery at [facility]  Dr Pippa Whitehead  2002 Recommending removal of the nail, irrigation of the laceration and repair of the nail bed  MDM  Number of Diagnoses or Management Options  Avulsion of fingernail, initial encounter  Closed nondisplaced fracture of distal phalanx of left middle finger, initial encounter  Injury of nail bed of finger of left hand, initial encounter  Subungual hematoma of digit of hand, initial encounter  Diagnosis management comments: 67-year-old female  Presents to the emergency department with worsening left middle finger pain  Per chart review, it appears that the patient injured her left middle finger approximately 6 days ago  Unknown mechanism of injury  She was evaluated earlier in the day at urgent care who recommended ED evaluation    On exam, the patient has an obvious nail avulsion with subungual hematoma  XRs of the finger were obtained and significant for a tuft fracture  Being that the injury was approximately 6 days ago, the case was discussed with Hand Surgery who recommended repair in the ED  The patient as administered a dose of IV Ancef  At the bedside, a metacarpal block of the left third digit was performed  The finger nail was removed and inspected  The subungual hematoma was drained  The nail bed had a small laceration along the ulnar aspect of the bed which was repaired with glue  With non absorbable sutures, the nail was replaced into the nail fold and secured along the corners of the nail  The finger was then splinted and bandaged  The patient was prescribed a course of Keflex and a Hand Surgery referral was provided  Wound care and return precautions were discussed with the group home representative  She expressed understanding  All questions were addressed  The patient was stable for discharge       Disposition  Final diagnoses:   Avulsion of fingernail, initial encounter   Subungual hematoma of digit of hand, initial encounter   Closed nondisplaced fracture of distal phalanx of left middle finger, initial encounter   Injury of nail bed of finger of left hand, initial encounter     Time reflects when diagnosis was documented in both MDM as applicable and the Disposition within this note     Time User Action Codes Description Comment    5/31/2022 11:08 PM Clearence Conde Add [S61 309A] Avulsion of fingernail, initial encounter     5/31/2022 11:08 PM Clearence Conde Add [S60 10XA] Subungual hematoma of digit of hand, initial encounter     5/31/2022 11:08 PM Clearence Conde Add [U81 819P] Closed nondisplaced fracture of distal phalanx of left middle finger, initial encounter     5/31/2022 11:08 PM Clearence Conde Add [F49 64EI] Injury of nail bed of finger of left hand, initial encounter       ED Disposition     ED Disposition Discharge    Condition   Stable    Date/Time   Tue May 31, 2022 11:08 PM    Comment   Denita Vital discharge to home/self care  Follow-up Information    None         Discharge Medication List as of 5/31/2022 11:10 PM      START taking these medications    Details   cephalexin (KEFLEX) 500 mg capsule Take 1 capsule (500 mg total) by mouth every 6 (six) hours for 5 days, Starting Tue 5/31/2022, Until Sun 6/5/2022, Normal         CONTINUE these medications which have NOT CHANGED    Details   acetaminophen (TYLENOL) 160 MG chewable tablet Chew 4 tablets (640 mg total) every 6 (six) hours as needed for mild pain or fever, Starting Wed 8/11/2021, No Print      aspirin (ECOTRIN LOW STRENGTH) 81 mg EC tablet Take 81 mg by mouth daily, Historical Med      bisacodyl (DULCOLAX) 5 mg EC tablet Take 5 mg by mouth daily as needed for constipation, Historical Med      Cranberry 450 MG CAPS Take by mouth, Historical Med      divalproex sodium (DEPAKOTE) 250 mg EC tablet Take 250 mg by mouth every 12 (twelve) hours 1 tab AM (8am), 2 tabs 8pm, Historical Med      docusate sodium (COLACE) 100 mg capsule Take 100 mg by mouth in the morning and 100 mg in the evening , Historical Med      Ergocalciferol (VITAMIN D2 PO) Take 1 25 mg by mouth once a week, Historical Med      ferrous gluconate (FERGON) 324 mg tablet Take 324 mg by mouth daily with breakfast, Historical Med      gabapentin (NEURONTIN) 400 mg capsule Take 2 capsules (800 mg total) by mouth 3 (three) times a day, Starting Sat 11/20/2021, No Print      glipiZIDE-metFORMIN (METAGLIP) 2 5-250 MG per tablet Take 1 tablet by mouth in the morning and 1 tablet in the evening  Take before meals  , Historical Med      levothyroxine 50 mcg tablet Take 1 tablet (50 mcg total) by mouth daily in the early morning, Starting Sat 5/21/2022, No Print      LORazepam (ATIVAN) 0 5 mg tablet Take 0 5 mg by mouth in the morning and 0 5 mg in the evening , Historical Med methenamine hippurate (HIPREX) 1 g tablet Take 1 tablet (1 g total) by mouth in the morning and 1 tablet (1 g total) in the evening  Take with meals  Restart that after finishing with keflex  , Starting Sat 5/21/2022, No Print      omeprazole (PriLOSEC) 20 mg delayed release capsule Take 20 mg by mouth daily, Starting Tue 6/22/2021, Until Wed 6/22/2022, Historical Med      QUEtiapine (SEROquel XR) 200 mg 24 hr tablet Take 1 tablet (200 mg total) by mouth daily at bedtime, Starting Wed 8/11/2021, No Print      QUEtiapine (SEROquel) 100 mg tablet Take 100 mg by mouth in the morning and 100 mg in the evening  8am, 4pm , Historical Med      rosuvastatin (CRESTOR) 40 MG tablet Take 40 mg by mouth daily, Historical Med      senna (SENOKOT) 8 6 mg Take 1 tablet (8 6 mg total) by mouth daily at bedtime, Starting Wed 8/11/2021, No Print      sertraline (ZOLOFT) 100 mg tablet Take 1 tablet (100 mg total) by mouth daily at bedtime, Starting Wed 8/11/2021, No Print      tamsulosin (FLOMAX) 0 4 mg Take 1 capsule (0 4 mg total) by mouth daily with dinner, Starting Sat 5/21/2022, Normal      tolterodine (DETROL LA) 4 mg 24 hr capsule Take 2 mg by mouth in the morning and 2 mg before bedtime  , Historical Med      torsemide (DEMADEX) 10 mg tablet Take 1 tablet (10 mg total) by mouth daily, Starting u 8/12/2021, No Print             No discharge procedures on file      PDMP Review       Value Time User    PDMP Reviewed  Yes 5/19/2022  5:07 PM Tatum Adams, [facility] Provider  Electronically Signed by           Elizabeth Bazan DO  06/01/22 5342

## 2022-06-01 NOTE — DISCHARGE INSTRUCTIONS
Maame Mello was evaluated in the emergency department after a finger injury  The x-ray that was obtained showed a fracture of her middle finger  The nail bed was repaired using glue  The nail was reattached using sutures  Keep the splint attached until follow-up with Hand surgery  For pain, you can administer Motrin 600 mg every 6 hours and Tylenol 1000 mg every 6 hours  She is being prescribed antibiotics  Please administer as directed  Do not hesitate to return to the emergency department for any concerning signs or symptoms

## 2022-06-02 ENCOUNTER — OFFICE VISIT (OUTPATIENT)
Dept: OBGYN CLINIC | Facility: CLINIC | Age: 67
End: 2022-06-02
Payer: MEDICARE

## 2022-06-02 VITALS
HEIGHT: 60 IN | HEART RATE: 72 BPM | RESPIRATION RATE: 20 BRPM | TEMPERATURE: 97.2 F | SYSTOLIC BLOOD PRESSURE: 120 MMHG | DIASTOLIC BLOOD PRESSURE: 70 MMHG | BODY MASS INDEX: 19.63 KG/M2 | WEIGHT: 100 LBS

## 2022-06-02 DIAGNOSIS — S61.309A AVULSION OF FINGERNAIL, INITIAL ENCOUNTER: ICD-10-CM

## 2022-06-02 DIAGNOSIS — S62.663B OPEN NONDISPLACED FRACTURE OF DISTAL PHALANX OF LEFT MIDDLE FINGER, INITIAL ENCOUNTER: ICD-10-CM

## 2022-06-02 DIAGNOSIS — S69.92XA INJURY OF NAIL BED OF FINGER OF LEFT HAND, INITIAL ENCOUNTER: ICD-10-CM

## 2022-06-02 PROCEDURE — 99204 OFFICE O/P NEW MOD 45 MIN: CPT | Performed by: ORTHOPAEDIC SURGERY

## 2022-06-02 RX ORDER — FERROUS SULFATE 325(65) MG
325 TABLET ORAL DAILY
COMMUNITY
Start: 2022-05-12

## 2022-06-02 RX ORDER — INSULIN GLARGINE 100 [IU]/ML
INJECTION, SOLUTION SUBCUTANEOUS
COMMUNITY
Start: 2022-03-24

## 2022-06-02 RX ORDER — LEVOTHYROXINE SODIUM 0.05 MG/1
50 TABLET ORAL DAILY
COMMUNITY
Start: 2022-05-17 | End: 2022-06-02

## 2022-06-02 RX ORDER — UNDERPADS 23" X 36"
EACH MISCELLANEOUS
COMMUNITY
Start: 2022-05-12 | End: 2022-06-02

## 2022-06-02 RX ORDER — INCONTINENCE PAD,LINER,DISP
EACH MISCELLANEOUS
COMMUNITY
Start: 2022-05-14

## 2022-06-02 RX ORDER — GABAPENTIN 800 MG/1
800 TABLET ORAL 3 TIMES DAILY
COMMUNITY
Start: 2022-05-12

## 2022-06-02 RX ORDER — DIVALPROEX SODIUM 125 MG/1
CAPSULE, COATED PELLETS ORAL
COMMUNITY
Start: 2022-05-12 | End: 2022-06-02

## 2022-06-02 RX ORDER — LANCETS 33 GAUGE
EACH MISCELLANEOUS DAILY
COMMUNITY
Start: 2022-05-12

## 2022-06-02 RX ORDER — OXYBUTYNIN CHLORIDE 10 MG/1
TABLET, EXTENDED RELEASE ORAL
COMMUNITY
Start: 2022-05-08 | End: 2022-07-22

## 2022-06-02 NOTE — PROGRESS NOTES
ASSESSMENT/PLAN:    Problem List Items Addressed This Visit    None     Visit Diagnoses     Avulsion of fingernail, initial encounter        Open nondisplaced fracture of distal phalanx of left middle finger, initial encounter        Injury of nail bed of finger of left hand, initial encounter              Plan: I would recommend follow-up in 1 week  Stitches will likely be removed  In the interim, a light dressing should be applied as needed and a splint for comfort  The finger may be cleansed gently  She is permitted range of motion as tolerated  The office should be contacted if questions or concerns arise  Return in about 1 week (around 6/9/2022)  _____________________________________________________  CHIEF COMPLAINT:  Chief Complaint   Patient presents with    Left Hand - Fracture     Left middle finger          SUBJECTIVE:  Stephanie Florentino is a right hand dominant 74 y o  female who presents to the office today for initial evaluation of a left middle finger injury  The patient is non-verbal and non-communicative per baseline, and resides at a group home  The patient presents today with a caregiver from the home  The caregiver states that the patient had recently returned home from the hospital on 5/21/2022 after being treated for sepsis, and the patient's caregiver reports that she noticed bruising and swelling along the knuckles and the middle finger of the left hand at that time  The patient was then taken to a [facility] urgent care on 5/31/2022, where it was noted that the patient had swelling and ecchymosis along the tip of the long finger, with the "nail bed pulling away from the base"  This was not considered to have any correlation to the bruising noted after hospital discharge  This new injury reportedly occurred 5-7 days before arrival at the urgent care, though the patient's caregiver is unable to state what caused the nail bed injury during that time period   The patient was then sent from the urgent care to the ED the same day, where the patient's nail was removed, subungual hematoma drained, and the nail repaired with suture  X-ray revealed a distal phalanx fracture, and the patient was also fitted with an Alumafoam splint  She was provided with a script for Keflex  Today the patient's caregiver states that the splint was removed last night, as the splint became soiled  The patient has not been complaining of any pain  She is still taking the Keflex as prescribed  PAST MEDICAL HISTORY:  Past Medical History:   Diagnosis Date    Anxiety     [facility] [address] [facility]     GERD (gastroesophageal reflux disease)     Hyperlipidemia     Hypertension     Mental disability     Mixed incontinence 4/12/2017    [facility]        PAST SURGICAL HISTORY:  History reviewed  No pertinent surgical history      FAMILY HISTORY:  Family History   Problem Relation Age of Onset    No Known Problems Mother     No Known Problems Father        SOCIAL HISTORY:  Social History     Tobacco Use    Smoking status: Never Smoker    Smokeless tobacco: Never Used   Vaping Use    Vaping Use: Never used   Substance Use Topics    Alcohol use: Not Currently    Drug use: Not Currently       MEDICATIONS:    Current Outpatient Medications:     aspirin (ECOTRIN LOW STRENGTH) 81 mg EC tablet, Take 81 mg by mouth daily, Disp: , Rfl:     Basaglar KwikPen 100 units/mL injection pen, , Disp: , Rfl:     bisacodyl (DULCOLAX) 5 mg EC tablet, Take 5 mg by mouth daily as needed for constipation, Disp: , Rfl:     cephalexin (KEFLEX) 500 mg capsule, Take 1 capsule (500 mg total) by mouth every 6 (six) hours for 5 days, Disp: 20 capsule, Rfl: 0    Cranberry 450 MG CAPS, Take by mouth, Disp: , Rfl:     Diapers & Supplies MISC, Use as directed , Disp: , Rfl:     divalproex sodium (DEPAKOTE) 250 mg EC tablet, Take 250 mg by mouth every 12 (twelve) hours 1 tab AM (8am), 2 tabs 8pm, Disp: , Rfl:     docusate sodium (COLACE) 100 mg capsule, Take 100 mg by mouth in the morning and 100 mg in the evening , Disp: , Rfl:     Ergocalciferol (VITAMIN D2 PO), Take 1 25 mg by mouth once a week, Disp: , Rfl:     ferrous gluconate (FERGON) 324 mg tablet, Take 324 mg by mouth daily with breakfast, Disp: , Rfl:     ferrous sulfate 325 (65 Fe) mg tablet, Take 325 mg by mouth daily, Disp: , Rfl:     gabapentin (NEURONTIN) 800 mg tablet, , Disp: , Rfl:     glipiZIDE-metFORMIN (METAGLIP) 2 5-250 MG per tablet, Take 1 tablet by mouth in the morning and 1 tablet in the evening  Take before meals  , Disp: , Rfl:     glucose blood test strip, TEST BLOOD SUGAR TWO TIMES DAILY  DX E11 9, Disp: , Rfl:     Incontinence Supply Disposable (FQ Pant Liner) MISC, , Disp: , Rfl:     Lancets 33G MISC, daily, Disp: , Rfl:     levothyroxine 50 mcg tablet, Take 1 tablet (50 mcg total) by mouth daily in the early morning, Disp: , Rfl: 0    LORazepam (ATIVAN) 0 5 mg tablet, Take 0 5 mg by mouth in the morning and 0 5 mg in the evening , Disp: , Rfl:     methenamine hippurate (HIPREX) 1 g tablet, Take 1 tablet (1 g total) by mouth in the morning and 1 tablet (1 g total) in the evening  Take with meals  Restart that after finishing with keflex  , Disp: , Rfl: 0    omeprazole (PriLOSEC) 20 mg delayed release capsule, Take 20 mg by mouth daily, Disp: , Rfl:     oxybutynin (DITROPAN-XL) 10 MG 24 hr tablet, , Disp: , Rfl:     QUEtiapine (SEROquel XR) 200 mg 24 hr tablet, Take 1 tablet (200 mg total) by mouth daily at bedtime (Patient taking differently: Take 200 mg by mouth daily at bedtime 1 tab at 8pm), Disp: , Rfl: 0    QUEtiapine (SEROquel) 100 mg tablet, Take 100 mg by mouth in the morning and 100 mg in the evening   8am, 4pm , Disp: , Rfl:     rosuvastatin (CRESTOR) 40 MG tablet, Take 40 mg by mouth daily, Disp: , Rfl:     senna (SENOKOT) 8 6 mg, Take 1 tablet (8 6 mg total) by mouth daily at bedtime, Disp: , Rfl: 0    sertraline (ZOLOFT) 100 mg tablet, Take 1 tablet (100 mg total) by mouth daily at bedtime (Patient taking differently: Take 100 mg by mouth 2 (two) times a day BID), Disp: , Rfl: 0    tamsulosin (FLOMAX) 0 4 mg, Take 1 capsule (0 4 mg total) by mouth daily with dinner, Disp: 30 capsule, Rfl: 0    tolterodine (DETROL LA) 4 mg 24 hr capsule, Take 2 mg by mouth in the morning and 2 mg before bedtime  , Disp: , Rfl:     torsemide (DEMADEX) 10 mg tablet, Take 1 tablet (10 mg total) by mouth daily, Disp: , Rfl: 0    ALLERGIES:  Allergies   Allergen Reactions    Actos [Pioglitazone] Other (See Comments)     unkown        Review of systems:   Constitutional: Negative for fatigue, fever or loss of apetite  HENT: Negative  Respiratory: Negative for shortness of breath, dyspnea  Cardiovascular: Negative for chest pain/tightness  Gastrointestinal: Negative for abdominal pain, N/V  Endocrine: Negative for cold/heat intolerance, unexplained weight loss/gain  Genitourinary: Negative for flank pain, dysuria, hematuria  Musculoskeletal: nail bed laceration, ecchymosis, and swelling along the middle finger of the left hand   Skin: Negative for rash  Neurological: patient unable to participate in neurological questioning or examination due to baseline mental status  Psychiatric/Behavioral: Negative for agitation  _____________________________________________________  PHYSICAL EXAMINATION:    Blood pressure 120/70, pulse 72, temperature (!) 97 2 °F (36 2 °C), resp  rate 20, height 5' (1 524 m), weight 45 4 kg (100 lb)  General: well developed and well nourished, alert, oriented times 3 and appears comfortable  Psychiatric: Normal  HEENT: Benign  Cardiovascular: Regular    Pulmonary: No wheezing or stridor  Abdomen: Soft, Nontender  Skin: No masses, erythema, lacerations, fluctation, ulcerations  Neurovascular: strong distal pulses, motor testing intact       MUSCULOSKELETAL EXAMINATION:    Left 3rd digit:  - patient presents with dressings, which were removed prior to examination without complication  - there is healing ecchymosis along the dorsal aspect of the hand/ MCP joint of the third digit  Swelling and ecchymosis present along the 3rd digit  The nail has been repaired with non-absorbable suture  No active bleeding, drainage, or signs of infection    - Patient does not show any signs of discomfort or pain with palpation of the distal phalanx of the 3rd digit, DIP, PIP, or MCP joints  - No visible rotational or flexion deformity of the finger noted, no palpable deformity  - Intact passive ROM of the 3rd digit MCP and PIP joints, DIP joint flexion limited due to swelling    - Intact active ROM of all other digits   - Sensation unable to be assessed due to patient's baseline mental status  - strong radial pulse  - brisk cap refill in all fingers    _____________________________________________________  STUDIES REVIEWED:  I have personally reviewed pertinent films and reports in PACS  X-rays of the left 3rd digit demonstrates a comminuted distal phalanx fracture  The x-ray report was reviewed  The [facility] urgent care notes an ER note from [address] [place] [address] were reviewed          Junior Newby

## 2022-06-09 ENCOUNTER — OFFICE VISIT (OUTPATIENT)
Dept: OBGYN CLINIC | Facility: CLINIC | Age: 67
End: 2022-06-09
Payer: MEDICARE

## 2022-06-09 VITALS
DIASTOLIC BLOOD PRESSURE: 64 MMHG | OXYGEN SATURATION: 98 % | SYSTOLIC BLOOD PRESSURE: 116 MMHG | WEIGHT: 170 LBS | HEIGHT: 60 IN | BODY MASS INDEX: 33.38 KG/M2 | HEART RATE: 72 BPM

## 2022-06-09 DIAGNOSIS — S61.309D AVULSION OF FINGERNAIL, SUBSEQUENT ENCOUNTER: ICD-10-CM

## 2022-06-09 DIAGNOSIS — S62.663D OPEN NONDISPLACED FRACTURE OF DISTAL PHALANX OF LEFT MIDDLE FINGER WITH ROUTINE HEALING, SUBSEQUENT ENCOUNTER: Primary | ICD-10-CM

## 2022-06-09 PROBLEM — S61.309A AVULSION OF FINGERNAIL: Status: ACTIVE | Noted: 2022-06-09

## 2022-06-09 PROBLEM — S62.663B OPEN NONDISPLACED FRACTURE OF DISTAL PHALANX OF LEFT MIDDLE FINGER: Status: ACTIVE | Noted: 2022-06-09

## 2022-06-09 PROCEDURE — 99213 OFFICE O/P EST LOW 20 MIN: CPT | Performed by: ORTHOPAEDIC SURGERY

## 2022-06-09 RX ORDER — ERGOCALCIFEROL 1.25 MG/1
CAPSULE ORAL
COMMUNITY
Start: 2022-03-10

## 2022-06-09 NOTE — PROGRESS NOTES
ASSESSMENT/PLAN:    Problem List Items Addressed This Visit        Musculoskeletal and Integument    Open nondisplaced fracture of distal phalanx of left middle finger - Primary    Avulsion of fingernail          The patient's sutures were removed today in office without difficulty  Exam reveals a well healed nail avulsion repair, with full finger ROM without signs of tenderness to palpation  At this time the patient may follow up as needed if any problems arise  Return if symptoms worsen or fail to improve       _____________________________________________________  CHIEF COMPLAINT:  Chief Complaint   Patient presents with    Left Middle Finger - Follow-up         SUBJECTIVE:  Bridgett Payne is a 74 y o  female who presents along with her caregiver for follow up of her left middle finger nail avulsion and distal phalanx fracture  Today the patient's caregiver states that the patient is doing very well  She denies any complaints of pain  The patient has discontinued use of the splint, and has been using the finger/ hand without issue  The patient's caregiver has no other questions or complaints  PAST MEDICAL HISTORY:  Past Medical History:   Diagnosis Date    Anxiety     [facility] [address] [facility]     GERD (gastroesophageal reflux disease)     Hyperlipidemia     Hypertension     Mental disability     Mixed incontinence 4/12/2017    [facility] ([facility] )        PAST SURGICAL HISTORY:  History reviewed  No pertinent surgical history      FAMILY HISTORY:  Family History   Problem Relation Age of Onset    No Known Problems Mother     No Known Problems Father        SOCIAL HISTORY:  Social History     Tobacco Use    Smoking status: Never Smoker    Smokeless tobacco: Never Used   Vaping Use    Vaping Use: Never used   Substance Use Topics    Alcohol use: Not Currently    Drug use: Not Currently       MEDICATIONS:    Current Outpatient Medications:     aspirin (ECOTRIN LOW STRENGTH) 81 mg EC tablet, Take 81 mg by mouth daily, Disp: , Rfl:     Basaglar KwikPen 100 units/mL injection pen, , Disp: , Rfl:     bisacodyl (DULCOLAX) 5 mg EC tablet, Take 5 mg by mouth daily as needed for constipation, Disp: , Rfl:     Cranberry 450 MG CAPS, Take by mouth, Disp: , Rfl:     Diapers & Supplies MISC, Use as directed , Disp: , Rfl:     divalproex sodium (DEPAKOTE) 250 mg EC tablet, Take 250 mg by mouth every 12 (twelve) hours 1 tab AM (8am), 2 tabs 8pm, Disp: , Rfl:     docusate sodium (COLACE) 100 mg capsule, Take 100 mg by mouth in the morning and 100 mg in the evening , Disp: , Rfl:     Ergocalciferol (VITAMIN D2 PO), Take 1 25 mg by mouth once a week, Disp: , Rfl:     ergocalciferol (VITAMIN D2) 50,000 units, , Disp: , Rfl:     ferrous gluconate (FERGON) 324 mg tablet, Take 324 mg by mouth daily with breakfast, Disp: , Rfl:     ferrous sulfate 325 (65 Fe) mg tablet, Take 325 mg by mouth daily, Disp: , Rfl:     gabapentin (NEURONTIN) 800 mg tablet, , Disp: , Rfl:     glipiZIDE-metFORMIN (METAGLIP) 2 5-250 MG per tablet, Take 1 tablet by mouth in the morning and 1 tablet in the evening  Take before meals  , Disp: , Rfl:     glucose blood test strip, TEST BLOOD SUGAR TWO TIMES DAILY  DX E11 9, Disp: , Rfl:     Incontinence Supply Disposable (FQ Pant Liner) MISC, , Disp: , Rfl:     Lancets 33G MISC, daily, Disp: , Rfl:     levothyroxine 50 mcg tablet, Take 1 tablet (50 mcg total) by mouth daily in the early morning, Disp: , Rfl: 0    LORazepam (ATIVAN) 0 5 mg tablet, Take 0 5 mg by mouth in the morning and 0 5 mg in the evening , Disp: , Rfl:     methenamine hippurate (HIPREX) 1 g tablet, Take 1 tablet (1 g total) by mouth in the morning and 1 tablet (1 g total) in the evening  Take with meals  Restart that after finishing with keflex  , Disp: , Rfl: 0    omeprazole (PriLOSEC) 20 mg delayed release capsule, Take 20 mg by mouth daily, Disp: , Rfl:     oxybutynin (DITROPAN-XL) 10 MG 24 hr tablet, , Disp: , Rfl:     QUEtiapine (SEROquel XR) 200 mg 24 hr tablet, Take 1 tablet (200 mg total) by mouth daily at bedtime (Patient taking differently: Take 200 mg by mouth daily at bedtime 1 tab at 8pm), Disp: , Rfl: 0    QUEtiapine (SEROquel) 100 mg tablet, Take 100 mg by mouth in the morning and 100 mg in the evening  8am, 4pm , Disp: , Rfl:     rosuvastatin (CRESTOR) 40 MG tablet, Take 40 mg by mouth daily, Disp: , Rfl:     senna (SENOKOT) 8 6 mg, Take 1 tablet (8 6 mg total) by mouth daily at bedtime, Disp: , Rfl: 0    sertraline (ZOLOFT) 100 mg tablet, Take 1 tablet (100 mg total) by mouth daily at bedtime (Patient taking differently: Take 100 mg by mouth 2 (two) times a day BID), Disp: , Rfl: 0    tamsulosin (FLOMAX) 0 4 mg, Take 1 capsule (0 4 mg total) by mouth daily with dinner, Disp: 30 capsule, Rfl: 0    tolterodine (DETROL LA) 4 mg 24 hr capsule, Take 2 mg by mouth in the morning and 2 mg before bedtime  , Disp: , Rfl:     torsemide (DEMADEX) 10 mg tablet, Take 1 tablet (10 mg total) by mouth daily, Disp: , Rfl: 0    ALLERGIES:  Allergies   Allergen Reactions    Actos [Pioglitazone] Other (See Comments)     unkown        REVIEW OF SYSTEMS:  Pertinent items are noted in HPI  A comprehensive review of systems was negative       _____________________________________________________  PHYSICAL EXAMINATION:  General: well developed and well nourished, alert, oriented times 3 and appears comfortable  Psychiatric: Normal  HEENT:  Normocephalic, atraumatic  Cardiovascular:  Regular  Pulmonary: No wheezing or stridor  Skin: No masses, erthema, lacerations, fluctation, ulcerations  Neurovascular: strong distal pulses, sensory and motor testing as described below  MUSCULOSKELETAL EXAMINATION:    Left hand/3rd digit:  - Nail is intact, well secured with nylon suture  Sutures were removed today in office without complication   No bleeding, drainage, erythema, swelling, warmth, or other signs of infection  - Patient does not show any signs of discomfort or pain with palpation of the distal phalanx of the 3rd digit, DIP, PIP, or MCP joints  - No visible rotational or flexion deformity of the finger noted, no palpable deformity  - Intact passive ROM of the 3rd digit MCP, PIP, and DIP joints  - Intact active ROM of all other digits   - Sensation unable to be assessed due to patient's baseline mental status  - strong radial pulse  - brisk cap refill in all fingers    _____________________________________________________  STUDIES REVIEWED:  No new imaging performed today    PROCEDURES PERFORMED:   Procedures  None performed today            Debra Brasher PA-C

## 2022-06-17 ENCOUNTER — HOSPITAL ENCOUNTER (OUTPATIENT)
Dept: RADIOLOGY | Facility: CLINIC | Age: 67
Discharge: HOME | End: 2022-06-17
Payer: MEDICARE

## 2022-06-17 VITALS — WEIGHT: 170 LBS | BODY MASS INDEX: 33.38 KG/M2 | HEIGHT: 60 IN

## 2022-06-17 DIAGNOSIS — Z12.31 ENCOUNTER FOR SCREENING MAMMOGRAM FOR MALIGNANT NEOPLASM OF BREAST: ICD-10-CM

## 2022-06-17 PROCEDURE — 77063 BREAST TOMOSYNTHESIS BI: CPT

## 2022-06-17 PROCEDURE — 77067 SCR MAMMO BI INCL CAD: CPT

## 2022-07-05 ENCOUNTER — DOCTOR'S OFFICE (OUTPATIENT)
Dept: URBAN - NONMETROPOLITAN AREA CLINIC 1 | Facility: CLINIC | Age: 67
Setting detail: OPHTHALMOLOGY
End: 2022-07-05
Payer: COMMERCIAL

## 2022-07-05 DIAGNOSIS — I11.9: ICD-10-CM

## 2022-07-05 DIAGNOSIS — H25.13: ICD-10-CM

## 2022-07-05 DIAGNOSIS — E11.9: ICD-10-CM

## 2022-07-05 PROBLEM — H00.14 CHALAZION; RIGHT UPPER LID, LEFT UPPER LID: Status: RESOLVED | Noted: 2021-06-09 | Resolved: 2022-07-05

## 2022-07-05 PROBLEM — H00.11 CHALAZION; RIGHT UPPER LID, LEFT UPPER LID: Status: RESOLVED | Noted: 2021-06-09 | Resolved: 2022-07-05

## 2022-07-05 PROCEDURE — 99214 OFFICE O/P EST MOD 30 MIN: CPT | Performed by: OPHTHALMOLOGY

## 2022-07-05 ASSESSMENT — CONFRONTATIONAL VISUAL FIELD TEST (CVF)
OD_FINDINGS: CONSTRICTION
OS_FINDINGS: CONSTRICTION

## 2022-07-05 ASSESSMENT — REFRACTION_MANIFEST
OS_SPHERE: -2.75
OS_CYLINDER: -0.50
OD_CYLINDER: -0.50
OS_AXIS: 180
OD_SPHERE: -2.50
OD_AXIS: 180

## 2022-07-05 ASSESSMENT — SPHEQUIV_DERIVED
OD_SPHEQUIV: -2.75
OD_SPHEQUIV: -2.75
OS_SPHEQUIV: -3
OS_SPHEQUIV: -3

## 2022-07-05 ASSESSMENT — REFRACTION_CURRENTRX
OD_CYLINDER: -0.50
OD_AXIS: 172
OD_SPHERE: -3.00
OD_OVR_VA: 20/
OS_AXIS: 173
OS_SPHERE: -3.00
OS_CYLINDER: -0.50
OS_OVR_VA: 20/

## 2022-07-05 ASSESSMENT — VISUAL ACUITY
OS_BCVA: 20/F+F
OD_BCVA: 20/F+F

## 2022-07-12 ENCOUNTER — APPOINTMENT (EMERGENCY)
Dept: RADIOLOGY | Facility: HOSPITAL | Age: 67
End: 2022-07-12
Payer: MEDICARE

## 2022-07-12 ENCOUNTER — HOSPITAL ENCOUNTER (EMERGENCY)
Facility: HOSPITAL | Age: 67
Discharge: HOME | End: 2022-07-12
Attending: EMERGENCY MEDICINE | Admitting: EMERGENCY MEDICINE
Payer: MEDICARE

## 2022-07-12 VITALS
DIASTOLIC BLOOD PRESSURE: 70 MMHG | OXYGEN SATURATION: 95 % | SYSTOLIC BLOOD PRESSURE: 155 MMHG | BODY MASS INDEX: 35.26 KG/M2 | TEMPERATURE: 97.3 F | RESPIRATION RATE: 16 BRPM | HEART RATE: 90 BPM | WEIGHT: 180.56 LBS

## 2022-07-12 DIAGNOSIS — R19.7 DIARRHEA, UNSPECIFIED TYPE: ICD-10-CM

## 2022-07-12 DIAGNOSIS — R53.83 FATIGUE: Primary | ICD-10-CM

## 2022-07-12 LAB
2HR DELTA HS TROPONIN: 1 NG/L
ALBUMIN SERPL BCP-MCNC: 3.6 G/DL (ref 3.5–5)
ALP SERPL-CCNC: 62 U/L (ref 46–116)
ALT SERPL W P-5'-P-CCNC: 17 U/L (ref 12–78)
ANION GAP SERPL CALCULATED.3IONS-SCNC: 7 MMOL/L (ref 4–13)
AST SERPL W P-5'-P-CCNC: 25 U/L (ref 5–45)
BASOPHILS # BLD AUTO: 0.01 THOUSANDS/ΜL (ref 0–0.1)
BASOPHILS NFR BLD AUTO: 0 % (ref 0–1)
BILIRUB SERPL-MCNC: 0.53 MG/DL (ref 0.2–1)
BILIRUB UR QL STRIP: NEGATIVE
BUN SERPL-MCNC: 12 MG/DL (ref 5–25)
CALCIUM SERPL-MCNC: 9.7 MG/DL (ref 8.3–10.1)
CARDIAC TROPONIN I PNL SERPL HS: 3 NG/L
CARDIAC TROPONIN I PNL SERPL HS: 4 NG/L
CHLORIDE SERPL-SCNC: 92 MMOL/L (ref 100–108)
CK SERPL-CCNC: 72 U/L (ref 26–192)
CLARITY UR: CLEAR
CO2 SERPL-SCNC: 30 MMOL/L (ref 21–32)
COLOR UR: YELLOW
CREAT SERPL-MCNC: 0.59 MG/DL (ref 0.6–1.3)
EOSINOPHIL # BLD AUTO: 0.04 THOUSAND/ΜL (ref 0–0.61)
EOSINOPHIL NFR BLD AUTO: 1 % (ref 0–6)
ERYTHROCYTE [DISTWIDTH] IN BLOOD BY AUTOMATED COUNT: 15.2 % (ref 11.6–15.1)
FLUAV RNA RESP QL NAA+PROBE: NEGATIVE
FLUBV RNA RESP QL NAA+PROBE: NEGATIVE
GFR SERPL CREATININE-BSD FRML MDRD: 94 ML/MIN/1.73SQ M
GLUCOSE SERPL-MCNC: 107 MG/DL (ref 65–140)
GLUCOSE UR STRIP-MCNC: NEGATIVE MG/DL
HCT VFR BLD AUTO: 39.4 % (ref 34.8–46.1)
HGB BLD-MCNC: 12.5 G/DL (ref 11.5–15.4)
HGB UR QL STRIP.AUTO: NEGATIVE
IMM GRANULOCYTES # BLD AUTO: 0.03 THOUSAND/UL (ref 0–0.2)
IMM GRANULOCYTES NFR BLD AUTO: 0 % (ref 0–2)
KETONES UR STRIP-MCNC: NEGATIVE MG/DL
LACTATE SERPL-SCNC: 1.3 MMOL/L (ref 0.5–2)
LACTATE SERPL-SCNC: 2.5 MMOL/L (ref 0.5–2)
LEUKOCYTE ESTERASE UR QL STRIP: NEGATIVE
LIPASE SERPL-CCNC: 59 U/L (ref 73–393)
LYMPHOCYTES # BLD AUTO: 1.09 THOUSANDS/ΜL (ref 0.6–4.47)
LYMPHOCYTES NFR BLD AUTO: 16 % (ref 14–44)
MAGNESIUM SERPL-MCNC: 1.7 MG/DL (ref 1.6–2.6)
MCH RBC QN AUTO: 28.8 PG (ref 26.8–34.3)
MCHC RBC AUTO-ENTMCNC: 31.7 G/DL (ref 31.4–37.4)
MCV RBC AUTO: 91 FL (ref 82–98)
MONOCYTES # BLD AUTO: 0.43 THOUSAND/ΜL (ref 0.17–1.22)
MONOCYTES NFR BLD AUTO: 6 % (ref 4–12)
NEUTROPHILS # BLD AUTO: 5.36 THOUSANDS/ΜL (ref 1.85–7.62)
NEUTS SEG NFR BLD AUTO: 77 % (ref 43–75)
NITRITE UR QL STRIP: NEGATIVE
NRBC BLD AUTO-RTO: 0 /100 WBCS
NT-PROBNP SERPL-MCNC: 56 PG/ML
PH UR STRIP.AUTO: 5.5 [PH]
PLATELET # BLD AUTO: 195 THOUSANDS/UL (ref 149–390)
PMV BLD AUTO: 9.4 FL (ref 8.9–12.7)
POTASSIUM SERPL-SCNC: 4.5 MMOL/L (ref 3.5–5.3)
PROT SERPL-MCNC: 7.4 G/DL (ref 6.4–8.2)
PROT UR STRIP-MCNC: NEGATIVE MG/DL
RBC # BLD AUTO: 4.34 MILLION/UL (ref 3.81–5.12)
RSV RNA RESP QL NAA+PROBE: NEGATIVE
SARS-COV-2 RNA RESP QL NAA+PROBE: NEGATIVE
SODIUM SERPL-SCNC: 129 MMOL/L (ref 136–145)
SP GR UR STRIP.AUTO: 1.02 (ref 1–1.03)
UROBILINOGEN UR QL STRIP.AUTO: 0.2 E.U./DL
VALPROATE SERPL-MCNC: 82.1 UG/ML (ref 50–100)
WBC # BLD AUTO: 6.96 THOUSAND/UL (ref 4.31–10.16)

## 2022-07-12 PROCEDURE — 83880 ASSAY OF NATRIURETIC PEPTIDE: CPT | Performed by: EMERGENCY MEDICINE

## 2022-07-12 PROCEDURE — 80053 COMPREHEN METABOLIC PANEL: CPT | Performed by: EMERGENCY MEDICINE

## 2022-07-12 PROCEDURE — 82550 ASSAY OF CK (CPK): CPT | Performed by: EMERGENCY MEDICINE

## 2022-07-12 PROCEDURE — 0241U HB NFCT DS VIR RESP RNA 4 TRGT: CPT | Performed by: EMERGENCY MEDICINE

## 2022-07-12 PROCEDURE — 83690 ASSAY OF LIPASE: CPT | Performed by: EMERGENCY MEDICINE

## 2022-07-12 PROCEDURE — 93005 ELECTROCARDIOGRAM TRACING: CPT

## 2022-07-12 PROCEDURE — 71045 X-RAY EXAM CHEST 1 VIEW: CPT

## 2022-07-12 PROCEDURE — 96360 HYDRATION IV INFUSION INIT: CPT

## 2022-07-12 PROCEDURE — 80164 ASSAY DIPROPYLACETIC ACD TOT: CPT | Performed by: EMERGENCY MEDICINE

## 2022-07-12 PROCEDURE — 36415 COLL VENOUS BLD VENIPUNCTURE: CPT | Performed by: EMERGENCY MEDICINE

## 2022-07-12 PROCEDURE — 83605 ASSAY OF LACTIC ACID: CPT | Performed by: EMERGENCY MEDICINE

## 2022-07-12 PROCEDURE — 85025 COMPLETE CBC W/AUTO DIFF WBC: CPT | Performed by: EMERGENCY MEDICINE

## 2022-07-12 PROCEDURE — 99285 EMERGENCY DEPT VISIT HI MDM: CPT | Performed by: EMERGENCY MEDICINE

## 2022-07-12 PROCEDURE — 87040 BLOOD CULTURE FOR BACTERIA: CPT | Performed by: EMERGENCY MEDICINE

## 2022-07-12 PROCEDURE — 81003 URINALYSIS AUTO W/O SCOPE: CPT | Performed by: EMERGENCY MEDICINE

## 2022-07-12 PROCEDURE — 83735 ASSAY OF MAGNESIUM: CPT | Performed by: EMERGENCY MEDICINE

## 2022-07-12 PROCEDURE — 84484 ASSAY OF TROPONIN QUANT: CPT | Performed by: EMERGENCY MEDICINE

## 2022-07-12 PROCEDURE — 99285 EMERGENCY DEPT VISIT HI MDM: CPT

## 2022-07-12 RX ADMIN — SODIUM CHLORIDE 500 ML: 0.9 INJECTION, SOLUTION INTRAVENOUS at 21:51

## 2022-07-12 NOTE — ED PROVIDER NOTES
History  Chief Complaint   Patient presents with    Weakness - Generalized     Pt sent in from group home for increased weakness and lethargy over the last few days     Patient is a 53-year-old female brought to the emergency department by EMS from a group home secondary to lethargy, increased sleeping, decreased p o  Intake, and just not acting herself, patient is nonverbal so unable to provide any specific details of her condition          Prior to Admission Medications   Prescriptions Last Dose Informant Patient Reported? Taking? Basaglar KwikPen 100 units/mL injection pen   Yes No   Cranberry 450 MG CAPS   Yes No   Sig: Take by mouth   Diapers & Supplies MISC   Yes No   Sig: Use as directed  Ergocalciferol (VITAMIN D2 PO)   Yes No   Sig: Take 1 25 mg by mouth once a week   Incontinence Supply Disposable (FQ Pant Liner) MISC   Yes No   LORazepam (ATIVAN) 0 5 mg tablet   Yes No   Sig: Take 0 5 mg by mouth in the morning and 0 5 mg in the evening  Lancets 33G MISC   Yes No   Sig: daily   QUEtiapine (SEROquel XR) 200 mg 24 hr tablet   No No   Sig: Take 1 tablet (200 mg total) by mouth daily at bedtime   Patient taking differently: Take 200 mg by mouth daily at bedtime 1 tab at 8pm   QUEtiapine (SEROquel) 100 mg tablet   Yes No   Sig: Take 100 mg by mouth in the morning and 100 mg in the evening   8am, 4pm    aspirin (ECOTRIN LOW STRENGTH) 81 mg EC tablet   Yes No   Sig: Take 81 mg by mouth daily   bisacodyl (DULCOLAX) 5 mg EC tablet   Yes No   Sig: Take 5 mg by mouth daily as needed for constipation   divalproex sodium (DEPAKOTE) 250 mg EC tablet   Yes No   Sig: Take 250 mg by mouth every 12 (twelve) hours 1 tab AM (8am), 2 tabs 8pm   docusate sodium (COLACE) 100 mg capsule  Outside Facility (Specify) Yes No   Sig: Take 100 mg by mouth in the morning and 100 mg in the evening    ergocalciferol (VITAMIN D2) 50,000 units   Yes No   ferrous gluconate (FERGON) 324 mg tablet   Yes No   Sig: Take 324 mg by mouth daily with breakfast   ferrous sulfate 325 (65 Fe) mg tablet   Yes No   Sig: Take 325 mg by mouth daily   gabapentin (NEURONTIN) 800 mg tablet   Yes No   glipiZIDE-metFORMIN (METAGLIP) 2 5-250 MG per tablet   Yes No   Sig: Take 1 tablet by mouth in the morning and 1 tablet in the evening  Take before meals  glucose blood test strip   Yes No   Sig: TEST BLOOD SUGAR TWO TIMES DAILY  DX E11 9   levothyroxine 50 mcg tablet   No No   Sig: Take 1 tablet (50 mcg total) by mouth daily in the early morning   methenamine hippurate (HIPREX) 1 g tablet   No No   Sig: Take 1 tablet (1 g total) by mouth in the morning and 1 tablet (1 g total) in the evening  Take with meals  Restart that after finishing with keflex  omeprazole (PriLOSEC) 20 mg delayed release capsule   Yes No   Sig: Take 20 mg by mouth daily   oxybutynin (DITROPAN-XL) 10 MG 24 hr tablet   Yes No   rosuvastatin (CRESTOR) 40 MG tablet   Yes No   Sig: Take 40 mg by mouth daily   senna (SENOKOT) 8 6 mg   No No   Sig: Take 1 tablet (8 6 mg total) by mouth daily at bedtime   sertraline (ZOLOFT) 100 mg tablet   No No   Sig: Take 1 tablet (100 mg total) by mouth daily at bedtime   Patient taking differently: Take 100 mg by mouth 2 (two) times a day BID   tamsulosin (FLOMAX) 0 4 mg   No No   Sig: Take 1 capsule (0 4 mg total) by mouth daily with dinner   tolterodine (DETROL LA) 4 mg 24 hr capsule   Yes No   Sig: Take 2 mg by mouth in the morning and 2 mg before bedtime  torsemide (DEMADEX) 10 mg tablet   No No   Sig: Take 1 tablet (10 mg total) by mouth daily      Facility-Administered Medications: None       Past Medical History:   Diagnosis Date    Anxiety     [facility] mellitus ([facility] [address] )     GERD (gastroesophageal reflux disease)     Hyperlipidemia     Hypertension     Mental disability     Mixed incontinence 4/12/2017    [facility]ure disorder ([facility] )        No past surgical history on file      Family History   Problem Relation Age of Onset    No Known Problems Mother     No Known Problems Father     No Known Problems Sister     No Known Problems Maternal Grandmother     No Known Problems Maternal Grandfather     No Known Problems Paternal Grandmother     No Known Problems Paternal Grandfather      I have reviewed and agree with the history as documented  E-Cigarette/Vaping    E-Cigarette Use Never User      E-Cigarette/Vaping Substances     Social History     Tobacco Use    Smoking status: Never Smoker    Smokeless tobacco: Never Used   Vaping Use    Vaping Use: Never used   Substance Use Topics    Alcohol use: Not Currently    Drug use: Not Currently       Review of Systems   Unable to perform ROS: Patient nonverbal   Constitutional: Positive for activity change, appetite change and fatigue  Physical Exam  Physical Exam  Constitutional:       Appearance: She is well-developed  Comments: Somnolent but awakens to verbal stimuli, falls back asleep quickly   HENT:      Head: Normocephalic and atraumatic  Mouth/Throat:      Mouth: Mucous membranes are dry  Eyes:      Conjunctiva/sclera: Conjunctivae normal       Pupils: Pupils are equal, round, and reactive to light  Cardiovascular:      Rate and Rhythm: Normal rate and regular rhythm  Heart sounds: Normal heart sounds  Pulmonary:      Effort: Pulmonary effort is normal       Breath sounds: Normal breath sounds  Abdominal:      Palpations: Abdomen is soft  Tenderness: There is no abdominal tenderness  Musculoskeletal:         General: Normal range of motion  Cervical back: Normal range of motion and neck supple  Skin:     General: Skin is warm and dry  Neurological:      Mental Status: She is alert and oriented to person, place, and time           Vital Signs  ED Triage Vitals   Temperature Pulse Respirations Blood Pressure SpO2   07/12/22 1755 07/12/22 1749 07/12/22 1749 07/12/22 1749 07/12/22 1749   (!) 97 3 °F (36 3 °C) 90 16 155/70 95 %      Temp Source Heart Rate Source Patient Position - Orthostatic VS BP Location FiO2 (%)   07/12/22 1755 07/12/22 1749 -- -- --   Temporal Monitor         Pain Score       --                  Vitals:    07/12/22 1749   BP: 155/70   Pulse: 90             ED Medications  Medications   sodium chloride 0 9 % bolus 500 mL (0 mL Intravenous Stopped 7/12/22 9371)       Diagnostic Studies  Results Reviewed     Procedure Component Value Units Date/Time    Lactic acid 2 Hours [[id number]]  (Normal) Collected: 07/12/22 2120    Lab Status: Final result Specimen: Blood from Arm, Left Updated: 07/12/22 2143     LACTIC ACID 1 3 mmol/L     Narrative:      Result may be elevated if tourniquet was used during collection      HS Troponin I 2hr [[id number]]  (Normal) Collected: 07/12/22 2015    Lab Status: Final result Specimen: Blood from Arm, Left Updated: 07/12/22 2042     hs TnI 2hr 4 ng/L      Delta 2hr hsTnI 1 ng/L     UA w Reflex to Microscopic w Reflex to Culture [[id number]] Collected: 07/12/22 2010    Lab Status: Final result Specimen: Urine, Straight Cath Updated: 07/12/22 2029     Color, UA Yellow     Clarity, UA Clear     Specific [place], UA 1 020     pH, UA 5 5     Leukocytes, UA Negative     Nitrite, UA Negative     Protein, UA Negative mg/dl      Glucose, UA Negative mg/dl      Ketones, UA Negative mg/dl      Urobilinogen, UA 0 2 E U /dl      Bilirubin, UA Negative     Occult Blood, UA Negative    HS Troponin I 4hr [[id number]]     Lab Status: No result Specimen: Blood     NT-BNP PRO [[id number]]  (Normal) Collected: 07/12/22 1825    Lab Status: Final result Specimen: Blood from Arm, Left Updated: 07/12/22 1940     NT-proBNP 56 pg/mL     CK Total with Reflex CKMB [[id number]]  (Normal) Collected: 07/12/22 1825    Lab Status: Final result Specimen: Blood from Arm, Left Updated: 07/12/22 1939     Total CK 72 U/L     Lipase [[id number]]  (Abnormal) Collected: 07/12/22 1825    Lab Status: Final result Specimen: Blood from Arm, Left Updated: 07/12/22 1916 Lipase 59 u/L     Valproic acid level, total [[id number]]  (Normal) Collected: 07/12/22 1825    Lab Status: Final result Specimen: Blood from Arm, Left Updated: 07/12/22 1916     Valproic Acid, Total 82 1 ug/mL     Magnesium [[id number]]  (Normal) Collected: 07/12/22 1825    Lab Status: Final result Specimen: Blood from Arm, Left Updated: 07/12/22 1915     Magnesium 1 7 mg/dL     Lactic acid [[id number]]  (Abnormal) Collected: 07/12/22 1825    Lab Status: Final result Specimen: Blood from Arm, Left Updated: 07/12/22 1915     LACTIC ACID 2 5 mmol/L     Narrative:      Result may be elevated if tourniquet was used during collection  FLU/RSV/COVID - if FLU/RSV clinically relevant [[id number]]  (Normal) Collected: 07/12/22 1825    Lab Status: Final result Specimen: Nares from Nasopharyngeal Swab Updated: 07/12/22 1914     SARS-CoV-2 Negative     INFLUENZA A PCR Negative     INFLUENZA B PCR Negative     RSV PCR Negative    Narrative:      FOR PEDIATRIC PATIENTS - copy/paste COVID Guidelines URL to browser: [email or web address]    SARS-CoV-2 assay is a Nucleic Acid Amplification assay intended for the  qualitative detection of nucleic acid from SARS-CoV-2 in nasopharyngeal  swabs  Results are for the presumptive identification of SARS-CoV-2 RNA  Positive results are indicative of infection with SARS-CoV-2, the virus  causing COVID-19, but do not rule out bacterial infection or co-infection  with other viruses  Laboratories within the [place] and its  territories are required to report all positive results to the appropriate  public health authorities  Negative results do not preclude SARS-CoV-2  infection and should not be used as the sole basis for treatment or other  patient management decisions  Negative results must be combined with  clinical observations, patient history, and epidemiological information    This test has not been FDA cleared or approved  This test has been authorized by FDA under an Emergency Use Authorization  (EUA)  This test is only authorized for the duration of time the  declaration that circumstances exist justifying the authorization of the  emergency use of an in vitro diagnostic tests for detection of SARS-CoV-2  virus and/or diagnosis of COVID-19 infection under section 564(b)(1) of  the Act, 21 U  S C  751HBC-1(F)(5), unless the authorization is terminated  or revoked sooner  The test has been validated but independent review by FDA  and CLIA is pending  Test performed using [employer] Gene[employer]pert: This RT-PCR assay targets N2,  a region unique to SARS-CoV-2  A conserved region in the E-gene was chosen  for pan-Sarbecovirus detection which includes SARS-CoV-2      Comprehensive metabolic panel [[id number]]  (Abnormal) Collected: 07/12/22 1825    Lab Status: Final result Specimen: Blood from Arm, Left Updated: 07/12/22 1911     Sodium 129 mmol/L      Potassium 4 5 mmol/L      Chloride 92 mmol/L      CO2 30 mmol/L      ANION GAP 7 mmol/L      BUN 12 mg/dL      Creatinine 0 59 mg/dL      Glucose 107 mg/dL      Calcium 9 7 mg/dL      AST 25 U/L      ALT 17 U/L      Alkaline Phosphatase 62 U/L      Total Protein 7 4 g/dL      Albumin 3 6 g/dL      Total Bilirubin 0 53 mg/dL      eGFR 94 ml/min/1 73sq m     Narrative:      [place]iz[place]et guidelines for Chronic Kidney Disease (CKD):     Stage 1 with normal or high GFR (GFR > 90 mL/min/1 73 square meters)    Stage 2 Mild CKD (GFR = 60-89 mL/min/1 73 square meters)    Stage 3A Moderate CKD (GFR = 45-59 mL/min/1 73 square meters)    Stage 3B Moderate CKD (GFR = 30-44 mL/min/1 73 square meters)    Stage 4 Severe CKD (GFR = 15-29 mL/min/1 73 square meters)    Stage 5 End Stage CKD (GFR <15 mL/min/1 73 square meters)  Note: GFR calculation is accurate only with a steady state creatinine    HS Troponin 0hr (reflex protocol) [[id number]]  (Normal) Collected: 07/12/22 1825    Lab Status: Final result Specimen: Blood from Arm, Left Updated: 07/12/22 1908     hs TnI 0hr 3 ng/L     CBC and differential [[id number]]  (Abnormal) Collected: 07/12/22 1825    Lab Status: Final result Specimen: Blood from Arm, Left Updated: 07/12/22 1834     WBC 6 96 Thousand/uL      RBC 4 34 Million/uL      Hemoglobin 12 5 g/dL      Hematocrit 39 4 %      MCV 91 fL      MCH 28 8 pg      MCHC 31 7 g/dL      RDW 15 2 %      MPV 9 4 fL      Platelets 106 Thousands/uL      nRBC 0 /100 WBCs      Neutrophils Relative 77 %      Immat GRANS % 0 %      Lymphocytes Relative 16 %      Monocytes Relative 6 %      Eosinophils Relative 1 %      Basophils Relative 0 %      Neutrophils Absolute 5 36 Thousands/µL      Immature Grans Absolute 0 03 Thousand/uL      Lymphocytes Absolute 1 09 Thousands/µL      Monocytes Absolute 0 43 Thousand/µL      Eosinophils Absolute 0 04 Thousand/µL      Basophils Absolute 0 01 Thousands/µL     Blood culture #2 [[id number]] Collected: 07/12/22 1825    Lab Status: In process Specimen: Blood from Arm, Left Updated: 07/12/22 1831    Blood culture #1 [[id number]] Collected: 07/12/22 1825    Lab Status:  In process Specimen: Blood from Arm, Left Updated: 07/12/22 1831                 XR chest 1 view portable   ED Interpretation by Jody Malave DO (07/12 1937)   No acute findings                 Procedures  ECG 12 Lead Documentation Only    Date/Time: 7/12/2022 5:50 PM  Performed by: Jody Malave DO  Authorized by: Jody Malave DO     Indications / Diagnosis:  Generalized weakness  ECG reviewed by me, the ED Provider: yes    Patient location:  ED  Previous ECG:     Previous ECG:  Compared to current    Comparison ECG info:  05/19/2022    Similarity:  No change    Comparison to cardiac monitor: Yes    Interpretation:     Interpretation: normal    Rate:     ECG rate:  85    ECG rate assessment: normal    Rhythm:     Rhythm: sinus rhythm    Ectopy:     Ectopy: none    QRS:     QRS intervals:  Normal  Conduction:     Conduction: normal    ST segments:     ST segments:  Normal  T waves:     T waves: normal               ED Course  ED Course as of 07/12/22 2259   e Jul 12, 2022 2254 Group home care staff report patient has been having some episodes of diarrhea, contributes this possibly to metformin which she recently restarted after being discharged from a skilled nursing facility, she was on metformin previously without any difficulties, she has had no fever, she has been increasingly fatigued and sleepy, has not eaten much today which is unusual for patient   2258 Laboratory findings relatively unremarkable in the emergency department except for mild hyponatremia, initial lactic acid slightly elevated but repeat normal, patient did get some IV fluids, she was more awake on re-evaluation and was given applesauce which she immediately ate quickly, group home staff agree that patient is much more awake and alert, much more at her baseline self at this time, therefore discharged home with instruction to use at [place] as needed for diarrhea, follow-up with PCP as needed, return if symptoms worsen                               SBIRT 20yo+    Flowsheet Row Most Recent Value   SBIRT (25 yo +)    In order to provide better care to our patients, we are screening all of our patients for alcohol and drug use  Would it be okay to ask you these screening questions? Yes Filed at: 07/12/2022 1813   Initial Alcohol Screen: US AUDIT-C     1  How often do you have a drink containing alcohol? 0 Filed at: 07/12/2022 1813   2  How many drinks containing alcohol do you have on a typical day you are drinking? 0 Filed at: 07/12/2022 1813   3a  Male UNDER 65: How often do you have five or more drinks on one occasion? 0 Filed at: 07/12/2022 1813   3b  FEMALE Any Age, or MALE 65+: How often do you have 4 or more drinks on one occassion?  0 Filed at: 07/12/2022 1813   Audit-C Score 0 Filed at: 07/12/2022 1813 KAILASH: How many times in the past year have you    Used an illegal drug or used a prescription medication for non-medical reasons? Never Filed at: 07/12/2022 1813                      Disposition  Final diagnoses:   Fatigue   Diarrhea, unspecified type     Time reflects when diagnosis was documented in both MDM as applicable and the Disposition within this note     Time User Action Codes Description Comment    7/12/2022 10:39 PM Keke Santiago Add [R53 83] Fatigue     7/12/2022 10:39 PM Keke Santiago Add [R19 7] Diarrhea, unspecified type       ED Disposition     ED Disposition   Discharge    Condition   Stable    Date/Time   Tue Jul 12, 2022 10:39 PM    [address] discharge to home/self care  Follow-up Information     Follow up With Specialties Details Why Surya Godfrey MD   As needed [address] [facility]  [phone]            Patient's Medications   Discharge Prescriptions    No medications on file       No discharge procedures on file      PDMP Review       Value Time User    PDMP Reviewed  Yes 5/19/2022  5:07 PM Noemi Adam [address]           ED Provider  Electronically Signed by           Vladislav Sweet DO  07/12/22 0073

## 2022-07-13 LAB
ATRIAL RATE: 85 BPM
ATRIAL RATE: 85 BPM
P AXIS: 68 DEGREES
P AXIS: 80 DEGREES
PR INTERVAL: 152 MS
PR INTERVAL: 154 MS
QRS AXIS: 65 DEGREES
QRS AXIS: 72 DEGREES
QRSD INTERVAL: 86 MS
QRSD INTERVAL: 90 MS
QT INTERVAL: 374 MS
QT INTERVAL: 376 MS
QTC INTERVAL: 445 MS
QTC INTERVAL: 447 MS
T WAVE AXIS: 67 DEGREES
T WAVE AXIS: 80 DEGREES
VENTRICULAR RATE: 85 BPM
VENTRICULAR RATE: 85 BPM

## 2022-07-13 NOTE — ED NOTES
Patient currently eating jello and applesauce with assistance from caregiver     Consuelo Oh RN  07/12/22 5179

## 2022-07-14 ENCOUNTER — APPOINTMENT (EMERGENCY)
Dept: CT IMAGING | Facility: HOSPITAL | Age: 67
End: 2022-07-14
Payer: MEDICARE

## 2022-07-14 ENCOUNTER — APPOINTMENT (EMERGENCY)
Dept: RADIOLOGY | Facility: HOSPITAL | Age: 67
End: 2022-07-14
Payer: MEDICARE

## 2022-07-14 ENCOUNTER — HOSPITAL ENCOUNTER (EMERGENCY)
Facility: HOSPITAL | Age: 67
Discharge: HOME | End: 2022-07-14
Attending: EMERGENCY MEDICINE
Payer: MEDICARE

## 2022-07-14 VITALS
RESPIRATION RATE: 16 BRPM | TEMPERATURE: 98.1 F | DIASTOLIC BLOOD PRESSURE: 60 MMHG | HEIGHT: 60 IN | WEIGHT: 180.56 LBS | HEART RATE: 82 BPM | OXYGEN SATURATION: 94 % | BODY MASS INDEX: 35.45 KG/M2 | SYSTOLIC BLOOD PRESSURE: 136 MMHG

## 2022-07-14 DIAGNOSIS — E86.0 DEHYDRATION: ICD-10-CM

## 2022-07-14 DIAGNOSIS — R53.1 WEAKNESS: Primary | ICD-10-CM

## 2022-07-14 LAB
ALBUMIN SERPL BCP-MCNC: 2.7 G/DL (ref 3.5–5)
ALP SERPL-CCNC: 50 U/L (ref 46–116)
ALT SERPL W P-5'-P-CCNC: 18 U/L (ref 12–78)
ANION GAP SERPL CALCULATED.3IONS-SCNC: 8 MMOL/L (ref 4–13)
AST SERPL W P-5'-P-CCNC: 15 U/L (ref 5–45)
BASE EX.OXY STD BLDV CALC-SCNC: 93 % (ref 60–80)
BASE EXCESS BLDV CALC-SCNC: 0.1 MMOL/L
BASOPHILS # BLD AUTO: 0.02 THOUSANDS/ΜL (ref 0–0.1)
BASOPHILS NFR BLD AUTO: 1 % (ref 0–1)
BILIRUB SERPL-MCNC: 0.29 MG/DL (ref 0.2–1)
BILIRUB UR QL STRIP: NEGATIVE
BUN SERPL-MCNC: 11 MG/DL (ref 5–25)
CALCIUM ALBUM COR SERPL-MCNC: 9.4 MG/DL (ref 8.3–10.1)
CALCIUM SERPL-MCNC: 8.4 MG/DL (ref 8.3–10.1)
CHLORIDE SERPL-SCNC: 101 MMOL/L (ref 100–108)
CLARITY UR: CLEAR
CO2 SERPL-SCNC: 24 MMOL/L (ref 21–32)
COLOR UR: NORMAL
CREAT SERPL-MCNC: 0.48 MG/DL (ref 0.6–1.3)
EOSINOPHIL # BLD AUTO: 0.11 THOUSAND/ΜL (ref 0–0.61)
EOSINOPHIL NFR BLD AUTO: 2 % (ref 0–6)
ERYTHROCYTE [DISTWIDTH] IN BLOOD BY AUTOMATED COUNT: 15.3 % (ref 11.6–15.1)
FLUAV RNA RESP QL NAA+PROBE: NEGATIVE
FLUBV RNA RESP QL NAA+PROBE: NEGATIVE
GFR SERPL CREATININE-BSD FRML MDRD: 101 ML/MIN/1.73SQ M
GLUCOSE SERPL-MCNC: 94 MG/DL (ref 65–140)
GLUCOSE UR STRIP-MCNC: NEGATIVE MG/DL
HCO3 BLDV-SCNC: 23.6 MMOL/L (ref 24–30)
HCT VFR BLD AUTO: 41.4 % (ref 34.8–46.1)
HGB BLD-MCNC: 13.3 G/DL (ref 11.5–15.4)
HGB UR QL STRIP.AUTO: NEGATIVE
IMM GRANULOCYTES # BLD AUTO: 0.02 THOUSAND/UL (ref 0–0.2)
IMM GRANULOCYTES NFR BLD AUTO: 0 % (ref 0–2)
KETONES UR STRIP-MCNC: NEGATIVE MG/DL
LACTATE SERPL-SCNC: 1.7 MMOL/L (ref 0.5–2)
LACTATE SERPL-SCNC: 2.9 MMOL/L (ref 0.5–2)
LEUKOCYTE ESTERASE UR QL STRIP: NEGATIVE
LYMPHOCYTES # BLD AUTO: 1.2 THOUSANDS/ΜL (ref 0.6–4.47)
LYMPHOCYTES NFR BLD AUTO: 24 % (ref 14–44)
MAGNESIUM SERPL-MCNC: 1.8 MG/DL (ref 1.6–2.6)
MCH RBC QN AUTO: 29.1 PG (ref 26.8–34.3)
MCHC RBC AUTO-ENTMCNC: 32.1 G/DL (ref 31.4–37.4)
MCV RBC AUTO: 91 FL (ref 82–98)
MONOCYTES # BLD AUTO: 0.37 THOUSAND/ΜL (ref 0.17–1.22)
MONOCYTES NFR BLD AUTO: 7 % (ref 4–12)
NEUTROPHILS # BLD AUTO: 3.31 THOUSANDS/ΜL (ref 1.85–7.62)
NEUTS SEG NFR BLD AUTO: 66 % (ref 43–75)
NITRITE UR QL STRIP: NEGATIVE
NRBC BLD AUTO-RTO: 0 /100 WBCS
O2 CT BLDV-SCNC: 19.2 ML/DL
PCO2 BLDV: 35 MM HG (ref 42–50)
PH BLDV: 7.45 [PH] (ref 7.3–7.4)
PH UR STRIP.AUTO: 5.5 [PH]
PHOSPHATE SERPL-MCNC: 3.4 MG/DL (ref 2.3–4.1)
PLATELET # BLD AUTO: 215 THOUSANDS/UL (ref 149–390)
PMV BLD AUTO: 9.8 FL (ref 8.9–12.7)
PO2 BLDV: 113.1 MM HG (ref 35–45)
POTASSIUM SERPL-SCNC: 3.6 MMOL/L (ref 3.5–5.3)
PROT SERPL-MCNC: 6 G/DL (ref 6.4–8.2)
PROT UR STRIP-MCNC: NEGATIVE MG/DL
RBC # BLD AUTO: 4.57 MILLION/UL (ref 3.81–5.12)
RSV RNA RESP QL NAA+PROBE: NEGATIVE
SARS-COV-2 RNA RESP QL NAA+PROBE: NEGATIVE
SODIUM SERPL-SCNC: 133 MMOL/L (ref 136–145)
SP GR UR STRIP.AUTO: 1.01 (ref 1–1.03)
TSH SERPL DL<=0.05 MIU/L-ACNC: 3.81 UIU/ML (ref 0.45–4.5)
UROBILINOGEN UR QL STRIP.AUTO: 0.2 E.U./DL
WBC # BLD AUTO: 5.03 THOUSAND/UL (ref 4.31–10.16)

## 2022-07-14 PROCEDURE — 36415 COLL VENOUS BLD VENIPUNCTURE: CPT | Performed by: EMERGENCY MEDICINE

## 2022-07-14 PROCEDURE — 82805 BLOOD GASES W/O2 SATURATION: CPT | Performed by: EMERGENCY MEDICINE

## 2022-07-14 PROCEDURE — 0241U HB NFCT DS VIR RESP RNA 4 TRGT: CPT | Performed by: EMERGENCY MEDICINE

## 2022-07-14 PROCEDURE — 71045 X-RAY EXAM CHEST 1 VIEW: CPT

## 2022-07-14 PROCEDURE — 93005 ELECTROCARDIOGRAM TRACING: CPT

## 2022-07-14 PROCEDURE — 84443 ASSAY THYROID STIM HORMONE: CPT | Performed by: EMERGENCY MEDICINE

## 2022-07-14 PROCEDURE — 84100 ASSAY OF PHOSPHORUS: CPT | Performed by: EMERGENCY MEDICINE

## 2022-07-14 PROCEDURE — 80053 COMPREHEN METABOLIC PANEL: CPT | Performed by: EMERGENCY MEDICINE

## 2022-07-14 PROCEDURE — 99285 EMERGENCY DEPT VISIT HI MDM: CPT

## 2022-07-14 PROCEDURE — 96361 HYDRATE IV INFUSION ADD-ON: CPT

## 2022-07-14 PROCEDURE — 96360 HYDRATION IV INFUSION INIT: CPT

## 2022-07-14 PROCEDURE — 70450 CT HEAD/BRAIN W/O DYE: CPT

## 2022-07-14 PROCEDURE — 81003 URINALYSIS AUTO W/O SCOPE: CPT | Performed by: EMERGENCY MEDICINE

## 2022-07-14 PROCEDURE — G1004 CDSM NDSC: HCPCS

## 2022-07-14 PROCEDURE — 83605 ASSAY OF LACTIC ACID: CPT | Performed by: EMERGENCY MEDICINE

## 2022-07-14 PROCEDURE — 82948 REAGENT STRIP/BLOOD GLUCOSE: CPT

## 2022-07-14 PROCEDURE — 85025 COMPLETE CBC W/AUTO DIFF WBC: CPT | Performed by: EMERGENCY MEDICINE

## 2022-07-14 PROCEDURE — 83735 ASSAY OF MAGNESIUM: CPT | Performed by: EMERGENCY MEDICINE

## 2022-07-14 PROCEDURE — 99285 EMERGENCY DEPT VISIT HI MDM: CPT | Performed by: EMERGENCY MEDICINE

## 2022-07-14 RX ADMIN — SODIUM CHLORIDE 1000 ML: 0.9 INJECTION, SOLUTION INTRAVENOUS at 19:52

## 2022-07-14 RX ADMIN — SODIUM CHLORIDE 1000 ML: 0.9 INJECTION, SOLUTION INTRAVENOUS at 21:14

## 2022-07-14 NOTE — ED PROVIDER NOTES
History  Chief Complaint   Patient presents with    Lethargy     Pt more lethargic than normal per staff at [facility] home  Multiple attempts at blood sugar checks  EMS unsuccessful with POCT glucose  Pt was just here for same  20-year-old female with past medical history pertinent for diabetes, hypertension, mental disability, previous seizures, anxiety, dyslipidemia, GERD who presents to the emergency department by EMS for evaluation of lethargy that is more so than normal per staff at [facility] home  There were multiple attempts at blood sugar level checks but patient was not being cooperative  Patient was reportedly here for the same thing a few days ago  Per chart review, and that time was noted to have a mild lactic acidosis which resolved with IV fluids  Patient was also noted to have mild hyponatremia  Patient improved after receiving IV fluids and was given Imodium to go home with  Since that visit, staff reported that patient would not  her spoon today which she normally does when she eats  They are concerned because she also had increased urinary incontinence which she normally does not have  No mention of whether patient still has diarrhea or not  History limited as patient is nonverbal           Prior to Admission Medications   Prescriptions Last Dose Informant Patient Reported? Taking? Basaglar KwikPen 100 units/mL injection pen   Yes No   Cranberry 450 MG CAPS   Yes No   Sig: Take by mouth   Diapers & Supplies MISC   Yes No   Sig: Use as directed  Ergocalciferol (VITAMIN D2 PO)   Yes No   Sig: Take 1 25 mg by mouth once a week   Incontinence Supply Disposable (FQ Pant Liner) MISC   Yes No   LORazepam (ATIVAN) 0 5 mg tablet   Yes No   Sig: Take 0 5 mg by mouth in the morning and 0 5 mg in the evening     Lancets 33G MISC   Yes No   Sig: daily   QUEtiapine (SEROquel XR) 200 mg 24 hr tablet   No No   Sig: Take 1 tablet (200 mg total) by mouth daily at bedtime   Patient taking differently: Take 200 mg by mouth daily at bedtime 1 tab at 8pm   QUEtiapine (SEROquel) 100 mg tablet   Yes No   Sig: Take 100 mg by mouth in the morning and 100 mg in the evening  8am, 4pm    aspirin (ECOTRIN LOW STRENGTH) 81 mg EC tablet   Yes No   Sig: Take 81 mg by mouth daily   bisacodyl (DULCOLAX) 5 mg EC tablet   Yes No   Sig: Take 5 mg by mouth daily as needed for constipation   divalproex sodium (DEPAKOTE) 250 mg EC tablet   Yes No   Sig: Take 250 mg by mouth every 12 (twelve) hours 1 tab AM (8am), 2 tabs 8pm   docusate sodium (COLACE) 100 mg capsule  Outside Facility (Specify) Yes No   Sig: Take 100 mg by mouth in the morning and 100 mg in the evening    ergocalciferol (VITAMIN D2) 50,000 units   Yes No   ferrous gluconate (FERGON) 324 mg tablet   Yes No   Sig: Take 324 mg by mouth daily with breakfast   ferrous sulfate 325 (65 Fe) mg tablet   Yes No   Sig: Take 325 mg by mouth daily   gabapentin (NEURONTIN) 800 mg tablet   Yes No   glipiZIDE-metFORMIN (METAGLIP) 2 5-250 MG per tablet   Yes No   Sig: Take 1 tablet by mouth in the morning and 1 tablet in the evening  Take before meals  glucose blood test strip   Yes No   Sig: TEST BLOOD SUGAR TWO TIMES DAILY  DX E11 9   levothyroxine 50 mcg tablet   No No   Sig: Take 1 tablet (50 mcg total) by mouth daily in the early morning   methenamine hippurate (HIPREX) 1 g tablet   No No   Sig: Take 1 tablet (1 g total) by mouth in the morning and 1 tablet (1 g total) in the evening  Take with meals  Restart that after finishing with keflex     omeprazole (PriLOSEC) 20 mg delayed release capsule   Yes No   Sig: Take 20 mg by mouth daily   oxybutynin (DITROPAN-XL) 10 MG 24 hr tablet   Yes No   rosuvastatin (CRESTOR) 40 MG tablet   Yes No   Sig: Take 40 mg by mouth daily   senna (SENOKOT) 8 6 mg   No No   Sig: Take 1 tablet (8 6 mg total) by mouth daily at bedtime   sertraline (ZOLOFT) 100 mg tablet   No No   Sig: Take 1 tablet (100 mg total) by mouth daily at bedtime Patient taking differently: Take 100 mg by mouth 2 (two) times a day BID   tamsulosin (FLOMAX) 0 4 mg   No No   Sig: Take 1 capsule (0 4 mg total) by mouth daily with dinner   tolterodine (DETROL LA) 4 mg 24 hr capsule   Yes No   Sig: Take 2 mg by mouth in the morning and 2 mg before bedtime  torsemide (DEMADEX) 10 mg tablet   No No   Sig: Take 1 tablet (10 mg total) by mouth daily      Facility-Administered Medications: None       Past Medical History:   Diagnosis Date    Anxiety     [facility]     GERD (gastroesophageal reflux disease)     Hyperlipidemia     Hypertension     Mental disability     Mixed incontinence 4/12/2017    Seizure disorder ([facility] )        No past surgical history on file  Family History   Problem Relation Age of Onset    No Known Problems Mother     No Known Problems Father     No Known Problems Sister     No Known Problems Maternal Grandmother     No Known Problems Maternal Grandfather     No Known Problems Paternal Grandmother     No Known Problems Paternal Grandfather      I have reviewed and agree with the history as documented  E-Cigarette/Vaping    E-Cigarette Use Never User      E-Cigarette/Vaping Substances     Social History     Tobacco Use    Smoking status: Never Smoker    Smokeless tobacco: Never Used   Vaping Use    Vaping Use: Never used   Substance Use Topics    Alcohol use: Not Currently    Drug use: Not Currently       Review of Systems   Unable to perform ROS: Patient nonverbal   Constitutional: Positive for activity change and fatigue  Genitourinary:        Urinary incontinence   Neurological: Positive for weakness  Physical Exam  Physical Exam  Vitals and nursing note reviewed  Constitutional:       General: She is not in acute distress  Appearance: She is well-developed  She is obese  She is not diaphoretic        Comments: Awake and does not appeared lethargic on my exam   HENT:      Head: Normocephalic and atraumatic  Right Ear: External ear normal       Left Ear: External ear normal       Nose: Nose normal    Eyes:      Pupils: Pupils are equal, round, and reactive to light  Cardiovascular:      Rate and Rhythm: Normal rate and regular rhythm  Pulses: Normal pulses  Heart sounds: Normal heart sounds  Pulmonary:      Effort: Pulmonary effort is normal  No respiratory distress  Breath sounds: Normal breath sounds  No wheezing or rales  Abdominal:      General: Bowel sounds are normal       Palpations: Abdomen is soft  Tenderness: There is no abdominal tenderness  Musculoskeletal:         General: No tenderness or deformity  Normal range of motion  Cervical back: Normal range of motion and neck supple  Skin:     General: Skin is warm and dry  Capillary Refill: Capillary refill takes less than 2 seconds  Neurological:      Mental Status: She is alert  Motor: No abnormal muscle tone  Comments: Patient nonverbal; does follow commands when asked to give me a thumbs-up bilaterally  Moves all extremities  Is not able to answer whether she has any pain  Normal sensation appreciable in all extremities           Vital Signs  ED Triage Vitals   Temperature Pulse Respirations Blood Pressure SpO2   07/1955 07/14/22 1937 07/14/22 1937 07/14/22 1937 07/14/22 1937   98 1 °F (36 7 °C) 82 16 136/60 94 %      Temp Source Heart Rate Source Patient Position - Orthostatic VS BP Location FiO2 (%)   07/1955 07/14/22 1937 07/14/22 1937 07/14/22 1937 --   Oral Monitor Lying Left arm       Pain Score       --                  Vitals:    07/14/22 1937   BP: 136/60   Pulse: 82   Patient Position - Orthostatic VS: Lying         Visual Acuity  Visual Acuity    Flowsheet Row Most Recent Value   L Pupil Size (mm) 3   R Pupil Size (mm) 3          ED Medications  Medications   sodium chloride 0 9 % bolus 1,000 mL (0 mL Intravenous Stopped 7/14/22 2140)   sodium chloride 0 9 % bolus 1,000 mL (0 mL Intravenous Stopped 7/14/22 2221)       Diagnostic Studies  Results Reviewed     Procedure Component Value Units Date/Time    Comprehensive metabolic panel [[id number]]  (Abnormal) Collected: 07/14/22 2241    Lab Status: Final result Specimen: Blood from Foot, Left Updated: 07/14/22 2314     Sodium 133 mmol/L      Potassium 3 6 mmol/L      Chloride 101 mmol/L      CO2 24 mmol/L      ANION GAP 8 mmol/L      BUN 11 mg/dL      Creatinine 0 48 mg/dL      Glucose 94 mg/dL      Calcium 8 4 mg/dL      Corrected Calcium 9 4 mg/dL      AST 15 U/L      ALT 18 U/L      Alkaline Phosphatase 50 U/L      Total Protein 6 0 g/dL      Albumin 2 7 g/dL      Total Bilirubin 0 29 mg/dL      eGFR 101 ml/min/1 73sq m     Narrative:      [place] guidelines for Chronic Kidney Disease (CKD):     Stage 1 with normal or high GFR (GFR > 90 mL/min/1 73 square meters)    Stage 2 Mild CKD (GFR = 60-89 mL/min/1 73 square meters)    Stage 3A Moderate CKD (GFR = 45-59 mL/min/1 73 square meters)    Stage 3B Moderate CKD (GFR = 30-44 mL/min/1 73 square meters)    Stage 4 Severe CKD (GFR = 15-29 mL/min/1 73 square meters)    Stage 5 End Stage CKD (GFR <15 mL/min/1 73 square meters)  Note: GFR calculation is accurate only with a steady state creatinine    Lactic acid 2 Hours [[id number]]  (Normal) Collected: 07/14/22 2241    Lab Status: Final result Specimen: Blood from Vein Updated: 07/14/22 2304     LACTIC ACID 1 7 mmol/L     Narrative:      Result may be elevated if tourniquet was used during collection      CBC and differential [[id number]]  (Abnormal) Collected: 07/14/22 1950    Lab Status: Final result Specimen: Blood from Arm, Right Updated: 07/14/22 2058     WBC 5 03 Thousand/uL      RBC 4 57 Million/uL      Hemoglobin 13 3 g/dL      Hematocrit 41 4 %      MCV 91 fL      MCH 29 1 pg      MCHC 32 1 g/dL      RDW 15 3 %      MPV 9 8 fL      Platelets 970 Thousands/uL      nRBC 0 /100 WBCs Neutrophils Relative 66 %      Immat GRANS % 0 %      Lymphocytes Relative 24 %      Monocytes Relative 7 %      Eosinophils Relative 2 %      Basophils Relative 1 %      Neutrophils Absolute 3 31 Thousands/µL      Immature Grans Absolute 0 02 Thousand/uL      Lymphocytes Absolute 1 20 Thousands/µL      Monocytes Absolute 0 37 Thousand/µL      Eosinophils Absolute 0 11 Thousand/µL      Basophils Absolute 0 02 Thousands/µL     FLU/RSV/COVID - if FLU/RSV clinically relevant [[id number]]  (Normal) Collected: 07/14/22 1956    Lab Status: Final result Specimen: Nares from Nose Updated: 07/14/22 2055     SARS-CoV-2 Negative     INFLUENZA A PCR Negative     INFLUENZA B PCR Negative     RSV PCR Negative    Narrative:      FOR PEDIATRIC PATIENTS - copy/paste COVID Guidelines URL to browser: [email or web address]    SARS-CoV-2 assay is a Nucleic Acid Amplification assay intended for the  qualitative detection of nucleic acid from SARS-CoV-2 in nasopharyngeal  swabs  Results are for the presumptive identification of SARS-CoV-2 RNA  Positive results are indicative of infection with SARS-CoV-2, the virus  causing COVID-19, but do not rule out bacterial infection or co-infection  with other viruses  Laboratories within the [place] and its  territories are required to report all positive results to the appropriate  public health authorities  Negative results do not preclude SARS-CoV-2  infection and should not be used as the sole basis for treatment or other  patient management decisions  Negative results must be combined with  clinical observations, patient history, and epidemiological information  This test has not been FDA cleared or approved  This test has been authorized by FDA under an Emergency Use Authorization  (EUA)   This test is only authorized for the duration of time the  declaration that circumstances exist justifying the authorization of the  emergency use of an in vitro diagnostic tests for detection of SARS-CoV-2  virus and/or diagnosis of COVID-19 infection under section 564(b)(1) of  the Act, 21 U  [place]  930XPD-7(S)(0), unless the authorization is terminated  or revoked sooner  The test has been validated but independent review by FDA  and CLIA is pending  Test performed using [employer] GeneXpert: This RT-PCR assay targets N2,  a region unique to SARS-CoV-2  A conserved region in the E-gene was chosen  for pan-Sarbecovirus detection which includes SARS-CoV-2  UA w Reflex to Microscopic w Reflex to Culture [[id number]] Collected: 07/14/22 2005    Lab Status: Final result Specimen: Urine, Straight Cath Updated: 07/14/22 2038     Color, UA Straw     Clarity, UA Clear     Specific Gravity, UA 1 010     pH, UA 5 5     Leukocytes, UA Negative     Nitrite, UA Negative     Protein, UA Negative mg/dl      Glucose, UA Negative mg/dl      Ketones, UA Negative mg/dl      Urobilinogen, UA 0 2 E U /dl      Bilirubin, UA Negative     Occult Blood, UA Negative    Lactic acid, plasma [[id number]]  (Abnormal) Collected: 07/14/22 2005    Lab Status: Final result Specimen: Blood from Arm, Left Updated: 07/14/22 2036     LACTIC ACID 2 9 mmol/L     Narrative:      Result may be elevated if tourniquet was used during collection  Phosphorus [[id number]]  (Normal) Collected: 07/14/22 1950    Lab Status: Final result Specimen: Blood from Arm, Right Updated: 07/14/22 2032     Phosphorus 3 4 mg/dL     TSH [[id number]]  (Normal) Collected: 07/14/22 1950    Lab Status: Final result Specimen: Blood from Arm, Right Updated: 07/14/22 2032     TSH 3RD GENERATON 3 813 uIU/mL     Narrative:      Patients undergoing fluorescein dye angiography may retain small amounts of fluorescein in the body for 48-72 hours post procedure  Samples containing fluorescein can produce falsely depressed TSH values  If the patient had this procedure,a specimen should be resubmitted post fluorescein clearance  Magnesium [[id number]]  (Normal) Collected: 07/14/22 1950    Lab Status: Final result Specimen: Blood from Arm, Right Updated: 07/14/22 2032     Magnesium 1 8 mg/dL     Blood gas, venous [[id number]]  (Abnormal) Collected: 07/14/22 1951    Lab Status: Final result Specimen: Blood from Arm, Right Updated: 07/14/22 2004     pH, Eliot 7 446     pCO2, Eliot 35 0 mm Hg      pO2, Eliot 113 1 mm Hg      HCO3, Eliot 23 6 mmol/L      Base Excess, Eliot 0 1 mmol/L      O2 Content, Eliot 19 2 ml/dL      O2 HGB, VENOUS 93 0 %                  CT head without contrast   Final Result by Chet Aguilar MD (07/14 2029)      No acute intracranial pathology  Stable ventriculomegaly out of proportion degree of volume loss  Correlate for NPH  Chronic microangiopathy                    Workstation performed: [id number]         XR chest 1 view portable    (Results Pending)              Procedures  ECG 12 Lead Documentation Only    Date/Time: 7/14/2022 7:44 PM  Performed by: Nito Carr MD  Authorized by: Nito Carr MD     ECG reviewed by me, the ED Provider: yes    Patient location:  ED  Previous ECG:     Previous ECG:  Compared to current    Similarity:  No change  Interpretation:     Interpretation: normal    Quality:     Tracing quality:  Limited by artifact  Rate:     ECG rate:  80    ECG rate assessment: normal    Rhythm:     Rhythm: sinus rhythm    Ectopy:     Ectopy: none    QRS:     QRS axis:  Normal  Conduction:     Conduction: normal    ST segments:     ST segments:  Normal  T waves:     T waves: normal               ED Course          RESULTS:  Results Reviewed     Procedure Component Value Units Date/Time    Comprehensive metabolic panel [[id number]]  (Abnormal) Collected: 07/14/22 2241    Lab Status: Final result Specimen: Blood from Foot, Left Updated: 07/14/22 2314     Sodium 133 mmol/L      Potassium 3 6 mmol/L      Chloride 101 mmol/L      CO2 24 mmol/L      ANION GAP 8 mmol/L      BUN 11 mg/dL      Creatinine 0 48 mg/dL Glucose 94 mg/dL      Calcium 8 4 mg/dL      Corrected Calcium 9 4 mg/dL      AST 15 U/L      ALT 18 U/L      Alkaline Phosphatase 50 U/L      Total Protein 6 0 g/dL      Albumin 2 7 g/dL      Total Bilirubin 0 29 mg/dL      eGFR 101 ml/min/1 73sq m     Narrative:      [place] guidelines for Chronic Kidney Disease (CKD):     Stage 1 with normal or high GFR (GFR > 90 mL/min/1 73 square meters)    Stage 2 Mild CKD (GFR = 60-89 mL/min/1 73 square meters)    Stage 3A Moderate CKD (GFR = 45-59 mL/min/1 73 square meters)    Stage 3B Moderate CKD (GFR = 30-44 mL/min/1 73 square meters)    Stage 4 Severe CKD (GFR = 15-29 mL/min/1 73 square meters)    Stage 5 End Stage CKD (GFR <15 mL/min/1 73 square meters)  Note: GFR calculation is accurate only with a steady state creatinine    Lactic acid 2 Hours [[id number]]  (Normal) Collected: 07/14/22 2241    Lab Status: Final result Specimen: Blood from Vein Updated: 07/14/22 2304     LACTIC ACID 1 7 mmol/L     Narrative:      Result may be elevated if tourniquet was used during collection      CBC and differential [[id number]]  (Abnormal) Collected: 07/14/22 1950    Lab Status: Final result Specimen: Blood from Arm, Right Updated: 07/14/22 2058     WBC 5 03 Thousand/uL      RBC 4 57 Million/uL      Hemoglobin 13 3 g/dL      Hematocrit 41 4 %      MCV 91 fL      MCH 29 1 pg      MCHC 32 1 g/dL      RDW 15 3 %      MPV 9 8 fL      Platelets 252 Thousands/uL      nRBC 0 /100 WBCs      Neutrophils Relative 66 %      Immat GRANS % 0 %      Lymphocytes Relative 24 %      Monocytes Relative 7 %      Eosinophils Relative 2 %      Basophils Relative 1 %      Neutrophils Absolute 3 31 Thousands/µL      Immature Grans Absolute 0 02 Thousand/uL      Lymphocytes Absolute 1 20 Thousands/µL      Monocytes Absolute 0 37 Thousand/µL      Eosinophils Absolute 0 11 Thousand/µL      Basophils Absolute 0 02 Thousands/µL     FLU/RSV/COVID - if FLU/RSV clinically relevant [[id number]]  (Normal) Collected: 07/14/22 1956    Lab Status: Final result Specimen: Nares from Nose Updated: 07/14/22 2055     SARS-CoV-2 Negative     INFLUENZA A PCR Negative     INFLUENZA B PCR Negative     RSV PCR Negative    Narrative:      FOR PEDIATRIC PATIENTS - copy/paste COVID Guidelines URL to browser: [email or web address]    SARS-CoV-2 assay is a Nucleic Acid Amplification assay intended for the  qualitative detection of nucleic acid from SARS-CoV-2 in nasopharyngeal  swabs  Results are for the presumptive identification of SARS-CoV-2 RNA  Positive results are indicative of infection with SARS-CoV-2, the virus  causing COVID-19, but do not rule out bacterial infection or co-infection  with other viruses  Laboratories within the [place] and its  territories are required to report all positive results to the appropriate  public health authorities  Negative results do not preclude SARS-CoV-2  infection and should not be used as the sole basis for treatment or other  patient management decisions  Negative results must be combined with  clinical observations, patient history, and epidemiological information  This test has not been FDA cleared or approved  This test has been authorized by FDA under an Emergency Use Authorization  (EUA)  This test is only authorized for the duration of time the  declaration that circumstances exist justifying the authorization of the  emergency use of an in vitro diagnostic tests for detection of SARS-CoV-2  virus and/or diagnosis of COVID-19 infection under section 564(b)(1) of  the Act, 21 U  S C  157GBN-4(G)(7), unless the authorization is terminated  or revoked sooner  The test has been validated but independent review by FDA  and CLIA is pending  Test performed using [employer] Gene[employer]pert: This RT-PCR assay targets N2,  a region unique to SARS-CoV-2   A conserved region in the E-gene was chosen  for pan-Sarbecovirus detection which includes SARS-CoV-2  UA w Reflex to Microscopic w Reflex to Culture [[id number]] Collected: 07/14/22 2005    Lab Status: Final result Specimen: Urine, Straight Cath Updated: 07/14/22 2038     Color, UA Straw     Clarity, UA Clear     Specific Gravity, UA 1 010     pH, UA 5 5     Leukocytes, UA Negative     Nitrite, UA Negative     Protein, UA Negative mg/dl      Glucose, UA Negative mg/dl      Ketones, UA Negative mg/dl      Urobilinogen, UA 0 2 E U /dl      Bilirubin, UA Negative     Occult Blood, UA Negative    Lactic acid, plasma [[id number]]  (Abnormal) Collected: 07/14/22 2005    Lab Status: Final result Specimen: Blood from Arm, Left Updated: 07/14/22 2036     LACTIC ACID 2 9 mmol/L     Narrative:      Result may be elevated if tourniquet was used during collection  Phosphorus [[id number]]  (Normal) Collected: 07/14/22 1950    Lab Status: Final result Specimen: Blood from Arm, Right Updated: 07/14/22 2032     Phosphorus 3 4 mg/dL     TSH [[id number]]  (Normal) Collected: 07/14/22 1950    Lab Status: Final result Specimen: Blood from Arm, Right Updated: 07/14/22 2032     TSH 3RD GENERATON 3 813 uIU/mL     Narrative:      Patients undergoing fluorescein dye angiography may retain small amounts of fluorescein in the body for 48-72 hours post procedure  Samples containing fluorescein can produce falsely depressed TSH values  If the patient had this procedure,a specimen should be resubmitted post fluorescein clearance        Magnesium [[id number]]  (Normal) Collected: 07/14/22 1950    Lab Status: Final result Specimen: Blood from Arm, Right Updated: 07/14/22 2032     Magnesium 1 8 mg/dL     Blood gas, venous [[id number]]  (Abnormal) Collected: 07/14/22 1951    Lab Status: Final result Specimen: Blood from Arm, Right Updated: 07/14/22 2004     pH, Eliot 7 446     pCO2, Eliot 35 0 mm Hg      pO2, Eliot 113 1 mm Hg      HCO3, Eliot 23 6 mmol/L      Base Excess, Eliot 0 1 mmol/L      O2 Content, Eliot 19 2 ml/dL      O2 HGB, VENOUS 93 0 %           CT head without contrast   Final Result      No acute intracranial pathology  Stable ventriculomegaly out of proportion degree of volume loss  Correlate for NPH  Chronic microangiopathy  Workstation performed: [id number]         XR chest 1 view portable    (Results Pending)       Vitals:    07/14/22 1937 07/1955   BP: 136/60    TempSrc:  Oral   Pulse: 82    Resp: 16    Patient Position - Orthostatic VS: Lying    Temp:  98 1 °F (36 7 °C)         MDM  Number of Diagnoses or Management Options  Dehydration  Weakness  Diagnosis management comments: 76-year-old female with past medical history pertinent for diabetes, hypertension, mental disability, previous seizures, anxiety, dyslipidemia, GERD who presents to the emergency department by EMS for evaluation of lethargy that is more so than normal per staff at [facility] home  Vital signs on arrival here were non concerning  Patient has exam as above  EKG obtained did not show any acute signs of ischemia  Lab work obtained again to evaluate for any new changes  Patient was given IV fluids for supportive care  Urine and viral testing obtained as well  Lab work returned showing lactic acid at 2 9 but otherwise unremarkable labs; mild hyponatremia noted again at 133  Urine results returned showing no signs of UTI  Viral testing returned showing negative for COVID, flu or RSV  Chest x-ray obtained returned showing no obvious abnormality on my interpretation  CT head obtained and returned showing no acute intracranial pathology  Ventricles were noted to be enlarged which could be seen in NPH but was baseline compared to CT back in May way before patient had today's symptoms  Neurology referral placed for follow-up to evaluate further  Patient was given IV fluids and repeated lactic acid which was within normal limits  Patient was advised continuing hydration    Patient did seem to be improved since arrival         Amount and/or Complexity of Data Reviewed  Clinical lab tests: ordered and reviewed  Tests in the radiology section of CPT®: ordered and reviewed  Tests in the medicine section of CPT®: ordered and reviewed  Obtain history from someone other than the patient: yes  Review and summarize past medical records: yes  Independent visualization of images, tracings, or specimens: yes    Risk of Complications, Morbidity, and/or Mortality  Presenting problems: moderate  Diagnostic procedures: moderate  Management options: moderate        Disposition  Final diagnoses:   Weakness   Dehydration     Time reflects when diagnosis was documented in both MDM as applicable and the Disposition within this note     Time User Action Codes Description Comment    7/14/2022  7:46 PM Stephen SHIPLEY Add [[id number]] Weakness     7/14/2022 11:09 PM Maliha Schneider Add [E86 0] Dehydration       ED Disposition     ED Disposition   Discharge    Condition   Stable    Date/Time   Thu Jul 14, 2022 11:09 PM    Comment   [address] discharge to home/self care                 Follow-up Information     Follow up With Specialties Details Why Deb Barker MD    [facility] [place] [phone]            Patient's Medications   Discharge Prescriptions    No medications on file           PDMP Review       Value Time User    PDMP Reviewed  Yes 5/19/2022  5:07 PM Jenna Hammonds [address]          ED Provider  Electronically Signed by           Craig Fernandez MD  07/14/22 9974

## 2022-07-14 NOTE — DISCHARGE INSTRUCTIONS
Thank you for letting us take care of you  You have been evaluated for weakness  Please continue drinking plenty of fluids  A referral to Neurology has been placed for follow-up  Please return for worsening symptoms  At this time, you have no clinical evidence of symptoms or problems that will require hospitalization, however you should be evaluated soon by a primary care physician, and contact information has been provided  Follow up with your primary care physician  This is important as many medical conditions can be managed as an outpatient, in addition to routine health screening  Seeing your primary doctor often can help identify changes in the medical issue that brought you to the ED for care today  If you experience any new symptoms or acute worsening of current symptoms, please return to the ED

## 2022-07-15 LAB
ATRIAL RATE: 80 BPM
GLUCOSE SERPL-MCNC: 141 MG/DL (ref 65–140)
P AXIS: 53 DEGREES
PR INTERVAL: 152 MS
QRS AXIS: 64 DEGREES
QRSD INTERVAL: 86 MS
QT INTERVAL: 378 MS
QTC INTERVAL: 435 MS
T WAVE AXIS: 78 DEGREES
VENTRICULAR RATE: 80 BPM

## 2022-07-18 LAB
BACTERIA BLD CULT: NORMAL
BACTERIA BLD CULT: NORMAL

## 2022-07-19 ENCOUNTER — HOSPITAL ENCOUNTER (INPATIENT)
Facility: HOSPITAL | Age: 67
LOS: 3 days | Discharge: HOME HEALTH | DRG: 640 | End: 2022-07-22
Attending: EMERGENCY MEDICINE | Admitting: FAMILY MEDICINE
Payer: MEDICARE

## 2022-07-19 ENCOUNTER — APPOINTMENT (EMERGENCY)
Dept: CT IMAGING | Facility: HOSPITAL | Age: 67
DRG: 640 | End: 2022-07-19
Payer: MEDICARE

## 2022-07-19 ENCOUNTER — APPOINTMENT (EMERGENCY)
Dept: RADIOLOGY | Facility: HOSPITAL | Age: 67
DRG: 640 | End: 2022-07-19
Payer: MEDICARE

## 2022-07-19 DIAGNOSIS — N39.0 UTI (URINARY TRACT INFECTION): Primary | ICD-10-CM

## 2022-07-19 DIAGNOSIS — E87.1 HYPONATREMIA: ICD-10-CM

## 2022-07-19 LAB
ALBUMIN SERPL BCP-MCNC: 3.8 G/DL (ref 3.5–5)
ALP SERPL-CCNC: 66 U/L (ref 46–116)
ALT SERPL W P-5'-P-CCNC: 21 U/L (ref 12–78)
ANION GAP SERPL CALCULATED.3IONS-SCNC: 7 MMOL/L (ref 4–13)
AST SERPL W P-5'-P-CCNC: 16 U/L (ref 5–45)
BACTERIA UR QL AUTO: ABNORMAL /HPF
BASE EX.OXY STD BLDV CALC-SCNC: 42.2 % (ref 60–80)
BASE EXCESS BLDV CALC-SCNC: 6.4 MMOL/L
BASOPHILS # BLD AUTO: 0.01 THOUSANDS/ΜL (ref 0–0.1)
BASOPHILS NFR BLD AUTO: 0 % (ref 0–1)
BILIRUB SERPL-MCNC: 0.47 MG/DL (ref 0.2–1)
BILIRUB UR QL STRIP: NEGATIVE
BUN SERPL-MCNC: 13 MG/DL (ref 5–25)
CALCIUM SERPL-MCNC: 9.9 MG/DL (ref 8.3–10.1)
CARDIAC TROPONIN I PNL SERPL HS: 4 NG/L
CHLORIDE SERPL-SCNC: 89 MMOL/L (ref 96–108)
CLARITY UR: CLEAR
CO2 SERPL-SCNC: 32 MMOL/L (ref 21–32)
COLOR UR: YELLOW
CREAT SERPL-MCNC: 0.69 MG/DL (ref 0.6–1.3)
EOSINOPHIL # BLD AUTO: 0.09 THOUSAND/ΜL (ref 0–0.61)
EOSINOPHIL NFR BLD AUTO: 2 % (ref 0–6)
ERYTHROCYTE [DISTWIDTH] IN BLOOD BY AUTOMATED COUNT: 14.9 % (ref 11.6–15.1)
FLUAV RNA RESP QL NAA+PROBE: NEGATIVE
FLUBV RNA RESP QL NAA+PROBE: NEGATIVE
GFR SERPL CREATININE-BSD FRML MDRD: 89 ML/MIN/1.73SQ M
GLUCOSE SERPL-MCNC: 114 MG/DL (ref 65–140)
GLUCOSE SERPL-MCNC: 177 MG/DL (ref 65–140)
GLUCOSE UR STRIP-MCNC: NEGATIVE MG/DL
HCO3 BLDV-SCNC: 33.7 MMOL/L (ref 24–30)
HCT VFR BLD AUTO: 37.5 % (ref 34.8–46.1)
HGB BLD-MCNC: 12.1 G/DL (ref 11.5–15.4)
HGB UR QL STRIP.AUTO: NEGATIVE
IMM GRANULOCYTES # BLD AUTO: 0.03 THOUSAND/UL (ref 0–0.2)
IMM GRANULOCYTES NFR BLD AUTO: 1 % (ref 0–2)
KETONES UR STRIP-MCNC: NEGATIVE MG/DL
LACTATE SERPL-SCNC: 1.6 MMOL/L (ref 0.5–2)
LACTATE SERPL-SCNC: 2.4 MMOL/L (ref 0.5–2)
LEUKOCYTE ESTERASE UR QL STRIP: ABNORMAL
LYMPHOCYTES # BLD AUTO: 1.01 THOUSANDS/ΜL (ref 0.6–4.47)
LYMPHOCYTES NFR BLD AUTO: 18 % (ref 14–44)
MCH RBC QN AUTO: 28.7 PG (ref 26.8–34.3)
MCHC RBC AUTO-ENTMCNC: 32.3 G/DL (ref 31.4–37.4)
MCV RBC AUTO: 89 FL (ref 82–98)
MONOCYTES # BLD AUTO: 0.26 THOUSAND/ΜL (ref 0.17–1.22)
MONOCYTES NFR BLD AUTO: 5 % (ref 4–12)
NEUTROPHILS # BLD AUTO: 4.1 THOUSANDS/ΜL (ref 1.85–7.62)
NEUTS SEG NFR BLD AUTO: 74 % (ref 43–75)
NITRITE UR QL STRIP: NEGATIVE
NON-SQ EPI CELLS URNS QL MICRO: ABNORMAL /HPF
NRBC BLD AUTO-RTO: 0 /100 WBCS
NT-PROBNP SERPL-MCNC: 53 PG/ML
O2 CT BLDV-SCNC: 7.8 ML/DL
PCO2 BLDV: 61 MM HG (ref 42–50)
PH BLDV: 7.36 [PH] (ref 7.3–7.4)
PH UR STRIP.AUTO: 6 [PH]
PLATELET # BLD AUTO: 194 THOUSANDS/UL (ref 149–390)
PMV BLD AUTO: 9 FL (ref 8.9–12.7)
PO2 BLDV: 27.6 MM HG (ref 35–45)
POTASSIUM SERPL-SCNC: 4.3 MMOL/L (ref 3.5–5.3)
PROCALCITONIN SERPL-MCNC: <0.05 NG/ML
PROT SERPL-MCNC: 7.6 G/DL (ref 6.4–8.4)
PROT UR STRIP-MCNC: NEGATIVE MG/DL
RBC # BLD AUTO: 4.22 MILLION/UL (ref 3.81–5.12)
RBC #/AREA URNS AUTO: ABNORMAL /HPF
RSV RNA RESP QL NAA+PROBE: NEGATIVE
SARS-COV-2 RNA RESP QL NAA+PROBE: NEGATIVE
SODIUM SERPL-SCNC: 128 MMOL/L (ref 135–147)
SP GR UR STRIP.AUTO: 1.01 (ref 1–1.03)
TSH SERPL DL<=0.05 MIU/L-ACNC: 1.57 UIU/ML (ref 0.45–4.5)
UROBILINOGEN UR QL STRIP.AUTO: 0.2 E.U./DL
VALPROATE SERPL-MCNC: 80 UG/ML (ref 50–100)
VALPROATE SERPL-MCNC: 84 UG/ML (ref 50–100)
WBC # BLD AUTO: 5.5 THOUSAND/UL (ref 4.31–10.16)
WBC #/AREA URNS AUTO: ABNORMAL /HPF
WBC CLUMPS # UR AUTO: PRESENT /UL

## 2022-07-19 PROCEDURE — 83605 ASSAY OF LACTIC ACID: CPT | Performed by: EMERGENCY MEDICINE

## 2022-07-19 PROCEDURE — 82948 REAGENT STRIP/BLOOD GLUCOSE: CPT

## 2022-07-19 PROCEDURE — 71045 X-RAY EXAM CHEST 1 VIEW: CPT

## 2022-07-19 PROCEDURE — 93005 ELECTROCARDIOGRAM TRACING: CPT

## 2022-07-19 PROCEDURE — 80053 COMPREHEN METABOLIC PANEL: CPT | Performed by: EMERGENCY MEDICINE

## 2022-07-19 PROCEDURE — 96361 HYDRATE IV INFUSION ADD-ON: CPT

## 2022-07-19 PROCEDURE — 70450 CT HEAD/BRAIN W/O DYE: CPT

## 2022-07-19 PROCEDURE — 36415 COLL VENOUS BLD VENIPUNCTURE: CPT | Performed by: EMERGENCY MEDICINE

## 2022-07-19 PROCEDURE — 85025 COMPLETE CBC W/AUTO DIFF WBC: CPT | Performed by: EMERGENCY MEDICINE

## 2022-07-19 PROCEDURE — 87086 URINE CULTURE/COLONY COUNT: CPT | Performed by: EMERGENCY MEDICINE

## 2022-07-19 PROCEDURE — 99222 1ST HOSP IP/OBS MODERATE 55: CPT

## 2022-07-19 PROCEDURE — 81001 URINALYSIS AUTO W/SCOPE: CPT | Performed by: EMERGENCY MEDICINE

## 2022-07-19 PROCEDURE — 80164 ASSAY DIPROPYLACETIC ACD TOT: CPT | Performed by: EMERGENCY MEDICINE

## 2022-07-19 PROCEDURE — 83880 ASSAY OF NATRIURETIC PEPTIDE: CPT | Performed by: EMERGENCY MEDICINE

## 2022-07-19 PROCEDURE — 84145 PROCALCITONIN (PCT): CPT

## 2022-07-19 PROCEDURE — 87077 CULTURE AEROBIC IDENTIFY: CPT | Performed by: EMERGENCY MEDICINE

## 2022-07-19 PROCEDURE — 96365 THER/PROPH/DIAG IV INF INIT: CPT

## 2022-07-19 PROCEDURE — 0241U HB NFCT DS VIR RESP RNA 4 TRGT: CPT | Performed by: EMERGENCY MEDICINE

## 2022-07-19 PROCEDURE — 87186 SC STD MICRODIL/AGAR DIL: CPT | Performed by: EMERGENCY MEDICINE

## 2022-07-19 PROCEDURE — 82805 BLOOD GASES W/O2 SATURATION: CPT | Performed by: EMERGENCY MEDICINE

## 2022-07-19 PROCEDURE — 80164 ASSAY DIPROPYLACETIC ACD TOT: CPT

## 2022-07-19 PROCEDURE — 99285 EMERGENCY DEPT VISIT HI MDM: CPT | Performed by: EMERGENCY MEDICINE

## 2022-07-19 PROCEDURE — 99285 EMERGENCY DEPT VISIT HI MDM: CPT

## 2022-07-19 PROCEDURE — 84443 ASSAY THYROID STIM HORMONE: CPT | Performed by: EMERGENCY MEDICINE

## 2022-07-19 PROCEDURE — 84484 ASSAY OF TROPONIN QUANT: CPT | Performed by: EMERGENCY MEDICINE

## 2022-07-19 PROCEDURE — 80048 BASIC METABOLIC PNL TOTAL CA: CPT

## 2022-07-19 RX ORDER — ACETAMINOPHEN 325 MG/1
650 TABLET ORAL EVERY 6 HOURS PRN
Status: DISCONTINUED | OUTPATIENT
Start: 2022-07-19 | End: 2022-07-22 | Stop reason: HOSPADM

## 2022-07-19 RX ORDER — OXYBUTYNIN CHLORIDE 5 MG/1
10 TABLET, EXTENDED RELEASE ORAL DAILY
Status: DISCONTINUED | OUTPATIENT
Start: 2022-07-20 | End: 2022-07-22 | Stop reason: HOSPADM

## 2022-07-19 RX ORDER — DIVALPROEX SODIUM 250 MG/1
250 TABLET, DELAYED RELEASE ORAL DAILY
Status: DISCONTINUED | OUTPATIENT
Start: 2022-07-20 | End: 2022-07-22 | Stop reason: HOSPADM

## 2022-07-19 RX ORDER — DIVALPROEX SODIUM 500 MG/1
500 TABLET, DELAYED RELEASE ORAL
Status: DISCONTINUED | OUTPATIENT
Start: 2022-07-20 | End: 2022-07-22 | Stop reason: HOSPADM

## 2022-07-19 RX ORDER — SENNOSIDES 8.6 MG
8.6 TABLET ORAL
Status: DISCONTINUED | OUTPATIENT
Start: 2022-07-19 | End: 2022-07-22 | Stop reason: HOSPADM

## 2022-07-19 RX ORDER — SERTRALINE HYDROCHLORIDE 100 MG/1
100 TABLET, FILM COATED ORAL
Status: DISCONTINUED | OUTPATIENT
Start: 2022-07-19 | End: 2022-07-22 | Stop reason: HOSPADM

## 2022-07-19 RX ORDER — LORAZEPAM 0.5 MG/1
0.5 TABLET ORAL 2 TIMES DAILY PRN
Status: DISCONTINUED | OUTPATIENT
Start: 2022-07-19 | End: 2022-07-22 | Stop reason: HOSPADM

## 2022-07-19 RX ORDER — FERROUS SULFATE 325(65) MG
325 TABLET ORAL DAILY
Status: DISCONTINUED | OUTPATIENT
Start: 2022-07-20 | End: 2022-07-22 | Stop reason: HOSPADM

## 2022-07-19 RX ORDER — CEFTRIAXONE 1 G/50ML
1000 INJECTION, SOLUTION INTRAVENOUS ONCE
Status: COMPLETED | OUTPATIENT
Start: 2022-07-19 | End: 2022-07-19

## 2022-07-19 RX ORDER — INSULIN LISPRO 100 [IU]/ML
1-5 INJECTION, SOLUTION INTRAVENOUS; SUBCUTANEOUS
Status: DISCONTINUED | OUTPATIENT
Start: 2022-07-19 | End: 2022-07-20

## 2022-07-19 RX ORDER — CEFTRIAXONE 1 G/50ML
1000 INJECTION, SOLUTION INTRAVENOUS EVERY 24 HOURS
Status: DISCONTINUED | OUTPATIENT
Start: 2022-07-20 | End: 2022-07-21

## 2022-07-19 RX ORDER — INSULIN LISPRO 100 [IU]/ML
1-5 INJECTION, SOLUTION INTRAVENOUS; SUBCUTANEOUS
Status: DISCONTINUED | OUTPATIENT
Start: 2022-07-20 | End: 2022-07-20

## 2022-07-19 RX ORDER — DOCUSATE SODIUM 100 MG/1
100 CAPSULE, LIQUID FILLED ORAL 2 TIMES DAILY
Status: DISCONTINUED | OUTPATIENT
Start: 2022-07-19 | End: 2022-07-22 | Stop reason: HOSPADM

## 2022-07-19 RX ORDER — GABAPENTIN 400 MG/1
800 CAPSULE ORAL 3 TIMES DAILY
Status: DISCONTINUED | OUTPATIENT
Start: 2022-07-19 | End: 2022-07-22 | Stop reason: HOSPADM

## 2022-07-19 RX ORDER — QUETIAPINE 200 MG/1
200 TABLET, FILM COATED, EXTENDED RELEASE ORAL
Status: DISCONTINUED | OUTPATIENT
Start: 2022-07-19 | End: 2022-07-22 | Stop reason: HOSPADM

## 2022-07-19 RX ORDER — METHENAMINE HIPPURATE 1000 MG/1
1 TABLET ORAL 2 TIMES DAILY
Status: DISCONTINUED | OUTPATIENT
Start: 2022-07-19 | End: 2022-07-22 | Stop reason: HOSPADM

## 2022-07-19 RX ORDER — LORAZEPAM 0.5 MG/1
0.5 TABLET ORAL 2 TIMES DAILY
Status: DISCONTINUED | OUTPATIENT
Start: 2022-07-19 | End: 2022-07-19

## 2022-07-19 RX ORDER — DIVALPROEX SODIUM 500 MG/1
500 TABLET, DELAYED RELEASE ORAL
COMMUNITY

## 2022-07-19 RX ORDER — QUETIAPINE FUMARATE 100 MG/1
100 TABLET, FILM COATED ORAL DAILY
Status: DISCONTINUED | OUTPATIENT
Start: 2022-07-20 | End: 2022-07-22 | Stop reason: HOSPADM

## 2022-07-19 RX ORDER — HEPARIN SODIUM 5000 [USP'U]/ML
5000 INJECTION, SOLUTION INTRAVENOUS; SUBCUTANEOUS EVERY 8 HOURS SCHEDULED
Status: DISCONTINUED | OUTPATIENT
Start: 2022-07-19 | End: 2022-07-22 | Stop reason: HOSPADM

## 2022-07-19 RX ORDER — PANTOPRAZOLE SODIUM 40 MG/1
40 TABLET, DELAYED RELEASE ORAL
Status: DISCONTINUED | OUTPATIENT
Start: 2022-07-20 | End: 2022-07-22 | Stop reason: HOSPADM

## 2022-07-19 RX ORDER — NYSTATIN 100000 [USP'U]/G
POWDER TOPICAL DAILY
COMMUNITY

## 2022-07-19 RX ORDER — TORSEMIDE 20 MG/1
10 TABLET ORAL DAILY
Status: DISCONTINUED | OUTPATIENT
Start: 2022-07-20 | End: 2022-07-22 | Stop reason: HOSPADM

## 2022-07-19 RX ORDER — ASPIRIN 81 MG/1
81 TABLET ORAL DAILY
Status: DISCONTINUED | OUTPATIENT
Start: 2022-07-20 | End: 2022-07-22 | Stop reason: HOSPADM

## 2022-07-19 RX ORDER — LEVOTHYROXINE SODIUM 0.05 MG/1
50 TABLET ORAL
Status: DISCONTINUED | OUTPATIENT
Start: 2022-07-20 | End: 2022-07-22 | Stop reason: HOSPADM

## 2022-07-19 RX ORDER — ATORVASTATIN CALCIUM 40 MG/1
80 TABLET, FILM COATED ORAL
Status: DISCONTINUED | OUTPATIENT
Start: 2022-07-20 | End: 2022-07-22 | Stop reason: HOSPADM

## 2022-07-19 RX ADMIN — DOCUSATE SODIUM 100 MG: 100 CAPSULE ORAL at 22:39

## 2022-07-19 RX ADMIN — SERTRALINE HYDROCHLORIDE 100 MG: 100 TABLET ORAL at 23:05

## 2022-07-19 RX ADMIN — CEFTRIAXONE 1000 MG: 1 INJECTION, SOLUTION INTRAVENOUS at 19:49

## 2022-07-19 RX ADMIN — SODIUM CHLORIDE 500 ML: 0.9 INJECTION, SOLUTION INTRAVENOUS at 19:04

## 2022-07-19 RX ADMIN — GABAPENTIN 800 MG: 400 CAPSULE ORAL at 22:39

## 2022-07-19 RX ADMIN — QUETIAPINE FUMARATE 200 MG: 200 TABLET, EXTENDED RELEASE ORAL at 23:05

## 2022-07-19 RX ADMIN — HEPARIN SODIUM 5000 UNITS: 5000 INJECTION INTRAVENOUS; SUBCUTANEOUS at 22:38

## 2022-07-19 RX ADMIN — LORAZEPAM 0.5 MG: 0.5 TABLET ORAL at 22:47

## 2022-07-19 RX ADMIN — STANDARDIZED SENNA CONCENTRATE 8.6 MG: 8.6 TABLET ORAL at 23:05

## 2022-07-19 NOTE — ED PROVIDER NOTES
History  Chief Complaint   Patient presents with    Weakness - Generalized     Caregiver states sleeping a lot and no strength, here for same on Thursday, states no follow up with PCP, pt awake and alert on arrival     58-year-old female from group home with staff complains increased general weakness somnolence over the past few days      History provided by:  Caregiver  History limited by:  Patient nonverbal      Prior to Admission Medications   Prescriptions Last Dose Informant Patient Reported? Taking? Basaglar KwikPen 100 units/mL injection pen   Yes No   Cranberry 450 MG CAPS   Yes No   Sig: Take by mouth   Diapers & Supplies MISC   Yes No   Sig: Use as directed  Ergocalciferol (VITAMIN D2 PO)   Yes No   Sig: Take 1 25 mg by mouth once a week   Incontinence Supply Disposable (FQ Pant Liner) MISC   Yes No   LORazepam (ATIVAN) 0 5 mg tablet   Yes No   Sig: Take 0 5 mg by mouth in the morning and 0 5 mg in the evening  Lancets 33G MISC   Yes No   Sig: daily   QUEtiapine (SEROquel XR) 200 mg 24 hr tablet   No No   Sig: Take 1 tablet (200 mg total) by mouth daily at bedtime   Patient taking differently: Take 200 mg by mouth daily at bedtime 1 tab at 8pm   QUEtiapine (SEROquel) 100 mg tablet   Yes No   Sig: Take 100 mg by mouth in the morning and 100 mg in the evening   8am, 4pm    aspirin (ECOTRIN LOW STRENGTH) 81 mg EC tablet   Yes No   Sig: Take 81 mg by mouth daily   bisacodyl (DULCOLAX) 5 mg EC tablet   Yes No   Sig: Take 5 mg by mouth daily as needed for constipation   divalproex sodium (DEPAKOTE) 250 mg EC tablet   Yes No   Sig: Take 250 mg by mouth every 12 (twelve) hours 1 tab AM (8am), 2 tabs 8pm   docusate sodium (COLACE) 100 mg capsule  Outside Facility (Specify) Yes No   Sig: Take 100 mg by mouth in the morning and 100 mg in the evening    ergocalciferol (VITAMIN D2) 50,000 units   Yes No   ferrous gluconate (FERGON) 324 mg tablet   Yes No   Sig: Take 324 mg by mouth daily with breakfast   ferrous sulfate 325 (65 Fe) mg tablet   Yes No   Sig: Take 325 mg by mouth daily   gabapentin (NEURONTIN) 800 mg tablet   Yes No   glipiZIDE-metFORMIN (METAGLIP) 2 5-250 MG per tablet   Yes No   Sig: Take 1 tablet by mouth in the morning and 1 tablet in the evening  Take before meals  glucose blood test strip   Yes No   Sig: TEST BLOOD SUGAR TWO TIMES DAILY  DX E11 9   levothyroxine 50 mcg tablet   No No   Sig: Take 1 tablet (50 mcg total) by mouth daily in the early morning   methenamine hippurate (HIPREX) 1 g tablet   No No   Sig: Take 1 tablet (1 g total) by mouth in the morning and 1 tablet (1 g total) in the evening  Take with meals  Restart that after finishing with keflex  omeprazole (PriLOSEC) 20 mg delayed release capsule   Yes No   Sig: Take 20 mg by mouth daily   oxybutynin (DITROPAN-XL) 10 MG 24 hr tablet   Yes No   rosuvastatin (CRESTOR) 40 MG tablet   Yes No   Sig: Take 40 mg by mouth daily   senna (SENOKOT) 8 6 mg   No No   Sig: Take 1 tablet (8 6 mg total) by mouth daily at bedtime   sertraline (ZOLOFT) 100 mg tablet   No No   Sig: Take 1 tablet (100 mg total) by mouth daily at bedtime   Patient taking differently: Take 100 mg by mouth 2 (two) times a day BID   tamsulosin (FLOMAX) 0 4 mg   No No   Sig: Take 1 capsule (0 4 mg total) by mouth daily with dinner   tolterodine (DETROL LA) 4 mg 24 hr capsule   Yes No   Sig: Take 2 mg by mouth in the morning and 2 mg before bedtime  torsemide (DEMADEX) 10 mg tablet   No No   Sig: Take 1 tablet (10 mg total) by mouth daily      Facility-Administered Medications: None       Past Medical History:   Diagnosis Date    Anxiety     [facility] mellitus ([facility] [address] )     GERD (gastroesophageal reflux disease)     Hyperlipidemia     Hypertension     Mental disability     Mixed incontinence 4/12/2017    [facility]izure disorder ([facility] )        History reviewed  No pertinent surgical history      Family History   Problem Relation Age of Onset    No Known Problems Mother     No Known Problems Father     No Known Problems Sister     No Known Problems Maternal Grandmother     No Known Problems Maternal Grandfather     No Known Problems Paternal Grandmother     No Known Problems Paternal Grandfather      I have reviewed and agree with the history as documented  E-Cigarette/Vaping    E-Cigarette Use Never User      E-Cigarette/Vaping Substances    Nicotine No     THC No     CBD No     Flavoring No     Other No     Unknown No      Social History     Tobacco Use    Smoking status: Never Smoker    Smokeless tobacco: Never Used   Vaping Use    Vaping Use: Never used   Substance Use Topics    Alcohol use: Not Currently    Drug use: Not Currently       Review of Systems   Unable to perform ROS: Patient nonverbal       Physical Exam  Physical Exam  Vitals and nursing note reviewed  Constitutional:       General: She is not in acute distress  Appearance: She is obese  She is not toxic-appearing  Comments: Pleasant, comfortable-appearing, alert, able to stand with assist, not obviously somnolent, normal simple interactions   HENT:      Head: Normocephalic and atraumatic  Nose: Nose normal       Mouth/Throat:      Mouth: Mucous membranes are moist       Pharynx: Oropharynx is clear  Eyes:      Conjunctiva/sclera: Conjunctivae normal       Pupils: Pupils are equal, round, and reactive to light  Cardiovascular:      Rate and Rhythm: Normal rate and regular rhythm  Heart sounds: Normal heart sounds  Pulmonary:      Effort: Pulmonary effort is normal       Breath sounds: Normal breath sounds  Abdominal:      General: Bowel sounds are normal  There is no distension  Palpations: Abdomen is soft  Tenderness: There is no abdominal tenderness  Musculoskeletal:         General: Normal range of motion  Cervical back: Neck supple  Skin:     General: Skin is warm and dry  Findings: Bruising present        Comments: Two small ovoid 1-2 cm yellowed bruises at left forehead and left lower abdomen   Neurological:      General: No focal deficit present  Mental Status: She is alert  Mental status is at baseline  Cranial Nerves: No cranial nerve deficit  Sensory: No sensory deficit  Coordination: Coordination normal    Psychiatric:         Mood and Affect: Mood normal          Behavior: Behavior normal          Vital Signs  ED Triage Vitals   Temperature Pulse Respirations Blood Pressure SpO2   07/19/22 1835 07/19/22 1835 07/19/22 1835 07/19/22 1838 07/19/22 1835   (!) 97 1 °F (36 2 °C) 84 18 138/60 97 %      Temp src Heart Rate Source Patient Position - Orthostatic VS BP Location FiO2 (%)   -- 07/19/22 1835 07/19/22 1835 07/19/22 1835 --    Monitor Lying Left arm       Pain Score       --                  Vitals:    07/19/22 1838 07/19/22 1930 07/19/22 2000 07/19/22 2030   BP: 138/60 150/70 141/91 139/84   Pulse:  81 75 75   Patient Position - Orthostatic VS:             Visual Acuity      ED Medications  Medications   sodium chloride 0 9 % bolus 500 mL (0 mL Intravenous Stopped 7/19/22 2030)   cefTRIAXone (ROCEPHIN) IVPB (premix in dextrose) 1,000 mg 50 mL (0 mg Intravenous Stopped 7/19/22 2024)       Diagnostic Studies  Results Reviewed     Procedure Component Value Units Date/Time    FLU/RSV/COVID - if FLU/RSV clinically relevant [[id number]]  (Normal) Collected: 07/19/22 1901    Lab Status: Final result Specimen: Nares from Nose Updated: 07/19/22 1955     SARS-CoV-2 Negative     INFLUENZA A PCR Negative     INFLUENZA B PCR Negative     RSV PCR Negative    Narrative:      FOR PEDIATRIC PATIENTS - copy/paste COVID Guidelines URL to browser: [email or web address] [email or web address]  [email or web address]    SARS-CoV-2 assay is a Nucleic Acid Amplification assay intended for the  qualitative detection of nucleic acid from SARS-CoV-2 in nasopharyngeal  swabs  Results are for the presumptive identification of SARS-CoV-2 RNA      Positive results are indicative of infection with SARS-CoV-2, the virus  causing COVID-19, but do not rule out bacterial infection or co-infection  with other viruses  Laboratories within the [place] and its  territories are required to report all positive results to the appropriate  public health authorities  Negative results do not preclude SARS-CoV-2  infection and should not be used as the sole basis for treatment or other  patient management decisions  Negative results must be combined with  clinical observations, patient history, and epidemiological information  This test has not been FDA cleared or approved  This test has been authorized by FDA under an Emergency Use Authorization  (EUA)  This test is only authorized for the duration of time the  declaration that circumstances exist justifying the authorization of the  emergency use of an in vitro diagnostic tests for detection of SARS-CoV-2  virus and/or diagnosis of COVID-19 infection under section 564(b)(1) of  the Act, 21 U  S C  634FAS-4(U)(3), unless the authorization is terminated  or revoked sooner  The test has been validated but independent review by FDA  and CLIA is pending  Test performed using [employer] GeneXpert: This RT-PCR assay targets N2,  a region unique to SARS-CoV-2  A conserved region in the E-gene was chosen  for pan-Sarbecovirus detection which includes SARS-CoV-2  Lactic acid [[id number]]  (Abnormal) Collected: 07/19/22 1901    Lab Status: Final result Specimen: Blood from Arm, Left Updated: 07/19/22 1954     LACTIC ACID 2 4 mmol/L     Narrative:      Result may be elevated if tourniquet was used during collection      Lactic acid 2 Hours [[id number]]     Lab Status: No result Specimen: Blood     TSH [[id number]]  (Normal) Collected: 07/19/22 1901    Lab Status: Final result Specimen: Blood from Arm, Left Updated: 07/19/22 1951     TSH 3RD GENERATON 1 569 uIU/mL     Narrative:      Patients undergoing fluorescein dye angiography may retain small amounts of fluorescein in the body for 48-72 hours post procedure  Samples containing fluorescein can produce falsely depressed TSH values  If the patient had this procedure,a specimen should be resubmitted post fluorescein clearance        HS Troponin 0hr (reflex protocol) [[id number]]  (Normal) Collected: 07/19/22 1901    Lab Status: Final result Specimen: Blood from Arm, Left Updated: 07/19/22 1948     hs TnI 0hr 4 ng/L     NT-BNP PRO [[id number]]  (Normal) Collected: 07/19/22 1901    Lab Status: Final result Specimen: Blood from Arm, Left Updated: 07/19/22 1945     NT-proBNP 53 pg/mL     Valproic acid level, total [[id number]]  (Normal) Collected: 07/19/22 1901    Lab Status: Final result Specimen: Blood from Arm, Left Updated: 07/19/22 1945     Valproic Acid, Total 80 ug/mL     Comprehensive metabolic panel [[id number]]  (Abnormal) Collected: 07/19/22 1901    Lab Status: Final result Specimen: Blood from Arm, Left Updated: 07/19/22 1940     Sodium 128 mmol/L      Potassium 4 3 mmol/L      Chloride 89 mmol/L      CO2 32 mmol/L      ANION GAP 7 mmol/L      BUN 13 mg/dL      Creatinine 0 69 mg/dL      Glucose 177 mg/dL      Calcium 9 9 mg/dL      AST 16 U/L      ALT 21 U/L      Alkaline Phosphatase 66 U/L      Total Protein 7 6 g/dL      Albumin 3 8 g/dL      Total Bilirubin 0 47 mg/dL      eGFR 89 ml/min/1 73sq m     Narrative:      Elizabet guidelines for Chronic Kidney Disease (CKD):     Stage 1 with normal or high GFR (GFR > 90 mL/min/1 73 square meters)    Stage 2 Mild CKD (GFR = 60-89 mL/min/1 73 square meters)    Stage 3A Moderate CKD (GFR = 45-59 mL/min/1 73 square meters)    Stage 3B Moderate CKD (GFR = 30-44 mL/min/1 73 square meters)    Stage 4 Severe CKD (GFR = 15-29 mL/min/1 73 square meters)    Stage 5 End Stage CKD (GFR <15 mL/min/1 73 square meters)  Note: GFR calculation is accurate only with a steady state creatinine    Urine Microscopic [[id number]] (Abnormal) Collected: 07/19/22 1900    Lab Status: Final result Specimen: Urine, Straight Cath Updated: 07/19/22 1928     RBC, UA 2-4 /hpf      WBC, UA 30-50 /hpf      Epithelial Cells Moderate /hpf      Bacteria, UA Occasional /hpf      WBC Clumps Present    Urine culture [[id number]] Collected: 07/19/22 1900    Lab Status:  In process Specimen: Urine, Straight Cath Updated: 07/19/22 1928    UA w Reflex to Microscopic w Reflex to Culture [[id number]]  (Abnormal) Collected: 07/19/22 1900    Lab Status: Final result Specimen: Urine, Straight Cath Updated: 07/19/22 1921     Color, UA Yellow     Clarity, UA Clear     Specific Gravity, UA 1 015     pH, UA 6 0     Leukocytes, UA Moderate     Nitrite, UA Negative     Protein, UA Negative mg/dl      Glucose, UA Negative mg/dl      Ketones, UA Negative mg/dl      Urobilinogen, UA 0 2 E U /dl      Bilirubin, UA Negative     Occult Blood, UA Negative    Blood gas, venous [[id number]]  (Abnormal) Collected: 07/19/22 1901    Lab Status: Final result Specimen: Blood from Arm, Left Updated: 07/19/22 1917     pH, Eliot 7 360     pCO2, Eliot 61 0 mm Hg      pO2, Eliot 27 6 mm Hg      HCO3, Eliot 33 7 mmol/L      Base Excess, Eliot 6 4 mmol/L      O2 Content, Eliot 7 8 ml/dL      O2 HGB, VENOUS 42 2 %     CBC and differential [[id number]] Collected: 07/19/22 1901    Lab Status: Final result Specimen: Blood from Arm, Left Updated: 07/19/22 1912     WBC 5 50 Thousand/uL      RBC 4 22 Million/uL      Hemoglobin 12 1 g/dL      Hematocrit 37 5 %      MCV 89 fL      MCH 28 7 pg      MCHC 32 3 g/dL      RDW 14 9 %      MPV 9 0 fL      Platelets 433 Thousands/uL      nRBC 0 /100 WBCs      Neutrophils Relative 74 %      Immat GRANS % 1 %      Lymphocytes Relative 18 %      Monocytes Relative 5 %      Eosinophils Relative 2 %      Basophils Relative 0 %      Neutrophils Absolute 4 10 Thousands/µL      Immature Grans Absolute 0 03 Thousand/uL      Lymphocytes Absolute 1 01 Thousands/µL      Monocytes Absolute 0 26 Thousand/µL      Eosinophils Absolute 0 09 Thousand/µL      Basophils Absolute 0 01 Thousands/µL                  CT head without contrast   Final Result by Denzel Bryant MD (07/19 2048)      No acute intracranial pathology  Stable ventriculomegaly out of proportion degree of volume loss  Correlate for NPH  Workstation performed: [id number]         XR chest 1 view portable    (Results Pending)              Procedures  Procedures         ED Course  ED Course as of 07/19/22 2106 Tue Jul 19, 2022 1928 Leukocytes, UA(!): Moderate   1928 Nitrite, UA: Negative   1929 WBC, UA(!): 30-50   1929 Bacteria, UA: Occasional   1947 VALPROIC ACID TOTAL: 80   1947 NT-proBNP: 53   1947 Sodium(!): 128  Receiving saline bolus   1949 pCO2, Eliot(!): 61 0  Alert, awake, normal respirations   1950 CO2: 32   1950 hs TnI 0hr: 4   1950 Remains alert, breathing appropriately, general weakness may be related to moderate hyponatremia, given bolus, will admit for further evaluation   2032 EKG 7:02 p m  Normal sinus rhythm rate 79 normal axis normal intervals T-wave inversion aVL no ST elevation or depression interpreted by me                               SBIRT 20yo+    Flowsheet Row Most Recent Value   SBIRT (25 yo +)    In order to provide better care to our patients, we are screening all of our patients for alcohol and drug use  Would it be okay to ask you these screening questions? Yes Filed at: 07/19/2022 1839   Initial Alcohol Screen: US AUDIT-C     1  How often do you have a drink containing alcohol? 0 Filed at: 07/19/2022 1839   2  How many drinks containing alcohol do you have on a typical day you are drinking? 0 Filed at: 07/19/2022 1839   3a  Male UNDER 65: How often do you have five or more drinks on one occasion? 0 Filed at: 07/19/2022 1839   3b  FEMALE Any Age, or MALE 65+: How often do you have 4 or more drinks on one occassion?  0 Filed at: 07/19/2022 1839   Audit-C Score 0 Filed at: 07/19/2022 1839   KAILASH: How many times in the past year have you    Used an illegal drug or used a prescription medication for non-medical reasons? Never Filed at: 07/19/2022 1839                    MDM    Disposition  Final diagnoses:   UTI (urinary tract infection)   Hyponatremia     Time reflects when diagnosis was documented in both MDM as applicable and the Disposition within this note     Time User Action Codes Description Comment    7/19/2022  7:51 PM Charm Puff Add [R53 1] Generalized weakness     7/19/2022  7:51 PM Drema PacerNew Add [N39 0] UTI (urinary tract infection)     7/19/2022  7:51 PM Charm Puff Modify [N39 0] UTI (urinary tract infection)     7/19/2022  7:51 PM Drema PacerNew Remove [R53 1] Generalized weakness     7/19/2022  9:02 PM Charm Puff Add [E87 1] Hyponatremia       ED Disposition     ED Disposition   Admit    Condition   Stable    Date/Time   Tue Jul 19, 2022  9:02 PM    Comment   Case was discussed with Brie Pathak and the patient's admission status was agreed to be Admission Status: inpatient status to the service of Dr Ghada Edwards    None         Patient's Medications   Discharge Prescriptions    No medications on file       No discharge procedures on file      PDMP Review       Value Time User    PDMP Reviewed  Yes 5/19/2022  5:07 PM Val Ca, [address]          ED Provider  Electronically Signed by           Supriya Reed DO  07/19/22 9565

## 2022-07-20 LAB
ALBUMIN SERPL BCP-MCNC: 3.1 G/DL (ref 3.5–5)
ALP SERPL-CCNC: 53 U/L (ref 46–116)
ALT SERPL W P-5'-P-CCNC: 11 U/L (ref 12–78)
ANION GAP SERPL CALCULATED.3IONS-SCNC: 4 MMOL/L (ref 4–13)
ANION GAP SERPL CALCULATED.3IONS-SCNC: 4 MMOL/L (ref 4–13)
ARTERIAL PATENCY WRIST A: NO
AST SERPL W P-5'-P-CCNC: 11 U/L (ref 5–45)
ATRIAL RATE: 79 BPM
BASE EX.OXY STD BLDV CALC-SCNC: 78.9 % (ref 60–80)
BASE EXCESS BLDV CALC-SCNC: 6 MMOL/L
BASOPHILS # BLD AUTO: 0.01 THOUSANDS/ΜL (ref 0–0.1)
BASOPHILS NFR BLD AUTO: 0 % (ref 0–1)
BILIRUB SERPL-MCNC: 0.28 MG/DL (ref 0.2–1)
BUN SERPL-MCNC: 11 MG/DL (ref 5–25)
BUN SERPL-MCNC: 12 MG/DL (ref 5–25)
CALCIUM ALBUM COR SERPL-MCNC: 10 MG/DL (ref 8.3–10.1)
CALCIUM SERPL-MCNC: 9.3 MG/DL (ref 8.3–10.1)
CALCIUM SERPL-MCNC: 9.3 MG/DL (ref 8.3–10.1)
CHLORIDE SERPL-SCNC: 94 MMOL/L (ref 96–108)
CHLORIDE SERPL-SCNC: 95 MMOL/L (ref 96–108)
CO2 SERPL-SCNC: 32 MMOL/L (ref 21–32)
CO2 SERPL-SCNC: 32 MMOL/L (ref 21–32)
CREAT SERPL-MCNC: 0.54 MG/DL (ref 0.6–1.3)
CREAT SERPL-MCNC: 0.58 MG/DL (ref 0.6–1.3)
EOSINOPHIL # BLD AUTO: 0.13 THOUSAND/ΜL (ref 0–0.61)
EOSINOPHIL NFR BLD AUTO: 3 % (ref 0–6)
ERYTHROCYTE [DISTWIDTH] IN BLOOD BY AUTOMATED COUNT: 15 % (ref 11.6–15.1)
GFR SERPL CREATININE-BSD FRML MDRD: 95 ML/MIN/1.73SQ M
GFR SERPL CREATININE-BSD FRML MDRD: 97 ML/MIN/1.73SQ M
GLUCOSE SERPL-MCNC: 133 MG/DL (ref 65–140)
GLUCOSE SERPL-MCNC: 173 MG/DL (ref 65–140)
GLUCOSE SERPL-MCNC: 244 MG/DL (ref 65–140)
GLUCOSE SERPL-MCNC: 71 MG/DL (ref 65–140)
GLUCOSE SERPL-MCNC: 82 MG/DL (ref 65–140)
GLUCOSE SERPL-MCNC: 82 MG/DL (ref 65–140)
HCO3 BLDV-SCNC: 31.2 MMOL/L (ref 24–30)
HCT VFR BLD AUTO: 32.5 % (ref 34.8–46.1)
HGB BLD-MCNC: 10.2 G/DL (ref 11.5–15.4)
IMM GRANULOCYTES # BLD AUTO: 0.03 THOUSAND/UL (ref 0–0.2)
IMM GRANULOCYTES NFR BLD AUTO: 1 % (ref 0–2)
LYMPHOCYTES # BLD AUTO: 1.22 THOUSANDS/ΜL (ref 0.6–4.47)
LYMPHOCYTES NFR BLD AUTO: 24 % (ref 14–44)
MCH RBC QN AUTO: 28.2 PG (ref 26.8–34.3)
MCHC RBC AUTO-ENTMCNC: 31.4 G/DL (ref 31.4–37.4)
MCV RBC AUTO: 90 FL (ref 82–98)
MONOCYTES # BLD AUTO: 0.38 THOUSAND/ΜL (ref 0.17–1.22)
MONOCYTES NFR BLD AUTO: 8 % (ref 4–12)
NEUTROPHILS # BLD AUTO: 3.23 THOUSANDS/ΜL (ref 1.85–7.62)
NEUTS SEG NFR BLD AUTO: 64 % (ref 43–75)
NRBC BLD AUTO-RTO: 0 /100 WBCS
O2 CT BLDV-SCNC: 12.8 ML/DL
P AXIS: 60 DEGREES
PCO2 BLDV: 47.6 MM HG (ref 42–50)
PH BLDV: 7.43 [PH] (ref 7.3–7.4)
PLATELET # BLD AUTO: 163 THOUSANDS/UL (ref 149–390)
PMV BLD AUTO: 9.5 FL (ref 8.9–12.7)
PO2 BLDV: 46.6 MM HG (ref 35–45)
POTASSIUM SERPL-SCNC: 3.8 MMOL/L (ref 3.5–5.3)
POTASSIUM SERPL-SCNC: 4 MMOL/L (ref 3.5–5.3)
PR INTERVAL: 160 MS
PROT SERPL-MCNC: 6 G/DL (ref 6.4–8.4)
QRS AXIS: 74 DEGREES
QRSD INTERVAL: 86 MS
QT INTERVAL: 388 MS
QTC INTERVAL: 444 MS
RBC # BLD AUTO: 3.62 MILLION/UL (ref 3.81–5.12)
SODIUM SERPL-SCNC: 130 MMOL/L (ref 135–147)
SODIUM SERPL-SCNC: 131 MMOL/L (ref 135–147)
T WAVE AXIS: 76 DEGREES
VENTRICULAR RATE: 79 BPM
WBC # BLD AUTO: 5 THOUSAND/UL (ref 4.31–10.16)

## 2022-07-20 PROCEDURE — 99233 SBSQ HOSP IP/OBS HIGH 50: CPT | Performed by: FAMILY MEDICINE

## 2022-07-20 PROCEDURE — 82948 REAGENT STRIP/BLOOD GLUCOSE: CPT

## 2022-07-20 PROCEDURE — 82805 BLOOD GASES W/O2 SATURATION: CPT

## 2022-07-20 PROCEDURE — 92610 EVALUATE SWALLOWING FUNCTION: CPT

## 2022-07-20 PROCEDURE — 85025 COMPLETE CBC W/AUTO DIFF WBC: CPT

## 2022-07-20 PROCEDURE — 93010 ELECTROCARDIOGRAM REPORT: CPT | Performed by: INTERNAL MEDICINE

## 2022-07-20 PROCEDURE — 80053 COMPREHEN METABOLIC PANEL: CPT

## 2022-07-20 RX ORDER — INSULIN LISPRO 100 [IU]/ML
1-6 INJECTION, SOLUTION INTRAVENOUS; SUBCUTANEOUS
Status: DISCONTINUED | OUTPATIENT
Start: 2022-07-20 | End: 2022-07-22 | Stop reason: HOSPADM

## 2022-07-20 RX ORDER — INSULIN LISPRO 100 [IU]/ML
2-12 INJECTION, SOLUTION INTRAVENOUS; SUBCUTANEOUS
Status: DISCONTINUED | OUTPATIENT
Start: 2022-07-20 | End: 2022-07-22 | Stop reason: HOSPADM

## 2022-07-20 RX ADMIN — DIVALPROEX SODIUM 250 MG: 250 TABLET, DELAYED RELEASE ORAL at 09:06

## 2022-07-20 RX ADMIN — SERTRALINE HYDROCHLORIDE 100 MG: 100 TABLET ORAL at 21:07

## 2022-07-20 RX ADMIN — STANDARDIZED SENNA CONCENTRATE 8.6 MG: 8.6 TABLET ORAL at 21:08

## 2022-07-20 RX ADMIN — OXYBUTYNIN CHLORIDE 10 MG: 5 TABLET, EXTENDED RELEASE ORAL at 09:07

## 2022-07-20 RX ADMIN — FERROUS SULFATE TAB 325 MG (65 MG ELEMENTAL FE) 325 MG: 325 (65 FE) TAB at 09:06

## 2022-07-20 RX ADMIN — INSULIN LISPRO 1 UNITS: 100 INJECTION, SOLUTION INTRAVENOUS; SUBCUTANEOUS at 21:23

## 2022-07-20 RX ADMIN — QUETIAPINE FUMARATE 200 MG: 200 TABLET, EXTENDED RELEASE ORAL at 21:07

## 2022-07-20 RX ADMIN — GABAPENTIN 800 MG: 400 CAPSULE ORAL at 18:00

## 2022-07-20 RX ADMIN — LEVOTHYROXINE SODIUM 50 MCG: 50 TABLET ORAL at 05:14

## 2022-07-20 RX ADMIN — ATORVASTATIN CALCIUM 80 MG: 40 TABLET, FILM COATED ORAL at 18:00

## 2022-07-20 RX ADMIN — DOCUSATE SODIUM 100 MG: 100 CAPSULE ORAL at 09:06

## 2022-07-20 RX ADMIN — ASPIRIN 81 MG: 81 TABLET, COATED ORAL at 09:06

## 2022-07-20 RX ADMIN — PANTOPRAZOLE SODIUM 40 MG: 40 TABLET, DELAYED RELEASE ORAL at 05:14

## 2022-07-20 RX ADMIN — HEPARIN SODIUM 5000 UNITS: 5000 INJECTION INTRAVENOUS; SUBCUTANEOUS at 21:08

## 2022-07-20 RX ADMIN — HEPARIN SODIUM 5000 UNITS: 5000 INJECTION INTRAVENOUS; SUBCUTANEOUS at 05:14

## 2022-07-20 RX ADMIN — DOCUSATE SODIUM 100 MG: 100 CAPSULE ORAL at 18:00

## 2022-07-20 RX ADMIN — GABAPENTIN 800 MG: 400 CAPSULE ORAL at 09:06

## 2022-07-20 RX ADMIN — INSULIN LISPRO 2 UNITS: 100 INJECTION, SOLUTION INTRAVENOUS; SUBCUTANEOUS at 12:10

## 2022-07-20 RX ADMIN — DIVALPROEX SODIUM 500 MG: 500 TABLET, DELAYED RELEASE ORAL at 18:00

## 2022-07-20 RX ADMIN — HEPARIN SODIUM 5000 UNITS: 5000 INJECTION INTRAVENOUS; SUBCUTANEOUS at 14:33

## 2022-07-20 RX ADMIN — QUETIAPINE 100 MG: 100 TABLET, FILM COATED ORAL at 09:08

## 2022-07-20 RX ADMIN — TORSEMIDE 10 MG: 20 TABLET ORAL at 09:07

## 2022-07-20 RX ADMIN — CEFTRIAXONE 1000 MG: 1 INJECTION, SOLUTION INTRAVENOUS at 18:20

## 2022-07-20 RX ADMIN — GABAPENTIN 800 MG: 400 CAPSULE ORAL at 21:07

## 2022-07-20 NOTE — ASSESSMENT & PLAN NOTE
· Na 129 when corrected for hyperglycemia   · S/p 500 cc NS bolus in ED, will hold off on further IVF at this time  · Repeat BMP in 4hrs

## 2022-07-20 NOTE — ASSESSMENT & PLAN NOTE
· Presents from group home, reports of increased lethargy, generalized weakness and urinary frequency  Nonverbal at baseline  Found to have acute cystitis and hyponatremia on arrival    · Could be multifactorial  · CTH negative for acute process  · Blood sugar 177 on arrival   · Patient is alert, observed moving all extremities  Non verbal baseline  · Continue antibiotics for acute cystitis and correct electrolyte abnormalities - assess for improvement  · CT scan of head shows:Stable ventriculomegaly out of proportion degree of volume loss  Correlate for NPH

## 2022-07-20 NOTE — ASSESSMENT & PLAN NOTE
Lab Results   Component Value Date    HGBA1C 7 6 (H) 04/08/2022       Recent Labs     07/19/22  2223   POCGLU 114       Blood Sugar Average: Last 72 hrs:  (P) 114   · Hold oral diabetic medications  · Add SSI insulin

## 2022-07-20 NOTE — PLAN OF CARE
Problem: GENITOURINARY - ADULT  Goal: Maintains or returns to baseline urinary function  Description: INTERVENTIONS:  - Assess urinary function               bladder scan/urine retention protocol  - Encourage oral fluids to ensure adequate hydration if ordered  - Administer IV fluids as ordered to ensure adequate hydration  - Administer ordered medications as needed  - Offer frequent toileting  - Follow urinary retention protocol if ordered  Outcome: Progressing

## 2022-07-20 NOTE — H&P
[address] 1955, 74 y o  female MRN: [id number]  Unit/Bed#: -01 Encounter: [id number]  Primary Care Provider: Tim Quick MD   Date and time admitted to hospital: 7/19/2022  6:30 PM    * Acute metabolic encephalopathy  Assessment & Plan  · Presents from group home, reports of increased lethargy, generalized weakness and urinary frequency  Nonverbal at baseline  Found to have acute cystitis and hyponatremia on arrival    · CTH negative for acute process  · Patient is alert, observed moving all extremities  Non verbal baseline  · Continue antibiotics for acute cystitis and correct electrolyte abnormalities - assess for improvement  Cystitis without hematuria  Assessment & Plan  · UA showing large amount of leukocytes, elevated WBCs  · Previous urine cultures have grown E coli  · Does not meet sepsis protocol  · F/U urine culture, Continue Rocephin   · Urine retention protocol     Hyponatremia  Assessment & Plan  · Na 129 when corrected for hyperglycemia   · S/p 500 cc NS bolus in ED, will hold off on further IVF at this time  · Repeat BMP in 4hrs    Type 2 diabetes mellitus without complication, without long-term current use of insulin [facility])  Assessment & Plan  Lab Results   Component Value Date    HGBA1C 7 6 (H) 04/08/2022       Recent Labs     07/19/22  2223   POCGLU 114       Blood Sugar Average: Last 72 hrs:  (P) 114   · Hold oral diabetic medications  · Add SSI insulin     Intellectual disability  Assessment & Plan  · Baseline nonverbal , from group home    Seizure disorder [facility])  Assessment & Plan  · No reports of seizure like activity   · Continue Depakote   · Check valproic acid level     Mood disorder (HCC)  Assessment & Plan  · Continue home medications, QTc 444       VTE Pharmacologic Prophylaxis: VTE Score: 4 Moderate Risk (Score 3-4) - Pharmacological DVT Prophylaxis Ordered: heparin    Code Status: Level 1 - Full Code   Discussion with family: Updated  (caregiver ) at bedside  Anticipated Length of Stay: Patient will be admitted on an inpatient basis with an anticipated length of stay of greater than 2 midnights secondary to acute cystitis, hyponatremia- iv abx, freq labs, urine culture  Total Time for Visit, including Counseling / Coordination of Care: 70 minutes Greater than 50% of this total time spent on direct patient counseling and coordination of care  Chief Complaint: altered mental status, lethargy    History of Present Illness:  Verenice Clayton is a 74 y o  female with a PMH of mood disorder, intellectual disability, seizure disorder, type 2 diabetes who presents from group with reports altered mental status , lethargy, and urinary frequency  Patient is nonverbal, information from caregiver at group home--reports patient has been sleeping more , too weak to ambulate , and noted to have increased urinary frequency  Reports blood sugars have been fluctuating  Patient was seen in the ED two times recently  Review of Systems:  Review of Systems   Unable to perform ROS: Patient nonverbal   Constitutional:        Patient resting in bed, no acute distress  Alert  Past Medical and Surgical History:   Past Medical History:   Diagnosis Date    Anxiety     [facility] mellitus [facility] [address] [facility]     GERD (gastroesophageal reflux disease)     Hyperlipidemia     Hypertension     Mental disability     Mixed incontinence 4/12/2017    Seizure disorder ([facility] [address] )        History reviewed  No pertinent surgical history  Meds/Allergies:  Prior to Admission medications    Medication Sig Start Date End Date Taking?  Authorizing Provider   nystatin (MYCOSTATIN) powder Apply topically in the morning   Yes Historical Provider, MD   aspirin (ECOTRIN LOW STRENGTH) 81 mg EC tablet Take 81 mg by mouth daily    Historical Provider, MD Wellington Yanez 100 units/mL injection pen  3/24/22   Historical Provider, MD   bisacodyl (DULCOLAX) 5 mg EC tablet Take 5 mg by mouth daily as needed for constipation    Historical Provider, MD   Cranberry 450 MG CAPS Take by mouth    Historical Provider, MD   [address] Use as directed  5/12/22   Historical Provider, MD   divalproex sodium (DEPAKOTE) 250 mg EC tablet Take 250 mg by mouth in the morning 1 tab AM (8am)    Historical Provider, MD   divalproex sodium (DEPAKOTE) 500 mg EC tablet Take 500 mg by mouth daily after dinner    Historical Provider, MD   docusate sodium (COLACE) 100 mg capsule Take 100 mg by mouth in the morning and 100 mg in the evening  Historical Provider, MD   Ergocalciferol (VITAMIN D2 PO) Take 1 25 mg by mouth once a week  Patient not taking: Reported on 7/19/2022    Historical Provider, MD   ergocalciferol (VITAMIN D2) 50,000 units  3/10/22   Historical Provider, MD   ferrous gluconate (FERGON) 324 mg tablet Take 324 mg by mouth daily with breakfast  Patient not taking: Reported on 7/19/2022    Historical Provider, MD   ferrous sulfate 325 (65 Fe) mg tablet Take 325 mg by mouth daily 5/12/22   Historical Provider, MD   gabapentin (NEURONTIN) 800 mg tablet Take 800 mg by mouth 3 (three) times a day 5/12/22   Historical Provider, MD   glipiZIDE-metFORMIN (METAGLIP) 2 5-250 MG per tablet Take 1 tablet by mouth in the morning and 1 tablet in the evening  Take before meals  Historical Provider, MD   glucose blood test strip TEST BLOOD SUGAR TWO TIMES DAILY  DX E11 9 5/12/22   Historical Provider, MD   Incontinence Supply Disposable (FQ Pant Liner) MISC  5/14/22   Historical Provider, MD   Lancets 33G MISC daily 5/12/22   Historical Provider, MD   levothyroxine 50 mcg tablet Take 1 tablet (50 mcg total) by mouth daily in the early morning 5/21/22   Josafat Main MD   LORazepam (ATIVAN) 0 5 mg tablet Take 0 5 mg by mouth in the morning and 0 5 mg in the evening      Historical Provider, MD   methenamine hippurate (HIPREX) 1 g tablet Take 1 tablet (1 g total) by mouth in the morning and 1 tablet (1 g total) in the evening  Take with meals  Restart that after finishing with keflex  5/21/22   Liliana Lee MD   omeprazole (PriLOSEC) 20 mg delayed release capsule Take 20 mg by mouth daily 6/22/21 6/22/22  Historical Provider, MD   oxybutynin (DITROPAN-XL) 10 MG 24 hr tablet  5/8/22   Historical Provider, MD   QUEtiapine (SEROquel XR) 200 mg 24 hr tablet Take 1 tablet (200 mg total) by mouth daily at bedtime  Patient taking differently: Take 200 mg by mouth daily at bedtime 1 tab at 8pm 8/11/21   Liliana Lee MD   QUEtiapine (SEROquel) 100 mg tablet Take 100 mg by mouth in the morning and 100 mg in the evening  8am, 4pm     Historical Provider, MD   rosuvastatin (CRESTOR) 40 MG tablet Take 40 mg by mouth daily    Historical Provider, MD   senna (SENOKOT) 8 6 mg Take 1 tablet (8 6 mg total) by mouth daily at bedtime 8/11/21   Liliana Lee MD   sertraline (ZOLOFT) 100 mg tablet Take 1 tablet (100 mg total) by mouth daily at bedtime  Patient taking differently: Take 100 mg by mouth 2 (two) times a day BID 8/11/21   Liliana Lee MD   tamsulosin [facility]) 0 4 mg Take 1 capsule (0 4 mg total) by mouth daily with dinner 5/21/22   Liliana Lee MD   tolterodine (DETROL LA) 4 mg 24 hr capsule Take 2 mg by mouth in the morning and 2 mg before bedtime  Historical Provider, MD   torsemide (DEMADEX) 10 mg tablet Take 1 tablet (10 mg total) by mouth daily 8/12/21   Liliana Lee MD     I have reveiwed home medications using records provided by [facility]  Allergies:    Allergies   Allergen Reactions    Actos [Pioglitazone] Other (See Comments)     unkown        Social History:  Marital Status: Single   Patient Pre-hospital Living Situation: [facility]  Patient Pre-hospital Level of Mobility: manual wheelchair per caregiver normally able to pivot to wheelchair with assistance  Patient Pre-hospital Diet Restrictions: none  Substance Use History:   Social History     Substance and Sexual Activity   Alcohol Use Not Currently     Social History     Tobacco Use   Smoking Status Never Smoker   Smokeless Tobacco Never Used     Social History     Substance and Sexual Activity   Drug Use Not Currently       Family History:  Family History   Problem Relation Age of Onset    No Known Problems Mother     No Known Problems Father     No Known Problems Sister     No Known Problems Maternal Grandmother     No Known Problems Maternal Grandfather     No Known Problems Paternal Grandmother     No Known Problems Paternal Grandfather        Physical Exam:     Vitals:   Blood Pressure: 147/81 (07/19/22 2155)  Pulse: 76 (07/19/22 2115)  Temperature: 98 °F (36 7 °C) (07/19/22 2155)  Respirations: 18 (07/19/22 2155)  Weight - Scale: 76 1 kg (167 lb 12 3 oz) (07/19/22 1835)  SpO2: 96 % (07/19/22 1930)    Physical Exam  Vitals and nursing note reviewed  Constitutional:       General: She is not in acute distress  Appearance: Normal appearance  She is not ill-appearing, toxic-appearing or diaphoretic  HENT:      Head: Normocephalic  Cardiovascular:      Rate and Rhythm: Normal rate and regular rhythm  Pulses: Normal pulses  Heart sounds: Normal heart sounds  Pulmonary:      Effort: Pulmonary effort is normal  No respiratory distress  Breath sounds: Normal breath sounds  No wheezing, rhonchi or rales  Comments: Diminished , room air  Abdominal:      General: Bowel sounds are normal  There is no distension  Palpations: Abdomen is soft  Tenderness: There is no abdominal tenderness  There is no guarding  Musculoskeletal:         General: Normal range of motion  Cervical back: Normal range of motion  Right lower leg: No edema  Left lower leg: No edema  Skin:     General: Skin is warm and dry  Neurological:      General: No focal deficit present  Mental Status: She is alert        Comments: Nonverbal  Alert , observed moving upper and lower extremities   +withdraw to pain in all 4 extremities  Additional Data:     Lab Results:  Results from last 7 days   Lab Units 07/19/22  1901   WBC Thousand/uL 5 50   HEMOGLOBIN g/dL 12 1   HEMATOCRIT % 37 5   PLATELETS Thousands/uL 194   NEUTROS PCT % 74   LYMPHS PCT % 18   MONOS PCT % 5   EOS PCT % 2     Results from last 7 days   Lab Units 07/19/22  1901   SODIUM mmol/L 128*   POTASSIUM mmol/L 4 3   CHLORIDE mmol/L 89*   CO2 mmol/L 32   BUN mg/dL 13   CREATININE mg/dL 0 69   ANION GAP mmol/L 7   CALCIUM mg/dL 9 9   ALBUMIN g/dL 3 8   TOTAL BILIRUBIN mg/dL 0 47   ALK PHOS U/L 66   ALT U/L 21   AST U/L 16   GLUCOSE RANDOM mg/dL 177*         Results from last 7 days   Lab Units 07/19/22  2223 07/14/22  1940   POC GLUCOSE mg/dl 114 141*         Results from last 7 days   Lab Units 07/19/22  1901 07/14/22  2241 07/14/22 2005   LACTIC ACID mmol/L 2 4* 1 7 2 9*       Imaging: Reviewed radiology reports from this admission including: CT head  CT head without contrast   Final Result by Rosana Keller MD (07/19 2048)      No acute intracranial pathology  Stable ventriculomegaly out of proportion degree of volume loss  Correlate for NPH  Workstation performed: [id number]         XR chest 1 view portable    (Results Pending)       EKG and Other Studies Reviewed on Admission:   · EKG: NSR  HR 70     ** Please Note: This note has been constructed using a voice recognition system   **

## 2022-07-20 NOTE — ASSESSMENT & PLAN NOTE
· Presents from group home, reports of increased lethargy, generalized weakness and urinary frequency  Nonverbal at baseline  Found to have acute cystitis and hyponatremia on arrival    · CTH negative for acute process  · Blood sugar 177 on arrival   · Patient is alert, observed moving all extremities  Non verbal baseline  · Continue antibiotics for acute cystitis and correct electrolyte abnormalities - assess for improvement

## 2022-07-20 NOTE — SPEECH THERAPY NOTE
[facility]  Speech-Language Pathology Bedside Swallow Evaluation      Patient Name: Pippa Gray    NWZBI'Y Date: 7/20/2022     Problem List  Principal Problem:    Acute metabolic encephalopathy  Active Problems:    Mood disorder ([facility] [address] )    Type [facility] without complication[facility]    Seizure disorder ([facility] [address] )    Intellectual disability    Cystitis without hematuria    Hyponatremia      Past Medical History  Past Medical History:   Diagnosis Date    Anxiety     [facility]     GERD (gastroesophageal reflux disease)     Hyperlipidemia     Hypertension     Mental disability     Mixed incontinence 4/12/2017    Seizure disorder [facility])        Past Surgical History  History reviewed  No pertinent surgical history  Summary   Pt presents with prolonged oral manipulation and poor bolus formation with mech soft solids  No overt s/s aspiration w/ solids or liquids during bedside assessment  Spoke w/ supervisor at pt's group home who reported pt currently on a puree diet w/ thin liquids but that pt was having coughing spells with thin liquids  She reports pt previously on NTL during SNF stay in November but was discharged on thin liquids  VBS completed in Sept of 2021 recommended puree/nectar thick due to transient penetration of thins and inability to rule out aspiration d/t pt positioning  Pt nonverbal and unable to follow commands during assessment but did feed self w/ supervision at a rapid rate  Pt may benefit from thicker liquids if rapid rate of thins is an issue but not noted during this session  Recommend to downgrade to puree diet, continue thin liquids at this time d/t no overt s/s aspiration noted during bedside assessment   SLP will continue to follow    Risk/s for Aspiration: Moderate     Recommended Diet: puree/level 1 diet and thin liquids   Recommended Form of Meds: crushed with puree   Aspiration precautions and swallowing strategies: upright posture, only feed when fully alert, slow rate of feeding and small bites/sips  Other Recommendations: Continue frequent oral care        Current Medical Status  Ashli Washington is a 74 y o  female with a PMH of mood disorder, intellectual disability, seizure disorder, type 2 diabetes who presents from group with reports altered mental status , lethargy, and urinary frequency  Patient is nonverbal, information from caregiver at group home--reports patient has been sleeping more , too weak to ambulate , and noted to have increased urinary frequency  Reports blood sugars have been fluctuating  Patient was seen in the ED two times recently  Current Precautions:  Fall  Aspiration    Allergies:  No known food allergies    Past medical history:  Please see H&P for details    Special Studies:  CT Head:  No acute intracranial pathology  Stable ventriculomegaly out of proportion degree of volume loss  Correlate for NPH     CXR:  1  Right basilar opacification likely due to atelectasis versus pneumonia  2   Lucency inferior to the right hemidiaphragm likely due to a loop of subdiaphragmatic intestine  However, pneumoperitoneum cannot be excluded  Recommend appropriate clinical correlation and additional imaging with upright/decubitus plain film and/or   CT  VBS 9/2021:Pt presents with mild-moderate oropharyngeal dysphagia characterized by reduced mastication and rapid transfer, premature spillage with mild swallow delay and mildly reduced hyolaryngeal elevation  View of upper airway was limited due to pt's high shoulder placement, however airway closure/protection appeared only mildly reduced  Transient penetration noted with thin liquids and NTLs, unable to r/o trace aspiration  Aspiration did not appear to occur however NTL may still be safer to minimize risk  Note: Images are available for review in PACS as desired       Recommendations:   Recommended Diet:  puree/level 1 diet and nectar thick liquids   Recommended Form of Medications: crushed with puree   Aspiration precautions and compensatory swallowing strategies: upright posture, only feed when fully alert, slow rate of feeding, small bites/sips and alternating bites and sips  SLP Dysphagia therapy recommended: brief f/u    Social/Education/Vocational Hx:  Pt lives in group home    Swallow Information   Current Risks for Dysphagia & Aspiration: known history of dysphagia  Current Symptoms/Concerns: coughing with po  Current Diet: mechanically altered/level 2 diet and thin liquids   Baseline Diet: puree/level 1 diet and thin liquids      Baseline Assessment   Behavior/Cognition: alert and decreased attention  Speech/Language Status: not able to to follow commands and no verbal output noted  Patient Positioning: upright in bed  Pain Status/Interventions/Response to Interventions:   No report of or nonverbal indications of pain  Swallow Mechanism Exam  Facial: symmetrical  Labial: unable to test 2/2 limited command following  Lingual: unable to test 2/2 limited command following  Velum: unable to visualize  Mandible: unable to test 2/2 limited command following  Dentition: edentulous  Vocal quality:pt nonverbal   Volitional Cough: unable to initiate volitional cough   Respiratory Status: on RA 94% SPO2      Consistencies Assessed and Performance   Consistencies Administered: thin liquids, nectar thick, puree and mechanical soft solids    Oral Stage: mild-moderate  Prolonged mastication and poor bolus formation w/ mech soft carrots  WFL for puree  Rapid rate of intake noted    Pharyngeal Stage: suspected  Swallow Mechanics:  Swallowing initiation appeared mildly delayed  Laryngeal rise was palpated and judged to be within sluggish  No coughing, throat clearing, change in vocal quality or respiratory status noted today   No discernable difference between thin and nectar thick liquids at bedside    Esophageal Concerns: none reported      Summary and Recommendations (see above)    Results Reviewed with: patient, RN and MD     Treatment Recommended: Dysphagia therapy     Frequency of treatment: F/U 1-2x    Patient Stated Goal:    Dysphagia LTG  -Patient will demonstrate safe and effective oral intake (without overt s/s significant oral/pharyngeal dysphagia including s/s penetration or aspiration) for the highest appropriate diet level       Short Term Goals:  -Pt will tolerate Dysphagia 1/pureed diet and honey thick nectar thick thin liquid with no significant s/s oral or pharyngeal dysphagia across 1-3 diagnostic session/s      Speech Therapy Prognosis   Prognosis: fair    Prognosis Considerations: cognitive status and respiratory status     Violet Santiago 85 CCC-SLP  7/20/2022

## 2022-07-20 NOTE — ASSESSMENT & PLAN NOTE
· UA showing large amount of leukocytes, elevated WBCs  · Previous urine cultures have grown E coli  · Does not meet sepsis protocol    · F/U urine culture, Continue Rocephin   · Urine retention protocol

## 2022-07-20 NOTE — ASSESSMENT & PLAN NOTE
· Na 129 when corrected for hyperglycemia   · S/p 500 cc NS bolus in ED, will hold off on further IVF at this time  · Sodium level is improving, continue to monitor

## 2022-07-20 NOTE — PROGRESS NOTES
[address]  Progress Note - Can Tomas 1955, 74 y o  female MRN: [id number]  Unit/Bed#: -01 Encounter: [id number]  Primary Care Provider: Prabhakar Veras MD   Date and time admitted to hospital: 7/19/2022  6:30 PM    Hyponatremia  Assessment & Plan  · Na 129 when corrected for hyperglycemia   · S/p 500 cc NS bolus in ED, will hold off on further IVF at this time  · Sodium level is improving, continue to monitor    * Acute metabolic encephalopathy  Assessment & Plan  · Presents from group home, reports of increased lethargy, generalized weakness and urinary frequency  Nonverbal at baseline  Found to have acute cystitis and hyponatremia on arrival    · Could be multifactorial  · CTH negative for acute process  · Blood sugar 177 on arrival   · Patient is alert, observed moving all extremities  Non verbal baseline  · Continue antibiotics for acute cystitis and correct electrolyte abnormalities - assess for improvement  · CT scan of head shows:Stable ventriculomegaly out of proportion degree of volume loss  Correlate for NPH  Cystitis without hematuria  Assessment & Plan  · UA showing large amount of leukocytes, elevated WBCs  · Previous urine cultures have grown E coli  · Does not meet sepsis protocol  · F/U urine culture, Continue Rocephin   · Urine retention protocol     Intellectual disability  Assessment & Plan  · Baseline nonverbal-can transfer herself, can feed herself , from group home  · CT scan of head shows:Stable ventriculomegaly out of proportion degree of volume loss  Correlate for NPH  Seizure disorder ([facility] [address] )  Assessment & Plan  · No reports of seizure like activity   · Continue Depakote   · Check valproic acid level   · CT scan of head:Stable ventriculomegaly out of proportion degree of volume loss  Correlate for NPH       Type 2 diabetes mellitus without complication, without long-term current use of insulin [facility])  Assessment & Plan  Lab Results Component Value Date    HGBA1C 7 6 (H) 2022       Recent Labs     22  2223 22  0730 22  1058   POCGLU 114 71 244*       Blood Sugar Average: Last 72 hrs:  (P) 143   · Hold oral diabetic medications  · Add SSI insulin -insulin dose adjusted  · Follow hypoglycemia precaution    Mood disorder (HCC)  Assessment & Plan  · Continue home medications, QTc 444           VTE Pharmacologic Prophylaxis: VTE Score: 4 Moderate Risk (Score 3-4) - Pharmacological DVT Prophylaxis Ordered: heparin  Patient Centered Rounds: I performed bedside rounds with nursing staff today  Discussions with Specialists or Other Care Team Provider:  None    Education and Discussions with Family / Patient: Discussed with group home director  Time Spent for Care: 20 minutes  More than 50% of total time spent on counseling and coordination of care as described above  Current Length of Stay: 1 day(s)  Current Patient Status: Inpatient   Certification Statement: The patient will continue to require additional inpatient hospital stay due to To monitor above condition  Discharge Plan: Anticipate discharge in 48-72 hrs to group home  Code Status: Level 1 - Full Code    Subjective:   Seen and evaluated during the round  Patient is nonverbal   But patient is feeding herself, alert and awake  Objective:     Vitals:   Temp (24hrs), Av 7 °F (36 5 °C), Min:97 1 °F (36 2 °C), Max:98 °F (36 7 °C)    Temp:  [97 1 °F (36 2 °C)-98 °F (36 7 °C)] 97 8 °F (36 6 °C)  HR:  [62-84] 81  Resp:  [18-21] 18  BP: (114-152)/() 123/63  SpO2:  [93 %-97 %] 93 %  Body mass index is 33 63 kg/m²  Input and Output Summary (last 24 hours): Intake/Output Summary (Last 24 hours) at 2022 1523  Last data filed at 2022 1240  Gross per 24 hour   Intake 1240 ml   Output 2850 ml   Net -1610 ml       Physical Exam:   Physical Exam  Vitals and nursing note reviewed  Exam conducted with a chaperone present     Constitutional: Appearance: She is not ill-appearing or diaphoretic  HENT:      Head: Normocephalic  Nose: Nose normal  No congestion or rhinorrhea  Mouth/Throat:      Mouth: Mucous membranes are moist       Pharynx: Oropharynx is clear  No oropharyngeal exudate or posterior oropharyngeal erythema  Eyes:      General: No scleral icterus  Left eye: No discharge  Extraocular Movements: Extraocular movements intact  Conjunctiva/sclera: Conjunctivae normal       Pupils: Pupils are equal, round, and reactive to light  Cardiovascular:      Rate and Rhythm: Normal rate  Heart sounds: Normal heart sounds  No murmur heard  No friction rub  No gallop  Pulmonary:      Effort: Pulmonary effort is normal  No respiratory distress  Breath sounds: No stridor  No wheezing or rhonchi  Abdominal:      General: Abdomen is flat  Bowel sounds are normal  There is no distension  Palpations: There is no mass  Tenderness: There is no abdominal tenderness  Hernia: No hernia is present  Musculoskeletal:      Cervical back: Normal range of motion and neck supple  Right lower leg: No edema  Left lower leg: No edema  Neurological:      Mental Status: She is alert  Mental status is at baseline        Comments: Nonverbal   Psychiatric:         Mood and Affect: Mood normal          Additional Data:     Labs:  Results from last 7 days   Lab Units 07/20/22  0502   WBC Thousand/uL 5 00   HEMOGLOBIN g/dL 10 2*   HEMATOCRIT % 32 5*   PLATELETS Thousands/uL 163   NEUTROS PCT % 64   LYMPHS PCT % 24   MONOS PCT % 8   EOS PCT % 3     Results from last 7 days   Lab Units 07/20/22  0502   SODIUM mmol/L 131*   POTASSIUM mmol/L 4 0   CHLORIDE mmol/L 95*   CO2 mmol/L 32   BUN mg/dL 11   CREATININE mg/dL 0 54*   ANION GAP mmol/L 4   CALCIUM mg/dL 9 3   ALBUMIN g/dL 3 1*   TOTAL BILIRUBIN mg/dL 0 28   ALK PHOS U/L 53   ALT U/L 11*   AST U/L 11   GLUCOSE RANDOM mg/dL 82         Results from last 7 days Lab Units 07/20/22  1058 07/20/22  0730 07/19/22  2223 07/14/22  1940   POC GLUCOSE mg/dl 244* 71 114 141*         Results from last 7 days   Lab Units 07/19/22  2300 07/19/22  1901 07/14/22  2241 07/14/22 2005   LACTIC ACID mmol/L 1 6 2 4* 1 7 2 9*   PROCALCITONIN ng/ml  --  <0 05  --   --        Lines/Drains:  Invasive Devices  Report    Peripheral Intravenous Line  Duration           Peripheral IV 07/19/22 Left Antecubital <1 day          Drain  Duration           External Urinary Catheter <1 day                      Imaging: Reviewed radiology reports from this admission including: CT head    Recent Cultures (last 7 days):         Last 24 Hours Medication List:   Current Facility-Administered Medications   Medication Dose Route Frequency Provider Last Rate    acetaminophen  650 mg Oral Q6H PRN Ev Pheasant, CRNP      aspirin  81 mg Oral Daily Ev Pheasant, CRNP      atorvastatin  80 mg Oral Daily With [place], CRNP      cefTRIAXone  1,000 mg Intravenous Q24H Ev Pheasant, CRNP      divalproex sodium  250 mg Oral Daily Ev Pheasant, CRNP      divalproex sodium  500 mg Oral After [place], CRNP      docusate sodium  100 mg Oral BID Ev Pheasant, CRNP      ferrous sulfate  325 mg Oral Daily Ev Pheasant, CRNP      gabapentin  800 mg Oral TID Ev Pheasant, CRNP      heparin (porcine)  5,000 Units Subcutaneous [facility] Ev Pheasant, CRNP      insulin lispro  1-6 Units Subcutaneous HS Bruce Garcia MD      insulin lispro  2-12 Units Subcutaneous TID [facility] Bruce Garcia MD      levothyroxine  50 mcg Oral Early Morning Ev Pheasant, CRNP      LORazepam  0 5 mg Oral BID PRN Ev Pheasant, CRNP      methenamine hippurate  1 g Oral BID Ev Pheasant, CRNP      oxybutynin  10 mg Oral Daily Ev Pheasant, CRNP      pantoprazole  40 mg Oral Early Morning Ev Pheasant, CRNP      QUEtiapine  200 mg Oral HS Ev Pheasant, CRNP      QUEtiapine  100 mg Oral Daily Ev Pheasant, CRNP      senna  8 6 mg Oral HS KRISTOPHER Thomason      sertraline  100 mg Oral HS KRISTOPHER Thomason      torsemide  10 mg Oral Daily KRISTOPHER Thomason          Today, Patient Was Seen By: Mauri Walter MD    **Please Note: This note may have been constructed using a voice recognition system  **

## 2022-07-20 NOTE — ASSESSMENT & PLAN NOTE
· No reports of seizure like activity   · Continue Depakote   · Check valproic acid level   · CT scan of head:Stable ventriculomegaly out of proportion degree of volume loss  Correlate for NPH

## 2022-07-20 NOTE — ASSESSMENT & PLAN NOTE
Lab Results   Component Value Date    HGBA1C 7 6 (H) 04/08/2022       Recent Labs     07/19/22  2223 07/20/22  0730 07/20/22  1058   POCGLU 114 71 244*       Blood Sugar Average: Last 72 hrs:  (P) 143   · Hold oral diabetic medications  · Add SSI insulin -insulin dose adjusted  · Follow hypoglycemia precaution

## 2022-07-20 NOTE — ASSESSMENT & PLAN NOTE
· Baseline nonverbal-can transfer herself, can feed herself , from group home  · CT scan of head shows:Stable ventriculomegaly out of proportion degree of volume loss  Correlate for NPH

## 2022-07-20 NOTE — PLAN OF CARE
Problem: Potential for Falls  Goal: Patient will remain free of falls  Description: INTERVENTIONS:  - Educate patient/family on patient safety including physical limitations  - Instruct patient to call for assistance with activity   - Consult OT/PT to assist with strengthening/mobility   - Keep Call bell within reach  - Keep bed low and locked with side rails adjusted as appropriate  - Keep care items and personal belongings within reach  - Initiate and maintain comfort rounds  - Make Fall Risk Sign visible to staff  - Offer Toileting every 2 Hours, in advance of need  - Initiate/Maintain bed and chair alarm  - Obtain necessary fall risk management equipment: sit to stand  - Apply yellow socks and bracelet for high fall risk patients  - Consider moving patient to room near nurses station  Outcome: Progressing     Problem: MOBILITY - ADULT  Goal: Maintain or return to baseline ADL function  Description: INTERVENTIONS:  -  Assess patient's ability to carry out ADLs; assess patient's baseline for ADL function and identify physical deficits which impact ability to perform ADLs (bathing, care of mouth/teeth, toileting, grooming, dressing, etc )  - Assess/evaluate cause of self-care deficits   - Assess range of motion  - Assess patient's mobility; develop plan if impaired  - Assess patient's need for assistive devices and provide as appropriate  - Encourage maximum independence but intervene and supervise when necessary  - Involve family in performance of ADLs  - Assess for home care needs following discharge   - Consider OT consult to assist with ADL evaluation and planning for discharge  - Provide patient education as appropriate  Outcome: Progressing  Goal: Maintains/Returns to pre admission functional level  Description: INTERVENTIONS:  - Perform BMAT or MOVE assessment daily    - Set and communicate daily mobility goal to care team and patient/family/caregiver     - Collaborate with rehabilitation services on mobility goals if consulted  - Perform Range of Motion 4 times a day  - Reposition patient every 2 hours    - Dangle patient 3 times a day  - Stand patient 3 times a day  - Ambulate patient 3 times a day  - Out of bed to chair 3 times a day   - Out of bed for meals 3 times a day  - Out of bed for toileting  - Record patient progress and toleration of activity level   Outcome: Progressing     Problem: Prexisting or High Potential for Compromised Skin Integrity  Goal: Skin integrity is maintained or improved  Description: INTERVENTIONS:  - Identify patients at risk for skin breakdown  - Assess and monitor skin integrity  - Assess and monitor nutrition and hydration status  - Monitor labs   - Assess for incontinence   - Turn and reposition patient  - Assist with mobility/ambulation  - Relieve pressure over bony prominences  - Avoid friction and shearing  - Provide appropriate hygiene as needed including keeping skin clean and dry  - Evaluate need for skin moisturizer/barrier cream  - Collaborate with interdisciplinary team   - Patient/family teaching  - Consider wound care consult   Outcome: Progressing     Problem: GENITOURINARY - ADULT  Goal: Maintains or returns to baseline urinary function  Description: INTERVENTIONS:  - Assess urinary function               bladder scan/urine retention protocol  - Encourage oral fluids to ensure adequate hydration if ordered  - Administer IV fluids as ordered to ensure adequate hydration  - Administer ordered medications as needed  - Offer frequent toileting  - Follow urinary retention protocol if ordered  Outcome: Progressing

## 2022-07-20 NOTE — PLAN OF CARE
Problem: SLP ADULT - SWALLOWING, IMPAIRED  Goal: Initial SLP swallow eval performed  Outcome: Completed  Note: Bedside swallow completed  Recommend downgrade to puree diet, continue thin liquids  Meds crushed in puree  SLP will follow for diet tolerance     Problem: SLP ADULT - SWALLOWING, IMPAIRED  Goal: Advance to least restrictive diet without signs or symptoms of aspiration for planned discharge setting  See evaluation for individualized goals  Description: Patient will:      Dysphagia LTG  -Patient will demonstrate safe and effective oral intake (without overt s/s significant oral/pharyngeal dysphagia including s/s penetration or aspiration) for the highest appropriate diet level       Short Term Goals:  -Pt will tolerate Dysphagia 1/pureed diet and honey thick nectar thick thin liquid with no significant s/s oral or pharyngeal dysphagia across 1-3 diagnostic session/s  Outcome: Progressing

## 2022-07-21 LAB
ALBUMIN SERPL BCP-MCNC: 3 G/DL (ref 3.5–5)
ALP SERPL-CCNC: 56 U/L (ref 46–116)
ALT SERPL W P-5'-P-CCNC: 10 U/L (ref 12–78)
ANION GAP SERPL CALCULATED.3IONS-SCNC: 3 MMOL/L (ref 4–13)
AST SERPL W P-5'-P-CCNC: 9 U/L (ref 5–45)
BASOPHILS # BLD AUTO: 0.02 THOUSANDS/ΜL (ref 0–0.1)
BASOPHILS NFR BLD AUTO: 0 % (ref 0–1)
BILIRUB SERPL-MCNC: 0.23 MG/DL (ref 0.2–1)
BUN SERPL-MCNC: 17 MG/DL (ref 5–25)
CALCIUM ALBUM COR SERPL-MCNC: 10.1 MG/DL (ref 8.3–10.1)
CALCIUM SERPL-MCNC: 9.3 MG/DL (ref 8.3–10.1)
CHLORIDE SERPL-SCNC: 94 MMOL/L (ref 96–108)
CO2 SERPL-SCNC: 34 MMOL/L (ref 21–32)
CREAT SERPL-MCNC: 0.64 MG/DL (ref 0.6–1.3)
EOSINOPHIL # BLD AUTO: 0.26 THOUSAND/ΜL (ref 0–0.61)
EOSINOPHIL NFR BLD AUTO: 6 % (ref 0–6)
ERYTHROCYTE [DISTWIDTH] IN BLOOD BY AUTOMATED COUNT: 15.2 % (ref 11.6–15.1)
GFR SERPL CREATININE-BSD FRML MDRD: 91 ML/MIN/1.73SQ M
GLUCOSE SERPL-MCNC: 103 MG/DL (ref 65–140)
GLUCOSE SERPL-MCNC: 154 MG/DL (ref 65–140)
GLUCOSE SERPL-MCNC: 178 MG/DL (ref 65–140)
GLUCOSE SERPL-MCNC: 210 MG/DL (ref 65–140)
GLUCOSE SERPL-MCNC: 92 MG/DL (ref 65–140)
HCT VFR BLD AUTO: 33.4 % (ref 34.8–46.1)
HGB BLD-MCNC: 10.6 G/DL (ref 11.5–15.4)
IMM GRANULOCYTES # BLD AUTO: 0.07 THOUSAND/UL (ref 0–0.2)
IMM GRANULOCYTES NFR BLD AUTO: 2 % (ref 0–2)
LYMPHOCYTES # BLD AUTO: 1.37 THOUSANDS/ΜL (ref 0.6–4.47)
LYMPHOCYTES NFR BLD AUTO: 29 % (ref 14–44)
MCH RBC QN AUTO: 28.9 PG (ref 26.8–34.3)
MCHC RBC AUTO-ENTMCNC: 31.7 G/DL (ref 31.4–37.4)
MCV RBC AUTO: 91 FL (ref 82–98)
MONOCYTES # BLD AUTO: 0.4 THOUSAND/ΜL (ref 0.17–1.22)
MONOCYTES NFR BLD AUTO: 8 % (ref 4–12)
NEUTROPHILS # BLD AUTO: 2.64 THOUSANDS/ΜL (ref 1.85–7.62)
NEUTS SEG NFR BLD AUTO: 55 % (ref 43–75)
NRBC BLD AUTO-RTO: 0 /100 WBCS
PLATELET # BLD AUTO: 182 THOUSANDS/UL (ref 149–390)
PMV BLD AUTO: 9.4 FL (ref 8.9–12.7)
POTASSIUM SERPL-SCNC: 4.1 MMOL/L (ref 3.5–5.3)
PROT SERPL-MCNC: 6.4 G/DL (ref 6.4–8.4)
RBC # BLD AUTO: 3.67 MILLION/UL (ref 3.81–5.12)
SODIUM SERPL-SCNC: 131 MMOL/L (ref 135–147)
WBC # BLD AUTO: 4.76 THOUSAND/UL (ref 4.31–10.16)

## 2022-07-21 PROCEDURE — 92526 ORAL FUNCTION THERAPY: CPT

## 2022-07-21 PROCEDURE — 80053 COMPREHEN METABOLIC PANEL: CPT | Performed by: FAMILY MEDICINE

## 2022-07-21 PROCEDURE — 99232 SBSQ HOSP IP/OBS MODERATE 35: CPT | Performed by: FAMILY MEDICINE

## 2022-07-21 PROCEDURE — 97167 OT EVAL HIGH COMPLEX 60 MIN: CPT

## 2022-07-21 PROCEDURE — 85025 COMPLETE CBC W/AUTO DIFF WBC: CPT | Performed by: FAMILY MEDICINE

## 2022-07-21 PROCEDURE — 82948 REAGENT STRIP/BLOOD GLUCOSE: CPT

## 2022-07-21 PROCEDURE — 97163 PT EVAL HIGH COMPLEX 45 MIN: CPT

## 2022-07-21 RX ADMIN — INSULIN LISPRO 2 UNITS: 100 INJECTION, SOLUTION INTRAVENOUS; SUBCUTANEOUS at 12:25

## 2022-07-21 RX ADMIN — SERTRALINE HYDROCHLORIDE 100 MG: 100 TABLET ORAL at 22:35

## 2022-07-21 RX ADMIN — INSULIN LISPRO 4 UNITS: 100 INJECTION, SOLUTION INTRAVENOUS; SUBCUTANEOUS at 17:00

## 2022-07-21 RX ADMIN — DIVALPROEX SODIUM 250 MG: 250 TABLET, DELAYED RELEASE ORAL at 08:01

## 2022-07-21 RX ADMIN — DOCUSATE SODIUM 100 MG: 100 CAPSULE ORAL at 17:07

## 2022-07-21 RX ADMIN — ASPIRIN 81 MG: 81 TABLET, COATED ORAL at 08:04

## 2022-07-21 RX ADMIN — TORSEMIDE 10 MG: 20 TABLET ORAL at 08:05

## 2022-07-21 RX ADMIN — DIVALPROEX SODIUM 500 MG: 500 TABLET, DELAYED RELEASE ORAL at 17:07

## 2022-07-21 RX ADMIN — GABAPENTIN 800 MG: 400 CAPSULE ORAL at 16:04

## 2022-07-21 RX ADMIN — PANTOPRAZOLE SODIUM 40 MG: 40 TABLET, DELAYED RELEASE ORAL at 05:05

## 2022-07-21 RX ADMIN — GABAPENTIN 800 MG: 400 CAPSULE ORAL at 08:00

## 2022-07-21 RX ADMIN — HEPARIN SODIUM 5000 UNITS: 5000 INJECTION INTRAVENOUS; SUBCUTANEOUS at 13:41

## 2022-07-21 RX ADMIN — HEPARIN SODIUM 5000 UNITS: 5000 INJECTION INTRAVENOUS; SUBCUTANEOUS at 05:05

## 2022-07-21 RX ADMIN — HEPARIN SODIUM 5000 UNITS: 5000 INJECTION INTRAVENOUS; SUBCUTANEOUS at 22:35

## 2022-07-21 RX ADMIN — DOCUSATE SODIUM 100 MG: 100 CAPSULE ORAL at 08:00

## 2022-07-21 RX ADMIN — QUETIAPINE 100 MG: 100 TABLET, FILM COATED ORAL at 08:04

## 2022-07-21 RX ADMIN — OXYBUTYNIN CHLORIDE 10 MG: 5 TABLET, EXTENDED RELEASE ORAL at 08:04

## 2022-07-21 RX ADMIN — FERROUS SULFATE TAB 325 MG (65 MG ELEMENTAL FE) 325 MG: 325 (65 FE) TAB at 08:04

## 2022-07-21 RX ADMIN — QUETIAPINE FUMARATE 200 MG: 200 TABLET, EXTENDED RELEASE ORAL at 22:35

## 2022-07-21 RX ADMIN — LEVOTHYROXINE SODIUM 50 MCG: 50 TABLET ORAL at 05:05

## 2022-07-21 RX ADMIN — INSULIN LISPRO 1 UNITS: 100 INJECTION, SOLUTION INTRAVENOUS; SUBCUTANEOUS at 22:35

## 2022-07-21 RX ADMIN — STANDARDIZED SENNA CONCENTRATE 8.6 MG: 8.6 TABLET ORAL at 22:35

## 2022-07-21 RX ADMIN — GABAPENTIN 800 MG: 400 CAPSULE ORAL at 22:35

## 2022-07-21 RX ADMIN — ATORVASTATIN CALCIUM 80 MG: 40 TABLET, FILM COATED ORAL at 16:04

## 2022-07-21 NOTE — CASE MANAGEMENT
Case Management Discharge Planning Note    Patient name Terri May  Location /-79 MRN [id number]  : 1955 Date 2022       Current Admission Date: 2022  Current Admission Diagnosis:Acute metabolic encephalopathy   Patient Active Problem List    Diagnosis Date Noted    Hyponatremia 2022    Open nondisplaced fracture of distal phalanx of left middle finger 2022    Avulsion of fingernail 2022    Acute urinary retention 2022    Lower extremity edema 2022    Acute metabolic encephalopathy     Cystitis without hematuria 2022    Dysphagia 2021    Chronic anemia 2021    Chronically elevated right hemidiaphragm 2021    Obesity (BMI 30 0-34 9) 2021    Seizure disorder ([facility] [address] ) 2021    Hyperlipidemia 2021    Acute respiratory failure with hypoxia ([facility] [address] ) 2021    Aspiration pneumonia ([facility] [address] ) 2021    [facility]    Type 2 diabetes mellitus without complication, without long-term current use of insulin ([facility] [address] ) 2020    Hypertension 2019    Gastroesophageal reflux disease without esophagitis 2018    Ambulatory dysfunction 2017    Intellectual disability 2017      LOS (days): 2  Geometric Mean LOS (GMLOS) (days): 3 80  Days to GMLOS:2     OBJECTIVE:  Risk of Unplanned Readmission Score: 34 49         Current admission status: Inpatient   Preferred Pharmacy:   [address] [place] [id number]  Phone: [phone] Fax: [phone] [address]  Phone: [phone] Fax: [phone]    Primary Care Provider: Napoleon Charles MD    Primary Insurance: MEDICARE  Secondary Insurance: [place] [employer]    DISCHARGE DETAILS:     CM received [facility]if Brown [facility], patient will be picked up tomorrow around 9:30 - 10:00

## 2022-07-21 NOTE — OCCUPATIONAL THERAPY NOTE
Occupational Therapy Evaluation     Patient Name: Robyn FRASERPL'L Date: 7/21/2022  Problem List  Principal Problem:    Acute metabolic encephalopathy  Active Problems:    Mood disorder ([facility] )    Type 2 diabetes mellitus without complication, without long-term current use of insulin ([facility] )    Seizure disorder ([facility] )    Intellectual disability    Cystitis without hematuria    Hyponatremia    Past Medical History  Past Medical History:   Diagnosis Date    Anxiety     Diabetes mellitus ([facility] )     GERD (gastroesophageal reflux disease)     Hyperlipidemia     Hypertension     Mental disability     Mixed incontinence 4/12/2017    Seizure disorder [facility])      Past Surgical History  History reviewed  No pertinent surgical history  07/21/22 5768   Note Type   Note type Evaluation   Restrictions/Precautions   Other Precautions Cognitive; Chair Alarm; Bed Alarm; Fall Risk   Pain Assessment   Pain Assessment Tool FLACC   Pain Rating: FLACC (Rest) - Face 0   Pain Rating: FLACC (Rest) - Legs 0   Pain Rating: FLACC (Rest) - Activity 0   Pain Rating: FLACC (Rest) - Cry 0   Pain Rating: FLACC (Rest) - Consolability 0   Score: FLACC (Rest) 0   Pain Rating: FLACC (Activity) - Face 0   Pain Rating: FLACC (Activity) - Legs 0   Pain Rating: FLACC (Activity) - Activity 0   Pain Rating: FLACC (Activity) - Cry 0   Pain Rating: FLACC (Activity) - Consolability 0   Score: FLACC (Activity) 0   Home Living   Type of Home Group Home   Additional Comments Pt poor historian, unable to obtain PLOF or home set-up  Pt is from a group home although it is unknown how Pt was transferring PTA   Prior Function   ADL Assistance Needs assistance   IADLs Needs assistance   ADL   Where Assessed Chair   Eating Assistance 5  Supervision/Setup   Eating Deficit Setup   Grooming Assistance 4  Minimal Assistance   Grooming Deficit Verbal cueing;Supervision/safety; Increased time to complete   UB Dressing Assistance 4  Minimal Assistance   UB Dressing Deficit Verbal cueing;Supervision/safety; Increased time to complete; Thread RUE; Thread LUE;Pull over head;Pull around back   LB Dressing Assistance 1  Total Assistance   LB Dressing Deficit Steadying; Requires assistive device for steadying;Verbal cueing;Supervision/safety; Increased time to complete; Don/doff R sock; Don/doff L sock; Thread RLE into pants; Thread LLE into pants;Pull up over hips   Additional Comments Pt completing ADL tasks while seated OOB in recliner chair d/t poot unsupported sitting balanace at EOB  UB Dressign and grooming tasks @ Min A for [employer] and thoroughness, vc'ing for sequencing  Total A for LB dressing for donning socks/pants around feet and for CM around waist   Bed Mobility   Supine to Sit 2  Maximal assistance   Additional items Assist x 1;HOB elevated; Bedrails; Increased time required;Verbal cues;LE management  (trunk management)   Additional Comments Pt completing supine>sit @ Max x's 1 with HOB elevatd and use of bedrails PRN  increased time and cues, physicall assistance for trunk and LE management   Transfers   Sit to Stand 2  Maximal assistance  (unable to clear buttocks from EOB)   Additional items Assist x 2; Increased time required;Verbal cues   Stand pivot Unable to assess   Sit pivot 2  Maximal assistance   Additional items Assist x 2; Increased time required;Verbal cues   Additional Comments Attempted to complete STS from EOB with use of RW however unable to clear buttocks despite Max x's 2  Thearpist then trailing HHA, able to achieve "just clearing" buttocks from EOB although unable to pivot  Pt requiring Max's 2 for lateral scoot to drop arm recliner with 4-5 scoots required     Balance   Static Sitting Poor   Dynamic Sitting Poor -   Activity Tolerance   Activity Tolerance Patient limited by fatigue   Medical Staff Made Aware Spoke with PT, Mari   Nurse Made Aware Spoke with RN   RUE Assessment   RUE Assessment WFL   LUE Assessment   LUE Assessment WFL   Hand Function   Gross Motor Coordination Functional   Fine Motor Coordination Functional   Sensation   Light Touch No apparent deficits   Cognition   Overall Cognitive Status Impaired   Arousal/Participation Alert; Responsive; Cooperative   Attention Attends with cues to redirect   Orientation Level Unable to assess   Memory Unable to assess   Following Commands Follows one step commands with increased time or repetition   Assessment   Limitation Decreased ADL status; Decreased UE strength;Decreased Safe judgement during ADL;Decreased endurance;Decreased cognition;Decreased self-care trans;Decreased high-level ADLs   Prognosis Good;Fair   Assessment Pt is a 74 y o  female, admitted to [address] [place] [address] 7/19/2022 d/t experiencing increased weakness and AMS  Dx: acute metabolic encephalopathy  Pt with PMHx impacting their performance during ADL tasks, including: anxiety, DM, GERD, hyperlipidemia, ID, mixed incontinence, seizure disorder  Prior to admission to the hospital Pt was performing ADLs with physical assistance  IADLs with physical assistance  Functional transfers/ambulation with physical assistance  Cognitive status was PTA was impaired  OT order placed to assess Pt's ADLs, cognitive status, and performance during functional tasks in order to maximize safety and independence while making most appropriate d/c recommendations   Pt's clinical presentation is currently unstable/unpredictable given new onset deficits that effect Pt's occupational performance and ability to safely return to OF including decrease activity tolerance, decrease standing balance, decrease sitting balance, decrease performance during ADL tasks, decrease cognition, decrease safety awareness , decrease BUE ROM, increased pain, decrease generalized strength, decrease activity engagement, decrease performance during functional transfers, frequent falls, high fall risk, limited insight to deficits and inability to express needs combined with medical complications of change in mental status, abnormal CBC, abnormal sodium values, abnormal CO2 values, decreased skin integrity, multiple readmissions, incontinence, fear/retreat and need for input for mobility technique/safety  Personal factors affecting Pt at time of initial evaluation include: anxiety, past experience, behavioral pattern, inability to perform current job functions, inability to perform IADLs, inability to perform ADLs, new need for AD, inability to ambulate household distances, inability to navigate community distances, limited insight into impairments, decreased initiation and engagement and recent fall(s)/fall history  Pt will benefit from continued skilled OT services to address deficits as defined above and to maximize level independence/participation during ADLs and functional tasks to facilitate return toward PLOF and improved quality of life  From an occupational therapy standpoint, recommendation at time of d/c would be post acute rehab  Plan   Treatment Interventions ADL retraining;Functional transfer training;UE strengthening/ROM; Endurance training;Patient/family training;Cognitive reorientation;Equipment evaluation/education; Neuromuscular reeducation; Compensatory technique education;Continued evaluation; Energy conservation; Activityengagement   Goal Expiration Date 08/04/22   OT Frequency 3-5x/wk   Recommendation   OT Discharge Recommendation (S)  Post acute rehabilitation services  (unless group home has necessary equiptment to appropriately care for Pt (STS lift/hoyerlift))   AM-PAC Daily Activity Inpatient   Lower Body Dressing 1   Bathing 1   Toileting 1   Upper Body Dressing 3   Grooming 3   Eating 4   Daily Activity Raw Score 13   Daily Activity Standardized Score (Calc for Raw Score >=11) 32 03   AM-PAC Applied Cognition Inpatient   Following a Speech/Presentation 2   Understanding Ordinary Conversation 3   Taking Medications 1   Remembering Where Things Are Placed or Put Away 3   Remembering List of 4-5 Errands 2   Taking Care of Complicated Tasks 1   Applied Cognition Raw Score 12   Applied Cognition Standardized Score 28 82     The patient's raw score on the AM-PAC Daily Activity inpatient short form is 13, standardized score is 32 03, less than 39 4  Patients at this level are likely to benefit from DC to post-acute rehabilitation services  Please refer to the recommendation of the Occupational Therapist for safe DC planning  Pt goals to be met by 8/4/2022    Pt will demonstrate ability to complete grooming/hygiene tasks @ S after set-up  Pt will demonstrate ability to complete supine<>sit @ S in order to increase safety and independence during ADL tasks  Pt will demonstrate ability to complete UB ADLs including washing/dressing @ S in order to increase performance and participation during meaningful tasks  Pt will demonstrate ability to complete toileting tasks including CM and pericare @ Min A in order to increase safety and independence during meaningful tasks  Pt will demonstrate ability to complete EOB, chair, toilet/commode transfers @ Min A in order to increase performance and participation during functional tasks  Pt will demonstrate ability to stand for 1-2 minutes while maintaining F balance with use of RW for UB support PRN  Pt will demonstrate ability to tolerate 30-35 minute OT session with no vc'ing for deep breathing or use of energy conservation techniques in order to increase activity tolerance during functional tasks  Pt will demonstrate Good carryover of use of energy conservation/compensatory strategies during ADLs and functional tasks in order to increase safety and reduce risk for falls  Pt will demonstrate Good attention and participation in continued evaluation of functional ambulation house hold distances in order to assist with safe d/c planning  Pt will attend to continued cognitive assessments 100% of the time in order to provide most appropriate d/c recommendations     Pt will identify 3 areas of interest/hobbies and 1 intervention on how to incorporate into daily life in order to increase interaction with environment and peers as well as increase participation in meaningful tasks  Pt will demonstrate 100% carryover of BUE HEP in order to increase BUE MS and increase performance during functional tasks upon d/c home      Stacy Lawrence OTR/L

## 2022-07-21 NOTE — PLAN OF CARE
Problem: PHYSICAL THERAPY ADULT  Goal: Performs mobility at highest level of function for planned discharge setting  See evaluation for individualized goals  Description: Treatment/Interventions: Functional transfer training, LE strengthening/ROM, Therapeutic exercise, Endurance training, Patient/family training, Equipment eval/education, Bed mobility, Compensatory technique education, Spoke to nursing, OT  Equipment Recommended:  (TBD by rehab)       See flowsheet documentation for full assessment, interventions and recommendations  Note: Prognosis: Fair  Problem List: Decreased strength, Decreased endurance, Impaired balance, Decreased mobility, Decreased cognition, Impaired judgement, Decreased safety awareness, Obesity  Assessment: Pt is a 74 y o  female seen for PT evaluation s/p admission to [address] [place] [address] on 7/19/2022 with Acute metabolic encephalopathy  Order placed for PT services  Upon evaluation: Pt is presenting with impaired functional mobility due to decreased strength, decreased endurance, impaired balance, impaired cognition, decreased safety awareness, impaired judgment, and fall risk requiring  max x 1 assistance for bed mobility and max x 2 assistance for transfers  Pt's clinical presentation is currently unstable/unpredictable given the functional mobility deficits above coupled with fall risks as indicated by AM-PAC 6-Clicks: 4/32 as well as impaired balance, polypharmacy, impaired judgement, decreased safety awareness, and decreased cognition and combined with medical complications of hypertension , abnormal H&H, abnormal sodium values, abnormal CO2 values, multiple readmissions, and need for input for mobility technique/safety  Pt's PMHx and comorbidities that may affect physical performance and progress include: DM, HTN, limited communication, limited cognition, and intellectual disability and seizure disorder    Personal factors affecting pt at time of IE include: anxiety, inaccessible home environment, inability to perform IADLs, inability to perform ADLs, inability to navigate level surfaces without external assistance, inability to ambulate household distances, inability to navigate community distances, limited insight into impairments, decreased initiation and engagement, and difficulty communicating  Pt will benefit from continued skilled PT services to address deficits as defined above and to maximize level of functional mobility to facilitate return toward PLOF and improved QOL  From PT/mobility standpoint, recommendation at time of d/c would be Short term rehab v HHPT pending group home's ability to care for pt at current functional levelpending progress in order to reduce fall risk and maximize pt's functional independence and consistency with mobility in order to facilitate return to PLOF  Recommend ther ex next 1-2 sessions and trial with quick move next 1-2 sessions  Barriers to Discharge: Inaccessible home environment, Decreased caregiver support  Barriers to Discharge Comments: Pt requires increased assistance for mobility  PT Discharge Recommendation: Post acute rehabilitation services    See flowsheet documentation for full assessment

## 2022-07-21 NOTE — PLAN OF CARE
Problem: Potential for Falls  Goal: Patient will remain free of falls  Description: INTERVENTIONS:  - Educate patient/family on patient safety including physical limitations  - Instruct patient to call for assistance with activity   - Consult OT/PT to assist with strengthening/mobility   - Keep Call bell within reach  - Keep bed low and locked with side rails adjusted as appropriate  - Keep care items and personal belongings within reach  - Initiate and maintain comfort rounds  - Make Fall Risk Sign visible to staff  - Apply yellow socks and bracelet for high fall risk patients  - Consider moving patient to room near nurses station  Outcome: Progressing     Problem: MOBILITY - ADULT  Goal: Maintain or return to baseline ADL function  Description: INTERVENTIONS:  -  Assess patient's ability to carry out ADLs; assess patient's baseline for ADL function and identify physical deficits which impact ability to perform ADLs (bathing, care of mouth/teeth, toileting, grooming, dressing, etc )  - Assess/evaluate cause of self-care deficits   - Assess range of motion  - Assess patient's mobility; develop plan if impaired  - Assess patient's need for assistive devices and provide as appropriate  - Encourage maximum independence but intervene and supervise when necessary  - Involve family in performance of ADLs  - Assess for home care needs following discharge   - Consider OT consult to assist with ADL evaluation and planning for discharge  - Provide patient education as appropriate  Outcome: Progressing  Goal: Maintains/Returns to pre admission functional level  Description: INTERVENTIONS:  - Perform BMAT or MOVE assessment daily    - Set and communicate daily mobility goal to care team and patient/family/caregiver     - Collaborate with rehabilitation services on mobility goals if consulted  - Out of bed for toileting  - Record patient progress and toleration of activity level   Outcome: Progressing     Problem: Prexisting or High Potential for Compromised Skin Integrity  Goal: Skin integrity is maintained or improved  Description: INTERVENTIONS:  - Identify patients at risk for skin breakdown  - Assess and monitor skin integrity  - Assess and monitor nutrition and hydration status  - Monitor labs   - Assess for incontinence   - Turn and reposition patient  - Assist with mobility/ambulation  - Relieve pressure over bony prominences  - Avoid friction and shearing  - Provide appropriate hygiene as needed including keeping skin clean and dry  - Evaluate need for skin moisturizer/barrier cream  - Collaborate with interdisciplinary team   - Patient/family teaching  - Consider wound care consult   Outcome: Progressing     Problem: GENITOURINARY - ADULT  Goal: Maintains or returns to baseline urinary function  Description: INTERVENTIONS:  - Assess urinary function               bladder scan/urine retention protocol  - Encourage oral fluids to ensure adequate hydration if ordered  - Administer IV fluids as ordered to ensure adequate hydration  - Administer ordered medications as needed  - Offer frequent toileting  - Follow urinary retention protocol if ordered  Outcome: Progressing

## 2022-07-21 NOTE — CASE MANAGEMENT
Case Management Assessment & Discharge Planning Note    Patient name Robyn Bowers  Location [address] [id number] MRN [id number]  : 1955 Date 2022       Current Admission Date: 2022  Current Admission Diagnosis:Acute metabolic encephalopathy   Patient Active Problem List    Diagnosis Date Noted    Hyponatremia 2022    Open nondisplaced fracture of distal phalanx of left middle finger 2022    Avulsion of fingernail 2022    Acute urinary retention 2022    Lower extremity edema 2022    Acute metabolic encephalopathy     Cystitis without hematuria 2022    Dysphagia 2021    Chronic anemia 2021    Chronically elevated right hemidiaphragm 2021    Obesity (BMI 30 0-34 9) 2021    Se[facility]ure disorder [facility] ) 2021    Hyperlipidemia 2021    Acute respiratory failure with hypoxia ([facility] ) 2021    Aspiration pneumonia ([facility] ) 2021    Mood disorder ([facility] ) 2021    Type 2 diabetes mellitus without complication, without long-term current use of insulin ([facility] ) 2020    Hypertension 2019    Gastroesophageal reflux disease without esophagitis 2018    Ambulatory dysfunction 2017    Intellectual disability 2017      LOS (days): 2  Geometric Mean LOS (GMLOS) (days): 3 80  Days to GMLOS:2 2     OBJECTIVE:    Risk of Unplanned Readmission Score: 34 7         Current admission status: Inpatient       Preferred Pharmacy:   [address] [place] [address]  [address] [address]  Phone: [phone] Fax: [phone] - [address] [address]  Phone: [phone] Fax: [phone]    Primary Care Provider: Mc Polo MD    Primary Insurance: MEDICARE  Secondary Insurance: [place] [employer] [place]    ASSESSMENT:  [facility]e [place] Proxies     Armando Medina ( [address] Representative   Primary Phone: [phone] (Mobile)               Advance Directives  Does patient have a [address] [place] [address] Avenue?: No  Does patient currently have a [facility] decision maker?: Yes, please see Health Care Proxy section (jan jackson, )  Primary Contact: jan jackson, gp home supervisor              Patient Information  Admitted from[de-identified] Home  Mental Status: Alert  During Assessment patient was accompanied by: Not accompanied during assessment  Assessment information provided by[de-identified] Other - please comment ()  Primary Caregiver: Other (Comment) (group home)  Support Systems: /, Home care staff, Other (Comment) (group home staff)  Home entry access options   Select all that apply : Ramp  Type of Current Residence: [place]  In the last 12 months, was there a time when you were not able to pay the mortgage or rent on time?: Yes  In the last 12 months, how many places have you lived?: 1  In the last 12 months, was there a time when you did not have a steady place to sleep or slept in a shelter (including now)?: No  Living Arrangements: Other (Comment) (lives in group home)    Activities of Daily Living Prior to Admission  Functional Status: Assistance  Completes ADLs independently?: No  Level of ADL dependence: Assistance  Ambulates independently?: Yes  Does patient use assisted devices?: Yes  Assisted Devices (DME) used: [employer]  Does patient currently own DME?: Yes  What DME does the patient currently own?: [employer], Shower Chair (sit to stand with sling)  Does patient have a history of Outpatient Therapy (PT/OT)?: No  Does the patient have a history of Short-Term Rehab?: Yes ([facility])  Does patient have a history of HHC?: Yes ([employer])  Does patient currently have [address]?: No         Patient Information Continued  Income Source: SSI/SSD  Does patient have prescription coverage?: Yes  Within the past 12 months, you worried that your food would run out before you got the money to buy more : Never true  Within the past 12 months, the food you bought just didn't last and you didn't have money to get more : Never true  Does patient receive dialysis treatments?: No  Does patient have a history of substance abuse?: No  Does patient have a history of Mental Health Diagnosis?: No         Means of Transportation  Means of Transport to Appts[de-identified] Family transport  In the past 12 months, has lack of transportation kept you from medical appointments or from getting medications?: No  In the past 12 months, has lack of transportation kept you from meetings, work, or from getting things needed for daily living?: No        DISCHARGE DETAILS:    Discharge planning discussed with[de-identified]   Freedom of Choice: Yes     CM contacted family/caregiver?: Yes  Were Treatment Team discharge recommendations reviewed with patient/caregiver?: Yes  Did patient/caregiver verbalize understanding of patient care needs?: Yes  Were patient/caregiver advised of the risks associated with not following Treatment Team discharge recommendations?: Yes    Contacts  Patient Contacts: jan jackson   Relationship to Patient[de-identified] Other (Comment)  Contact Method: Phone  Reason/Outcome: Discharge [address]         Is the patient interested in [facility] at discharge?: Yes  [address] requested[de-identified] Nursing, Physical Therapy, Occupational [address] Name[de-identified] [address] [facility] Provider[de-identified] PCP  Home Health Services Needed[de-identified] Evaluate Functional Status and Safety, Gait/ADL Training, Strengthening/Theraputic Exercises to Improve Function, Diabetes Management  Homebound Criteria Met[de-identified] Requires the Assistance of Another Person for Safe Ambulation or to Leave the Home  Supporting Clincal Findings[de-identified] Limited Endurance              Would you like to participate in our [address] service program?  : No - Declined

## 2022-07-21 NOTE — PROGRESS NOTES
Dysphagia diet matches 7/21 ST recommendations  Will order CCD 2 for glycemic control  Pt with excellent appetite, supplements not indicated at this time

## 2022-07-21 NOTE — PLAN OF CARE
Problem: OCCUPATIONAL THERAPY ADULT  Goal: Performs self-care activities at highest level of function for planned discharge setting  See evaluation for individualized goals  Description: Treatment Interventions: ADL retraining, Functional transfer training, UE strengthening/ROM, Endurance training, Patient/family training, Cognitive reorientation, Equipment evaluation/education, Neuromuscular reeducation, Compensatory technique education, Continued evaluation, Energy conservation, Activityengagement          See flowsheet documentation for full assessment, interventions and recommendations  Note: Limitation: Decreased ADL status, Decreased UE strength, Decreased Safe judgement during ADL, Decreased endurance, Decreased cognition, Decreased self-care trans, Decreased high-level ADLs  Prognosis: Good, Fair  Assessment: Pt is a 74 y o  female, admitted to [address] [place] [address] 7/19/2022 d/t experiencing increased weakness and AMS  Dx: acute metabolic encephalopathy  Pt with PMHx impacting their performance during ADL tasks, including: anxiety, DM, GERD, hyperlipidemia, ID, mixed incontinence, seizure disorder  Prior to admission to the hospital Pt was performing ADLs with physical assistance  IADLs with physical assistance  Functional transfers/ambulation with physical assistance  Cognitive status was PTA was impaired  OT order placed to assess Pt's ADLs, cognitive status, and performance during functional tasks in order to maximize safety and independence while making most appropriate d/c recommendations   Pt's clinical presentation is currently unstable/unpredictable given new onset deficits that effect Pt's occupational performance and ability to safely return to OF including decrease activity tolerance, decrease standing balance, decrease sitting balance, decrease performance during ADL tasks, decrease cognition, decrease safety awareness , decrease BUE ROM, increased pain, decrease generalized strength, decrease activity engagement, decrease performance during functional transfers, frequent falls, high fall risk, limited insight to deficits and inability to express needs combined with medical complications of change in mental status, abnormal CBC, abnormal sodium values, abnormal CO2 values, decreased skin integrity, multiple readmissions, incontinence, fear/retreat and need for input for mobility technique/safety  Personal factors affecting Pt at time of initial evaluation include: anxiety, past experience, behavioral pattern, inability to perform current job functions, inability to perform IADLs, inability to perform ADLs, new need for AD, inability to ambulate household distances, inability to navigate community distances, limited insight into impairments, decreased initiation and engagement and recent fall(s)/fall history  Pt will benefit from continued skilled OT services to address deficits as defined above and to maximize level independence/participation during ADLs and functional tasks to facilitate return toward PLOF and improved quality of life  From an occupational therapy standpoint, recommendation at time of d/c would be post acute rehab       OT Discharge Recommendation: (S) Post acute rehabilitation services (unless group home has necessary equiptment to appropriately care for Pt (STS lift/hoyerlift))

## 2022-07-21 NOTE — PROGRESS NOTES
[address]  Progress Note - Marilin Moseley 1955, 74 y o  female MRN: [id number]  Unit/Bed#: -01 Encounter: [id number]  Primary Care Provider: Eliud Fuller MD   Date and time admitted to hospital: 7/19/2022  6:30 PM    Hyponatremia  Assessment & Plan  · Sodium level is improving, patient mentation improved, clinically back to baseline    * Acute metabolic encephalopathy  Assessment & Plan  · Presents from group home, reports of increased lethargy, generalized weakness and urinary frequency  Nonverbal at baseline  Found to have acute cystitis and hyponatremia on arrival    · Could be multifactorial  · CTH negative for acute process  · Blood sugar 177 on arrival   · Patient is alert, observed moving all extremities  Non verbal baseline  · Was on antibiotics for acute cystitis and correct electrolyte abnormalities - since patient condition is significantly improved, and urine culture growing Pseudomonas, less than 40,000, does know suspect the UTI, will discontinue antibiotic  · CT scan of head shows:Stable ventriculomegaly out of proportion degree of volume loss  Correlate for NPH  Cystitis without hematuria  Assessment & Plan  · Does nothing urinary tract infection since urine culture is growing Pseudomonas, less than 40,000 and patient is on cefepime with does not cover  Without that, patient mentation improve and patient back to baseline, for that reason, will discontinue antibiotic  Intellectual disability  Assessment & Plan  · Baseline nonverbal-can transfer herself, can feed herself , from group home  · CT scan of head shows:Stable ventriculomegaly out of proportion degree of volume loss  Correlate for NPH  Seizure disorder ([facility] [address] )  Assessment & Plan  · No reports of seizure like activity   · Continue Depakote   · Check valproic acid level   · CT scan of head:Stable ventriculomegaly out of proportion degree of volume loss  Correlate for NPH       Type 2 diabetes mellitus without complication, without long-term current use of insulin [facility])  Assessment & Plan  Lab Results   Component Value Date    HGBA1C 7 6 (H) 2022       Recent Labs     22  1558 22  2115 22  0726 22  1102   POCGLU 133 173* 92 178*       Blood Sugar Average: Last 72 hrs:  (P) 143 [id number]   · Hold oral diabetic medications  · Add SSI insulin -insulin dose adjusted  · Follow hypoglycemia precaution    Mood disorder ([address] )  Assessment & Plan  · Continue home medications, QTc 444           VTE Pharmacologic Prophylaxis: VTE Score: 4 Moderate Risk (Score 3-4) - Pharmacological DVT Prophylaxis Ordered: heparin  Patient Centered Rounds: I performed bedside rounds with nursing staff today  Discussions with Specialists or Other Care Team Provider:  None    Education and Discussions with Family / Patient: None  Time Spent for Care: 15 minutes  More than 50% of total time spent on counseling and coordination of care as described above  Current Length of Stay: 2 day(s)  Current Patient Status: Inpatient   Certification Statement: The patient will continue to require additional inpatient hospital stay due to To monitor above condition  Discharge Plan: Anticipate discharge in 24-48 hrs to group home  Code Status: Level 1 - Full Code    Subjective:   Seen and evaluated during the round  Patient sitting upright positions, interactive by using body language  Feeding herself  Objective:     Vitals:   Temp (24hrs), Av 6 °F (36 4 °C), Min:97 3 °F (36 3 °C), Max:97 8 °F (36 6 °C)    Temp:  [97 3 °F (36 3 °C)-97 8 °F (36 6 °C)] 97 7 °F (36 5 °C)  HR:  [63-81] 63  Resp:  [16-21] 21  BP: (107-123)/(49-63) 107/50  SpO2:  [93 %] 93 %  Body mass index is 33 63 kg/m²  Input and Output Summary (last 24 hours):      Intake/Output Summary (Last 24 hours) at 2022 1432  Last data filed at 2022 1344  Gross per 24 hour   Intake 1010 ml   Output 2900 ml   Net -1890 ml       Physical Exam:   Physical Exam  Vitals and nursing note reviewed  Constitutional:       Appearance: Normal appearance  She is obese  She is not ill-appearing or diaphoretic  HENT:      Mouth/Throat:      Mouth: Mucous membranes are moist       Pharynx: Oropharynx is clear  No oropharyngeal exudate  Eyes:      General: No scleral icterus  Extraocular Movements: Extraocular movements intact  Conjunctiva/sclera: Conjunctivae normal       Pupils: Pupils are equal, round, and reactive to light  Cardiovascular:      Rate and Rhythm: Normal rate  Pulses: Normal pulses  Heart sounds: Normal heart sounds  No murmur heard  No friction rub  No gallop  Pulmonary:      Effort: Pulmonary effort is normal  No respiratory distress  Breath sounds: No stridor  No wheezing or rhonchi  Abdominal:      General: Abdomen is flat  Bowel sounds are normal  There is no distension  Palpations: There is no mass  Tenderness: There is no abdominal tenderness  Hernia: No hernia is present  Musculoskeletal:         General: No swelling, tenderness, deformity or signs of injury  Cervical back: Normal range of motion  No rigidity  Lymphadenopathy:      Cervical: No cervical adenopathy  Neurological:      Mental Status: She is alert  Mental status is at baseline  Cranial Nerves: No cranial nerve deficit  Sensory: No sensory deficit  Motor: No weakness           Additional Data:     Labs:  Results from last 7 days   Lab Units 07/21/22  0427   WBC Thousand/uL 4 76   HEMOGLOBIN g/dL 10 6*   HEMATOCRIT % 33 4*   PLATELETS Thousands/uL 182   NEUTROS PCT % 55   LYMPHS PCT % 29   MONOS PCT % 8   EOS PCT % 6     Results from last 7 days   Lab Units 07/21/22  0427   SODIUM mmol/L 131*   POTASSIUM mmol/L 4 1   CHLORIDE mmol/L 94*   CO2 mmol/L 34*   BUN mg/dL 17   CREATININE mg/dL 0 64   ANION GAP mmol/L 3*   CALCIUM mg/dL 9 3   ALBUMIN g/dL 3 0*   TOTAL BILIRUBIN mg/dL 0 23   ALK PHOS U/L 56   ALT U/L 10*   AST U/L 9   GLUCOSE RANDOM mg/dL 103         Results from last 7 days   Lab Units 07/21/22  1102 07/21/22  0726 07/20/22  2115 07/20/22  1558 07/20/22  1058 07/20/22  0730 07/19/22  2223 07/14/22  1940   POC GLUCOSE mg/dl 178* 92 173* 133 244* 71 114 141*         Results from last 7 days   Lab Units 07/19/22  2300 07/19/22  1901 07/14/22  2241 07/14/22 2005   LACTIC ACID mmol/L 1 6 2 4* 1 7 2 9*   PROCALCITONIN ng/ml  --  <0 05  --   --        Lines/Drains:  Invasive Devices  Report    Peripheral Intravenous Line  Duration           Peripheral IV 07/21/22 Right Hand <1 day          Drain  Duration           External Urinary Catheter 1 day                      Imaging: No pertinent imaging reviewed      Recent Cultures (last 7 days):   Results from last 7 days   Lab Units 07/19/22  1900   URINE CULTURE  30,000-39,000 cfu/ml Pseudomonas aeruginosa*       Last 24 Hours Medication List:   Current Facility-Administered Medications   Medication Dose Route Frequency Provider Last Rate    acetaminophen  650 mg Oral Q6H PRN Brian Pali, CRNP      aspirin  81 mg Oral Daily Brian Pali, CRNP      atorvastatin  80 mg Oral Daily With TNC, CRNP      divalproex sodium  250 mg Oral Daily Brian Pali, CRNP      divalproex sodium  500 mg Oral After TNC, CRNP      docusate sodium  100 mg Oral BID Brian Pali, CRNP      ferrous sulfate  325 mg Oral Daily Brian Pali, CRNP      gabapentin  800 mg Oral TID Brian Pali, CRNP      heparin (porcine)  5,000 Units Subcutaneous [facility] Brian Pali, CRNP      insulin lispro  1-6 Units Subcutaneous HS Denise White MD      insulin lispro  2-12 Units Subcutaneous TID [facility] Denise White MD      levothyroxine  50 mcg Oral Early Morning Brian Pali, CRNP      LORazepam  0 5 mg Oral BID PRN Brian Pali, CRNP      methenamine hippurate  1 g Oral BID Brian Pali, CRNP      oxybutynin  10 mg Oral Daily Juanpablo Askew Jaimee, CRNP      pantoprazole  40 mg Oral Early Morning Briana Brianne, CRNP      QUEtiapine  200 mg Oral HS Briana Brianne, CRNP      QUEtiapine  100 mg Oral Daily Briana Brianne, [place]      senna  8 6 mg Oral HS Briana Brianne, CRNP      sertraline  100 mg Oral HS Briana Brianne, CRNP      torsemide  10 mg Oral Daily Briana Brianne, CRNP          Today, Patient Was Seen By: Raj Simmons MD    **Please Note: This note may have been constructed using a voice recognition system  **

## 2022-07-21 NOTE — CASE MANAGEMENT
Case Management Progress Note    Patient name Avery Escalante  Location /-15 MRN [id number]  : 1955 Date 2022       LOS (days): 2  Geometric Mean LOS (GMLOS) (days): 3 80  Days to GMLOS:2 2        OBJECTIVE:        Current admission status: Inpatient  Preferred Pharmacy:   [address] [place] [address] [facility] [address]  Phone: [phone] Fax: [phone]    [address] [address]  Phone: [phone] Fax: [phone]    Primary Care Provider: Giovanni Stewart MD    Primary Insurance: MEDICARE  Secondary Insurance: [place] [employer]    PROGRESS NOTE:    STR vs HHC at group home has been recommended for pt at time of dc  Cm spoke with Vazquez Coppola [phone], gp home manager, who sts they do not want pt to fo to STR, as pt 'was at a rehab for over 6 months in the past and they did nothing for her "  Kathi sts they have a sit to stand with a sling in the home and feel they can meet pts needs in the home  Julián Romero is agreeable to Pam [facility]as, would like a referral placed to [facility], as per request, a referral has been placed in [facility] has accepted pt at time of dc

## 2022-07-21 NOTE — SPEECH THERAPY NOTE
Speech Language/Pathology    Speech/Language Pathology Progress Note    Patient Name: Robyn Bowers  BQVCI'B Date: 7/21/2022     Subjective:  Pt upright in chair, finished working w/ PT/OT  Objective:  Assess diet tolerance    Assessment:  Pt presented w/ puree and thins via straw  Set up assistance provided  Rapid rate of intake noted with both solids and liquids  Adequate bolus formation and clearance w/ puree  Audible swallow noted w/ thins via straw, however no overt s/s aspiration  No coughing or throat clearing  Pt consumed 100% of breakfast   Observed taking meds both crushed in puree and whole in puree (colace unable to be crushed)  No overt s/s aspiration  Plan/Recommendations:  Recommend to continue puree and thin liquids  No s/s aspiration w/ thin liquids even w/ successive sips of thin liquids  SLP will sign off, please re-consult as needed      Violet Rosenberg [address] 85 CCC-SLP  7/21/2022

## 2022-07-21 NOTE — ASSESSMENT & PLAN NOTE
Lab Results   Component Value Date    HGBA1C 7 6 (H) 04/08/2022       Recent Labs     07/20/22  1558 07/20/22  2115 07/21/22  0726 07/21/22  1102   POCGLU 133 173* 92 178*       Blood Sugar Average: Last 72 hrs:  (P) 143 [id number]   · Hold oral diabetic medications  · Add SSI insulin -insulin dose adjusted  · Follow hypoglycemia precaution

## 2022-07-21 NOTE — PHYSICAL THERAPY NOTE
PHYSICAL THERAPY EVALUATION  NAME:  Chai Forbes  DATE: 07/21/22    AGE:   74 y o  Mrn:   [id number]  ADMIT DX:  Hyponatremia [E87 1]  UTI (urinary tract infection) [N39 0]  Weakness [R53 1]    Past Medical History:   Diagnosis Date    Anxiety     Diabetes mellitus ([facility] )     GERD (gastroesophageal reflux disease)     Hyperlipidemia     Hypertension     Mental disability     Mixed incontinence 4/12/2017    Seizure disorder ([facility] )      Length Of Stay: 2  Performed at least 2 patient identifiers during session: ID [id number]  PHYSICAL THERAPY EVALUATION :        07/21/22 0724   Note Type   Note type Evaluation   Pain Assessment   Pain Assessment Tool FLACC   Pain Rating: FLACC (Rest) - Face 0   Pain Rating: FLACC (Rest) - Legs 0   Pain Rating: FLACC (Rest) - Activity 0   Pain Rating: FLACC (Rest) - Cry 0   Pain Rating: FLACC (Rest) - Consolability 0   Score: FLACC (Rest) 0   Pain Rating: FLACC (Activity) - Face 0   Pain Rating: FLACC (Activity) - Legs 0   Pain Rating: FLACC (Activity) - Activity 0   Pain Rating: FLACC (Activity) - Cry 0   Pain Rating: FLACC (Activity) - Consolability 0   Score: FLACC (Activity) 0   Restrictions/Precautions   Other Precautions Cognitive; Chair Alarm; Bed Alarm; Fall Risk   Home Living   Type of Home Group Home   Additional Comments Pt is a poor historian, will consult with CM to obtain PLOF and home setup  Per CM note 5/20/22 pt has access to sit to stand lift   Prior Function   ADL Assistance Needs assistance   IADLs Needs assistance   Cognition   Overall Cognitive Status Impaired   Orientation Level Unable to assess   Following Commands Follows one step commands with increased time or repetition   Comments Pt has ID and is non-verbal at baseline   RLE Assessment   RLE Assessment   (KAYLEIGH)   LLE Assessment   LLE Assessment   (KAYLEIGH)   Bed Mobility   Supine to Sit 2  Maximal assistance   Additional items Assist x 1; Increased time required;Verbal cues;HOB elevated; Bedrails;LE management  (trunk management)   Additional Comments HOB elevated, bedrails PRN, increased VC for technique and participation  Max x 1 for trunk and LE management, increased R lateral lean in upright sitting, unable to self correct, min A provided to maintain   Transfers   Sit to Stand 2  Maximal assistance   Additional items Assist x 2; Increased time required;Verbal cues  (unable to clear buttocks)   Sit pivot 2  Maximal assistance   Additional items Assist x 2; Increased time required;Verbal cues   Additional Comments Initially attempted to complete SPT from EOB with RW however unable to clear buttocks with max A x 2  Attempted with HHA and B/L knees blocked, pt with minimal participation, able to minimally clear buttocks, completed sit pivot with Max x 2 b/l knees blocked with HHA, VC for technique  Gait deferred this session due to safety concerns   Balance   Static Sitting Poor +   Dynamic Sitting Poor -   Static Standing Zero   Endurance Deficit   Endurance Deficit Yes   Endurance Deficit Description fatigue, strength deficits   Activity Tolerance   Activity Tolerance Patient limited by fatigue   Medical Staff Made Aware Simone LATHAM, [address]   Nurse Made Aware Eli LERMA   Assessment   Prognosis Fair   Problem List Decreased strength;Decreased endurance; Impaired balance;Decreased mobility; Decreased cognition; Impaired judgement;Decreased safety awareness; Obesity   Barriers to Discharge Inaccessible home environment;Decreased caregiver support   Barriers to Discharge Comments Pt requires increased assistance for mobility   Goals   Patient Goals None stated   STG Expiration Date 08/04/22   Plan   Treatment/Interventions Functional transfer training;LE strengthening/ROM; Therapeutic exercise; Endurance training;Patient/family training;Equipment eval/education; Bed mobility; Compensatory technique education;Spoke to nursing;OT   PT Frequency 2-3x/wk   Recommendation   PT Discharge Recommendation Post acute rehabilitation services v HHPT pending group home's ability to care for pt at current functional level   Equipment Recommended   (TBD by rehab)   Alberto 8 in Bed Without Bedrails 2   Lying on Back to Sitting on Edge of Flat Bed 2   Moving Bed to Chair 1   Standing Up From Chair 1   Walk in Room 1   Climb 3-5 Stairs 1   Basic Mobility Inpatient Raw Score 8   Turning Head Towards Sound 3   Follow Simple Instructions 2   Low Function Basic Mobility Raw Score 13   Low Function Basic Mobility Standardized Score 20 14   Highest Level Of Mobility   -HL Goal 3: Sit at edge of bed   -HL Achieved 4: Move to chair/commode   End of Consult   Patient Position at End of Consult Bedside chair;Bed/Chair alarm activated; All needs within reach     The patient's AM-PAC Basic Mobility Inpatient Short Form Raw Score is 8    A Raw score of less than or equal to 16 suggests the patient may benefit from discharge to post-acute rehabilitation services  Please also refer to the recommendation of the Physical Therapist for safe discharge planning  (Please find full objective findings from PT assessment regarding body systems outlined above)  Assessment: Pt is a 74 y o  female seen for PT evaluation s/p admission to [address] [place] [address] on 7/19/2022 with Acute metabolic encephalopathy  Order placed for PT services  Upon evaluation: Pt is presenting with impaired functional mobility due to decreased strength, decreased endurance, impaired balance, impaired cognition, decreased safety awareness, impaired judgment, and fall risk requiring  max x 1 assistance for bed mobility and max x 2 assistance for transfers   Pt's clinical presentation is currently unstable/unpredictable given the functional mobility deficits above coupled with fall risks as indicated by AM-PAC 6-Clicks: 3/48 as well as impaired balance, polypharmacy, impaired judgement, decreased safety awareness, and decreased cognition and combined with medical complications of hypertension , abnormal H&H, abnormal sodium values, abnormal CO2 values, multiple readmissions, and need for input for mobility technique/safety  Pt's PMHx and comorbidities that may affect physical performance and progress include: DM, HTN, limited communication, limited cognition, and intellectual disability and seizure disorder   Personal factors affecting pt at time of IE include: anxiety, inaccessible home environment, inability to perform IADLs, inability to perform ADLs, inability to navigate level surfaces without external assistance, inability to ambulate household distances, inability to navigate community distances, limited insight into impairments, decreased initiation and engagement, and difficulty communicating  Pt will benefit from continued skilled PT services to address deficits as defined above and to maximize level of functional mobility to facilitate return toward PLOF and improved QOL  From PT/mobility standpoint, recommendation at time of d/c would be Short term rehab v HHPT pending group home's ability to care for pt at current functional levelpending progress in order to reduce fall risk and maximize pt's functional independence and consistency with mobility in order to facilitate return to PLOF  Recommend ther ex next 1-2 sessions and trial with quick move next 1-2 sessions  Goals: Pt will: Perform bed mobility tasks with consistent min A of 1 to reposition in bed and prepare for transfers  Pt will perform transfers with consistent min A of 1 to decrease burden of care and prepare for ambulation  Pt will ambulate with LRAD for >/= 5' with  consistent min A of 1  to improve gait quality and promote proper use of assistive device and to access home environment  Increase bilateral LE strength 1/2 grade to facilitate independent mobility and Increase all balance 1/2 grade to decrease risk for falls          Azeb Haro, PT,DPT

## 2022-07-21 NOTE — ASSESSMENT & PLAN NOTE
· Does nothing urinary tract infection since urine culture is growing Pseudomonas, less than 40,000 and patient is on cefepime with does not cover  Without that, patient mentation improve and patient back to baseline, for that reason, will discontinue antibiotic

## 2022-07-21 NOTE — ASSESSMENT & PLAN NOTE
· Presents from group home, reports of increased lethargy, generalized weakness and urinary frequency  Nonverbal at baseline  Found to have acute cystitis and hyponatremia on arrival    · Could be multifactorial  · CTH negative for acute process  · Blood sugar 177 on arrival   · Patient is alert, observed moving all extremities  Non verbal baseline  · Was on antibiotics for acute cystitis and correct electrolyte abnormalities - since patient condition is significantly improved, and urine culture growing Pseudomonas, less than 40,000, does know suspect the UTI, will discontinue antibiotic  · CT scan of head shows:Stable ventriculomegaly out of proportion degree of volume loss  Correlate for NPH

## 2022-07-22 VITALS
RESPIRATION RATE: 19 BRPM | DIASTOLIC BLOOD PRESSURE: 69 MMHG | TEMPERATURE: 98.1 F | BODY MASS INDEX: 33.63 KG/M2 | WEIGHT: 172.18 LBS | SYSTOLIC BLOOD PRESSURE: 126 MMHG | OXYGEN SATURATION: 93 % | HEART RATE: 63 BPM

## 2022-07-22 LAB
BACTERIA UR CULT: ABNORMAL
BACTERIA UR CULT: ABNORMAL
GLUCOSE SERPL-MCNC: 117 MG/DL (ref 65–140)
GLUCOSE SERPL-MCNC: 178 MG/DL (ref 65–140)

## 2022-07-22 PROCEDURE — 82948 REAGENT STRIP/BLOOD GLUCOSE: CPT

## 2022-07-22 PROCEDURE — 99239 HOSP IP/OBS DSCHRG MGMT >30: CPT | Performed by: FAMILY MEDICINE

## 2022-07-22 RX ADMIN — DIVALPROEX SODIUM 250 MG: 250 TABLET, DELAYED RELEASE ORAL at 08:28

## 2022-07-22 RX ADMIN — PANTOPRAZOLE SODIUM 40 MG: 40 TABLET, DELAYED RELEASE ORAL at 05:25

## 2022-07-22 RX ADMIN — HEPARIN SODIUM 5000 UNITS: 5000 INJECTION INTRAVENOUS; SUBCUTANEOUS at 05:26

## 2022-07-22 RX ADMIN — ASPIRIN 81 MG: 81 TABLET, COATED ORAL at 08:28

## 2022-07-22 RX ADMIN — QUETIAPINE 100 MG: 100 TABLET, FILM COATED ORAL at 08:29

## 2022-07-22 RX ADMIN — LEVOTHYROXINE SODIUM 50 MCG: 50 TABLET ORAL at 05:25

## 2022-07-22 RX ADMIN — GABAPENTIN 800 MG: 400 CAPSULE ORAL at 08:29

## 2022-07-22 RX ADMIN — TORSEMIDE 10 MG: 20 TABLET ORAL at 08:29

## 2022-07-22 RX ADMIN — FERROUS SULFATE TAB 325 MG (65 MG ELEMENTAL FE) 325 MG: 325 (65 FE) TAB at 08:28

## 2022-07-22 RX ADMIN — DOCUSATE SODIUM 100 MG: 100 CAPSULE ORAL at 08:28

## 2022-07-22 RX ADMIN — OXYBUTYNIN CHLORIDE 10 MG: 5 TABLET, EXTENDED RELEASE ORAL at 08:29

## 2022-07-22 NOTE — ASSESSMENT & PLAN NOTE
· Presents from group home, reports of increased lethargy, generalized weakness and urinary frequency  Nonverbal at baseline  Found to have acute cystitis and hyponatremia on arrival    · Could be multifactorial  · CTH negative for acute process  · Blood sugar 177 on arrival   · Patient is alert, observed moving all extremities  Non verbal baseline  · Was on antibiotics for acute cystitis and correct electrolyte abnormalities - since patient condition is significantly improved, and urine culture growing Pseudomonas, less than 40,000, does not suspect the UTI, will discontinue antibiotic  · CT scan of head shows:Stable ventriculomegaly out of proportion degree of volume loss  Correlate for NPH     · Condition resolved

## 2022-07-22 NOTE — DISCHARGE SUMMARY
[address]  Discharge- [facility] 1955, 74 y o  female MRN: [id number]  Unit/Bed#: -Imelda Encounter: [id number]  Primary Care Provider: West Baker MD   Date and time admitted to hospital: 7/19/2022  6:30 PM    Hyponatremia  Assessment & Plan  · Sodium level is improving, patient mentation improved, clinically back to baseline    * Acute metabolic encephalopathy  Assessment & Plan  · Presents from group home, reports of increased lethargy, generalized weakness and urinary frequency  Nonverbal at baseline  Found to have acute cystitis and hyponatremia on arrival    · Could be multifactorial  · CTH negative for acute process  · Blood sugar 177 on arrival   · Patient is alert, observed moving all extremities  Non verbal baseline  · Was on antibiotics for acute cystitis and correct electrolyte abnormalities - since patient condition is significantly improved, and urine culture growing Pseudomonas, less than 40,000, does not suspect the UTI, will discontinue antibiotic  · CT scan of head shows:Stable ventriculomegaly out of proportion degree of volume loss  Correlate for NPH  · Condition resolved    Cystitis without hematuria  Assessment & Plan  · Does nothing urinary tract infection since urine culture is growing Pseudomonas, less than 40,000 and patient is on cefepime with does not cover  Without that, patient mentation improve and patient back to baseline, for that reason, will discontinue antibiotic  Intellectual disability  Assessment & Plan  · Baseline nonverbal-can transfer herself, can feed herself , from group home  · CT scan of head shows:Stable ventriculomegaly out of proportion degree of volume loss  Correlate for NPH  Seizure disorder ([facility] [address] )  Assessment & Plan  · No reports of seizure like activity   · Continue Depakote   ·  valproic acid level normal  · CT scan of head:Stable ventriculomegaly out of proportion degree of volume loss   Correlate for NPH      Type 2 diabetes mellitus without complication, without long-term current use of insulin [facility])  Assessment & Plan  Lab Results   Component Value Date    HGBA1C 7 6 (H) 04/08/2022       Recent Labs     07/21/22  1102 07/21/22  1623 07/21/22  2144 07/22/22  0703   POCGLU 178* 210* 154* 117       Blood Sugar Average: Last 72 hrs:  (P) 148 6   · May resume all home medications    Mood disorder ([address] )  Assessment & Plan  · Continue home medications, QTc 444       Abnormal x-ray   Lucency inferior to the right hemidiaphragm likely due to a loop of subdiaphragmatic intestine   However, pneumoperitoneum cannot be excluded  Patient clinically remained asymptomatic, tolerating diet, no significant abdominal discomfort or bowel bladder function issues  Medical Problems             Resolved Problems  Date Reviewed: 7/19/2022   None               Discharging Physician / Practitioner: Ji Orozco MD  PCP: Giovanni Stewart MD  Admission Date:   Admission Orders (From admission, onward)     Ordered        07/19/22 2103  INPATIENT ADMISSION  Once                      Discharge Date: 07/22/22    Consultations During Hospital Stay:  · None    Procedures Performed:   XR chest 1 view portable    Result Date: 7/20/2022  Narrative: CHEST INDICATION:   Somnolence  COMPARISON:  7/14/2010 EXAM PERFORMED/VIEWS:  XR CHEST PORTABLE  7:20 PM FINDINGS: Cardiomediastinal silhouette appears unremarkable  Elevation of the right hemidiaphragm is noted with opacification of the right lung base likely due to atelectasis versus pneumonia  Lungs are otherwise clear  Lucency below the right hemidiaphragm is noted which could reflect an interposed loop of bowel but the possibility of pneumoperitoneum is raised, particularly since this appears to be a new finding, and should be appropriately correlated with additional imaging, possibly including decubitus, upright imaging and/or CT   Osseous structures appear within normal limits for patient age  Impression: 1  Right basilar opacification likely due to atelectasis versus pneumonia  2   Lucency inferior to the right hemidiaphragm likely due to a loop of subdiaphragmatic intestine  However, pneumoperitoneum cannot be excluded  Recommend appropriate clinical correlation and additional imaging with upright/decubitus plain film and/or  CT  I personally discussed this study with Grace Darnell on 7/20/2022 at 7:56 AM  Workstation performed: [id number]     XR chest 1 view portable    Result Date: 7/15/2022  Narrative: CHEST INDICATION:   r/o cause of weakness  COMPARISON:  7/12/2022 EXAM PERFORMED/VIEWS:  XR CHEST PORTABLE Single view FINDINGS: Low lung volumes accentuate markings Cardiomediastinal silhouette appears unremarkable  The lungs are otherwise clear  No pneumothorax or pleural effusion  Osseous structures appear within normal limits for patient age  Impression: Low lung volumes No acute cardiopulmonary disease  Stable exam Workstation performed: [id number]     XR chest 1 view portable    Result Date: 7/13/2022  Narrative: CHEST INDICATION:   cough  COMPARISON:  5/19/2022 EXAM PERFORMED/VIEWS:  XR CHEST PORTABLE Single view FINDINGS: Low lung volumes accentuate markings Cardiomediastinal silhouette appears unremarkable  The lungs are clear  No pneumothorax or pleural effusion  Osseous structures appear within normal limits for patient age  Impression: Low lung volumes No acute cardiopulmonary disease  Workstation performed: [id number]     CT head without contrast    Result Date: 7/19/2022  Narrative: CT BRAIN - WITHOUT CONTRAST INDICATION:   Somnolence  COMPARISON:  CT scan 7/14/2022  TECHNIQUE:  CT examination of the brain was performed  In addition to axial images, sagittal and coronal 2D reformatted images were created and submitted for interpretation  Radiation dose length product (DLP) for this visit:  914 mGy-cm     This examination, like all CT scans performed in the [facility], was performed utilizing techniques to minimize radiation dose exposure, including the use of iterative reconstruction and automated exposure control  IMAGE QUALITY:  Diagnostic  FINDINGS: PARENCHYMA:  No intracranial mass, mass effect or midline shift  No CT signs of acute infarction  No acute parenchymal hemorrhage  Stable decreased attenuation in the periventricular and subcortical white matter consistent with mild chronic microangiopathy  VENTRICLES AND EXTRA-AXIAL SPACES:  Stable ventriculomegaly out of proportion to degree of volume loss  Again correlate for NPH  VISUALIZED ORBITS AND PARANASAL SINUSES:  Mild scattered mucosal thickening  CALVARIUM AND EXTRACRANIAL SOFT TISSUES:  Normal      Impression: No acute intracranial pathology  Stable ventriculomegaly out of proportion degree of volume loss  Correlate for NPH  Workstation performed: [id number]     CT head without contrast    Result Date: 7/14/2022  Narrative: CT BRAIN - WITHOUT CONTRAST INDICATION:   reported weakness at group home; nonverbal patient  COMPARISON:  CT dated 5/19/2022  TECHNIQUE:  CT examination of the brain was performed  In addition to axial images, sagittal and coronal 2D reformatted images were created and submitted for interpretation  Radiation dose length product (DLP) for this visit:  915 mGy-cm   This examination, like all CT scans performed in the [facility], was performed utilizing techniques to minimize radiation dose exposure, including the use of iterative reconstruction and automated exposure control  IMAGE QUALITY:  Diagnostic  FINDINGS: PARENCHYMA:  No intracranial mass, mass effect or midline shift  No CT signs of acute infarction  No acute parenchymal hemorrhage  Stable diminished attenuation in the periventricular and subcortical white matter likely due to chronic microangiopathy   VENTRICLES AND EXTRA-AXIAL SPACES:  Stable ventriculomegaly out of proportion to degree of volume loss  Correlate for NPH  VISUALIZED ORBITS AND PARANASAL SINUSES:  Mild scattered mucosal thickening  CALVARIUM AND EXTRACRANIAL SOFT TISSUES:  Normal      · Impression: No acute intracranial pathology  Stable ventriculomegaly out of proportion degree of volume loss  Correlate for NPH  Chronic microangiopathy  Workstation performed: [id number]   ·     Significant Findings / Test Results:   Lab Results   Component Value Date    WBC 4 76 07/21/2022    HGB 10 6 (L) 07/21/2022    HCT 33 4 (L) 07/21/2022    MCV 91 07/21/2022     07/21/2022   ·   Lab Results   Component Value Date    SODIUM 131 (L) 07/21/2022    K 4 1 07/21/2022    CL 94 (L) 07/21/2022    CO2 34 (H) 07/21/2022    AGAP 3 (L) 07/21/2022    BUN 17 07/21/2022    CREATININE 0 64 07/21/2022    GLUC 103 07/21/2022    CALCIUM 9 3 07/21/2022    AST 9 07/21/2022    ALT 10 (L) 07/21/2022    ALKPHOS 56 07/21/2022    TP 6 4 07/21/2022    TBILI 0 23 07/21/2022    EGFR 91 07/21/2022   ·   Result Status Patient Facility Result Comment       07/19/2022 1901 07/19/2022 1955 FLU/RSV/COVID - if FLU/RSV clinically relevant [[id number]]    Nares from Nose    Final result [facility] - copy/paste COVID Guidelines URL to browser: [email or web address]   SARS-CoV-2 assay is a Nucleic Acid Amplification assay intended for the   qualitative detection of nucleic acid from SARS-CoV-2 in nasopharyngeal   swabs  Results are for the presumptive identification of SARS-CoV-2 RNA  Positive results are indicative of infection with SARS-CoV-2, the virus   causing COVID-19, but do not rule out bacterial infection or co-infection   with other viruses  Laboratories within the [place] and its   territories are required to report all positive results to the appropriate   public health authorities   Negative results do not preclude SARS-CoV-2   infection and should not be used as the sole basis for treatment or other   patient management decisions  Negative results must be combined with   clinical observations, patient history, and epidemiological information  This test has not been FDA cleared or approved  This test has been authorized by FDA under an Emergency Use Authorization   (EUA)  This test is only authorized for the duration of time the   declaration that circumstances exist justifying the authorization of the   emergency use of an in vitro diagnostic tests for detection of SARS-CoV-2   virus and/or diagnosis of COVID-19 infection under section 564(b)(1) of   the Act, 21 U  S C  603CVO-9(M)(7), unless the authorization is terminated   or revoked sooner  The test has been validated but independent review by FDA   and CLIA is pending  Test performed using [employer]: This RT-PCR assay targets N2,   a region unique to SARS-CoV-2  A conserved region in the E-gene was chosen   for pan-Sarbecovirus detection which includes SARS-CoV-2  Component Value   SARS-CoV-2 Negative   INFLUENZA A PCR Negative   INFLUENZA B PCR Negative   RSV PCR Negative          07/19/2022 1900 07/21/2022 0800 Urine culture [[id number]]   (Abnormal)   Urine, Straight Cath    Preliminary result [address]  Component Value   Urine Culture 30,000-39,000 cfu/ml Pseudomonas aeruginosa Abnormal  P    This organism has been edited  The previous result was Pseudomonas species on 7/20/2022 at 2037 EDT              07/14/2022 1956 07/14/2022 2055 FLU/RSV/COVID - if FLU/RSV clinically relevant [[id number]]    Nares from Nose    Final result [facility] - copy/paste COVID Guidelines URL to browser: [email or web address]   SARS-CoV-2 assay is a Nucleic Acid Amplification assay intended for the   qualitative detection of nucleic acid from SARS-CoV-2 in nasopharyngeal swabs  Results are for the presumptive identification of SARS-CoV-2 RNA  Positive results are indicative of infection with SARS-CoV-2, the virus   causing COVID-19, but do not rule out bacterial infection or co-infection   with other viruses  Laboratories within the [place] and its   territories are required to report all positive results to the appropriate   public health authorities  Negative results do not preclude SARS-CoV-2   infection and should not be used as the sole basis for treatment or other   patient management decisions  Negative results must be combined with   clinical observations, patient history, and epidemiological information  This test has not been FDA cleared or approved  This test has been authorized by FDA under an Emergency Use Authorization   (EUA)  This test is only authorized for the duration of time the   declaration that circumstances exist justifying the authorization of the   emergency use of an in vitro diagnostic tests for detection of SARS-CoV-2   virus and/or diagnosis of COVID-19 infection under section 564(b)(1) of   the Act, 21 U  S C  867HFL-8(O)(3), unless the authorization is terminated   or revoked sooner  The test has been validated but independent review by FDA   and CLIA is pending  Test performed using [employer] Gene[employer]pert: This RT-PCR assay targets N2,   a region unique to SARS-CoV-2  A conserved region in the E-gene was chosen   for pan-Sarbecovirus detection which includes SARS-CoV-2  Component Value   SARS-CoV-2 Negative   INFLUENZA A PCR Negative   INFLUENZA B PCR Negative   RSV PCR Negative            ·     Incidental Findings:   · As mentioned above     Test Results Pending at Discharge (will require follow up):    · None     Outpatient Tests Requested:  · None    Complications:  None    Reason for Admission:  Altered mental status, lethargy    Hospital Course:   Dane Al is a 74 y o  female patient who originally presented to the hospital on 7/19/2022 due to altered mental status, lethargy  Patient admitted under the diagnosis of acute metabolic encephalopathy most likely secondary to hyponatremia and dehydration  Initially also suspected UTI, but urine culture was showing less than for the 1000 Pseudomonas and patient was on cefepime which was not covering Pseudomonas and patient clinical condition improved   For that reason it was not suspected that patient was having UTI and antibiotic discontinue  Patient received IV hydration  , with the treatment, patient's condition significantly improved patient back to her baseline  Patient be discharged back to group home, with resuming all home medication  No medication added or changed upon discharge  Treatment plan discussed with group home  Agrees and verbalizes to understand  Please see above list of diagnoses and related plan for additional information  Condition at Discharge: stable    Discharge Day Visit / Exam:   Subjective:  Seen and evaluated during the round  Alert and answering questions by noting her head and waving her hand  Vitals: Blood Pressure: 126/69 (07/22/22 0707)  Pulse: 63 (07/22/22 0707)  Temperature: 98 1 °F (36 7 °C) (07/22/22 0707)  Respirations: 19 (07/22/22 0707)  Weight - Scale: 78 1 kg (172 lb 2 9 oz) (07/19/22 2155)  SpO2: 93 % (07/22/22 0707)  Exam:   Physical Exam  Vitals and nursing note reviewed  Exam conducted with a chaperone present  Constitutional:       Appearance: She is obese  She is not ill-appearing or diaphoretic  HENT:      Nose: Nose normal  No congestion or rhinorrhea  Mouth/Throat:      Mouth: Mucous membranes are moist       Pharynx: Oropharynx is clear  No oropharyngeal exudate  Eyes:      General: No scleral icterus  Extraocular Movements: Extraocular movements intact  Conjunctiva/sclera: Conjunctivae normal       Pupils: Pupils are equal, round, and reactive to light     Cardiovascular:      Rate and Rhythm: Normal rate       Heart sounds: Normal heart sounds  No murmur heard  No friction rub  No gallop  Pulmonary:      Effort: Pulmonary effort is normal  No respiratory distress  Breath sounds: No stridor  No wheezing or rhonchi  Abdominal:      General: Abdomen is flat  Bowel sounds are normal  There is no distension  Palpations: There is no mass  Tenderness: There is no abdominal tenderness  Hernia: No hernia is present  Musculoskeletal:         General: No swelling  Cervical back: Normal range of motion  No rigidity or tenderness  Right lower leg: No edema  Left lower leg: No edema  Lymphadenopathy:      Cervical: No cervical adenopathy  Skin:     General: Skin is warm  Coloration: Skin is not jaundiced or pale  Findings: No bruising  Neurological:      Mental Status: She is alert  Mental status is at baseline  Comments: Answering questions by her body movements, noding her head and waving her hands  Discussion with Family:  updated  Discharge instructions/Information to patient and family:   See after visit summary for information provided to patient and family  Provisions for Follow-Up Care:  See after visit summary for information related to follow-up care and any pertinent home health orders  Disposition:   Group Home / [place] [facility] service group home at [address] [place] [address]    Planned Readmission:  If condition get worse     Discharge Statement:  I spent >35 minutes discharging the patient  This time was spent on the day of discharge  I had direct contact with the patient on the day of discharge  Greater than 50% of the total time was spent examining patient, answering all patient questions, arranging and discussing plan of care with patient as well as directly providing post-discharge instructions  Additional time then spent on discharge activities      Discharge Medications:  See after visit summary for reconciled discharge medications provided to patient and/or family        **Please Note: This note may have been constructed using a voice recognition system**

## 2022-07-22 NOTE — CASE MANAGEMENT
Case Management Discharge Planning Note    Patient name Dara Madison  Location /-03 MRN [id number]  : 1955 Date 2022       Current Admission Date: 2022  Current Admission Diagnosis:Acute metabolic encephalopathy   Patient Active Problem List    Diagnosis Date Noted    Hyponatremia 2022    Open nondisplaced fracture of distal phalanx of left middle finger 2022    Avulsion of fingernail 2022    Acute urinary retention 2022    Lower extremity edema 2022    Acute metabolic encephalopathy     Cystitis without hematuria 2022    Dysphagia 2021    Chronic anemia 2021    Chronically elevated right hemidiaphragm 2021    Obesity (BMI 30 0-34 9) 2021    Seizure disorder ([facility] [address] ) 2021    Hyperlipidemia 2021    [facility] failure with hypoxia [facility] ) 2021    Aspiration pneumonia ([facility] ) 2021    Mood disorder ([facility] ) 2021    Type 2 diabetes mellitus without complication, without long-term current use of insulin ([facility] ) 2020    Hypertension 2019    Gastroesophageal reflux disease without esophagitis 2018    Ambulatory dysfunction 2017    Intellectual disability 2017      LOS (days): 3  Geometric Mean LOS (GMLOS) (days): 3 80  Days to GMLOS:1 3     OBJECTIVE:  Risk of Unplanned Readmission Score: 34 82         Current admission status: Inpatient   Preferred Pharmacy:   [address] [place] [address]  Phone: [phone] Fax: [phone] - [address] [place] [address]  Phone: [phone] Fax: [phone]    Primary Care Provider: Celena Herrera MD    Primary Insurance: MEDICARE  Secondary Insurance: [place] [employer]    DISCHARGE DETAILS:      Patient discussed in am huddle stable for discharge today back to group home      CM spoke with Marivel Aldana supervisor this am patient to be  at 1100  Staff member Hyun Bah from [facility]  Any new medications scripts to be faxed to [facility]   [phone]     Deg aware no new or changed medications or patient on discharge

## 2022-07-22 NOTE — ASSESSMENT & PLAN NOTE
Lucency inferior to the right hemidiaphragm likely due to a loop of subdiaphragmatic intestine   However, pneumoperitoneum cannot be excluded  Patient clinically remained asymptomatic, tolerating diet, no significant abdominal discomfort or bowel bladder function issues

## 2022-07-22 NOTE — ASSESSMENT & PLAN NOTE
Lab Results   Component Value Date    HGBA1C 7 6 (H) 04/08/2022       Recent Labs     07/21/22  1102 07/21/22  1623 07/21/22  2144 07/22/22  0703   POCGLU 178* 210* 154* 117       Blood Sugar Average: Last 72 hrs:  (P) 148 6   · May resume all home medications

## 2022-07-22 NOTE — INCIDENTAL FINDINGS
The following findings require follow up:  Radiographic finding   Finding: XR chest 1 view portable: 1  Right basilar opacification likely due to atelectasis versus pneumonia , 2   Lucency inferior to the right hemidiaphragm likely due to a loop of subdiaphragmatic intestine  However, pneumoperitoneum cannot be excluded       Follow up required: yes   Follow up should be done within 1 week(s)    Please notify the following clinician to assist with the follow up:   Dr Maddi Raines MD     [facility]     [phone]

## 2022-07-22 NOTE — ASSESSMENT & PLAN NOTE
· No reports of seizure like activity   · Continue Depakote   ·  valproic acid level normal  · CT scan of head:Stable ventriculomegaly out of proportion degree of volume loss  Correlate for NPH

## 2022-08-21 ENCOUNTER — APPOINTMENT (EMERGENCY)
Dept: CT IMAGING | Facility: HOSPITAL | Age: 67
End: 2022-08-21
Payer: MEDICARE

## 2022-08-21 ENCOUNTER — HOSPITAL ENCOUNTER (EMERGENCY)
Facility: HOSPITAL | Age: 67
Discharge: HOME | End: 2022-08-21
Attending: EMERGENCY MEDICINE
Payer: MEDICARE

## 2022-08-21 VITALS
WEIGHT: 165.34 LBS | DIASTOLIC BLOOD PRESSURE: 72 MMHG | RESPIRATION RATE: 19 BRPM | HEART RATE: 75 BPM | TEMPERATURE: 96.1 F | OXYGEN SATURATION: 100 % | BODY MASS INDEX: 32.29 KG/M2 | SYSTOLIC BLOOD PRESSURE: 132 MMHG

## 2022-08-21 DIAGNOSIS — R53.83 FATIGUE: Primary | ICD-10-CM

## 2022-08-21 DIAGNOSIS — E87.1 HYPONATREMIA: ICD-10-CM

## 2022-08-21 LAB
ALBUMIN SERPL BCP-MCNC: 4.3 G/DL (ref 3.5–5)
ALP SERPL-CCNC: 81 U/L (ref 46–116)
ALT SERPL W P-5'-P-CCNC: 24 U/L (ref 12–78)
ANION GAP SERPL CALCULATED.3IONS-SCNC: 8 MMOL/L (ref 4–13)
APTT PPP: 19 SECONDS (ref 23–37)
AST SERPL W P-5'-P-CCNC: 20 U/L (ref 5–45)
ATRIAL RATE: 80 BPM
BASOPHILS # BLD AUTO: 0.01 THOUSANDS/ΜL (ref 0–0.1)
BASOPHILS NFR BLD AUTO: 0 % (ref 0–1)
BILIRUB SERPL-MCNC: 0.51 MG/DL (ref 0.2–1)
BILIRUB UR QL STRIP: NEGATIVE
BUN SERPL-MCNC: 17 MG/DL (ref 5–25)
CALCIUM SERPL-MCNC: 10.4 MG/DL (ref 8.3–10.1)
CHLORIDE SERPL-SCNC: 90 MMOL/L (ref 96–108)
CLARITY UR: CLEAR
CO2 SERPL-SCNC: 32 MMOL/L (ref 21–32)
COLOR UR: YELLOW
CREAT SERPL-MCNC: 0.8 MG/DL (ref 0.6–1.3)
EOSINOPHIL # BLD AUTO: 0.08 THOUSAND/ΜL (ref 0–0.61)
EOSINOPHIL NFR BLD AUTO: 2 % (ref 0–6)
ERYTHROCYTE [DISTWIDTH] IN BLOOD BY AUTOMATED COUNT: 15 % (ref 11.6–15.1)
GFR SERPL CREATININE-BSD FRML MDRD: 75 ML/MIN/1.73SQ M
GLUCOSE SERPL-MCNC: 112 MG/DL (ref 65–140)
GLUCOSE UR STRIP-MCNC: NEGATIVE MG/DL
HCT VFR BLD AUTO: 43.4 % (ref 34.8–46.1)
HGB BLD-MCNC: 14.1 G/DL (ref 11.5–15.4)
HGB UR QL STRIP.AUTO: NEGATIVE
IMM GRANULOCYTES # BLD AUTO: 0.03 THOUSAND/UL (ref 0–0.2)
IMM GRANULOCYTES NFR BLD AUTO: 1 % (ref 0–2)
INR PPP: 0.93 (ref 0.84–1.19)
KETONES UR STRIP-MCNC: NEGATIVE MG/DL
LEUKOCYTE ESTERASE UR QL STRIP: NEGATIVE
LIPASE SERPL-CCNC: 85 U/L (ref 73–393)
LYMPHOCYTES # BLD AUTO: 0.79 THOUSANDS/ΜL (ref 0.6–4.47)
LYMPHOCYTES NFR BLD AUTO: 16 % (ref 14–44)
MAGNESIUM SERPL-MCNC: 1.6 MG/DL (ref 1.6–2.6)
MCH RBC QN AUTO: 29 PG (ref 26.8–34.3)
MCHC RBC AUTO-ENTMCNC: 32.5 G/DL (ref 31.4–37.4)
MCV RBC AUTO: 89 FL (ref 82–98)
MONOCYTES # BLD AUTO: 0.37 THOUSAND/ΜL (ref 0.17–1.22)
MONOCYTES NFR BLD AUTO: 8 % (ref 4–12)
NEUTROPHILS # BLD AUTO: 3.63 THOUSANDS/ΜL (ref 1.85–7.62)
NEUTS SEG NFR BLD AUTO: 73 % (ref 43–75)
NITRITE UR QL STRIP: NEGATIVE
NRBC BLD AUTO-RTO: 0 /100 WBCS
P AXIS: 59 DEGREES
PH UR STRIP.AUTO: 6.5 [PH]
PLATELET # BLD AUTO: 152 THOUSANDS/UL (ref 149–390)
PMV BLD AUTO: 9.6 FL (ref 8.9–12.7)
POTASSIUM SERPL-SCNC: 3.6 MMOL/L (ref 3.5–5.3)
PR INTERVAL: 156 MS
PROT SERPL-MCNC: 8.6 G/DL (ref 6.4–8.4)
PROT UR STRIP-MCNC: NEGATIVE MG/DL
PROTHROMBIN TIME: 12.6 SECONDS (ref 11.6–14.5)
QRS AXIS: 69 DEGREES
QRSD INTERVAL: 98 MS
QT INTERVAL: 398 MS
QTC INTERVAL: 459 MS
RBC # BLD AUTO: 4.86 MILLION/UL (ref 3.81–5.12)
SODIUM SERPL-SCNC: 130 MMOL/L (ref 135–147)
SP GR UR STRIP.AUTO: 1.01 (ref 1–1.03)
T WAVE AXIS: 70 DEGREES
UROBILINOGEN UR QL STRIP.AUTO: 0.2 E.U./DL
VALPROATE SERPL-MCNC: 88 UG/ML (ref 50–100)
VENTRICULAR RATE: 80 BPM
WBC # BLD AUTO: 4.91 THOUSAND/UL (ref 4.31–10.16)

## 2022-08-21 PROCEDURE — 36415 COLL VENOUS BLD VENIPUNCTURE: CPT | Performed by: EMERGENCY MEDICINE

## 2022-08-21 PROCEDURE — 80053 COMPREHEN METABOLIC PANEL: CPT | Performed by: EMERGENCY MEDICINE

## 2022-08-21 PROCEDURE — 99285 EMERGENCY DEPT VISIT HI MDM: CPT | Performed by: EMERGENCY MEDICINE

## 2022-08-21 PROCEDURE — 83690 ASSAY OF LIPASE: CPT | Performed by: EMERGENCY MEDICINE

## 2022-08-21 PROCEDURE — 83735 ASSAY OF MAGNESIUM: CPT | Performed by: EMERGENCY MEDICINE

## 2022-08-21 PROCEDURE — 85610 PROTHROMBIN TIME: CPT | Performed by: EMERGENCY MEDICINE

## 2022-08-21 PROCEDURE — 81003 URINALYSIS AUTO W/O SCOPE: CPT | Performed by: EMERGENCY MEDICINE

## 2022-08-21 PROCEDURE — 85730 THROMBOPLASTIN TIME PARTIAL: CPT | Performed by: EMERGENCY MEDICINE

## 2022-08-21 PROCEDURE — 96360 HYDRATION IV INFUSION INIT: CPT

## 2022-08-21 PROCEDURE — 85025 COMPLETE CBC W/AUTO DIFF WBC: CPT | Performed by: EMERGENCY MEDICINE

## 2022-08-21 PROCEDURE — 93005 ELECTROCARDIOGRAM TRACING: CPT

## 2022-08-21 PROCEDURE — 70450 CT HEAD/BRAIN W/O DYE: CPT

## 2022-08-21 PROCEDURE — 99285 EMERGENCY DEPT VISIT HI MDM: CPT

## 2022-08-21 PROCEDURE — 80164 ASSAY DIPROPYLACETIC ACD TOT: CPT | Performed by: EMERGENCY MEDICINE

## 2022-08-21 RX ORDER — SODIUM CHLORIDE 1000 MG
1 TABLET, SOLUBLE MISCELLANEOUS ONCE
Status: COMPLETED | OUTPATIENT
Start: 2022-08-21 | End: 2022-08-21

## 2022-08-21 RX ADMIN — SODIUM CHLORIDE 1000 ML: 0.9 INJECTION, SOLUTION INTRAVENOUS at 11:28

## 2022-08-21 RX ADMIN — Medication 1 G: at 12:06

## 2022-08-21 NOTE — ED PROVIDER NOTES
History  Chief Complaint   Patient presents with    Weakness - Generalized     Couple of days of lethargy per group home staff  "Won't get up today"  Baseline nonverbal  On antibiotics for UTI     Patient is a 40-year-old female with history of diabetes hypertension hyperlipidemia seizure disorder and mental disability resides in local group home nonverbal at baseline presents the emergency department due to increased lethargy and difficulty in assisting group home staff with transferring and being less active over the past few days has been treated at the group home with antibiotics for urinary tract infection  No fevers or chills or nausea or vomiting reported  History provided by:  EMS personnel and caregiver  Fatigue  Severity:  Moderate  Onset quality:  Gradual  Duration:  3 days  Timing:  Constant  Progression:  Worsening  Chronicity:  Recurrent      Prior to Admission Medications   Prescriptions Last Dose Informant Patient Reported? Taking? Basaglar KwikPen 100 units/mL injection pen   Yes No   Cranberry 450 MG CAPS   Yes No   Sig: Take by mouth   Diapers & Supplies MISC   Yes No   Sig: Use as directed  Incontinence Supply Disposable (FQ Pant Liner) MISC   Yes No   LORazepam (ATIVAN) 0 5 mg tablet   Yes No   Sig: Take 0 5 mg by mouth in the morning and 0 5 mg in the evening  Lancets 33G MISC   Yes No   Sig: daily   QUEtiapine (SEROquel XR) 200 mg 24 hr tablet   No No   Sig: Take 1 tablet (200 mg total) by mouth daily at bedtime   Patient taking differently: Take 200 mg by mouth daily at bedtime 1 tab at 8pm   QUEtiapine (SEROquel) 100 mg tablet   Yes No   Sig: Take 100 mg by mouth in the morning and 100 mg in the evening   8am, 4pm    aspirin (ECOTRIN LOW STRENGTH) 81 mg EC tablet   Yes No   Sig: Take 81 mg by mouth daily   bisacodyl (DULCOLAX) 5 mg EC tablet   Yes No   Sig: Take 5 mg by mouth daily as needed for constipation   divalproex sodium (DEPAKOTE) 250 mg EC tablet   Yes No   Sig: Take 250 mg by mouth in the morning 1 tab AM (8am)   divalproex sodium (DEPAKOTE) 500 mg EC tablet   Yes No   Sig: Take 500 mg by mouth daily after dinner   docusate sodium (COLACE) 100 mg capsule  Outside Facility (Specify) Yes No   Sig: Take 100 mg by mouth in the morning and 100 mg in the evening    ergocalciferol (VITAMIN D2) 50,000 units   Yes No   ferrous sulfate 325 (65 Fe) mg tablet   Yes No   Sig: Take 325 mg by mouth daily   gabapentin (NEURONTIN) 800 mg tablet   Yes No   Sig: Take 800 mg by mouth 3 (three) times a day   glipiZIDE-metFORMIN (METAGLIP) 2 5-250 MG per tablet   Yes No   Sig: Take 1 tablet by mouth in the morning and 1 tablet in the evening  Take before meals  glucose blood test strip   Yes No   Sig: TEST BLOOD SUGAR TWO TIMES DAILY  DX E11 9   levothyroxine 50 mcg tablet   No No   Sig: Take 1 tablet (50 mcg total) by mouth daily in the early morning   methenamine hippurate (HIPREX) 1 g tablet   No No   Sig: Take 1 tablet (1 g total) by mouth in the morning and 1 tablet (1 g total) in the evening  Take with meals  Restart that after finishing with keflex  nystatin (MYCOSTATIN) powder  Self Yes No   Sig: Apply topically in the morning   omeprazole (PriLOSEC) 20 mg delayed release capsule   Yes No   Sig: Take 20 mg by mouth daily   rosuvastatin (CRESTOR) 40 MG tablet   Yes No   Sig: Take 40 mg by mouth daily   senna (SENOKOT) 8 6 mg   No No   Sig: Take 1 tablet (8 6 mg total) by mouth daily at bedtime   sertraline (ZOLOFT) 100 mg tablet   No No   Sig: Take 1 tablet (100 mg total) by mouth daily at bedtime   Patient taking differently: Take 100 mg by mouth 2 (two) times a day BID   tamsulosin (FLOMAX) 0 4 mg   No No   Sig: Take 1 capsule (0 4 mg total) by mouth daily with dinner   tolterodine (DETROL LA) 4 mg 24 hr capsule   Yes No   Sig: Take 2 mg by mouth in the morning and 2 mg before bedtime     torsemide (DEMADEX) 10 mg tablet   No No   Sig: Take 1 tablet (10 mg total) by mouth daily Facility-Administered Medications: None       Past Medical History:   Diagnosis Date    Anxiety     Diabetes mellitus ([facility] )     GERD (gastroesophageal reflux disease)     Hyperlipidemia     Hypertension     Mental disability     Mixed incontinence 4/12/2017    Seizure disorder ([facility] )        History reviewed  No pertinent surgical history  Family History   Problem Relation Age of Onset    No Known Problems Mother     No Known Problems Father     No Known Problems Sister     No Known Problems Maternal Grandmother     No Known Problems Maternal Grandfather     No Known Problems Paternal Grandmother     No Known Problems Paternal Grandfather      I have reviewed and agree with the history as documented  E-Cigarette/Vaping    E-Cigarette Use Never User      E-Cigarette/Vaping Substances    Nicotine No     THC No     CBD No     Flavoring No     Other No     Unknown No      Social History     Tobacco Use    Smoking status: Never Smoker    Smokeless tobacco: Never Used   Vaping Use    Vaping Use: Never used   Substance Use Topics    Alcohol use: Not Currently    Drug use: Not Currently       Review of Systems   Unable to perform ROS: Patient nonverbal   Constitutional: Positive for activity change, appetite change and fatigue  All other systems reviewed and are negative  Physical Exam  Physical Exam  Vitals and nursing note reviewed  Constitutional:       General: She is not in acute distress  Appearance: Normal appearance  She is well-developed  She is not ill-appearing or toxic-appearing  HENT:      Head: Normocephalic and atraumatic  Right Ear: External ear normal       Left Ear: External ear normal       Nose: Nose normal    Eyes:      Conjunctiva/sclera: Conjunctivae normal       Pupils: Pupils are equal, round, and reactive to light  Cardiovascular:      Rate and Rhythm: Normal rate and regular rhythm  Heart sounds: Normal heart sounds     Pulmonary: Effort: Pulmonary effort is normal  No respiratory distress  Breath sounds: Normal breath sounds  No wheezing or rales  Chest:      Chest wall: No tenderness  Abdominal:      General: Bowel sounds are normal  There is no distension  Palpations: Abdomen is soft  Tenderness: There is no abdominal tenderness  There is no guarding  Musculoskeletal:         General: Normal range of motion  Cervical back: Normal range of motion and neck supple  Skin:     General: Skin is warm and dry  Neurological:      Mental Status: She is alert  Mental status is at baseline  Cranial Nerves: No cranial nerve deficit  Sensory: No sensory deficit           Vital Signs  ED Triage Vitals [08/21/22 1051]   Temperature Pulse Respirations Blood Pressure SpO2   (!) 96 1 °F (35 6 °C) 78 20 130/70 100 %      Temp Source Heart Rate Source Patient Position - Orthostatic VS BP Location FiO2 (%)   Temporal -- Lying Right arm --      Pain Score       --           Vitals:    08/21/22 1051   BP: 130/70   Pulse: 78   Patient Position - Orthostatic VS: Lying         Visual Acuity      ED Medications  Medications   sodium chloride 0 9 % bolus 1,000 mL (1,000 mL Intravenous New Bag 8/21/22 1128)   sodium chloride tablet 1 g (1 g Oral Given 8/21/22 1206)       Diagnostic Studies  Results Reviewed     Procedure Component Value Units Date/Time    UA w Reflex to Microscopic w Reflex to Culture [[id number]] Collected: 08/21/22 1157    Lab Status: Final result Specimen: Urine, Straight Cath Updated: 08/21/22 1206     Color, UA Yellow     Clarity, UA Clear     Specific Gravity, UA 1 010     pH, UA 6 5     Leukocytes, UA Negative     Nitrite, UA Negative     Protein, UA Negative mg/dl      Glucose, UA Negative mg/dl      Ketones, UA Negative mg/dl      Urobilinogen, UA 0 2 E U /dl      Bilirubin, UA Negative     Occult Blood, UA Negative    Protime-INR [[id number]]  (Normal) Collected: 08/21/22 1111    Lab Status: Final result Specimen: Blood from Arm, Right Updated: 08/21/22 1146     Protime 12 6 seconds      INR 0 93    APTT [[id number]]  (Abnormal) Collected: 08/21/22 1111    Lab Status: Final result Specimen: Blood from Arm, Right Updated: 08/21/22 1146     PTT 19 seconds     Valproic acid level, total [[id number]]  (Normal) Collected: 08/21/22 1111    Lab Status: Final result Specimen: Blood from Arm, Right Updated: 08/21/22 1142     Valproic Acid, Total 88 ug/mL     Comprehensive metabolic panel [[id number]]  (Abnormal) Collected: 08/21/22 1111    Lab Status: Final result Specimen: Blood from Arm, Right Updated: 08/21/22 1141     Sodium 130 mmol/L      Potassium 3 6 mmol/L      Chloride 90 mmol/L      CO2 32 mmol/L      ANION GAP 8 mmol/L      BUN 17 mg/dL      Creatinine 0 80 mg/dL      Glucose 112 mg/dL      Calcium 10 4 mg/dL      AST 20 U/L      ALT 24 U/L      Alkaline Phosphatase 81 U/L      Total Protein 8 6 g/dL      Albumin 4 3 g/dL      Total Bilirubin 0 51 mg/dL      eGFR 75 ml/min/1 73sq m     Narrative:      [place] guidelines for Chronic Kidney Disease (CKD):     Stage 1 with normal or high GFR (GFR > 90 mL/min/1 73 square meters)    Stage 2 Mild CKD (GFR = 60-89 mL/min/1 73 square meters)    Stage 3A Moderate CKD (GFR = 45-59 mL/min/1 73 square meters)    Stage 3B Moderate CKD (GFR = 30-44 mL/min/1 73 square meters)    Stage 4 Severe CKD (GFR = 15-29 mL/min/1 73 square meters)    Stage 5 End Stage CKD (GFR <15 mL/min/1 73 square meters)  Note: GFR calculation is accurate only with a steady state creatinine    Lipase [[id number]]  (Normal) Collected: 08/21/22 1111    Lab Status: Final result Specimen: Blood from Arm, Right Updated: 08/21/22 1141     Lipase 85 u/L     Magnesium [[id number]]  (Normal) Collected: 08/21/22 1111    Lab Status: Final result Specimen: Blood from Arm, Right Updated: 08/21/22 1141     Magnesium 1 6 mg/dL     CBC and differential [[id number]] Collected: 08/21/22 1111 Lab Status: Final result Specimen: Blood from Arm, Right Updated: 08/21/22 1123     WBC 4 91 Thousand/uL      RBC 4 86 Million/uL      Hemoglobin 14 1 g/dL      Hematocrit 43 4 %      MCV 89 fL      MCH 29 0 pg      MCHC 32 5 g/dL      RDW 15 0 %      MPV 9 6 fL      Platelets 351 Thousands/uL      nRBC 0 /100 WBCs      Neutrophils Relative 73 %      Immat GRANS % 1 %      Lymphocytes Relative 16 %      Monocytes Relative 8 %      Eosinophils Relative 2 %      Basophils Relative 0 %      Neutrophils Absolute 3 63 Thousands/µL      Immature Grans Absolute 0 03 Thousand/uL      Lymphocytes Absolute 0 79 Thousands/µL      Monocytes Absolute 0 37 Thousand/µL      Eosinophils Absolute 0 08 Thousand/µL      Basophils Absolute 0 01 Thousands/µL                  CT head without contrast   Final Result by Trena Escalante MD (08/21 1154)   Stable ventriculomegaly  Correlate for NPH  No acute intracranial abnormality  Workstation performed: [id number]                    Procedures  ECG 12 Lead Documentation Only    Date/Time: 8/21/2022 11:06 AM  Performed by: Dave Dowling DO  Authorized by: Dave Dowling DO     ECG reviewed by me, the ED Provider: yes    Patient location:  ED  Previous ECG:     Comparison to cardiac monitor: Yes    Quality:     Tracing quality:  Limited by artifact  Rate:     ECG rate:  80    ECG rate assessment: normal    Rhythm:     Rhythm: sinus rhythm    QRS:     QRS axis:  Normal    QRS intervals:  Normal  Conduction:     Conduction: normal    ST segments:     ST segments:  Normal  T waves:     T waves: normal               ED Course  ED Course as of 08/21/22 1220   Sun Aug 21, 2022   1145 Sodium(!): 130  Mild hyponatremia noted this is chronic and at or near baseline for patient patient has received normal saline bolus in the ED will treat additional oral sodium chloride tablet in the ED now    Patient clinically hemodynamically neurologically stable in the ED awaiting urine at this time      1158 Stable ventriculomegaly is noted patient has no acute change in mental status or incontinence or new ataxia, no strong clinical correlation for normal pressure hydrocephalus  Additionally this finding is stable from prior examination  MDM  Number of Diagnoses or Management Options  Fatigue: new and requires workup  Hyponatremia: new and requires workup  Diagnosis management comments: Patient remained clinically hemodynamically neurologically stable in the emergency department she is alert and well-appearing and calm cooperative in the ED  Workup in the ED reveals no evidence acute infectious or metabolic process the mild hyponatremia noted on workup in the ED is chronic and at baseline for the patient no evidence of urinary tract infection or acute infection or other metabolic process at this time  Patient given normal saline bolus as well as sodium chloride tablet in the ED to help with the hyponatremia  Advised rest and supportive care prompt follow-up with primary physician for further evaluation and treatment and obtain test results  return precautions and anticipatory guidance discussed           Amount and/or Complexity of Data Reviewed  Clinical lab tests: reviewed and ordered  Tests in the radiology section of CPT®: ordered and reviewed  Tests in the medicine section of CPT®: ordered and reviewed  Decide to obtain previous medical records or to obtain history from someone other than the patient: yes  Review and summarize past medical records: yes  Independent visualization of images, tracings, or specimens: yes    Risk of Complications, Morbidity, and/or Mortality  Presenting problems: moderate  Diagnostic procedures: moderate  Management options: moderate    Patient Progress  Patient progress: stable      Disposition  Final diagnoses:   Fatigue   Hyponatremia - Mild chronic     Time reflects when diagnosis was documented in both MDM as applicable and the Disposition within this note     Time User Action Codes Description Comment    8/21/2022 11:54 AM Lacy Senate Add [R53 1] Generalized weakness     8/21/2022 11:54 AM Rakesh Elias Remove [R53 1] Generalized weakness     8/21/2022 11:54 AM Lacy Senate Add [R53 83] Fatigue     8/21/2022 11:54 AM Rakesh De La Torre Add [E87 1] Hyponatremia     8/21/2022 11:54 AM Lacy Senate Modify [E87 1] Hyponatremia Mild chronic      ED Disposition     ED Disposition   Discharge    Condition   Stable    Date/Time   Sun Aug 21, 2022 12:16 PM    Comment   Denita Vital discharge to home/self care  Follow-up Information     Follow up With Specialties Details Why Contact Info    Sarah Wiley MD  Schedule an appointment as soon as possible for a visit in 3 days  [address] [facility]  [phone]            Patient's Medications   Discharge Prescriptions    No medications on file       No discharge procedures on file      PDMP Review       Value Time User    PDMP Reviewed  Yes 7/19/2022  9:40 PM Briana Decker [place]          ED Provider  Electronically Signed by           Juan Poole DO  08/21/22 1224

## 2022-08-21 NOTE — ED NOTES
Call placed to [facility] for transport back to [facility]        Josh Zamora RN  08/21/22 4400

## 2022-08-21 NOTE — ED NOTES
Patient came from the group home with 2 adult briefs in place that were removed, orange cream applied to her buttock/ aleksander cleft area in the area of the fragile skin area         Sterling Santos RN  [phone]

## 2022-10-12 PROBLEM — J69.0 ASPIRATION PNEUMONIA (HCC): Status: RESOLVED | Noted: 2021-07-18 | Resolved: 2022-10-12

## 2022-10-22 ENCOUNTER — HOSPITAL ENCOUNTER (EMERGENCY)
Facility: HOSPITAL | Age: 67
Discharge: HOME | End: 2022-10-22
Attending: EMERGENCY MEDICINE
Payer: MEDICARE

## 2022-10-22 ENCOUNTER — APPOINTMENT (EMERGENCY)
Dept: CT IMAGING | Facility: HOSPITAL | Age: 67
End: 2022-10-22
Payer: MEDICARE

## 2022-10-22 VITALS
RESPIRATION RATE: 18 BRPM | SYSTOLIC BLOOD PRESSURE: 107 MMHG | WEIGHT: 165 LBS | HEIGHT: 60 IN | TEMPERATURE: 97.7 F | HEART RATE: 76 BPM | OXYGEN SATURATION: 96 % | DIASTOLIC BLOOD PRESSURE: 66 MMHG | BODY MASS INDEX: 32.39 KG/M2

## 2022-10-22 DIAGNOSIS — K59.00 CONSTIPATION, UNSPECIFIED CONSTIPATION TYPE: Primary | ICD-10-CM

## 2022-10-22 PROCEDURE — 99283 EMERGENCY DEPT VISIT LOW MDM: CPT | Performed by: EMERGENCY MEDICINE

## 2022-10-22 PROCEDURE — 74176 CT ABD & PELVIS W/O CONTRAST: CPT

## 2022-10-22 PROCEDURE — G1004 CDSM NDSC: HCPCS

## 2022-10-22 RX ORDER — POLYETHYLENE GLYCOL 3350 17 G/17G
238 POWDER, FOR SOLUTION ORAL ONCE
Status: COMPLETED | OUTPATIENT
Start: 2022-10-22 | End: 2022-10-22

## 2022-10-22 RX ORDER — BISACODYL 10 MG
10 SUPPOSITORY, RECTAL RECTAL ONCE
Status: COMPLETED | OUTPATIENT
Start: 2022-10-22 | End: 2022-10-22

## 2022-10-22 RX ORDER — MAGNESIUM CARB/ALUMINUM HYDROX 105-160MG
296 TABLET,CHEWABLE ORAL ONCE
Qty: 296 ML | Refills: 0 | Status: SHIPPED | OUTPATIENT
Start: 2022-10-22 | End: 2022-10-22

## 2022-10-22 RX ORDER — POLYETHYLENE GLYCOL 3350 17 G/17G
238 POWDER, FOR SOLUTION ORAL ONCE
Status: DISCONTINUED | OUTPATIENT
Start: 2022-10-22 | End: 2022-10-22

## 2022-10-22 RX ADMIN — POLYETHYLENE GLYCOL 3350 238 G: 17 POWDER, FOR SOLUTION ORAL at 17:06

## 2022-10-22 RX ADMIN — BISACODYL 10 MG: 10 SUPPOSITORY RECTAL at 16:45

## 2022-10-22 NOTE — ED NOTES
Patient unable to follow direction for soap suds enema  Patient immediately pushing and just water coming out  Provider aware  Order for Mag Citrate to follow       Josafat Melgar RN  10/22/22 [id number]

## 2022-10-22 NOTE — DISCHARGE INSTRUCTIONS
There is no fecal impaction or obstruction  No further emergent intervention required at this time  We recommend the use of any over-the-counter laxative recommended by PCP or as previously ordered

## 2022-10-22 NOTE — ED PROVIDER NOTES
History  Chief Complaint   Patient presents with   • Constipation     Pts caregiver arrives reporting 1 week of constipation  Pt saw PCP and has used OTC treatments without success and was advised by PCP to go to ED  [de-identified] year old female from group home where patient resides secondary to developmental delay brought to the emergency room upon advice of PCP secondary to reported constipation  OTC medications apparently have not relieved the concern and she was brought here for further evaluation  Patient appears to be in no pain or distress  History provided by:  Caregiver  History limited by: Developmental delay  Constipation  Severity:  Unable to specify  Timing:  Unable to specify  Progression:  Unchanged  Context: not dehydration, not dietary changes, not medication and not narcotics    Stool description:  None produced  Relieved by:  Nothing  Ineffective treatments:  Laxatives      Prior to Admission Medications   Prescriptions Last Dose Informant Patient Reported? Taking? Basaglar KwikPen 100 units/mL injection pen   Yes No   Cranberry 450 MG CAPS   Yes No   Sig: Take by mouth   Diapers & Supplies MISC   Yes No   Sig: Use as directed  Incontinence Supply Disposable (FQ Pant Liner) MISC   Yes No   LORazepam (ATIVAN) 0 5 mg tablet   Yes No   Sig: Take 0 5 mg by mouth in the morning and 0 5 mg in the evening  Lancets 33G MISC   Yes No   Sig: daily   QUEtiapine (SEROquel XR) 200 mg 24 hr tablet   No No   Sig: Take 1 tablet (200 mg total) by mouth daily at bedtime   Patient taking differently: Take 200 mg by mouth daily at bedtime 1 tab at 8pm   QUEtiapine (SEROquel) 100 mg tablet   Yes No   Sig: Take 100 mg by mouth in the morning and 100 mg in the evening   8am, 4pm    aspirin (ECOTRIN LOW STRENGTH) 81 mg EC tablet   Yes No   Sig: Take 81 mg by mouth daily   bisacodyl (DULCOLAX) 5 mg EC tablet   Yes No   Sig: Take 5 mg by mouth daily as needed for constipation   divalproex sodium (DEPAKOTE) 250 mg EC tablet   Yes No   Sig: Take 250 mg by mouth in the morning 1 tab AM (8am)   divalproex sodium (DEPAKOTE) 500 mg EC tablet   Yes No   Sig: Take 500 mg by mouth daily after dinner   docusate sodium (COLACE) 100 mg capsule  Outside Facility (Specify) Yes No   Sig: Take 100 mg by mouth in the morning and 100 mg in the evening    ergocalciferol (VITAMIN D2) 50,000 units   Yes No   ferrous sulfate 325 (65 Fe) mg tablet   Yes No   Sig: Take 325 mg by mouth daily   gabapentin (NEURONTIN) 800 mg tablet   Yes No   Sig: Take 800 mg by mouth 3 (three) times a day   glipiZIDE-metFORMIN (METAGLIP) 2 5-250 MG per tablet   Yes No   Sig: Take 1 tablet by mouth in the morning and 1 tablet in the evening  Take before meals  glucose blood test strip   Yes No   Sig: TEST BLOOD SUGAR TWO TIMES DAILY  DX E11 9   levothyroxine 50 mcg tablet   No No   Sig: Take 1 tablet (50 mcg total) by mouth daily in the early morning   methenamine hippurate (HIPREX) 1 g tablet   No No   Sig: Take 1 tablet (1 g total) by mouth in the morning and 1 tablet (1 g total) in the evening  Take with meals  Restart that after finishing with keflex  nystatin (MYCOSTATIN) powder  Self Yes No   Sig: Apply topically in the morning   omeprazole (PriLOSEC) 20 mg delayed release capsule   Yes No   Sig: Take 20 mg by mouth daily   rosuvastatin (CRESTOR) 40 MG tablet   Yes No   Sig: Take 40 mg by mouth daily   senna (SENOKOT) 8 6 mg   No No   Sig: Take 1 tablet (8 6 mg total) by mouth daily at bedtime   sertraline (ZOLOFT) 100 mg tablet   No No   Sig: Take 1 tablet (100 mg total) by mouth daily at bedtime   Patient taking differently: Take 100 mg by mouth 2 (two) times a day BID   tamsulosin (FLOMAX) 0 4 mg   No No   Sig: Take 1 capsule (0 4 mg total) by mouth daily with dinner   tolterodine (DETROL LA) 4 mg 24 hr capsule   Yes No   Sig: Take 2 mg by mouth in the morning and 2 mg before bedtime     torsemide (DEMADEX) 10 mg tablet   No No   Sig: Take 1 tablet (10 mg total) by mouth daily      Facility-Administered Medications: None       Past Medical History:   Diagnosis Date   • Anxiety    • CHF (congestive heart failure) ([facility])    • Diabetes mellitus ([facility] )    • GERD (gastroesophageal reflux disease)    • Hyperlipidemia    • Hypertension    • Mental disability    • Mixed incontinence 04/12/2017   • Seizure disorder ([facility] )        History reviewed  No pertinent surgical history  Family History   Problem Relation Age of Onset   • No Known Problems Mother    • No Known Problems Father    • No Known Problems Sister    • No Known Problems Maternal Grandmother    • No Known Problems Maternal Grandfather    • No Known Problems Paternal Grandmother    • No Known Problems Paternal Grandfather      I have reviewed and agree with the history as documented  E-Cigarette/Vaping   • E-Cigarette Use Never User      E-Cigarette/Vaping Substances   • Nicotine No    • THC No    • CBD No    • Flavoring No    • Other No    • Unknown No      Social History     Tobacco Use   • Smoking status: Never Smoker   • Smokeless tobacco: Never Used   Vaping Use   • Vaping Use: Never used   Substance Use Topics   • Alcohol use: Not Currently   • Drug use: Not Currently       Review of Systems   Gastrointestinal: Positive for constipation  Physical Exam  Physical Exam  Vitals and nursing note reviewed  Constitutional:       General: She is not in acute distress  Appearance: She is normal weight  She is not ill-appearing or toxic-appearing  HENT:      Head: Normocephalic  Right Ear: External ear normal       Left Ear: External ear normal       Nose: Nose normal       Mouth/Throat:      Mouth: Mucous membranes are moist    Cardiovascular:      Rate and Rhythm: Normal rate  Pulses: Normal pulses  Pulmonary:      Effort: Pulmonary effort is normal  No respiratory distress  Abdominal:      General: Abdomen is flat  There is no distension  Palpations:  There is no mass  Tenderness: There is no abdominal tenderness  Skin:     General: Skin is warm  Neurological:      Mental Status: She is alert  Mental status is at baseline  Vital Signs  ED Triage Vitals [10/22/22 1409]   Temperature Pulse Respirations Blood Pressure SpO2   97 7 °F (36 5 °C) 76 18 107/66 96 %      Temp src Heart Rate Source Patient Position - Orthostatic VS BP Location FiO2 (%)   -- -- -- -- --      Pain Score       No Pain           Vitals:    10/22/22 1409   BP: 107/66   Pulse: 76         Visual Acuity      ED Medications  Medications   polyethylene glycol (GLYCOLAX) bowel prep 238 g (238 g Oral Given 10/22/22 1706)   bisacodyl (DULCOLAX) rectal suppository 10 mg (10 mg Rectal Given 10/22/22 1645)       Diagnostic Studies  Results Reviewed     None                 CT abdomen pelvis wo contrast   Final Result by Leonid Gtz MD (10/22 1805)      Moderate colonic fecal stasis  No bowel obstruction or fecal impaction  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  Yearly follow-up CT suggested  Workstation performed: [id number]                    Procedures  Procedures         ED Course  ED Course as of 10/22/22 1916   Sat Oct 22, 2022   1536 Care provider reports that patient was diagnosed with constipation the a KUB 2 days ago  PCP advised the group home to take the patient emergency room if she did not have a bowel movement in the next 24 hours  1904 Moderate colonic fecal stasis  No obstruction or impaction  Patient will be discharged home with recommendations for the group facility to continue to use magnesium citrate or similar and Dulcolax   1912 Group home staff was advised at bedside  They were advised to use the glycol 5 times a day (17 g per dose) or until bowel movement was achieved  That this order was good until Monday  At that time they should contact PCP for further advice    Care provider verbalized an understanding of these instructions  MDM  Number of Diagnoses or Management Options  Constipation, unspecified constipation type: new and requires workup  Diagnosis management comments: No significant change  However, patient has been provided with oral laxatives and rectal suppository  Group home can continue to monitor at home and provide continued outpatient over-the-counter medications and follow-up with PCP  There is no impaction or obstruction requiring emergent intervention  PCP may managed as an outpatient  Amount and/or Complexity of Data Reviewed  Tests in the radiology section of CPT®: ordered and reviewed    Risk of Complications, Morbidity, and/or Mortality  Presenting problems: moderate  Diagnostic procedures: low  Management options: low    Patient Progress  Patient progress: stable      Disposition  Final diagnoses:   Constipation, unspecified constipation type     Time reflects when diagnosis was documented in both MDM as applicable and the Disposition within this note     Time User Action Codes Description Comment    10/22/2022  7:05 PM Eileen Coleman Gera [K59 00] Constipation, unspecified constipation type       ED Disposition     ED Disposition   Discharge    Condition   Stable    Date/Time   Sat Oct 22, 2022  7:05 PM    [address] discharge to home/self care  Follow-up Information     Follow up With Specialties Details Why Marcela De MD  In 1 week As needed [address] [address]  [phone]            Patient's Medications   Discharge Prescriptions    No medications on file       No discharge procedures on file      PDMP Review       Value Time User    PDMP Reviewed  Yes 7/19/2022  9:40 PM Panchito Tolliver          ED Provider  Electronically Signed by           Nj Lyons DO  10/22/22 Maggi Santo [address], DO  10/22/22 2458

## 2022-12-02 ENCOUNTER — APPOINTMENT (EMERGENCY)
Dept: CT IMAGING | Facility: HOSPITAL | Age: 67
End: 2022-12-02

## 2022-12-02 ENCOUNTER — HOSPITAL ENCOUNTER (EMERGENCY)
Facility: HOSPITAL | Age: 67
Discharge: HOME | End: 2022-12-02
Attending: EMERGENCY MEDICINE

## 2022-12-02 VITALS
OXYGEN SATURATION: 96 % | HEIGHT: 60 IN | SYSTOLIC BLOOD PRESSURE: 125 MMHG | HEART RATE: 72 BPM | RESPIRATION RATE: 16 BRPM | DIASTOLIC BLOOD PRESSURE: 60 MMHG | BODY MASS INDEX: 30.12 KG/M2 | WEIGHT: 153.44 LBS | TEMPERATURE: 97.8 F

## 2022-12-02 DIAGNOSIS — S09.90XA CLOSED HEAD INJURY, INITIAL ENCOUNTER: Primary | ICD-10-CM

## 2022-12-03 NOTE — ED PROVIDER NOTES
Emergency Department Trauma Note  Tyrese Peres 69 y o  female MRN: [id number]  Unit/Bed#: ED 09/ED 09 Encounter: [id number]      Trauma Alert: Trauma Acuity: Trauma Evaluation  Model of Arrival: Mode of Arrival: Direct from scene via    Trauma Team: Current Providers  Attending Provider: Roc Holman MD  Registered Nurse: Joy Sandifer, RN  Consultants:  None      History of Present Illness     Chief Complaint:   Chief Complaint   Patient presents with   • Head Injury     Pt's caregivers noticed a bruise to left side of occiput  Nobody is aware of injury, pt is acting appropriately, takes asa daily     HPI:  Tyrese Peres is a 69 y o  female who presents with fall  Mechanism:Details of Incident: caregivers noticed bruise to left occiput  unknown injury  takes asa daily Injury Date:  (noticed bruise today)   Injury Occurence Location - [address] [place] [address] Way: Chepe Carlsonwer in from group home because caretaker noticed a bruise on the back of the head  Patient takes aspirin daily  No change in mental status  Acting normally  No nausea or vomiting  History provided by:  Caregiver   used: No    Head Injury w/unknown LOC  Location:  Occipital  Mechanism of injury: unable to specify    Pain details:     Quality:  Aching    Severity:  Unable to specify    Timing:  Unable to specify    Progression:  Unable to specify  Chronicity:  New  Relieved by:  Nothing  Worsened by:  Nothing  Ineffective treatments:  None tried  Associated symptoms: no difficulty breathing, no disorientation, no headaches, no hearing loss, no nausea, no neck pain, no seizures and no vomiting      Review of Systems   Constitutional: Negative for chills and fever  HENT: Negative for ear pain, hearing loss, sore throat, trouble swallowing and voice change  Eyes: Negative for pain and discharge  Respiratory: Negative for cough, shortness of breath and wheezing      Cardiovascular: Negative for chest pain and palpitations  Gastrointestinal: Negative for abdominal pain, blood in stool, constipation, diarrhea, nausea and vomiting  Genitourinary: Negative for dysuria, flank pain, frequency and hematuria  Musculoskeletal: Negative for joint swelling, neck pain and neck stiffness  Skin: Negative for rash and wound  Neurological: Negative for dizziness, seizures, syncope, facial asymmetry and headaches  Psychiatric/Behavioral: Negative for hallucinations, self-injury and suicidal ideas  All other systems reviewed and are negative  Historical Information     Immunizations: There is no immunization history for the selected administration types on file for this patient  Past Medical History:   Diagnosis Date   • Anxiety    • CHF (congestive heart failure) ([facility] )    • Diabetes mellitus ([place] [address] )    • GERD (gastroesophageal reflux disease)    • Hyperlipidemia    • Hypertension    • Mental disability    • Mixed incontinence 04/12/2017   • Seizure disorder ([place] [address] )        Family History   Problem Relation Age of Onset   • No Known Problems Mother    • No Known Problems Father    • No Known Problems Sister    • No Known Problems Maternal Grandmother    • No Known Problems Maternal Grandfather    • No Known Problems Paternal Grandmother    • No Known Problems Paternal Grandfather      History reviewed  No pertinent surgical history  Social History     Tobacco Use   • Smoking status: Never   • Smokeless tobacco: Never   Vaping Use   • Vaping Use: Never used   Substance Use Topics   • Alcohol use: Not Currently   • Drug use: Not Currently     E-Cigarette/Vaping   • E-Cigarette Use Never User      E-Cigarette/Vaping Substances   • Nicotine No    • THC No    • CBD No    • Flavoring No    • Other No    • Unknown No        Family History:  Noncontributory  Meds/Allergies   Prior to Admission Medications   Prescriptions Last Dose Informant Patient Reported? Taking?    Basaglar KwikPen 100 units/mL injection pen   Yes No Cranberry 450 MG CAPS   Yes No   Sig: Take by mouth   Diapers & Supplies MISC   Yes No   Sig: Use as directed  Incontinence Supply Disposable (FQ Pant Liner) MISC   Yes No   LORazepam (ATIVAN) 0 5 mg tablet   Yes No   Sig: Take 0 5 mg by mouth in the morning and 0 5 mg in the evening  Lancets 33G MISC   Yes No   Sig: daily   QUEtiapine (SEROquel XR) 200 mg 24 hr tablet   No No   Sig: Take 1 tablet (200 mg total) by mouth daily at bedtime   Patient taking differently: Take 200 mg by mouth daily at bedtime 1 tab at 8pm   QUEtiapine (SEROquel) 100 mg tablet   Yes No   Sig: Take 100 mg by mouth in the morning and 100 mg in the evening  8am, 4pm    aspirin (ECOTRIN LOW STRENGTH) 81 mg EC tablet   Yes No   Sig: Take 81 mg by mouth daily   bisacodyl (DULCOLAX) 5 mg EC tablet   Yes No   Sig: Take 5 mg by mouth daily as needed for constipation   divalproex sodium (DEPAKOTE) 250 mg EC tablet   Yes No   Sig: Take 250 mg by mouth in the morning 1 tab AM (8am)   divalproex sodium (DEPAKOTE) 500 mg EC tablet   Yes No   Sig: Take 500 mg by mouth daily after dinner   docusate sodium (COLACE) 100 mg capsule   Yes No   Sig: Take 100 mg by mouth in the morning and 100 mg in the evening    ergocalciferol (VITAMIN D2) 50,000 units   Yes No   ferrous sulfate 325 (65 Fe) mg tablet   Yes No   Sig: Take 325 mg by mouth daily   gabapentin (NEURONTIN) 800 mg tablet   Yes No   Sig: Take 800 mg by mouth 3 (three) times a day   glipiZIDE-metFORMIN (METAGLIP) 2 5-250 MG per tablet   Yes No   Sig: Take 1 tablet by mouth in the morning and 1 tablet in the evening  Take before meals  glucose blood test strip   Yes No   Sig: TEST BLOOD SUGAR TWO TIMES DAILY   DX E11 9   levothyroxine 50 mcg tablet   No No   Sig: Take 1 tablet (50 mcg total) by mouth daily in the early morning   magnesium citrate (CITROMA) 1 745 g/30 mL oral solution   No No   Sig: Take 296 mL by mouth once for 1 dose   methenamine hippurate (HIPREX) 1 g tablet   No No   Sig: Take 1 tablet (1 g total) by mouth in the morning and 1 tablet (1 g total) in the evening  Take with meals  Restart that after finishing with keflex  nystatin (MYCOSTATIN) powder   Yes No   Sig: Apply topically in the morning   omeprazole (PriLOSEC) 20 mg delayed release capsule   Yes No   Sig: Take 20 mg by mouth daily   rosuvastatin (CRESTOR) 40 MG tablet   Yes No   Sig: Take 40 mg by mouth daily   senna (SENOKOT) 8 6 mg   No No   Sig: Take 1 tablet (8 6 mg total) by mouth daily at bedtime   sertraline (ZOLOFT) 100 mg tablet   No No   Sig: Take 1 tablet (100 mg total) by mouth daily at bedtime   Patient taking differently: Take 100 mg by mouth 2 (two) times a day BID   tamsulosin (FLOMAX) 0 4 mg   No No   Sig: Take 1 capsule (0 4 mg total) by mouth daily with dinner   tolterodine (DETROL LA) 4 mg 24 hr capsule   Yes No   Sig: Take 2 mg by mouth in the morning and 2 mg before bedtime  torsemide (DEMADEX) 10 mg tablet   No No   Sig: Take 1 tablet (10 mg total) by mouth daily      Facility-Administered Medications: None       Allergies   Allergen Reactions   • Actos [Pioglitazone] Other (See Comments)     unkown        PHYSICAL EXAM    PE limited by:  Mental disability    Objective   Vitals:   First set: Temperature: 97 8 °F (36 6 °C) (12/02/22 1912)  Pulse: 68 (12/02/22 1914)  Respirations: 16 (12/02/22 1912)  Blood Pressure: 134/59 (12/02/22 1912)  SpO2: 97 % (12/02/22 1914)    Primary Survey:   (A) Airway:  Intact  (B) Breathing:  Spontaneous  (C) Circulation: Pulses:   Radial pulses 2+ bilaterally  (D) Disabliity:  GCS 15  (E) Expose:  Done    Secondary Survey: (Click on Physical Exam tab above)  Physical Exam  Vitals and nursing note reviewed  Constitutional:       General: She is not in acute distress  Appearance: She is well-developed  HENT:      Head: Normocephalic and atraumatic  Right Ear: External ear normal       Left Ear: External ear normal    Eyes:      General: No scleral icterus  Right eye: No discharge  Left eye: No discharge  Extraocular Movements: Extraocular movements intact  Conjunctiva/sclera: Conjunctivae normal    Cardiovascular:      Rate and Rhythm: Normal rate and regular rhythm  Heart sounds: Normal heart sounds  No murmur heard  Pulmonary:      Effort: Pulmonary effort is normal       Breath sounds: Normal breath sounds  No wheezing or rales  Abdominal:      General: Bowel sounds are normal  There is no distension  Palpations: Abdomen is soft  Tenderness: There is no abdominal tenderness  There is no guarding or rebound  Musculoskeletal:         General: No deformity  Normal range of motion  Cervical back: Normal range of motion and neck supple  Skin:     General: Skin is warm and dry  Findings: No rash  Neurological:      General: No focal deficit present  Mental Status: She is alert and oriented to person, place, and time  Cranial Nerves: No cranial nerve deficit  Psychiatric:         Mood and Affect: Mood normal          Behavior: Behavior normal          Thought Content: Thought content normal          Judgment: Judgment normal          Cervical spine cleared by clinical criteria? No    Invasive Devices     None                 Lab Results:   Results Reviewed     None                 Imaging Studies:   Direct to CT:  No  TRAUMA - CT spine cervical wo contrast   Final Result by Mine Colmenares MD (12/02 1947)      No cervical spine fracture or traumatic malalignment  The study was marked in [facility] for immediate notification as per trauma protocol  Workstation performed: [id number]         TRAUMA - CT head wo contrast   Final Result by Mine Colmenares MD (12/02 1947)      No acute intracranial abnormality  Focal soft tissue swelling in the left parietal scalp region likely related to contusion  Stable enlargement of the ventricles out of portion to the sulci   Again correlate for possible NPH       The study was marked in [facility] for immediate notification as per trauma protocol  Workstation performed: [id number]               Procedures  Procedures         ED Course  ED Course as of 12/02/22 1950   Fri Dec 02, 2022   1911 Portable chest x-ray not indicated this time as the patient has no pain with palpation of the chest   Lungs are clear to auscultation  No respiratory distress  1912 Portable pelvis x-ray not indicated this time as the patient has no pain in the pelvis  Not clinically indicated  1949 Cervical Collar Clearance: The patient had a CT scan of the cervical spine demonstrating no acute injury  On exam, the patient had no midline point tenderness or paresthesias/numbness/weakness in the extremities  The patient had full range of motion (was then able to flex, extend, and rotate head laterally) without pain  There were no distracting injuries and the patient was not intoxicated  The patient's cervical spine was cleared radiologically and clinically  Cervical collar removed at this time  Lisa Wallis MD  12/2/2022 7:49 PM               MDM  Number of Diagnoses or Management Options     Amount and/or Complexity of Data Reviewed  Clinical lab tests: reviewed  Review and summarize past medical records: yes            Disposition  [facility] Transfer:  No  Final diagnoses:   Closed head injury, initial encounter     Time reflects when diagnosis was documented in both MDM as applicable and the Disposition within this note     Time User Action Codes Description Comment    12/2/2022  7:18 PM Michael Penn Add [S09 90XA] Closed head injury, initial encounter       ED Disposition     ED Disposition   Discharge    Condition   Stable    Date/Time   Fri Dec 2, 2022  7:17 PM    Comment   [place] discharge to home/self care                 Follow-up Information     Follow up With Specialties Details Why Keo Rebolledo MD  Call in 3 days  [phone] [place] [phone]  [phone]          Patient's Medications   Discharge Prescriptions    No medications on file     No discharge procedures on file      PDMP Review       Value Time User    PDMP Reviewed  Yes 7/19/2022  9:40 PM Gayl Billing, [address]           ED Provider  Electronically Signed by         Rhonda Naranjo MD  12/02/22 1950

## 2022-12-09 NOTE — CASE MANAGEMENT
Case Management Progress Note    Patient name Bettye Heading  Location /-01 MRN [id number]  : 1955 Date 2022       LOS (days): 2  Geometric Mean LOS (GMLOS) (days): 3 80  Days to GMLOS:2        OBJECTIVE:        Current admission status: Inpatient  Preferred Pharmacy:   [address] [place] [address]  Phone: [phone] Fax: [address] [address]  Phone: [phone] Fax: [phone]    Primary Care Provider: Madi Almanza MD    Primary Insurance: MEDICARE  Secondary Insurance: [address] [place] [address] NOTE:        As per request, dc summary emailed to [email or web address] Minoxidil Counseling: Minoxidil is a topical medication which can increase blood flow where it is applied. It is uncertain how this medication increases hair growth. Side effects are uncommon and include stinging and allergic reactions.

## 2023-01-11 ENCOUNTER — HOSPITAL ENCOUNTER (EMERGENCY)
Facility: HOSPITAL | Age: 68
Discharge: HOME | End: 2023-01-11
Attending: EMERGENCY MEDICINE

## 2023-01-11 ENCOUNTER — APPOINTMENT (EMERGENCY)
Dept: RADIOLOGY | Facility: HOSPITAL | Age: 68
End: 2023-01-11

## 2023-01-11 VITALS
WEIGHT: 153 LBS | OXYGEN SATURATION: 97 % | RESPIRATION RATE: 17 BRPM | TEMPERATURE: 96.8 F | DIASTOLIC BLOOD PRESSURE: 71 MMHG | BODY MASS INDEX: 30.04 KG/M2 | SYSTOLIC BLOOD PRESSURE: 130 MMHG | HEART RATE: 62 BPM | HEIGHT: 60 IN

## 2023-01-11 DIAGNOSIS — S70.212A ABRASION OF LEFT HIP, INITIAL ENCOUNTER: Primary | ICD-10-CM

## 2023-01-11 NOTE — ED PROVIDER NOTES
History  Chief Complaint   Patient presents with   • Leg Pain     Left leg was stuck in commode on EMS arrival, pt is non-verbal at baseline  EMS took apart toilet chair to get leg unstuck        Patient is nonverbal from the group home, sent for evaluation of questionable fall in the shower  Apparently the patient fell out of her shower chair and suffered minimal injury to the left groin area with an abrasion  She was apparently tangled up in the shower chair and Kai lift and was sent to the emergency room for evaluation  The EMS state that the patient had what appeared to be an abrasion  No other obvious signs of trauma were noted at the scene  The patient cannot provide any history but does not appear to have any complaints      History provided by:  EMS personnel  History limited by:  Patient nonverbal   used: No    Leg Pain  Location:  Hip  Time since incident: Just prior to arrival   Hip location:  L hip  Pain details:     Severity:  Mild    Onset quality:  Sudden    Timing:  Constant    Progression:  Unchanged  Chronicity:  New  Relieved by:  Nothing  Worsened by:  Nothing  Ineffective treatments:  None tried      Prior to Admission Medications   Prescriptions Last Dose Informant Patient Reported? Taking? Basaglar KwikPen 100 units/mL injection pen   Yes No   Cranberry 450 MG CAPS   Yes No   Sig: Take by mouth   Diapers & Supplies MISC   Yes No   Sig: Use as directed  Incontinence Supply Disposable (FQ Pant Liner) MISC   Yes No   LORazepam (ATIVAN) 0 5 mg tablet   Yes No   Sig: Take 0 5 mg by mouth in the morning and 0 5 mg in the evening     Lancets 33G MISC   Yes No   Sig: daily   QUEtiapine (SEROquel XR) 200 mg 24 hr tablet   No No   Sig: Take 1 tablet (200 mg total) by mouth daily at bedtime   Patient taking differently: Take 200 mg by mouth daily at bedtime 1 tab at 8pm   QUEtiapine (SEROquel) 100 mg tablet   Yes No   Sig: Take 100 mg by mouth in the morning and 100 mg in the evening  8am, 4pm    aspirin (ECOTRIN LOW STRENGTH) 81 mg EC tablet   Yes No   Sig: Take 81 mg by mouth daily   bisacodyl (DULCOLAX) 5 mg EC tablet   Yes No   Sig: Take 5 mg by mouth daily as needed for constipation   divalproex sodium (DEPAKOTE) 250 mg EC tablet   Yes No   Sig: Take 250 mg by mouth in the morning 1 tab AM (8am)   divalproex sodium (DEPAKOTE) 500 mg EC tablet   Yes No   Sig: Take 500 mg by mouth daily after dinner   docusate sodium (COLACE) 100 mg capsule   Yes No   Sig: Take 100 mg by mouth in the morning and 100 mg in the evening    ergocalciferol (VITAMIN D2) 50,000 units   Yes No   ferrous sulfate 325 (65 Fe) mg tablet   Yes No   Sig: Take 325 mg by mouth daily   gabapentin (NEURONTIN) 800 mg tablet   Yes No   Sig: Take 800 mg by mouth 3 (three) times a day   glipiZIDE-metFORMIN (METAGLIP) 2 5-250 MG per tablet   Yes No   Sig: Take 1 tablet by mouth in the morning and 1 tablet in the evening  Take before meals  glucose blood test strip   Yes No   Sig: TEST BLOOD SUGAR TWO TIMES DAILY  DX E11 9   levothyroxine 50 mcg tablet   No No   Sig: Take 1 tablet (50 mcg total) by mouth daily in the early morning   magnesium citrate (CITROMA) 1 745 g/30 mL oral solution   No No   Sig: Take 296 mL by mouth once for 1 dose   methenamine hippurate (HIPREX) 1 g tablet   No No   Sig: Take 1 tablet (1 g total) by mouth in the morning and 1 tablet (1 g total) in the evening  Take with meals  Restart that after finishing with keflex     nystatin (MYCOSTATIN) powder   Yes No   Sig: Apply topically in the morning   omeprazole (PriLOSEC) 20 mg delayed release capsule   Yes No   Sig: Take 20 mg by mouth daily   rosuvastatin (CRESTOR) 40 MG tablet   Yes No   Sig: Take 40 mg by mouth daily   senna (SENOKOT) 8 6 mg   No No   Sig: Take 1 tablet (8 6 mg total) by mouth daily at bedtime   sertraline (ZOLOFT) 100 mg tablet   No No   Sig: Take 1 tablet (100 mg total) by mouth daily at bedtime   Patient taking differently: Take 100 mg by mouth 2 (two) times a day BID   tamsulosin (FLOMAX) 0 4 mg   No No   Sig: Take 1 capsule (0 4 mg total) by mouth daily with dinner   tolterodine (DETROL LA) 4 mg 24 hr capsule   Yes No   Sig: Take 2 mg by mouth in the morning and 2 mg before bedtime  torsemide (DEMADEX) 10 mg tablet   No No   Sig: Take 1 tablet (10 mg total) by mouth daily      Facility-Administered Medications: None       Past Medical History:   Diagnosis Date   • Anxiety    • CHF (congestive heart failure) ([facility])    • Diabetes mellitus ([facility] )    • GERD (gastroesophageal reflux disease)    • Hyperlipidemia    • Hypertension    • Mental disability    • Mixed incontinence 04/12/2017   • Seizure disorder ([facility] )        History reviewed  No pertinent surgical history  Family History   Problem Relation Age of Onset   • No Known Problems Mother    • No Known Problems Father    • No Known Problems Sister    • No Known Problems Maternal Grandmother    • No Known Problems Maternal Grandfather    • No Known Problems Paternal Grandmother    • No Known Problems Paternal Grandfather      I have reviewed and agree with the history as documented  E-Cigarette/Vaping   • E-Cigarette Use Never User      E-Cigarette/Vaping Substances   • Nicotine No    • THC No    • CBD No    • Flavoring No    • Other No    • Unknown No      Social History     Tobacco Use   • Smoking status: Never   • Smokeless tobacco: Never   Vaping Use   • Vaping Use: Never used   Substance Use Topics   • Alcohol use: Not Currently   • Drug use: Not Currently       Review of Systems   Unable to perform ROS: Patient nonverbal       Physical Exam  Physical Exam  Vitals and nursing note reviewed  Exam conducted with a chaperone present (ED tech)  Constitutional:       General: She is not in acute distress  Appearance: She is well-developed  HENT:      Head: Normocephalic and atraumatic        Right Ear: External ear normal       Left Ear: External ear normal    Eyes: General: No scleral icterus  Right eye: No discharge  Left eye: No discharge  Extraocular Movements: Extraocular movements intact  Conjunctiva/sclera: Conjunctivae normal    Cardiovascular:      Rate and Rhythm: Normal rate and regular rhythm  Heart sounds: Normal heart sounds  No murmur heard  Pulmonary:      Effort: Pulmonary effort is normal       Breath sounds: Normal breath sounds  No wheezing or rales  Abdominal:      General: Bowel sounds are normal  There is no distension  Palpations: Abdomen is soft  Tenderness: There is no abdominal tenderness  There is no guarding or rebound  Musculoskeletal:         General: No tenderness, deformity or signs of injury  Normal range of motion  Cervical back: Normal range of motion and neck supple  Comments: Full range of motion bilateral hips without pain  Skin:     General: Skin is warm and dry  Findings: No rash  Comments: Small abrasion noted in the skin overlying the left hip  No lacerations  No active bleeding  Neurological:      General: No focal deficit present  Mental Status: She is alert and oriented to person, place, and time  Cranial Nerves: No cranial nerve deficit     Psychiatric:         Mood and Affect: Mood normal          Vital Signs  ED Triage Vitals   Temperature Pulse Respirations Blood Pressure SpO2   01/11/23 1856 01/11/23 1847 01/11/23 1847 01/11/23 1847 01/11/23 1847   (!) 96 8 °F (36 °C) 64 19 139/69 94 %      Temp Source Heart Rate Source Patient Position - Orthostatic VS BP Location FiO2 (%)   01/11/23 1856 01/11/23 1847 01/11/23 1847 01/11/23 1847 --   Temporal Monitor Lying Left arm       Pain Score       --                  Vitals:    01/11/23 1847   BP: 139/69   Pulse: 64   Patient Position - Orthostatic VS: Lying         Visual Acuity      ED Medications  Medications - No data to display    Diagnostic Studies  Results Reviewed     None                 XR hip/pelv 2-3 vws left if performed   ED Interpretation by Maribel Duran MD (01/11 1939)   No fractures                 Procedures  Procedures         ED Course                               SBIRT 22yo+    Flowsheet Row Most Recent Value   SBIRT (25 yo +)    In order to provide better care to our patients, we are screening all of our patients for alcohol and drug use  Would it be okay to ask you these screening questions? Yes Filed at: 01/11/2023 1857   Initial Alcohol Screen: US AUDIT-C     1  How often do you have a drink containing alcohol? 0 Filed at: 01/11/2023 1857   2  How many drinks containing alcohol do you have on a typical day you are drinking? 0 Filed at: 01/11/2023 1857   3a  Male UNDER 65: How often do you have five or more drinks on one occasion? 0 Filed at: 01/11/2023 1857   3b  FEMALE Any Age, or MALE 65+: How often do you have 4 or more drinks on one occassion? 0 Filed at: 01/11/2023 1857   Audit-C Score 0 Filed at: 01/11/2023 1857   KAILASH: How many times in the past year have you    Used an illegal drug or used a prescription medication for non-medical reasons? Never Filed at: 01/11/2023 1857                    Medical Decision Making  Nonverbal patient from group home with possible small abrasion, no obvious injuries otherwise, will obtain x-ray of hip  Abrasion of left hip, initial encounter: acute illness or injury  Amount and/or Complexity of Data Reviewed  Independent Historian: EMS  External Data Reviewed: labs, radiology and notes  Radiology: ordered and independent interpretation performed  Decision-making details documented in ED Course  Details: Left hip x-ray negative  Patient stable without obvious complaint and negative examination aside from abrasion noted in the area            Disposition  Final diagnoses:   Abrasion of left hip, initial encounter     Time reflects when diagnosis was documented in both MDM as applicable and the Disposition within this note     Time User Action Codes Description Comment    1/11/2023  7:40 PM Almita Salcedo Add [O40 212A] Abrasion of left hip, initial encounter       ED Disposition     ED Disposition   Discharge    Condition   Stable    Date/Time   Wed Jan 11, 2023  7:40 PM    Comment   Ashli Washington discharge to home/self care  Follow-up Information     Follow up With Specialties Details Why Deb Barker MD    [address] [address]  [phone]            Patient's Medications   Discharge Prescriptions    No medications on file       No discharge procedures on file      PDMP Review       Value Time User    PDMP Reviewed  Yes 7/19/2022  9:40 PM Kika Crowley [place]          ED Provider  Electronically Signed by           Art Toth MD  01/11/23 6057

## 2023-03-02 NOTE — ASSESSMENT & PLAN NOTE
Lab Results   Component Value Date    HGBA1C 6 6 (H) 07/19/2021     Recent Labs     07/19/21  1117 07/19/21  1554 07/20/21  0734 07/20/21  1104   POCGLU 200* 179* 180* 253*     · Continue sliding scale insulin Known

## 2023-04-03 NOTE — ASSESSMENT & PLAN NOTE
· Secondary to pneumonia being trialed on room air today  · In ICU did require bronchoscopy with suction of mucus plugs  · Seen by pulmonology    Will continue diuretics with low-dose torsemide  · CTA negative for pulmonary embolism Elidel Pregnancy And Lactation Text: This medication is Pregnancy Category C. It is unknown if this medication is excreted in breast milk.

## 2023-07-12 ENCOUNTER — DOCTOR'S OFFICE (OUTPATIENT)
Dept: URBAN - NONMETROPOLITAN AREA CLINIC 1 | Facility: CLINIC | Age: 68
Setting detail: OPHTHALMOLOGY
End: 2023-07-12
Payer: COMMERCIAL

## 2023-07-12 DIAGNOSIS — H35.033: ICD-10-CM

## 2023-07-12 DIAGNOSIS — E11.9: ICD-10-CM

## 2023-07-12 DIAGNOSIS — H25.13: ICD-10-CM

## 2023-07-12 PROCEDURE — 92014 COMPRE OPH EXAM EST PT 1/>: CPT | Performed by: OPHTHALMOLOGY

## 2023-07-12 ASSESSMENT — SPHEQUIV_DERIVED
OS_SPHEQUIV: -3
OS_SPHEQUIV: -3
OD_SPHEQUIV: -2.75
OD_SPHEQUIV: -2.75

## 2023-07-12 ASSESSMENT — REFRACTION_CURRENTRX
OS_AXIS: 173
OS_OVR_VA: 20/
OD_AXIS: 172
OD_SPHERE: -3.00
OS_CYLINDER: -0.50
OD_CYLINDER: -0.50
OD_OVR_VA: 20/
OS_SPHERE: -3.00

## 2023-07-12 ASSESSMENT — REFRACTION_MANIFEST
OD_AXIS: 180
OD_SPHERE: -2.50
OS_AXIS: 180
OD_CYLINDER: -0.50
OS_CYLINDER: -0.50
OS_SPHERE: -2.75

## 2023-07-12 ASSESSMENT — CONFRONTATIONAL VISUAL FIELD TEST (CVF)
OD_COMMENTS: UNABLE
OS_COMMENTS: UNABLE

## 2023-07-12 ASSESSMENT — VISUAL ACUITY
OS_BCVA: 20/F+F
OD_BCVA: 20/F+F

## 2023-08-22 ENCOUNTER — HOSPITAL ENCOUNTER (INPATIENT)
Facility: HOSPITAL | Age: 68
LOS: 1 days | Discharge: HOME HEALTH | DRG: 637 | End: 2023-08-24
Attending: STUDENT IN AN ORGANIZED HEALTH CARE EDUCATION/TRAINING PROGRAM | Admitting: FAMILY MEDICINE
Payer: MEDICARE

## 2023-08-22 ENCOUNTER — APPOINTMENT (EMERGENCY)
Dept: RADIOLOGY | Facility: HOSPITAL | Age: 68
DRG: 637 | End: 2023-08-22
Payer: MEDICARE

## 2023-08-22 DIAGNOSIS — J90 PLEURAL EFFUSION: ICD-10-CM

## 2023-08-22 DIAGNOSIS — N30.90 CYSTITIS WITHOUT HEMATURIA: ICD-10-CM

## 2023-08-22 DIAGNOSIS — E16.2 HYPOGLYCEMIA: Primary | ICD-10-CM

## 2023-08-22 DIAGNOSIS — F39 MOOD DISORDER (HCC): ICD-10-CM

## 2023-08-22 DIAGNOSIS — G93.41 ACUTE METABOLIC ENCEPHALOPATHY: ICD-10-CM

## 2023-08-22 DIAGNOSIS — Z86.39 HISTORY OF DIABETES MELLITUS: ICD-10-CM

## 2023-08-22 LAB
ALBUMIN SERPL BCP-MCNC: 4 G/DL (ref 3.5–5)
ALP SERPL-CCNC: 71 U/L (ref 34–104)
ALT SERPL W P-5'-P-CCNC: 15 U/L (ref 7–52)
ANION GAP SERPL CALCULATED.3IONS-SCNC: 6 MMOL/L
AST SERPL W P-5'-P-CCNC: 22 U/L (ref 13–39)
BASOPHILS # BLD AUTO: 0.01 THOUSANDS/ÂΜL (ref 0–0.1)
BASOPHILS NFR BLD AUTO: 0 % (ref 0–1)
BILIRUB SERPL-MCNC: 0.19 MG/DL (ref 0.2–1)
BILIRUB UR QL STRIP: NEGATIVE
BUN SERPL-MCNC: 29 MG/DL (ref 5–25)
CALCIUM SERPL-MCNC: 9.8 MG/DL (ref 8.4–10.2)
CHLORIDE SERPL-SCNC: 95 MMOL/L (ref 96–108)
CK SERPL-CCNC: 88 U/L (ref 26–192)
CLARITY UR: CLEAR
CO2 SERPL-SCNC: 31 MMOL/L (ref 21–32)
COLOR UR: YELLOW
CREAT SERPL-MCNC: 0.98 MG/DL (ref 0.6–1.3)
EOSINOPHIL # BLD AUTO: 0.06 THOUSAND/ÂΜL (ref 0–0.61)
EOSINOPHIL NFR BLD AUTO: 2 % (ref 0–6)
ERYTHROCYTE [DISTWIDTH] IN BLOOD BY AUTOMATED COUNT: 17 % (ref 11.6–15.1)
GFR SERPL CREATININE-BSD FRML MDRD: 59 ML/MIN/1.73SQ M
GLUCOSE SERPL-MCNC: 112 MG/DL (ref 65–140)
GLUCOSE SERPL-MCNC: 127 MG/DL (ref 65–140)
GLUCOSE SERPL-MCNC: 151 MG/DL (ref 65–140)
GLUCOSE SERPL-MCNC: 156 MG/DL (ref 65–140)
GLUCOSE SERPL-MCNC: 35 MG/DL (ref 65–140)
GLUCOSE SERPL-MCNC: 54 MG/DL (ref 65–140)
GLUCOSE SERPL-MCNC: 62 MG/DL (ref 65–140)
GLUCOSE SERPL-MCNC: <20 MG/DL (ref 65–140)
GLUCOSE UR STRIP-MCNC: NEGATIVE MG/DL
HCT VFR BLD AUTO: 34.8 % (ref 34.8–46.1)
HGB BLD-MCNC: 11.1 G/DL (ref 11.5–15.4)
HGB UR QL STRIP.AUTO: NEGATIVE
IMM GRANULOCYTES # BLD AUTO: 0.01 THOUSAND/UL (ref 0–0.2)
IMM GRANULOCYTES NFR BLD AUTO: 0 % (ref 0–2)
KETONES UR STRIP-MCNC: NEGATIVE MG/DL
LACTATE SERPL-SCNC: 0.9 MMOL/L (ref 0.5–2)
LEUKOCYTE ESTERASE UR QL STRIP: NEGATIVE
LG PLATELETS BLD QL SMEAR: PRESENT
LYMPHOCYTES # BLD AUTO: 0.92 THOUSANDS/ÂΜL (ref 0.6–4.47)
LYMPHOCYTES NFR BLD AUTO: 26 % (ref 14–44)
MAGNESIUM SERPL-MCNC: 2.2 MG/DL (ref 1.9–2.7)
MCH RBC QN AUTO: 30 PG (ref 26.8–34.3)
MCHC RBC AUTO-ENTMCNC: 31.9 G/DL (ref 31.4–37.4)
MCV RBC AUTO: 94 FL (ref 82–98)
MONOCYTES # BLD AUTO: 0.22 THOUSAND/ÂΜL (ref 0.17–1.22)
MONOCYTES NFR BLD AUTO: 6 % (ref 4–12)
NEUTROPHILS # BLD AUTO: 2.39 THOUSANDS/ÂΜL (ref 1.85–7.62)
NEUTS SEG NFR BLD AUTO: 66 % (ref 43–75)
NITRITE UR QL STRIP: NEGATIVE
NRBC BLD AUTO-RTO: 1 /100 WBCS
PH UR STRIP.AUTO: 6.5 [PH]
PLATELET # BLD AUTO: 105 THOUSANDS/UL (ref 149–390)
PLATELET BLD QL SMEAR: ABNORMAL
PMV BLD AUTO: 9.8 FL (ref 8.9–12.7)
POTASSIUM SERPL-SCNC: 3.9 MMOL/L (ref 3.5–5.3)
PROCALCITONIN SERPL-MCNC: <0.05 NG/ML
PROT SERPL-MCNC: 7 G/DL (ref 6.4–8.4)
PROT UR STRIP-MCNC: NEGATIVE MG/DL
RBC # BLD AUTO: 3.7 MILLION/UL (ref 3.81–5.12)
SODIUM SERPL-SCNC: 132 MMOL/L (ref 135–147)
SP GR UR STRIP.AUTO: <=1.005 (ref 1–1.03)
UROBILINOGEN UR QL STRIP.AUTO: 0.2 E.U./DL
WBC # BLD AUTO: 3.61 THOUSAND/UL (ref 4.31–10.16)

## 2023-08-22 PROCEDURE — 82550 ASSAY OF CK (CPK): CPT | Performed by: STUDENT IN AN ORGANIZED HEALTH CARE EDUCATION/TRAINING PROGRAM

## 2023-08-22 PROCEDURE — 82948 REAGENT STRIP/BLOOD GLUCOSE: CPT

## 2023-08-22 PROCEDURE — 83605 ASSAY OF LACTIC ACID: CPT | Performed by: STUDENT IN AN ORGANIZED HEALTH CARE EDUCATION/TRAINING PROGRAM

## 2023-08-22 PROCEDURE — 96365 THER/PROPH/DIAG IV INF INIT: CPT

## 2023-08-22 PROCEDURE — 85025 COMPLETE CBC W/AUTO DIFF WBC: CPT | Performed by: STUDENT IN AN ORGANIZED HEALTH CARE EDUCATION/TRAINING PROGRAM

## 2023-08-22 PROCEDURE — 71045 X-RAY EXAM CHEST 1 VIEW: CPT

## 2023-08-22 PROCEDURE — 83735 ASSAY OF MAGNESIUM: CPT | Performed by: STUDENT IN AN ORGANIZED HEALTH CARE EDUCATION/TRAINING PROGRAM

## 2023-08-22 PROCEDURE — 93005 ELECTROCARDIOGRAM TRACING: CPT

## 2023-08-22 PROCEDURE — 99285 EMERGENCY DEPT VISIT HI MDM: CPT

## 2023-08-22 PROCEDURE — 36415 COLL VENOUS BLD VENIPUNCTURE: CPT | Performed by: STUDENT IN AN ORGANIZED HEALTH CARE EDUCATION/TRAINING PROGRAM

## 2023-08-22 PROCEDURE — 87040 BLOOD CULTURE FOR BACTERIA: CPT | Performed by: STUDENT IN AN ORGANIZED HEALTH CARE EDUCATION/TRAINING PROGRAM

## 2023-08-22 PROCEDURE — 99223 1ST HOSP IP/OBS HIGH 75: CPT

## 2023-08-22 PROCEDURE — 84145 PROCALCITONIN (PCT): CPT | Performed by: STUDENT IN AN ORGANIZED HEALTH CARE EDUCATION/TRAINING PROGRAM

## 2023-08-22 PROCEDURE — 99291 CRITICAL CARE FIRST HOUR: CPT | Performed by: STUDENT IN AN ORGANIZED HEALTH CARE EDUCATION/TRAINING PROGRAM

## 2023-08-22 PROCEDURE — 81003 URINALYSIS AUTO W/O SCOPE: CPT | Performed by: STUDENT IN AN ORGANIZED HEALTH CARE EDUCATION/TRAINING PROGRAM

## 2023-08-22 PROCEDURE — 80053 COMPREHEN METABOLIC PANEL: CPT | Performed by: STUDENT IN AN ORGANIZED HEALTH CARE EDUCATION/TRAINING PROGRAM

## 2023-08-22 RX ORDER — ACETAMINOPHEN 325 MG/1
650 TABLET ORAL EVERY 6 HOURS PRN
Status: DISCONTINUED | OUTPATIENT
Start: 2023-08-22 | End: 2023-08-24 | Stop reason: HOSPADM

## 2023-08-22 RX ORDER — ONDANSETRON 2 MG/ML
4 INJECTION INTRAMUSCULAR; INTRAVENOUS EVERY 6 HOURS PRN
Status: DISCONTINUED | OUTPATIENT
Start: 2023-08-22 | End: 2023-08-24 | Stop reason: HOSPADM

## 2023-08-22 RX ORDER — QUETIAPINE FUMARATE 25 MG/1
100 TABLET, FILM COATED ORAL 2 TIMES DAILY
Status: DISCONTINUED | OUTPATIENT
Start: 2023-08-22 | End: 2023-08-22

## 2023-08-22 RX ORDER — BISACODYL 5 MG/1
5 TABLET, DELAYED RELEASE ORAL DAILY PRN
Status: DISCONTINUED | OUTPATIENT
Start: 2023-08-22 | End: 2023-08-24 | Stop reason: HOSPADM

## 2023-08-22 RX ORDER — DEXTROSE MONOHYDRATE 25 G/50ML
INJECTION, SOLUTION INTRAVENOUS
Status: COMPLETED
Start: 2023-08-22 | End: 2023-08-22

## 2023-08-22 RX ORDER — SERTRALINE HYDROCHLORIDE 100 MG/1
200 TABLET, FILM COATED ORAL DAILY
Status: DISCONTINUED | OUTPATIENT
Start: 2023-08-23 | End: 2023-08-24 | Stop reason: HOSPADM

## 2023-08-22 RX ORDER — DIVALPROEX SODIUM 250 MG/1
500 TABLET, DELAYED RELEASE ORAL
Status: DISCONTINUED | OUTPATIENT
Start: 2023-08-23 | End: 2023-08-24 | Stop reason: HOSPADM

## 2023-08-22 RX ORDER — QUETIAPINE FUMARATE 200 MG/1
200 TABLET, FILM COATED ORAL
COMMUNITY

## 2023-08-22 RX ORDER — FERROUS SULFATE 325(65) MG
325 TABLET ORAL DAILY
Status: DISCONTINUED | OUTPATIENT
Start: 2023-08-23 | End: 2023-08-24 | Stop reason: HOSPADM

## 2023-08-22 RX ORDER — QUETIAPINE FUMARATE 100 MG/1
200 TABLET, FILM COATED ORAL
Status: DISCONTINUED | OUTPATIENT
Start: 2023-08-22 | End: 2023-08-24 | Stop reason: HOSPADM

## 2023-08-22 RX ORDER — PANTOPRAZOLE SODIUM 40 MG/1
40 TABLET, DELAYED RELEASE ORAL
Status: DISCONTINUED | OUTPATIENT
Start: 2023-08-23 | End: 2023-08-24 | Stop reason: HOSPADM

## 2023-08-22 RX ORDER — SODIUM CHLORIDE, SODIUM GLUCONATE, SODIUM ACETATE, POTASSIUM CHLORIDE, MAGNESIUM CHLORIDE, SODIUM PHOSPHATE, DIBASIC, AND POTASSIUM PHOSPHATE .53; .5; .37; .037; .03; .012; .00082 G/100ML; G/100ML; G/100ML; G/100ML; G/100ML; G/100ML; G/100ML
1000 INJECTION, SOLUTION INTRAVENOUS ONCE
Status: COMPLETED | OUTPATIENT
Start: 2023-08-22 | End: 2023-08-22

## 2023-08-22 RX ORDER — GLIPIZIDE AND METFORMIN HCL 2.5; 5 MG/1; MG/1
1 TABLET, FILM COATED ORAL
COMMUNITY
End: 2023-08-24

## 2023-08-22 RX ORDER — LEVOTHYROXINE SODIUM 0.05 MG/1
50 TABLET ORAL
Status: DISCONTINUED | OUTPATIENT
Start: 2023-08-23 | End: 2023-08-24 | Stop reason: HOSPADM

## 2023-08-22 RX ORDER — DEXTROSE, SODIUM CHLORIDE, SODIUM LACTATE, POTASSIUM CHLORIDE, AND CALCIUM CHLORIDE 5; .6; .31; .03; .02 G/100ML; G/100ML; G/100ML; G/100ML; G/100ML
50 INJECTION, SOLUTION INTRAVENOUS CONTINUOUS
Status: DISCONTINUED | OUTPATIENT
Start: 2023-08-22 | End: 2023-08-23

## 2023-08-22 RX ORDER — DOCUSATE SODIUM 100 MG/1
100 CAPSULE, LIQUID FILLED ORAL 2 TIMES DAILY
Status: DISCONTINUED | OUTPATIENT
Start: 2023-08-22 | End: 2023-08-24 | Stop reason: HOSPADM

## 2023-08-22 RX ORDER — DIVALPROEX SODIUM 250 MG/1
250 TABLET, DELAYED RELEASE ORAL DAILY
Status: DISCONTINUED | OUTPATIENT
Start: 2023-08-23 | End: 2023-08-24 | Stop reason: HOSPADM

## 2023-08-22 RX ORDER — GABAPENTIN 300 MG/1
300 CAPSULE ORAL 3 TIMES DAILY
Status: DISCONTINUED | OUTPATIENT
Start: 2023-08-22 | End: 2023-08-24 | Stop reason: HOSPADM

## 2023-08-22 RX ORDER — SENNOSIDES 8.6 MG
8.6 TABLET ORAL
Status: DISCONTINUED | OUTPATIENT
Start: 2023-08-22 | End: 2023-08-24 | Stop reason: HOSPADM

## 2023-08-22 RX ORDER — ATORVASTATIN CALCIUM 40 MG/1
80 TABLET, FILM COATED ORAL
Status: DISCONTINUED | OUTPATIENT
Start: 2023-08-23 | End: 2023-08-24 | Stop reason: HOSPADM

## 2023-08-22 RX ORDER — DEXTROSE MONOHYDRATE 25 G/50ML
INJECTION, SOLUTION INTRAVENOUS
Status: DISPENSED
Start: 2023-08-22 | End: 2023-08-23

## 2023-08-22 RX ORDER — NYSTATIN 100000 [USP'U]/G
POWDER TOPICAL DAILY
Status: DISCONTINUED | OUTPATIENT
Start: 2023-08-23 | End: 2023-08-24 | Stop reason: HOSPADM

## 2023-08-22 RX ORDER — LORAZEPAM 0.5 MG/1
0.5 TABLET ORAL 2 TIMES DAILY
Status: DISCONTINUED | OUTPATIENT
Start: 2023-08-22 | End: 2023-08-24 | Stop reason: HOSPADM

## 2023-08-22 RX ORDER — QUETIAPINE FUMARATE 100 MG/1
100 TABLET, FILM COATED ORAL 2 TIMES DAILY
Status: DISCONTINUED | OUTPATIENT
Start: 2023-08-23 | End: 2023-08-24 | Stop reason: HOSPADM

## 2023-08-22 RX ORDER — DEXTROSE MONOHYDRATE 25 G/50ML
25 INJECTION, SOLUTION INTRAVENOUS ONCE
Status: DISCONTINUED | OUTPATIENT
Start: 2023-08-22 | End: 2023-08-24

## 2023-08-22 RX ORDER — HEPARIN SODIUM 5000 [USP'U]/ML
5000 INJECTION, SOLUTION INTRAVENOUS; SUBCUTANEOUS EVERY 8 HOURS SCHEDULED
Status: DISCONTINUED | OUTPATIENT
Start: 2023-08-22 | End: 2023-08-24 | Stop reason: HOSPADM

## 2023-08-22 RX ADMIN — GABAPENTIN 300 MG: 300 CAPSULE ORAL at 22:53

## 2023-08-22 RX ADMIN — DEXTROSE, SODIUM CHLORIDE, SODIUM LACTATE, POTASSIUM CHLORIDE, AND CALCIUM CHLORIDE 115 ML/HR: 5; .6; .31; .03; .02 INJECTION, SOLUTION INTRAVENOUS at 22:59

## 2023-08-22 RX ADMIN — SENNOSIDES 8.6 MG: 8.6 TABLET ORAL at 22:53

## 2023-08-22 RX ADMIN — SODIUM CHLORIDE, SODIUM GLUCONATE, SODIUM ACETATE, POTASSIUM CHLORIDE, MAGNESIUM CHLORIDE, SODIUM PHOSPHATE, DIBASIC, AND POTASSIUM PHOSPHATE 1000 ML: .53; .5; .37; .037; .03; .012; .00082 INJECTION, SOLUTION INTRAVENOUS at 17:51

## 2023-08-22 RX ADMIN — DEXTROSE MONOHYDRATE 50 ML: 25 INJECTION, SOLUTION INTRAVENOUS at 17:27

## 2023-08-22 RX ADMIN — DEXTROSE, SODIUM CHLORIDE, SODIUM LACTATE, POTASSIUM CHLORIDE, AND CALCIUM CHLORIDE 115 ML/HR: 5; .6; .31; .03; .02 INJECTION, SOLUTION INTRAVENOUS at 22:04

## 2023-08-22 RX ADMIN — LORAZEPAM 0.5 MG: 0.5 TABLET ORAL at 22:53

## 2023-08-22 RX ADMIN — HEPARIN SODIUM 5000 UNITS: 5000 INJECTION INTRAVENOUS; SUBCUTANEOUS at 22:53

## 2023-08-22 RX ADMIN — DEXTROSE MONOHYDRATE 1 G: 500 INJECTION PARENTERAL at 21:25

## 2023-08-22 RX ADMIN — QUETIAPINE FUMARATE 200 MG: 100 TABLET ORAL at 22:53

## 2023-08-22 RX ADMIN — DOCUSATE SODIUM 100 MG: 100 CAPSULE, LIQUID FILLED ORAL at 22:53

## 2023-08-22 NOTE — ED PROVIDER NOTES
History  Chief Complaint   Patient presents with   • Hypoglycemia - Symptomatic     Pt presents from group home. Staff states patient was weak. EMS found FSBS 40. Attempted x2 for peripheral IV and was unsuccessful. Administered 1 mg glucagon IM. History provided by:  Medical records, EMS personnel, nursing home and caregiver  History limited by:  Patient nonverbal, mental status change and patient unresponsive     78-year-old female. History of CHF, DM, seizure disorder. Resides at a group home. Presents with altered mental status, hypoglycemia. Per EMS, her initial blood sugar was 40 mg/dL. Unable to establish IV access. Intramuscular glucagon 1 mg administered. Upon arrival in the ED, the patient was unresponsive. Blood glucose less than 20 mg/dL. Prior to Admission Medications   Prescriptions Last Dose Informant Patient Reported? Taking? Basaglar KwikPen 100 units/mL injection pen   Yes No   Cranberry 450 MG CAPS   Yes No   Sig: Take by mouth   Diapers & Supplies MISC   Yes No   Sig: Use as directed. Incontinence Supply Disposable (FQ Pant Liner) MISC   Yes No   LORazepam (ATIVAN) 0.5 mg tablet   Yes No   Sig: Take 0.5 mg by mouth in the morning and 0.5 mg in the evening. Lancets 33G MISC   Yes No   Sig: daily   QUEtiapine (SEROquel XR) 200 mg 24 hr tablet   No No   Sig: Take 1 tablet (200 mg total) by mouth daily at bedtime   Patient taking differently: Take 200 mg by mouth daily at bedtime 1 tab at 8pm   QUEtiapine (SEROquel) 100 mg tablet   Yes No   Sig: Take 100 mg by mouth in the morning and 100 mg in the evening.  8am, 4pm.   aspirin (ECOTRIN LOW STRENGTH) 81 mg EC tablet   Yes No   Sig: Take 81 mg by mouth daily   bisacodyl (DULCOLAX) 5 mg EC tablet   Yes No   Sig: Take 5 mg by mouth daily as needed for constipation   divalproex sodium (DEPAKOTE) 250 mg EC tablet   Yes No   Sig: Take 250 mg by mouth in the morning 1 tab AM (8am)   divalproex sodium (DEPAKOTE) 500 mg EC tablet   Yes No   Sig: Take 500 mg by mouth daily after dinner   docusate sodium (COLACE) 100 mg capsule  Outside Facility (Specify) Yes No   Sig: Take 100 mg by mouth in the morning and 100 mg in the evening.   ergocalciferol (VITAMIN D2) 50,000 units   Yes No   ferrous sulfate 325 (65 Fe) mg tablet   Yes No   Sig: Take 325 mg by mouth daily   gabapentin (NEURONTIN) 800 mg tablet   Yes No   Sig: Take 800 mg by mouth 3 (three) times a day   glipiZIDE-metFORMIN (METAGLIP) 2.5-250 MG per tablet   Yes No   Sig: Take 1 tablet by mouth in the morning and 1 tablet in the evening. Take before meals. glucose blood test strip   Yes No   Sig: TEST BLOOD SUGAR TWO TIMES DAILY. DX E11.9   levothyroxine 50 mcg tablet   No No   Sig: Take 1 tablet (50 mcg total) by mouth daily in the early morning   magnesium citrate (CITROMA) 1.745 g/30 mL oral solution   No No   Sig: Take 296 mL by mouth once for 1 dose   methenamine hippurate (HIPREX) 1 g tablet   No No   Sig: Take 1 tablet (1 g total) by mouth in the morning and 1 tablet (1 g total) in the evening. Take with meals. Restart that after finishing with keflex. nystatin (MYCOSTATIN) powder  Self Yes No   Sig: Apply topically in the morning   omeprazole (PriLOSEC) 20 mg delayed release capsule   Yes No   Sig: Take 20 mg by mouth daily   rosuvastatin (CRESTOR) 40 MG tablet   Yes No   Sig: Take 40 mg by mouth daily   senna (SENOKOT) 8.6 mg   No No   Sig: Take 1 tablet (8.6 mg total) by mouth daily at bedtime   sertraline (ZOLOFT) 100 mg tablet   No No   Sig: Take 1 tablet (100 mg total) by mouth daily at bedtime   Patient taking differently: Take 100 mg by mouth 2 (two) times a day BID   tamsulosin (FLOMAX) 0.4 mg   No No   Sig: Take 1 capsule (0.4 mg total) by mouth daily with dinner   tolterodine (DETROL LA) 4 mg 24 hr capsule   Yes No   Sig: Take 2 mg by mouth in the morning and 2 mg before bedtime.    torsemide (DEMADEX) 10 mg tablet   No No   Sig: Take 1 tablet (10 mg total) by mouth daily Facility-Administered Medications: None       Past Medical History:   Diagnosis Date   • Anxiety    • CHF (congestive heart failure) ([facility])    • Diabetes mellitus ([address])    • GERD (gastroesophageal reflux disease)    • Hyperlipidemia    • Hypertension    • Mental disability    • Mixed incontinence 04/12/2017   • Seizure disorder ([address])        History reviewed. No pertinent surgical history. Family History   Problem Relation Age of Onset   • No Known Problems Mother    • No Known Problems Father    • No Known Problems Sister    • No Known Problems Maternal Grandmother    • No Known Problems Maternal Grandfather    • No Known Problems Paternal Grandmother    • No Known Problems Paternal Grandfather      I have reviewed and agree with the history as documented. E-Cigarette/Vaping   • E-Cigarette Use Never User      E-Cigarette/Vaping Substances   • Nicotine No    • THC No    • CBD No    • Flavoring No    • Other No    • Unknown No      Social History     Tobacco Use   • Smoking status: Never   • Smokeless tobacco: Never   Vaping Use   • Vaping Use: Never used   Substance Use Topics   • Alcohol use: Not Currently   • Drug use: Not Currently       Review of Systems   Unable to perform ROS: Patient nonverbal     Physical Exam  Physical Exam  Vitals and nursing note reviewed. Constitutional:       Appearance: She is ill-appearing. She is not toxic-appearing. Comments: Chronically ill-appearing, unresponsive   HENT:      Head: Normocephalic and atraumatic. Right Ear: External ear normal.      Left Ear: External ear normal.      Nose: No congestion or rhinorrhea. Eyes:      General: No scleral icterus. Right eye: No discharge. Left eye: No discharge. Cardiovascular:      Rate and Rhythm: Normal rate and regular rhythm. Pulses: Normal pulses. Heart sounds: Normal heart sounds. No murmur heard. Pulmonary:      Effort: Pulmonary effort is normal. No respiratory distress. Breath sounds: Normal breath sounds. No stridor. No wheezing, rhonchi or rales. Abdominal:      General: Bowel sounds are normal.      Palpations: Abdomen is soft. Tenderness: There is no abdominal tenderness. There is no right CVA tenderness, left CVA tenderness, guarding or rebound. Musculoskeletal:         General: No swelling or tenderness. Skin:     General: Skin is warm and dry. Coloration: Skin is pale. Skin is not jaundiced. Findings: No bruising, erythema, lesion or rash. Neurological:      Mental Status: She is unresponsive.          Vital Signs  ED Triage Vitals [08/22/23 1721]   Temperature Pulse Respirations Blood Pressure SpO2   (!) 97.1 °F (36.2 °C) 57 18 110/54 99 %      Temp Source Heart Rate Source Patient Position - Orthostatic VS BP Location FiO2 (%)   Temporal Monitor Lying Left arm --      Pain Score       --           Vitals:    08/22/23 1721 08/22/23 1730   BP: 110/54 (!) 97/47   Pulse: 57 60   Patient Position - Orthostatic VS: Lying Lying         Visual Acuity  Visual Acuity    Flowsheet Row Most Recent Value   L Pupil Size (mm) 3   R Pupil Size (mm) 3        ED Medications  Medications   dextrose 50 % IV solution **ADS Override Pull** (  Not Given 8/22/23 1809)   dextrose 50 % IV solution **ADS Override Pull** (50 mL  Given 8/22/23 1727)   glucagon (FOR EMS ONLY) (GLUCAGEN) injection 1 mg (0 mg Does not apply Given to EMS 8/22/23 1729)   multi-electrolyte (ISOLYTE-S PH 7.4) bolus 1,000 mL (0 mL Intravenous Stopped 8/22/23 1917)     Diagnostic Studies  Results Reviewed     Procedure Component Value Units Date/Time    Comprehensive metabolic panel [[id number]]  (Abnormal) Collected: 08/22/23 1751    Lab Status: Final result Specimen: Blood from Arm, Right Updated: 08/22/23 1839     Sodium 132 mmol/L      Potassium 3.9 mmol/L      Chloride 95 mmol/L      CO2 31 mmol/L      ANION GAP 6 mmol/L      BUN 29 mg/dL      Creatinine 0.98 mg/dL      Glucose 35 mg/dL      Calcium 9.8 mg/dL      AST 22 U/L      ALT 15 U/L      Alkaline Phosphatase 71 U/L      Total Protein 7.0 g/dL      Albumin 4.0 g/dL      Total Bilirubin 0.19 mg/dL      eGFR 59 ml/min/1.73sq m     Narrative:      [facility] guidelines for Chronic Kidney Disease (CKD):   •  Stage 1 with normal or high GFR (GFR > 90 mL/min/1.73 square meters)  •  Stage 2 Mild CKD (GFR = 60-89 mL/min/1.73 square meters)  •  Stage 3A Moderate CKD (GFR = 45-59 mL/min/1.73 square meters)  •  Stage 3B Moderate CKD (GFR = 30-44 mL/min/1.73 square meters)  •  Stage 4 Severe CKD (GFR = 15-29 mL/min/1.73 square meters)  •  Stage 5 End Stage CKD (GFR <15 mL/min/1.73 square meters)  Note: GFR calculation is accurate only with a steady state creatinine    Smear Review(Phlebs Do Not Order) [[id number]]  (Abnormal) Collected: 08/22/23 1751    Lab Status: Final result Specimen: Blood from Arm, Right Updated: 08/22/23 1830     Platelet Estimate Decreased     Large Platelet Present    Magnesium [[id number]]  (Normal) Collected: 08/22/23 1751    Lab Status: Final result Specimen: Blood from Arm, Right Updated: 08/22/23 1829     Magnesium 2.2 mg/dL     Lactic acid, plasma (w/reflex if result > 2.0) [[id number]]  (Normal) Collected: 08/22/23 1751    Lab Status: Final result Specimen: Blood from Arm, Right Updated: 08/22/23 1829     LACTIC ACID 0.9 mmol/L     Narrative:      Result may be elevated if tourniquet was used during collection.     CK [[id number]]  (Normal) Collected: 08/22/23 1751    Lab Status: Final result Specimen: Blood from Arm, Right Updated: 08/22/23 1829     Total CK 88 U/L     CBC and differential [[id number]]  (Abnormal) Collected: 08/22/23 1751    Lab Status: Final result Specimen: Blood from Arm, Right Updated: 08/22/23 1829     WBC 3.61 Thousand/uL      RBC 3.70 Million/uL      Hemoglobin 11.1 g/dL      Hematocrit 34.8 %      MCV 94 fL      MCH 30.0 pg      MCHC 31.9 g/dL      RDW 17.0 %      MPV 9.8 fL      Platelets 105 Thousands/uL      nRBC 1 /100 WBCs      Neutrophils Relative 66 %      Immat GRANS % 0 %      Lymphocytes Relative 26 %      Monocytes Relative 6 %      Eosinophils Relative 2 %      Basophils Relative 0 %      Neutrophils Absolute 2.39 Thousands/µL      Immature Grans Absolute 0.01 Thousand/uL      Lymphocytes Absolute 0.92 Thousands/µL      Monocytes Absolute 0.22 Thousand/µL      Eosinophils Absolute 0.06 Thousand/µL      Basophils Absolute 0.01 Thousands/µL     Narrative: This is an appended report. These results have been appended to a previously verified report. Procalcitonin [[id number]]  (Normal) Collected: 08/22/23 1751    Lab Status: Final result Specimen: Blood from Arm, Right Updated: 08/22/23 1826     Procalcitonin <0.05 ng/ml     Fingerstick Glucose (POCT) [[id number]]  (Abnormal) Collected: 08/22/23 1803    Lab Status: Final result Updated: 08/22/23 1804     POC Glucose 151 mg/dl     Blood culture #2 [[id number]] Collected: 08/22/23 1751    Lab Status: In process Specimen: Blood from Arm, Right Updated: 08/22/23 1759    Blood culture #1 [[id number]] Collected: 08/22/23 1751    Lab Status: In process Specimen: Blood from Arm, Right Updated: 08/22/23 1759    Fingerstick Glucose (POCT) [[id number]]  (Normal) Collected: 08/22/23 1739    Lab Status: Final result Updated: 08/22/23 1741     POC Glucose 127 mg/dl     Fingerstick Glucose (POCT) [[id number]]  (Abnormal) Collected: 08/22/23 1722    Lab Status: Final result Updated: 08/22/23 1740     POC Glucose <20 mg/dl     UA w Reflex to Microscopic w Reflex to Culture [[id number]]     Lab Status: No result Specimen: Urine              XR chest 1 view portable   ED Interpretation by Regla Guo DO (08/22 2011)   No acute findings       Final Result by Lisa Griffin DO (08/23 1043)      No acute cardiopulmonary disease. Mild elevation right diaphragm, unchanged.                Resident: Manisha Beyer, the attending radiologist, have reviewed the images and agree with the final report above. Workstation performed: [id number]                Procedures  ECG 12 Lead Documentation Only    Date/Time: 8/22/2023 5:26 PM    Performed by: Margaret Glover DO  Authorized by: Margaret Glover DO    Indications / Diagnosis:  AMS   ECG reviewed by me, the ED Provider: yes    Patient location:  ED  Previous ECG:     Previous ECG:  Unavailable  Interpretation:     Interpretation: normal    Rate:     ECG rate:  59    ECG rate assessment: bradycardic    Rhythm:     Rhythm: sinus bradycardia    Ectopy:     Ectopy: none    QRS:     QRS axis:  Normal    QRS intervals:  Normal  Conduction:     Conduction: normal    ST segments:     ST segments:  Normal  T waves:     T waves: normal    CriticalCare Time    Date/Time: 8/22/2023 6:03 PM    Performed by: Margaret Glover DO  Authorized by: Margaret Glover DO    Critical care provider statement:     Critical care time (minutes):  35    Critical care was necessary to treat or prevent imminent or life-threatening deterioration of the following conditions:  Endocrine crisis and metabolic crisis    Critical care was time spent personally by me on the following activities:  Blood draw for specimens, obtaining history from patient or surrogate, development of treatment plan with patient or surrogate, discussions with consultants, evaluation of patient's response to treatment, examination of patient, review of old charts, re-evaluation of patient's condition, ordering and review of radiographic studies, ordering and review of laboratory studies and ordering and performing treatments and interventions       ED Course  ED Course as of 08/23/23 1238   Tue Aug 22, 2023   1743 Initial blood sugar less than 20 mg/dL. IV D50 administered. Mental status improving. Repeat blood glucose 127 mg/dL. 1841 Mild leukopenia. Hemoglobin 1.1 from 12.1. Mild thrombocytopenia. Procalcitonin negative.   Lactic acid/CK are within normal limits. 1957 UA without signs of infection    2011 CXR without acute findings    2036 Repeat glucose 62 mg/dL. Will administer apple juice   2110 Status post apple juice, repeat glucose 54 mg/dL     SBIRT 20yo+    Flowsheet Row Most Recent Value   Initial Alcohol Screen: US AUDIT-C     1. How often do you have a drink containing alcohol? 0 Filed at: 08/22/2023 1729   2. How many drinks containing alcohol do you have on a typical day you are drinking? 0 Filed at: 08/22/2023 1729   3b. FEMALE Any Age, or MALE 65+: How often do you have 4 or more drinks on one occassion? 0 Filed at: 08/22/2023 1729   Audit-C Score 0 Filed at: 08/22/2023 1729   KAILASH: How many times in the past year have you. .. Used an illegal drug or used a prescription medication for non-medical reasons? Never Filed at: 08/22/2023 1729        Medical Decision Making  The differential diagnoses include but are not limited to UTI, hypoglycemia, PNA, medication noncompliance  Vital signs reviewed. Unresponsive upon arrival. BS <20 mg/dl. Mental status improved s/p IV D50. Other than for hypoglycemia, no other significant laboratory abnormalities. Infectious w/u unremarkable. BCx pending. The patient's BS continued to decrease despite being able to tolerate PO. D5LR maintenance fluids started. Admitted to [facility] for further management. History of diabetes mellitus: chronic illness or injury with exacerbation, progression, or side effects of treatment  Hypoglycemia: acute illness or injury  Amount and/or Complexity of Data Reviewed  Labs: ordered. Radiology: ordered and independent interpretation performed. Risk  Prescription drug management. Decision regarding hospitalization.           Disposition  Final diagnoses:   Hypoglycemia   History of diabetes mellitus     Time reflects when diagnosis was documented in both MDM as applicable and the Disposition within this note     Time User Action Codes Description Comment 8/22/2023  9:24 PM Kreg Cowden Add [E16.2] Hypoglycemia     8/22/2023  9:24 PM Per [place] Add [Z86.39] History of diabetes mellitus       ED Disposition     ED Disposition   Admit    Condition   Stable    Date/Time   Tue Aug 22, 2023  9:24 PM    Comment   Case was discussed with Macho Veliz and the patient's admission status was agreed to be Admission Status: observation status to the service of Dr. Laney Newton . Follow-up Information    None         Patient's Medications   Discharge Prescriptions    No medications on file       No discharge procedures on file.     PDMP Review       Value Time User    PDMP Reviewed  Yes 7/19/2022  9:40 PM Ed Crowley, [facility]          ED Provider  Electronically Signed by           Sallie Montiel DO  08/23/23 0871

## 2023-08-23 VITALS
DIASTOLIC BLOOD PRESSURE: 52 MMHG | WEIGHT: 153 LBS | TEMPERATURE: 97.5 F | OXYGEN SATURATION: 94 % | BODY MASS INDEX: 30.84 KG/M2 | HEIGHT: 59 IN | RESPIRATION RATE: 19 BRPM | SYSTOLIC BLOOD PRESSURE: 88 MMHG | HEART RATE: 59 BPM

## 2023-08-23 PROBLEM — E16.2 HYPOGLYCEMIA: Status: ACTIVE | Noted: 2023-08-23

## 2023-08-23 LAB
ALBUMIN SERPL BCP-MCNC: 3.5 G/DL (ref 3.5–5)
ALP SERPL-CCNC: 60 U/L (ref 34–104)
ALT SERPL W P-5'-P-CCNC: 14 U/L (ref 7–52)
ANION GAP SERPL CALCULATED.3IONS-SCNC: 3 MMOL/L
ANION GAP SERPL CALCULATED.3IONS-SCNC: 5 MMOL/L
AST SERPL W P-5'-P-CCNC: 17 U/L (ref 13–39)
ATRIAL RATE: 59 BPM
BASOPHILS # BLD AUTO: 0 THOUSANDS/ÂΜL (ref 0–0.1)
BASOPHILS NFR BLD AUTO: 0 % (ref 0–1)
BILIRUB SERPL-MCNC: 0.2 MG/DL (ref 0.2–1)
BUN SERPL-MCNC: 19 MG/DL (ref 5–25)
BUN SERPL-MCNC: 20 MG/DL (ref 5–25)
CALCIUM SERPL-MCNC: 9.6 MG/DL (ref 8.4–10.2)
CALCIUM SERPL-MCNC: 9.6 MG/DL (ref 8.4–10.2)
CHLORIDE SERPL-SCNC: 98 MMOL/L (ref 96–108)
CHLORIDE SERPL-SCNC: 99 MMOL/L (ref 96–108)
CO2 SERPL-SCNC: 31 MMOL/L (ref 21–32)
CO2 SERPL-SCNC: 33 MMOL/L (ref 21–32)
CREAT SERPL-MCNC: 0.71 MG/DL (ref 0.6–1.3)
CREAT SERPL-MCNC: 0.73 MG/DL (ref 0.6–1.3)
EOSINOPHIL # BLD AUTO: 0.04 THOUSAND/ÂΜL (ref 0–0.61)
EOSINOPHIL NFR BLD AUTO: 1 % (ref 0–6)
ERYTHROCYTE [DISTWIDTH] IN BLOOD BY AUTOMATED COUNT: 17.1 % (ref 11.6–15.1)
GFR SERPL CREATININE-BSD FRML MDRD: 84 ML/MIN/1.73SQ M
GFR SERPL CREATININE-BSD FRML MDRD: 87 ML/MIN/1.73SQ M
GLUCOSE SERPL-MCNC: 102 MG/DL (ref 65–140)
GLUCOSE SERPL-MCNC: 128 MG/DL (ref 65–140)
GLUCOSE SERPL-MCNC: 146 MG/DL (ref 65–140)
GLUCOSE SERPL-MCNC: 148 MG/DL (ref 65–140)
GLUCOSE SERPL-MCNC: 150 MG/DL (ref 65–140)
GLUCOSE SERPL-MCNC: 163 MG/DL (ref 65–140)
GLUCOSE SERPL-MCNC: 184 MG/DL (ref 65–140)
GLUCOSE SERPL-MCNC: 188 MG/DL (ref 65–140)
GLUCOSE SERPL-MCNC: 38 MG/DL (ref 65–140)
GLUCOSE SERPL-MCNC: 43 MG/DL (ref 65–140)
GLUCOSE SERPL-MCNC: 43 MG/DL (ref 65–140)
GLUCOSE SERPL-MCNC: 83 MG/DL (ref 65–140)
GLUCOSE SERPL-MCNC: 98 MG/DL (ref 65–140)
GLUCOSE SERPL-MCNC: 99 MG/DL (ref 65–140)
HCT VFR BLD AUTO: 31.1 % (ref 34.8–46.1)
HGB BLD-MCNC: 9.9 G/DL (ref 11.5–15.4)
IMM GRANULOCYTES # BLD AUTO: 0.01 THOUSAND/UL (ref 0–0.2)
IMM GRANULOCYTES NFR BLD AUTO: 0 % (ref 0–2)
LYMPHOCYTES # BLD AUTO: 0.91 THOUSANDS/ÂΜL (ref 0.6–4.47)
LYMPHOCYTES NFR BLD AUTO: 31 % (ref 14–44)
MCH RBC QN AUTO: 30.3 PG (ref 26.8–34.3)
MCHC RBC AUTO-ENTMCNC: 31.8 G/DL (ref 31.4–37.4)
MCV RBC AUTO: 95 FL (ref 82–98)
MONOCYTES # BLD AUTO: 0.18 THOUSAND/ÂΜL (ref 0.17–1.22)
MONOCYTES NFR BLD AUTO: 6 % (ref 4–12)
NEUTROPHILS # BLD AUTO: 1.77 THOUSANDS/ÂΜL (ref 1.85–7.62)
NEUTS SEG NFR BLD AUTO: 62 % (ref 43–75)
NRBC BLD AUTO-RTO: 0 /100 WBCS
P AXIS: 62 DEGREES
PLATELET # BLD AUTO: 100 THOUSANDS/UL (ref 149–390)
PMV BLD AUTO: 9.3 FL (ref 8.9–12.7)
POTASSIUM SERPL-SCNC: 3.4 MMOL/L (ref 3.5–5.3)
POTASSIUM SERPL-SCNC: 4.3 MMOL/L (ref 3.5–5.3)
PR INTERVAL: 206 MS
PROT SERPL-MCNC: 5.9 G/DL (ref 6.4–8.4)
QRS AXIS: 68 DEGREES
QRSD INTERVAL: 118 MS
QT INTERVAL: 380 MS
QTC INTERVAL: 376 MS
RBC # BLD AUTO: 3.27 MILLION/UL (ref 3.81–5.12)
SODIUM SERPL-SCNC: 134 MMOL/L (ref 135–147)
SODIUM SERPL-SCNC: 135 MMOL/L (ref 135–147)
T WAVE AXIS: -5 DEGREES
VENTRICULAR RATE: 59 BPM
WBC # BLD AUTO: 2.91 THOUSAND/UL (ref 4.31–10.16)

## 2023-08-23 PROCEDURE — 97535 SELF CARE MNGMENT TRAINING: CPT

## 2023-08-23 PROCEDURE — 80048 BASIC METABOLIC PNL TOTAL CA: CPT | Performed by: FAMILY MEDICINE

## 2023-08-23 PROCEDURE — 85025 COMPLETE CBC W/AUTO DIFF WBC: CPT

## 2023-08-23 PROCEDURE — 82948 REAGENT STRIP/BLOOD GLUCOSE: CPT

## 2023-08-23 PROCEDURE — 97163 PT EVAL HIGH COMPLEX 45 MIN: CPT

## 2023-08-23 PROCEDURE — 92610 EVALUATE SWALLOWING FUNCTION: CPT

## 2023-08-23 PROCEDURE — 80053 COMPREHEN METABOLIC PANEL: CPT

## 2023-08-23 PROCEDURE — 87081 CULTURE SCREEN ONLY: CPT | Performed by: FAMILY MEDICINE

## 2023-08-23 PROCEDURE — 97530 THERAPEUTIC ACTIVITIES: CPT

## 2023-08-23 PROCEDURE — 97167 OT EVAL HIGH COMPLEX 60 MIN: CPT

## 2023-08-23 PROCEDURE — 99232 SBSQ HOSP IP/OBS MODERATE 35: CPT | Performed by: FAMILY MEDICINE

## 2023-08-23 RX ORDER — DEXTROSE MONOHYDRATE 25 G/50ML
50 INJECTION, SOLUTION INTRAVENOUS ONCE
Status: COMPLETED | OUTPATIENT
Start: 2023-08-23 | End: 2023-08-23

## 2023-08-23 RX ADMIN — DEXTROSE, SODIUM CHLORIDE, SODIUM LACTATE, POTASSIUM CHLORIDE, AND CALCIUM CHLORIDE 100 ML/HR: 5; .6; .31; .03; .02 INJECTION, SOLUTION INTRAVENOUS at 13:55

## 2023-08-23 RX ADMIN — DEXTROSE MONOHYDRATE 50 ML: 25 INJECTION, SOLUTION INTRAVENOUS at 08:43

## 2023-08-23 RX ADMIN — HEPARIN SODIUM 5000 UNITS: 5000 INJECTION INTRAVENOUS; SUBCUTANEOUS at 05:18

## 2023-08-23 RX ADMIN — HEPARIN SODIUM 5000 UNITS: 5000 INJECTION INTRAVENOUS; SUBCUTANEOUS at 21:04

## 2023-08-23 RX ADMIN — GABAPENTIN 300 MG: 300 CAPSULE ORAL at 16:54

## 2023-08-23 RX ADMIN — DOCUSATE SODIUM 100 MG: 100 CAPSULE, LIQUID FILLED ORAL at 08:53

## 2023-08-23 RX ADMIN — SERTRALINE 200 MG: 100 TABLET, FILM COATED ORAL at 08:53

## 2023-08-23 RX ADMIN — HEPARIN SODIUM 5000 UNITS: 5000 INJECTION INTRAVENOUS; SUBCUTANEOUS at 16:55

## 2023-08-23 RX ADMIN — GABAPENTIN 300 MG: 300 CAPSULE ORAL at 21:05

## 2023-08-23 RX ADMIN — SENNOSIDES 8.6 MG: 8.6 TABLET ORAL at 21:04

## 2023-08-23 RX ADMIN — DIVALPROEX SODIUM 250 MG: 250 TABLET, DELAYED RELEASE ORAL at 08:52

## 2023-08-23 RX ADMIN — ASPIRIN 81 MG: 81 TABLET, COATED ORAL at 08:53

## 2023-08-23 RX ADMIN — FERROUS SULFATE TAB 325 MG (65 MG ELEMENTAL FE) 325 MG: 325 (65 FE) TAB at 08:53

## 2023-08-23 RX ADMIN — GABAPENTIN 300 MG: 300 CAPSULE ORAL at 08:53

## 2023-08-23 RX ADMIN — DIVALPROEX SODIUM 500 MG: 250 TABLET, DELAYED RELEASE ORAL at 16:55

## 2023-08-23 RX ADMIN — PANTOPRAZOLE SODIUM 40 MG: 40 TABLET, DELAYED RELEASE ORAL at 06:30

## 2023-08-23 RX ADMIN — QUETIAPINE FUMARATE 200 MG: 100 TABLET ORAL at 21:05

## 2023-08-23 RX ADMIN — ATORVASTATIN CALCIUM 80 MG: 40 TABLET, FILM COATED ORAL at 16:54

## 2023-08-23 RX ADMIN — DOCUSATE SODIUM 100 MG: 100 CAPSULE, LIQUID FILLED ORAL at 16:54

## 2023-08-23 RX ADMIN — QUETIAPINE FUMARATE 100 MG: 100 TABLET ORAL at 08:53

## 2023-08-23 RX ADMIN — LORAZEPAM 0.5 MG: 0.5 TABLET ORAL at 08:53

## 2023-08-23 RX ADMIN — QUETIAPINE FUMARATE 100 MG: 100 TABLET ORAL at 16:55

## 2023-08-23 RX ADMIN — LEVOTHYROXINE SODIUM 50 MCG: 50 TABLET ORAL at 05:18

## 2023-08-23 RX ADMIN — LORAZEPAM 0.5 MG: 0.5 TABLET ORAL at 16:54

## 2023-08-23 NOTE — PLAN OF CARE
Problem: OCCUPATIONAL THERAPY ADULT  Goal: Performs self-care activities at highest level of function for planned discharge setting. See evaluation for individualized goals. Description: Treatment Interventions: ADL retraining, Functional transfer training, UE strengthening/ROM, Endurance training, Cognitive reorientation, Patient/family training, Equipment evaluation/education, Compensatory technique education, Continued evaluation, Energy conservation, Activityengagement          See flowsheet documentation for full assessment, interventions and recommendations. Outcome: Progressing  Note: Limitation: Decreased ADL status, Decreased UE ROM, Decreased UE strength, Decreased Safe judgement during ADL, Decreased cognition, Decreased endurance, Decreased fine motor control, Decreased self-care trans, Decreased high-level ADLs     Assessment: Pt is a 69 y.o. female, admitted to [address] [place] [address] 8/22/2023 d/t experiencing hypoglycemia. Dx: Hypoglycemia. Pt with PMHx impacting their performance during ADL tasks, including: CHF, DM, seizure disorder, mood disorder, hyponatremia, acute metabolic encephalopathy, anxiety GERD, HLD, mixed incontinence. Prior to admission to the hospital Pt was performing ADLs with physical assistance. IADLs with physical assistance. Functional transfers/ambulation with physical assistance. Cognitive status was PTA was impaired. OT order placed to assess Pt's ADLs, cognitive status, and performance during functional tasks in order to maximize safety and independence while making most appropriate d/c recommendations. PT/OT co-evaluation completed at this time d/t significant mobility deficits and safety concerns.  Pt's clinical presentation is currently unstable/unpredictable given new onset deficits that affect Pt's occupational performance and ability to safely return to PLOF including decrease activity tolerance, decrease standing balance, decrease sitting balance, decrease performance during ADL tasks, decrease cognition, decrease UB MS, decrease generalized strength, decrease activity engagement, decrease performance during functional transfers, limited family support, limited insight to deficits and inability to express needs combined with medical complications of abnormal renal lab values, abnormal H&H, abnormal CBC, abnormal blood sugars, abnormal sodium values, abnormal potassium values, incontinence and need for input for mobility technique/safety. Personal factors affecting Pt at time of initial evaluation include: anxiety, inability to perform IADLs, inability to perform ADLs, inability to ambulate household distances, inability to navigate community distances, limited insight into impairments, decreased initiation and engagement and difficulty communicating. Pt will benefit from continued skilled OT services to address deficits as defined above and to maximize level independence/participation during ADLs and functional tasks to facilitate return toward PLOF and improved quality of life. From an occupational therapy standpoint, recommendation at time of d/c would be PAR, can return to group home as long as facility can provide assistance needed with addition of skilled OT services to maximize her independence.      OT Discharge Recommendation: Post acute rehabilitation services (vs HHOT. would only be able to return to group home if facility can provide the assistance needed, and addition of skilled home OT services, and robson lift.)

## 2023-08-23 NOTE — PLAN OF CARE
Problem: PHYSICAL THERAPY ADULT  Goal: Performs mobility at highest level of function for planned discharge setting. See evaluation for individualized goals. Description: Treatment/Interventions: Functional transfer training, LE strengthening/ROM, Elevations, Therapeutic exercise, Endurance training, Patient/family training, Equipment eval/education, Bed mobility, Gait training, OT, Spoke to nursing          See flowsheet documentation for full assessment, interventions and recommendations. 8/23/2023 1558 by Keerthi Blood PT  Outcome: Progressing  Note: Prognosis: Poor  Problem List: Decreased strength, Decreased endurance, Impaired balance, Decreased mobility, Decreased cognition, Impaired judgement, Decreased safety awareness, Obesity  Pt tolerated tx fair. Limited by bowel incontinence, core strength deficits, difficulty following directions & attending to task. PT recommends use of mechanical lift for transfers at this time for pt safety and high fall risk classification. Barriers to Discharge: Other (Comment) (current level of assistance for mobility)  Barriers to Discharge Comments: CM note states that pt's facility has "necessary equipment to care for pt"  PT Discharge Recommendation: Home with home health rehabilitation (vs. PAR (pending group home's ability to care for pt at this assist level))    See flowsheet documentation for full assessment.

## 2023-08-23 NOTE — SPEECH THERAPY NOTE
Speech Language/Pathology  Speech-Language Pathology Bedside Swallow Evaluation      Patient Name: Mikael Alicea    [id number] Date: 8/23/2023    Summary   Consult received secondary to hx of dysphagia. Pt admitted w/ hypoglycemia and acute metabolic encephalopathy. PMHx includes seizures, ID. Pt is nonverbal at baseline. Pt known to this SLP from previous admissions. Baseline diet is puree and thin liquids. Pt self feeds w/ a rapid rate of intake. See previous VBS below. Seen w/ breakfast meal, assisted w/ upright positioning and smaller bolus size. Pt presents w/ mild oral phase dysphagia characterized by limited oral manipulation and mild oral residual. Suspect pharyngeal phase grossly WFL however could be impacted by rapid rate of intake. Throat clear x1, otherwise no overt s/s aspiration. Recommend to continue puree and thin liquids  Meds crushed in puree. Close supervision for intake. SLP will continue to follow for diet tolerance    Risk/s for Aspiration: Low-Moderate     Recommended Diet: puree/level 1 diet and thin liquids   Recommended Form of Meds: crushed with puree   Aspiration precautions and swallowing strategies: upright posture, only feed when fully alert, slow rate of feeding, small bites/sips and alternating bites and sips  Other Recommendations: Continue frequent oral care        Current Medical Status  Mikael Alicea is a 69 y.o. female with a PMH of intellectual disability, type 2 diabetes, hypertension, mood disorder, seizure disorder who presents with from group home with reports of altered mental status that started the day of admission. Per EMS patient was found to be hypoglycemic, treated prehospital.  On arrival to the ED patient noted to be obtunded and blood sugar was less than 20. Blood sugar and mental status improved status post treatment for hypoglycemia.   Caregiver reports earlier in the day patient was lethargic, generally weak and unable to eat or drink prompting to call EMS. Patient is nonverbal and non ambulatory at baseline per group home. Current Precautions:  Fall  Aspiration    Allergies:  No known food allergies    Past medical history:  Please see H&P for details    Special Studies:  VBS 2/2022:     Assessment Summary:    Pt presents with mild-moderate oropharyngeal dysphagia characterized by reduced mastication and rapid transfer, premature spillage with mild swallow delay and mildly reduced hyolaryngeal elevation. View of upper airway was limited due to pt's high shoulder placement, however airway closure/protection appeared only mildly reduced. Transient penetration noted with thin liquids and NTLs, unable to r/o trace aspiration. Aspiration did not appear to occur however NTL may still be safer to minimize risk. Note: Images are available for review in PACS as desired. Recommendations:   Recommended Diet:  puree/level 1 diet and nectar thick liquids   Recommended Form of Medications: crushed with puree   Aspiration precautions and compensatory swallowing strategies: upright posture, only feed when fully alert, slow rate of feeding, small bites/sips and alternating bites and sips  SLP Dysphagia therapy recommended: brief f/u      Social/Education/Vocational Hx:  Pt lives in group home    Swallow Information   Current Risks for Dysphagia & Aspiration: known history of dysphagia  Current Symptoms/Concerns: known hx of dyspahgia  Current Diet: puree/level 1 diet and thin liquids   Baseline Diet: puree/level 1 diet and thin liquids      Baseline Assessment   Behavior/Cognition: alert  Speech/Language Status: not able to to follow commands and no verbal output noted  Patient Positioning: upright in bed  Pain Status/Interventions/Response to Interventions:   No report of or nonverbal indications of pain.        Swallow Mechanism Exam  Facial: symmetrical  Labial: unable to test 2/2 limited command following  Lingual: unable to test 2/2 limited command following  Velum: unable to visualize  Mandible: adequate ROM  Dentition: limited dentition  Vocal quality:weak   Volitional Cough: unable to initiate volitional cough   Respiratory Status: on RA        Consistencies Assessed and Performance   Consistencies Administered: thin liquids and puree    Oral Stage: mild  Manipulation was limited with the materials administered today. Bolus formation and transfer were functional with no significant oral residue noted. No overt s/s reduced oral control. Pharyngeal Stage: WFL  Swallow Mechanics:  Swallowing initiation appeared prompt. Laryngeal rise was palpated and judged to be within functional limits. Throat clear x1     Esophageal Concerns: none reported    Summary and Recommendations (see above)    Results Reviewed with: patient and RN     Treatment Recommended: Dysphagia therapy for diet tolerance     Frequency of treatment: 1-2x/week    Dysphagia LTG  -Patient will demonstrate safe and effective oral intake (without overt s/s significant oral/pharyngeal dysphagia including s/s penetration or aspiration) for the highest appropriate diet level. Short Term Goals:  -Pt will tolerate Dysphagia 1/pureed diet and  thin liquid with no significant s/s oral or pharyngeal dysphagia across 1-3 diagnostic session/s    Re: Compensatory Strategies    - Patient’s caregiver will demonstrate adherence to recommended diet, as well as application of aspiration precautions and compensatory strategies.       Speech Therapy Prognosis   Prognosis: fair    Prognosis Considerations: age     Gin Mullen, [address] [facility] [address] [facility]  8/23/2023

## 2023-08-23 NOTE — ASSESSMENT & PLAN NOTE
· Patient presents from group home with altered mental status . EMS noted hypoglycemia and was treated pre hospital, upon arrival to the ED noted to be obtunded with blood sugar < 20. Blood sugar initially improved- > then back to 50's. Sugar dropped again this morning despite running D5. Received 1 amp of D50 and continue to monitor for now. · Hx DM2. Per group home records, only on Glipizide-Metformin 500 mg BID . · Mental status improved with correction of blood sugar. · Hold oral diabetic medications. ·  Continue D5LR IV fluids for now. Check blood sugars every 2 hours until blood sugars remain consistently stable.

## 2023-08-23 NOTE — CASE MANAGEMENT
Case Management Assessment & Discharge Planning Note    Patient name Shelley Bazan  Location [address] [facility] 224/-01 MRN [id number]  : 1955 Date 2023       Current Admission Date: 2023  Current Admission Diagnosis:Hypoglycemia   Patient Active Problem List    Diagnosis Date Noted   • Hypoglycemia 2023   • Hyponatremia 2022   • Open nondisplaced fracture of distal phalanx of left middle finger 2022   • Avulsion of fingernail 2022   • Acute urinary retention 2022   • Lower extremity edema 2022   • Acute metabolic encephalopathy    • Cystitis without hematuria 2022   • Dysphagia 2021   • Chronic anemia 2021   • Chronically elevated right hemidiaphragm 2021   • Obesity (BMI 30.0-34.9) 2021   • Seizure disorder ([address]) 2021   • Hyperlipidemia 2021   • Abnormal x-ray 2021   • Acute respiratory failure with hypoxia ([address]) 2021   • Mood disorder ([address]) 2021   • Type 2 diabetes mellitus without complication, without long-term current use of insulin ([address]) 2020   • Hypertension 2019   • Gastroesophageal reflux disease without esophagitis 2018   • Ambulatory dysfunction 2017   • Intellectual disability 2017      LOS (days): 0  Geometric Mean LOS (GMLOS) (days): 3.90  Days to GMLOS:3.7     OBJECTIVE:    Risk of Unplanned Readmission Score: 18.58     CM called pt's ,baseline information was obtained. CM discussed the role of CM in helping the patient develop a discharge plan and assist the patient in carrying out their plan.      Current admission status: Inpatient  Referral Reason: Other (Post Acute Home Needs (VNA/DME/Infusion))    Preferred Pharmacy:   [address] [place] [address]  Phone: [phone] Fax: [phone]    [facility]/pharmacy #[id number]  [place] [address] [facility] [address]  Phone: [phone] Fax: [phone]    Primary Care Provider: Mitali Rai MD    Primary Insurance: MEDICARE  Secondary Insurance: [place] [employer] [place]    ASSESSMENT:  Bárbara Proxies     Angela Mina ( [address] Representative   Primary Phone: [phone] (Mobile)               Advance Directives  Primary Contact: Angela Mina ()         Readmission Root Cause  30 Day Readmission: No    Patient Information  Admitted from[de-identified] Other (comment) (Group Home)  Mental Status: Confused  During Assessment patient was accompanied by: Not accompanied during assessment  Assessment information provided by[de-identified] Other - please comment ()  Primary Caregiver: Other (Comment) ()  Caregiver's Name[de-identified] Dane Barboza Relationship to Patient[de-identified]  ()  Caregiver's Telephone Number[de-identified] [phone]  Support Systems: Home care staff  [place] of Residence: [address] [place] [address] do you live in?: [facility] entry access options. Select all that apply.: Ramp  Type of Current Residence: Group home  In the last 12 months, was there a time when you were not able to pay the mortgage or rent on time?: No  In the last 12 months, how many places have you lived?: 1  In the last 12 months, was there a time when you did not have a steady place to sleep or slept in a shelter (including now)?: No  Homeless/housing insecurity resource given?: N/A  Living Arrangements: Other (Comment) (Lives in a [facility])  Is patient a ?: No    Activities of Daily Living Prior to Admission  Functional Status: Assistance  Completes ADLs independently?: No  Level of ADL dependence: Assistance  Ambulates independently?: No  Level of ambulatory dependence: Assistance  Does patient use assisted devices?: Yes  Assisted Devices (DME) used:  Shower Chair ([address] [place] [address] stated that the pt is a stand assist and uses hand rails throughout the house)  Does patient currently own DME?: Yes  What DME does the patient currently own?: Shower ChairIrlanda  Does patient have a history of Outpatient Therapy (PT/OT)?: No  Does the patient have a history of Short-Term Rehab?: Yes ([facility])  Does patient have a history of HHC?: Yes ([facility])  Does patient currently have [facility]?: No         Patient Information Continued  Income Source: SSI/SSD  Does patient have prescription coverage?: Yes  Within the past 12 months, you worried that your food would run out before you got the money to buy more.: Never true  Within the past 12 months, the food you bought just didn't last and you didn't have money to get more.: Never true  Food insecurity resource given?: N/A  Does patient receive dialysis treatments?: No  Does patient have a history of substance abuse?: No  Does patient have a history of Mental Health Diagnosis?: No         Means of Transportation  Means of Transport to Appts[de-identified] Other (Comment) (Group Home Staff)  In the past 12 months, has lack of transportation kept you from medical appointments or from getting medications?: No  In the past 12 months, has lack of transportation kept you from meetings, work, or from getting things needed for daily living?: No  Was application for public transport provided?: N/A        DISCHARGE DETAILS:    Discharge planning discussed with[de-identified] [place] of Choice: Yes  Comments - Freedom of Choice: Epifanio Alfonso stated that she is open to [facility] and declines STR  CM contacted family/caregiver?: Yes  Were Treatment Team discharge recommendations reviewed with patient/caregiver?: Yes  Did patient/caregiver verbalize understanding of patient care needs?: Yes  Were patient/caregiver advised of the risks associated with not following Treatment Team discharge recommendations?: Yes    Contacts  Patient Contacts: Dania Rodriguez ( Super[facility])  Relationship to Patient[de-identified]  ()  Contact Method: Phone  Phone Number: [phone]  Reason/Outcome: Discharge [address]         Is the patient interested in [address] at discharge?: Yes  [facility] requested[de-identified] Nursing, Occupational Therapy, Physical [facility] Name[de-identified] [facility] Provider[de-identified] PCP  Home Health Services Needed[de-identified] Diabetes Management, Gait/ADL Training, Evaluate Functional Status and Safety, Strengthening/Theraputic Exercises to Improve Function, Other (comment) (Cardiopulmonary assessment)  Homebound Criteria Met[de-identified] Requires the Assistance of Another Person for Safe Ambulation or to Leave the Home, Uses an Assist Device (i.e. cane, walker, etc)  Supporting Clincal Findings[de-identified] Cognitive Deficit Requiring the Assistance of Others, Limited Endurance, Fatigues Easliy in [employer]         Other Referral/Resources/Interventions Provided:  Interventions: [address]         Treatment Team Recommendation: Home with [facility]  Discharge Destination Plan[de-identified] Home with [facility] called pt's , Spencer Paul to discuss post discharge options. She stated that she was open to [facility] and declined any STR. She stated that she had the equipment and staff to take care of the pt.  Referral placed to [facility]

## 2023-08-23 NOTE — PROGRESS NOTES
[address] [facility]  Progress Note  Name: Aidan Cardoso  MRN: [id number]  Unit/Bed#: -01 I Date of Admission: 8/22/2023   Date of Service: 8/23/2023 I Hospital Day: 0    Assessment/Plan   * Hypoglycemia  Assessment & Plan  · Patient presents from group home with altered mental status . EMS noted hypoglycemia and was treated pre hospital, upon arrival to the ED noted to be obtunded with blood sugar < 20. Blood sugar initially improved- > then back to 50's. Sugar dropped again this morning despite running D5. Received 1 amp of D50 and continue to monitor for now. · Hx DM2. Per group home records, only on Glipizide-Metformin 500 mg BID . · Mental status improved with correction of blood sugar. · Hold oral diabetic medications. ·  Continue D5LR IV fluids for now. Check blood sugars every 2 hours until blood sugars remain consistently stable. Hyponatremia  Assessment & Plan  · Sodium 132 on admission  · Continue IV fluid hydration  · Trend labs. now improving    Acute metabolic encephalopathy  Assessment & Plan  · Likely in the setting of severe hypoglycemia. Also has mild hyponatremia. · UA negative   · Patient is alert and interactive on exam , non verbal at baseline. · Continue to monitor blood sugars and electrolytes closely       Seizure disorder (HCC)  Assessment & Plan  · Depakote 250 mg in the a.m., 500 mg in the p.m. Mood disorder (HCC)  Assessment & Plan  · Hx intellectual disability , Nonverbal at baseline  · Continue PTA medications: Lorazepam 0.5 mg twice daily, Seroquel 3 times daily, sertraline 200 mg daily         VTE Pharmacologic Prophylaxis:   Pharmacologic: Heparin  Mechanical VTE Prophylaxis in Place: Yes    Patient Centered Rounds: I have performed bedside rounds with nursing staff today.     Discussions with Specialists or Other Care Team Provider: none    Education and Discussions with Family / Patient: abbi patient and will update family     Time Spent for Care: 30 minutes. More than 50% of total time spent on counseling and coordination of care as described above. Current Length of Stay: 0 day(s)    Current Patient Status: Observation   Certification Statement: The patient will continue to require additional inpatient hospital stay due to hypoglycemia    Discharge Plan: will stay inpatient today    Code Status: Level 1 - Full Code      Subjective:   Is nonverbal however does follow some simple commands. Blood sugar again dropped to the 30s this morning but eating breakfast and being on D5 hydration    Objective:     Vitals:   Temp (24hrs), Av.1 °F (36.2 °C), Min:97 °F (36.1 °C), Max:97.2 °F (36.2 °C)    Temp:  [97 °F (36.1 °C)-97.2 °F (36.2 °C)] 97 °F (36.1 °C)  HR:  [55-67] 59  Resp:  [12-22] 19  BP: ()/(47-65) 103/65  SpO2:  [97 %-100 %] 98 %  Body mass index is 30.9 kg/m². Input and Output Summary (last 24 hours): Intake/Output Summary (Last 24 hours) at 2023 1040  Last data filed at 2023 0644  Gross per 24 hour   Intake 1925.42 ml   Output 900 ml   Net 1025.42 ml       Physical Exam:     Physical Exam  Vitals and nursing note reviewed. Constitutional:       Appearance: Normal appearance. HENT:      Head: Normocephalic and atraumatic. Right Ear: External ear normal.      Left Ear: External ear normal.      Nose: Nose normal.      Mouth/Throat:      Pharynx: Oropharynx is clear. Cardiovascular:      Rate and Rhythm: Normal rate and regular rhythm. Heart sounds: Normal heart sounds. Pulmonary:      Effort: Pulmonary effort is normal.      Breath sounds: Normal breath sounds. Abdominal:      General: Bowel sounds are normal.      Palpations: Abdomen is soft. Tenderness: There is no abdominal tenderness. Musculoskeletal:      Cervical back: Normal range of motion and neck supple. Skin:     General: Skin is warm and dry. Capillary Refill: Capillary refill takes less than 2 seconds. Neurological:      Mental Status: She is alert. Mental status is at baseline. Comments: Nonverbal   Psychiatric:         Mood and Affect: Mood normal.           Additional Data:     Labs:    Results from last 7 days   Lab Units 08/23/23  0458   WBC Thousand/uL 2.91*   HEMOGLOBIN g/dL 9.9*   HEMATOCRIT % 31.1*   PLATELETS Thousands/uL 100*   NEUTROS PCT % 62   LYMPHS PCT % 31   MONOS PCT % 6   EOS PCT % 1     Results from last 7 days   Lab Units 08/23/23  0458   SODIUM mmol/L 135   POTASSIUM mmol/L 3.4*   CHLORIDE mmol/L 99   CO2 mmol/L 33*   BUN mg/dL 20   CREATININE mg/dL 0.71   ANION GAP mmol/L 3   CALCIUM mg/dL 9.6   ALBUMIN g/dL 3.5   TOTAL BILIRUBIN mg/dL 0.20   ALK PHOS U/L 60   ALT U/L 14   AST U/L 17   GLUCOSE RANDOM mg/dL 98         Results from last 7 days   Lab Units 08/23/23  0929 08/23/23  0901 08/23/23  0834 08/23/23  0808 08/23/23  0802 08/23/23  9534 08/23/23  0451 08/23/23  0218 08/23/23  0004 08/22/23  2222 08/22/23  2102 08/22/23  2035   POC GLUCOSE mg/dl 163* 150* 43* 43* 38* 83 102 148* 146* 156* 54* 62*         Results from last 7 days   Lab Units 08/22/23  1751   LACTIC ACID mmol/L 0.9   PROCALCITONIN ng/ml <0.05           * I Have Reviewed All Lab Data Listed Above. * Additional Pertinent Lab Tests Reviewed: [facility] Admission Reviewed    Imaging:    Imaging Reports Reviewed Today Include: cxray  Imaging Personally Reviewed by Myself Includes:  cxray    Recent Cultures (last 7 days):     Results from last 7 days   Lab Units 08/22/23  1751   BLOOD CULTURE  Received in Microbiology Lab. Culture in Progress. Received in Microbiology Lab. Culture in Progress.        Last 24 Hours Medication List:   Current Facility-Administered Medications   Medication Dose Route Frequency Provider Last Rate   • acetaminophen  650 mg Oral Q6H PRN KRISTOPHER Campbell     • aspirin  81 mg Oral Daily KRISTOPHER Campbell     • atorvastatin  80 mg Oral Daily With Genability KRISTOPHER Martinez     • bisacodyl  5 mg Oral Daily PRN KRISTOPHER Martinez     • dextrose 5% lactated ringer's  75 mL/hr Intravenous Continuous KRISTOPHER Martinez 75 mL/hr (08/23/23 0259)   • dextrose  25 mL Intravenous Once KRISTOPHER Martinez     • divalproex sodium  250 mg Oral Daily KRISTOPHER Martinez     • divalproex sodium  500 mg Oral After KRISTOPHER Quiroz     • docusate sodium  100 mg Oral BID KRISTOPHER Martinez     • ferrous sulfate  325 mg Oral Daily KRISTOPHER Martinez     • gabapentin  300 mg Oral TID KRISTOPHER Martinez     • heparin (porcine)  5,000 Units Subcutaneous Q8H [facility] KRISTOPHER Christensen     • levothyroxine  50 mcg Oral Early Morning KRISTOPHER Martinez     • LORazepam  0.5 mg Oral BID KRISTOPHER Martinez     • nystatin   Topical Daily KRISTOPHER Martinez     • ondansetron  4 mg Intravenous Q6H PRN KRISTOPHER Martinez     • pantoprazole  40 mg Oral Early Morning KRISTOPHER Martinez     • QUEtiapine  100 mg Oral BID Brayan Pulido MD     • QUEtiapine  200 mg Oral HS KRISTOPHER Martinez     • senna  8.6 mg Oral HS KRISTOPHER Martinez     • sertraline  200 mg Oral Daily KRISTOPHER Martinez          Today, Patient Was Seen By: Carola Kent MD    ** Please Note: Dictation voice to text software may have been used in the creation of this document.  **

## 2023-08-23 NOTE — PHYSICAL THERAPY NOTE
PHYSICAL THERAPY EVALUATION  NAME:  Navid Boudreaux  DATE: 08/23/23    AGE:   69 y.o.  Mrn:   [id number]  ADMIT DX:  Hypoglycemia [E16.2]  History of diabetes mellitus [Z86.39]    Past Medical History:   Diagnosis Date   • Anxiety    • CHF (congestive heart failure) ([address])    • Diabetes mellitus ([address])    • GERD (gastroesophageal reflux disease)    • Hyperlipidemia    • Hypertension    • Mental disability    • Mixed incontinence 04/12/2017   • Seizure disorder ([address])      Length Of Stay: 0  Performed at least 2 patient identifiers during session: ID bracelet and Epic photo  PHYSICAL THERAPY EVALUATION :       08/23/23 1328   PT Last Visit   PT Visit Date 08/23/23   Note Type   Note type Evaluation     Pain Assessment   Pain Assessment Tool FLACC     Pain Rating: FLACC (Rest) - Face 0     Pain Rating: FLACC (Rest) - Legs 0     Pain Rating: FLACC (Rest) - Activity 0     Pain Rating: FLACC (Rest) - Cry 0     Pain Rating: FLACC (Rest) - Consolability 0     Score: FLACC (Rest) 0     Pain Rating: FLACC (Activity) - Face 0     Pain Rating: FLACC (Activity) - Legs 0     Pain Rating: FLACC (Activity) - Activity 0     Pain Rating: FLACC (Activity) - Cry 0     Pain Rating: FLACC (Activity) - Consolability 0     Score: FLACC (Activity) 0     Restrictions/Precautions   Weight Bearing Precautions Per Order No   Other Precautions Chair Alarm; Bed Alarm;Cognitive;Multiple lines; Fall Risk     Home Living   Type of [facility]; Wheelchair-manual   Additional Comments pt primarily non-verbal 2* intellectual disability - unable to provide home details - will need to clarify further with CM     Prior Function   Level of [place] Needs assistance with ADLs; Needs assistance with IADLS;Needs assistance with functional mobility     Lives With Facility staff   Receives Help From Other (Comment)  (Group home staff)   IADLs Family/Friend/Other provides transportation; Family/Friend/Other provides meals; Family/Friend/Other provides medication management   Comments per chart adelita pt non ambulatory at baseline   General   Additional Pertinent History CM notes state that Group Home declines STR, wants home health - "they have necessary equipment to care for pt"   Family/Caregiver Present No   Cognition   Overall Cognitive Status Impaired     Arousal/Participation Alert   Following Commands Follows one step commands inconsistently   Comments Unable to assess as pt is nonverbal, does respond to her name. Per notes pt with mood disorder and hx of intellectual disability, nonverbal at baseline. Subjective   Subjective pt gives head nods or thumbs up when asked YES/NO questions   RUE Assessment   RUE Assessment WFL   LUE Assessment   LUE Assessment WFL   RLE Assessment   RLE Assessment X   Strength RLE   RLE Overall Strength 3/5   LLE Assessment   LLE Assessment X   Strength LLE   LLE Overall Strength 3/5   Light Touch   RLE Light Touch Grossly intact   LLE Light Touch Grossly intact   Proprioception   RLE Proprioception Not tested  (unable to test 2* pt's cognitive status)   LLE Proprioception Not tested   Bed Mobility   Supine to Sit 2  Maximal assistance     Additional items Assist x 1; Increased time required;Verbal cues;LE management     Additional Comments Pt seated EOB with intermittent L lateral lean. Transfers   Sit to Stand 2  Maximal assistance  (does not achieve full stand (only 1/2 stand)     Additional items Assist x 2; Increased time required;Verbal cues; Other  (RW; pt grabbing onto therapist's arm instead of walker     Stand to Sit 2  Maximal assistance     Additional items Increased time required;Verbal cues     Stand pivot 3  Moderate assistance     Additional items Increased time required;Verbal cues     Additional Comments Pt performs initial sit to stand up to RW with max A x 2; pt taking her L hand off of RW handle and grabbing onto therapist's arm.   Pt then performs sit to stand mod A with PT in front and pt initiates SPT to recliner chair however due to pt's height unable to fully clear recliner chair seat. PT then transfers pt back to EOB with max A, no AD. Pt with bowel incontinence so PT then transfers pt to [facility] max A. Ambulation/Elevation   Ambulation/Elevation Additional Comments non ambulatory at baseline   Balance   Static Sitting Poor +     Dynamic Sitting Poor     Static Standing Poor -     Dynamic Standing Poor -   Endurance Deficit   Endurance Deficit Yes   Endurance Deficit Description Fatigue   Activity Tolerance   Activity Tolerance Patient limited by fatigue     Medical Staff Made Aware Yes, OT-Marquita & PCA     Nurse Made Aware RNAna   Assessment   Prognosis Poor   Problem List Decreased strength;Decreased endurance; Impaired balance;Decreased mobility; Decreased cognition; Impaired judgement;Decreased safety awareness; Obesity   Barriers to Discharge Other (Comment)  (current level of assistance for mobility)   Barriers to Discharge Comments CM note states that pt's facility has "necessary equipment to care for pt"   Goals   Patient Goals unable to verbalize   STG Expiration Date 09/06/23   PT Treatment Day 1   Plan   Treatment/Interventions Functional transfer training;LE strengthening/ROM; Elevations; Therapeutic exercise; Endurance training;Patient/family training;Equipment eval/education; Bed mobility;Gait training;OT;Spoke to nursing   PT Frequency 1-2x/wk   Recommendation   PT Discharge Recommendation Home with home health rehabilitation  (vs. PAR (pending group home's ability to care for pt at this assist level))   Additional Comments Recommend mechanical lift device for transfers at this time   AM-PAC Basic Mobility Inpatient   Turning in Flat Bed Without Bedrails 2   Lying on Back to Sitting on Edge of Flat Bed Without Bedrails 2   Moving Bed to Chair 2   Standing Up From Chair Using Arms 2   Walk in Room 1   Climb 3-5 Stairs With Railing 1   Basic Mobility Inpatient Raw Score 10   Turning Head Towards Sound 3   Follow Simple Instructions 2   Low Function Basic Mobility Raw Score  15   Low Function Basic Mobility Standardized Score  23.9   Highest Level Of Mobility   -HLM Goal 4: Move to chair/commode   JH-HLM Achieved 4: Move to chair/commode   Additional Treatment Session   Start Time 1400   End Time 1416   Treatment Assessment Pt tolerated tx fair. Limited by bowel incontinence, core strength deficits, difficulty following directions & attending to task. PT recommends use of mechanical lift for transfers at this time for pt safety and high fall risk classification. Equipment Use Pt performed sit to stand transfer using Quick move anterior rail max A. Cues for upright posture including trunk, hip, knee EXT. Pt stands for 5 minutes with mod A to maintain standing. Pt performs 2nd trial of standing within Quick Move using anterior rail with SBA & cues, maintains stance for 3 minutes at SBA. Quick Move utilized to transfer to recliner chair. Max A x 2 to re-position in chair; utilized wedge on L side of trunk to maintain upright seated posture. End of Consult   Patient Position at End of Consult Bedside chair;Bed/Chair alarm activated; All needs within reach; Other (comment)  (Wedge on L side of pt's trunk for optimal positioning)     (Please find full objective findings from PT assessment regarding body systems outlined above). Pt requires PT /OT co-eval due to signficant assistance with mobility and cognitive-behavioral impairments. Assessment: Pt is a 69 y.o. female seen for PT evaluation s/p admission to [address] [place] [address] on 8/22/2023 with Hypoglycemia. Order placed for PT services.   Upon evaluation: Pt is presenting with impaired functional mobility due to decreased strength, decreased endurance, impaired balance, decreased safety awareness, impaired judgment and fall risk requiring max  assistance for bed mobility and max x 2 assistance for transfers. Pt's clinical presentation is currently unstable/unpredictable given the functional mobility deficits above, especially weakness, decreased endurance, decreased activity tolerance, decreased functional mobility tolerance, decreased safety awareness, impaired judgement and decreased cognition, coupled with fall risks as indicated by AM-PAC 6-Clicks: 81/89 as well as hx of falls, impaired balance, impaired judgement, decreased safety awareness and decreased cognition and combined with medical complications of abnormal H&H, abnormal WBCs, abnormal CBC, abnormal CO2 values and need for input for mobility technique/safety. Pt's PMHx and comorbidities that may affect physical performance and progress include: CHF, seizure disorder, obesity and mood disorder. Personal factors affecting pt at time of IE include: difficulty communicating. Pt will benefit from continued skilled PT services to address deficits as defined above and to maximize level of functional mobility to facilitate return toward PLOF and improved QOL. From PT/mobility standpoint, recommendation at time of d/c would be Home PT vs. post acute rehab (PAR) pending progress in order to reduce fall risk and maximize pt's functional independence and consistency with mobility in order to facilitate return to PLOF. Recommend ther ex next 1-2 sessions, trial with quick move next 1-2 sessions and mechanical conveyance from nursing standpoint for OOB mobility. The patient's AM-PAC Basic Mobility Inpatient Short Form Raw Score is 10. A Raw score of less than or equal to 16 suggests the patient may benefit from discharge to post-acute rehabilitation services. Please also refer to the recommendation of the Physical Therapist for safe discharge planning. Goals: Pt will: Perform bed mobility tasks with consistent A of 1 to reposition in bed and prepare for transfers.  Pt will perform transfers with consistent A of 1 to decrease burden of care and prepare for ambulation. Pt will ambulate with RW for >/= 8 ft with  mod A  to decrease risk for falls, improve standing posture  and improve activity tolerance and to access home environment. Pt will participate in objective balance assessment to determine baseline fall risk.          Juana Manuel, PT,DPT

## 2023-08-23 NOTE — ASSESSMENT & PLAN NOTE
· Hx intellectual disability , Nonverbal at baseline  · Continue PTA medications: Lorazepam 0.5 mg twice daily, Seroquel 3 times daily, sertraline 200 mg daily

## 2023-08-23 NOTE — PLAN OF CARE
Problem: Prexisting or High Potential for Compromised Skin Integrity  Goal: Skin integrity is maintained or improved  Description: INTERVENTIONS:  - Identify patients at risk for skin breakdown  - Assess and monitor skin integrity  - Assess and monitor nutrition and hydration status  - Monitor labs   - Assess for incontinence   - Turn and reposition patient  - Assist with mobility/ambulation  - Relieve pressure over bony prominences  - Avoid friction and shearing  - Provide appropriate hygiene as needed including keeping skin clean and dry  - Evaluate need for skin moisturizer/barrier cream  - Collaborate with interdisciplinary team   - Patient/family teaching  - Consider wound care consult   Outcome: Progressing     Problem: MOBILITY - ADULT  Goal: Maintain or return to baseline ADL function  Description: INTERVENTIONS:  -  Assess patient's ability to carry out ADLs; assess patient's baseline for ADL function and identify physical deficits which impact ability to perform ADLs (bathing, care of mouth/teeth, toileting, grooming, dressing, etc.)  - Assess/evaluate cause of self-care deficits   - Assess range of motion  - Assess patient's mobility; develop plan if impaired  - Assess patient's need for assistive devices and provide as appropriate  - Encourage maximum independence but intervene and supervise when necessary  - Involve family in performance of ADLs  - Assess for home care needs following discharge   - Consider OT consult to assist with ADL evaluation and planning for discharge  - Provide patient education as appropriate  Outcome: Progressing     Problem: PAIN - ADULT  Goal: Verbalizes/displays adequate comfort level or baseline comfort level  Description: Interventions:  - Encourage patient to monitor pain and request assistance  - Assess pain using appropriate pain scale  - Administer analgesics based on type and severity of pain and evaluate response  - Implement non-pharmacological measures as appropriate and evaluate response  - Consider cultural and social influences on pain and pain management  - Notify physician/advanced practitioner if interventions unsuccessful or patient reports new pain  Outcome: Progressing     Problem: INFECTION - ADULT  Goal: Absence or prevention of progression during hospitalization  Description: INTERVENTIONS:  - Assess and monitor for signs and symptoms of infection  - Monitor lab/diagnostic results  - Monitor all insertion sites, i.e. indwelling lines, tubes, and drains  - Monitor endotracheal if appropriate and nasal secretions for changes in amount and color  - [place] appropriate cooling/warming therapies per order  - Administer medications as ordered  - Instruct and encourage patient and family to use good hand hygiene technique  - Identify and instruct in appropriate isolation precautions for identified infection/condition  Outcome: Progressing     Problem: SAFETY ADULT  Goal: Maintain or return to baseline ADL function  Description: INTERVENTIONS:  -  Assess patient's ability to carry out ADLs; assess patient's baseline for ADL function and identify physical deficits which impact ability to perform ADLs (bathing, care of mouth/teeth, toileting, grooming, dressing, etc.)  - Assess/evaluate cause of self-care deficits   - Assess range of motion  - Assess patient's mobility; develop plan if impaired  - Assess patient's need for assistive devices and provide as appropriate  - Encourage maximum independence but intervene and supervise when necessary  - Involve family in performance of ADLs  - Assess for home care needs following discharge   - Consider OT consult to assist with ADL evaluation and planning for discharge  - Provide patient education as appropriate  Outcome: Progressing

## 2023-08-23 NOTE — ASSESSMENT & PLAN NOTE
· Likely in the setting of severe hypoglycemia. Also has mild hyponatremia. · UA negative   · Patient is alert and interactive on exam , non verbal at baseline.    · Continue to monitor blood sugars and electrolytes closely

## 2023-08-23 NOTE — OCCUPATIONAL THERAPY NOTE
Occupational Therapy Evaluation     Patient Name: Jacob Valera  NYCLT'K Date: 8/23/2023  Problem List  Principal Problem:    Hypoglycemia  Active Problems:    Mood disorder ([address])    Seizure disorder ([address])    Acute metabolic encephalopathy    Hyponatremia    Past Medical History  Past Medical History:   Diagnosis Date    Anxiety     CHF (congestive heart failure) ([address])     Diabetes mellitus ([address])     GERD (gastroesophageal reflux disease)     Hyperlipidemia     Hypertension     Mental disability     Mixed incontinence 04/12/2017    Seizure disorder [facility])      Past Surgical History  History reviewed. No pertinent surgical history. 08/23/23 1329   Note Type   Note type Evaluation   Pain Assessment   Pain Assessment Tool FLACC   Pain Rating: FLACC (Rest) - Face 0   Pain Rating: FLACC (Rest) - Legs 0   Pain Rating: FLACC (Rest) - Activity 0   Pain Rating: FLACC (Rest) - Cry 0   Pain Rating: FLACC (Rest) - Consolability 0   Score: FLACC (Rest) 0   Pain Rating: FLACC (Activity) - Face 1   Pain Rating: FLACC (Activity) - Legs 0   Pain Rating: FLACC (Activity) - Activity 0   Pain Rating: FLACC (Activity) - Cry 1   Pain Rating: FLACC (Activity) - Consolability 1   Score: FLACC (Activity) 3   Restrictions/Precautions   Other Precautions Cognitive; Chair Alarm   Home Living   Type of [address] [place] [address] Rd; Wheelchair-manual   Additional Comments Pt is a poor historian. Pt is nonverbal at baseline. Per notes, pt is from a group home and has 24/7 supervision with a RW and WC available. Will confirm with CM as able. Prior Function   Level of [place] Needs assistance with ADLs; Needs assistance with functional mobility; Needs assistance with [facility] staff   Receives Help From   (Group home staff)   IADLs Family/Friend/Other provides transportation; Family/Friend/Other provides meals; Family/Friend/Other provides medication management   ADL   UB Dressing Assistance 4  Minimal Assistance   UB Dressing Deficit Verbal cueing;Supervision/safety; Increased time to complete   LB Dressing Assistance 1  Total Assistance   LB Dressing Deficit Don/doff R sock; Don/doff L sock   Additional Comments Pt required total assist to don B socks. Bed Mobility   Supine to Sit 2  Maximal assistance   Additional items Assist x 1; Increased time required;Verbal cues;LE management   Additional Comments Pt received supine in bed upon start of session. Pt supine > sit with max assist x 1. Transfers   Sit to Stand 2  Maximal assistance   Additional items Assist x 2; Increased time required;Verbal cues   Stand to Sit 2  Maximal assistance   Additional items Assist x 2; Increased time required;Verbal cues   Stand pivot 2  Maximal assistance   Additional items Assist x 1; Increased time required;Verbal cues   Additional Comments Pt attempted sit > stand from EOB with max assist x 2 and RW, despite assist pt only able to acheive 1/2 standing. Then attempted sit > stand with B knees and feet blocked with hand hold assist and no AD, pt able to stand at mod assist x 1, attempted to spt to chair with max assist x 1, but unable to clear chair. Pt noted to have been incontinent of bowels, spt back to bed at max assist x 1 (see more details on functional transfers in tx note). Balance   Static Sitting Poor +   Dynamic Sitting Poor   Static Standing Poor -   Activity Tolerance   Activity Tolerance Patient limited by fatigue   Medical Staff Made Aware PT, Anice Dakin and Chelo ZELAYA   Nurse Made Aware RNSalome   RUE Assessment   RUE Assessment WFL   LUE Assessment   LUE Assessment WFL   Hand Function   Gross Motor Coordination Functional   Fine Motor Coordination Functional   Cognition   Overall Cognitive Status Unable to assess   Following Commands Follows one step commands inconsistently   Comments Unable to assess as pt is nonverbal, does respond to her name.  Per notes pt with mood disorder and hx of intellectual disability, nonverbal at baseline. Assessment   Limitation Decreased ADL status; Decreased UE ROM; Decreased UE strength;Decreased Safe judgement during ADL;Decreased cognition;Decreased endurance;Decreased fine motor control;Decreased self-care trans;Decreased high-level ADLs   Assessment Pt is a 69 y.o. female, admitted to [address] [place] [address] 8/22/2023 d/t experiencing hypoglycemia. Dx: Hypoglycemia. Pt with PMHx impacting their performance during ADL tasks, including: CHF, DM, seizure disorder, mood disorder, hyponatremia, acute metabolic encephalopathy, anxiety GERD, HLD, mixed incontinence. Prior to admission to the hospital Pt was performing ADLs with physical assistance. IADLs with physical assistance. Functional transfers/ambulation with physical assistance. Cognitive status was PTA was impaired. OT order placed to assess Pt's ADLs, cognitive status, and performance during functional tasks in order to maximize safety and independence while making most appropriate d/c recommendations. PT/OT co-evaluation completed at this time d/t significant mobility deficits and safety concerns. Pt's clinical presentation is currently unstable/unpredictable given new onset deficits that affect Pt's occupational performance and ability to safely return to OF including decrease activity tolerance, decrease standing balance, decrease sitting balance, decrease performance during ADL tasks, decrease cognition, decrease UB MS, decrease generalized strength, decrease activity engagement, decrease performance during functional transfers, limited family support, limited insight to deficits and inability to express needs combined with medical complications of abnormal renal lab values, abnormal H&H, abnormal CBC, abnormal blood sugars, abnormal sodium values, abnormal potassium values, incontinence and need for input for mobility technique/safety.  Personal factors affecting Pt at time of initial evaluation include: anxiety, inability to perform IADLs, inability to perform ADLs, inability to ambulate household distances, inability to navigate community distances, limited insight into impairments, decreased initiation and engagement and difficulty communicating. Pt will benefit from continued skilled OT services to address deficits as defined above and to maximize level independence/participation during ADLs and functional tasks to facilitate return toward PLOF and improved quality of life. From an occupational therapy standpoint, recommendation at time of d/c would be PAR, can return to group home as long as facility can provide assistance needed with addition of skilled OT services to maximize her independence. Plan   Treatment Interventions ADL retraining;Functional transfer training;UE strengthening/ROM; Endurance training;Cognitive reorientation;Patient/family training;Equipment evaluation/education; Compensatory technique education;Continued evaluation; Energy conservation; Activityengagement   Goal Expiration Date 09/06/23   OT Treatment Day 1   OT Frequency 2-3x/wk   Recommendation   OT Discharge Recommendation Post acute rehabilitation services  (vs HHOT. would only be able to return to group home if facility can provide the assistance needed, and addition of skilled home OT services, and robson lift.)   Additional Comments  CM note reads, "CM called pt's , [place] to discuss post discharge options. She stated that she was open to [facility] and declined any STR. She stated that she had the equipment and staff to take care of the pt".    AM-PAC Daily Activity Inpatient   Lower Body Dressing 1   Bathing 2   Toileting 1   Upper Body Dressing 3   Grooming 3   Eating 4   Daily Activity Raw Score 14   Daily Activity Standardized Score (Calc for Raw Score >=11) 33.39   AM-PAC Applied Cognition Inpatient   Following a Speech/Presentation 1   Understanding Ordinary Conversation 2   Taking Medications 1   Remembering Where Things Are Placed or Put Away 1   Remembering List of 4-5 Errands 1   Taking Care of Complicated Tasks 1   Applied Cognition Raw Score 7   Applied Cognition Standardized Score 15.17   Additional Treatment Session   Start Time 1400   End Time 1416   Treatment Assessment Pt participated in tx session focused on functional transfers and ADLs. PT/OT co-treat due to safety concerns and significant mobility deficits. Pt spt to [facility] with max assist x 1. Pt  [facility] obtained to safely transfer pt. Pt sit > stand using Quick[employer] with max assist x 1. Pt required total assistance for perineal hygiene in standing as she was incontinent of bowels several times throughout treatment, and total assist for doffing soiled briefs and donning new briefs. Pt maintained static standing with [facility] with mod assist total of ~10 minutes between various sit > stand transfers that occurred using [facility]. At end of session, pt seated in chair with chair alarm on, call bell in reach and all needs met. The patient's raw score on the -PAC Daily Activity Inpatient Short Form is 14. A raw score of less than 19 suggests the patient may benefit from discharge to post-acute rehabilitation services. Please refer to the recommendation of the Occupational Therapist for safe discharge planning. Pt goals to be met by 9/6/23:    Pt will demonstrate ability to complete grooming/hygiene tasks @ mod I after set-up. Pt will demonstrate ability to complete supine<>sit @ min assist in order to increase safety and independence during ADL tasks. Pt will demonstrate ability to complete UB ADLs including washing/dressing @ supervision in order to increase performance and participation during meaningful tasks  Pt will demonstrate ability to complete LB dressing @ min assist in order to increase safety and independence during meaningful tasks.    Pt will demonstrate ability to kirill/doff socks/shoes while sitting EOB/chair @ min assist in order to increase safety and independence during meaningful tasks. Pt will demonstrate ability to complete toileting tasks including CM and pericare @ min assist in order to increase safety and independence during meaningful tasks. Pt will demonstrate ability to complete EOB, chair, toilet/commode transfers @ min assist in order to increase performance and participation during functional tasks. Pt will demonstrate ability to stand for 5 minutes while maintaining G balance with use of RW for UB support PRN. Pt will demonstrate ability to tolerate 10 minute OT session with no vc'ing for deep breathing or use of energy conservation techniques in order to increase activity tolerance during functional tasks. Pt will demonstrate Good carryover of use of energy conservation/compensatory strategies during ADLs and functional tasks in order to increase safety and reduce risk for falls. Pt will demonstrate Good attention and participation in continued evaluation of functional ambulation house hold distances in order to assist with safe d/c planning. Pt will attend to continued cognitive assessments 100% of the time in order to provide most appropriate d/c recommendations. Pt will follow 100% simple 2-step commands to increase participation in functional activities. Pt will identify 3 areas of interest/hobbies and 1 intervention on how to incorporate into daily life in order to increase interaction with environment and peers as well as increase participation in meaningful tasks. Pt will demonstrate 100% carryover of BUE HEP in order to increase BUE MS and increase performance during functional tasks upon d/c home.     [employer] MS, OTR/L

## 2023-08-23 NOTE — H&P
[facility]  H&P  Name: Anderson Boast 69 y.o. female I MRN: [id number]  Unit/Bed#: -01 I Date of Admission: 8/22/2023   Date of Service: 8/23/2023 I Hospital Day: 0      Assessment/Plan   * Hypoglycemia  Assessment & Plan  · Patient presents from group home with altered mental status . EMS noted hypoglycemia and was treated pre hospital, upon arrival to the ED noted to be obtunded with blood sugar < 20. Blood sugar initially improved- > then back to 50's. · Hx DM2. Per group home records, only on Glipizide-Metformin 500 mg BID . · Mental status improved with correction of blood sugar. · Hold oral diabetic medications. ·  Continue D5LR IV fluids for now. Check blood sugars every 2 hours until blood sugars remain consistently stable. Acute metabolic encephalopathy  Assessment & Plan  · Likely in the setting of severe hypoglycemia. Also has mild hyponatremia. · UA negative   · Patient is alert and interactive on exam , non verbal at baseline. · Continue to monitor blood sugars and electrolytes closely       Hyponatremia  Assessment & Plan  · Sodium 132 on admission  · Continue IV fluid hydration  · Trend labs    Seizure disorder [facility])  Assessment & Plan  · Depakote 250 mg in the a.m., 500 mg in the p.m. Mood disorder (HCC)  Assessment & Plan  · Hx intellectual disability , Nonverbal at baseline  · Continue PTA medications: Lorazepam 0.5 mg twice daily, Seroquel 3 times daily, sertraline 200 mg daily         VTE Pharmacologic Prophylaxis:   Moderate Risk (Score 3-4) - Pharmacological DVT Prophylaxis Ordered: heparin. Code Status: Level 1 - Full Code   Discussion with family: Updated  (care giver) at bedside. Anticipated Length of Stay: Patient will be admitted on an observation basis with an anticipated length of stay of less than 2 midnights secondary to Hypoglycemia, acute metabolic cephalopathy-D5 IV fluids, blood sugar monitoring.     Total Time Spent on Date of Encounter in care of patient: 75 minutes This time was spent on one or more of the following: performing physical exam; counseling and coordination of care; obtaining or reviewing history; documenting in the medical record; reviewing/ordering tests, medications or procedures; communicating with other healthcare professionals and discussing with patient's family/caregivers. Chief Complaint: Altered mental status    History of Present Illness:  Chai Delgadillo is a 69 y.o. female with a PMH of intellectual disability, type 2 diabetes, hypertension, mood disorder, seizure disorder who presents with from group home with reports of altered mental status that started the day of admission. Per EMS patient was found to be hypoglycemic, treated prehospital.  On arrival to the ED patient noted to be obtunded and blood sugar was less than 20. Blood sugar and mental status improved status post treatment for hypoglycemia. Caregiver reports earlier in the day patient was lethargic, generally weak and unable to eat or drink prompting to call EMS. Patient is nonverbal and non ambulatory at baseline per group home. Review of Systems:  Review of Systems   Unable to perform ROS: Patient nonverbal   All other systems reviewed and are negative. Past Medical and Surgical History:   Past Medical History:   Diagnosis Date   • Anxiety    • CHF (congestive heart failure) ([address])    • Diabetes mellitus ([address])    • GERD (gastroesophageal reflux disease)    • Hyperlipidemia    • Hypertension    • Mental disability    • Mixed incontinence 04/12/2017   • Seizure disorder ([address])        History reviewed. No pertinent surgical history. Meds/Allergies:  Prior to Admission medications    Medication Sig Start Date End Date Taking?  Authorizing Provider   aspirin (ECOTRIN LOW STRENGTH) 81 mg EC tablet Take 81 mg by mouth daily    Historical Provider, MD Wellington Yanez 100 units/mL injection pen  3/24/22   Historical Provider, MD   bisacodyl (DULCOLAX) 5 mg EC tablet Take 5 mg by mouth daily as needed for constipation    Historical Provider, MD   Cranberry 450 MG CAPS Take by mouth    Historical Provider, MD Valentín Cormier Use as directed. 5/12/22   Historical Provider, MD   divalproex sodium (DEPAKOTE) 250 mg EC tablet Take 250 mg by mouth in the morning 1 tab AM (8am)    Historical Provider, MD   divalproex sodium (DEPAKOTE) 500 mg EC tablet Take 500 mg by mouth daily after dinner    Historical Provider, MD   docusate sodium (COLACE) 100 mg capsule Take 100 mg by mouth in the morning and 100 mg in the evening. Historical Provider, MD   ergocalciferol (VITAMIN D2) 50,000 units  3/10/22   Historical Provider, MD   ferrous sulfate 325 (65 Fe) mg tablet Take 325 mg by mouth daily 5/12/22   Historical Provider, MD   gabapentin (NEURONTIN) 800 mg tablet Take 800 mg by mouth 3 (three) times a day 5/12/22   Historical Provider, MD   glipiZIDE-metFORMIN (METAGLIP) 2.5-500 MG per tablet Take 1 tablet by mouth 2 (two) times a day before meals    Historical Provider, MD   glucose blood test strip TEST BLOOD SUGAR TWO TIMES DAILY. DX E11.9 5/12/22   Historical Provider, MD   Incontinence Supply Disposable (FQ Pant Liner) MISC  5/14/22   Historical Provider, MD   Lancets 33G MISC daily 5/12/22   Historical Provider, MD   levothyroxine 50 mcg tablet Take 1 tablet (50 mcg total) by mouth daily in the early morning 5/21/22   Kristyn Welsh MD   LORazepam (ATIVAN) 0.5 mg tablet Take 0.5 mg by mouth in the morning and 0.5 mg in the evening. Historical Provider, MD   magnesium citrate (CITROMA) 1.745 g/30 mL oral solution Take 296 mL by mouth once for 1 dose 10/22/22 10/22/22  Nadege Linares DO   methenamine hippurate (HIPREX) 1 g tablet Take 1 tablet (1 g total) by mouth in the morning and 1 tablet (1 g total) in the evening. Take with meals. Restart that after finishing with keflex.  5/21/22   Kristyn Welsh MD   nystatin (MYCOSTATIN) powder Apply topically in the morning    Historical Provider, MD   omeprazole (PriLOSEC) 20 mg delayed release capsule Take 20 mg by mouth daily 6/22/21 6/22/22  Historical Provider, MD   QUEtiapine (SEROquel) 100 mg tablet Take 100 mg by mouth in the morning and 100 mg in the evening. 8am, 4pm.    Historical Provider, MD   QUEtiapine (SEROquel) 200 mg tablet Take 200 mg by mouth daily at bedtime    Historical Provider, MD   rosuvastatin (CRESTOR) 40 MG tablet Take 40 mg by mouth daily    Historical Provider, MD   senna (SENOKOT) 8.6 mg Take 1 tablet (8.6 mg total) by mouth daily at bedtime 8/11/21   Javi Shaffer MD   sertraline (ZOLOFT) 100 mg tablet Take 1 tablet (100 mg total) by mouth daily at bedtime  Patient taking differently: Take 200 mg by mouth daily BID 8/11/21   Javi Shaffer MD   tamsulosin [facility]) 0.4 mg Take 1 capsule (0.4 mg total) by mouth daily with dinner 5/21/22   Javi Shaffer MD   tolterodine (DETROL LA) 4 mg 24 hr capsule Take 2 mg by mouth in the morning and 2 mg before bedtime. Historical Provider, MD   torsemide (DEMADEX) 10 mg tablet Take 1 tablet (10 mg total) by mouth daily 8/12/21   Javi Shaffer MD     I have reveiwed home medications using records provided by [facility]. Allergies:    Allergies   Allergen Reactions   • Actos [Pioglitazone] Other (See Comments)     unkown        Social History:  Marital Status: Single   Patient Pre-hospital Living Situation: [address]  Patient Pre-hospital Level of Mobility: non-ambulatory/bed bound  Patient Pre-hospital Diet Restrictions: none  Substance Use History:   Social History     Substance and Sexual Activity   Alcohol Use Not Currently     Social History     Tobacco Use   Smoking Status Never   Smokeless Tobacco Never     Social History     Substance and Sexual Activity   Drug Use Not Currently       Family History:  Family History   Problem Relation Age of Onset   • No Known Problems Mother    • No Known Problems Father    • No Known Problems Sister    • No Known Problems Maternal Grandmother    • No Known Problems Maternal Grandfather    • No Known Problems Paternal Grandmother    • No Known Problems Paternal Grandfather        Physical Exam:     Vitals:   Blood Pressure: 99/52 (08/22/23 2222)  Pulse: 67 (08/22/23 2222)  Temperature: (!) 97.2 °F (36.2 °C) (08/22/23 2222)  Temp Source: Temporal (08/22/23 2222)  Respirations: 18 (08/22/23 2222)  Height: 4' 11" (149.9 cm) (08/22/23 2222)  Weight - Scale: 69.4 kg (153 lb) (08/22/23 2222)  SpO2: 99 % (08/22/23 2222)    Physical Exam  Vitals and nursing note reviewed. Constitutional:       General: She is not in acute distress. Appearance: She is not ill-appearing, toxic-appearing or diaphoretic. HENT:      Head: Normocephalic and atraumatic. Eyes:      Pupils: Pupils are equal, round, and reactive to light. Cardiovascular:      Rate and Rhythm: Normal rate and regular rhythm. Pulses: Normal pulses. Heart sounds: Normal heart sounds. Pulmonary:      Effort: Pulmonary effort is normal. No respiratory distress. Breath sounds: Normal breath sounds. No wheezing, rhonchi or rales. Abdominal:      General: Bowel sounds are normal. There is no distension. Palpations: Abdomen is soft. Tenderness: There is no abdominal tenderness. There is no guarding or rebound. Musculoskeletal:      Right lower leg: Edema present. Left lower leg: Edema present. Comments: +1bilateral lower extremity edema    Skin:     General: Skin is warm and dry. Neurological:      General: No focal deficit present. Mental Status: She is alert. Mental status is at baseline. Comments: Alert and nonverbal. Does track and is expressive , able to lift both arms up and high five.            Additional Data:     Lab Results:  Results from last 7 days   Lab Units 08/22/23  1751   WBC Thousand/uL 3.61*   HEMOGLOBIN g/dL 11.1*   HEMATOCRIT % 34.8 PLATELETS Thousands/uL 105*   NEUTROS PCT % 66   LYMPHS PCT % 26   MONOS PCT % 6   EOS PCT % 2     Results from last 7 days   Lab Units 08/22/23  1751   SODIUM mmol/L 132*   POTASSIUM mmol/L 3.9   CHLORIDE mmol/L 95*   CO2 mmol/L 31   BUN mg/dL 29*   CREATININE mg/dL 0.98   ANION GAP mmol/L 6   CALCIUM mg/dL 9.8   ALBUMIN g/dL 4.0   TOTAL BILIRUBIN mg/dL 0.19*   ALK PHOS U/L 71   ALT U/L 15   AST U/L 22   GLUCOSE RANDOM mg/dL 35*         Results from last 7 days   Lab Units 08/23/23  0218 08/23/23  0004 08/22/23  2222 08/22/23  2102 08/22/23  2035 08/22/23  1858 08/22/23  1803 08/22/23  1739 08/22/23  1722   POC GLUCOSE mg/dl 148* 146* 156* 54* 62* 112 151* 127 <20*         Results from last 7 days   Lab Units 08/22/23  1751   LACTIC ACID mmol/L 0.9   PROCALCITONIN ng/ml <0.05       Lines/Drains:  Invasive Devices     Peripheral Intravenous Line  Duration           Peripheral IV 08/22/23 Right Antecubital <1 day          Drain  Duration           External Urinary Catheter <1 day                    Imaging: Personally reviewed the following imaging: chest xray and No pertinent imaging reviewed. XR chest 1 view portable   ED Interpretation by Dayna Gastelum DO (08/22 2011)   No acute findings           EKG and Other Studies Reviewed on Admission:   · EKG: Sinus Bradycardia. HR 59.    ** Please Note: This note has been constructed using a voice recognition system.  **

## 2023-08-23 NOTE — PLAN OF CARE
Problem: PHYSICAL THERAPY ADULT  Goal: Performs mobility at highest level of function for planned discharge setting. See evaluation for individualized goals. Description: Treatment/Interventions: Functional transfer training, LE strengthening/ROM, Elevations, Therapeutic exercise, Endurance training, Patient/family training, Equipment eval/education, Bed mobility, Gait training, OT, Spoke to nursing          See flowsheet documentation for full assessment, interventions and recommendations. Note: Prognosis: Poor  Problem List: Decreased strength, Decreased endurance, Impaired balance, Decreased mobility, Decreased cognition, Impaired judgement, Decreased safety awareness, Obesity  Assessment: Pt is a 69 y.o. female seen for PT evaluation s/p admission to [address] [place] [address] on 8/22/2023 with Hypoglycemia. Order placed for PT services. Upon evaluation: Pt is presenting with impaired functional mobility due to decreased strength, decreased endurance, impaired balance, decreased safety awareness, impaired judgment and fall risk requiring max  assistance for bed mobility and max x 2 assistance for transfers. Pt's clinical presentation is currently unstable/unpredictable given the functional mobility deficits above, especially weakness, decreased endurance, decreased activity tolerance, decreased functional mobility tolerance, decreased safety awareness, impaired judgement and decreased cognition, coupled with fall risks as indicated by AM-PAC 6-Clicks: 89/57 as well as hx of falls, impaired balance, impaired judgement, decreased safety awareness and decreased cognition and combined with medical complications of abnormal H&H, abnormal WBCs, abnormal CBC, abnormal CO2 values and need for input for mobility technique/safety. Pt's PMHx and comorbidities that may affect physical performance and progress include: CHF, seizure disorder, obesity and mood disorder.  Personal factors affecting pt at time of IE include: difficulty communicating. Pt will benefit from continued skilled PT services to address deficits as defined above and to maximize level of functional mobility to facilitate return toward PLOF and improved QOL. From PT/mobility standpoint, recommendation at time of d/c would be Home PT vs. post acute rehab (PAR) pending progress in order to reduce fall risk and maximize pt's functional independence and consistency with mobility in order to facilitate return to PLOF. Recommend ther ex next 1-2 sessions, trial with quick move next 1-2 sessions and mechanical conveyance from nursing standpoint for OOB mobility. Barriers to Discharge: Other (Comment) (current level of assistance for mobility)  Barriers to Discharge Comments: CM note states that pt's facility has "necessary equipment to care for pt"  PT Discharge Recommendation: Home with home health rehabilitation (vs. PAR (pending group home's ability to care for pt at this assist level))    See flowsheet documentation for full assessment.

## 2023-08-23 NOTE — ASSESSMENT & PLAN NOTE
· Patient presents from group home with altered mental status . EMS noted hypoglycemia and was treated pre hospital, upon arrival to the ED noted to be obtunded with blood sugar < 20. Blood sugar initially improved- > then back to 50's. · Hx DM2. Per group home records, only on Glipizide-Metformin 500 mg BID . · Mental status improved with correction of blood sugar. · Hold oral diabetic medications. ·  Continue D5LR IV fluids for now. Check blood sugars every 2 hours until blood sugars remain consistently stable.

## 2023-08-24 LAB
ANION GAP SERPL CALCULATED.3IONS-SCNC: 2 MMOL/L
BUN SERPL-MCNC: 18 MG/DL (ref 5–25)
CALCIUM SERPL-MCNC: 9.2 MG/DL (ref 8.4–10.2)
CHLORIDE SERPL-SCNC: 99 MMOL/L (ref 96–108)
CO2 SERPL-SCNC: 33 MMOL/L (ref 21–32)
CREAT SERPL-MCNC: 0.65 MG/DL (ref 0.6–1.3)
ERYTHROCYTE [DISTWIDTH] IN BLOOD BY AUTOMATED COUNT: 17.2 % (ref 11.6–15.1)
EST. AVERAGE GLUCOSE BLD GHB EST-MCNC: 108 MG/DL
GFR SERPL CREATININE-BSD FRML MDRD: 90 ML/MIN/1.73SQ M
GLUCOSE SERPL-MCNC: 100 MG/DL (ref 65–140)
GLUCOSE SERPL-MCNC: 101 MG/DL (ref 65–140)
GLUCOSE SERPL-MCNC: 123 MG/DL (ref 65–140)
GLUCOSE SERPL-MCNC: 158 MG/DL (ref 65–140)
GLUCOSE SERPL-MCNC: 165 MG/DL (ref 65–140)
GLUCOSE SERPL-MCNC: 61 MG/DL (ref 65–140)
GLUCOSE SERPL-MCNC: 64 MG/DL (ref 65–140)
GLUCOSE SERPL-MCNC: 80 MG/DL (ref 65–140)
GLUCOSE SERPL-MCNC: 89 MG/DL (ref 65–140)
HBA1C MFR BLD: 5.4 %
HCT VFR BLD AUTO: 28.4 % (ref 34.8–46.1)
HGB BLD-MCNC: 8.9 G/DL (ref 11.5–15.4)
LIPASE SERPL-CCNC: 53 U/L (ref 11–82)
MCH RBC QN AUTO: 30 PG (ref 26.8–34.3)
MCHC RBC AUTO-ENTMCNC: 31.3 G/DL (ref 31.4–37.4)
MCV RBC AUTO: 96 FL (ref 82–98)
PLATELET # BLD AUTO: 84 THOUSANDS/UL (ref 149–390)
PMV BLD AUTO: 9.9 FL (ref 8.9–12.7)
POTASSIUM SERPL-SCNC: 4.1 MMOL/L (ref 3.5–5.3)
RBC # BLD AUTO: 2.97 MILLION/UL (ref 3.81–5.12)
SODIUM SERPL-SCNC: 134 MMOL/L (ref 135–147)
VIT B12 SERPL-MCNC: 202 PG/ML (ref 180–914)
WBC # BLD AUTO: 2.91 THOUSAND/UL (ref 4.31–10.16)

## 2023-08-24 PROCEDURE — 83690 ASSAY OF LIPASE: CPT | Performed by: FAMILY MEDICINE

## 2023-08-24 PROCEDURE — 82607 VITAMIN B-12: CPT | Performed by: FAMILY MEDICINE

## 2023-08-24 PROCEDURE — 80048 BASIC METABOLIC PNL TOTAL CA: CPT | Performed by: FAMILY MEDICINE

## 2023-08-24 PROCEDURE — 85027 COMPLETE CBC AUTOMATED: CPT | Performed by: FAMILY MEDICINE

## 2023-08-24 PROCEDURE — 82948 REAGENT STRIP/BLOOD GLUCOSE: CPT

## 2023-08-24 PROCEDURE — 99239 HOSP IP/OBS DSCHRG MGMT >30: CPT | Performed by: FAMILY MEDICINE

## 2023-08-24 PROCEDURE — 83036 HEMOGLOBIN GLYCOSYLATED A1C: CPT | Performed by: FAMILY MEDICINE

## 2023-08-24 PROCEDURE — 92526 ORAL FUNCTION THERAPY: CPT

## 2023-08-24 RX ORDER — METHENAMINE HIPPURATE 1000 MG/1
1 TABLET ORAL 2 TIMES DAILY WITH MEALS
Qty: 60 TABLET | Refills: 0 | Status: SHIPPED | OUTPATIENT
Start: 2023-08-24 | End: 2023-09-23

## 2023-08-24 RX ORDER — CLONAZEPAM 1 MG/1
1 TABLET ORAL 2 TIMES DAILY
COMMUNITY
End: 2023-08-24

## 2023-08-24 RX ORDER — GABAPENTIN 800 MG/1
800 TABLET ORAL 3 TIMES DAILY
Status: ON HOLD | COMMUNITY
End: 2023-08-24

## 2023-08-24 RX ORDER — MEMANTINE HYDROCHLORIDE 5 MG/1
5 TABLET ORAL 2 TIMES DAILY
Qty: 60 TABLET | Refills: 0 | Status: SHIPPED | OUTPATIENT
Start: 2023-08-24 | End: 2023-09-23

## 2023-08-24 RX ORDER — DEXTROSE MONOHYDRATE 25 G/50ML
25 INJECTION, SOLUTION INTRAVENOUS ONCE
Status: DISCONTINUED | OUTPATIENT
Start: 2023-08-24 | End: 2023-08-24

## 2023-08-24 RX ORDER — AMPICILLIN TRIHYDRATE 500 MG
1 CAPSULE ORAL DAILY
Refills: 0
Start: 2023-08-24

## 2023-08-24 RX ORDER — POLYETHYLENE GLYCOL 3350 17 G/17G
17 POWDER, FOR SOLUTION ORAL
Refills: 0
Start: 2023-08-24

## 2023-08-24 RX ORDER — SERTRALINE HYDROCHLORIDE 100 MG/1
200 TABLET, FILM COATED ORAL DAILY
Status: CANCELLED
Start: 2023-08-24

## 2023-08-24 RX ORDER — CLONAZEPAM 2 MG/1
2 TABLET ORAL
COMMUNITY
End: 2023-08-24

## 2023-08-24 RX ORDER — TORSEMIDE 5 MG/1
5 TABLET ORAL DAILY
Qty: 30 TABLET | Refills: 0 | Status: SHIPPED | OUTPATIENT
Start: 2023-08-24 | End: 2023-09-23

## 2023-08-24 RX ORDER — SERTRALINE HYDROCHLORIDE 100 MG/1
200 TABLET, FILM COATED ORAL DAILY
COMMUNITY

## 2023-08-24 RX ORDER — TOLTERODINE 4 MG/1
4 CAPSULE, EXTENDED RELEASE ORAL DAILY
Refills: 0
Start: 2023-08-24

## 2023-08-24 RX ORDER — DEXTROSE MONOHYDRATE 25 G/50ML
25 INJECTION, SOLUTION INTRAVENOUS ONCE
Status: COMPLETED | OUTPATIENT
Start: 2023-08-24 | End: 2023-08-24

## 2023-08-24 RX ADMIN — QUETIAPINE FUMARATE 100 MG: 100 TABLET ORAL at 09:00

## 2023-08-24 RX ADMIN — ASPIRIN 81 MG: 81 TABLET, COATED ORAL at 09:00

## 2023-08-24 RX ADMIN — LORAZEPAM 0.5 MG: 0.5 TABLET ORAL at 09:00

## 2023-08-24 RX ADMIN — SERTRALINE 200 MG: 100 TABLET, FILM COATED ORAL at 09:00

## 2023-08-24 RX ADMIN — PANTOPRAZOLE SODIUM 40 MG: 40 TABLET, DELAYED RELEASE ORAL at 05:31

## 2023-08-24 RX ADMIN — DEXTROSE MONOHYDRATE 25 ML: 25 INJECTION, SOLUTION INTRAVENOUS at 00:53

## 2023-08-24 RX ADMIN — LEVOTHYROXINE SODIUM 50 MCG: 50 TABLET ORAL at 05:31

## 2023-08-24 RX ADMIN — HEPARIN SODIUM 5000 UNITS: 5000 INJECTION INTRAVENOUS; SUBCUTANEOUS at 05:31

## 2023-08-24 RX ADMIN — GABAPENTIN 300 MG: 300 CAPSULE ORAL at 09:00

## 2023-08-24 RX ADMIN — DIVALPROEX SODIUM 250 MG: 250 TABLET, DELAYED RELEASE ORAL at 09:00

## 2023-08-24 RX ADMIN — FERROUS SULFATE TAB 325 MG (65 MG ELEMENTAL FE) 325 MG: 325 (65 FE) TAB at 09:01

## 2023-08-24 RX ADMIN — NYSTATIN: 100000 POWDER TOPICAL at 09:01

## 2023-08-24 RX ADMIN — DOCUSATE SODIUM 100 MG: 100 CAPSULE, LIQUID FILLED ORAL at 09:00

## 2023-08-24 NOTE — ASSESSMENT & PLAN NOTE
· Patient presents from group home with altered mental status . EMS noted hypoglycemia and was treated pre hospital, upon arrival to the ED noted to be obtunded with blood sugar < 20. Blood sugar initially improved- > then back to 50's. · Patient had persistant hypoglycemia that lasted for 24 hours requiring d5 infusion and amp of d50 despite continuing to eat and drink  · Hx DM2. Per group home records, only on Glipizide-Metformin 500 mg BID . · Mental status improved with correction of blood sugar. · Hold oral diabetic medications. ·  accucheck three times daily for 1 week. follow up with pcp after that to adjust medications further  · Must get high protein snack at 9 pm prior to sleeping at night

## 2023-08-24 NOTE — DISCHARGE INSTR - AVS FIRST PAGE
Must check blood sugar three times daily before meals for 7 days due to hypoglycemia  Must get a high protein snack at 9 pm daily      I have discontinued metformin,glipizide and acetazolamide

## 2023-08-24 NOTE — DISCHARGE SUMMARY
[address] [facility]  Discharge- Rigo Hall 1955, 69 y.o. female MRN: [id number]  Unit/Bed#: [facility] Encounter: [id number]  Primary Care Provider: Musa Llanos MD   Date and time admitted to hospital: 8/22/2023  5:16 PM    * Hypoglycemia  Assessment & Plan  · Patient presents from group home with altered mental status . EMS noted hypoglycemia and was treated pre hospital, upon arrival to the ED noted to be obtunded with blood sugar < 20. Blood sugar initially improved- > then back to 50's. · Patient had persistant hypoglycemia that lasted for 24 hours requiring d5 infusion and amp of d50 despite continuing to eat and drink  · Hx DM2. Per group home records, only on Glipizide-Metformin 500 mg BID . · Mental status improved with correction of blood sugar. · Hold oral diabetic medications. ·  accucheck three times daily for 1 week. follow up with pcp after that to adjust medications further  · Must get high protein snack at 9 pm prior to sleeping at night    Hyponatremia  Assessment & Plan  · Sodium 132 on admission  · Received  IV fluid hydration and now improving    Acute metabolic encephalopathy  Assessment & Plan  · Likely in the setting of severe hypoglycemia. Also has mild hyponatremia. · UA negative   · Patient is alert and interactive on exam , non verbal at baseline. · Now back to her baseline      Seizure disorder [facility])  Assessment & Plan  · Depakote 250 mg in the a.m., 500 mg in the p.m.     Mood disorder (HCC)  Assessment & Plan  · Hx intellectual disability , Nonverbal at baseline  · Continue PTA medications: Lorazepam 0.5 mg twice daily, Seroquel 3 times daily, sertraline 200 mg daily      Discharging Physician / Practitioner: Beth Michelle MD  PCP: Musa Llanos MD  Admission Date:   Admission Orders (From admission, onward)     Ordered        08/23/23 1044  Inpatient Admission  Once            08/22/23 2124  Place in Observation  Once Discharge Date: 08/24/23    Medical Problems     Resolved Problems  Date Reviewed: 8/24/2023   None         Consultations During Hospital Stay:  · none    Procedures Performed:   · none    Significant Findings / Test Results:   XR chest 1 view portable    Result Date: 8/23/2023  Impression: No acute cardiopulmonary disease. Mild elevation right diaphragm, unchanged. Resident: Brandie Jovel, the attending radiologist, have reviewed the images and agree with the final report above. Workstation performed: [id number]     Incidental Findings:   · none     Test Results Pending at Discharge (will require follow up):   · none     Outpatient Tests Requested:  · Follow up with pcp in 4-75 days    Complications:  none    Reason for Admission: Acute metabolic encephalopathy    Hospital Course:     Shelley Bazan is a 69 y.o. female patient who originally presented to the hospital on 8/22/2023 due to cute metabolic encephalopathy secondary to hypoglycemia and mild hyponatremia. Patient is on glipizide which must have caused persistent hyperglycemia as she was eating and drinking but still requiring D5 amp of D50 to support her sugars for the first 24 hours. She is now off IV fluids for 12 hours and sugars are staying controlled. Does not require any medications at this time to keep her blood sugars low including metformin or glipizide however she needs her blood sugars to be checked 3 times a day to make sure that she does not have any further episodes of hypoglycemia. Would recommend blood sugars to be checked for at least 1 week and then follow-up with her family doctor for further adjustment of medications for her diabetes. Please see above list of diagnoses and related plan for additional information.      Condition at Discharge: good     Discharge Day Visit / Exam:     Subjective: Patient nonverbal.  Does not appear to be in any distress  Vitals: Blood Pressure: (!) 88/52 (08/23/23 2308)  Pulse: 59 (08/23/23 2308)  Temperature: 97.5 °F (36.4 °C) (08/23/23 2308)  Temp Source: Temporal (08/22/23 2222)  Respirations: 19 (08/23/23 2308)  Height: 4' 11" (149.9 cm) (08/22/23 2222)  Weight - Scale: 69.4 kg (153 lb) (08/22/23 2222)  SpO2: 94 % (08/23/23 2308)  Exam:   Physical Exam  Vitals and nursing note reviewed. Constitutional:       Appearance: Normal appearance. HENT:      Head: Normocephalic and atraumatic. Right Ear: External ear normal.      Left Ear: External ear normal.      Nose: Nose normal.      Mouth/Throat:      Pharynx: Oropharynx is clear. Cardiovascular:      Rate and Rhythm: Normal rate and regular rhythm. Heart sounds: Normal heart sounds. Pulmonary:      Effort: Pulmonary effort is normal.      Breath sounds: Normal breath sounds. Abdominal:      General: Bowel sounds are normal.      Palpations: Abdomen is soft. Tenderness: There is no abdominal tenderness. Musculoskeletal:         General: Normal range of motion. Cervical back: Normal range of motion and neck supple. Skin:     General: Skin is warm and dry. Capillary Refill: Capillary refill takes less than 2 seconds. Neurological:      Mental Status: She is alert. Mental status is at baseline. Psychiatric:         Mood and Affect: Mood normal.         Discussion with Family: Updated group home and left message for family yesterday    Discharge instructions/Information to patient and family:   See after visit summary for information provided to patient and family. Provisions for Follow-Up Care:  See after visit summary for information related to follow-up care and any pertinent home health orders. Disposition:     Home    For Discharges to [facility] SNF:   · Not Applicable to this Patient - Not Applicable to this Patient    Planned Readmission: none     Discharge Statement:  I spent 35 minutes discharging the patient. This time was spent on the day of discharge.  I had direct contact with the patient on the day of discharge. Greater than 50% of the total time was spent examining patient, answering all patient questions, arranging and discussing plan of care with patient as well as directly providing post-discharge instructions. Additional time then spent on discharge activities. Discharge Medications:  See after visit summary for reconciled discharge medications provided to patient and family.       ** Please Note: This note has been constructed using a voice recognition system **

## 2023-08-24 NOTE — PLAN OF CARE
Problem: Prexisting or High Potential for Compromised Skin Integrity  Goal: Skin integrity is maintained or improved  Description: INTERVENTIONS:  - Identify patients at risk for skin breakdown  - Assess and monitor skin integrity  - Assess and monitor nutrition and hydration status  - Monitor labs   - Assess for incontinence   - Turn and reposition patient  - Assist with mobility/ambulation  - Relieve pressure over bony prominences  - Avoid friction and shearing  - Provide appropriate hygiene as needed including keeping skin clean and dry  - Evaluate need for skin moisturizer/barrier cream  - Collaborate with interdisciplinary team   - Patient/family teaching  - Consider wound care consult   Outcome: Progressing     Problem: MOBILITY - ADULT  Goal: Maintain or return to baseline ADL function  Description: INTERVENTIONS:  -  Assess patient's ability to carry out ADLs; assess patient's baseline for ADL function and identify physical deficits which impact ability to perform ADLs (bathing, care of mouth/teeth, toileting, grooming, dressing, etc.)  - Assess/evaluate cause of self-care deficits   - Assess range of motion  - Assess patient's mobility; develop plan if impaired  - Assess patient's need for assistive devices and provide as appropriate  - Encourage maximum independence but intervene and supervise when necessary  - Involve family in performance of ADLs  - Assess for home care needs following discharge   - Consider OT consult to assist with ADL evaluation and planning for discharge  - Provide patient education as appropriate  Outcome: Progressing     Problem: PAIN - ADULT  Goal: Verbalizes/displays adequate comfort level or baseline comfort level  Description: Interventions:  - Encourage patient to monitor pain and request assistance  - Assess pain using appropriate pain scale  - Administer analgesics based on type and severity of pain and evaluate response  - Implement non-pharmacological measures as appropriate and evaluate response  - Consider cultural and social influences on pain and pain management  - Notify physician/advanced practitioner if interventions unsuccessful or patient reports new pain  Outcome: Progressing     Problem: SAFETY ADULT  Goal: Maintain or return to baseline ADL function  Description: INTERVENTIONS:  -  Assess patient's ability to carry out ADLs; assess patient's baseline for ADL function and identify physical deficits which impact ability to perform ADLs (bathing, care of mouth/teeth, toileting, grooming, dressing, etc.)  - Assess/evaluate cause of self-care deficits   - Assess range of motion  - Assess patient's mobility; develop plan if impaired  - Assess patient's need for assistive devices and provide as appropriate  - Encourage maximum independence but intervene and supervise when necessary  - Involve family in performance of ADLs  - Assess for home care needs following discharge   - Consider OT consult to assist with ADL evaluation and planning for discharge  - Provide patient education as appropriate  Outcome: Progressing

## 2023-08-24 NOTE — NURSING NOTE
Pt dressed and assisted into w/c. Discharge instructions reviewed with pt caretaker at bedside, verb understanding. Dr. Meredith Jean at bedside to fill out paperwork and talk with care taker. PT taken via w/c to [facility] for discharge home.

## 2023-08-24 NOTE — CASE MANAGEMENT
Case Management Discharge Planning Note    Patient name Jelly Meyers  Location [address] [facility] 224/-01 MRN [id number]  : 1955 Date 2023       Current Admission Date: 2023  Current Admission Diagnosis:Hypoglycemia   Patient Active Problem List    Diagnosis Date Noted   • Hypoglycemia 2023   • Hyponatremia 2022   • Open nondisplaced fracture of distal phalanx of left middle finger 2022   • Avulsion of fingernail 2022   • Acute urinary retention 2022   • Lower extremity edema 2022   • Acute metabolic encephalopathy    • Cystitis without hematuria 2022   • Dysphagia 2021   • Chronic anemia 2021   • Chronically elevated right hemidiaphragm 2021   • Obesity (BMI 30.0-34.9) 2021   • Seizure disorder ([address]) 2021   • Hyperlipidemia 2021   • Abnormal x-ray 2021   • Acute respiratory failure with hypoxia ([address]) 2021   • Mood disorder ([address]) 2021   • Type 2 diabetes mellitus without complication, without long-term current use of insulin ([address]) 2020   • Hypertension 2019   • Gastroesophageal reflux disease without esophagitis 2018   • Ambulatory dysfunction 2017   • Intellectual disability 2017      LOS (days): 1  Geometric Mean LOS (GMLOS) (days): 3.90  Days to GMLOS:2.7     OBJECTIVE:  Risk of Unplanned Readmission Score: 17.72         Current admission status: Inpatient   Preferred Pharmacy:   [address] [place] [address], [address] [place] [address]  Phone: [phone] Fax: [phone]  [place] [place] [address]  Phone: [phone] Fax: [phone]    Primary Care Provider: Da Abebe MD    Primary Insurance: MEDICARE  [place] [employer]    DISCHARGE DETAILS:    [facility] is aware of dc today. AVS was faxed to them.

## 2023-08-24 NOTE — SPEECH THERAPY NOTE
Speech Language/Pathology    Speech/Language Pathology Progress Note    Patient Name: Ethel Thomas  IEFXD'Z Date: 8/24/2023       Assessment:  Pt seen w/ breakfast meal, both self feeding and being assisted by PCA. Currently on puree and thin liquids which is baseline for patient. Tolerated breakfast well, no overt s/s aspiration    Plan/Recommendations:  Continue puree/thins, meds crushed in puree  Assistance for meals.   SLP will s/o    Diamante Booth, [address] [facility]-SLP  8/24/2023

## 2023-08-24 NOTE — CASE MANAGEMENT
Case Management Discharge Planning Note    Patient name Dennie Min  Location [address] [facility] 224/-01 MRN [id number]  : 1955 Date 2023       Current Admission Date: 2023  Current Admission Diagnosis:Hypoglycemia   Patient Active Problem List    Diagnosis Date Noted   • Hypoglycemia 2023   • Hyponatremia 2022   • Open nondisplaced fracture of distal phalanx of left middle finger 2022   • Avulsion of fingernail 2022   • Acute urinary retention 2022   • Lower extremity edema 2022   • Acute metabolic encephalopathy    • Cystitis without hematuria 2022   • Dysphagia 2021   • Chronic anemia 2021   • Chronically elevated right hemidiaphragm 2021   • Obesity (BMI 30.0-34.9) 2021   • Seizure disorder ([address]) 2021   • Hyperlipidemia 2021   • Abnormal x-ray 2021   • Acute respiratory failure with hypoxia ([address]) 2021   • Mood disorder ([address]) 2021   • Type 2 diabetes mellitus without complication, without long-term current use of insulin ([address]) 2020   • Hypertension 2019   • Gastroesophageal reflux disease without esophagitis 2018   • Ambulatory dysfunction 2017   • Intellectual disability 2017      LOS (days): 1  Geometric Mean LOS (GMLOS) (days): 3.90  Days to GMLOS:3     OBJECTIVE:  Risk of Unplanned Readmission Score: 17.86         Current admission status: Inpatient   Preferred Pharmacy:   [address] [place] [address], [address] [place] [address]  Phone: [phone] Fax: [phone] 4241 [address]  [address] [facility]  [address] [place] [address]  Phone: [phone] Fax: [phone]    Primary Care Provider: Felisa Harris MD    Primary Insurance: MEDICARE  [place]    DISCHARGE DETAILS:    [facility] has accepted for home services upon dc.   AVS was updated, and face to face. CM will continue to follow.

## 2023-08-24 NOTE — ASSESSMENT & PLAN NOTE
· Likely in the setting of severe hypoglycemia. Also has mild hyponatremia. · UA negative   · Patient is alert and interactive on exam , non verbal at baseline.    · Now back to her baseline

## 2023-08-24 NOTE — PROGRESS NOTES
Pt scheduled FSBS check completed for 0020, result of 64. When this RN was notified of low blood sugar result, 120ml orange juice given, Bettina SUMMERS made aware. Recheck FSBS 61. Order placed for 25ml D50 to be given, order completed and given, recheck to be done at [address] [place] [address]. Pt remains sleepy, needing constant stimulation to stay awake for prolonged period of time. Recheck FSBS 165.

## 2023-08-24 NOTE — PROGRESS NOTES
-- Patient:  -- MRN: [id number]  -- Aidin Request ID: [id number]  -- Level of care reserved: Jean-Claude Wheeler  -- Partner Reserved: AMY ROSE of [facility], [address] [facility] [phone]  -- Clinical needs requested:  -- Geography searched: [address]  -- Start of Service:  -- Request sent: 2:36pm EDT on 8/23/2023 by Brooklynn Lucero  -- Partner reserved: 8:52am EDT on 8/24/2023 by Skylar Dumont  -- Choice list shared: 8:51am EDT on 8/24/2023 by Skylar Dumont

## 2023-08-24 NOTE — PLAN OF CARE
Problem: Prexisting or High Potential for Compromised Skin Integrity  Goal: Skin integrity is maintained or improved  Description: INTERVENTIONS:  - Identify patients at risk for skin breakdown  - Assess and monitor skin integrity  - Assess and monitor nutrition and hydration status  - Monitor labs   - Assess for incontinence   - Turn and reposition patient  - Assist with mobility/ambulation  - Relieve pressure over bony prominences  - Avoid friction and shearing  - Provide appropriate hygiene as needed including keeping skin clean and dry  - Evaluate need for skin moisturizer/barrier cream  - Collaborate with interdisciplinary team   - Patient/family teaching  - Consider wound care consult   Outcome: Adequate for Discharge     Problem: MOBILITY - ADULT  Goal: Maintain or return to baseline ADL function  Description: INTERVENTIONS:  -  Assess patient's ability to carry out ADLs; assess patient's baseline for ADL function and identify physical deficits which impact ability to perform ADLs (bathing, care of mouth/teeth, toileting, grooming, dressing, etc.)  - Assess/evaluate cause of self-care deficits   - Assess range of motion  - Assess patient's mobility; develop plan if impaired  - Assess patient's need for assistive devices and provide as appropriate  - Encourage maximum independence but intervene and supervise when necessary  - Involve family in performance of ADLs  - Assess for home care needs following discharge   - Consider OT consult to assist with ADL evaluation and planning for discharge  - Provide patient education as appropriate  Outcome: Adequate for Discharge  Goal: Maintains/Returns to pre admission functional level  Description: INTERVENTIONS:  - Perform BMAT or MOVE assessment daily.   - Set and communicate daily mobility goal to care team and patient/family/caregiver. - Collaborate with rehabilitation services on mobility goals if consulted  - Perform Range of Motion 4 times a day.   - Reposition patient every 2 hours.   - Dangle patient 2 times a day  - Stand patient 2   times a day  - Out of bed to chair 2 times a day   - Out of bed for meals 2 times a day  - Out of bed for toileting  - Record patient progress and toleration of activity level   Outcome: Adequate for Discharge     Problem: PAIN - ADULT  Goal: Verbalizes/displays adequate comfort level or baseline comfort level  Description: Interventions:  - Encourage patient to monitor pain and request assistance  - Assess pain using appropriate pain scale  - Administer analgesics based on type and severity of pain and evaluate response  - Implement non-pharmacological measures as appropriate and evaluate response  - Consider cultural and social influences on pain and pain management  - Notify physician/advanced practitioner if interventions unsuccessful or patient reports new pain  Outcome: Adequate for Discharge     Problem: INFECTION - ADULT  Goal: Absence or prevention of progression during hospitalization  Description: INTERVENTIONS:  - Assess and monitor for signs and symptoms of infection  - Monitor lab/diagnostic results  - Monitor all insertion sites, i.e. indwelling lines, tubes, and drains  - Monitor endotracheal if appropriate and nasal secretions for changes in amount and color  - Bryant appropriate cooling/warming therapies per order  - Administer medications as ordered  - Instruct and encourage patient and family to use good hand hygiene technique  - Identify and instruct in appropriate isolation precautions for identified infection/condition  Outcome: Adequate for Discharge  Goal: Absence of fever/infection during neutropenic period  Description: INTERVENTIONS:  - Monitor WBC    Outcome: Adequate for Discharge     Problem: SAFETY ADULT  Goal: Maintain or return to baseline ADL function  Description: INTERVENTIONS:  -  Assess patient's ability to carry out ADLs; assess patient's baseline for ADL function and identify physical deficits which impact ability to perform ADLs (bathing, care of mouth/teeth, toileting, grooming, dressing, etc.)  - Assess/evaluate cause of self-care deficits   - Assess range of motion  - Assess patient's mobility; develop plan if impaired  - Assess patient's need for assistive devices and provide as appropriate  - Encourage maximum independence but intervene and supervise when necessary  - Involve family in performance of ADLs  - Assess for home care needs following discharge   - Consider OT consult to assist with ADL evaluation and planning for discharge  - Provide patient education as appropriate  Outcome: Adequate for Discharge  Goal: Maintains/Returns to pre admission functional level  Description: INTERVENTIONS:  - Perform BMAT or MOVE assessment daily.   - Set and communicate daily mobility goal to care team and patient/family/caregiver. - Collaborate with rehabilitation services on mobility goals if consulted  - Perform Range of Motion 2 times a day. - Reposition patient every 2 hours.   - Dangle patient 2 times a day  - Stand patient 2 times a day   - Out of bed to chair 2 times a day   - Out of bed for meals 2 times a day  - Out of bed for toileting  - Record patient progress and toleration of activity level   Outcome: Adequate for Discharge  Goal: Patient will remain free of falls  Description: INTERVENTIONS:  - Educate patient/family on patient safety including physical limitations  - Instruct patient to call for assistance with activity   - Consult OT/PT to assist with strengthening/mobility   - Keep Call bell within reach  - Keep bed low and locked with side rails adjusted as appropriate  - Keep care items and personal belongings within reach  - Initiate and maintain comfort rounds  - Make Fall Risk Sign visible to staff  - Offer Toileting every 2 Hours, in advance of need  - Initiate/Maintain bedalarm  - Obtain necessary fall risk management equipment: sit to stand, yellow socks, alarms          - Apply yellow socks and bracelet for high fall risk patients  - Consider moving patient to room near nurses station  Outcome: Adequate for Discharge     Problem: DISCHARGE PLANNING  Goal: Discharge to home or other facility with appropriate resources  Description: INTERVENTIONS:  - Identify barriers to discharge w/patient and caregiver  - Arrange for needed discharge resources and transportation as appropriate  - Identify discharge learning needs (meds, wound care, etc.)  - Arrange for interpretive services to assist at discharge as needed  - Refer to Case Management Department for coordinating discharge planning if the patient needs post-hospital services based on physician/advanced practitioner order or complex needs related to functional status, cognitive ability, or social support system  Outcome: Adequate for Discharge     Problem: Knowledge Deficit  Goal: Patient/family/caregiver demonstrates understanding of disease process, treatment plan, medications, and discharge instructions  Description: Complete learning assessment and assess knowledge base.   Interventions:  - Provide teaching at level of understanding  - Provide teaching via preferred learning methods  Outcome: Adequate for Discharge

## 2023-08-24 NOTE — CASE MANAGEMENT
Case Management Discharge Planning Note    Patient name Shelley Bazan  Location [address] [facility] 224/-[id number] MRN [id number]  : 1955 Date 2023       Current Admission Date: 2023  Current Admission Diagnosis:Hypoglycemia   Patient Active Problem List    Diagnosis Date Noted   • Hypoglycemia 2023   • Hyponatremia 2022   • Open nondisplaced fracture of distal phalanx of left middle finger 2022   • Avulsion of fingernail 2022   • Acute urinary retention 2022   • Lower extremity edema 2022   • Acute metabolic encephalopathy 3632   • Cystitis without hematuria 2022   • Dysphagia 2021   • Chronic anemia 2021   • Chronically elevated right hemidiaphragm 2021   • Obesity (BMI 30.0-34.9) 2021   • Seizure disorder ([address]) 2021   • Hyperlipidemia 2021   • Abnormal x-ray 2021   • Acute respiratory failure with hypoxia ([address]) 2021   • Mood disorder ([address]) 2021   • Type 2 diabetes mellitus without complication, without long-term current use of insulin ([address]) 2020   • Hypertension 2019   • Gastroesophageal reflux disease without esophagitis 2018   • Ambulatory dysfunction 2017   • Intellectual disability 2017      LOS (days): 1  Geometric Mean LOS (GMLOS) (days): 3.90  Days to GMLOS:2.8     OBJECTIVE:  Risk of Unplanned Readmission Score: 17.72         Current admission status: Inpatient   Preferred Pharmacy:   [address] [place] [address], [address] [place] [id number]  Phone: [phone] Fax: [phone] - 6762 [facility]  [address]  [address] [place] [id number]  Phone: [phone] Fax: [phone]    Primary Care Provider: Noemy Tsai MD    Primary Insurance: MEDICARE  Secondary Insurance: [place] [employer]    DISCHARGE DETAILS:    Patient is medically stable for dc today.   Spoke with Lizzeth Rico Care Giver at the [facility] and they can accept patient back today. Discussed needing 3 times a day checks on her blood surgar for 7 days. MD to provide the group home with a prescription. Rony Saran is aware if they need additional stripes they can check to see if [facility] has them or [address]. Lisseth Moura is aware the [facility] is set up for [facility]. The group home will be able to transport home today around 1400 - CM updated Nursing and provider of dc time.

## 2023-08-25 LAB — MRSA NOSE QL CULT: NORMAL

## 2023-08-27 LAB
BACTERIA BLD CULT: NORMAL
BACTERIA BLD CULT: NORMAL

## 2023-10-06 ENCOUNTER — HOSPITAL ENCOUNTER (EMERGENCY)
Facility: HOSPITAL | Age: 68
Discharge: HOME | End: 2023-10-06
Attending: EMERGENCY MEDICINE
Payer: MEDICARE

## 2023-10-06 ENCOUNTER — APPOINTMENT (EMERGENCY)
Dept: RADIOLOGY | Facility: HOSPITAL | Age: 68
End: 2023-10-06
Payer: MEDICARE

## 2023-10-06 VITALS
BODY MASS INDEX: 30.81 KG/M2 | RESPIRATION RATE: 18 BRPM | SYSTOLIC BLOOD PRESSURE: 110 MMHG | WEIGHT: 152.56 LBS | DIASTOLIC BLOOD PRESSURE: 53 MMHG | HEART RATE: 87 BPM | TEMPERATURE: 100 F | OXYGEN SATURATION: 92 %

## 2023-10-06 DIAGNOSIS — U07.1 COVID-19: Primary | ICD-10-CM

## 2023-10-06 LAB
FLUAV RNA RESP QL NAA+PROBE: NEGATIVE
FLUBV RNA RESP QL NAA+PROBE: NEGATIVE
RSV RNA RESP QL NAA+PROBE: NEGATIVE
SARS-COV-2 RNA RESP QL NAA+PROBE: POSITIVE

## 2023-10-06 PROCEDURE — 99283 EMERGENCY DEPT VISIT LOW MDM: CPT

## 2023-10-06 PROCEDURE — 71046 X-RAY EXAM CHEST 2 VIEWS: CPT

## 2023-10-06 PROCEDURE — 99284 EMERGENCY DEPT VISIT MOD MDM: CPT | Performed by: EMERGENCY MEDICINE

## 2023-10-06 PROCEDURE — 0241U HB NFCT DS VIR RESP RNA 4 TRGT: CPT | Performed by: EMERGENCY MEDICINE

## 2023-10-06 RX ORDER — ACETAMINOPHEN 325 MG/1
650 TABLET ORAL ONCE
Status: COMPLETED | OUTPATIENT
Start: 2023-10-06 | End: 2023-10-06

## 2023-10-06 RX ADMIN — ACETAMINOPHEN 650 MG: 325 TABLET, FILM COATED ORAL at 09:51

## 2023-10-06 NOTE — ED PROVIDER NOTES
History  Chief Complaint   Patient presents with   • Fever     Patient's caregiver reports patient is more tired and weak than usual this morning and BP was high. Caregiver also reports low grade temp. Caregiver reports hx DM II. BG was 93 this AM.        History provided by:  Medical records, patient and caregiver (Group home patient)  History limited by:  Dementia  URI  Presenting symptoms: congestion, cough, fever and rhinorrhea    Severity:  Mild  Onset quality:  Gradual  Duration:  1 day  Timing:  Constant  Progression:  Unchanged  Chronicity:  New  Relieved by:  Nothing  Worsened by:  Nothing  Ineffective treatments:  None tried  Associated symptoms comment:  Found to have high blood pressure prehospital  Risk factors: sick contacts        Prior to Admission Medications   Prescriptions Last Dose Informant Patient Reported? Taking? Cranberry 450 MG CAPS   No No   Sig: Take 1 capsule (450 mg total) by mouth in the morning   Diapers & Supplies MISC   Yes No   Sig: Use as directed. Incontinence Supply Disposable (FQ Pant Liner) MISC   Yes No   LORazepam (ATIVAN) 0.5 mg tablet   Yes No   Sig: Take 0.5 mg by mouth in the morning and 0.5 mg in the evening. Lancets 33G MISC   Yes No   Sig: daily   QUEtiapine (SEROquel) 100 mg tablet   Yes No   Sig: Take 100 mg by mouth in the morning and 100 mg in the evening.  8am, 4pm.   QUEtiapine (SEROquel) 200 mg tablet   Yes No   Sig: Take 200 mg by mouth daily at bedtime   aspirin (ECOTRIN LOW STRENGTH) 81 mg EC tablet   Yes No   Sig: Take 81 mg by mouth daily   bisacodyl (DULCOLAX) 5 mg EC tablet   Yes No   Sig: Take 5 mg by mouth daily as needed for constipation   cholecalciferol (VITAMIN D3) 250 MCG (03191 UT) capsule   No No   Sig: Take 5 capsules (50,000 Units total) by mouth once a week On saturday   divalproex sodium (DEPAKOTE) 250 mg EC tablet   Yes No   Sig: Take 250 mg by mouth in the morning 1 tab AM (8am)   divalproex sodium (DEPAKOTE) 500 mg EC tablet   Yes No Sig: Take 500 mg by mouth daily after dinner   docusate sodium (COLACE) 100 mg capsule  Outside Facility (Specify) Yes No   Sig: Take 100 mg by mouth in the morning and 100 mg in the evening.   gabapentin (NEURONTIN) 800 mg tablet   Yes No   Sig: Take 800 mg by mouth 3 (three) times a day   levothyroxine 50 mcg tablet   No No   Sig: Take 1 tablet (50 mcg total) by mouth daily in the early morning   memantine (NAMENDA) 5 mg tablet   No No   Sig: Take 1 tablet (5 mg total) by mouth 2 (two) times a day   nystatin (MYCOSTATIN) powder  Self Yes No   Sig: Apply topically in the morning   omeprazole (PriLOSEC) 20 mg delayed release capsule   Yes No   Sig: Take 20 mg by mouth daily   polyethylene glycol (MIRALAX) 17 g packet   No No   Sig: Take 17 g by mouth every other day as needed (if no bm in 3 days)   rosuvastatin (CRESTOR) 40 MG tablet   Yes No   Sig: Take 40 mg by mouth daily   senna (SENOKOT) 8.6 mg   No No   Sig: Take 1 tablet (8.6 mg total) by mouth daily at bedtime   sertraline (ZOLOFT) 100 mg tablet   Yes No   Sig: Take 200 mg by mouth daily   tolterodine (DETROL LA) 4 mg 24 hr capsule   No No   Sig: Take 1 capsule (4 mg total) by mouth daily   torsemide (DEMADEX) 5 MG tablet   No No   Sig: Take 1 tablet (5 mg total) by mouth daily      Facility-Administered Medications: None       Past Medical History:   Diagnosis Date   • Anxiety    • CHF (congestive heart failure) ([facility])    • Diabetes mellitus ([facility]ly [facility])    • GERD (gastroesophageal reflux disease)    • Hyperlipidemia    • Hypertension    • Mental disability    • Mixed incontinence 04/12/2017   • Seizure disorder ([address])        History reviewed. No pertinent surgical history.     Family History   Problem Relation Age of Onset   • No Known Problems Mother    • No Known Problems Father    • No Known Problems Sister    • No Known Problems Maternal Grandmother    • No Known Problems Maternal Grandfather    • No Known Problems Paternal Grandmother    • No Known Problems Paternal Grandfather      I have reviewed and agree with the history as documented. E-Cigarette/Vaping   • E-Cigarette Use Never User      E-Cigarette/Vaping Substances   • Nicotine No    • THC No    • CBD No    • Flavoring No    • Other No    • Unknown No      Social History     Tobacco Use   • Smoking status: Never   • Smokeless tobacco: Never   Vaping Use   • Vaping Use: Never used   Substance Use Topics   • Alcohol use: Not Currently   • Drug use: Not Currently       Review of Systems   Unable to perform ROS: Dementia   Constitutional: Positive for fever. HENT: Positive for congestion and rhinorrhea. Respiratory: Positive for cough. Physical Exam  Physical Exam  Vitals and nursing note reviewed. Constitutional:       General: She is not in acute distress. Appearance: Normal appearance. She is not ill-appearing, toxic-appearing or diaphoretic. HENT:      Head: Normocephalic and atraumatic. Nose: Congestion and rhinorrhea present. Mouth/Throat:      Mouth: Mucous membranes are dry. Pharynx: Oropharynx is clear. No oropharyngeal exudate or posterior oropharyngeal erythema. Eyes:      General:         Right eye: No discharge. Left eye: No discharge. Extraocular Movements: Extraocular movements intact. Conjunctiva/sclera: Conjunctivae normal.      Pupils: Pupils are equal, round, and reactive to light. Cardiovascular:      Rate and Rhythm: Normal rate and regular rhythm. Pulses: Normal pulses. Heart sounds: Normal heart sounds. No murmur heard. No gallop. Pulmonary:      Effort: Pulmonary effort is normal. No respiratory distress. Breath sounds: Normal breath sounds. No stridor. No wheezing, rhonchi or rales. Chest:      Chest wall: No tenderness. Abdominal:      General: Bowel sounds are normal. There is no distension. Palpations: Abdomen is soft. There is no mass. Tenderness: There is no abdominal tenderness.  There is no right CVA tenderness, left CVA tenderness, guarding or rebound. Hernia: No hernia is present. Musculoskeletal:         General: Normal range of motion. Cervical back: Normal range of motion and neck supple. Right lower leg: Edema present. Left lower leg: Edema present. Comments: Mild nonpitting edema bilateral lower legs   Skin:     General: Skin is warm and dry. Capillary Refill: Capillary refill takes less than 2 seconds. Neurological:      General: No focal deficit present. Mental Status: She is alert and oriented to person, place, and time. Cranial Nerves: No cranial nerve deficit. Sensory: No sensory deficit. Motor: No weakness. Coordination: Coordination normal.      Gait: Gait normal.      Deep Tendon Reflexes: Reflexes normal.   Psychiatric:         Mood and Affect: Mood normal.         Behavior: Behavior normal.         Thought Content:  Thought content normal.         Judgment: Judgment normal.         Vital Signs  ED Triage Vitals [10/06/23 0942]   Temperature Pulse Respirations Blood Pressure SpO2   100 °F (37.8 °C) 96 18 132/60 94 %      Temp Source Heart Rate Source Patient Position - Orthostatic VS BP Location FiO2 (%)   Temporal Monitor Lying Left arm --      Pain Score       No Pain           Vitals:    10/06/23 0942 10/06/23 0945   BP: 132/60 110/53   Pulse: 96 87   Patient Position - Orthostatic VS: Lying Lying         Visual Acuity      ED Medications  Medications   acetaminophen (TYLENOL) tablet 650 mg (650 mg Oral Given 10/6/23 0951)       Diagnostic Studies  Results Reviewed     Procedure Component Value Units Date/Time    FLU/RSV/COVID - if FLU/RSV clinically relevant [[id number]]  (Abnormal) Collected: 10/06/23 0950    Lab Status: Final result Specimen: Nares from Nose Updated: 10/06/23 1035     SARS-CoV-2 Positive     INFLUENZA A PCR Negative     INFLUENZA B PCR Negative     RSV PCR Negative    Narrative:      FOR PEDIATRIC PATIENTS - copy/paste COVID Guidelines URL to browser: [email or web address]    SARS-CoV-2 assay is a Nucleic Acid Amplification assay intended for the  qualitative detection of nucleic acid from SARS-CoV-2 in nasopharyngeal  swabs. Results are for the presumptive identification of SARS-CoV-2 RNA. Positive results are indicative of infection with SARS-CoV-2, the virus  causing COVID-19, but do not rule out bacterial infection or co-infection  with other viruses. Laboratories within the [facility] and its  territories are required to report all positive results to the appropriate  public health authorities. Negative results do not preclude SARS-CoV-2  infection and should not be used as the sole basis for treatment or other  patient management decisions. Negative results must be combined with  clinical observations, patient history, and epidemiological information. This test has not been FDA cleared or approved. This test has been authorized by FDA under an Emergency Use Authorization  (EUA). This test is only authorized for the duration of time the  declaration that circumstances exist justifying the authorization of the  emergency use of an in vitro diagnostic tests for detection of SARS-CoV-2  virus and/or diagnosis of COVID-19 infection under section 564(b)(1) of  the Act, 21 U. S.C. 401AVW-2(E)(9), unless the authorization is terminated  or revoked sooner. The test has been validated but independent review by FDA  and CLIA is pending. Test performed using [employer] Gene[employer]pert: This RT-PCR assay targets N2,  a region unique to SARS-CoV-2. A conserved region in the E-gene was chosen  for pan-Sarbecovirus detection which includes SARS-CoV-2. According to CMS-2020-01-R, this platform meets the definition of high-throughput technology. XR chest 2 views   Final Result by Ariella Pedraza MD (10/06 1024)      No acute cardiopulmonary disease. Workstation performed: [id number]                    Procedures  Procedures         ED Course                               SBIRT 22yo+    Flowsheet Row Most Recent Value   Initial Alcohol Screen: US AUDIT-C     1. How often do you have a drink containing alcohol? 0 Filed at: 10/06/2023 0945   2. How many drinks containing alcohol do you have on a typical day you are drinking? 0 Filed at: 10/06/2023 0945   3a. Male UNDER 65: How often do you have five or more drinks on one occasion? 0 Filed at: 10/06/2023 0945   3b. FEMALE Any Age, or MALE 65+: How often do you have 4 or more drinks on one occassion? 0 Filed at: 10/06/2023 0945   Audit-C Score 0 Filed at: 10/06/2023 0945   KAILASH: How many times in the past year have you. .. Used an illegal drug or used a prescription medication for non-medical reasons? Never Filed at: 10/06/2023 0945                    Medical Decision Making  2140: Patient appears well, vital signs reviewed. Concerns for viral URI. No respiratory distress. Send COVID/flu/RSV PCR. Check chest x-ray. COVID-19: acute illness or injury  Amount and/or Complexity of Data Reviewed  Radiology: ordered and independent interpretation performed. Details: Chest x-ray negative      Risk  OTC drugs.           Disposition  Final diagnoses:   [id number]     Time reflects when diagnosis was documented in both MDM as applicable and the Disposition within this note     Time User Action Codes Description Comment    10/6/2023 10:29 AM Starling Pin Add [J06.9] URI (upper respiratory infection)     10/6/2023 10:36 AM Starling Pin Add [U07.1] COVID-19     10/6/2023 10:36 AM Starling Pin Modify [J06.9] URI (upper respiratory infection)     10/6/2023 10:36 AM Starling Pin Modify [U07.1] COVID-19     10/6/2023 10:36 AM Starling Pin Modify [U07.1] COVID-19     10/6/2023 10:36 AM Starling Pin Remove [J06.9] URI (upper respiratory infection)       ED Disposition     ED Disposition Discharge    Condition   Stable    Date/Time   Fri Oct 6, 2023 10:29 AM    Comment   Denita Vital discharge to home/self care.                Follow-up Information     Follow up With Specialties Details Why Contact Info    Dahlia Farr MD  Schedule an appointment as soon as possible for a visit   [address] [place] [address] [place]  [phone]            Discharge Medication List as of 10/6/2023 10:37 AM      CONTINUE these medications which have NOT CHANGED    Details   aspirin (ECOTRIN LOW STRENGTH) 81 mg EC tablet Take 81 mg by mouth daily, Historical Med      bisacodyl (DULCOLAX) 5 mg EC tablet Take 5 mg by mouth daily as needed for constipation, Historical Med      cholecalciferol (VITAMIN D3) 250 MCG (63158 UT) capsule Take 5 capsules (50,000 Units total) by mouth once a week On saturday, Starting Thu 8/24/2023, No Print      Cranberry 450 MG CAPS Take 1 capsule (450 mg total) by mouth in the morning, Starting Thu 8/24/2023, No Print      Diapers & Supplies MISC Use as directed., Historical Med      !! divalproex sodium (DEPAKOTE) 250 mg EC tablet Take 250 mg by mouth in the morning 1 tab AM (8am), Historical Med      !! divalproex sodium (DEPAKOTE) 500 mg EC tablet Take 500 mg by mouth daily after dinner, Historical Med      docusate sodium (COLACE) 100 mg capsule Take 100 mg by mouth in the morning and 100 mg in the evening., Historical Med      gabapentin (NEURONTIN) 800 mg tablet Take 800 mg by mouth 3 (three) times a day, Starting Thu 5/12/2022, Historical Med      Incontinence Supply Disposable (FQ Pant Liner) MISC Starting Sat 5/14/2022, Historical Med      Lancets 33G MISC daily, Starting u 5/12/2022, Historical Med      levothyroxine 50 mcg tablet Take 1 tablet (50 mcg total) by mouth daily in the early morning, Starting Sat 5/21/2022, No Print      LORazepam (ATIVAN) 0.5 mg tablet Take 0.5 mg by mouth in the morning and 0.5 mg in the evening., Historical Med      memantine (NAMENDA) 5 mg tablet Take 1 tablet (5 mg total) by mouth 2 (two) times a day, Starting Thu 8/24/2023, Until Sat 9/23/2023, Normal      nystatin (MYCOSTATIN) powder Apply topically in the morning, Historical Med      omeprazole (PriLOSEC) 20 mg delayed release capsule Take 20 mg by mouth daily, Starting Tue 6/22/2021, Until Wed 6/22/2022, Historical Med      polyethylene glycol (MIRALAX) 17 g packet Take 17 g by mouth every other day as needed (if no bm in 3 days), Starting Thu 8/24/2023, No Print      !! QUEtiapine (SEROquel) 100 mg tablet Take 100 mg by mouth in the morning and 100 mg in the evening. 8am, 4pm., Historical Med      !! QUEtiapine (SEROquel) 200 mg tablet Take 200 mg by mouth daily at bedtime, Historical Med      rosuvastatin (CRESTOR) 40 MG tablet Take 40 mg by mouth daily, Historical Med      senna (SENOKOT) 8.6 mg Take 1 tablet (8.6 mg total) by mouth daily at bedtime, Starting Wed 8/11/2021, No Print      sertraline (ZOLOFT) 100 mg tablet Take 200 mg by mouth daily, Historical Med      tolterodine (DETROL LA) 4 mg 24 hr capsule Take 1 capsule (4 mg total) by mouth daily, Starting Thu 8/24/2023, No Print      torsemide (DEMADEX) 5 MG tablet Take 1 tablet (5 mg total) by mouth daily, Starting Thu 8/24/2023, Until Sat 9/23/2023, Normal       !! - Potential duplicate medications found. Please discuss with provider. No discharge procedures on file.     PDMP Review       Value Time User    PDMP Reviewed  Yes 7/19/2022  9:40 PM Maeve Crowley, [facility]          ED Provider  Electronically Signed by           Serjio Roldan MD  10/06/23 1492

## 2024-02-23 ENCOUNTER — HOSPITAL ENCOUNTER (INPATIENT)
Facility: HOSPITAL | Age: 69
LOS: 3 days | Discharge: HOME HEALTH | DRG: 177 | End: 2024-02-26
Attending: EMERGENCY MEDICINE | Admitting: FAMILY MEDICINE
Payer: MEDICARE

## 2024-02-23 ENCOUNTER — APPOINTMENT (EMERGENCY)
Dept: RADIOLOGY | Facility: HOSPITAL | Age: 69
DRG: 177 | End: 2024-02-23
Payer: MEDICARE

## 2024-02-23 ENCOUNTER — APPOINTMENT (EMERGENCY)
Dept: CT IMAGING | Facility: HOSPITAL | Age: 69
DRG: 177 | End: 2024-02-23
Payer: MEDICARE

## 2024-02-23 DIAGNOSIS — N39.0 UTI (URINARY TRACT INFECTION): ICD-10-CM

## 2024-02-23 DIAGNOSIS — J18.9 PNEUMONIA: ICD-10-CM

## 2024-02-23 DIAGNOSIS — S61.309D AVULSION OF FINGERNAIL, SUBSEQUENT ENCOUNTER: ICD-10-CM

## 2024-02-23 DIAGNOSIS — R13.10 DYSPHAGIA, UNSPECIFIED TYPE: ICD-10-CM

## 2024-02-23 DIAGNOSIS — R41.82 ALTERED MENTAL STATUS: ICD-10-CM

## 2024-02-23 DIAGNOSIS — E87.1 ACUTE HYPONATREMIA: Primary | ICD-10-CM

## 2024-02-23 PROBLEM — E11.649 TYPE 2 DIABETES MELLITUS WITH HYPOGLYCEMIA, WITHOUT LONG-TERM CURRENT USE OF INSULIN (HCC): Status: ACTIVE | Noted: 2020-02-14

## 2024-02-23 LAB
ALBUMIN SERPL BCP-MCNC: 3.7 G/DL (ref 3.5–5)
ALBUMIN SERPL BCP-MCNC: 3.9 G/DL (ref 3.5–5)
ALP SERPL-CCNC: 65 U/L (ref 34–104)
ALP SERPL-CCNC: 79 U/L (ref 34–104)
ALT SERPL W P-5'-P-CCNC: 37 U/L (ref 7–52)
ALT SERPL W P-5'-P-CCNC: 40 U/L (ref 7–52)
ANION GAP SERPL CALCULATED.3IONS-SCNC: 1 MMOL/L
ANION GAP SERPL CALCULATED.3IONS-SCNC: 2 MMOL/L
ANION GAP SERPL CALCULATED.3IONS-SCNC: 5 MMOL/L
APTT PPP: 32 SECONDS (ref 23–37)
AST SERPL W P-5'-P-CCNC: 29 U/L (ref 13–39)
AST SERPL W P-5'-P-CCNC: 53 U/L (ref 13–39)
ATRIAL RATE: 62 BPM
BACTERIA UR QL AUTO: ABNORMAL /HPF
BASOPHILS # BLD AUTO: 0.01 THOUSANDS/ÂΜL (ref 0–0.1)
BASOPHILS NFR BLD AUTO: 0 % (ref 0–1)
BILIRUB SERPL-MCNC: 0.26 MG/DL (ref 0.2–1)
BILIRUB SERPL-MCNC: 0.41 MG/DL (ref 0.2–1)
BILIRUB UR QL STRIP: NEGATIVE
BNP SERPL-MCNC: 64 PG/ML (ref 0–100)
BUN SERPL-MCNC: 14 MG/DL (ref 5–25)
BUN SERPL-MCNC: 16 MG/DL (ref 5–25)
BUN SERPL-MCNC: 17 MG/DL (ref 5–25)
CALCIUM SERPL-MCNC: 10 MG/DL (ref 8.4–10.2)
CALCIUM SERPL-MCNC: 9.8 MG/DL (ref 8.4–10.2)
CALCIUM SERPL-MCNC: 9.9 MG/DL (ref 8.4–10.2)
CHLORIDE SERPL-SCNC: 91 MMOL/L (ref 96–108)
CHLORIDE SERPL-SCNC: 92 MMOL/L (ref 96–108)
CHLORIDE SERPL-SCNC: 94 MMOL/L (ref 96–108)
CLARITY UR: ABNORMAL
CO2 SERPL-SCNC: 29 MMOL/L (ref 21–32)
CO2 SERPL-SCNC: 33 MMOL/L (ref 21–32)
CO2 SERPL-SCNC: 35 MMOL/L (ref 21–32)
COLOR UR: YELLOW
CREAT SERPL-MCNC: 0.59 MG/DL (ref 0.6–1.3)
CREAT SERPL-MCNC: 0.6 MG/DL (ref 0.6–1.3)
CREAT SERPL-MCNC: 0.61 MG/DL (ref 0.6–1.3)
EOSINOPHIL # BLD AUTO: 0.04 THOUSAND/ÂΜL (ref 0–0.61)
EOSINOPHIL NFR BLD AUTO: 1 % (ref 0–6)
ERYTHROCYTE [DISTWIDTH] IN BLOOD BY AUTOMATED COUNT: 16 % (ref 11.6–15.1)
FLUAV RNA RESP QL NAA+PROBE: NEGATIVE
FLUBV RNA RESP QL NAA+PROBE: NEGATIVE
GFR SERPL CREATININE-BSD FRML MDRD: 92 ML/MIN/1.73SQ M
GFR SERPL CREATININE-BSD FRML MDRD: 92 ML/MIN/1.73SQ M
GFR SERPL CREATININE-BSD FRML MDRD: 93 ML/MIN/1.73SQ M
GLUCOSE SERPL-MCNC: 103 MG/DL (ref 65–140)
GLUCOSE SERPL-MCNC: 110 MG/DL (ref 65–140)
GLUCOSE SERPL-MCNC: 124 MG/DL (ref 65–140)
GLUCOSE SERPL-MCNC: 184 MG/DL (ref 65–140)
GLUCOSE SERPL-MCNC: 57 MG/DL (ref 65–140)
GLUCOSE SERPL-MCNC: 63 MG/DL (ref 65–140)
GLUCOSE SERPL-MCNC: 68 MG/DL (ref 65–140)
GLUCOSE UR STRIP-MCNC: NEGATIVE MG/DL
HCT VFR BLD AUTO: 33.2 % (ref 34.8–46.1)
HGB BLD-MCNC: 10.7 G/DL (ref 11.5–15.4)
HGB UR QL STRIP.AUTO: NEGATIVE
IMM GRANULOCYTES # BLD AUTO: 0.02 THOUSAND/UL (ref 0–0.2)
IMM GRANULOCYTES NFR BLD AUTO: 0 % (ref 0–2)
INR PPP: 0.98 (ref 0.84–1.19)
KETONES UR STRIP-MCNC: NEGATIVE MG/DL
LACTATE SERPL-SCNC: 1.2 MMOL/L (ref 0.5–2)
LEUKOCYTE ESTERASE UR QL STRIP: ABNORMAL
LYMPHOCYTES # BLD AUTO: 1.21 THOUSANDS/ÂΜL (ref 0.6–4.47)
LYMPHOCYTES NFR BLD AUTO: 23 % (ref 14–44)
MAGNESIUM SERPL-MCNC: 1.8 MG/DL (ref 1.9–2.7)
MCH RBC QN AUTO: 29.2 PG (ref 26.8–34.3)
MCHC RBC AUTO-ENTMCNC: 32.2 G/DL (ref 31.4–37.4)
MCV RBC AUTO: 91 FL (ref 82–98)
MONOCYTES # BLD AUTO: 0.38 THOUSAND/ÂΜL (ref 0.17–1.22)
MONOCYTES NFR BLD AUTO: 7 % (ref 4–12)
NEUTROPHILS # BLD AUTO: 3.68 THOUSANDS/ÂΜL (ref 1.85–7.62)
NEUTS SEG NFR BLD AUTO: 69 % (ref 43–75)
NITRITE UR QL STRIP: POSITIVE
NON-SQ EPI CELLS URNS QL MICRO: ABNORMAL /HPF
NRBC BLD AUTO-RTO: 0 /100 WBCS
OSMOLALITY UR: 242 MMOL/KG
P AXIS: 81 DEGREES
PH UR STRIP.AUTO: 7.5 [PH]
PLATELET # BLD AUTO: 143 THOUSANDS/UL (ref 149–390)
PMV BLD AUTO: 9.9 FL (ref 8.9–12.7)
POTASSIUM SERPL-SCNC: 4.4 MMOL/L (ref 3.5–5.3)
POTASSIUM SERPL-SCNC: 5.1 MMOL/L (ref 3.5–5.3)
POTASSIUM SERPL-SCNC: 6.5 MMOL/L (ref 3.5–5.3)
PR INTERVAL: 210 MS
PROCALCITONIN SERPL-MCNC: <0.05 NG/ML
PROCALCITONIN SERPL-MCNC: <0.05 NG/ML
PROT SERPL-MCNC: 6.6 G/DL (ref 6.4–8.4)
PROT SERPL-MCNC: 7.1 G/DL (ref 6.4–8.4)
PROT UR STRIP-MCNC: NEGATIVE MG/DL
PROTHROMBIN TIME: 13.2 SECONDS (ref 11.6–14.5)
QRS AXIS: 82 DEGREES
QRSD INTERVAL: 100 MS
QT INTERVAL: 410 MS
QTC INTERVAL: 416 MS
RBC # BLD AUTO: 3.67 MILLION/UL (ref 3.81–5.12)
RBC #/AREA URNS AUTO: ABNORMAL /HPF
RSV RNA RESP QL NAA+PROBE: NEGATIVE
SARS-COV-2 RNA RESP QL NAA+PROBE: NEGATIVE
SODIUM 24H UR-SCNC: 55 MOL/L
SODIUM SERPL-SCNC: 126 MMOL/L (ref 135–147)
SODIUM SERPL-SCNC: 128 MMOL/L (ref 135–147)
SODIUM SERPL-SCNC: 128 MMOL/L (ref 135–147)
SP GR UR STRIP.AUTO: 1.01 (ref 1–1.03)
T WAVE AXIS: 26 DEGREES
UROBILINOGEN UR QL STRIP.AUTO: 0.2 E.U./DL
VENTRICULAR RATE: 62 BPM
WBC # BLD AUTO: 5.34 THOUSAND/UL (ref 4.31–10.16)
WBC #/AREA URNS AUTO: ABNORMAL /HPF

## 2024-02-23 PROCEDURE — 87086 URINE CULTURE/COLONY COUNT: CPT | Performed by: FAMILY MEDICINE

## 2024-02-23 PROCEDURE — 87040 BLOOD CULTURE FOR BACTERIA: CPT | Performed by: PHYSICIAN ASSISTANT

## 2024-02-23 PROCEDURE — 96365 THER/PROPH/DIAG IV INF INIT: CPT

## 2024-02-23 PROCEDURE — 84145 PROCALCITONIN (PCT): CPT | Performed by: FAMILY MEDICINE

## 2024-02-23 PROCEDURE — 74177 CT ABD & PELVIS W/CONTRAST: CPT

## 2024-02-23 PROCEDURE — 99285 EMERGENCY DEPT VISIT HI MDM: CPT

## 2024-02-23 PROCEDURE — 83605 ASSAY OF LACTIC ACID: CPT | Performed by: PHYSICIAN ASSISTANT

## 2024-02-23 PROCEDURE — 87077 CULTURE AEROBIC IDENTIFY: CPT | Performed by: FAMILY MEDICINE

## 2024-02-23 PROCEDURE — 99223 1ST HOSP IP/OBS HIGH 75: CPT | Performed by: FAMILY MEDICINE

## 2024-02-23 PROCEDURE — 81001 URINALYSIS AUTO W/SCOPE: CPT | Performed by: PHYSICIAN ASSISTANT

## 2024-02-23 PROCEDURE — 71045 X-RAY EXAM CHEST 1 VIEW: CPT

## 2024-02-23 PROCEDURE — 80048 BASIC METABOLIC PNL TOTAL CA: CPT | Performed by: FAMILY MEDICINE

## 2024-02-23 PROCEDURE — 85730 THROMBOPLASTIN TIME PARTIAL: CPT | Performed by: PHYSICIAN ASSISTANT

## 2024-02-23 PROCEDURE — 0241U HB NFCT DS VIR RESP RNA 4 TRGT: CPT | Performed by: PHYSICIAN ASSISTANT

## 2024-02-23 PROCEDURE — 84300 ASSAY OF URINE SODIUM: CPT | Performed by: FAMILY MEDICINE

## 2024-02-23 PROCEDURE — 80053 COMPREHEN METABOLIC PANEL: CPT | Performed by: PHYSICIAN ASSISTANT

## 2024-02-23 PROCEDURE — 84145 PROCALCITONIN (PCT): CPT | Performed by: PHYSICIAN ASSISTANT

## 2024-02-23 PROCEDURE — 99285 EMERGENCY DEPT VISIT HI MDM: CPT | Performed by: PHYSICIAN ASSISTANT

## 2024-02-23 PROCEDURE — 85025 COMPLETE CBC W/AUTO DIFF WBC: CPT | Performed by: PHYSICIAN ASSISTANT

## 2024-02-23 PROCEDURE — 36415 COLL VENOUS BLD VENIPUNCTURE: CPT | Performed by: PHYSICIAN ASSISTANT

## 2024-02-23 PROCEDURE — 83735 ASSAY OF MAGNESIUM: CPT | Performed by: PHYSICIAN ASSISTANT

## 2024-02-23 PROCEDURE — 85610 PROTHROMBIN TIME: CPT | Performed by: PHYSICIAN ASSISTANT

## 2024-02-23 PROCEDURE — 83935 ASSAY OF URINE OSMOLALITY: CPT | Performed by: FAMILY MEDICINE

## 2024-02-23 PROCEDURE — 93005 ELECTROCARDIOGRAM TRACING: CPT

## 2024-02-23 PROCEDURE — 82948 REAGENT STRIP/BLOOD GLUCOSE: CPT

## 2024-02-23 PROCEDURE — 71260 CT THORAX DX C+: CPT

## 2024-02-23 PROCEDURE — 87186 SC STD MICRODIL/AGAR DIL: CPT | Performed by: FAMILY MEDICINE

## 2024-02-23 PROCEDURE — 83880 ASSAY OF NATRIURETIC PEPTIDE: CPT | Performed by: FAMILY MEDICINE

## 2024-02-23 PROCEDURE — 96361 HYDRATE IV INFUSION ADD-ON: CPT

## 2024-02-23 PROCEDURE — 70450 CT HEAD/BRAIN W/O DYE: CPT

## 2024-02-23 RX ORDER — MAGNESIUM SULFATE 1 G/100ML
1 INJECTION INTRAVENOUS ONCE
Status: COMPLETED | OUTPATIENT
Start: 2024-02-23 | End: 2024-02-23

## 2024-02-23 RX ORDER — GABAPENTIN 400 MG/1
800 CAPSULE ORAL 3 TIMES DAILY
Status: DISCONTINUED | OUTPATIENT
Start: 2024-02-23 | End: 2024-02-26 | Stop reason: HOSPADM

## 2024-02-23 RX ORDER — INSULIN LISPRO 100 [IU]/ML
1-5 INJECTION, SOLUTION INTRAVENOUS; SUBCUTANEOUS
Status: DISCONTINUED | OUTPATIENT
Start: 2024-02-23 | End: 2024-02-23

## 2024-02-23 RX ORDER — ATORVASTATIN CALCIUM 40 MG/1
80 TABLET, FILM COATED ORAL
Status: DISCONTINUED | OUTPATIENT
Start: 2024-02-23 | End: 2024-02-23

## 2024-02-23 RX ORDER — CEFTRIAXONE 1 G/50ML
1000 INJECTION, SOLUTION INTRAVENOUS ONCE
Status: COMPLETED | OUTPATIENT
Start: 2024-02-23 | End: 2024-02-23

## 2024-02-23 RX ORDER — DEXTROSE MONOHYDRATE 25 G/50ML
25 INJECTION, SOLUTION INTRAVENOUS ONCE
Status: COMPLETED | OUTPATIENT
Start: 2024-02-23 | End: 2024-02-23

## 2024-02-23 RX ORDER — PANTOPRAZOLE SODIUM 40 MG/10ML
40 INJECTION, POWDER, LYOPHILIZED, FOR SOLUTION INTRAVENOUS
Status: DISCONTINUED | OUTPATIENT
Start: 2024-02-24 | End: 2024-02-26 | Stop reason: HOSPADM

## 2024-02-23 RX ORDER — DEXTROSE AND SODIUM CHLORIDE 5; .9 G/100ML; G/100ML
75 INJECTION, SOLUTION INTRAVENOUS CONTINUOUS
Status: DISCONTINUED | OUTPATIENT
Start: 2024-02-23 | End: 2024-02-24

## 2024-02-23 RX ORDER — LORAZEPAM 2 MG/ML
0.5 INJECTION INTRAMUSCULAR 2 TIMES DAILY
Status: DISCONTINUED | OUTPATIENT
Start: 2024-02-23 | End: 2024-02-26 | Stop reason: HOSPADM

## 2024-02-23 RX ORDER — ONDANSETRON 2 MG/ML
4 INJECTION INTRAMUSCULAR; INTRAVENOUS EVERY 6 HOURS PRN
Status: DISCONTINUED | OUTPATIENT
Start: 2024-02-23 | End: 2024-02-26 | Stop reason: HOSPADM

## 2024-02-23 RX ORDER — ACETAMINOPHEN 325 MG/1
650 TABLET ORAL EVERY 6 HOURS PRN
Status: DISCONTINUED | OUTPATIENT
Start: 2024-02-23 | End: 2024-02-26 | Stop reason: HOSPADM

## 2024-02-23 RX ORDER — CEFTRIAXONE 2 G/50ML
2000 INJECTION, SOLUTION INTRAVENOUS EVERY 24 HOURS
Status: DISCONTINUED | OUTPATIENT
Start: 2024-02-23 | End: 2024-02-25

## 2024-02-23 RX ORDER — QUETIAPINE FUMARATE 100 MG/1
200 TABLET, FILM COATED ORAL
Status: DISCONTINUED | OUTPATIENT
Start: 2024-02-23 | End: 2024-02-23

## 2024-02-23 RX ORDER — LEVOTHYROXINE SODIUM 0.05 MG/1
50 TABLET ORAL
Status: DISCONTINUED | OUTPATIENT
Start: 2024-02-24 | End: 2024-02-23

## 2024-02-23 RX ORDER — SERTRALINE HYDROCHLORIDE 100 MG/1
200 TABLET, FILM COATED ORAL DAILY
Status: DISCONTINUED | OUTPATIENT
Start: 2024-02-24 | End: 2024-02-23

## 2024-02-23 RX ORDER — HEPARIN SODIUM 5000 [USP'U]/ML
5000 INJECTION, SOLUTION INTRAVENOUS; SUBCUTANEOUS EVERY 8 HOURS SCHEDULED
Status: DISCONTINUED | OUTPATIENT
Start: 2024-02-23 | End: 2024-02-26 | Stop reason: HOSPADM

## 2024-02-23 RX ORDER — FERROUS SULFATE 325(65) MG
325 TABLET ORAL
Status: DISCONTINUED | OUTPATIENT
Start: 2024-02-24 | End: 2024-02-23

## 2024-02-23 RX ORDER — QUETIAPINE FUMARATE 25 MG/1
100 TABLET, FILM COATED ORAL 2 TIMES DAILY
Status: DISCONTINUED | OUTPATIENT
Start: 2024-02-23 | End: 2024-02-23

## 2024-02-23 RX ADMIN — DEXTROSE AND SODIUM CHLORIDE 75 ML/HR: 5; .9 INJECTION, SOLUTION INTRAVENOUS at 17:01

## 2024-02-23 RX ADMIN — IOHEXOL 100 ML: 350 INJECTION, SOLUTION INTRAVENOUS at 12:15

## 2024-02-23 RX ADMIN — CEFTRIAXONE 1000 MG: 1 INJECTION, SOLUTION INTRAVENOUS at 14:41

## 2024-02-23 RX ADMIN — MAGNESIUM SULFATE HEPTAHYDRATE 1 G: 1 INJECTION, SOLUTION INTRAVENOUS at 17:11

## 2024-02-23 RX ADMIN — VALPROATE SODIUM 500 MG: 100 INJECTION, SOLUTION INTRAVENOUS at 18:07

## 2024-02-23 RX ADMIN — HEPARIN SODIUM 5000 UNITS: 5000 INJECTION INTRAVENOUS; SUBCUTANEOUS at 23:20

## 2024-02-23 RX ADMIN — DEXTROSE MONOHYDRATE 25 ML: 25 INJECTION, SOLUTION INTRAVENOUS at 16:58

## 2024-02-23 RX ADMIN — SODIUM CHLORIDE 500 ML: 0.9 INJECTION, SOLUTION INTRAVENOUS at 10:46

## 2024-02-23 RX ADMIN — HEPARIN SODIUM 5000 UNITS: 5000 INJECTION INTRAVENOUS; SUBCUTANEOUS at 17:13

## 2024-02-23 RX ADMIN — LORAZEPAM 0.5 MG: 2 INJECTION INTRAMUSCULAR; INTRAVENOUS at 18:16

## 2024-02-23 NOTE — ASSESSMENT & PLAN NOTE
She has not been swallowing her food today apparently spitting it out ER discussed with GI they will plan to scope her on Monday to evaluate for any food bolus there but the CT is negative for any apparent food bolus in the esophagus but she feels the dysphagia screening by nursing will keep her n.p.o. till speech evaluation.  She was on puréed diet at [facility]

## 2024-02-23 NOTE — Clinical Note
Case was discussed with  and the patient's admission status was agreed to be  to the service of Dr. Butler

## 2024-02-23 NOTE — H&P
[facility]  H&P  Name: Denita Vital 68 y.o. female I MRN: [id number]  Unit/Bed#: PRE/POST 14 I Date of Admission: 2/23/2024   Date of Service: 2/23/2024 I Hospital Day: 0      Assessment/Plan   Hyponatremia  Assessment & Plan  Will check sodium studies provide fluids recheck BMP at 8 and consult nephrology    Lower extremity edema  Assessment & Plan  Hold torsemide as will fluid her as she is npo     Cystitis without hematuria  Assessment & Plan  Ua positive   Rocephine  Follow up urine cx    Acute metabolic encephalopathy  Assessment & Plan  Odd behavior could be secondary to acute cystitis.  Otherwise she is nonverbal.  And also aspiration pneumonia pneumonitis.  CT of the brain is negative for acute findings  Will treat with antibiotics also hypoglycemia on the labs as well will place on D5 normal saline hold diabetic medications and monitor glucose closely    Dysphagia  Assessment & Plan  She has not been swallowing her food today apparently spitting it out ER discussed with GI they will plan to scope her on Monday to evaluate for any food bolus there but the CT is negative for any apparent food bolus in the esophagus but she feels the dysphagia screening by nursing will keep her n.p.o. till speech evaluation.  She was on puréed diet at group home    Seizure disorder (HCC)  Assessment & Plan  On Depakote will convert to Depacon IV as she failed swallow eval    Type 2 diabetes mellitus with hypoglycemia, without long-term current use of insulin (HCC)  Assessment & Plan  Lab Results   Component Value Date    HGBA1C 5.4 08/24/2023       Recent Labs     02/23/24  1636   POCGLU 57*       Blood Sugar Average: Last 72 hrs:  (P) 57  Recurrent hold her diabetic medications will monitor her fingersticks every 4 hours placed on D5 normal    Mood disorder (HCC)  Assessment & Plan  Till she is cleared by speech unfortunately I have to hold her Seroquel and Zoloft but I will place Ativan twice  daily IV    Aspiration pneumonia (HCC)  Assessment & Plan  Vs pneumonitis place abx  Do vest therapy as she wont be able to do flutter or incentive spirometry   Follow procal  Aggressive pulmonary toileting   Speech to see           VTE Pharmacologic Prophylaxis: VTE Score: 3 Moderate Risk (Score 3-4) - Pharmacological DVT Prophylaxis Ordered: heparin.  Code Status: full code  Discussion with family: Updated  (friend) at bedside.  Group home caretaker    Anticipated Length of Stay: Patient will be admitted on an inpatient basis with an anticipated length of stay of greater than 2 midnights secondary to aspiration pneumonia acute cystitis.    Total Time Spent on Date of Encounter in care of patient: >45 mins. This time was spent on one or more of the following: performing physical exam; counseling and coordination of care; obtaining or reviewing history; documenting in the medical record; reviewing/ordering tests, medications or procedures; communicating with other healthcare professionals and discussing with patient's family/caregivers.    Chief Complaint: She is nonverbal unable to express apparently spitting out her food and chewing on everything    History of Present Illness:  Denita Vital is a 68 y.o. female with a PMH of seizure disorder who presents with spitting out her food and not chewing it.  She has also been compliant with a puréed diet there is.  The caregiver.  She persistently today has been grabbing at things and trying to put them in her mouth and eat she did not swallow the food patient is not drooling no nausea no vomiting patient does not wear dentures and apparently this is an odd behavior.     Review of Systems:  Review of Systems   Unable to perform ROS: Patient nonverbal       Past Medical and Surgical History:   Past Medical History:   Diagnosis Date    Anxiety     CHF (congestive heart failure) (HCC)     Diabetes mellitus (HCC)     GERD (gastroesophageal reflux disease)      Hyperlipidemia     Hypertension     Mental disability     Mixed incontinence 04/12/2017    Seizure disorder (HCC)        History reviewed. No pertinent surgical history.    Meds/Allergies:  Prior to Admission medications    Medication Sig Start Date End Date Taking? Authorizing Provider   aspirin (ECOTRIN LOW STRENGTH) 81 mg EC tablet Take 81 mg by mouth daily   Yes Historical Provider, MD   bisacodyl (DULCOLAX) 5 mg EC tablet Take 5 mg by mouth daily as needed for constipation   Yes Historical Provider, MD   cholecalciferol (VITAMIN D3) 250 MCG (27334 UT) capsule Take 5 capsules (50,000 Units total) by mouth once a week On saturday 8/24/23  Yes Eli Tolentino MD   Cranberry 450 MG CAPS Take 1 capsule (450 mg total) by mouth in the morning 8/24/23  Yes Eli Tolentino MD   Diapers & Supplies MISC Use as directed. 5/12/22  Yes Historical Provider, MD   divalproex sodium (DEPAKOTE) 250 mg EC tablet Take 250 mg by mouth in the morning 1 tab AM (8am)   Yes Historical Provider, MD   divalproex sodium (DEPAKOTE) 500 mg EC tablet Take 500 mg by mouth daily after dinner   Yes Historical Provider, MD   docusate sodium (COLACE) 100 mg capsule Take 100 mg by mouth in the morning and 100 mg in the evening.   Yes Historical Provider, MD   gabapentin (NEURONTIN) 800 mg tablet Take 800 mg by mouth 3 (three) times a day 5/12/22  Yes Historical Provider, MD   Incontinence Supply Disposable (FQ Pant Liner) MISC  5/14/22  Yes Historical Provider, MD   Lancets 33G MISC daily 5/12/22  Yes Historical Provider, MD   levothyroxine 50 mcg tablet Take 1 tablet (50 mcg total) by mouth daily in the early morning 5/21/22  Yes Demetria Sol MD   LORazepam (ATIVAN) 0.5 mg tablet Take 0.5 mg by mouth in the morning and 0.5 mg in the evening.   Yes Historical Provider, MD   omeprazole (PriLOSEC) 20 mg delayed release capsule Take 20 mg by mouth daily 6/22/21 2/23/24 Yes Historical Provider, MD   polyethylene glycol (MIRALAX) 17 g packet Take 17 g  by mouth every other day as needed (if no bm in 3 days) 8/24/23  Yes Eli Tolentino MD   QUEtiapine (SEROquel) 100 mg tablet Take 100 mg by mouth in the morning and 100 mg in the evening. 8am, 4pm.   Yes Historical Provider, MD   QUEtiapine (SEROquel) 200 mg tablet Take 200 mg by mouth daily at bedtime   Yes Historical Provider, MD   rosuvastatin (CRESTOR) 40 MG tablet Take 40 mg by mouth daily   Yes Historical Provider, MD   senna (SENOKOT) 8.6 mg Take 1 tablet (8.6 mg total) by mouth daily at bedtime 8/11/21  Yes Demetria Sol MD   tolterodine (DETROL LA) 4 mg 24 hr capsule Take 1 capsule (4 mg total) by mouth daily 8/24/23  Yes Eli Tolentino MD   torsemide (DEMADEX) 5 MG tablet Take 1 tablet (5 mg total) by mouth daily 8/24/23 2/23/24 Yes Eli Tolentino MD   memantine (NAMENDA) 5 mg tablet Take 1 tablet (5 mg total) by mouth 2 (two) times a day 8/24/23 9/23/23  Eli Tolentino MD   nystatin (MYCOSTATIN) powder Apply topically in the morning  Patient not taking: Reported on 2/23/2024    Historical Provider, MD   sertraline (ZOLOFT) 100 mg tablet Take 200 mg by mouth daily  Patient not taking: Reported on 2/23/2024    Historical Provider, MD     I have reviewed home medications with a medical source (PCP, Pharmacy, other).    Allergies:   Allergies   Allergen Reactions    Actos [Pioglitazone] Other (See Comments)     unkown        Social History:  Marital Status: Single   Substance Use History:   Social History     Substance and Sexual Activity   Alcohol Use Not Currently     Social History     Tobacco Use   Smoking Status Never   Smokeless Tobacco Never     Social History     Substance and Sexual Activity   Drug Use Not Currently       Family History:  Family History   Problem Relation Age of Onset    No Known Problems Mother     No Known Problems Father     No Known Problems Sister     No Known Problems Maternal Grandmother     No Known Problems Maternal Grandfather     No Known Problems Paternal Grandmother     No  Known Problems Paternal Grandfather        Physical Exam:     Vitals:   Blood Pressure: 134/64 (02/23/24 1500)  Pulse: 63 (02/23/24 1530)  Temperature: (!) 96.8 °F (36 °C) (02/23/24 0915)  Temp Source: Temporal (02/23/24 0915)  Respirations: 18 (02/23/24 1500)  SpO2: 98 % (02/23/24 1530)    Physical Exam  Vitals and nursing note reviewed.   Constitutional:       General: She is not in acute distress.     Appearance: She is well-developed.      Comments: Awake nonverbal   HENT:      Head: Normocephalic and atraumatic.   Eyes:      Conjunctiva/sclera: Conjunctivae normal.   Cardiovascular:      Rate and Rhythm: Normal rate and regular rhythm.      Heart sounds: No murmur heard.  Pulmonary:      Effort: Pulmonary effort is normal. No respiratory distress.      Breath sounds: Rales present.   Abdominal:      General: Bowel sounds are normal. There is no distension.      Palpations: Abdomen is soft.      Tenderness: There is no abdominal tenderness.   Musculoskeletal:         General: No swelling.      Cervical back: Neck supple.   Skin:     General: Skin is warm and dry.      Capillary Refill: Capillary refill takes less than 2 seconds.   Neurological:      Comments: Awake and nonverbal    Psychiatric:         Mood and Affect: Mood normal.          Additional Data:     Lab Results:  Results from last 7 days   Lab Units 02/23/24  1046   WBC Thousand/uL 5.34   HEMOGLOBIN g/dL 10.7*   HEMATOCRIT % 33.2*   PLATELETS Thousands/uL 143*   NEUTROS PCT % 69   LYMPHS PCT % 23   MONOS PCT % 7   EOS PCT % 1     Results from last 7 days   Lab Units 02/23/24  1233   SODIUM mmol/L 128*   POTASSIUM mmol/L 4.4   CHLORIDE mmol/L 91*   CO2 mmol/L 35*   BUN mg/dL 16   CREATININE mg/dL 0.61   ANION GAP mmol/L 2   CALCIUM mg/dL 9.8   ALBUMIN g/dL 3.7   TOTAL BILIRUBIN mg/dL 0.26   ALK PHOS U/L 79   ALT U/L 37   AST U/L 29   GLUCOSE RANDOM mg/dL 63*     Results from last 7 days   Lab Units 02/23/24  1046   INR  0.98     Results from last 7  days   Lab Units 02/23/24  1636   POC GLUCOSE mg/dl 57*         Results from last 7 days   Lab Units 02/23/24  1046   LACTIC ACID mmol/L 1.2   PROCALCITONIN ng/ml <0.05       Lines/Drains:  Invasive Devices       Peripheral Intravenous Line  Duration             Peripheral IV 02/23/24 Right Wrist <1 day                        Imaging: Reviewed radiology reports from this admission including: chest CT scan and CT head  CT chest abdomen pelvis w contrast   Final Result by Venkat Torres MD (02/23 1335)      Study degraded by respiratory motion.      1.  Scattered groundglass opacities throughout the lung fields, nonspecific. This could represent pulmonary edema or an atypical pneumonia. Clinical correlation recommended.      2.  Dense consolidation layering along the right major fissure, progressed from 2021 and likely atelectasis though of uncertain etiology.      3.  Unchanged 4 cm ectatic ascending aorta. Continued annual CT surveillance recommended.      4.  4 mm right lower lobe lung nodule, not present previously. This can also be reassessed at the time of follow-up.      5.  Enlarged pulmonary artery which can be seen with pulmonary hypertension.      6.  Circumferential bladder wall thickening and irregularity which could represent acute cystitis. Correlation with urinalysis recommended.      7.  Subcentimeter pancreatic cyst. For simple cyst(s) less than 1.5 cm, recommend follow-up every 2 years for 5 times or to age 80, whichever comes first. Follow-up can stop at age 80 or can switch over to 80 year or older algorithm. Recommend next    follow-up in 2 years.   Preferred imaging modality: abdomen MRI and MRCP with and without IV contrast, or triple phase abdomen CT with IV contrast, or abdomen MRI and MRCP without IV contrast.      The study was marked in EPIC for immediate notification.                              Workstation performed: [id number]         CT head without contrast   Final Result by  Pallav N Shah, MD (02/23 1321)      No acute intracranial abnormality.  Stable chronic microangiopathic changes within the brain.      Redemonstration of imaging features which are highly suspicious for normal pressure hydrocephalus.                  Workstation performed: [id number]         XR chest 1 view portable   Final Result by Mathew Molina MD (02/23 1321)      Poor inspiration. Mild prominent markings suggesting congestion.            Workstation performed: [id number]             EKG and Other Studies Reviewed on Admission:   EKG: NSR. HR 63.    ** Please Note: This note has been constructed using a voice recognition system. **

## 2024-02-23 NOTE — ASSESSMENT & PLAN NOTE
Till she is cleared by speech unfortunately I have to hold her Seroquel and Zoloft but I will place Ativan twice daily IV

## 2024-02-23 NOTE — ASSESSMENT & PLAN NOTE
Vs pneumonitis place abx  Do vest therapy as she wont be able to do flutter or incentive spirometry   Follow procal  Aggressive pulmonary toileting   Speech to see

## 2024-02-23 NOTE — ED PROVIDER NOTES
History  Chief Complaint   Patient presents with    Altered Mental Status     Pt presents to ER with caregiver. States she was spitting out food and drinks this AM which is unusual behavior for her. Also caregiver reports patient is sticking her fingers in her mouth. States at time spitting out secretions. Had cream of wheat this AM, spaghetti last night.      Patient is a 68-year-old female, nonverbal, who presents to the Emergency Department today with caregiver for the concern of ending up her food and acting oddly.  The patient has persistently today been grabbing at things and trying to put them in her mouth and eat.  Although the patient this morning was given breakfast the patient was pulling the food in her mouth and then spitting the food out.  Did not swallow the food.  Patient is not drooling.  Patient did not have any falls or traumas.  Patient did not have any nausea vomiting fevers chills.  Patient does not wear dentures.  Brought in for further evaluation of this behavior.      History provided by:  Caregiver  History limited by:  Patient nonverbal  Altered Mental Status  Presenting symptoms: behavior changes    Severity:  Mild  Most recent episode:  Today  Episode history:  Continuous  Duration:  1 day  Timing:  Constant  Progression:  Worsening  Chronicity:  New  Associated symptoms: no abdominal pain, no fever, no seizures and no vomiting        Prior to Admission Medications   Prescriptions Last Dose Informant Patient Reported? Taking?   Cranberry 450 MG CAPS   No Yes   Sig: Take 1 capsule (450 mg total) by mouth in the morning   Diapers & Supplies MISC   Yes Yes   Sig: Use as directed.   Incontinence Supply Disposable (FQ Pant Liner) MISC   Yes Yes   LORazepam (ATIVAN) 0.5 mg tablet   Yes Yes   Sig: Take 0.5 mg by mouth in the morning and 0.5 mg in the evening.   Lancets 33G MISC   Yes Yes   Sig: daily   QUEtiapine (SEROquel) 100 mg tablet   Yes Yes   Sig: Take 100 mg by mouth in the morning and  100 mg in the evening. 8am, 4pm.   QUEtiapine (SEROquel) 200 mg tablet   Yes Yes   Sig: Take 200 mg by mouth daily at bedtime   aspirin (ECOTRIN LOW STRENGTH) 81 mg EC tablet   Yes Yes   Sig: Take 81 mg by mouth daily   bisacodyl (DULCOLAX) 5 mg EC tablet   Yes Yes   Sig: Take 5 mg by mouth daily as needed for constipation   cholecalciferol (VITAMIN D3) 250 MCG (32735 UT) capsule   No Yes   Sig: Take 5 capsules (50,000 Units total) by mouth once a week On saturday   divalproex sodium (DEPAKOTE) 250 mg EC tablet   Yes Yes   Sig: Take 250 mg by mouth in the morning 1 tab AM (8am)   divalproex sodium (DEPAKOTE) 500 mg EC tablet   Yes Yes   Sig: Take 500 mg by mouth daily after dinner   docusate sodium (COLACE) 100 mg capsule  Outside Facility (Specify) Yes Yes   Sig: Take 100 mg by mouth in the morning and 100 mg in the evening.   gabapentin (NEURONTIN) 800 mg tablet   Yes Yes   Sig: Take 800 mg by mouth 3 (three) times a day   levothyroxine 50 mcg tablet   No Yes   Sig: Take 1 tablet (50 mcg total) by mouth daily in the early morning   memantine (NAMENDA) 5 mg tablet   No No   Sig: Take 1 tablet (5 mg total) by mouth 2 (two) times a day   nystatin (MYCOSTATIN) powder Not Taking Self Yes No   Sig: Apply topically in the morning   Patient not taking: Reported on 2/23/2024   omeprazole (PriLOSEC) 20 mg delayed release capsule   Yes Yes   Sig: Take 20 mg by mouth daily   polyethylene glycol (MIRALAX) 17 g packet   No Yes   Sig: Take 17 g by mouth every other day as needed (if no bm in 3 days)   rosuvastatin (CRESTOR) 40 MG tablet   Yes Yes   Sig: Take 40 mg by mouth daily   senna (SENOKOT) 8.6 mg   No Yes   Sig: Take 1 tablet (8.6 mg total) by mouth daily at bedtime   sertraline (ZOLOFT) 100 mg tablet Not Taking  Yes No   Sig: Take 200 mg by mouth daily   Patient not taking: Reported on 2/23/2024   tolterodine (DETROL LA) 4 mg 24 hr capsule   No Yes   Sig: Take 1 capsule (4 mg total) by mouth daily   torsemide (DEMADEX) 5  MG tablet   No Yes   Sig: Take 1 tablet (5 mg total) by mouth daily      Facility-Administered Medications: None       Past Medical History:   Diagnosis Date    Anxiety     CHF (congestive heart failure) (HCC)     Diabetes mellitus (HCC)     GERD (gastroesophageal reflux disease)     Hyperlipidemia     Hypertension     Mental disability     Mixed incontinence 04/12/2017    Seizure disorder (HCC)        History reviewed. No pertinent surgical history.    Family History   Problem Relation Age of Onset    No Known Problems Mother     No Known Problems Father     No Known Problems Sister     No Known Problems Maternal Grandmother     No Known Problems Maternal Grandfather     No Known Problems Paternal Grandmother     No Known Problems Paternal Grandfather      I have reviewed and agree with the history as documented.    E-Cigarette/Vaping    E-Cigarette Use Never User      E-Cigarette/Vaping Substances    Nicotine No     THC No     CBD No     Flavoring No     Other No     Unknown No      Social History     Tobacco Use    Smoking status: Never    Smokeless tobacco: Never   Vaping Use    Vaping status: Never Used   Substance Use Topics    Alcohol use: Not Currently    Drug use: Not Currently       Review of Systems   Constitutional:  Negative for fever.   Gastrointestinal:  Negative for abdominal pain and vomiting.   Neurological:  Negative for seizures.       Physical Exam  Physical Exam  Vitals and nursing note reviewed.   Constitutional:       General: She is not in acute distress.     Appearance: She is well-developed.      Comments: Is nonverbal   HENT:      Head: Normocephalic and atraumatic.   Eyes:      Conjunctiva/sclera: Conjunctivae normal.   Cardiovascular:      Rate and Rhythm: Normal rate and regular rhythm.      Heart sounds: No murmur heard.  Pulmonary:      Effort: Pulmonary effort is normal. No respiratory distress.      Breath sounds: Normal breath sounds.   Abdominal:      Palpations: Abdomen is soft.       Tenderness: There is no abdominal tenderness.   Musculoskeletal:         General: No swelling.      Cervical back: Neck supple.   Skin:     General: Skin is warm and dry.      Capillary Refill: Capillary refill takes less than 2 seconds.   Psychiatric:         Mood and Affect: Mood normal.         Vital Signs  ED Triage Vitals [02/23/24 0915]   Temperature Pulse Respirations Blood Pressure SpO2   (!) 96.8 °F (36 °C) 72 18 98/82 99 %      Temp Source Heart Rate Source Patient Position - Orthostatic VS BP Location FiO2 (%)   Temporal Monitor Sitting Right arm --      Pain Score       --           Vitals:    02/23/24 1500 02/23/24 1515 02/23/24 1530 02/23/24 1944   BP: 134/64   132/72   Pulse: 72 67 63 68   Patient Position - Orthostatic VS: Sitting            Visual Acuity  Visual Acuity      Flowsheet Row Most Recent Value   L Pupil Size (mm) 2   R Pupil Size (mm) 2   L Pupil Shape Round   R Pupil Shape Round            ED Medications  Medications   valproate (DEPACON) 250 mg in sodium chloride 0.9 % 50 mL IVPB (has no administration in time range)   valproate (DEPACON) 500 mg in sodium chloride 0.9 % 50 mL IVPB (500 mg Intravenous New Bag 2/23/24 1807)   gabapentin (NEURONTIN) capsule 800 mg (800 mg Oral Not Given 2/23/24 2029)   LORazepam (ATIVAN) injection 0.5 mg (0.5 mg Intravenous Given 2/23/24 1816)   pantoprazole (PROTONIX) injection 40 mg (has no administration in time range)   acetaminophen (TYLENOL) tablet 650 mg (has no administration in time range)   ondansetron (ZOFRAN) injection 4 mg (has no administration in time range)   heparin (porcine) subcutaneous injection 5,000 Units (5,000 Units Subcutaneous Given 2/23/24 1713)   cefTRIAXone (ROCEPHIN) IVPB (premix in dextrose) 2,000 mg 50 mL (has no administration in time range)   dextrose 5 % and sodium chloride 0.9 % infusion (75 mL/hr Intravenous New Bag 2/23/24 1701)   sodium chloride 0.9 % bolus 500 mL (0 mL Intravenous Stopped 2/23/24 1146)    iohexol (OMNIPAQUE) 350 MG/ML injection (MULTI-DOSE) 100 mL (100 mL Intravenous Given 2/23/24 1215)   cefTRIAXone (ROCEPHIN) IVPB (premix in dextrose) 1,000 mg 50 mL (0 mg Intravenous Stopped 2/23/24 1511)   magnesium sulfate IVPB (premix) SOLN 1 g (0 g Intravenous Stopped 2/23/24 1811)   dextrose 50 % IV solution 25 mL (25 mL Intravenous Given 2/23/24 1658)       Diagnostic Studies  Results Reviewed       Procedure Component Value Units Date/Time    Osmolality, urine [[id number]]  (Abnormal) Collected: 02/23/24 1705    Lab Status: Final result Specimen: Urine, Clean Catch Updated: 02/23/24 2112     Osmolality, Ur 242 mmol/KG     Fingerstick Glucose (POCT) [[id number]]  (Normal) Collected: 02/23/24 1911    Lab Status: Final result Updated: 02/23/24 1912     POC Glucose 124 mg/dl     Basic metabolic panel [[id number]]  (Abnormal) Collected: 02/23/24 1705    Lab Status: Final result Specimen: Blood from Finger, Left Updated: 02/23/24 1731     Sodium 128 mmol/L      Potassium 5.1 mmol/L      Chloride 94 mmol/L      CO2 29 mmol/L      ANION GAP 5 mmol/L      BUN 14 mg/dL      Creatinine 0.59 mg/dL      Glucose 184 mg/dL      Calcium 10.0 mg/dL      eGFR 93 ml/min/1.73sq m     Narrative:      National Kidney Disease Foundation guidelines for Chronic Kidney Disease (CKD):     Stage 1 with normal or high GFR (GFR > 90 mL/min/1.73 square meters)    Stage 2 Mild CKD (GFR = 60-89 mL/min/1.73 square meters)    Stage 3A Moderate CKD (GFR = 45-59 mL/min/1.73 square meters)    Stage 3B Moderate CKD (GFR = 30-44 mL/min/1.73 square meters)    Stage 4 Severe CKD (GFR = 15-29 mL/min/1.73 square meters)    Stage 5 End Stage CKD (GFR <15 mL/min/1.73 square meters)  Note: GFR calculation is accurate only with a steady state creatinine    Fingerstick Glucose (POCT) [[id number]]  (Normal) Collected: 02/23/24 1728    Lab Status: Final result Updated: 02/23/24 1729     POC Glucose 103 mg/dl     Sodium, urine, random [[id number]] Collected:  02/23/24 1705    Lab Status: Final result Specimen: Urine, Clean Catch Updated: 02/23/24 1722     Sodium, Ur 55    B-Type Natriuretic Peptide(BNP) [[id number]]  (Normal) Collected: 02/23/24 1623    Lab Status: Final result Specimen: Blood from Arm, Left Updated: 02/23/24 1653     BNP 64 pg/mL     Procalcitonin [[id number]]  (Normal) Collected: 02/23/24 1233    Lab Status: Final result Specimen: Blood from Arm, Left Updated: 02/23/24 1653     Procalcitonin <0.05 ng/ml     Fingerstick Glucose (POCT) [[id number]]  (Abnormal) Collected: 02/23/24 1636    Lab Status: Final result Updated: 02/23/24 1638     POC Glucose 57 mg/dl     Urine culture [[id number]] Collected: 02/23/24 1431    Lab Status: In process Specimen: Urine, Clean Catch Updated: 02/23/24 1546    Urine Microscopic [[id number]]  (Abnormal) Collected: 02/23/24 1431    Lab Status: Final result Specimen: Urine, Clean Catch Updated: 02/23/24 1508     RBC, UA None Seen /hpf      WBC, UA 4-10 /hpf      Epithelial Cells Occasional /hpf      Bacteria, UA None Seen /hpf     UA w Reflex to Microscopic w Reflex to Culture [[id number]]  (Abnormal) Collected: 02/23/24 1431    Lab Status: Final result Specimen: Urine, Clean Catch Updated: 02/23/24 1445     Color, UA Yellow     Clarity, UA Slightly Cloudy     Specific Gravity, UA 1.010     pH, UA 7.5     Leukocytes, UA Moderate     Nitrite, UA Positive     Protein, UA Negative mg/dl      Glucose, UA Negative mg/dl      Ketones, UA Negative mg/dl      Urobilinogen, UA 0.2 E.U./dl      Bilirubin, UA Negative     Occult Blood, UA Negative    Comprehensive metabolic panel [[id number]]  (Abnormal) Collected: 02/23/24 1233    Lab Status: Final result Specimen: Blood from Arm, Left Updated: 02/23/24 1307     Sodium 128 mmol/L      Potassium 4.4 mmol/L      Chloride 91 mmol/L      CO2 35 mmol/L      ANION GAP 2 mmol/L      BUN 16 mg/dL      Creatinine 0.61 mg/dL      Glucose 63 mg/dL      Calcium 9.8 mg/dL      AST 29 U/L      ALT 37 U/L       Alkaline Phosphatase 79 U/L      Total Protein 6.6 g/dL      Albumin 3.7 g/dL      Total Bilirubin 0.26 mg/dL      eGFR 92 ml/min/1.73sq m     Narrative:      National Kidney Disease Foundation guidelines for Chronic Kidney Disease (CKD):     Stage 1 with normal or high GFR (GFR > 90 mL/min/1.73 square meters)    Stage 2 Mild CKD (GFR = 60-89 mL/min/1.73 square meters)    Stage 3A Moderate CKD (GFR = 45-59 mL/min/1.73 square meters)    Stage 3B Moderate CKD (GFR = 30-44 mL/min/1.73 square meters)    Stage 4 Severe CKD (GFR = 15-29 mL/min/1.73 square meters)    Stage 5 End Stage CKD (GFR <15 mL/min/1.73 square meters)  Note: GFR calculation is accurate only with a steady state creatinine    FLU/RSV/COVID - if FLU/RSV clinically relevant [[id number]]  (Normal) Collected: 02/23/24 1046    Lab Status: Final result Specimen: Nares from Nose Updated: 02/23/24 1133     SARS-CoV-2 Negative     INFLUENZA A PCR Negative     INFLUENZA B PCR Negative     RSV PCR Negative    Narrative:      FOR PEDIATRIC PATIENTS - copy/paste COVID Guidelines URL to browser: [email or web address]    SARS-CoV-2 assay is a Nucleic Acid Amplification assay intended for the  qualitative detection of nucleic acid from SARS-CoV-2 in nasopharyngeal  swabs. Results are for the presumptive identification of SARS-CoV-2 RNA.    Positive results are indicative of infection with SARS-CoV-2, the virus  causing COVID-19, but do not rule out bacterial infection or co-infection  with other viruses. Laboratories within the [place] and its  territories are required to report all positive results to the appropriate  public health authorities. Negative results do not preclude SARS-CoV-2  infection and should not be used as the sole basis for treatment or other  patient management decisions. Negative results must be combined with  clinical observations, patient history, and epidemiological  information.  This test has not been FDA cleared or approved.    This test has been authorized by FDA under an Emergency Use Authorization  (EUA). This test is only authorized for the duration of time the  declaration that circumstances exist justifying the authorization of the  emergency use of an in vitro diagnostic tests for detection of SARS-CoV-2  virus and/or diagnosis of COVID-19 infection under section 564(b)(1) of  the Act, 21 U.S.C. 360bbb-3(b)(1), unless the authorization is terminated  or revoked sooner. The test has been validated but independent review by FDA  and CLIA is pending.    Test performed using [employer] GeneXpert: This RT-PCR assay targets N2,  a region unique to SARS-CoV-2. A conserved region in the E-gene was chosen  for pan-Sarbecovirus detection which includes SARS-CoV-2.    According to CMS-2020-01-R, this platform meets the definition of high-throughput technology.    Comprehensive metabolic panel [[id number]]  (Abnormal) Collected: 02/23/24 1046    Lab Status: Final result Specimen: Blood from Arm, Right Updated: 02/23/24 1130     Sodium 126 mmol/L      Potassium 6.5 mmol/L      Chloride 92 mmol/L      CO2 33 mmol/L      ANION GAP 1 mmol/L      BUN 17 mg/dL      Creatinine 0.60 mg/dL      Glucose 68 mg/dL      Calcium 9.9 mg/dL      AST 53 U/L      ALT 40 U/L      Alkaline Phosphatase 65 U/L      Total Protein 7.1 g/dL      Albumin 3.9 g/dL      Total Bilirubin 0.41 mg/dL      eGFR 92 ml/min/1.73sq m     Narrative:      National Kidney Disease Foundation guidelines for Chronic Kidney Disease (CKD):     Stage 1 with normal or high GFR (GFR > 90 mL/min/1.73 square meters)    Stage 2 Mild CKD (GFR = 60-89 mL/min/1.73 square meters)    Stage 3A Moderate CKD (GFR = 45-59 mL/min/1.73 square meters)    Stage 3B Moderate CKD (GFR = 30-44 mL/min/1.73 square meters)    Stage 4 Severe CKD (GFR = 15-29 mL/min/1.73 square meters)    Stage 5 End Stage CKD (GFR <15 mL/min/1.73 square meters)  Note: GFR  calculation is accurate only with a steady state creatinine    Procalcitonin [[id number]]  (Normal) Collected: 02/23/24 1046    Lab Status: Final result Specimen: Blood from Arm, Right Updated: 02/23/24 1124     Procalcitonin <0.05 ng/ml     Magnesium [[id number]]  (Abnormal) Collected: 02/23/24 1046    Lab Status: Final result Specimen: Blood from Arm, Right Updated: 02/23/24 1118     Magnesium 1.8 mg/dL     Lactic acid [[id number]]  (Normal) Collected: 02/23/24 1046    Lab Status: Final result Specimen: Blood from Arm, Right Updated: 02/23/24 1118     LACTIC ACID 1.2 mmol/L     Narrative:      Result may be elevated if tourniquet was used during collection.    CBC and differential [[id number]]  (Abnormal) Collected: 02/23/24 1046    Lab Status: Final result Specimen: Blood from Arm, Right Updated: 02/23/24 1112     WBC 5.34 Thousand/uL      RBC 3.67 Million/uL      Hemoglobin 10.7 g/dL      Hematocrit 33.2 %      MCV 91 fL      MCH 29.2 pg      MCHC 32.2 g/dL      RDW 16.0 %      MPV 9.9 fL      Platelets 143 Thousands/uL      nRBC 0 /100 WBCs      Neutrophils Relative 69 %      Immat GRANS % 0 %      Lymphocytes Relative 23 %      Monocytes Relative 7 %      Eosinophils Relative 1 %      Basophils Relative 0 %      Neutrophils Absolute 3.68 Thousands/µL      Immature Grans Absolute 0.02 Thousand/uL      Lymphocytes Absolute 1.21 Thousands/µL      Monocytes Absolute 0.38 Thousand/µL      Eosinophils Absolute 0.04 Thousand/µL      Basophils Absolute 0.01 Thousands/µL     Protime-INR [[id number]]  (Normal) Collected: 02/23/24 1046    Lab Status: Final result Specimen: Blood from Arm, Right Updated: 02/23/24 1110     Protime 13.2 seconds      INR 0.98    APTT [[id number]]  (Normal) Collected: 02/23/24 1046    Lab Status: Final result Specimen: Blood from Arm, Right Updated: 02/23/24 1110     PTT 32 seconds     Blood culture #1 [[id number]] Collected: 02/23/24 1051    Lab Status: In process Specimen: Blood from Arm, Right  Updated: 02/23/24 1057    Blood culture #2 [[id number]] Collected: 02/23/24 1051    Lab Status: In process Specimen: Blood from Hand, Right Updated: 02/23/24 1057                   CT chest abdomen pelvis w contrast   Final Result by Venkat Torres MD (02/23 1335)      Study degraded by respiratory motion.      1.  Scattered groundglass opacities throughout the lung fields, nonspecific. This could represent pulmonary edema or an atypical pneumonia. Clinical correlation recommended.      2.  Dense consolidation layering along the right major fissure, progressed from 2021 and likely atelectasis though of uncertain etiology.      3.  Unchanged 4 cm ectatic ascending aorta. Continued annual CT surveillance recommended.      4.  4 mm right lower lobe lung nodule, not present previously. This can also be reassessed at the time of follow-up.      5.  Enlarged pulmonary artery which can be seen with pulmonary hypertension.      6.  Circumferential bladder wall thickening and irregularity which could represent acute cystitis. Correlation with urinalysis recommended.      7.  Subcentimeter pancreatic cyst. For simple cyst(s) less than 1.5 cm, recommend follow-up every 2 years for 5 times or to age 80, whichever comes first. Follow-up can stop at age 80 or can switch over to 80 year or older algorithm. Recommend next    follow-up in 2 years.   Preferred imaging modality: abdomen MRI and MRCP with and without IV contrast, or triple phase abdomen CT with IV contrast, or abdomen MRI and MRCP without IV contrast.      The study was marked in EPIC for immediate notification.                              Workstation performed: [id number]         CT head without contrast   Final Result by Pallav N Shah, MD (02/23 1321)      No acute intracranial abnormality.  Stable chronic microangiopathic changes within the brain.      Redemonstration of imaging features which are highly suspicious for normal pressure hydrocephalus.                   Workstation performed: [id number]         XR chest 1 view portable   Final Result by Mathew Molina MD (02/23 1321)      Poor inspiration. Mild prominent markings suggesting congestion.            Workstation performed: [id number]                    Procedures  ECG 12 Lead Documentation Only    Date/Time: 2/23/2024 10:35 AM    Performed by: Steven Wang PA-C  Authorized by: Steven Wang PA-C    Indications / Diagnosis:  Ams  Patient location:  ED  Previous ECG:     Previous ECG:  Unavailable  Interpretation:     Interpretation: abnormal    Rate:     ECG rate:  62    ECG rate assessment: normal    Rhythm:     Rhythm: A-V block    ST segments:     ST segments:  Non-specific  T waves:     T waves: non-specific             ED Course  ED Course as of 02/23/24 2115 Fri Feb 23, 2024   1331 Sodium(!): 128   1346 Dr. Tenorio with GI contacted    1522 Coby said about Monday doing an endoscopy    1523 Dr. Sol was contacted for admission and accepted                                SBIRT 20yo+      Flowsheet Row Most Recent Value   Initial Alcohol Screen: US AUDIT-C     1. How often do you have a drink containing alcohol? 0 Filed at: 02/23/2024 0914   2. How many drinks containing alcohol do you have on a typical day you are drinking?  0 Filed at: 02/23/2024 0914   3b. FEMALE Any Age, or MALE 65+: How often do you have 4 or more drinks on one occassion? 0 Filed at: 02/23/2024 0914   Audit-C Score 0 Filed at: 02/23/2024 0914   KAILASH: How many times in the past year have you...    Used an illegal drug or used a prescription medication for non-medical reasons? Never Filed at: 02/23/2024 0914                      Medical Decision Making  Patient is a 68-year-old female, nonverbal, who presents to the Emergency Department today with caregiver for the concern of ending up her food and acting oddly.  The patient has persistently today been grabbing at things and trying to put them in her mouth and eat.   Although the patient this morning was given breakfast the patient was pulling the food in her mouth and then spitting the food out.  Did not swallow the food.  Patient is not drooling.  Patient did not have any falls or traumas.  Patient did not have any nausea vomiting fevers chills.  Patient does not wear dentures.  Brought in for further evaluation of this behavior.    Patient on examination is reaching for any object and placing it in her mouth including the bedsheet.  Patient hemodynamically stable and mental status alert but nonverbal.  The patient lab work patiently demonstrated hyperkalemia however hemolyzed sample upon redraw the patient was found to have a normal potassium level however hyponatremia at 128.  Patient on straight cath urine found to have UTI.  Imaging studies consistent with pneumonia.  Patient was not found to have any obstructive findings on imaging studies.  The patient was tolerating secretions and in no respiratory distress.  Did discuss briefly with GI  due to concern of possibility of bolus or foreign body.  At this time patient stable no imaging consistent with this and therefore could scope the patient on Monday.  Patient was given antibiotics.  Discussed with Vipin for further inpatient treatment and excepted to [facility] service.    Caregiver in agreement with treatment plan.    Differential included but not limited to warm body ingestion, esophageal impaction, bowel obstruction, bacteremia, pneumonia, UTI, mass    Amount and/or Complexity of Data Reviewed  Independent Historian: caregiver  Labs: ordered. Decision-making details documented in ED Course.  Radiology: ordered and independent interpretation performed. Decision-making details documented in ED Course.  ECG/medicine tests: ordered and independent interpretation performed. Decision-making details documented in ED Course.  Discussion of management or test interpretation with external provider(s):   Ebenezer    Risk  Prescription drug management.  Decision regarding hospitalization.             Disposition  Final diagnoses:   Acute hyponatremia   Altered mental status   UTI (urinary tract infection)   Pneumonia     Time reflects when diagnosis was documented in both MDM as applicable and the Disposition within this note       Time User Action Codes Description Comment    2/23/2024  1:45 PM Steven Wang [E87.1] Acute hyponatremia     2/23/2024  1:45 PM Steven Wang [R41.82] Altered mental status     2/23/2024  3:06 PM Steven Wang [N39.0] UTI (urinary tract infection)     2/23/2024  3:06 PM Steven Wang [J18.9] Pneumonia     2/23/2024  4:28 PM Demetria Sol [S61.309D] Avulsion of fingernail, subsequent encounter     2/23/2024  4:31 PM Demetria Sol [R13.10] Dysphagia, unspecified type           ED Disposition       ED Disposition   Admit    Condition   Stable    Date/Time   Fri Feb 23, 2024 1514    Comment   Case was discussed with Ebenezer  and the patient's admission status was agreed to be  inpatient to the service of Dr. Sol                Follow-up Information    None         Current Discharge Medication List        CONTINUE these medications which have NOT CHANGED    Details   aspirin (ECOTRIN LOW STRENGTH) 81 mg EC tablet Take 81 mg by mouth daily      bisacodyl (DULCOLAX) 5 mg EC tablet Take 5 mg by mouth daily as needed for constipation      cholecalciferol (VITAMIN D3) 250 MCG (12735 UT) capsule Take 5 capsules (50,000 Units total) by mouth once a week On saturday  Refills: 0    Associated Diagnoses: Cystitis without hematuria      Cranberry 450 MG CAPS Take 1 capsule (450 mg total) by mouth in the morning  Refills: 0    Associated Diagnoses: Cystitis without hematuria      Diapers & Supplies MISC Use as directed.      !! divalproex sodium (DEPAKOTE) 250 mg EC tablet Take 250 mg by mouth in the morning 1 tab AM (8am)      !! divalproex sodium (DEPAKOTE) 500 mg  EC tablet Take 500 mg by mouth daily after dinner      docusate sodium (COLACE) 100 mg capsule Take 100 mg by mouth in the morning and 100 mg in the evening.      gabapentin (NEURONTIN) 800 mg tablet Take 800 mg by mouth 3 (three) times a day      Incontinence Supply Disposable (FQ Pant Liner) MISC       Lancets 33G MISC daily      levothyroxine 50 mcg tablet Take 1 tablet (50 mcg total) by mouth daily in the early morning  Refills: 0    Associated Diagnoses: Hypothyroid      LORazepam (ATIVAN) 0.5 mg tablet Take 0.5 mg by mouth in the morning and 0.5 mg in the evening.      omeprazole (PriLOSEC) 20 mg delayed release capsule Take 20 mg by mouth daily      polyethylene glycol (MIRALAX) 17 g packet Take 17 g by mouth every other day as needed (if no bm in 3 days)  Refills: 0    Associated Diagnoses: Cystitis without hematuria      !! QUEtiapine (SEROquel) 100 mg tablet Take 100 mg by mouth in the morning and 100 mg in the evening. 8am, 4pm.      !! QUEtiapine (SEROquel) 200 mg tablet Take 200 mg by mouth daily at bedtime      rosuvastatin (CRESTOR) 40 MG tablet Take 40 mg by mouth daily      senna (SENOKOT) 8.6 mg Take 1 tablet (8.6 mg total) by mouth daily at bedtime  Refills: 0    Associated Diagnoses: Constipation      tolterodine (DETROL LA) 4 mg 24 hr capsule Take 1 capsule (4 mg total) by mouth daily  Refills: 0    Associated Diagnoses: Cystitis without hematuria      torsemide (DEMADEX) 5 MG tablet Take 1 tablet (5 mg total) by mouth daily  Qty: 30 tablet, Refills: 0    Associated Diagnoses: Pleural effusion      memantine (NAMENDA) 5 mg tablet Take 1 tablet (5 mg total) by mouth 2 (two) times a day  Qty: 60 tablet, Refills: 0    Associated Diagnoses: Acute metabolic encephalopathy      nystatin (MYCOSTATIN) powder Apply topically in the morning      sertraline (ZOLOFT) 100 mg tablet Take 200 mg by mouth daily       !! - Potential duplicate medications found. Please discuss with provider.          No discharge  procedures on file.    PDMP Review         Value Time User    PDMP Reviewed  Yes 7/19/2022  9:40 PM KRISTOPHER Mayer            ED Provider  Electronically Signed by             Steven Wang PA-C  02/23/24 5505

## 2024-02-23 NOTE — ASSESSMENT & PLAN NOTE
Odd behavior could be secondary to acute cystitis.  Otherwise she is nonverbal.  And also aspiration pneumonia pneumonitis.  CT of the brain is negative for acute findings  Will treat with antibiotics also hypoglycemia on the labs as well will place on D5 normal saline hold diabetic medications and monitor glucose closely

## 2024-02-23 NOTE — ASSESSMENT & PLAN NOTE
Lab Results   Component Value Date    HGBA1C 5.4 08/24/2023       Recent Labs     02/23/24  1636   POCGLU 57*       Blood Sugar Average: Last 72 hrs:  (P) 57  Recurrent hold her diabetic medications will monitor her fingersticks every 4 hours placed on D5 normal

## 2024-02-24 LAB
ANION GAP SERPL CALCULATED.3IONS-SCNC: 5 MMOL/L
BUN SERPL-MCNC: 11 MG/DL (ref 5–25)
CALCIUM SERPL-MCNC: 10 MG/DL (ref 8.4–10.2)
CHLORIDE SERPL-SCNC: 100 MMOL/L (ref 96–108)
CO2 SERPL-SCNC: 30 MMOL/L (ref 21–32)
CREAT SERPL-MCNC: 0.49 MG/DL (ref 0.6–1.3)
ERYTHROCYTE [DISTWIDTH] IN BLOOD BY AUTOMATED COUNT: 16.4 % (ref 11.6–15.1)
GFR SERPL CREATININE-BSD FRML MDRD: 99 ML/MIN/1.73SQ M
GLUCOSE SERPL-MCNC: 114 MG/DL (ref 65–140)
GLUCOSE SERPL-MCNC: 118 MG/DL (ref 65–140)
GLUCOSE SERPL-MCNC: 177 MG/DL (ref 65–140)
GLUCOSE SERPL-MCNC: 199 MG/DL (ref 65–140)
GLUCOSE SERPL-MCNC: 202 MG/DL (ref 65–140)
GLUCOSE SERPL-MCNC: 79 MG/DL (ref 65–140)
GLUCOSE SERPL-MCNC: 81 MG/DL (ref 65–140)
GLUCOSE SERPL-MCNC: 82 MG/DL (ref 65–140)
HCT VFR BLD AUTO: 33 % (ref 34.8–46.1)
HGB BLD-MCNC: 10.6 G/DL (ref 11.5–15.4)
MAGNESIUM SERPL-MCNC: 1.9 MG/DL (ref 1.9–2.7)
MCH RBC QN AUTO: 29.4 PG (ref 26.8–34.3)
MCHC RBC AUTO-ENTMCNC: 32.1 G/DL (ref 31.4–37.4)
MCV RBC AUTO: 91 FL (ref 82–98)
OSMOLALITY UR/SERPL-RTO: 289 MMOL/KG (ref 282–298)
PLATELET # BLD AUTO: 150 THOUSANDS/UL (ref 149–390)
PMV BLD AUTO: 9.7 FL (ref 8.9–12.7)
POTASSIUM SERPL-SCNC: 4.1 MMOL/L (ref 3.5–5.3)
PROCALCITONIN SERPL-MCNC: <0.05 NG/ML
RBC # BLD AUTO: 3.61 MILLION/UL (ref 3.81–5.12)
SODIUM SERPL-SCNC: 135 MMOL/L (ref 135–147)
WBC # BLD AUTO: 4.1 THOUSAND/UL (ref 4.31–10.16)

## 2024-02-24 PROCEDURE — 83930 ASSAY OF BLOOD OSMOLALITY: CPT | Performed by: FAMILY MEDICINE

## 2024-02-24 PROCEDURE — 99232 SBSQ HOSP IP/OBS MODERATE 35: CPT | Performed by: FAMILY MEDICINE

## 2024-02-24 PROCEDURE — 85027 COMPLETE CBC AUTOMATED: CPT | Performed by: FAMILY MEDICINE

## 2024-02-24 PROCEDURE — 94664 DEMO&/EVAL PT USE INHALER: CPT

## 2024-02-24 PROCEDURE — 84145 PROCALCITONIN (PCT): CPT | Performed by: FAMILY MEDICINE

## 2024-02-24 PROCEDURE — C9113 INJ PANTOPRAZOLE SODIUM, VIA: HCPCS | Performed by: FAMILY MEDICINE

## 2024-02-24 PROCEDURE — 87081 CULTURE SCREEN ONLY: CPT | Performed by: FAMILY MEDICINE

## 2024-02-24 PROCEDURE — 97163 PT EVAL HIGH COMPLEX 45 MIN: CPT

## 2024-02-24 PROCEDURE — 83735 ASSAY OF MAGNESIUM: CPT | Performed by: FAMILY MEDICINE

## 2024-02-24 PROCEDURE — 82948 REAGENT STRIP/BLOOD GLUCOSE: CPT

## 2024-02-24 PROCEDURE — 97167 OT EVAL HIGH COMPLEX 60 MIN: CPT

## 2024-02-24 PROCEDURE — 80048 BASIC METABOLIC PNL TOTAL CA: CPT | Performed by: FAMILY MEDICINE

## 2024-02-24 RX ORDER — ATORVASTATIN CALCIUM 40 MG/1
80 TABLET, FILM COATED ORAL
Status: DISCONTINUED | OUTPATIENT
Start: 2024-02-24 | End: 2024-02-26 | Stop reason: HOSPADM

## 2024-02-24 RX ORDER — LEVOTHYROXINE SODIUM 0.05 MG/1
50 TABLET ORAL
Status: DISCONTINUED | OUTPATIENT
Start: 2024-02-24 | End: 2024-02-26 | Stop reason: HOSPADM

## 2024-02-24 RX ORDER — QUETIAPINE FUMARATE 200 MG/1
200 TABLET, FILM COATED ORAL
Status: DISCONTINUED | OUTPATIENT
Start: 2024-02-24 | End: 2024-02-26 | Stop reason: HOSPADM

## 2024-02-24 RX ORDER — OXYBUTYNIN CHLORIDE 5 MG/1
10 TABLET, EXTENDED RELEASE ORAL DAILY
Status: DISCONTINUED | OUTPATIENT
Start: 2024-02-24 | End: 2024-02-26 | Stop reason: HOSPADM

## 2024-02-24 RX ORDER — SENNOSIDES 8.6 MG
8.6 TABLET ORAL
Status: DISCONTINUED | OUTPATIENT
Start: 2024-02-24 | End: 2024-02-26 | Stop reason: HOSPADM

## 2024-02-24 RX ORDER — DOCUSATE SODIUM 100 MG/1
100 CAPSULE, LIQUID FILLED ORAL 2 TIMES DAILY
Status: DISCONTINUED | OUTPATIENT
Start: 2024-02-24 | End: 2024-02-26 | Stop reason: HOSPADM

## 2024-02-24 RX ORDER — QUETIAPINE FUMARATE 100 MG/1
100 TABLET, FILM COATED ORAL 2 TIMES DAILY
Status: DISCONTINUED | OUTPATIENT
Start: 2024-02-24 | End: 2024-02-26 | Stop reason: HOSPADM

## 2024-02-24 RX ADMIN — GABAPENTIN 800 MG: 400 CAPSULE ORAL at 20:52

## 2024-02-24 RX ADMIN — PANTOPRAZOLE SODIUM 40 MG: 40 INJECTION, POWDER, FOR SOLUTION INTRAVENOUS at 09:19

## 2024-02-24 RX ADMIN — VALPROATE SODIUM 250 MG: 100 INJECTION, SOLUTION INTRAVENOUS at 09:28

## 2024-02-24 RX ADMIN — LORAZEPAM 0.5 MG: 2 INJECTION INTRAMUSCULAR; INTRAVENOUS at 09:22

## 2024-02-24 RX ADMIN — STANDARDIZED SENNA CONCENTRATE 8.6 MG: 8.6 TABLET ORAL at 20:52

## 2024-02-24 RX ADMIN — DOCUSATE SODIUM 100 MG: 100 CAPSULE, LIQUID FILLED ORAL at 17:42

## 2024-02-24 RX ADMIN — HEPARIN SODIUM 5000 UNITS: 5000 INJECTION INTRAVENOUS; SUBCUTANEOUS at 09:22

## 2024-02-24 RX ADMIN — QUETIAPINE FUMARATE 100 MG: 100 TABLET ORAL at 16:33

## 2024-02-24 RX ADMIN — VALPROATE SODIUM 500 MG: 100 INJECTION, SOLUTION INTRAVENOUS at 17:42

## 2024-02-24 RX ADMIN — GABAPENTIN 800 MG: 400 CAPSULE ORAL at 09:22

## 2024-02-24 RX ADMIN — QUETIAPINE FUMARATE 200 MG: 200 TABLET ORAL at 20:51

## 2024-02-24 RX ADMIN — ATORVASTATIN CALCIUM 80 MG: 40 TABLET, FILM COATED ORAL at 16:24

## 2024-02-24 RX ADMIN — CEFTRIAXONE 2000 MG: 2 INJECTION, SOLUTION INTRAVENOUS at 06:04

## 2024-02-24 RX ADMIN — HEPARIN SODIUM 5000 UNITS: 5000 INJECTION INTRAVENOUS; SUBCUTANEOUS at 23:17

## 2024-02-24 RX ADMIN — GABAPENTIN 800 MG: 400 CAPSULE ORAL at 16:24

## 2024-02-24 RX ADMIN — HEPARIN SODIUM 5000 UNITS: 5000 INJECTION INTRAVENOUS; SUBCUTANEOUS at 16:31

## 2024-02-24 RX ADMIN — LORAZEPAM 0.5 MG: 2 INJECTION INTRAMUSCULAR; INTRAVENOUS at 17:42

## 2024-02-24 RX ADMIN — DEXTROSE AND SODIUM CHLORIDE 75 ML/HR: 5; .9 INJECTION, SOLUTION INTRAVENOUS at 08:00

## 2024-02-24 NOTE — ASSESSMENT & PLAN NOTE
Vs pneumonitis place abx  Do vest therapy as she wont be able to do flutter or incentive spirometry   Follow procal  Aggressive pulmonary toileting   Speech to see they will see tomorrow but will place her on puréed and thins as she did okay with the nurses last night.  She is on room air

## 2024-02-24 NOTE — ASSESSMENT & PLAN NOTE
Lab Results   Component Value Date    HGBA1C 5.4 08/24/2023       Recent Labs     02/23/24  2114 02/23/24  2359 02/24/24  0632 02/24/24  0754   POCGLU 110 114 81 82         Blood Sugar Average: Last 72 hrs:  (P) [id number]  Recurrent hold her diabetic medications will monitor her fingersticks every 4 hours placed on D5 normal  Sugars 80's

## 2024-02-24 NOTE — NURSING NOTE
Attempted to give patient her morning meds in applesauce. Patient was pocketing the food then spitting it back out. Provider aware.

## 2024-02-24 NOTE — ED ATTENDING ATTESTATION
2/23/2024  I, Wade ePnn MD, saw and evaluated the patient. I have discussed the patient with the resident/non-physician practitioner and agree with the resident's/non-physician practitioner's findings, Plan of Care, and MDM as documented in the resident's/non-physician practitioner's note, except where noted. All available labs and Radiology studies were reviewed.  I was present for key portions of any procedure(s) performed by the resident/non-physician practitioner and I was immediately available to provide assistance.       At this point I agree with the current assessment done in the Emergency Department.  I have conducted an independent evaluation of this patient a history and physical is as follows:    ED Course     Has been spitting.    On exam, no distress.  Neck without stridor.  Lungs CTA  Abdomen benign    Critical Care Time  Procedures

## 2024-02-24 NOTE — PLAN OF CARE
Problem: PHYSICAL THERAPY ADULT  Goal: Performs mobility at highest level of function for planned discharge setting.  See evaluation for individualized goals.  Description: Treatment/Interventions: ADL retraining, Functional transfer training, LE strengthening/ROM, Therapeutic exercise, Endurance training, Patient/family training, Equipment eval/education, Cognitive reorientation, Bed mobility, Gait training, Compensatory technique education, Spoke to nursing, Spoke to case management, OT          See flowsheet documentation for full assessment, interventions and recommendations.  Note: Prognosis: Good  Problem List: Decreased strength, Decreased endurance, Impaired balance, Decreased mobility, Decreased cognition, Impaired judgement, Decreased safety awareness, Obesity  Assessment: Pt is a 68 y.o. female seen for PT evaluation s/p admission to [facility] on 2/23/2024 with acute metabolic encephalopathy, UTI.  Order placed for PT services.  Upon evaluation: Pt is presenting with impaired functional mobility due to decreased strength, decreased endurance, impaired balance, impaired cognition, decreased safety awareness, impaired judgment, and fall risk requiring  maximal of 1 assistance for bed mobility and maximal of 2 assistance for transfers. Pt's clinical presentation is currently unpredictable given the functional mobility deficits above, especially weakness, decreased endurance, impaired balance, decreased activity tolerance, decreased functional mobility tolerance, decreased safety awareness, impaired judgement, and decreased cognition, coupled with fall risks as indicated by AM-PAC 6-Clicks: 8/24 as well as impaired balance, polypharmacy, impaired judgement, decreased safety awareness, and obesity and combined with medical complications of abnormal H&H, abnormal WBCs, abnormal blood sugars, abnormal sodium values, and need for input for mobility technique/safety, acute cystitis, acute  metabolic encephalopathy, lower extremity edema, aspiration PNA versus pneumonitis.  Pt's PMHx and comorbidities that may affect physical performance and progress include: anxiety, CHF, DM, HTN, seizure disorder, obesity, and mood disorder, intellectual disability, dysphagia, chronic anemia . Personal factors affecting pt at time of IE include: inability to perform IADLs, inability to perform ADLs, inability to navigate level surfaces without external assistance, limited insight into impairments, decreased initiation and engagement, and difficulty communicating. Pt will benefit from continued skilled PT services to address deficits as defined above and to maximize level of functional mobility to facilitate return toward PLOF and improved QOL. From PT/mobility standpoint, recommendation at time of d/c would be Level II (Moderate Resource Intensity in order to reduce fall risk and maximize pt's functional independence and consistency with mobility. Recommend ther ex next 1-2 sessions.     Barriers to Discharge Comments: pt requires increased assistance to complete transfers  Rehab Resource Intensity Level, PT: II (Moderate Resource Intensity)    See flowsheet documentation for full assessment.

## 2024-02-24 NOTE — PROGRESS NOTES
Patient:    MRN:  [id number]    Camilo Request ID:  [id number]    Level of care reserved:  Home Health Agency    Partner Reserved:  [facility] [place] [address] [phone]    Clinical needs requested:    Geography searched:  [phone]    Start of Service:    Request sent:  3:13pm EST on 2/24/2024 by Nery Hernandez    Partner reserved:  3:22pm EST on 2/24/2024 by Nery Hernandez    Choice list shared:   Event Note

## 2024-02-24 NOTE — PHYSICAL THERAPY NOTE
PHYSICAL THERAPY EVALUATION  NAME:  Denita Vital  DATE: 02/24/24    AGE:   68 y.o.  Mrn:   [id number]  ADMIT DX:  Acute hyponatremia [E87.1]  UTI (urinary tract infection) [N39.0]  Altered mental status [R41.82]  Pneumonia [J18.9]  Avulsion of fingernail, subsequent encounter [S61.309D]  Dysphagia, unspecified type [R13.10]  AMS (altered mental status) [R41.82]    Past Medical History:   Diagnosis Date    Anxiety     CHF (congestive heart failure) (HCC)     Diabetes mellitus (HCC)     GERD (gastroesophageal reflux disease)     Hyperlipidemia     Hypertension     Mental disability     Mixed incontinence 04/12/2017    Seizure disorder (HCC)      Length Of Stay: 1  Performed at least 2 patient identifiers during session: Assigned identification number and ID bracelet  PHYSICAL THERAPY EVALUATION :    02/24/24 0756   PT Last Visit   PT Visit Date 02/24/24   Note Type   Note type Evaluation   Pain Assessment   Pain Assessment Tool FLACC   Pain Rating: FLACC (Rest) - Face 0   Pain Rating: FLACC (Rest) - Legs 0   Pain Rating: FLACC (Rest) - Activity 0   Pain Rating: FLACC (Rest) - Cry 0   Pain Rating: FLACC (Rest) - Consolability 0   Score: FLACC (Rest) 0   Pain Rating: FLACC (Activity) - Face 0   Pain Rating: FLACC (Activity) - Legs 0   Pain Rating: FLACC (Activity) - Activity 0   Pain Rating: FLACC (Activity) - Cry 0   Pain Rating: FLACC (Activity) - Consolability 0   Score: FLACC (Activity) 0   Restrictions/Precautions   Other Precautions Chair Alarm;Bed Alarm;Cognitive;Fall Risk;Multiple lines   Home Living   Type of Home Group Home   Home Equipment Walker;Wheelchair-manual   Additional Comments Pt is nonverbal at baseline. Primarily uses wheelchair to access living environment. As of July 2021 admission, pt was ambulatory with assistance with railing. There are railings all over the group home. However has since been nonambulatory requiring 2 people to assist with transfers per most recent therapy notes from  this facility. Pt has asisstance with all mobility and ADLs.   Prior Function   Level of [place] Needs assistance with ADLs;Needs assistance with functional mobility;Needs assistance with IADLS   Lives With Facility staff   Receives Help From   (facility staff)   IADLs Family/Friend/Other provides transportation;Family/Friend/Other provides meals;Family/Friend/Other provides medication management   Cognition   Orientation Level Unable to assess   Following Commands Follows one step commands inconsistently   Comments difficult to redirect, constantly attempting to place fingers and objects (pop it, gown) into her mouth.   RLE Assessment   RLE Assessment WFL  (hip flexion just > 90 degrees. strength grossly 2/5 except knee ext 3/5)   LLE Assessment   LLE Assessment WFL  (hip flexion just > 90 degrees. strength grossly 2/5 except knee extension 3/5)   Bed Mobility   Supine to Sit 2  Maximal assistance   Additional items Assist x 1;HOB elevated;Bedrails;Increased time required;Verbal cues;LE management   Additional Comments use of bedrail. HOB > 30 degrees. required maxAx1 to ahcieve sititng EOB. upon sitting, required maxAx1 to maintain with right lateral and posteiror lean., cues for uprihgt posture. pt then able to sit with minimal assistance briefly with UE support and increased attention to task, however when distracted and no UE support maxAx1 to maintain sitting balance.   Transfers   Sit to Stand 2  Maximal assistance   Additional items Assist x 2;Increased time required;Verbal cues   Stand to Sit 2  Maximal assistance   Additional items Assist x 2;Increased time required;Verbal cues   Stand pivot 2  Maximal assistance   Additional items Assist x 2;Increased time required;Verbal cues   Additional Comments no AD. B knees and feet blocked. required maxAx2 to achieve standing and maxAx2 to complete spt with manual cues for wt shifting and LE advancement and verbal cues for turning completely.    Ambulation/Elevation   Distance not attempted due to safety concerns, need for maxAx2 to stand and pivot   Balance   Static Sitting   (poor - to poor +)   Dynamic Sitting Poor -   Static Standing Poor -   Dynamic Standing Poor -   Activity Tolerance   Activity Tolerance Patient limited by fatigue  (limited by impaired cognition)   Medical Staff Made Aware Erika LATHAM   Nurse Made Aware Monse LERMA   Assessment   Prognosis Good   Problem List Decreased strength;Decreased endurance;Impaired balance;Decreased mobility;Decreased cognition;Impaired judgement;Decreased safety awareness;Obesity   Barriers to Discharge Comments pt requires increased assistance to complete transfers   Goals   Patient Goals none stated   [facility] Expiration Date 03/09/24   PT Treatment Day 0   Plan   Treatment/Interventions ADL retraining;Functional transfer training;LE strengthening/ROM;Therapeutic exercise;Endurance training;Patient/family training;Equipment eval/education;Cognitive reorientation;Bed mobility;Gait training;Compensatory technique education;Spoke to nursing;Spoke to case management;OT   PT Frequency 2-3x/wk   Discharge Recommendation   Rehab Resource Intensity Level, PT II (Moderate Resource Intensity)   AM-PAC Basic Mobility Inpatient   Turning in Flat Bed Without Bedrails 2   Lying on Back to Sitting on Edge of Flat Bed Without Bedrails 2   Moving Bed to Chair 1   Standing Up From Chair Using Arms 1   Walk in Room 1   Climb 3-5 Stairs With Railing 1   Basic Mobility Inpatient Raw Score 8   Turning Head Towards Sound 2   Follow Simple Instructions 2   Low Function Basic Mobility Raw Score  12   Low Function Basic Mobility Standardized Score  18.33   Highest Level Of Mobility   JH-HLM Goal 3: Sit at edge of bed   JH-HLM Achieved 4: Move to chair/commode   End of Consult   Patient Position at End of Consult Bedside chair;Bed/Chair alarm activated;All needs within reach       Pt requires PT/OT co-eval due to medical complexity, safety  concerns, fall risk, significant assistance with mobility and/or cognitive-behavioral impairments.    (Please find full objective findings from PT assessment regarding body systems outlined above).     Assessment: Pt is a 68 y.o. female seen for PT evaluation s/p admission to [facility] on 2/23/2024 with acute metabolic encephalopathy, UTI.  Order placed for PT services.  Upon evaluation: Pt is presenting with impaired functional mobility due to decreased strength, decreased endurance, impaired balance, impaired cognition, decreased safety awareness, impaired judgment, and fall risk requiring  maximal of 1 assistance for bed mobility and maximal of 2 assistance for transfers. Pt's clinical presentation is currently unpredictable given the functional mobility deficits above, especially weakness, decreased endurance, impaired balance, decreased activity tolerance, decreased functional mobility tolerance, decreased safety awareness, impaired judgement, and decreased cognition, coupled with fall risks as indicated by AM-PAC 6-Clicks: 8/24 as well as impaired balance, polypharmacy, impaired judgement, decreased safety awareness, and obesity and combined with medical complications of abnormal H&H, abnormal WBCs, abnormal blood sugars, abnormal sodium values, and need for input for mobility technique/safety, acute cystitis, acute metabolic encephalopathy, lower extremity edema, aspiration PNA versus pneumonitis.  Pt's PMHx and comorbidities that may affect physical performance and progress include: anxiety, CHF, DM, HTN, seizure disorder, obesity, and mood disorder, intellectual disability, dysphagia, chronic anemia . Personal factors affecting pt at time of IE include: inability to perform IADLs, inability to perform ADLs, inability to navigate level surfaces without external assistance, limited insight into impairments, decreased initiation and engagement, and difficulty communicating. Pt will benefit  from continued skilled PT services to address deficits as defined above and to maximize level of functional mobility to facilitate return toward PLOF and improved QOL. From PT/mobility standpoint, recommendation at time of d/c would be Level II (Moderate Resource Intensity in order to reduce fall risk and maximize pt's functional independence and consistency with mobility. Recommend ther ex next 1-2 sessions.       The patient's AM-PAC Basic Mobility Inpatient Short Form Raw Score is 8. A Raw score of less than or equal to 16 suggests the patient may benefit from discharge to post-acute rehabilitation services. Please also refer to the recommendation of the Physical Therapist for safe discharge planning.       Goals: Pt will: Perform bed mobility tasks with consistent modA of 1 to reposition in bed and prepare for transfers. Pt will perform transfers with  moderate assistance of 1-2  to decrease burden of care, decrease risk for falls, and improve ease of transfers and prepare for ambulation. Pt will ambulate with LRAD for >/= 5' with   modAx2   to decrease burden of care and improve ease of transfers and to access home environment. Pt will participate in objective balance assessment to determine baseline fall risk. Pt will increase B LE strength >/= 1/2 MMT grade to facilitate functional mobility.      Sonya Prado, PT,DPT

## 2024-02-24 NOTE — RESPIRATORY THERAPY NOTE
RT Protocol Note  Denita Vital 68 y.o. female MRN: [id number]  Unit/Bed#: -01 Encounter: [id number]    Assessment    Active Problems:    Aspiration pneumonia (HCC)    Mood disorder (HCC)    Type 2 diabetes mellitus with hypoglycemia, without long-term current use of insulin (HCC)    Seizure disorder (HCC)    Dysphagia    Acute metabolic encephalopathy    Cystitis without hematuria    Lower extremity edema    Hyponatremia      Home Pulmonary Medications:         Past Medical History:   Diagnosis Date    Anxiety     CHF (congestive heart failure) (HCC)     Diabetes mellitus (HCC)     GERD (gastroesophageal reflux disease)     Hyperlipidemia     Hypertension     Mental disability     Mixed incontinence 04/12/2017    Seizure disorder (HCC)      Social History     Socioeconomic History    Marital status: Single     Spouse name: None    Number of children: None    Years of education: None    Highest education level: None   Occupational History    None   Tobacco Use    Smoking status: Never    Smokeless tobacco: Never   Vaping Use    Vaping status: Never Used   Substance and Sexual Activity    Alcohol use: Not Currently    Drug use: Not Currently    Sexual activity: Not Currently   Other Topics Concern    None   Social History Narrative    None     Social Determinants of Health     Financial Resource Strain: Low Risk  (1/15/2024)    Received from [facility]    Overall Financial Resource Strain (CARDIA)     Difficulty of Paying Living Expenses: Not hard at all   Food Insecurity: No Food Insecurity (2/24/2024)    Hunger Vital Sign     Worried About Running Out of Food in the Last Year: Never true     Ran Out of Food in the Last Year: Never true   Transportation Needs: No Transportation Needs (2/24/2024)    PRAPARE - Transportation     Lack of Transportation (Medical): No     Lack of Transportation (Non-Medical): No   Physical Activity: Not on file   Stress: No Stress Concern Present (1/15/2024)     Received from [facility]    [employer] [place] - Occupational Stress Questionnaire     Feeling of Stress : Not at all   Social Connections: Not on file   Intimate Partner Violence: Not At Risk (1/15/2024)    Received from [facility]    Humiliation, Afraid, Rape, and Kick questionnaire     Fear of Current or Ex-Partner: No     Emotionally Abused: No     Physically Abused: No     Sexually Abused: No   Housing Stability: Low Risk  (2/24/2024)    Housing Stability Vital Sign     Unable to Pay for Housing in the Last Year: No     Number of Places Lived in the Last Year: 1     Unstable Housing in the Last Year: No       Subjective         Objective    Physical Exam:   Assessment Type: Assess only  General Appearance: Awake  Respiratory Pattern: Normal  Chest Assessment: Chest expansion symmetrical  Bilateral Breath Sounds: Clear, Diminished  O2 Device: RA    Vitals:  Blood pressure 146/73, pulse 65, temperature (!) 97.3 °F (36.3 °C), temperature source Temporal, resp. rate 20, height 5' (1.524 m), weight 67.4 kg (148 lb 9.4 oz), SpO2 92%.          Imaging and other studies:     O2 Device: RA     Plan    Respiratory Plan: No distress/Pulmonary history        Resp Comments: pt arrived in ED when caregiver states pt is acting oddly . Pt is unable to answer any questions and is trying to eat buttons off gown. Pt is otherwise stable on RA w/ clear and diminished BS bilaterally. Pt is unable to to flutter and vest therapy is not neccessary at this time  DC flutter/vest

## 2024-02-24 NOTE — PROGRESS NOTES
[facility]  [facility]  Name: Denita Vital I  MRN: [id number]  Unit/Bed#: [facility] Date of Admission: 2/23/2024   Date of Service: 2/24/2024 I Hospital Day: 1    Assessment/Plan   Hyponatremia  Assessment & Plan  Sodium studies are pending sodium is normal today discontinue nephrology consult improved appropriately hold torsemide till observe how she is eating    Lower extremity edema  Assessment & Plan  Mild today continue to hold torsemide pending on her p.o. intake    Cystitis without hematuria  Assessment & Plan  Ua positive   Rocephine  Follow up urine cx    Acute metabolic encephalopathy  Assessment & Plan  Odd behavior could be secondary to acute cystitis.  Otherwise she is nonverbal.  And also aspiration pneumonia pneumonitis.  CT of the brain is negative for acute findings  She is more responsive today she is still chewing on her Puppet - discussed with nursing did better with the swallow even last night.  She is currently sitting in the chair will advance her to her usual puréed thin liquid diet.    Dysphagia  Assessment & Plan  She has not been swallowing her food today apparently spitting it out ER discussed with GI they will plan to scope her on Monday to evaluate for any food bolus there but the CT is negative for any apparent food apparently did better with her swallow study yesterday with the nurses.  She is not in any distress or drooling will place her back on her usual puréed with thin n.p.o. at midnight Monday for EGD and have speech see her tomorrow    Seizure disorder (HCC)  Assessment & Plan  On Depakote will convert to Depacon IV as she failed swallow eval    Type 2 diabetes mellitus with hypoglycemia, without long-term current use of insulin ([facility])  Assessment & Plan  Lab Results   Component Value Date    HGBA1C 5.4 08/24/2023       Recent Labs     02/23/24  2114 02/23/24  2359 02/24/24  0632 02/24/24  0754   POCGLU 110 114 81 82         Blood Sugar  Average: Last 72 hrs:  (P) [id number]  Recurrent hold her diabetic medications will monitor her fingersticks every 4 hours placed on D5 normal  Sugars 80's    Mood disorder (HCC)  Assessment & Plan  Restart her psychiatric medications but will keep Ativan IV for now to ensure she is taking everything okay    Aspiration pneumonia (HCC)  Assessment & Plan  Vs pneumonitis place abx  Do vest therapy as she wont be able to do flutter or incentive spirometry   Follow procal  Aggressive pulmonary toileting   Speech to see they will see tomorrow but will place her on puréed and thins as she did okay with the nurses last night.  She is on room air                 VTE Pharmacologic Prophylaxis: VTE Score: 3 Moderate Risk (Score 3-4) - Pharmacological DVT Prophylaxis Ordered: heparin.    Mobility:   Basic Mobility Inpatient Raw Score: 9  JH-HLM Goal: 3: Sit at edge of bed  JH-HLM Achieved: 1: Laying in bed  HLM Goal NOT achieved. Continue with multidisciplinary rounding and encourage appropriate mobility to improve upon HLM goals.    Patient Centered Rounds: I performed bedside rounds with nursing staff today.   Discussions with Specialists or Other Care Team Provider: none    Education and Discussions with Family / Patient: Patient will update family    Total Time Spent on Date of Encounter in care of patient: >35 mins. This time was spent on one or more of the following: performing physical exam; counseling and coordination of care; obtaining or reviewing history; documenting in the medical record; reviewing/ordering tests, medications or procedures; communicating with other healthcare professionals and discussing with patient's family/caregivers.    Current Length of Stay: 1 day(s)  Current Patient Status: Inpatient   Certification Statement: The patient will continue to require additional inpatient hospital stay due to secondary to pocketing food cystitis  Discharge Plan: Anticipate discharge in 48-72 hrs to  discharge location to be determined pending rehab evaluations.    Code Status: Level 1 - Full Code    Subjective:   Patient seen and examined sitting in the chair apparently did okay last night with nurses swallow currently chewing on her puppet    Objective:     Vitals:   Temp (24hrs), Av.3 °F (36.3 °C), Min:97.3 °F (36.3 °C), Max:97.3 °F (36.3 °C)    Temp:  [97.3 °F (36.3 °C)] 97.3 °F (36.3 °C)  HR:  [62-72] 65  Resp:  [18-22] 20  BP: (119-146)/(56-73) 146/73  SpO2:  [89 %-99 %] 92 %  Body mass index is 29.02 kg/m².     Input and Output Summary (last 24 hours):     Intake/Output Summary (Last 24 hours) at 2024 0948  Last data filed at 2024 2241  Gross per 24 hour   Intake 650 ml   Output 950 ml   Net -300 ml       Physical Exam:   Physical Exam  Vitals and nursing note reviewed.   Constitutional:       General: She is not in acute distress.     Appearance: She is well-developed.   HENT:      Head: Normocephalic and atraumatic.   Eyes:      Conjunctiva/sclera: Conjunctivae normal.   Cardiovascular:      Rate and Rhythm: Normal rate and regular rhythm.      Heart sounds: No murmur heard.  Pulmonary:      Effort: Pulmonary effort is normal. No respiratory distress.      Breath sounds: No wheezing or rales.      Comments: Decreased as poor effort  Abdominal:      General: Bowel sounds are normal. There is no distension.      Palpations: Abdomen is soft.      Tenderness: There is no abdominal tenderness.   Musculoskeletal:         General: No swelling.      Cervical back: Neck supple.   Skin:     General: Skin is warm and dry.      Capillary Refill: Capillary refill takes less than 2 seconds.   Neurological:      Mental Status: She is alert.      Comments: Awake alert nonverbal   Psychiatric:         Mood and Affect: Mood normal.          Additional Data:     Labs:  Results from last 7 days   Lab Units 24  0620 24  1046   WBC Thousand/uL 4.10* 5.34   HEMOGLOBIN g/dL 10.6* 10.7*   HEMATOCRIT %  33.0* 33.2*   PLATELETS Thousands/uL 150 143*   NEUTROS PCT %  --  69   LYMPHS PCT %  --  23   MONOS PCT %  --  7   EOS PCT %  --  1     Results from last 7 days   Lab Units 02/24/24  0620 02/23/24  1705 02/23/24  1233   SODIUM mmol/L 135   < > 128*   POTASSIUM mmol/L 4.1   < > 4.4   CHLORIDE mmol/L 100   < > 91*   CO2 mmol/L 30   < > 35*   BUN mg/dL 11   < > 16   CREATININE mg/dL 0.49*   < > 0.61   ANION GAP mmol/L 5   < > 2   CALCIUM mg/dL 10.0   < > 9.8   ALBUMIN g/dL  --   --  3.7   TOTAL BILIRUBIN mg/dL  --   --  0.26   ALK PHOS U/L  --   --  79   ALT U/L  --   --  37   AST U/L  --   --  29   GLUCOSE RANDOM mg/dL 79   < > 63*    < > = values in this interval not displayed.     Results from last 7 days   Lab Units 02/23/24  1046   INR  0.98     Results from last 7 days   Lab Units 02/24/24  0754 02/24/24  0632 02/23/24  2359 02/23/24  2114 02/23/24  1911 02/23/24  1728 02/23/24  1636   POC GLUCOSE mg/dl 82 81 114 110 124 103 57*         Results from last 7 days   Lab Units 02/24/24  0620 02/23/24  1233 02/23/24  1046   LACTIC ACID mmol/L  --   --  1.2   PROCALCITONIN ng/ml <0.05 <0.05 <0.05       Lines/Drains:  Invasive Devices       Peripheral Intravenous Line  Duration             Peripheral IV 02/23/24 Right Wrist <1 day                          Imaging: Reviewed radiology reports from this admission including: chest CT scan    Recent Cultures (last 7 days):   Results from last 7 days   Lab Units 02/23/24  1051   BLOOD CULTURE  Received in Microbiology Lab. Culture in Progress.  Received in Microbiology Lab. Culture in Progress.       Last 24 Hours Medication List:   Current Facility-Administered Medications   Medication Dose Route Frequency Provider Last Rate    acetaminophen  650 mg Oral Q6H PRN Demetria Sol MD      aspirin  81 mg Oral Daily Demetria Sol MD      atorvastatin  80 mg Oral Daily With Dinner Demetria Sol MD      cefTRIAXone  2,000 mg Intravenous Q24H Demetria Sol MD 2,000 mg  (02/24/24 0604)    dextrose 5 % and sodium chloride 0.9 %  75 mL/hr Intravenous Continuous Demetria Sol MD 75 mL/hr (02/24/24 0800)    docusate sodium  100 mg Oral BID Demetria Sol MD      gabapentin  800 mg Oral TID Demetria Sol MD      heparin (porcine)  5,000 Units Subcutaneous Q8H RONNELL Demetria Sol MD      levothyroxine  50 mcg Oral Early Morning Demetria Sol MD      LORazepam  0.5 mg Intravenous BID Demetria Sol MD      ondansetron  4 mg Intravenous Q6H PRN Demetria Sol MD      oxybutynin  10 mg Oral Daily Demetria Sol MD      pantoprazole  40 mg Intravenous Q24H RONNELL Demetria Sol MD      QUEtiapine  100 mg Oral BID Demetria Sol MD      QUEtiapine  200 mg Oral HS Demetria Sol MD      senna  8.6 mg Oral HS Demetria Sol MD      valproate sodium  250 mg Intravenous QAM Demetria Sol  mg (02/24/24 0928)    valproate sodium  500 mg Intravenous QPM Demetria Sol  mg (02/23/24 1807)        Today, Patient Was Seen By: Demetria Sol MD    **Please Note: This note may have been constructed using a voice recognition system.**

## 2024-02-24 NOTE — CASE MANAGEMENT
Case Management Assessment & Discharge Planning Note    Patient name Denita Vital  Location /-01 MRN [id number]  : 1955 Date 2024       Current Admission Date: 2024  Current Admission Diagnosis:Acute metabolic encephalopathy   Patient Active Problem List    Diagnosis Date Noted    Hypoglycemia 2023    Hyponatremia 2022    Open nondisplaced fracture of distal phalanx of left middle finger 2022    Avulsion of fingernail 2022    Acute urinary retention 2022    Lower extremity edema 2022    Acute metabolic encephalopathy 2022    Cystitis without hematuria 2022    Dysphagia 2021    Chronic anemia 2021    Chronically elevated right hemidiaphragm 2021    Obesity (BMI 30.0-34.9) 2021    Seizure disorder (HCC) 2021    Hyperlipidemia 2021    Abnormal x-ray 2021    Acute respiratory failure with hypoxia (HCC) 2021    Aspiration pneumonia (HCC) 2021    Mood disorder (HCC) 2021    Type 2 diabetes mellitus with hypoglycemia, without long-term current use of insulin (HCC) 2020    Hypertension 2019    Gastroesophageal reflux disease without esophagitis 2018    Ambulatory dysfunction 2017    Intellectual disability 2017      LOS (days): 1  Geometric Mean LOS (GMLOS) (days): 5  Days to GMLOS:4     OBJECTIVE:    Risk of Unplanned Readmission Score: 20.73         Current admission status: Inpatient       Preferred Pharmacy:   [facility] [place] - [address] [place], [place] [id number]  Phone: [phone] Fax: [phone]    [facility]/pharmacy #[id number] - [place] - [address] [place] [id number]  Phone: [phone] Fax: [phone]    Primary Care Provider: Soy Clemente MD    Primary Insurance: MEDICARE  Secondary Insurance[place]    ASSESSMENT:  Active Health Care Proxies        Irais Willis ( [employer] [place]) Health Care Representative   Primary Phone: [phone] (Mobile)                 Advance Directives  Does patient have a Health Care POA?: No  Does patient currently have a Health Care decision maker?: No  Does patient have Advance Directives?: No  Primary Contact: sister and caretakerIrais              Patient Information  Admitted from:: Home  Mental Status: Alert  During Assessment patient was accompanied by: Not accompanied during assessment  Assessment information provided by:: Other - please comment (care taker, Irais)  Primary Caregiver: Private caregiver  Caregiver's Name:: irais  Caregiver's Relationship to Patient:: Other (Specify) ()  Support Systems: Home care staff  County of Residence: [facility] entry access options. Select all that apply.: [place]  Type of Current Residence: [facility]  Living Arrangements: Other (Comment) (group home)  Is patient a ?: No    Activities of Daily Living Prior to Admission  Functional Status: Assistance  Completes ADLs independently?: No  Level of ADL dependence: Assistance  Ambulates independently?: No  Level of ambulatory dependence: Assistance  Does patient use assisted devices?: Yes  Assisted Devices (DME) used: Walker, Shower Chair (sit to stand)  Does patient currently own DME?: Yes  What DME does the patient currently own?: Shower Chair, Walker  Does patient have a history of Outpatient Therapy (PT/OT)?: No  Does the patient have a history of Short-Term Rehab?: Yes ([facility])  Does patient have a history of HHC?: Yes ([facility])  Does patient currently have HHC?: No         Patient Information Continued  Income Source: SSI/SSD  Does patient have prescription coverage?: Yes  Does patient receive dialysis treatments?: No  Does patient have a history of substance abuse?: No  Does patient have a history of Mental Health Diagnosis?: Yes (mood disorder)  Is patient receiving treatment for  mental health?: Yes  Has patient received inpatient treatment related to mental health in the last 2 years?: No         Means of Transportation  Means of Transport to Appts:: Family transport      Social Determinants of Health (SDOH)      Flowsheet Row Most Recent Value   Housing Stability    In the last 12 months, was there a time when you were not able to pay the mortgage or rent on time? N   In the last 12 months, how many places have you lived? 1   In the last 12 months, was there a time when you did not have a steady place to sleep or slept in a shelter (including now)? N   Transportation Needs    In the past 12 months, has lack of transportation kept you from medical appointments or from getting medications? no   In the past 12 months, has lack of transportation kept you from meetings, work, or from getting things needed for daily living? No   Food Insecurity    Within the past 12 months, you worried that your food would run out before you got the money to buy more. Never true   Within the past 12 months, the food you bought just didn't last and you didn't have money to get more. Never true   Utilities    In the past 12 months has the electric, gas, oil, or water company threatened to shut off services in your home? No            DISCHARGE DETAILS:    Discharge planning discussed with:: pts caretakerIrais  Freedom of Choice: Yes     CM contacted family/caregiver?: Yes  Were Treatment Team discharge recommendations reviewed with patient/caregiver?: Yes  Did patient/caregiver verbalize understanding of patient care needs?: Yes       Contacts  Patient Contacts: Irais care taker  Relationship to Patient:: Other (Comment) (care taker)  Contact Method: Phone  Reason/Outcome: Discharge Planning    Requested Home Health Care         Is the patient interested in HHC at discharge?: Yes  Home Health Discipline requested:: Nursing, Occupational Therapy, Physical Therapy  Home Health Agency Name:: [facility]  "Health Care  HHA External Referral Reason (only applicable if external HHA name selected): Patient has established relationship with provider  Home Health Follow-Up Provider:: PCP  Home Health Services Needed:: Evaluate Functional Status and Safety, Gait/ADL Training, Strengthening/Theraputic Exercises to Improve Function, Other (comment)  Homebound Criteria Met:: Requires the Assistance of Another Person for Safe Ambulation or to Leave the Home  Supporting Clincal Findings:: Limited Endurance    DME Referral Provided  Referral made for DME?: No         Would you like to participate in our [facility] service program?  : No - Declined       Discharge Destination Plan:: Group Home                  Pt resides at a group home, [facility] spoke with care giver supervisor, Irais.  As per Kathi, prior to admit, pt hd been \"acting strange, stuffing things in her mouth, spitting out food/meds.\"  As per Kathi, pt uses a \"sit to stand\" machine at baseline.    [facility] has been recommended for pt at time of discharge, Irais is agreeable and would like CM to place referral to [facility], as pt has utilized their services in the past.  As per request, referral placed in A[facility]    A post acute care recommendation was made by your care team for [facility].  Discussed Freedom of Choice with caregiver. List of agencies given to caregiver via in person. caregiver aware the list is custom filtered for them by preference  and that [facility].         [facility] has accepted pt at time of discahrge                    "

## 2024-02-24 NOTE — ASSESSMENT & PLAN NOTE
Restart her psychiatric medications but will keep Ativan IV for now to ensure she is taking everything okay

## 2024-02-24 NOTE — ASSESSMENT & PLAN NOTE
Sodium studies are pending sodium is normal today discontinue nephrology consult improved appropriately hold torsemide till observe how she is eating

## 2024-02-24 NOTE — PLAN OF CARE
Problem: Potential for Falls  Goal: Patient will remain free of falls  Description: INTERVENTIONS:  - Educate patient/family on patient safety including physical limitations  - Instruct patient to call for assistance with activity   - Consult OT/PT to assist with strengthening/mobility   - Keep Call bell within reach  - Keep bed low and locked with side rails adjusted as appropriate  - Keep care items and personal belongings within reach  - Initiate and maintain comfort rounds  - Make Fall Risk Sign visible to staff  - Offer Toileting every 2 Hours, in advance of need  - Initiate/Maintain bed alarm  - Apply yellow socks and bracelet for high fall risk patients  - Consider moving patient to room near nurses station  Outcome: Progressing     Problem: Prexisting or High Potential for Compromised Skin Integrity  Goal: Skin integrity is maintained or improved  Description: INTERVENTIONS:  - Identify patients at risk for skin breakdown  - Assess and monitor skin integrity  - Assess and monitor nutrition and hydration status  - Monitor labs   - Assess for incontinence   - Turn and reposition patient  - Assist with mobility/ambulation  - Relieve pressure over bony prominences  - Avoid friction and shearing  - Provide appropriate hygiene as needed including keeping skin clean and dry  - Evaluate need for skin moisturizer/barrier cream  - Collaborate with interdisciplinary team   - Patient/family teaching  - Consider wound care consult   Outcome: Progressing     Problem: METABOLIC, FLUID AND ELECTROLYTES - ADULT  Goal: Electrolytes maintained within normal limits  Description: INTERVENTIONS:  - Monitor labs and assess patient for signs and symptoms of electrolyte imbalances  - Administer electrolyte replacement as ordered  - Monitor response to electrolyte replacements, including repeat lab results as appropriate  - Instruct patient on fluid and nutrition as appropriate  Outcome: Progressing  Goal: Fluid balance  maintained  Description: INTERVENTIONS:  - Monitor labs   - Monitor I/O and WT  - Instruct patient on fluid and nutrition as appropriate  - Assess for signs & symptoms of volume excess or deficit  Outcome: Progressing  Goal: Glucose maintained within target range  Description: INTERVENTIONS:  - Monitor Blood Glucose as ordered  - Assess for signs and symptoms of hyperglycemia and hypoglycemia  - Administer ordered medications to maintain glucose within target range  - Assess nutritional intake and initiate nutrition service referral as needed  Outcome: Progressing     Problem: NEUROSENSORY - ADULT  Goal: Achieves stable or improved neurological status  Description: INTERVENTIONS  - Monitor and report changes in neurological status  - Monitor vital signs such as temperature, blood pressure, glucose, and any other labs ordered   - Initiate measures to prevent increased intracranial pressure  - Monitor for seizure activity and implement precautions if appropriate      Outcome: Progressing     Problem: GASTROINTESTINAL - ADULT  Goal: Maintains adequate nutritional intake  Description: INTERVENTIONS:  - Monitor percentage of each meal consumed  - Identify factors contributing to decreased intake, treat as appropriate  - Assist with meals as needed  - Monitor I&O, weight, and lab values if indicated  - Obtain nutrition services referral as needed  Outcome: Progressing

## 2024-02-24 NOTE — OCCUPATIONAL THERAPY NOTE
Occupational Therapy Evaluation     Patient Name: Denita Vital  Today's Date: 2/24/2024  Problem List  Active Problems:    Aspiration pneumonia (HCC)    Mood disorder (HCC)    Type 2 diabetes mellitus with hypoglycemia, without long-term current use of insulin (HCC)    Seizure disorder (HCC)    Dysphagia    Acute metabolic encephalopathy    Cystitis without hematuria    Lower extremity edema    Hyponatremia    Past Medical History  Past Medical History:   Diagnosis Date    Anxiety     CHF (congestive heart failure) (HCC)     Diabetes mellitus (HCC)     GERD (gastroesophageal reflux disease)     Hyperlipidemia     Hypertension     Mental disability     Mixed incontinence 04/12/2017    Seizure disorder (HCC)      Past Surgical History  History reviewed. No pertinent surgical history.      02/24/24 0757   Note Type   Note type Evaluation   Pain Assessment   Pain Assessment Tool FLACC   Pain Rating: FLACC (Rest) - Face 0   Pain Rating: FLACC (Rest) - Legs 0   Pain Rating: FLACC (Rest) - Activity 0   Pain Rating: FLACC (Rest) - Cry 0   Pain Rating: FLACC (Rest) - Consolability 0   Score: FLACC (Rest) 0   Pain Rating: FLACC (Activity) - Face 0   Pain Rating: FLACC (Activity) - Legs 0   Pain Rating: FLACC (Activity) - Activity 0   Pain Rating: FLACC (Activity) - Cry 0   Pain Rating: FLACC (Activity) - Consolability 0   Score: FLACC (Activity) 0   Restrictions/Precautions   Other Precautions Cognitive;Chair Alarm;Bed Alarm;Fall Risk   Home Living   Type of Home Group Home   Home Equipment Walker;Wheelchair-manual   Additional Comments Pt nonverbal. Home setup obtained from previous documentation. Pt lives in group home with 24/7 assist.   Prior Function   Level of [place] Needs assistance with ADLs;Needs assistance with functional mobility;Needs assistance with IADLS   Lives With Facility staff   Receives Help From   (Group home staff)   IADLs Family/Friend/Other provides transportation;Family/Friend/Other  provides meals;Family/Friend/Other provides medication management   Comments Pt has assist for all ADLs, IADLs and functional transfers. Pt uses wc for mobility.   ADL   LB Dressing Assistance 1  Total Assistance   LB Dressing Deficit Thread RLE into underwear;Thread LLE into underwear;Pull up over hips;Don/doff R sock;Don/doff L sock   Additional Comments Pt DEP to don socks and brief. Based on functional abilities assessed, pt demo ability to complete UB ADLs with maxA and LB ADLs with maxA-DEP.   Bed Mobility   Supine to Sit 2  Maximal assistance   Additional items Assist x 1;Increased time required;Verbal cues;LE management  (trunk management)   Additional Comments Pt completed bed mobility with HOB elevated and use of grab bars with maxA for trunk and BLE management. Pt requiring Rigo-maxA to maintain sitting EOB.   Transfers   Sit to Stand 2  Maximal assistance   Additional items Assist x 2;Increased time required;Verbal cues   Stand to Sit 2  Maximal assistance   Additional items Assist x 2;Increased time required;Verbal cues   Stand pivot 2  Maximal assistance   Additional items Assist x 2;Increased time required;Verbal cues   Additional Comments Pt completed all functional transfers without AD. MaxA x2 required for STS and EOB<>recliner SPT with max cuing for sequencing.   Balance   Static Sitting Poor -   Dynamic Sitting Poor -   Static Standing Poor   Dynamic Standing Poor -   Activity Tolerance   Activity Tolerance Patient limited by fatigue  (Cognition)   Medical Staff Made Aware Sonya AYALA   Nurse Made Aware Yes   RUE Assessment   RUE Assessment WFL   LUE Assessment   LUE Assessment WFL   Hand Function   Gross Motor Coordination Functional   Fine Motor Coordination Functional   Sensation   Light Touch Not tested   Cognition   Overall Cognitive Status Impaired   Arousal/Participation Alert   Attention Difficulty attending to directions   Orientation Level Unable to assess   Memory Unable to assess    Following Commands Follows one step commands inconsistently   Comments Pt nonverbal but is able to follow some 1 step commands inconsistenty.   Assessment   Limitation Decreased ADL status;Decreased UE strength;Decreased Safe judgement during ADL;Decreased cognition;Decreased endurance;Decreased self-care trans;Decreased high-level ADLs   Prognosis Fair   Assessment Pt is a 68 y.o. female, admitted to [facility]/23/[facility]24 d/t experiencing odd behavior and not swallowing food. Dx: no active problem. Pt with PMHx impacting their performance during ADL tasks, including: anxiety, CHF, DM, GERD, HLD, HTN, mental disability, seizure disorder. Prior to admission to the hospital Pt was performing ADLs with physical assistance. IADLs with physical assistance. Functional transfers/ambulation with physical assistance. Cognitive status was PTA was impaired. OT order placed to assess Pt's ADLs, cognitive status, and performance during functional tasks in order to maximize safety and independence while making most appropriate d/c recommendations. Pt's clinical presentation is currently unstable/unpredictable given new onset deficits that effect Pt's occupational performance and ability to safely return to OF including decrease activity tolerance, decrease standing balance, decrease sitting balance, decrease performance during ADL tasks, decrease cognition, decrease safety awareness , decrease UB MS, decrease generalized strength, decrease activity engagement, decrease performance during functional transfers, high fall risk, limited insight to deficits, and inability to express needs combined with medical complications of hypertension , abnormal renal lab values, abnormal H&H, abnormal CBC, and incontinence. Personal factors affecting Pt at time of initial evaluation include: anxiety, level of education, inability to perform ADLs, inability to ambulate household distances, inability to navigate community distances, limited insight  into impairments, decreased initiation and engagement, and difficulty communicating. Pt will benefit from continued skilled OT services to address deficits as defined above and to maximize level independence/participation during ADLs and functional tasks to facilitate return toward PLOF and improved quality of life. From an occupational therapy standpoint, recommendation at time of d/c would be Level II (Moderate Resource Intensity) therapy.   Plan   Treatment Interventions ADL retraining;Functional transfer training;UE strengthening/ROM;Endurance training;Patient/family training;Energy conservation;Activityengagement   Goal Expiration Date 03/09/24   OT Frequency 2-3x/wk   Discharge Recommendation   Rehab Resource Intensity Level, OT II (Moderate Resource Intensity)   AM-PAC Daily Activity Inpatient   Lower Body Dressing 1   Bathing 1   Toileting 1   Upper Body Dressing 1   Grooming 1   Eating 2   Daily Activity Raw Score 7   Turning Head Towards Sound 3   Follow Simple Instructions 2   Low Function Daily Activity Raw Score 12   Low Function Daily Activity Standardized Score  21.38   AM-PAC Applied Cognition Inpatient   Following a Speech/Presentation 1   Understanding Ordinary Conversation 2   Taking Medications 1   Remembering Where Things Are Placed or Put Away 1   Remembering List of 4-5 Errands 1   Taking Care of Complicated Tasks 1   Applied Cognition Raw Score 7   Applied Cognition Standardized Score 15.17     The patient's raw score on the AM-PAC Daily Activity inpatient short form is 7, standardized score is 21.38 , less than 39.4. Patients at this level are likely to benefit from DC to post-acute rehabilitation services. Patient lives in group home. If group home is able to care for patient at this level, recommend return to home with HHOT. Please refer to the recommendation of the Occupational Therapist for safe DC planning.    Pt goals to be met by 3/9/24.    Pt will demonstrate ability to complete  grooming/hygiene tasks @ modA after set-up.  Pt will demonstrate ability to complete supine<>sit @ Rigo in order to increase safety and independence during ADL tasks.  Pt will demonstrate ability to complete UB ADLs including washing/dressing @ modA in order to increase performance and participation during meaningful tasks  Pt will demonstrate ability to complete LB dressing @ modA in order to increase safety and independence during meaningful tasks.   Pt will demonstrate ability to complete EOB, chair, toilet/commode transfers @ maxA in order to increase performance and participation during functional tasks.  Pt will demonstrate ability to tolerate 30-35 minute OT session with no vc'ing for deep breathing or use of energy conservation techniques in order to increase activity tolerance during functional tasks.   Pt will demonstrate Good carryover of use of energy conservation/compensatory strategies during ADLs and functional tasks in order to increase safety and reduce risk for falls.   Pt will demonstrate Good attention and participation in continued evaluation of functional ambulation house hold distances in order to assist with safe d/c planning.  Pt will attend to continued cognitive assessments 100% of the time in order to provide most appropriate d/c recommendations.   Pt will follow 100% simple 1-step commands consistently with environmental cues to increase participation in functional activities.   Pt will demonstrate 100% carryover of BUE HEP in order to increase BUE MS and increase performance during functional tasks upon d/c home.    Erika Orr, OTR/L

## 2024-02-24 NOTE — ASSESSMENT & PLAN NOTE
Odd behavior could be secondary to acute cystitis.  Otherwise she is nonverbal.  And also aspiration pneumonia pneumonitis.  CT of the brain is negative for acute findings  She is more responsive today she is still chewing on her Puppet - discussed with nursing did better with the swallow even last night.  She is currently sitting in the chair will advance her to her usual puréed thin liquid diet.

## 2024-02-24 NOTE — PLAN OF CARE
Problem: OCCUPATIONAL THERAPY ADULT  Goal: Performs self-care activities at highest level of function for planned discharge setting.  See evaluation for individualized goals.  Description: Treatment Interventions: ADL retraining, Functional transfer training, UE strengthening/ROM, Endurance training, Patient/family training, Energy conservation, Activityengagement          See flowsheet documentation for full assessment, interventions and recommendations.   Note: Limitation: Decreased ADL status, Decreased UE strength, Decreased Safe judgement during ADL, Decreased cognition, Decreased endurance, Decreased self-care trans, Decreased high-level ADLs  Prognosis: Fair  Assessment: Pt is a 68 y.o. female, admitted to [facility] 2/23/2024 d/t experiencing odd behavior and not swallowing food. Dx: no active problem. Pt with PMHx impacting their performance during ADL tasks, including: anxiety, CHF, DM, GERD, HLD, HTN, mental disability, seizure disorder. Prior to admission to the hospital Pt was performing ADLs with physical assistance. IADLs with physical assistance. Functional transfers/ambulation with physical assistance. Cognitive status was PTA was impaired. OT order placed to assess Pt's ADLs, cognitive status, and performance during functional tasks in order to maximize safety and independence while making most appropriate d/c recommendations. Pt's clinical presentation is currently unstable/unpredictable given new onset deficits that effect Pt's occupational performance and ability to safely return to PLOF including decrease activity tolerance, decrease standing balance, decrease sitting balance, decrease performance during ADL tasks, decrease cognition, decrease safety awareness , decrease UB MS, decrease generalized strength, decrease activity engagement, decrease performance during functional transfers, high fall risk, limited insight to deficits, and inability to express needs combined with medical complications of  hypertension , abnormal renal lab values, abnormal H&H, abnormal CBC, and incontinence. Personal factors affecting Pt at time of initial evaluation include: anxiety, level of education, inability to perform ADLs, inability to ambulate household distances, inability to navigate community distances, limited insight into impairments, decreased initiation and engagement, and difficulty communicating. Pt will benefit from continued skilled OT services to address deficits as defined above and to maximize level independence/participation during ADLs and functional tasks to facilitate return toward PLOF and improved quality of life. From an occupational therapy standpoint, recommendation at time of d/c would be Level II (Moderate Resource Intensity) therapy.     Rehab Resource Intensity Level, OT: II (Moderate Resource Intensity)

## 2024-02-24 NOTE — ASSESSMENT & PLAN NOTE
She has not been swallowing her food today apparently spitting it out ER discussed with GI they will plan to scope her on Monday to evaluate for any food bolus there but the CT is negative for any apparent food apparently did better with her swallow study yesterday with the nurses.  She is not in any distress or drooling will place her back on her usual puréed with thin n.p.o. at midnight Monday for EGD and have speech see her tomorrow

## 2024-02-24 NOTE — PLAN OF CARE
Problem: Potential for Falls  Goal: Patient will remain free of falls  Description: INTERVENTIONS:  - Educate patient/family on patient safety including physical limitations  - Instruct patient to call for assistance with activity   - Consult OT/PT to assist with strengthening/mobility   - Keep Call bell within reach  - Keep bed low and locked with side rails adjusted as appropriate  - Keep care items and personal belongings within reach  - Initiate and maintain comfort rounds  - Make Fall Risk Sign visible to staff  - Offer Toileting every 2 Hours, in advance of need  - Initiate/Maintain bed alarm  - Obtain necessary fall risk management equipment  - Apply yellow socks and bracelet for high fall risk patients  - Consider moving patient to room near nurses station  Outcome: Progressing     Problem: Prexisting or High Potential for Compromised Skin Integrity  Goal: Skin integrity is maintained or improved  Description: INTERVENTIONS:  - Identify patients at risk for skin breakdown  - Assess and monitor skin integrity  - Assess and monitor nutrition and hydration status  - Monitor labs   - Assess for incontinence   - Turn and reposition patient  - Assist with mobility/ambulation  - Relieve pressure over bony prominences  - Avoid friction and shearing  - Provide appropriate hygiene as needed including keeping skin clean and dry  - Evaluate need for skin moisturizer/barrier cream  - Collaborate with interdisciplinary team   - Patient/family teaching  - Consider wound care consult   Outcome: Progressing     Problem: METABOLIC, FLUID AND ELECTROLYTES - ADULT  Goal: Electrolytes maintained within normal limits  Description: INTERVENTIONS:  - Monitor labs and assess patient for signs and symptoms of electrolyte imbalances  - Administer electrolyte replacement as ordered  - Monitor response to electrolyte replacements, including repeat lab results as appropriate  - Instruct patient on fluid and nutrition as  appropriate  Outcome: Progressing  Goal: Fluid balance maintained  Description: INTERVENTIONS:  - Monitor labs   - Monitor I/O and WT  - Instruct patient on fluid and nutrition as appropriate  - Assess for signs & symptoms of volume excess or deficit  Outcome: Progressing  Goal: Glucose maintained within target range  Description: INTERVENTIONS:  - Monitor Blood Glucose as ordered  - Assess for signs and symptoms of hyperglycemia and hypoglycemia  - Administer ordered medications to maintain glucose within target range  - Assess nutritional intake and initiate nutrition service referral as needed  Outcome: Progressing     Problem: NEUROSENSORY - ADULT  Goal: Achieves stable or improved neurological status  Description: INTERVENTIONS  - Monitor and report changes in neurological status  - Monitor vital signs such as temperature, blood pressure, glucose, and any other labs ordered   - Initiate measures to prevent increased intracranial pressure  - Monitor for seizure activity and implement precautions if appropriate      Outcome: Progressing     Problem: GASTROINTESTINAL - ADULT  Goal: Maintains adequate nutritional intake  Description: INTERVENTIONS:  - Monitor percentage of each meal consumed  - Identify factors contributing to decreased intake, treat as appropriate  - Assist with meals as needed  - Monitor I&O, weight, and lab values if indicated  - Obtain nutrition services referral as needed  Outcome: Progressing

## 2024-02-25 LAB
ANION GAP SERPL CALCULATED.3IONS-SCNC: 5 MMOL/L
BACTERIA UR CULT: ABNORMAL
BASOPHILS # BLD AUTO: 0.01 THOUSANDS/ÂΜL (ref 0–0.1)
BASOPHILS NFR BLD AUTO: 0 % (ref 0–1)
BUN SERPL-MCNC: 13 MG/DL (ref 5–25)
CALCIUM SERPL-MCNC: 10.1 MG/DL (ref 8.4–10.2)
CHLORIDE SERPL-SCNC: 102 MMOL/L (ref 96–108)
CO2 SERPL-SCNC: 30 MMOL/L (ref 21–32)
CREAT SERPL-MCNC: 0.46 MG/DL (ref 0.6–1.3)
EOSINOPHIL # BLD AUTO: 0.02 THOUSAND/ÂΜL (ref 0–0.61)
EOSINOPHIL NFR BLD AUTO: 0 % (ref 0–6)
ERYTHROCYTE [DISTWIDTH] IN BLOOD BY AUTOMATED COUNT: 16.4 % (ref 11.6–15.1)
GFR SERPL CREATININE-BSD FRML MDRD: 101 ML/MIN/1.73SQ M
GLUCOSE SERPL-MCNC: 154 MG/DL (ref 65–140)
GLUCOSE SERPL-MCNC: 154 MG/DL (ref 65–140)
GLUCOSE SERPL-MCNC: 180 MG/DL (ref 65–140)
GLUCOSE SERPL-MCNC: 195 MG/DL (ref 65–140)
GLUCOSE SERPL-MCNC: 196 MG/DL (ref 65–140)
HCT VFR BLD AUTO: 32.6 % (ref 34.8–46.1)
HGB BLD-MCNC: 10.2 G/DL (ref 11.5–15.4)
IMM GRANULOCYTES # BLD AUTO: 0.02 THOUSAND/UL (ref 0–0.2)
IMM GRANULOCYTES NFR BLD AUTO: 0 % (ref 0–2)
LYMPHOCYTES # BLD AUTO: 0.67 THOUSANDS/ÂΜL (ref 0.6–4.47)
LYMPHOCYTES NFR BLD AUTO: 9 % (ref 14–44)
MCH RBC QN AUTO: 28.9 PG (ref 26.8–34.3)
MCHC RBC AUTO-ENTMCNC: 31.3 G/DL (ref 31.4–37.4)
MCV RBC AUTO: 92 FL (ref 82–98)
MONOCYTES # BLD AUTO: 0.43 THOUSAND/ÂΜL (ref 0.17–1.22)
MONOCYTES NFR BLD AUTO: 6 % (ref 4–12)
MRSA NOSE QL CULT: NORMAL
NEUTROPHILS # BLD AUTO: 6.3 THOUSANDS/ÂΜL (ref 1.85–7.62)
NEUTS SEG NFR BLD AUTO: 85 % (ref 43–75)
NRBC BLD AUTO-RTO: 0 /100 WBCS
PLATELET # BLD AUTO: 154 THOUSANDS/UL (ref 149–390)
PMV BLD AUTO: 9.5 FL (ref 8.9–12.7)
POTASSIUM SERPL-SCNC: 3.8 MMOL/L (ref 3.5–5.3)
RBC # BLD AUTO: 3.53 MILLION/UL (ref 3.81–5.12)
SODIUM SERPL-SCNC: 137 MMOL/L (ref 135–147)
WBC # BLD AUTO: 7.45 THOUSAND/UL (ref 4.31–10.16)

## 2024-02-25 PROCEDURE — 85025 COMPLETE CBC W/AUTO DIFF WBC: CPT | Performed by: FAMILY MEDICINE

## 2024-02-25 PROCEDURE — 80048 BASIC METABOLIC PNL TOTAL CA: CPT | Performed by: FAMILY MEDICINE

## 2024-02-25 PROCEDURE — 82948 REAGENT STRIP/BLOOD GLUCOSE: CPT

## 2024-02-25 PROCEDURE — C9113 INJ PANTOPRAZOLE SODIUM, VIA: HCPCS | Performed by: FAMILY MEDICINE

## 2024-02-25 PROCEDURE — 99232 SBSQ HOSP IP/OBS MODERATE 35: CPT | Performed by: FAMILY MEDICINE

## 2024-02-25 PROCEDURE — 92610 EVALUATE SWALLOWING FUNCTION: CPT

## 2024-02-25 RX ORDER — INSULIN LISPRO 100 [IU]/ML
1-5 INJECTION, SOLUTION INTRAVENOUS; SUBCUTANEOUS
Status: DISCONTINUED | OUTPATIENT
Start: 2024-02-25 | End: 2024-02-26 | Stop reason: HOSPADM

## 2024-02-25 RX ORDER — CEFTAZIDIME 1 G/1
1000 INJECTION, POWDER, FOR SOLUTION INTRAMUSCULAR; INTRAVENOUS EVERY 8 HOURS
Status: DISCONTINUED | OUTPATIENT
Start: 2024-02-25 | End: 2024-02-25

## 2024-02-25 RX ADMIN — HEPARIN SODIUM 5000 UNITS: 5000 INJECTION INTRAVENOUS; SUBCUTANEOUS at 23:40

## 2024-02-25 RX ADMIN — QUETIAPINE FUMARATE 100 MG: 100 TABLET ORAL at 16:26

## 2024-02-25 RX ADMIN — CEFTAZIDIME 1000 MG: 1 INJECTION, POWDER, FOR SOLUTION INTRAMUSCULAR; INTRAVENOUS at 16:30

## 2024-02-25 RX ADMIN — INSULIN LISPRO 1 UNITS: 100 INJECTION, SOLUTION INTRAVENOUS; SUBCUTANEOUS at 12:25

## 2024-02-25 RX ADMIN — LORAZEPAM 0.5 MG: 2 INJECTION INTRAMUSCULAR; INTRAVENOUS at 10:08

## 2024-02-25 RX ADMIN — LORAZEPAM 0.5 MG: 2 INJECTION INTRAMUSCULAR; INTRAVENOUS at 17:15

## 2024-02-25 RX ADMIN — LEVOTHYROXINE SODIUM 50 MCG: 50 TABLET ORAL at 04:53

## 2024-02-25 RX ADMIN — INSULIN LISPRO 1 UNITS: 100 INJECTION, SOLUTION INTRAVENOUS; SUBCUTANEOUS at 21:08

## 2024-02-25 RX ADMIN — QUETIAPINE FUMARATE 200 MG: 200 TABLET ORAL at 20:55

## 2024-02-25 RX ADMIN — INSULIN LISPRO 1 UNITS: 100 INJECTION, SOLUTION INTRAVENOUS; SUBCUTANEOUS at 17:14

## 2024-02-25 RX ADMIN — OXYBUTYNIN CHLORIDE 10 MG: 5 TABLET, EXTENDED RELEASE ORAL at 10:08

## 2024-02-25 RX ADMIN — HEPARIN SODIUM 5000 UNITS: 5000 INJECTION INTRAVENOUS; SUBCUTANEOUS at 16:30

## 2024-02-25 RX ADMIN — GABAPENTIN 800 MG: 400 CAPSULE ORAL at 20:56

## 2024-02-25 RX ADMIN — QUETIAPINE FUMARATE 100 MG: 100 TABLET ORAL at 10:08

## 2024-02-25 RX ADMIN — CEFTAZIDIME 1000 MG: 1 INJECTION, POWDER, FOR SOLUTION INTRAMUSCULAR; INTRAVENOUS at 23:40

## 2024-02-25 RX ADMIN — PANTOPRAZOLE SODIUM 40 MG: 40 INJECTION, POWDER, FOR SOLUTION INTRAVENOUS at 10:08

## 2024-02-25 RX ADMIN — HEPARIN SODIUM 5000 UNITS: 5000 INJECTION INTRAVENOUS; SUBCUTANEOUS at 07:31

## 2024-02-25 RX ADMIN — VALPROATE SODIUM 250 MG: 100 INJECTION, SOLUTION INTRAVENOUS at 10:15

## 2024-02-25 RX ADMIN — ATORVASTATIN CALCIUM 80 MG: 40 TABLET, FILM COATED ORAL at 16:27

## 2024-02-25 RX ADMIN — CEFTRIAXONE 2000 MG: 2 INJECTION, SOLUTION INTRAVENOUS at 07:31

## 2024-02-25 RX ADMIN — GABAPENTIN 800 MG: 400 CAPSULE ORAL at 10:08

## 2024-02-25 RX ADMIN — DOCUSATE SODIUM 100 MG: 100 CAPSULE, LIQUID FILLED ORAL at 17:13

## 2024-02-25 RX ADMIN — STANDARDIZED SENNA CONCENTRATE 8.6 MG: 8.6 TABLET ORAL at 20:55

## 2024-02-25 RX ADMIN — GABAPENTIN 800 MG: 400 CAPSULE ORAL at 16:26

## 2024-02-25 RX ADMIN — ASPIRIN 81 MG: 81 TABLET, COATED ORAL at 10:08

## 2024-02-25 RX ADMIN — VALPROATE SODIUM 500 MG: 100 INJECTION, SOLUTION INTRAVENOUS at 17:13

## 2024-02-25 RX ADMIN — DOCUSATE SODIUM 100 MG: 100 CAPSULE, LIQUID FILLED ORAL at 10:08

## 2024-02-25 NOTE — PLAN OF CARE
Problem: Potential for Falls  Goal: Patient will remain free of falls  Description: INTERVENTIONS:  - Educate patient/family on patient safety including physical limitations  - Instruct patient to call for assistance with activity   - Consult OT/PT to assist with strengthening/mobility   - Keep Call bell within reach  - Keep bed low and locked with side rails adjusted as appropriate  - Keep care items and personal belongings within reach  - Initiate and maintain comfort rounds  - Make Fall Risk Sign visible to staff  - Offer Toileting every 2 Hours, in advance of need  - Initiate/Maintain bed/chair alarm  - Obtain necessary fall risk management equipment  - Apply yellow socks and bracelet for high fall risk patients  - Consider moving patient to room near nurses station  Outcome: Progressing     Problem: Prexisting or High Potential for Compromised Skin Integrity  Goal: Skin integrity is maintained or improved  Description: INTERVENTIONS:  - Identify patients at risk for skin breakdown  - Assess and monitor skin integrity  - Assess and monitor nutrition and hydration status  - Monitor labs   - Assess for incontinence   - Turn and reposition patient  - Assist with mobility/ambulation  - Relieve pressure over bony prominences  - Avoid friction and shearing  - Provide appropriate hygiene as needed including keeping skin clean and dry  - Evaluate need for skin moisturizer/barrier cream  - Collaborate with interdisciplinary team   - Patient/family teaching  - Consider wound care consult   Outcome: Progressing     Problem: METABOLIC, FLUID AND ELECTROLYTES - ADULT  Goal: Electrolytes maintained within normal limits  Description: INTERVENTIONS:  - Monitor labs and assess patient for signs and symptoms of electrolyte imbalances  - Administer electrolyte replacement as ordered  - Monitor response to electrolyte replacements, including repeat lab results as appropriate  - Instruct patient on fluid and nutrition as  appropriate  Outcome: Progressing  Goal: Fluid balance maintained  Description: INTERVENTIONS:  - Monitor labs   - Monitor I/O and WT  - Instruct patient on fluid and nutrition as appropriate  - Assess for signs & symptoms of volume excess or deficit  Outcome: Progressing  Goal: Glucose maintained within target range  Description: INTERVENTIONS:  - Monitor Blood Glucose as ordered  - Assess for signs and symptoms of hyperglycemia and hypoglycemia  - Administer ordered medications to maintain glucose within target range  - Assess nutritional intake and initiate nutrition service referral as needed  Outcome: Progressing     Problem: NEUROSENSORY - ADULT  Goal: Achieves stable or improved neurological status  Description: INTERVENTIONS  - Monitor and report changes in neurological status  - Monitor vital signs such as temperature, blood pressure, glucose, and any other labs ordered   - Initiate measures to prevent increased intracranial pressure  - Monitor for seizure activity and implement precautions if appropriate      Outcome: Progressing     Problem: GASTROINTESTINAL - ADULT  Goal: Maintains adequate nutritional intake  Description: INTERVENTIONS:  - Monitor percentage of each meal consumed  - Identify factors contributing to decreased intake, treat as appropriate  - Assist with meals as needed  - Monitor I&O, weight, and lab values if indicated  - Obtain nutrition services referral as needed  Outcome: Progressing

## 2024-02-25 NOTE — PROGRESS NOTES
[facility]  Progress Note  Name: Denita Vital I  MRN: [id number]  Unit/Bed#: [facility] Date of Admission: 2/23/2024   Date of Service: 2/25/2024 I Hospital Day: 2    Assessment/Plan   Hyponatremia  Assessment & Plan  Resolved with IV fluids    Lower extremity edema  Assessment & Plan  Will restart torsemide in 24 to 48 hours    Cystitis without hematuria  Assessment & Plan  UA is positive for E. coli resistant to Rocephin and Ancef sensitive to ceftazidime will place him 500 mg IV twice daily on discharge seizures are controlled most likely will discharge on fluoroquinolone    Acute metabolic encephalopathy  Assessment & Plan  Odd behavior could be secondary to acute cystitis.  Otherwise she is nonverbal.  And also aspiration pneumonia pneumonitis.  CT of the brain is negative for acute findings  Improved looks like she is at her baseline she is eating and drinking no issues and taking medications.    Dysphagia  Assessment & Plan  She has not been swallowing her food today apparently spitting it out ER discussed with GI they will plan to scope her on Monday to evaluate for any food bolus there but the CT is negative for any apparent food apparently did better with her swallow study yesterday with the nurses.  She is not in any distress or drooling will place her back on her usual puréed with thin n.p.o. at midnight Monday for EGD and have speech see her tomorrow    Seizure disorder (HCC)  Assessment & Plan  On Depakote will convert to Depacon IV     Type 2 diabetes mellitus with hypoglycemia, without long-term current use of insulin (HCC)  Assessment & Plan  Lab Results   Component Value Date    HGBA1C 5.4 08/24/2023       Recent Labs     02/24/24 2012 02/24/24  2347 02/25/24  0434 02/25/24  0925   POCGLU 177* 202* 154* 196*         Blood Sugar Average: Last 72 hrs:  (P) 132.8976633888114683  Hypoglycemia has resolved that she is eating fluids were discontinued we will place a  sliding scale    Mood disorder (HCC)  Assessment & Plan  Continue her psychiatric medications except Ativan IV to ensure she gets it    Aspiration pneumonia ([facility])  Assessment & Plan  Vs pneumonitis place abx  Do vest therapy as she wont be able to do flutter or incentive spirometry   Aggressive pulmonary toileting   Speech evaluated continue purée and thin she is eating she is on room air will have n.p.o. after midnight for possible EGD with gastroenterology tomorrow                 VTE Pharmacologic Prophylaxis: VTE Score: 3 Moderate Risk (Score 3-4) - Pharmacological DVT Prophylaxis Ordered: heparin.    Mobility:   Basic Mobility Inpatient Raw Score: 8  -M Goal: 3: Sit at edge of bed  JH-HLM Achieved: 2: Bed activities/Dependent transfer  HLM Goal achieved. Continue to encourage appropriate mobility.    Patient Centered Rounds: I performed bedside rounds with nursing staff today.   Discussions with Specialists or Other Care Team Provider: none    Education and Discussions with Family / Patient: pt    Total Time Spent on Date of Encounter in care of patient: >35 mins. This time was spent on one or more of the following: performing physical exam; counseling and coordination of care; obtaining or reviewing history; documenting in the medical record; reviewing/ordering tests, medications or procedures; communicating with other healthcare professionals and discussing with patient's family/caregivers.    Current Length of Stay: 2 day(s)  Current Patient Status: Inpatient   Certification Statement: The patient will continue to require additional inpatient hospital stay due to egd  Discharge Plan: Anticipate discharge in 24-48 hrs to home with home services.    Code Status: Level 1 - Full Code    Subjective:   Seen and examined nonverbal discussed with nursing eating    Objective:     Vitals:   Temp (24hrs), Av.9 °F (36.6 °C), Min:97.9 °F (36.6 °C), Max:97.9 °F (36.6 °C)    Temp:  [97.9 °F (36.6 °C)] 97.9 °F (36.6  °C)  HR:  [76-97] 97  Resp:  [16] 16  BP: (100-105)/(59-63) 105/63  SpO2:  [92 %-93 %] 92 %  Body mass index is 29.02 kg/m².     Input and Output Summary (last 24 hours):     Intake/Output Summary (Last 24 hours) at 2/25/2024 1422  Last data filed at 2/25/2024 1239  Gross per 24 hour   Intake 1212 ml   Output 415 ml   Net 797 ml       Physical Exam:   Physical Exam  Vitals and nursing note reviewed.   Constitutional:       General: She is not in acute distress.     Appearance: She is well-developed.   HENT:      Head: Normocephalic and atraumatic.   Eyes:      Conjunctiva/sclera: Conjunctivae normal.   Cardiovascular:      Rate and Rhythm: Normal rate and regular rhythm.      Heart sounds: No murmur heard.  Pulmonary:      Effort: Pulmonary effort is normal. No respiratory distress.      Breath sounds: Rales (bibasilar) present. No wheezing.   Abdominal:      Palpations: Abdomen is soft.      Tenderness: There is no abdominal tenderness.   Musculoskeletal:         General: No swelling.      Cervical back: Neck supple.   Skin:     General: Skin is warm and dry.      Capillary Refill: Capillary refill takes less than 2 seconds.   Neurological:      Mental Status: She is alert. Mental status is at baseline.   Psychiatric:         Mood and Affect: Mood normal.          Additional Data:     Labs:  Results from last 7 days   Lab Units 02/25/24  0442   WBC Thousand/uL 7.45   HEMOGLOBIN g/dL 10.2*   HEMATOCRIT % 32.6*   PLATELETS Thousands/uL 154   NEUTROS PCT % 85*   LYMPHS PCT % 9*   MONOS PCT % 6   EOS PCT % 0     Results from last 7 days   Lab Units 02/25/24  0442 02/23/24  1705 02/23/24  1233   SODIUM mmol/L 137   < > 128*   POTASSIUM mmol/L 3.8   < > 4.4   CHLORIDE mmol/L 102   < > 91*   CO2 mmol/L 30   < > 35*   BUN mg/dL 13   < > 16   CREATININE mg/dL 0.46*   < > 0.61   ANION GAP mmol/L 5   < > 2   CALCIUM mg/dL 10.1   < > 9.8   ALBUMIN g/dL  --   --  3.7   TOTAL BILIRUBIN mg/dL  --   --  0.26   ALK PHOS U/L  --    --  79   ALT U/L  --   --  37   AST U/L  --   --  29   GLUCOSE RANDOM mg/dL 154*   < > 63*    < > = values in this interval not displayed.     Results from last 7 days   Lab Units 02/23/24  1046   INR  0.98     Results from last 7 days   Lab Units 02/25/24  0925 02/25/24  0434 02/24/24  2347 02/24/24  2012 02/24/24  1549 02/24/24  1209 02/24/24  0754 02/24/24  0632 02/23/24  2359 02/23/24  2114 02/23/24  1911 02/23/24  1728   POC GLUCOSE mg/dl 196* 154* 202* 177* 199* 118 82 81 114 110 124 103         Results from last 7 days   Lab Units 02/24/24  0620 02/23/24  1233 02/23/24  1046   LACTIC ACID mmol/L  --   --  1.2   PROCALCITONIN ng/ml <0.05 <0.05 <0.05       Lines/Drains:  Invasive Devices       Peripheral Intravenous Line  Duration             Peripheral IV 02/23/24 Right Wrist 2 days                          Imaging: Reviewed radiology reports from this admission including: chest CT scan    Recent Cultures (last 7 days):   Results from last 7 days   Lab Units 02/23/24  1431 02/23/24  1051   BLOOD CULTURE   --  Received in Microbiology Lab. Culture in Progress.  Received in Microbiology Lab. Culture in Progress.   URINE CULTURE  >100,000 cfu/ml Escherichia coli*  --        Last 24 Hours Medication List:   Current Facility-Administered Medications   Medication Dose Route Frequency Provider Last Rate    acetaminophen  650 mg Oral Q6H PRN Demetria Sol MD      aspirin  81 mg Oral Daily Demetria Sol MD      atorvastatin  80 mg Oral Daily With Dinner Demetria Sol MD      cefTAZidime  500 mg Intravenous Q12H Demetria Sol MD      docusate sodium  100 mg Oral BID Demetria Sol MD      gabapentin  800 mg Oral TID Demetria Sol MD      heparin (porcine)  5,000 Units Subcutaneous Q8H RONNELL Demetria Sol MD      insulin lispro  1-5 Units Subcutaneous TID AC Demetria Sol MD      insulin lispro  1-5 Units Subcutaneous HS Demetria Sol MD      levothyroxine  50 mcg Oral Early Morning Demetria  MD Ebenezer      LORazepam  0.5 mg Intravenous BID Demetria Sol MD      ondansetron  4 mg Intravenous Q6H PRN Demetria Sol MD      oxybutynin  10 mg Oral Daily Demetria Sol MD      pantoprazole  40 mg Intravenous Q24H RONNELL Demetria Sol MD      QUEtiapine  100 mg Oral BID Demetria Sol MD      QUEtiapine  200 mg Oral HS Demetria Sol MD      senna  8.6 mg Oral HS Demetria Sol MD      valproate sodium  250 mg Intravenous QAM Demetria Sol  mg (02/25/24 1015)    valproate sodium  500 mg Intravenous QPM Demetria Sol  mg (02/24/24 0902)        Today, Patient Was Seen By: Demetria Sol MD    **Please Note: This note may have been constructed using a voice recognition system.**

## 2024-02-25 NOTE — ASSESSMENT & PLAN NOTE
Odd behavior could be secondary to acute cystitis.  Otherwise she is nonverbal.  And also aspiration pneumonia pneumonitis.  CT of the brain is negative for acute findings  Improved looks like she is at her baseline she is eating and drinking no issues and taking medications.

## 2024-02-25 NOTE — ASSESSMENT & PLAN NOTE
UA is positive for E. coli resistant to Rocephin and Ancef sensitive to ceftazidime will place him 500 mg IV twice daily on discharge seizures are controlled most likely will discharge on fluoroquinolone

## 2024-02-25 NOTE — SPEECH THERAPY NOTE
Speech Language/Pathology  Speech-Language Pathology Bedside Swallow Evaluation      Patient Name: Denita Vital    Today's Date: 2/25/2024    Summary   Consult received for bedside swallow assessment. Pt admitted w/ hyponatremia, LE edema, cystitis and acute metabolic encephalopathy. Baseline diet is puree/thins, pt noted to be spitting food out/pocketing at group home. Pt known to SLP from previous admissions. RN reported pt did well w/ breakfast and pills.  Seen w/ trials of puree (pudding which is a preferred food) and thins via straw. Pt demonstrated adequate bolus acceptance and clearance, no oral residual noted. Delayed cough x1 on successive large sips of thins via straw, pt benefited from slower rate/bolus size.   Suspect pocketing/expelling of food largely d/t behavior/preference of food. Dysphagia appears baseline at this time    Rec to continue puree/thins  Meds crushed in puree as able  Full feeding assistance  SLP will s/o; please reconsult as needed    Risk/s for Aspiration: Low     Recommended Diet: puree/level 1 diet and thin liquids   Recommended Form of Meds: crushed with puree   Aspiration precautions and swallowing strategies: upright posture, only feed when fully alert, and slow rate of feeding  Other Recommendations: Continue frequent oral care        Current Medical Status  Denita Vital is a 68 y.o. female with a PMH of seizure disorder who presents with spitting out her food and not chewing it.  She has also been compliant with a puréed diet there is.  The caregiver.  She persistently today has been grabbing at things and trying to put them in her mouth and eat she did not swallow the food patient is not drooling no nausea no vomiting patient does not wear dentures and apparently this is an odd behavior.        Current Precautions:  Fall  Aspiration     Allergies:  No known food allergies    Past medical history:  Please see H&P for details    Special Studies:  CT Chest:  Study  degraded by respiratory motion.  1.  Scattered groundglass opacities throughout the lung fields, nonspecific. This could represent pulmonary edema or an atypical pneumonia. Clinical correlation recommended.  2.  Dense consolidation layering along the right major fissure, progressed from 2021 and likely atelectasis though of uncertain etiology.  3.  Unchanged 4 cm ectatic ascending aorta. Continued annual CT surveillance recommended.  4.  4 mm right lower lobe lung nodule, not present previously. This can also be reassessed at the time of follow-up.  5.  Enlarged pulmonary artery which can be seen with pulmonary hypertension.  6.  Circumferential bladder wall thickening and irregularity which could represent acute cystitis. Correlation with urinalysis recommended.  7.  Subcentimeter pancreatic cyst. For simple cyst(s) less than 1.5 cm, recommend follow-up every 2 years for 5 times or to age 80, whichever comes first. Follow-up can stop at age 80 or can switch over to 80 year or older algorithm. Recommend next   follow-up in 2 years.   Preferred imaging modality: abdomen MRI and MRCP with and without IV contrast, or triple phase abdomen CT with IV contrast, or abdomen MRI and MRCP without IV contrast.    CT Head:  No acute intracranial abnormality.  Stable chronic microangiopathic changes within the brain.  Redemonstration of imaging features which are highly suspicious for normal pressure hydrocephalus.    Social/Education/Vocational Hx:  Pt lives in group home    Swallow Information   Current Risks for Dysphagia & Aspiration: known history of dysphagia  Current Symptoms/Concerns: pocketing food  Current Diet: puree/level 1 diet and thin liquids   Baseline Diet: puree/level 1 diet and thin liquids      Baseline Assessment   Behavior/Cognition: alert  Speech/Language Status: not able to to follow commands  Patient Positioning: upright in bed  Pain Status/Interventions/Response to Interventions:   No report of or  nonverbal indications of pain.       Swallow Mechanism Exam  Facial: symmetrical  Labial: unable to test 2/2 limited command following  Lingual: unable to test 2/2 limited command following  Velum: unable to visualize  Mandible: adequate ROM  Dentition: edentulous  Vocal quality:clear/adequate   Volitional Cough: strong/productive   Respiratory Status: on RA         Consistencies Assessed and Performance   Consistencies Administered: thin liquids and puree    Oral Stage: moderate   Pt demonstrated adequate bolus acceptance and clearance, no oral residual noted. Suspect pocketing/expelling of food largely d/t behavior/preference of food. Dysphagia appears baseline at this time    Pharyngeal Stage:  suspect WFL  Delayed cough x1 on successive large sips of thins via straw, pt benefited from slower rate/bolus size.    Esophageal Concerns: none reported    Summary and Recommendations (see above)    Results Reviewed with: patient and RN     Treatment Recommended: None at this time    Keily Washington MS CCC-SLP  2/25/2024

## 2024-02-25 NOTE — ASSESSMENT & PLAN NOTE
Lab Results   Component Value Date    HGBA1C 5.4 08/24/2023       Recent Labs     02/24/24 2012 02/24/24  2347 02/25/24  0434 02/25/24  0925   POCGLU 177* 202* 154* 196*         Blood Sugar Average: Last 72 hrs:  (P) 132.8386113947165061  Hypoglycemia has resolved that she is eating fluids were discontinued we will place a sliding scale

## 2024-02-25 NOTE — ASSESSMENT & PLAN NOTE
Vs pneumonitis place abx  Do vest therapy as she wont be able to do flutter or incentive spirometry   Aggressive pulmonary toileting   Speech evaluated continue purée and thin she is eating she is on room air will have n.p.o. after midnight for possible EGD with gastroenterology tomorrow

## 2024-02-26 VITALS
OXYGEN SATURATION: 95 % | SYSTOLIC BLOOD PRESSURE: 98 MMHG | RESPIRATION RATE: 18 BRPM | DIASTOLIC BLOOD PRESSURE: 70 MMHG | TEMPERATURE: 97.5 F | HEART RATE: 74 BPM | HEIGHT: 60 IN | WEIGHT: 148.59 LBS | BODY MASS INDEX: 29.17 KG/M2

## 2024-02-26 LAB
GLUCOSE SERPL-MCNC: 117 MG/DL (ref 65–140)
GLUCOSE SERPL-MCNC: 130 MG/DL (ref 65–140)

## 2024-02-26 PROCEDURE — 82948 REAGENT STRIP/BLOOD GLUCOSE: CPT

## 2024-02-26 PROCEDURE — C9113 INJ PANTOPRAZOLE SODIUM, VIA: HCPCS | Performed by: FAMILY MEDICINE

## 2024-02-26 PROCEDURE — 99239 HOSP IP/OBS DSCHRG MGMT >30: CPT | Performed by: FAMILY MEDICINE

## 2024-02-26 RX ORDER — SULFAMETHOXAZOLE AND TRIMETHOPRIM 800; 160 MG/1; MG/1
1 TABLET ORAL EVERY 12 HOURS SCHEDULED
Qty: 6 TABLET | Refills: 0 | Status: SHIPPED | OUTPATIENT
Start: 2024-02-26 | End: 2024-02-29

## 2024-02-26 RX ORDER — DEXTROSE AND SODIUM CHLORIDE 5; .9 G/100ML; G/100ML
75 INJECTION, SOLUTION INTRAVENOUS CONTINUOUS
Status: DISCONTINUED | OUTPATIENT
Start: 2024-02-26 | End: 2024-02-26

## 2024-02-26 RX ADMIN — OXYBUTYNIN CHLORIDE 10 MG: 5 TABLET, EXTENDED RELEASE ORAL at 10:14

## 2024-02-26 RX ADMIN — ASPIRIN 81 MG: 81 TABLET, COATED ORAL at 10:15

## 2024-02-26 RX ADMIN — LEVOTHYROXINE SODIUM 50 MCG: 50 TABLET ORAL at 05:36

## 2024-02-26 RX ADMIN — DOCUSATE SODIUM 100 MG: 100 CAPSULE, LIQUID FILLED ORAL at 10:16

## 2024-02-26 RX ADMIN — PANTOPRAZOLE SODIUM 40 MG: 40 INJECTION, POWDER, FOR SOLUTION INTRAVENOUS at 08:21

## 2024-02-26 RX ADMIN — CEFTAZIDIME 1000 MG: 1 INJECTION, POWDER, FOR SOLUTION INTRAMUSCULAR; INTRAVENOUS at 08:28

## 2024-02-26 RX ADMIN — GABAPENTIN 800 MG: 400 CAPSULE ORAL at 10:16

## 2024-02-26 RX ADMIN — LORAZEPAM 0.5 MG: 2 INJECTION INTRAMUSCULAR; INTRAVENOUS at 10:14

## 2024-02-26 RX ADMIN — HEPARIN SODIUM 5000 UNITS: 5000 INJECTION INTRAVENOUS; SUBCUTANEOUS at 08:14

## 2024-02-26 RX ADMIN — VALPROATE SODIUM 250 MG: 100 INJECTION, SOLUTION INTRAVENOUS at 10:15

## 2024-02-26 RX ADMIN — QUETIAPINE FUMARATE 100 MG: 100 TABLET ORAL at 10:15

## 2024-02-26 RX ADMIN — DEXTROSE AND SODIUM CHLORIDE 75 ML/HR: 5; .9 INJECTION, SOLUTION INTRAVENOUS at 08:23

## 2024-02-26 NOTE — ASSESSMENT & PLAN NOTE
UA is positive for E. coli resistant to Rocephin and Ancef sensitive to ceftazidime and dc on bactrim ds x3 days as uncomplicated

## 2024-02-26 NOTE — PROGRESS NOTES
Patient:    MRN:  [id number]    Camilo Request ID:  [id number]    Level of care reserved:  Home Health Agency    Partner Reserved:  [facility] [place] [address] [id number]    Clinical needs requested:    Geography searched:  [phone]    Start of Service:    Request sent:  3:13pm EST on 2/24/2024 by Nery Hernandez    Partner reserved:  3:22pm EST on 2/24/2024 by Nery Hernandez    Choice list shared:

## 2024-02-26 NOTE — ASSESSMENT & PLAN NOTE
Lab Results   Component Value Date    HGBA1C 5.4 08/24/2023       Recent Labs     02/25/24  0925 02/25/24  1659 02/25/24  2105 02/26/24  0711   POCGLU 196* 180* 195* 130         Blood Sugar Average: Last 72 hrs:  (P) 138.875  Hypoglycemia has resolved eating and drinking may resume outpatient orals

## 2024-02-26 NOTE — CASE MANAGEMENT
Case Management Discharge Planning Note    Patient name Denita Vital  Location /-01 MRN [id number]  : 1955 Date 2024       Current Admission Date: 2024  Current Admission Diagnosis:Acute metabolic encephalopathy   Patient Active Problem List    Diagnosis Date Noted    Hypoglycemia 2023    Hyponatremia 2022    Open nondisplaced fracture of distal phalanx of left middle finger 2022    Avulsion of fingernail 2022    Acute urinary retention 2022    Lower extremity edema 2022    Acute metabolic encephalopathy 2022    Cystitis without hematuria 2022    Dysphagia 2021    Chronic anemia 2021    Chronically elevated right hemidiaphragm 2021    Obesity (BMI 30.0-34.9) 2021    Seizure disorder (HCC) 2021    Hyperlipidemia 2021    Abnormal x-ray 2021    Acute respiratory failure with hypoxia (HCC) 2021    Aspiration pneumonitis (HCC) 2021    Mood disorder (HCC) 2021    Type 2 diabetes mellitus with hypoglycemia, without long-term current use of insulin (HCC) 2020    Hypertension 2019    Gastroesophageal reflux disease without esophagitis 2018    Ambulatory dysfunction 2017    Intellectual disability 2017      LOS (days): 3  Geometric Mean LOS (GMLOS) (days): 5  Days to GMLOS:2.2     OBJECTIVE:  Risk of Unplanned Readmission Score: 20.64         Current admission status: Inpatient   Preferred Pharmacy:   [facility] [place] - [address] [place], [place] [address]  Phone: [phone] Fax: [phone]    [facility]pharmacy #[id number] - [place], [place] - [address] [address]  Phone: [phone] Fax: [phone]    Primary Care Provider: Soy Clemente MD    Primary Insurance: MEDICARE  Secondary Insurance: [place] MEDICAL [employer]    DISCHARGE DETAILS:    Discharge planning discussed with::  [facility]        CM contacted family/caregiver?: Yes     Did patient/caregiver verbalize understanding of patient care needs?: Yes  Were patient/caregiver advised of the risks associated with not following Treatment Team discharge recommendations?: Yes    Contacts  Patient Contacts: Irais care taker  Relationship to Patient:: Other (Comment)  Contact Method: Phone  Phone Number: [phone]  Reason/Outcome: Discharge Planning    Requested Home Health Care         Is the patient interested in HHC at discharge?: Yes    Treatment Team Recommendation: Group Home  Discharge Destination Plan:: Group Home (Facility)  Transport at Discharge : Wheelchair van (Facility to )       IMM Given (Date):: 02/26/24  IMM Given to:: Other  Family notified:: [facility] called, IMM reviewed with Adia Ba  notified of todays dc.  Contact information on AVS.    call to Adia lester, who confirms they are able to accept patient back today and will be sending van to pick her up at approximately 12:15 this afternoon.

## 2024-02-26 NOTE — PLAN OF CARE
Problem: Potential for Falls  Goal: Patient will remain free of falls  Description: INTERVENTIONS:  - Educate patient/family on patient safety including physical limitations  - Instruct patient to call for assistance with activity   - Consult OT/PT to assist with strengthening/mobility   - Keep Call bell within reach  - Keep bed low and locked with side rails adjusted as appropriate  - Keep care items and personal belongings within reach  - Initiate and maintain comfort rounds  - Make Fall Risk Sign visible to staff  - Offer Toileting every 2 Hours, in advance of need  - Initiate/Maintain bed and chair alarm  - Obtain necessary fall risk management equipment: walker and wheelchair   - Apply yellow socks and bracelet for high fall risk patients  - Consider moving patient to room near nurses station  Outcome: Progressing     Problem: Prexisting or High Potential for Compromised Skin Integrity  Goal: Skin integrity is maintained or improved  Description: INTERVENTIONS:  - Identify patients at risk for skin breakdown  - Assess and monitor skin integrity  - Assess and monitor nutrition and hydration status  - Monitor labs   - Assess for incontinence   - Turn and reposition patient  - Assist with mobility/ambulation  - Relieve pressure over bony prominences  - Avoid friction and shearing  - Provide appropriate hygiene as needed including keeping skin clean and dry  - Evaluate need for skin moisturizer/barrier cream  - Collaborate with interdisciplinary team   - Patient/family teaching  - Consider wound care consult   Outcome: Progressing     Problem: METABOLIC, FLUID AND ELECTROLYTES - ADULT  Goal: Electrolytes maintained within normal limits  Description: INTERVENTIONS:  - Monitor labs and assess patient for signs and symptoms of electrolyte imbalances  - Administer electrolyte replacement as ordered  - Monitor response to electrolyte replacements, including repeat lab results as appropriate  - Instruct patient on  fluid and nutrition as appropriate  Outcome: Progressing  Goal: Fluid balance maintained  Description: INTERVENTIONS:  - Monitor labs   - Monitor I/O and WT  - Instruct patient on fluid and nutrition as appropriate  - Assess for signs & symptoms of volume excess or deficit  Outcome: Progressing  Goal: Glucose maintained within target range  Description: INTERVENTIONS:  - Monitor Blood Glucose as ordered  - Assess for signs and symptoms of hyperglycemia and hypoglycemia  - Administer ordered medications to maintain glucose within target range  - Assess nutritional intake and initiate nutrition service referral as needed  Outcome: Progressing     Problem: NEUROSENSORY - ADULT  Goal: Achieves stable or improved neurological status  Description: INTERVENTIONS  - Monitor and report changes in neurological status  - Monitor vital signs such as temperature, blood pressure, glucose, and any other labs ordered   - Initiate measures to prevent increased intracranial pressure  - Monitor for seizure activity and implement precautions if appropriate      Outcome: Progressing     Problem: GASTROINTESTINAL - ADULT  Goal: Maintains adequate nutritional intake  Description: INTERVENTIONS:  - Monitor percentage of each meal consumed  - Identify factors contributing to decreased intake, treat as appropriate  - Assist with meals as needed  - Monitor I&O, weight, and lab values if indicated  - Obtain nutrition services referral as needed  Outcome: Progressing

## 2024-02-26 NOTE — CONSULTS
Consultation - [facility]  Denita Vital 68 y.o. female MRN: [id number]  Unit/Bed#: -01 Encounter: [id number]      ASSESSMENT & PLAN    Dysphagia   Poor PO intake   Presented initially with concerns for possible food bolus  Per nursing, patient eating and drinking without any issue yesterday  No plans for procedures at this time  Continue PPI  Diet per speech  Recommend outpatient follow up       Reason for Consult / Principal Problem: dysphagia    HPI: Denita Vital is a 68 y.o. year old female with a PMHx type 2 DM, CHF, anxiety, GERD, HLD, HTN, mental disability, non verbal, seizure disorder who presented with AMS.     Patient brought in by caregiver who stated patient was refusing PO intake and spitting out food and drinks which is abnormal behavior for her. She brought her in for evaluation. In the ED, labs with multiple electrolyte abnormalities, Hgb 10.7, no leukocytosis. CT CAP w/ IV contrast with pulmonary edema vs atypical pneumonia, evidence of acute cystitis and subcentimeter pancreatic cyst. CT head unremarkable. She was admitted with aspiration pneumonia, acute metabolic encephalopathy and cystitis.     Patient evaluated by SLP and underwent bedside swallow eval with suspected pocketing/expelling food due to behavior and preference of food, dysphagia appears at baseline.     No prior EGD found in chart.    Patient seen at bedside, non verbal. Resting comfortably in bed. Spoke with nursing staff who states she ate and drank everything yesterday except for apple sauce which she does not like.       Past Medical History:   Diagnosis Date    Anxiety     CHF (congestive heart failure) (HCC)     Diabetes mellitus (HCC)     GERD (gastroesophageal reflux disease)     Hyperlipidemia     Hypertension     Mental disability     Mixed incontinence 04/12/2017    Seizure disorder (HCC)      History reviewed. No pertinent surgical history.  Social History   Social History      Substance and Sexual Activity   Alcohol Use Not Currently     Social History     Substance and Sexual Activity   Drug Use Not Currently     Social History     Tobacco Use   Smoking Status Never   Smokeless Tobacco Never       Family History   Problem Relation Age of Onset    No Known Problems Mother     No Known Problems Father     No Known Problems Sister     No Known Problems Maternal Grandmother     No Known Problems Maternal Grandfather     No Known Problems Paternal Grandmother     No Known Problems Paternal Grandfather        Medications Prior to Admission   Medication    aspirin (ECOTRIN LOW STRENGTH) 81 mg EC tablet    bisacodyl (DULCOLAX) 5 mg EC tablet    cholecalciferol (VITAMIN D3) 250 MCG (69661 UT) capsule    Cranberry 450 MG CAPS    Diapers & Supplies MISC    divalproex sodium (DEPAKOTE) 250 mg EC tablet    divalproex sodium (DEPAKOTE) 500 mg EC tablet    docusate sodium (COLACE) 100 mg capsule    gabapentin (NEURONTIN) 800 mg tablet    Incontinence Supply Disposable (FQ Pant Liner) MISC    Lancets 33G MISC    levothyroxine 50 mcg tablet    LORazepam (ATIVAN) 0.5 mg tablet    omeprazole (PriLOSEC) 20 mg delayed release capsule    polyethylene glycol (MIRALAX) 17 g packet    QUEtiapine (SEROquel) 100 mg tablet    QUEtiapine (SEROquel) 200 mg tablet    rosuvastatin (CRESTOR) 40 MG tablet    senna (SENOKOT) 8.6 mg    tolterodine (DETROL LA) 4 mg 24 hr capsule    torsemide (DEMADEX) 5 MG tablet    memantine (NAMENDA) 5 mg tablet    nystatin (MYCOSTATIN) powder    sertraline (ZOLOFT) 100 mg tablet     Current Facility-Administered Medications   Medication Dose Route Frequency    acetaminophen (TYLENOL) tablet 650 mg  650 mg Oral Q6H PRN    aspirin (ECOTRIN LOW STRENGTH) EC tablet 81 mg  81 mg Oral Daily    atorvastatin (LIPITOR) tablet 80 mg  80 mg Oral Daily With Dinner    cefTAZidime (FORTAZ) 1,000 mg in sodium chloride 0.9 % 50 mL IVPB  1,000 mg Intravenous Q8H    dextrose 5 % and sodium chloride  0.9 % infusion  75 mL/hr Intravenous Continuous    docusate sodium (COLACE) capsule 100 mg  100 mg Oral BID    gabapentin (NEURONTIN) capsule 800 mg  800 mg Oral TID    heparin (porcine) subcutaneous injection 5,000 Units  5,000 Units Subcutaneous Q8H RONNELL    insulin lispro (HumaLOG) 100 units/mL subcutaneous injection 1-5 Units  1-5 Units Subcutaneous TID AC    insulin lispro (HumaLOG) 100 units/mL subcutaneous injection 1-5 Units  1-5 Units Subcutaneous HS    levothyroxine tablet 50 mcg  50 mcg Oral Early Morning    LORazepam (ATIVAN) injection 0.5 mg  0.5 mg Intravenous BID    ondansetron (ZOFRAN) injection 4 mg  4 mg Intravenous Q6H PRN    oxybutynin (DITROPAN-XL) 24 hr tablet 10 mg  10 mg Oral Daily    pantoprazole (PROTONIX) injection 40 mg  40 mg Intravenous Q24H RONNELL    QUEtiapine (SEROquel) tablet 100 mg  100 mg Oral BID    QUEtiapine (SEROquel) tablet 200 mg  200 mg Oral HS    senna (SENOKOT) tablet 8.6 mg  8.6 mg Oral HS    valproate (DEPACON) 250 mg in sodium chloride 0.9 % 50 mL IVPB  250 mg Intravenous QAM    valproate (DEPACON) 500 mg in sodium chloride 0.9 % 50 mL IVPB  500 mg Intravenous QPM     Allergies   Allergen Reactions    Actos [Pioglitazone] Other (See Comments)     unkown        Physical Exam  Constitutional:       General: She is not in acute distress.     Appearance: She is not ill-appearing.   HENT:      Head: Normocephalic and atraumatic.      Mouth/Throat:      Mouth: Mucous membranes are moist.   Pulmonary:      Effort: Pulmonary effort is normal. No respiratory distress.   Abdominal:      General: Abdomen is flat. There is no distension.      Palpations: Abdomen is soft.      Tenderness: There is no guarding.   Skin:     General: Skin is warm and dry.   Neurological:      Mental Status: She is alert.       Most Recent Vital Signs:  Vitals:    02/24/24 2233 02/25/24 1536 02/25/24 2254 02/26/24 0712   BP: 105/63 129/60 132/54 98/70   BP Location:    Right arm   Pulse: 97 70 73 74   Resp:   19 19 18   Temp:  (!) 97.2 °F (36.2 °C) 98.1 °F (36.7 °C) 97.5 °F (36.4 °C)   TempSrc:    Temporal   SpO2: 92% 94% 94% 92%   Weight:       Height:           Intake/Output Summary (Last 24 hours) at 2/26/2024 0831  Last data filed at 2/25/2024 1239  Gross per 24 hour   Intake 462 ml   Output --   Net 462 ml       LABS/IMAGING  Lab Results: I have reviewed all relevant lab results during this hospitalization.    Imaging Studies:  I have reviewed all the relevant images during this hospitalizations    Counseling / Coordination of Care  Total time spent today 45 minutes. Greater than 50% of total time was spent with the patient and / or family counseling and / or coordination of care.    Rhiannon Wise PA-C

## 2024-02-26 NOTE — DISCHARGE SUMMARY
[facility]  Discharge- Denita Vital 1955, 68 y.o. female MRN: [id number]  Unit/Bed#: [facility] Encounter: [id number]  Primary Care Provider: Soy Clemente MD   Date and time admitted to hospital: 2/23/2024 10:11 AM    Hyponatremia  Assessment & Plan  Resolved with IV fluids- resume outpatient torsemide but will decrease to 5mg po daily     Lower extremity edema  Assessment & Plan  None evident may resume torsemide but will decrease to 5mg po daily     Cystitis without hematuria  Assessment & Plan  UA is positive for E. coli resistant to Rocephin and Ancef sensitive to ceftazidime and dc on bactrim ds x3 days as uncomplicated     Acute metabolic encephalopathy  Assessment & Plan  Odd behavior could be secondary to acute cystitis.  Otherwise she is nonverbal.  And also aspiration pneumonia pneumonitis.  CT of the brain is negative for acute findings  Improved looks like she is at her baseline she is eating and drinking no issues and taking medications.    Dysphagia  Assessment & Plan  No issues with her diet puree and thin - discussed with gi no need for egd     Seizure disorder (HCC)  Assessment & Plan  Continue depakote     Type 2 diabetes mellitus with hypoglycemia, without long-term current use of insulin ([facility])  Assessment & Plan  Lab Results   Component Value Date    HGBA1C 5.4 08/24/2023       Recent Labs     02/25/24  0925 02/25/24  1659 02/25/24  2105 02/26/24  0711   POCGLU 196* 180* 195* 130         Blood Sugar Average: Last 72 hrs:  (P) 138.875  Hypoglycemia has resolved eating and drinking may resume outpatient orals    Mood disorder (HCC)  Assessment & Plan  Continue same outpatient meds     Aspiration pneumonitis (HCC)  Assessment & Plan  Vs pneumonitis place abx  Do vest therapy as she wont be able to do flutter or incentive spirometry   Aggressive pulmonary toileting   Speech evaluated continue purée and thin she is eating she is on room air discussed with gi  no need for egd  2 neg procalcitonin suspect more aspiration pneumonitis and no need for abx for this reason and anyhow received total 5 days of cephalosporin in hospital           Medical Problems       Resolved Problems  Date Reviewed: 2/26/2024   None       Discharging Physician / Practitioner: Demetria Sol MD  PCP: Soy Clemente MD  Admission Date:   Admission Orders (From admission, onward)       Ordered        02/23/24 1514  INPATIENT ADMISSION  Once                          Discharge Date: 02/26/24    Consultations During Hospital Stay:  Gi     Procedures Performed:   none    Significant Findings / Test Results:   Ct chest -    1.  Scattered groundglass opacities throughout the lung fields, nonspecific. This could represent pulmonary edema or an atypical pneumonia. Clinical correlation recommended.     2.  Dense consolidation layering along the right major fissure, progressed from 2021 and likely atelectasis though of uncertain etiology.     3.  Unchanged 4 cm ectatic ascending aorta. Continued annual CT surveillance recommended.  Ct head - No acute intracranial abnormality.  Stable chronic microangiopathic changes within the brain.     Redemonstration of imaging features which are highly suspicious for normal pressure hydrocephalus.  Incidental Findings:   See above to be followed by pcp     Test Results Pending at Discharge (will require follow up):   none     Outpatient Tests Requested:  none    Complications:  none    Reason for Admission: encephalopathy     Hospital Course:   Denita Vital is a 68 y.o. female patient who originally presented to the hospital on 2/23/2024 due to encephalopathy with bizzare behavior spitting her food out - found to have aspiration pneumonitis as 2 procal negative and acute cystitis and hypoglycemia. Behaviorals resolved she is eating well without issues- gi evaluated no egd needed started on abx cxompleted 5 days for pna but for e.coli uti that is multi resistant  will be dced on 3 days of bactrim for uncomplicated cystitis . Reduce torsemide to 5 mg po daily to avoid such degree of electrolyte abnormality. Hyponatremia resolved with fluids and hypoglycemia as well when starting eating well.  Cleared for dc         Please see above list of diagnoses and related plan for additional information.     Condition at Discharge: stable    Discharge Day Visit / Exam:   Subjective:  seen and examined eating well   Vitals: Blood Pressure: 98/70 (02/26/24 0712)  Pulse: 74 (02/26/24 0712)  Temperature: 97.5 °F (36.4 °C) (02/26/24 0712)  Temp Source: Temporal (02/26/24 0712)  Respirations: 18 (02/26/24 0712)  Height: 5' (152.4 cm) (02/23/24 1944)  Weight - Scale: 67.4 kg (148 lb 9.4 oz) (02/23/24 1944)  SpO2: 92 % (02/26/24 0712)  Exam:   Physical Exam  Vitals and nursing note reviewed.   Constitutional:       General: She is not in acute distress.     Appearance: She is well-developed.   HENT:      Head: Normocephalic and atraumatic.   Eyes:      Conjunctiva/sclera: Conjunctivae normal.   Cardiovascular:      Rate and Rhythm: Normal rate and regular rhythm.      Heart sounds: No murmur heard.  Pulmonary:      Effort: Pulmonary effort is normal. No respiratory distress.      Breath sounds: Rales (bibasilar rales) present. No wheezing.   Abdominal:      General: There is no distension.      Palpations: Abdomen is soft.      Tenderness: There is no abdominal tenderness.   Musculoskeletal:         General: No swelling.      Cervical back: Neck supple.   Skin:     General: Skin is warm and dry.      Capillary Refill: Capillary refill takes less than 2 seconds.   Neurological:      Mental Status: She is alert. Mental status is at baseline.      Comments: Nonverbal    Psychiatric:         Mood and Affect: Mood normal.          Discussion with Family: cm already informed group home of dc    Discharge instructions/Information to patient and family:   See after visit summary for information  provided to patient and family.      Provisions for Follow-Up Care:  See after visit summary for information related to follow-up care and any pertinent home health orders.      Mobility at time of Discharge:   Basic Mobility Inpatient Raw Score: 8  JH-HLM Goal: 3: Sit at edge of bed  JH-HLM Achieved: 2: Bed activities/Dependent transfer  HLM Goal achieved. Continue to encourage appropriate mobility.     Disposition:   Group home with vna    Planned Readmission: no     Discharge Statement:  I spent >35 minutes discharging the patient. This time was spent on the day of discharge. I had direct contact with the patient on the day of discharge. Greater than 50% of the total time was spent examining patient, answering all patient questions, arranging and discussing plan of care with patient as well as directly providing post-discharge instructions.  Additional time then spent on discharge activities.    Discharge Medications:  See after visit summary for reconciled discharge medications provided to patient and/or family.      **Please Note: This note may have been constructed using a voice recognition system**

## 2024-02-26 NOTE — NURSING NOTE
Patient's IV removed and intact. AVS instructions given to aide from group home, report given to Irais at group home.

## 2024-02-26 NOTE — ASSESSMENT & PLAN NOTE
Vs pneumonitis place abx  Do vest therapy as she wont be able to do flutter or incentive spirometry   Aggressive pulmonary toileting   Speech evaluated continue purée and thin she is eating she is on room air discussed with gi no need for egd  2 neg procalcitonin suspect more aspiration pneumonitis and no need for abx for this reason and anyhow received total 5 days of cephalosporin in hospital

## 2024-02-28 LAB
BACTERIA BLD CULT: NORMAL
BACTERIA BLD CULT: NORMAL

## 2024-05-31 RX ORDER — GLIPIZIDE AND METFORMIN HCL 2.5; 5 MG/1; MG/1
1 TABLET, FILM COATED ORAL
COMMUNITY
End: 2024-06-03 | Stop reason: ALTCHOICE

## 2024-05-31 RX ORDER — FERROUS SULFATE 325(65) MG
325 TABLET ORAL
COMMUNITY
End: 2024-06-03 | Stop reason: ALTCHOICE

## 2024-05-31 RX ORDER — MEMANTINE HYDROCHLORIDE 5 MG/1
5 TABLET ORAL 2 TIMES DAILY
COMMUNITY
Start: 2024-03-19 | End: 2024-06-03 | Stop reason: ALTCHOICE

## 2024-06-02 PROBLEM — N39.0 URINARY TRACT INFECTION WITHOUT HEMATURIA: Status: ACTIVE | Noted: 2024-06-02

## 2024-06-03 ENCOUNTER — TELEPHONE (OUTPATIENT)
Dept: UROLOGY | Facility: CLINIC | Age: 69
End: 2024-06-03

## 2024-06-03 ENCOUNTER — OFFICE VISIT (OUTPATIENT)
Dept: UROLOGY | Facility: CLINIC | Age: 69
End: 2024-06-03
Payer: MEDICARE

## 2024-06-03 VITALS
HEIGHT: 60 IN | SYSTOLIC BLOOD PRESSURE: 130 MMHG | RESPIRATION RATE: 18 BRPM | DIASTOLIC BLOOD PRESSURE: 64 MMHG | BODY MASS INDEX: 29.02 KG/M2 | HEART RATE: 63 BPM | TEMPERATURE: 96.9 F | OXYGEN SATURATION: 95 %

## 2024-06-03 DIAGNOSIS — N39.0 URINARY TRACT INFECTION WITHOUT HEMATURIA, SITE UNSPECIFIED: Primary | ICD-10-CM

## 2024-06-03 PROCEDURE — 99203 OFFICE O/P NEW LOW 30 MIN: CPT | Performed by: UROLOGY

## 2024-06-03 NOTE — PROGRESS NOTES
UROLOGY PROGRESS NOTE         NAME: Denita Vital  AGE: 68 y.o. SEX: female  : 1955   MRN: [id number]    DATE: 6/3/2024  TIME: 2:28 PM    Assessment and Plan      Impression:   1. Urinary tract infection without hematuria, site unspecified       Plan: Patient has a history of recurrent UTIs.  She is nonverbal and has been so her whole life.  She is here with a caretaker.  She was placed on an antibiotic sometime recently for a positive urinalysis however there is no associated culture.  Apparently she has some mental status changes at the time of UTI.  Difficult to characterize.  She has had more than 2 episodes in a 6-month.  A CT scan from earlier this year did not reveal any upper urinary tract issue.  There was some bladder wall thickening.  I recommended a cystoscopy with urine culture from the bladder for further evaluation.  She voids into a diaper.  Postvoid residual unknown.  She is on tolterodine.  Informed consent was obtained via caregiver.  Will need consent from power of       Chief Complaint     Chief Complaint   Patient presents with   • New Patient Visit     Patient here as a new patient for frequent UTI's     History of Present Illness     HPI: Denita Vital is a 68 y.o. year old female who presents with history of UTIs.  Patient is nonverbal she is here with her caregiver.  Chart reviewed.  Medications up-to-date.  Finished an antibiotic quite a while ago.  No present symptoms              The following portions of the patient's history were reviewed and updated as appropriate: allergies, current medications, past family history, past medical history, past social history, past surgical history and problem list.  Past Medical History:   Diagnosis Date   • Anxiety    • CHF (congestive heart failure) (HCC)    • Diabetes mellitus (HCC)    • GERD (gastroesophageal reflux disease)    • Hyperlipidemia    • Hypertension    • Leukopenia    • Mental disability    • Mixed  incontinence 04/12/2017   • Seizure disorder (HCC)      Past Surgical History:   Procedure Laterality Date   • DENTAL SURGERY       shoulder  Review of Systems     Const: Denies chills, fever and weight loss.  CV: Denies chest pain.  Resp: Denies SOB.  GI: Denies abdominal pain, nausea and vomiting.  : Denies symptoms other than stated above.  Musculo: Denies back pain.    Objective   /64   Pulse 63   Temp (!) 96.9 °F (36.1 °C) (Tympanic)   Resp 18   Ht 5' (1.524 m)   SpO2 95%   BMI 29.02 kg/m²     Physical Exam  Const: Appears healthy and well developed. No signs of acute distress present.  Resp: Respirations are regular and unlabored.   CV: Rate is regular. Rhythm is regular.  Abdomen: Abdomen is soft, nontender, and nondistended. Kidneys are not palpable.  : Not performed  Psych: Patient's attitude is cooperative. Mood is normal. Affect is normal.    Procedure   Procedures     Current Medications     Current Outpatient Medications:   •  aspirin (ECOTRIN LOW STRENGTH) 81 mg EC tablet, Take 81 mg by mouth daily, Disp: , Rfl:   •  bisacodyl (DULCOLAX) 5 mg EC tablet, Take 5 mg by mouth daily as needed for constipation, Disp: , Rfl:   •  cholecalciferol (VITAMIN D3) 250 MCG (36484 UT) capsule, Take 5 capsules (50,000 Units total) by mouth once a week On saturday, Disp: , Rfl: 0  •  Cranberry 450 MG CAPS, Take 1 capsule (450 mg total) by mouth in the morning, Disp: , Rfl: 0  •  Diapers & Supplies MISC, Use as directed., Disp: , Rfl:   •  divalproex sodium (DEPAKOTE) 250 mg EC tablet, Take 250 mg by mouth in the morning 1 tab AM (8am), Disp: , Rfl:   •  divalproex sodium (DEPAKOTE) 500 mg EC tablet, Take 500 mg by mouth daily after dinner, Disp: , Rfl:   •  docusate sodium (COLACE) 100 mg capsule, Take 100 mg by mouth in the morning and 100 mg in the evening., Disp: , Rfl:   •  gabapentin (NEURONTIN) 800 mg tablet, Take 800 mg by mouth 3 (three) times a day, Disp: , Rfl:   •  Incontinence Supply  Disposable (FQ Pant Liner) MISC, , Disp: , Rfl:   •  Lancets 33G MISC, daily, Disp: , Rfl:   •  levothyroxine 50 mcg tablet, Take 1 tablet (50 mcg total) by mouth daily in the early morning, Disp: , Rfl: 0  •  LORazepam (ATIVAN) 0.5 mg tablet, Take 0.5 mg by mouth in the morning and 0.5 mg in the evening., Disp: , Rfl:   •  omeprazole (PriLOSEC) 20 mg delayed release capsule, Take 20 mg by mouth daily, Disp: , Rfl:   •  polyethylene glycol (MIRALAX) 17 g packet, Take 17 g by mouth every other day as needed (if no bm in 3 days), Disp: , Rfl: 0  •  QUEtiapine (SEROquel) 100 mg tablet, Take 100 mg by mouth in the morning and 100 mg in the evening. 8am, 4pm., Disp: , Rfl:   •  QUEtiapine (SEROquel) 200 mg tablet, Take 200 mg by mouth daily at bedtime, Disp: , Rfl:   •  rosuvastatin (CRESTOR) 40 MG tablet, Take 40 mg by mouth daily, Disp: , Rfl:   •  senna (SENOKOT) 8.6 mg, Take 1 tablet (8.6 mg total) by mouth daily at bedtime, Disp: , Rfl: 0  •  tolterodine (DETROL LA) 4 mg 24 hr capsule, Take 1 capsule (4 mg total) by mouth daily, Disp: , Rfl: 0  •  torsemide (DEMADEX) 5 MG tablet, Take 1 tablet (5 mg total) by mouth daily, Disp: 30 tablet, Rfl: 0        Andreea Valadez MD

## 2024-06-03 NOTE — TELEPHONE ENCOUNTER
Left message for Irais Willis to call the office back so we can discuss getting a consent signed for patient to have a Cystoscope. Wanting to know if a family member is to sign we do not have a consent form signed to speak with a family member. Please relay message to Irais.

## 2024-06-06 NOTE — TELEPHONE ENCOUNTER
She said patient does have a sister as her emergency contact and she would sign consent for patient. She doesn't know how we can get consent to her, but she put a call in to the sister and is waiting for her to call back or we can call the sister ourselves.

## 2024-06-06 NOTE — TELEPHONE ENCOUNTER
Irais Willis called stating Christine Federico patient's sister will be coming to the office on Monday to sign the consent .

## 2024-06-06 NOTE — TELEPHONE ENCOUNTER
Spoke with Irais Willis and she is going to discuss with her supervisor who will need to sign paperwork to be able to give consent for patient. She will call the office back and will let us know.  Jarrod lives in a group home and does not have any family members to sign for medical consent for patient.

## 2024-06-18 ENCOUNTER — APPOINTMENT (EMERGENCY)
Dept: CT IMAGING | Facility: HOSPITAL | Age: 69
End: 2024-06-18
Payer: MEDICARE

## 2024-06-18 ENCOUNTER — HOSPITAL ENCOUNTER (EMERGENCY)
Facility: HOSPITAL | Age: 69
Discharge: HOME | End: 2024-06-18
Attending: EMERGENCY MEDICINE
Payer: MEDICARE

## 2024-06-18 VITALS
TEMPERATURE: 97.4 F | OXYGEN SATURATION: 96 % | SYSTOLIC BLOOD PRESSURE: 128 MMHG | DIASTOLIC BLOOD PRESSURE: 65 MMHG | BODY MASS INDEX: 29.92 KG/M2 | HEART RATE: 57 BPM | WEIGHT: 153.22 LBS | RESPIRATION RATE: 16 BRPM

## 2024-06-18 DIAGNOSIS — N39.0 UTI (URINARY TRACT INFECTION): Primary | ICD-10-CM

## 2024-06-18 LAB
ALBUMIN SERPL BCP-MCNC: 3.8 G/DL (ref 3.5–5)
ALP SERPL-CCNC: 85 U/L (ref 34–104)
ALT SERPL W P-5'-P-CCNC: 38 U/L (ref 7–52)
ANION GAP SERPL CALCULATED.3IONS-SCNC: 4 MMOL/L (ref 4–13)
AST SERPL W P-5'-P-CCNC: 37 U/L (ref 13–39)
BACTERIA UR QL AUTO: ABNORMAL /HPF
BASOPHILS # BLD AUTO: 0.01 THOUSANDS/ÂΜL (ref 0–0.1)
BASOPHILS NFR BLD AUTO: 0 % (ref 0–1)
BILIRUB SERPL-MCNC: 0.31 MG/DL (ref 0.2–1)
BILIRUB UR QL STRIP: NEGATIVE
BUN SERPL-MCNC: 32 MG/DL (ref 5–25)
CALCIUM SERPL-MCNC: 10.3 MG/DL (ref 8.4–10.2)
CHLORIDE SERPL-SCNC: 98 MMOL/L (ref 96–108)
CLARITY UR: ABNORMAL
CO2 SERPL-SCNC: 33 MMOL/L (ref 21–32)
COLOR UR: YELLOW
CREAT SERPL-MCNC: 0.72 MG/DL (ref 0.6–1.3)
EOSINOPHIL # BLD AUTO: 0.12 THOUSAND/ÂΜL (ref 0–0.61)
EOSINOPHIL NFR BLD AUTO: 3 % (ref 0–6)
ERYTHROCYTE [DISTWIDTH] IN BLOOD BY AUTOMATED COUNT: 16.8 % (ref 11.6–15.1)
GFR SERPL CREATININE-BSD FRML MDRD: 85 ML/MIN/1.73SQ M
GLUCOSE SERPL-MCNC: 71 MG/DL (ref 65–140)
GLUCOSE UR STRIP-MCNC: NEGATIVE MG/DL
HCT VFR BLD AUTO: 34.7 % (ref 34.8–46.1)
HGB BLD-MCNC: 10.6 G/DL (ref 11.5–15.4)
HGB UR QL STRIP.AUTO: NEGATIVE
IMM GRANULOCYTES # BLD AUTO: 0.02 THOUSAND/UL (ref 0–0.2)
IMM GRANULOCYTES NFR BLD AUTO: 1 % (ref 0–2)
KETONES UR STRIP-MCNC: NEGATIVE MG/DL
LEUKOCYTE ESTERASE UR QL STRIP: ABNORMAL
LYMPHOCYTES # BLD AUTO: 1.3 THOUSANDS/ÂΜL (ref 0.6–4.47)
LYMPHOCYTES NFR BLD AUTO: 34 % (ref 14–44)
MCH RBC QN AUTO: 28.6 PG (ref 26.8–34.3)
MCHC RBC AUTO-ENTMCNC: 30.5 G/DL (ref 31.4–37.4)
MCV RBC AUTO: 94 FL (ref 82–98)
MONOCYTES # BLD AUTO: 0.41 THOUSAND/ÂΜL (ref 0.17–1.22)
MONOCYTES NFR BLD AUTO: 11 % (ref 4–12)
MUCOUS THREADS UR QL AUTO: ABNORMAL
NEUTROPHILS # BLD AUTO: 1.95 THOUSANDS/ÂΜL (ref 1.85–7.62)
NEUTS SEG NFR BLD AUTO: 51 % (ref 43–75)
NITRITE UR QL STRIP: NEGATIVE
NON-SQ EPI CELLS URNS QL MICRO: ABNORMAL /HPF
NRBC BLD AUTO-RTO: 0 /100 WBCS
OTHER STN SPEC: ABNORMAL
PH UR STRIP.AUTO: 6 [PH]
PLATELET # BLD AUTO: 126 THOUSANDS/UL (ref 149–390)
PMV BLD AUTO: 10.2 FL (ref 8.9–12.7)
POTASSIUM SERPL-SCNC: 5.2 MMOL/L (ref 3.5–5.3)
PROT SERPL-MCNC: 7.1 G/DL (ref 6.4–8.4)
PROT UR STRIP-MCNC: NEGATIVE MG/DL
RBC # BLD AUTO: 3.7 MILLION/UL (ref 3.81–5.12)
RBC #/AREA URNS AUTO: ABNORMAL /HPF
SODIUM SERPL-SCNC: 135 MMOL/L (ref 135–147)
SP GR UR STRIP.AUTO: 1.01 (ref 1–1.03)
UROBILINOGEN UR QL STRIP.AUTO: 0.2 E.U./DL
WBC # BLD AUTO: 3.81 THOUSAND/UL (ref 4.31–10.16)
WBC #/AREA URNS AUTO: ABNORMAL /HPF

## 2024-06-18 PROCEDURE — 81001 URINALYSIS AUTO W/SCOPE: CPT | Performed by: EMERGENCY MEDICINE

## 2024-06-18 PROCEDURE — 99285 EMERGENCY DEPT VISIT HI MDM: CPT

## 2024-06-18 PROCEDURE — 87077 CULTURE AEROBIC IDENTIFY: CPT | Performed by: EMERGENCY MEDICINE

## 2024-06-18 PROCEDURE — 87186 SC STD MICRODIL/AGAR DIL: CPT | Performed by: EMERGENCY MEDICINE

## 2024-06-18 PROCEDURE — 36415 COLL VENOUS BLD VENIPUNCTURE: CPT | Performed by: EMERGENCY MEDICINE

## 2024-06-18 PROCEDURE — 80053 COMPREHEN METABOLIC PANEL: CPT | Performed by: EMERGENCY MEDICINE

## 2024-06-18 PROCEDURE — 87086 URINE CULTURE/COLONY COUNT: CPT | Performed by: EMERGENCY MEDICINE

## 2024-06-18 PROCEDURE — 99284 EMERGENCY DEPT VISIT MOD MDM: CPT | Performed by: EMERGENCY MEDICINE

## 2024-06-18 PROCEDURE — 85025 COMPLETE CBC W/AUTO DIFF WBC: CPT | Performed by: EMERGENCY MEDICINE

## 2024-06-18 PROCEDURE — 71250 CT THORAX DX C-: CPT

## 2024-06-18 NOTE — DISCHARGE INSTRUCTIONS
The workup performed today in the emergency department revealed improving CT chest.  The urinalysis today did reveal urinary tract infection.  Denita should continue taking the amoxicillin and doxycycline as prescribed by her physician.  This should address the urinary tract infection.  A urine culture is currently pending.  If these results are positive, you will be contacted.

## 2024-06-18 NOTE — ED PROVIDER NOTES
History  Chief Complaint   Patient presents with    Cough     Patient presents to the ED with [facility] staff with reports of cough, weakness, and shortness of breath. The patient was placed on antibiotics for pneumonia last Friday.      Patient with prior history of developmental delay, resident at [facility], recently discharged from hospital in the last several weeks after admission for aspiration pneumonia, has been started on oral antibiotics 4 days ago by her primary care physician for treatment of continued cough, (currently taking amoxicillin and doxycycline for a total 10-day course) , today brought in for evaluation of continued cough, sputum production, room air saturation 93%, and increased confusion per caretaker.      History provided by:  Caregiver  History limited by: Developmental delay.   used: No    Cough  Cough characteristics:  Unable to specify  Sputum characteristics:  Unable to specify  Severity:  Moderate  Onset quality:  Gradual  Timing:  Constant  Progression:  Unchanged  Chronicity:  Recurrent  Relieved by:  Nothing  Worsened by:  Nothing  Ineffective treatments:  None tried  Associated symptoms: no chest pain, no chills, no diaphoresis, no ear pain, no eye discharge, no fever, no headaches, no rash, no rhinorrhea, no shortness of breath, no sinus congestion, no sore throat, no weight loss and no wheezing        Prior to Admission Medications   Prescriptions Last Dose Informant Patient Reported? Taking?   Cranberry 450 MG CAPS   No No   Sig: Take 1 capsule (450 mg total) by mouth in the morning   Diapers & Supplies MISC   Yes No   Sig: Use as directed.   Incontinence Supply Disposable (FQ Pant Liner) MISC   Yes No   LORazepam (ATIVAN) 0.5 mg tablet   Yes No   Sig: Take 0.5 mg by mouth in the morning and 0.5 mg in the evening.   Lancets 33G MISC   Yes No   Sig: daily   QUEtiapine (SEROquel) 100 mg tablet   Yes No   Sig: Take 100 mg by mouth in the morning and 100 mg in  the evening. 8am, 4pm.   QUEtiapine (SEROquel) 200 mg tablet   Yes No   Sig: Take 200 mg by mouth daily at bedtime   aspirin (ECOTRIN LOW STRENGTH) 81 mg EC tablet   Yes No   Sig: Take 81 mg by mouth daily   bisacodyl (DULCOLAX) 5 mg EC tablet   Yes No   Sig: Take 5 mg by mouth daily as needed for constipation   cholecalciferol (VITAMIN D3) 250 MCG (01187 UT) capsule   No No   Sig: Take 5 capsules (50,000 Units total) by mouth once a week On saturday   divalproex sodium (DEPAKOTE) 250 mg EC tablet   Yes No   Sig: Take 250 mg by mouth in the morning 1 tab AM (8am)   divalproex sodium (DEPAKOTE) 500 mg EC tablet   Yes No   Sig: Take 500 mg by mouth daily after dinner   docusate sodium (COLACE) 100 mg capsule  Outside Facility (Specify) Yes No   Sig: Take 100 mg by mouth in the morning and 100 mg in the evening.   gabapentin (NEURONTIN) 800 mg tablet   Yes No   Sig: Take 800 mg by mouth 3 (three) times a day   levothyroxine 50 mcg tablet   No No   Sig: Take 1 tablet (50 mcg total) by mouth daily in the early morning   omeprazole (PriLOSEC) 20 mg delayed release capsule   Yes No   Sig: Take 20 mg by mouth daily   polyethylene glycol (MIRALAX) 17 g packet   No No   Sig: Take 17 g by mouth every other day as needed (if no bm in 3 days)   rosuvastatin (CRESTOR) 40 MG tablet   Yes No   Sig: Take 40 mg by mouth daily   senna (SENOKOT) 8.6 mg   No No   Sig: Take 1 tablet (8.6 mg total) by mouth daily at bedtime   tolterodine (DETROL LA) 4 mg 24 hr capsule   No No   Sig: Take 1 capsule (4 mg total) by mouth daily   torsemide (DEMADEX) 5 MG tablet   No No   Sig: Take 1 tablet (5 mg total) by mouth daily      Facility-Administered Medications: None       Past Medical History:   Diagnosis Date    Anxiety     CHF (congestive heart failure) (HCC)     Diabetes mellitus (HCC)     GERD (gastroesophageal reflux disease)     Hyperlipidemia     Hypertension     Leukopenia     Mental disability     Mixed incontinence 04/12/2017    Seizure  disorder (HCC)        Past Surgical History:   Procedure Laterality Date    DENTAL SURGERY         Family History   Problem Relation Age of Onset    No Known Problems Mother     No Known Problems Father     No Known Problems Sister     No Known Problems Maternal Grandmother     No Known Problems Maternal Grandfather     No Known Problems Paternal Grandmother     No Known Problems Paternal Grandfather      I have reviewed and agree with the history as documented.    E-Cigarette/Vaping    E-Cigarette Use Never User      E-Cigarette/Vaping Substances    Nicotine No     THC No     CBD No     Flavoring No     Other No     Unknown No      Social History     Tobacco Use    Smoking status: Never    Smokeless tobacco: Never   Vaping Use    Vaping status: Never Used   Substance Use Topics    Alcohol use: Not Currently    Drug use: Not Currently       Review of Systems   Constitutional:  Negative for chills, diaphoresis, fever and weight loss.   HENT:  Negative for ear pain, hearing loss, rhinorrhea, sore throat, trouble swallowing and voice change.    Eyes:  Negative for pain and discharge.   Respiratory:  Positive for cough. Negative for shortness of breath and wheezing.    Cardiovascular:  Negative for chest pain and palpitations.   Gastrointestinal:  Negative for abdominal pain, blood in stool, constipation, diarrhea, nausea and vomiting.   Genitourinary:  Negative for dysuria, flank pain, frequency and hematuria.   Musculoskeletal:  Negative for joint swelling, neck pain and neck stiffness.   Skin:  Negative for rash and wound.   Neurological:  Negative for dizziness, seizures, syncope, facial asymmetry and headaches.   Psychiatric/Behavioral:  Negative for hallucinations, self-injury and suicidal ideas.    All other systems reviewed and are negative.      Physical Exam  Physical Exam  Vitals and nursing note reviewed.   Constitutional:       General: She is not in acute distress.     Appearance: She is well-developed.    HENT:      Head: Normocephalic and atraumatic.      Right Ear: External ear normal.      Left Ear: External ear normal.   Eyes:      General: No scleral icterus.        Right eye: No discharge.         Left eye: No discharge.      Extraocular Movements: Extraocular movements intact.      Conjunctiva/sclera: Conjunctivae normal.   Cardiovascular:      Rate and Rhythm: Normal rate and regular rhythm.      Heart sounds: Normal heart sounds. No murmur heard.  Pulmonary:      Effort: Pulmonary effort is normal.      Breath sounds: Normal breath sounds. No wheezing or rales.   Abdominal:      General: Bowel sounds are normal. There is no distension.      Palpations: Abdomen is soft.      Tenderness: There is no abdominal tenderness. There is no guarding or rebound.   Musculoskeletal:         General: No deformity. Normal range of motion.      Cervical back: Normal range of motion and neck supple.   Skin:     General: Skin is warm and dry.      Findings: No rash.   Neurological:      General: No focal deficit present.      Mental Status: She is alert.      Cranial Nerves: No cranial nerve deficit.      Comments: Pleasantly confused consistent with history of developmental delay   Psychiatric:         Mood and Affect: Mood normal.         Behavior: Behavior normal.         Thought Content: Thought content normal.         Judgment: Judgment normal.         Vital Signs  ED Triage Vitals [06/18/24 1729]   Temperature Pulse Respirations Blood Pressure SpO2   (!) 97.4 °F (36.3 °C) 64 16 126/59 95 %      Temp Source Heart Rate Source Patient Position - Orthostatic VS BP Location FiO2 (%)   Temporal Monitor Sitting Left arm --      Pain Score       No Pain           Vitals:    06/18/24 1729   BP: 126/59   Pulse: 64   Patient Position - Orthostatic VS: Sitting         Visual Acuity      ED Medications  Medications - No data to display    Diagnostic Studies  Results Reviewed       Procedure Component Value Units Date/Time    Urine  Microscopic [[id number]]  (Abnormal) Collected: 06/18/24 1753    Lab Status: Final result Specimen: Urine, Straight Cath Updated: 06/18/24 1843     RBC, UA None Seen /hpf      WBC, UA 30-50 /hpf      Epithelial Cells None Seen /hpf      Bacteria, UA Occasional /hpf      OTHER OBSERVATIONS WBCs Clumped     MUCUS THREADS Occasional    Urine culture [[id number]] Collected: 06/18/24 1753    Lab Status: In process Specimen: Urine, Straight Cath Updated: 06/18/24 1843    Comprehensive metabolic panel [[id number]]  (Abnormal) Collected: 06/18/24 1744    Lab Status: Final result Specimen: Blood from Arm, Right Updated: 06/18/24 1826     Sodium 135 mmol/L      Potassium 5.2 mmol/L      Chloride 98 mmol/L      CO2 33 mmol/L      ANION GAP 4 mmol/L      BUN 32 mg/dL      Creatinine 0.72 mg/dL      Glucose 71 mg/dL      Calcium 10.3 mg/dL      AST 37 U/L      ALT 38 U/L      Alkaline Phosphatase 85 U/L      Total Protein 7.1 g/dL      Albumin 3.8 g/dL      Total Bilirubin 0.31 mg/dL      eGFR 85 ml/min/1.73sq m     Narrative:      National Kid[employer] Foundation guidelines for Chronic Kidney Disease (CKD):     Stage 1 with normal or high GFR (GFR > 90 mL/min/1.73 square meters)    Stage 2 Mild CKD (GFR = 60-89 mL/min/1.73 square meters)    Stage 3A Moderate CKD (GFR = 45-59 mL/min/1.73 square meters)    Stage 3B Moderate CKD (GFR = 30-44 mL/min/1.73 square meters)    Stage 4 Severe CKD (GFR = 15-29 mL/min/1.73 square meters)    Stage 5 End Stage CKD (GFR <15 mL/min/1.73 square meters)  Note: GFR calculation is accurate only with a steady state creatinine    CBC and differential [[id number]]  (Abnormal) Collected: 06/18/24 1744    Lab Status: Final result Specimen: Blood from Arm, Right Updated: 06/18/24 1821     WBC 3.81 Thousand/uL      RBC 3.70 Million/uL      Hemoglobin 10.6 g/dL      Hematocrit 34.7 %      MCV 94 fL      MCH 28.6 pg      MCHC 30.5 g/dL      RDW 16.8 %      MPV 10.2 fL      Platelets 126 Thousands/uL       nRBC 0 /100 WBCs      Segmented % 51 %      Immature Grans % 1 %      Lymphocytes % 34 %      Monocytes % 11 %      Eosinophils Relative 3 %      Basophils Relative 0 %      Absolute Neutrophils 1.95 Thousands/µL      Absolute Immature Grans 0.02 Thousand/uL      Absolute Lymphocytes 1.30 Thousands/µL      Absolute Monocytes 0.41 Thousand/µL      Eosinophils Absolute 0.12 Thousand/µL      Basophils Absolute 0.01 Thousands/µL     UA w Reflex to Microscopic w Reflex to Culture [[id number]]  (Abnormal) Collected: 06/18/24 1753    Lab Status: Final result Specimen: Urine, Straight Cath Updated: 06/18/24 1800     Color, UA Yellow     Clarity, UA Slightly Cloudy     Specific Gravity, UA 1.015     pH, UA 6.0     Leukocytes, UA Moderate     Nitrite, UA Negative     Protein, UA Negative mg/dl      Glucose, UA Negative mg/dl      Ketones, UA Negative mg/dl      Urobilinogen, UA 0.2 E.U./dl      Bilirubin, UA Negative     Occult Blood, UA Negative                   CT chest without contrast   Final Result by E. Alec Schoenberger, MD (06/18 1920)   Improved aeration of the right upper lobe with mild residual subsegmental atelectasis and few small residual groundglass opacities that are probably infectious/inflammatory. Scattered subsegmental levels otherwise stable. Stable elevated right    hemidiaphragm.                  Workstation performed: [id number]                    Procedures  Procedures         ED Course                               SBIRT 20yo+      Flowsheet Row Most Recent Value   Initial Alcohol Screen: US AUDIT-C     1. How often do you have a drink containing alcohol? 0 Filed at: 06/18/2024 1731   2. How many drinks containing alcohol do you have on a typical day you are drinking?  0 Filed at: 06/18/2024 1731   3b. FEMALE Any Age, or MALE 65+: How often do you have 4 or more drinks on one occassion? 0 Filed at: 06/18/2024 1731   Audit-C Score 0 Filed at: 06/18/2024 1731   KAILASH: How many times in the past year  have you...    Used an illegal drug or used a prescription medication for non-medical reasons? Never Filed at: 06/18/2024 1731                      Medical Decision Making  Based on the history and medical screening exam performed the diagnostic considerations include but are not limited to urinary tract infection, pneumonia, dehydration, electrolyte abnormality, developmental delay.    Based on the work-up performed in the emergency room which includes physical examination, and which may include laboratory studies and imaging as warranted including advanced imaging such as CT scan or ultrasound, the diagnostic considerations are narrowed to exclude limb or life-threatening process.    The patient is stable for discharge.  Patient has benign workup in the emergency room with improving CT chest and negative lab work.  She does have urinary tract infection noted on the urinalysis, however already taking amoxicillin and doxycycline.  No indication for further intervention at this time.    Amount and/or Complexity of Data Reviewed  Labs: ordered. Decision-making details documented in ED Course.     Details: Benign  UTI noted on UA  Radiology: ordered and independent interpretation performed. Decision-making details documented in ED Course.     Details: CT chest with findings overall improved from prior imaging             Disposition  Final diagnoses:   UTI (urinary tract infection)     Time reflects when diagnosis was documented in both MDM as applicable and the Disposition within this note       Time User Action Codes Description Comment    6/18/2024  7:32 PM Juan Daniel Willis Add [N39.0] UTI (urinary tract infection)           ED Disposition       ED Disposition   Discharge    Condition   Stable    Date/Time   Tue Jun 18, 2024 1932    Comment   Denita Vital discharge to home/self care.                   Follow-up Information       Follow up With Specialties Details Why Contact Info    Soy Clemente MD    [address]  [facility] [address]  [phone]              Patient's Medications   Discharge Prescriptions    No medications on file       No discharge procedures on file.    PDMP Review         Value Time User    PDMP Reviewed  Yes 7/19/2022  9:40 PM KRISTOPHER Mayer            ED Provider  Electronically Signed by             Juan Daniel Willis MD  06/18/24 7067

## 2024-06-21 ENCOUNTER — TELEPHONE (OUTPATIENT)
Dept: EMERGENCY DEPT | Facility: HOSPITAL | Age: 69
End: 2024-06-21

## 2024-06-21 LAB — BACTERIA UR CULT: ABNORMAL

## 2024-06-22 NOTE — TELEPHONE ENCOUNTER
Left message for supervisor to review medications     patient was diagnosed with urinary tract infection and per recent ED note patient was on amoxicillin and doxycycline urine culture has resulted would do best on Augmentin vs amoxicillin

## 2024-06-25 ENCOUNTER — APPOINTMENT (EMERGENCY)
Dept: CT IMAGING | Facility: HOSPITAL | Age: 69
DRG: 689 | End: 2024-06-25
Payer: MEDICARE

## 2024-06-25 ENCOUNTER — HOSPITAL ENCOUNTER (INPATIENT)
Facility: HOSPITAL | Age: 69
LOS: 7 days | Discharge: NURSING, REHAB OR LONG-TERM CARE | DRG: 689 | End: 2024-07-02
Attending: EMERGENCY MEDICINE | Admitting: FAMILY MEDICINE
Payer: MEDICARE

## 2024-06-25 DIAGNOSIS — D64.9 CHRONIC ANEMIA: ICD-10-CM

## 2024-06-25 DIAGNOSIS — N30.00 ACUTE CYSTITIS WITHOUT HEMATURIA: ICD-10-CM

## 2024-06-25 DIAGNOSIS — N39.0 UTI (URINARY TRACT INFECTION): Primary | ICD-10-CM

## 2024-06-25 DIAGNOSIS — G40.909 SEIZURE DISORDER (HCC): Chronic | ICD-10-CM

## 2024-06-25 DIAGNOSIS — R53.83 LETHARGY: ICD-10-CM

## 2024-06-25 LAB
ALBUMIN SERPL BCG-MCNC: 3.7 G/DL (ref 3.5–5)
ALP SERPL-CCNC: 94 U/L (ref 34–104)
ALT SERPL W P-5'-P-CCNC: 30 U/L (ref 7–52)
AMMONIA PLAS-SCNC: 13 UMOL/L (ref 18–72)
ANION GAP SERPL CALCULATED.3IONS-SCNC: 5 MMOL/L (ref 4–13)
APTT PPP: 37 SECONDS (ref 23–37)
AST SERPL W P-5'-P-CCNC: 25 U/L (ref 13–39)
BACTERIA UR QL AUTO: ABNORMAL /HPF
BASOPHILS # BLD AUTO: 0 THOUSANDS/ÂΜL (ref 0–0.1)
BASOPHILS NFR BLD AUTO: 0 % (ref 0–1)
BILIRUB SERPL-MCNC: 0.26 MG/DL (ref 0.2–1)
BILIRUB UR QL STRIP: NEGATIVE
BUN SERPL-MCNC: 25 MG/DL (ref 5–25)
CALCIUM SERPL-MCNC: 10.1 MG/DL (ref 8.4–10.2)
CHLORIDE SERPL-SCNC: 96 MMOL/L (ref 96–108)
CLARITY UR: ABNORMAL
CO2 SERPL-SCNC: 35 MMOL/L (ref 21–32)
COLOR UR: YELLOW
CREAT SERPL-MCNC: 0.68 MG/DL (ref 0.6–1.3)
EOSINOPHIL # BLD AUTO: 0.06 THOUSAND/ÂΜL (ref 0–0.61)
EOSINOPHIL NFR BLD AUTO: 1 % (ref 0–6)
ERYTHROCYTE [DISTWIDTH] IN BLOOD BY AUTOMATED COUNT: 16.8 % (ref 11.6–15.1)
FLUAV RNA RESP QL NAA+PROBE: NEGATIVE
FLUBV RNA RESP QL NAA+PROBE: NEGATIVE
GFR SERPL CREATININE-BSD FRML MDRD: 88 ML/MIN/1.73SQ M
GLUCOSE SERPL-MCNC: 160 MG/DL (ref 65–140)
GLUCOSE UR STRIP-MCNC: NEGATIVE MG/DL
HCT VFR BLD AUTO: 33.4 % (ref 34.8–46.1)
HGB BLD-MCNC: 10.6 G/DL (ref 11.5–15.4)
HGB UR QL STRIP.AUTO: NEGATIVE
IMM GRANULOCYTES # BLD AUTO: 0.01 THOUSAND/UL (ref 0–0.2)
IMM GRANULOCYTES NFR BLD AUTO: 0 % (ref 0–2)
INR PPP: 1.09 (ref 0.84–1.19)
KETONES UR STRIP-MCNC: NEGATIVE MG/DL
LACTATE SERPL-SCNC: 2 MMOL/L (ref 0.5–2)
LEUKOCYTE ESTERASE UR QL STRIP: ABNORMAL
LYMPHOCYTES # BLD AUTO: 0.52 THOUSANDS/ÂΜL (ref 0.6–4.47)
LYMPHOCYTES NFR BLD AUTO: 11 % (ref 14–44)
MAGNESIUM SERPL-MCNC: 1.6 MG/DL (ref 1.9–2.7)
MCH RBC QN AUTO: 29.2 PG (ref 26.8–34.3)
MCHC RBC AUTO-ENTMCNC: 31.7 G/DL (ref 31.4–37.4)
MCV RBC AUTO: 92 FL (ref 82–98)
MONOCYTES # BLD AUTO: 0.27 THOUSAND/ÂΜL (ref 0.17–1.22)
MONOCYTES NFR BLD AUTO: 6 % (ref 4–12)
NEUTROPHILS # BLD AUTO: 4.08 THOUSANDS/ÂΜL (ref 1.85–7.62)
NEUTS SEG NFR BLD AUTO: 82 % (ref 43–75)
NITRITE UR QL STRIP: NEGATIVE
NON-SQ EPI CELLS URNS QL MICRO: ABNORMAL /HPF
NRBC BLD AUTO-RTO: 0 /100 WBCS
PH UR STRIP.AUTO: 6.5 [PH]
PLATELET # BLD AUTO: 103 THOUSANDS/UL (ref 149–390)
PMV BLD AUTO: 10.3 FL (ref 8.9–12.7)
POTASSIUM SERPL-SCNC: 4.2 MMOL/L (ref 3.5–5.3)
PROCALCITONIN SERPL-MCNC: <0.05 NG/ML
PROT SERPL-MCNC: 7.1 G/DL (ref 6.4–8.4)
PROT UR STRIP-MCNC: NEGATIVE MG/DL
PROTHROMBIN TIME: 14.4 SECONDS (ref 11.6–14.5)
RBC # BLD AUTO: 3.63 MILLION/UL (ref 3.81–5.12)
RBC #/AREA URNS AUTO: ABNORMAL /HPF
RSV RNA RESP QL NAA+PROBE: NEGATIVE
SARS-COV-2 RNA RESP QL NAA+PROBE: NEGATIVE
SODIUM SERPL-SCNC: 136 MMOL/L (ref 135–147)
SP GR UR STRIP.AUTO: 1.01 (ref 1–1.03)
TSH SERPL DL<=0.05 MIU/L-ACNC: 2.37 UIU/ML (ref 0.45–4.5)
UROBILINOGEN UR QL STRIP.AUTO: 0.2 E.U./DL
VALPROATE SERPL-MCNC: 40 UG/ML (ref 50–100)
WBC # BLD AUTO: 4.94 THOUSAND/UL (ref 4.31–10.16)
WBC #/AREA URNS AUTO: ABNORMAL /HPF

## 2024-06-25 PROCEDURE — 84443 ASSAY THYROID STIM HORMONE: CPT

## 2024-06-25 PROCEDURE — 87081 CULTURE SCREEN ONLY: CPT | Performed by: FAMILY MEDICINE

## 2024-06-25 PROCEDURE — 80164 ASSAY DIPROPYLACETIC ACD TOT: CPT

## 2024-06-25 PROCEDURE — 99285 EMERGENCY DEPT VISIT HI MDM: CPT | Performed by: EMERGENCY MEDICINE

## 2024-06-25 PROCEDURE — 84145 PROCALCITONIN (PCT): CPT | Performed by: PHYSICIAN ASSISTANT

## 2024-06-25 PROCEDURE — 83605 ASSAY OF LACTIC ACID: CPT | Performed by: PHYSICIAN ASSISTANT

## 2024-06-25 PROCEDURE — 99223 1ST HOSP IP/OBS HIGH 75: CPT | Performed by: FAMILY MEDICINE

## 2024-06-25 PROCEDURE — 74177 CT ABD & PELVIS W/CONTRAST: CPT

## 2024-06-25 PROCEDURE — 87040 BLOOD CULTURE FOR BACTERIA: CPT | Performed by: PHYSICIAN ASSISTANT

## 2024-06-25 PROCEDURE — 36415 COLL VENOUS BLD VENIPUNCTURE: CPT | Performed by: PHYSICIAN ASSISTANT

## 2024-06-25 PROCEDURE — 83735 ASSAY OF MAGNESIUM: CPT | Performed by: PHYSICIAN ASSISTANT

## 2024-06-25 PROCEDURE — 93005 ELECTROCARDIOGRAM TRACING: CPT

## 2024-06-25 PROCEDURE — 96375 TX/PRO/DX INJ NEW DRUG ADDON: CPT

## 2024-06-25 PROCEDURE — 96365 THER/PROPH/DIAG IV INF INIT: CPT

## 2024-06-25 PROCEDURE — 71260 CT THORAX DX C+: CPT

## 2024-06-25 PROCEDURE — 87086 URINE CULTURE/COLONY COUNT: CPT | Performed by: PHYSICIAN ASSISTANT

## 2024-06-25 PROCEDURE — 81001 URINALYSIS AUTO W/SCOPE: CPT | Performed by: PHYSICIAN ASSISTANT

## 2024-06-25 PROCEDURE — 99284 EMERGENCY DEPT VISIT MOD MDM: CPT

## 2024-06-25 PROCEDURE — 82140 ASSAY OF AMMONIA: CPT

## 2024-06-25 PROCEDURE — 0241U HB NFCT DS VIR RESP RNA 4 TRGT: CPT | Performed by: PHYSICIAN ASSISTANT

## 2024-06-25 PROCEDURE — 96367 TX/PROPH/DG ADDL SEQ IV INF: CPT

## 2024-06-25 PROCEDURE — 85610 PROTHROMBIN TIME: CPT | Performed by: PHYSICIAN ASSISTANT

## 2024-06-25 PROCEDURE — 87186 SC STD MICRODIL/AGAR DIL: CPT | Performed by: PHYSICIAN ASSISTANT

## 2024-06-25 PROCEDURE — 85730 THROMBOPLASTIN TIME PARTIAL: CPT | Performed by: PHYSICIAN ASSISTANT

## 2024-06-25 PROCEDURE — 80053 COMPREHEN METABOLIC PANEL: CPT | Performed by: PHYSICIAN ASSISTANT

## 2024-06-25 PROCEDURE — 87154 CUL TYP ID BLD PTHGN 6+ TRGT: CPT | Performed by: PHYSICIAN ASSISTANT

## 2024-06-25 PROCEDURE — 85025 COMPLETE CBC W/AUTO DIFF WBC: CPT | Performed by: PHYSICIAN ASSISTANT

## 2024-06-25 PROCEDURE — 96361 HYDRATE IV INFUSION ADD-ON: CPT

## 2024-06-25 PROCEDURE — 87077 CULTURE AEROBIC IDENTIFY: CPT | Performed by: PHYSICIAN ASSISTANT

## 2024-06-25 RX ORDER — BISACODYL 5 MG/1
5 TABLET, DELAYED RELEASE ORAL DAILY PRN
Status: DISCONTINUED | OUTPATIENT
Start: 2024-06-25 | End: 2024-07-02 | Stop reason: HOSPADM

## 2024-06-25 RX ORDER — POLYETHYLENE GLYCOL 3350 17 G/17G
17 POWDER, FOR SOLUTION ORAL DAILY
Status: DISCONTINUED | OUTPATIENT
Start: 2024-06-25 | End: 2024-06-25

## 2024-06-25 RX ORDER — DOCUSATE SODIUM 100 MG/1
100 CAPSULE, LIQUID FILLED ORAL 2 TIMES DAILY
Status: DISCONTINUED | OUTPATIENT
Start: 2024-06-25 | End: 2024-07-02 | Stop reason: HOSPADM

## 2024-06-25 RX ORDER — PANTOPRAZOLE SODIUM 20 MG/1
20 TABLET, DELAYED RELEASE ORAL
Status: DISCONTINUED | OUTPATIENT
Start: 2024-06-26 | End: 2024-07-02 | Stop reason: HOSPADM

## 2024-06-25 RX ORDER — LORAZEPAM 0.5 MG/1
0.5 TABLET ORAL 2 TIMES DAILY
Status: DISCONTINUED | OUTPATIENT
Start: 2024-06-25 | End: 2024-07-02 | Stop reason: HOSPADM

## 2024-06-25 RX ORDER — POLYETHYLENE GLYCOL 3350 17 G/17G
17 POWDER, FOR SOLUTION ORAL DAILY
Status: DISCONTINUED | OUTPATIENT
Start: 2024-06-25 | End: 2024-07-02 | Stop reason: HOSPADM

## 2024-06-25 RX ORDER — NYSTATIN 100000 [USP'U]/G
1 POWDER TOPICAL 2 TIMES DAILY
COMMUNITY
Start: 2024-06-17

## 2024-06-25 RX ORDER — SIMETHICONE 80 MG
80 TABLET,CHEWABLE ORAL 4 TIMES DAILY PRN
Status: DISCONTINUED | OUTPATIENT
Start: 2024-06-25 | End: 2024-07-02 | Stop reason: HOSPADM

## 2024-06-25 RX ORDER — DOCUSATE SODIUM 100 MG/1
100 CAPSULE, LIQUID FILLED ORAL 2 TIMES DAILY
Status: DISCONTINUED | OUTPATIENT
Start: 2024-06-25 | End: 2024-06-25

## 2024-06-25 RX ORDER — METHENAMINE HIPPURATE 1000 MG/1
1 TABLET ORAL 2 TIMES DAILY WITH MEALS
COMMUNITY
Start: 2024-06-16

## 2024-06-25 RX ORDER — NYSTATIN 100000 [USP'U]/G
1 POWDER TOPICAL 2 TIMES DAILY
Status: DISCONTINUED | OUTPATIENT
Start: 2024-06-25 | End: 2024-07-02 | Stop reason: HOSPADM

## 2024-06-25 RX ORDER — SENNOSIDES 8.6 MG
8.6 TABLET ORAL
Status: DISCONTINUED | OUTPATIENT
Start: 2024-06-26 | End: 2024-07-02 | Stop reason: HOSPADM

## 2024-06-25 RX ORDER — ENOXAPARIN SODIUM 100 MG/ML
40 INJECTION SUBCUTANEOUS DAILY
Status: DISCONTINUED | OUTPATIENT
Start: 2024-06-25 | End: 2024-07-02 | Stop reason: HOSPADM

## 2024-06-25 RX ORDER — QUETIAPINE FUMARATE 100 MG/1
100 TABLET, FILM COATED ORAL 2 TIMES DAILY
Status: DISCONTINUED | OUTPATIENT
Start: 2024-06-25 | End: 2024-07-02 | Stop reason: HOSPADM

## 2024-06-25 RX ORDER — DIVALPROEX SODIUM 500 MG/1
500 TABLET, DELAYED RELEASE ORAL
Status: DISCONTINUED | OUTPATIENT
Start: 2024-06-25 | End: 2024-07-02 | Stop reason: HOSPADM

## 2024-06-25 RX ORDER — SENNOSIDES 8.6 MG
1 TABLET ORAL DAILY
Status: DISCONTINUED | OUTPATIENT
Start: 2024-06-25 | End: 2024-06-25

## 2024-06-25 RX ORDER — DIVALPROEX SODIUM 250 MG/1
250 TABLET, DELAYED RELEASE ORAL DAILY
Status: DISCONTINUED | OUTPATIENT
Start: 2024-06-26 | End: 2024-07-02 | Stop reason: HOSPADM

## 2024-06-25 RX ORDER — DOXYCYCLINE HYCLATE 100 MG/1
100 CAPSULE ORAL EVERY 12 HOURS SCHEDULED
COMMUNITY
Start: 2024-06-14 | End: 2024-07-02

## 2024-06-25 RX ORDER — QUETIAPINE FUMARATE 200 MG/1
200 TABLET, FILM COATED ORAL
Status: DISCONTINUED | OUTPATIENT
Start: 2024-06-25 | End: 2024-07-02 | Stop reason: HOSPADM

## 2024-06-25 RX ORDER — MAGNESIUM SULFATE HEPTAHYDRATE 40 MG/ML
2 INJECTION, SOLUTION INTRAVENOUS ONCE
Status: COMPLETED | OUTPATIENT
Start: 2024-06-25 | End: 2024-06-25

## 2024-06-25 RX ORDER — AMOXICILLIN 875 MG/1
875 TABLET, COATED ORAL 2 TIMES DAILY
COMMUNITY
Start: 2024-06-14 | End: 2024-07-02

## 2024-06-25 RX ORDER — OXYBUTYNIN CHLORIDE 5 MG/1
10 TABLET, EXTENDED RELEASE ORAL DAILY
Status: DISCONTINUED | OUTPATIENT
Start: 2024-06-26 | End: 2024-07-02 | Stop reason: HOSPADM

## 2024-06-25 RX ORDER — ATORVASTATIN CALCIUM 40 MG/1
80 TABLET, FILM COATED ORAL
Status: DISCONTINUED | OUTPATIENT
Start: 2024-06-26 | End: 2024-07-02 | Stop reason: HOSPADM

## 2024-06-25 RX ORDER — ACETAMINOPHEN 325 MG/1
650 TABLET ORAL EVERY 6 HOURS PRN
Status: DISCONTINUED | OUTPATIENT
Start: 2024-06-25 | End: 2024-07-02 | Stop reason: HOSPADM

## 2024-06-25 RX ORDER — GABAPENTIN 400 MG/1
800 CAPSULE ORAL 3 TIMES DAILY
Status: DISCONTINUED | OUTPATIENT
Start: 2024-06-25 | End: 2024-07-02 | Stop reason: HOSPADM

## 2024-06-25 RX ORDER — LEVOTHYROXINE SODIUM 0.05 MG/1
50 TABLET ORAL
Status: DISCONTINUED | OUTPATIENT
Start: 2024-06-26 | End: 2024-07-02 | Stop reason: HOSPADM

## 2024-06-25 RX ORDER — SODIUM CHLORIDE, SODIUM GLUCONATE, SODIUM ACETATE, POTASSIUM CHLORIDE, MAGNESIUM CHLORIDE, SODIUM PHOSPHATE, DIBASIC, AND POTASSIUM PHOSPHATE .53; .5; .37; .037; .03; .012; .00082 G/100ML; G/100ML; G/100ML; G/100ML; G/100ML; G/100ML; G/100ML
100 INJECTION, SOLUTION INTRAVENOUS CONTINUOUS
Status: DISCONTINUED | OUTPATIENT
Start: 2024-06-25 | End: 2024-06-26

## 2024-06-25 RX ADMIN — NYSTATIN 1 APPLICATION: 100000 POWDER TOPICAL at 17:02

## 2024-06-25 RX ADMIN — ENOXAPARIN SODIUM 40 MG: 40 INJECTION SUBCUTANEOUS at 14:23

## 2024-06-25 RX ADMIN — PIPERACILLIN AND TAZOBACTAM 4.5 G: 36; 4.5 INJECTION, POWDER, FOR SOLUTION INTRAVENOUS at 12:53

## 2024-06-25 RX ADMIN — SODIUM CHLORIDE, SODIUM GLUCONATE, SODIUM ACETATE, POTASSIUM CHLORIDE, MAGNESIUM CHLORIDE, SODIUM PHOSPHATE, DIBASIC, AND POTASSIUM PHOSPHATE 100 ML/HR: .53; .5; .37; .037; .03; .012; .00082 INJECTION, SOLUTION INTRAVENOUS at 14:23

## 2024-06-25 RX ADMIN — QUETIAPINE FUMARATE 100 MG: 100 TABLET ORAL at 15:33

## 2024-06-25 RX ADMIN — POLYETHYLENE GLYCOL 3350 17 G: 17 POWDER, FOR SOLUTION ORAL at 14:23

## 2024-06-25 RX ADMIN — DIVALPROEX SODIUM 500 MG: 500 TABLET, DELAYED RELEASE ORAL at 17:02

## 2024-06-25 RX ADMIN — QUETIAPINE FUMARATE 200 MG: 200 TABLET ORAL at 20:49

## 2024-06-25 RX ADMIN — MAGNESIUM SULFATE HEPTAHYDRATE 2 G: 40 INJECTION, SOLUTION INTRAVENOUS at 11:43

## 2024-06-25 RX ADMIN — SENNOSIDES 8.6 MG: 8.6 TABLET, FILM COATED ORAL at 14:23

## 2024-06-25 RX ADMIN — GABAPENTIN 800 MG: 400 CAPSULE ORAL at 15:33

## 2024-06-25 RX ADMIN — SODIUM CHLORIDE 1000 ML: 0.9 INJECTION, SOLUTION INTRAVENOUS at 10:35

## 2024-06-25 RX ADMIN — SODIUM CHLORIDE 1000 ML: 0.9 INJECTION, SOLUTION INTRAVENOUS at 12:53

## 2024-06-25 RX ADMIN — GABAPENTIN 800 MG: 400 CAPSULE ORAL at 20:49

## 2024-06-25 RX ADMIN — IOHEXOL 100 ML: 350 INJECTION, SOLUTION INTRAVENOUS at 11:18

## 2024-06-25 RX ADMIN — AMPICILLIN SODIUM AND SULBACTAM SODIUM 3 G: 100; 50 INJECTION, POWDER, FOR SOLUTION INTRAVENOUS at 10:35

## 2024-06-25 RX ADMIN — PIPERACILLIN AND TAZOBACTAM 4.5 G: 36; 4.5 INJECTION, POWDER, FOR SOLUTION INTRAVENOUS at 16:53

## 2024-06-25 RX ADMIN — LORAZEPAM 0.5 MG: 0.5 TABLET ORAL at 17:02

## 2024-06-25 NOTE — H&P
[facility]  H&P  Name: Denita Vital 68 y.o. female I MRN: [id number]  Unit/Bed#: [facility] I Date of Admission: 6/25/2024   Date of Service: 6/25/2024 I Hospital Day: 0      Assessment & Plan   * Acute metabolic encephalopathy  Assessment & Plan  Presented to ED with increased lethargy  Baseline orientation/neuro status nonverbal, alert -usually active, mimics staff, able to feed herself, stands with assistance and uses a sit to stand   Blood glucose 160  Ammonia ordered, LFTs WNL  UA moderate leukocytes, microscopic with WBC (has been on antibiotics, results may be altered)  Check serum Depakote level  Likely etiology ESBL UTI  IV fluid, IV antibiotics and monitor mental status      Urinary tract infection without hematuria  Assessment & Plan  Recently diagnosed with UTI-urine culture revealing Proteus ESBL  Has been being treated with amoxicillin and doxycycline  There is no in chart phone call made from the ER to group home to instruct to switch antibiotics, group home states they had not received the call    Now with lethargy  Will re-evaluate urine cultures  Start on Zosyn which on previous culture showed susceptibility  Monitor for improvement in encephalopathy with antibiotics  Not meeting SIRS criteria -no leukocytosis, Pro-Shawn negative, lactic negative  IV fluids  Urinary retention protocol    Intellectual disability  Assessment & Plan  At baseline patient is nonverbal  Resident of [facility]    Seizure disorder (HCC)  Assessment & Plan  Maintained on Depakote  Check valproic acid level  Check ammonia level  Seizure precautions           VTE Pharmacologic Prophylaxis: VTE Score: 4 Moderate Risk (Score 3-4) - Pharmacological DVT Prophylaxis Ordered: enoxaparin (Lovenox).  Code Status: Level 1 - Full Code   Discussion with family:  Caregiver from group home at bedside and supervisor at group home via phone.     Anticipated Length of Stay: Patient will be admitted on an  inpatient basis with an anticipated length of stay of greater than 2 midnights secondary to acute encephalopathy secondary to UTI.    Total Time Spent on Date of Encounter in care of patient:  mins. This time was spent on one or more of the following: performing physical exam; counseling and coordination of care; obtaining or reviewing history; documenting in the medical record; reviewing/ordering tests, medications or procedures; communicating with other healthcare professionals and discussing with patient's family/caregivers.    Chief Complaint: Lethargy x 1 day    History of Present Illness:  Denita Vital is a 68 y.o. female with a PMH of intellectual disability, seizure disorder, previous ESBL UTI, mood disorder who presents with concerns over increased lethargy.  Patient is typically alert, can do some ADLs herself including feeding herself.  Today was very lethargic and sleepy which is unusual.  Had UTI diagnosed approximately a week ago and has been on amoxicillin/doxycycline.  Cultures from that time growing ESBL and this is not appropriate antibiotic treatment.  Patient also with decreased appetite and oral intake.  Group home staff denying any fevers or chills like symptoms/rigors, no witnessing shortness of breath or labored breathing.  Does have occasional cough.  Sometimes spits up food at dinner table.  Denies seeing her grab her stomach to indicate abdominal pain.  Denies any skin wounds. .    Review of Systems:  Review of Systems   Unable to perform ROS: Patient nonverbal   Constitutional:  Positive for fatigue.        Noted by staff to be lethargic       Past Medical and Surgical History:   Past Medical History:   Diagnosis Date    Anxiety     CHF (congestive heart failure) (HCC)     Diabetes mellitus (HCC)     GERD (gastroesophageal reflux disease)     Hyperlipidemia     Hypertension     Leukopenia     Mental disability     Mixed incontinence 04/12/2017    Seizure disorder (HCC)        Past  Surgical History:   Procedure Laterality Date    DENTAL SURGERY         Meds/Allergies:  Prior to Admission medications    Medication Sig Start Date End Date Taking? Authorizing Provider   aspirin (ECOTRIN LOW STRENGTH) 81 mg EC tablet Take 81 mg by mouth daily    Historical Provider, MD   bisacodyl (DULCOLAX) 5 mg EC tablet Take 5 mg by mouth daily as needed for constipation    Historical Provider, MD   cholecalciferol (VITAMIN D3) 250 MCG (21421 UT) capsule Take 5 capsules (50,000 Units total) by mouth once a week On saturday 8/24/23   Eli Tolentino MD   Cranberry 450 MG CAPS Take 1 capsule (450 mg total) by mouth in the morning 8/24/23   Eli Tolentino MD   Diapers & Supplies MISC Use as directed. 5/12/22   Historical Provider, MD   divalproex sodium (DEPAKOTE) 250 mg EC tablet Take 250 mg by mouth in the morning 1 tab AM (8am)    Historical Provider, MD   divalproex sodium (DEPAKOTE) 500 mg EC tablet Take 500 mg by mouth daily after dinner    Historical Provider, MD   docusate sodium (COLACE) 100 mg capsule Take 100 mg by mouth in the morning and 100 mg in the evening.    Historical Provider, MD   gabapentin (NEURONTIN) 800 mg tablet Take 800 mg by mouth 3 (three) times a day 5/12/22   Historical Provider, MD   Incontinence Supply Disposable (FQ Pant Liner) MISC  5/14/22   Historical Provider, MD   Lancets 33G MISC daily 5/12/22   Historical Provider, MD   levothyroxine 50 mcg tablet Take 1 tablet (50 mcg total) by mouth daily in the early morning 5/21/22   Demetria Sol MD   LORazepam (ATIVAN) 0.5 mg tablet Take 0.5 mg by mouth in the morning and 0.5 mg in the evening.    Historical Provider, MD   omeprazole (PriLOSEC) 20 mg delayed release capsule Take 20 mg by mouth daily 6/22/21 6/3/24  Historical Provider, MD   polyethylene glycol (MIRALAX) 17 g packet Take 17 g by mouth every other day as needed (if no bm in 3 days) 8/24/23   Eli Tolentino MD   QUEtiapine (SEROquel) 100 mg tablet Take 100 mg by mouth in the  morning and 100 mg in the evening. 8am, 4pm.    Historical Provider, MD   QUEtiapine (SEROquel) 200 mg tablet Take 200 mg by mouth daily at bedtime    Historical Provider, MD   rosuvastatin (CRESTOR) 40 MG tablet Take 40 mg by mouth daily    Historical Provider, MD   senna (SENOKOT) 8.6 mg Take 1 tablet (8.6 mg total) by mouth daily at bedtime 8/11/21   Demetria Sol MD   tolterodine (DETROL LA) 4 mg 24 hr capsule Take 1 capsule (4 mg total) by mouth daily 8/24/23   Eli Tolentino MD   torsemide (DEMADEX) 5 MG tablet Take 1 tablet (5 mg total) by mouth daily 8/24/23 6/3/24  Eli Tolentino MD     I have reveiwed home medications using records provided by [facility].    Allergies:   Allergies   Allergen Reactions    Actos [Pioglitazone] Other (See Comments)     unkown        Social History:  Marital Status: Single   Occupation: [employer]  Patient Pre-hospital Living Situation: [facility]  Patient Pre-hospital Level of Mobility:  Uses a sit to stand to be moved from room to room  Patient Pre-hospital Diet Restrictions: Puree and thin liquids  Substance Use History:   Social History     Substance and Sexual Activity   Alcohol Use Not Currently     Social History     Tobacco Use   Smoking Status Never   Smokeless Tobacco Never     Social History     Substance and Sexual Activity   Drug Use Not Currently       Family History:  Family History   Problem Relation Age of Onset    No Known Problems Mother     No Known Problems Father     No Known Problems Sister     No Known Problems Maternal Grandmother     No Known Problems Maternal Grandfather     No Known Problems Paternal Grandmother     No Known Problems Paternal Grandfather        Physical Exam:     Vitals:   Blood Pressure: 110/64 (06/25/24 1348)  Pulse: (!) 53 (06/25/24 1348)  Temperature: (!) 96.6 °F (35.9 °C) (06/25/24 1348)  Respirations: 18 (06/25/24 1348)  Height: 5' (152.4 cm) (06/25/24 1436)  Weight - Scale: 75.9 kg (167 lb 5.3 oz) (06/25/24  1436)  SpO2: 95 % (06/25/24 1348)    Physical Exam  Vitals and nursing note reviewed.   Constitutional:       General: She is not in acute distress.     Appearance: Normal appearance.   HENT:      Head: Normocephalic and atraumatic.      Nose: No congestion.      Mouth/Throat:      Mouth: Mucous membranes are moist.   Eyes:      Conjunctiva/sclera: Conjunctivae normal.   Cardiovascular:      Rate and Rhythm: Normal rate and regular rhythm.      Pulses: Normal pulses.      Heart sounds: Normal heart sounds. No murmur heard.  Pulmonary:      Effort: Pulmonary effort is normal. No respiratory distress.      Breath sounds: Normal breath sounds.   Abdominal:      General: Bowel sounds are normal.      Palpations: Abdomen is soft.      Tenderness: There is no abdominal tenderness.   Musculoskeletal:         General: Normal range of motion.      Right lower leg: No edema.      Left lower leg: No edema.   Skin:     General: Skin is warm and dry.   Neurological:      Mental Status: She is alert.      Comments: Nonverbal          Additional Data:     Lab Results:  Results from last 7 days   Lab Units 06/25/24  0955   WBC Thousand/uL 4.94   HEMOGLOBIN g/dL 10.6*   HEMATOCRIT % 33.4*   PLATELETS Thousands/uL 103*   SEGS PCT % 82*   LYMPHO PCT % 11*   MONO PCT % 6   EOS PCT % 1     Results from last 7 days   Lab Units 06/25/24  0955   SODIUM mmol/L 136   POTASSIUM mmol/L 4.2   CHLORIDE mmol/L 96   CO2 mmol/L 35*   BUN mg/dL 25   CREATININE mg/dL 0.68   ANION GAP mmol/L 5   CALCIUM mg/dL 10.1   ALBUMIN g/dL 3.7   TOTAL BILIRUBIN mg/dL 0.26   ALK PHOS U/L 94   ALT U/L 30   AST U/L 25   GLUCOSE RANDOM mg/dL 160*     Results from last 7 days   Lab Units 06/25/24  0955   INR  1.09         Lab Results   Component Value Date    HGBA1C 5.4 08/24/2023    HGBA1C 7.6 (H) 04/08/2022    HGBA1C 5.8 (H) 11/15/2021     Results from last 7 days   Lab Units 06/25/24  0955   LACTIC ACID mmol/L 2.0   PROCALCITONIN ng/ml <0.05        Lines/Drains:  Invasive Devices       Peripheral Intravenous Line  Duration             Peripheral IV 06/25/24 Left Antecubital <1 day                        Imaging: Reviewed radiology reports from this admission including: chest CT scan and abdominal/pelvic CT  CT chest abdomen pelvis w contrast   Final Result by Kavitha Hurst MD (06/25 1225)   No acute intrathoracic or intra-abdominal pathology.   Minimally increased compressive atelectasis in the right lower lobe. Redemonstrated elevation of the right hemidiaphragm.   Moderate fecal retention in the colon.   Chronic findings, as per the body of the report.                  Workstation performed: [id number]             EKG and Other Studies Reviewed on Admission:   EKG: Personally Reviewed. NSR. HR 71.    ** Please Note: This note has been constructed using a voice recognition system. **

## 2024-06-25 NOTE — PLAN OF CARE
Problem: PAIN - ADULT  Goal: Verbalizes/displays adequate comfort level or baseline comfort level  Description: Interventions:  - Encourage patient to monitor pain and request assistance  - Assess pain using appropriate pain scale  - Administer analgesics based on type and severity of pain and evaluate response  - Implement non-pharmacological measures as appropriate and evaluate response  - Consider cultural and social influences on pain and pain management  - Notify physician/advanced practitioner if interventions unsuccessful or patient reports new pain  Outcome: Progressing     Problem: INFECTION - ADULT  Goal: Absence or prevention of progression during hospitalization  Description: INTERVENTIONS:  - Assess and monitor for signs and symptoms of infection  - Monitor lab/diagnostic results  - Monitor all insertion sites, i.e. indwelling lines, tubes, and drains  - Monitor endotracheal if appropriate and nasal secretions for changes in amount and color  - [place] appropriate cooling/warming therapies per order  - Administer medications as ordered  - Instruct and encourage patient and family to use good hand hygiene technique  - Identify and instruct in appropriate isolation precautions for identified infection/condition  Outcome: Progressing     Problem: SAFETY ADULT  Goal: Patient will remain free of falls  Description: INTERVENTIONS:  - Educate patient/family on patient safety including physical limitations  - Instruct patient to call for assistance with activity   - Consult OT/PT to assist with strengthening/mobility   - Keep Call bell within reach  - Keep bed low and locked with side rails adjusted as appropriate  - Keep care items and personal belongings within reach  - Initiate and maintain comfort rounds  - Make Fall Risk Sign visible to staff  - Offer Toileting every 2 Hours, in advance of need  - Initiate/Maintain bed/chair alarm  - Obtain necessary fall risk management equipment: alarms  - Apply  yellow socks and bracelet for high fall risk patients  - Consider moving patient to room near nurses station  Outcome: Progressing  Goal: Maintain or return to baseline ADL function  Description: INTERVENTIONS:  -  Assess patient's ability to carry out ADLs; assess patient's baseline for ADL function and identify physical deficits which impact ability to perform ADLs (bathing, care of mouth/teeth, toileting, grooming, dressing, etc.)  - Assess/evaluate cause of self-care deficits   - Assess range of motion  - Assess patient's mobility; develop plan if impaired  - Assess patient's need for assistive devices and provide as appropriate  - Encourage maximum independence but intervene and supervise when necessary  - Involve family in performance of ADLs  - Assess for home care needs following discharge   - Consider OT consult to assist with ADL evaluation and planning for discharge  - Provide patient education as appropriate  Outcome: Progressing  Goal: Maintains/Returns to pre admission functional level  Description: INTERVENTIONS:  - Perform AM-PAC 6 Click Basic Mobility/ Daily Activity assessment daily.  - Set and communicate daily mobility goal to care team and patient/family/caregiver.   - Collaborate with rehabilitation services on mobility goals if consulted  - Perform Range of Motion 3 times a day.  - Reposition patient every 2 hours.  - Dangle patient 3 times a day  - Stand patient 3 times a day  - Ambulate patient 3 times a day  - Out of bed to chair 3 times a day   - Out of bed for meals 3 times a day  - Out of bed for toileting  - Record patient progress and toleration of activity level   Outcome: Progressing     Problem: DISCHARGE PLANNING  Goal: Discharge to home or other facility with appropriate resources  Description: INTERVENTIONS:  - Identify barriers to discharge w/patient and caregiver  - Arrange for needed discharge resources and transportation as appropriate  - Identify discharge learning needs  (meds, wound care, etc.)  - Arrange for interpretive services to assist at discharge as needed  - Refer to Case Management Department for coordinating discharge planning if the patient needs post-hospital services based on physician/advanced practitioner order or complex needs related to functional status, cognitive ability, or social support system  Outcome: Progressing     Problem: Knowledge Deficit  Goal: Patient/family/caregiver demonstrates understanding of disease process, treatment plan, medications, and discharge instructions  Description: Complete learning assessment and assess knowledge base.  Interventions:  - Provide teaching at level of understanding  - Provide teaching via preferred learning methods  Outcome: Progressing     Problem: GENITOURINARY - ADULT  Goal: Maintains or returns to baseline urinary function  Description: INTERVENTIONS:  - Assess urinary function  - Encourage oral fluids to ensure adequate hydration if ordered  - Administer IV fluids as ordered to ensure adequate hydration  - Administer ordered medications as needed  - Offer frequent toileting  - Follow urinary retention protocol if ordered  Outcome: Progressing     Problem: SKIN/TISSUE INTEGRITY - ADULT  Goal: Skin Integrity remains intact(Skin Breakdown Prevention)  Description: Assess:  -Perform Zafar assessment every shift  -Clean and moisturize skin every shift  -Inspect skin when repositioning, toileting, and assisting with ADLS  -Assess under medical devices such as mepilex every shift  -Assess extremities for adequate circulation and sensation     Bed Management:  -Have minimal linens on bed & keep smooth, unwrinkled  -Change linens as needed when moist or perspiring  -Avoid sitting or lying in one position for more than 2 hours while in bed  -Keep HOB at 30degrees     Toileting:  -Offer bedside commode  -Assess for incontinence every shift  -Use incontinent care products after each incontinent episode such as  wipes    Activity:  -Mobilize patient 3 times a day  -Encourage activity and walks on unit  -Encourage or provide ROM exercises   -Turn and reposition patient every 2 Hours  -Use appropriate equipment to lift or move patient in bed  -Instruct/ Assist with weight shifting every 1 when out of bed in chair  -Consider limitation of chair time 1 hour intervals    Skin Care:  -Avoid use of baby powder, tape, friction and shearing, hot water or constrictive clothing  -Relieve pressure over bony prominences using pillows  -Do not massage red bony areas    Next Steps:  -Teach patient strategies to minimize risks such as    -Consider consults to  interdisciplinary teams such as   Outcome: Progressing

## 2024-06-25 NOTE — ED PROVIDER NOTES
History  Chief Complaint   Patient presents with    Possible UTI     Per caregiver, patient has increased lethargy this morning, spitting up mucous. Patient has hx of UTIs and presented similarly.     Patient is a 68-year-old female nonverbal from group home who presents with caretaker for the concern of change in mental status more lethargic.  The patient approximately a week ago was diagnosed with a urinary tract infection by PCP was placed on doxycycline and amoxicillin has now finished that course but has become increasingly more lethargic at the facility.  Patient has had decreased p.o. intake and has been less responsive.  The patient has also been seen recently for cough.  She has been having more sputum production over the last few days.  Brought in for reevaluation.          Prior to Admission Medications   Prescriptions Last Dose Informant Patient Reported? Taking?   Cranberry 450 MG CAPS   No No   Sig: Take 1 capsule (450 mg total) by mouth in the morning   Diapers & Supplies MISC   Yes No   Sig: Use as directed.   Incontinence Supply Disposable (FQ Pant Liner) MISC   Yes No   LORazepam (ATIVAN) 0.5 mg tablet   Yes No   Sig: Take 0.5 mg by mouth in the morning and 0.5 mg in the evening.   Lancets 33G MISC   Yes No   Sig: daily   QUEtiapine (SEROquel) 100 mg tablet   Yes No   Sig: Take 100 mg by mouth in the morning and 100 mg in the evening. 8am, 4pm.   QUEtiapine (SEROquel) 200 mg tablet   Yes No   Sig: Take 200 mg by mouth daily at bedtime   aspirin (ECOTRIN LOW STRENGTH) 81 mg EC tablet   Yes No   Sig: Take 81 mg by mouth daily   bisacodyl (DULCOLAX) 5 mg EC tablet   Yes No   Sig: Take 5 mg by mouth daily as needed for constipation   cholecalciferol (VITAMIN D3) 250 MCG (68371 UT) capsule   No No   Sig: Take 5 capsules (50,000 Units total) by mouth once a week On saturday   divalproex sodium (DEPAKOTE) 250 mg EC tablet   Yes No   Sig: Take 250 mg by mouth in the morning 1 tab AM (8am)   divalproex  sodium (DEPAKOTE) 500 mg EC tablet   Yes No   Sig: Take 500 mg by mouth daily after dinner   docusate sodium (COLACE) 100 mg capsule  Outside Facility (Specify) Yes No   Sig: Take 100 mg by mouth in the morning and 100 mg in the evening.   gabapentin (NEURONTIN) 800 mg tablet   Yes No   Sig: Take 800 mg by mouth 3 (three) times a day   levothyroxine 50 mcg tablet   No No   Sig: Take 1 tablet (50 mcg total) by mouth daily in the early morning   omeprazole (PriLOSEC) 20 mg delayed release capsule   Yes No   Sig: Take 20 mg by mouth daily   polyethylene glycol (MIRALAX) 17 g packet   No No   Sig: Take 17 g by mouth every other day as needed (if no bm in 3 days)   rosuvastatin (CRESTOR) 40 MG tablet   Yes No   Sig: Take 40 mg by mouth daily   senna (SENOKOT) 8.6 mg   No No   Sig: Take 1 tablet (8.6 mg total) by mouth daily at bedtime   tolterodine (DETROL LA) 4 mg 24 hr capsule   No No   Sig: Take 1 capsule (4 mg total) by mouth daily   torsemide (DEMADEX) 5 MG tablet   No No   Sig: Take 1 tablet (5 mg total) by mouth daily      Facility-Administered Medications: None       Past Medical History:   Diagnosis Date    Anxiety     CHF (congestive heart failure) ([facility])     Diabetes mellitus (HCC)     GERD (gastroesophageal reflux disease)     Hyperlipidemia     Hypertension     Leukopenia     Mental disability     Mixed incontinence 04/12/2017    Seizure disorder (HCC)        Past Surgical History:   Procedure Laterality Date    DENTAL SURGERY         Family History   Problem Relation Age of Onset    No Known Problems Mother     No Known Problems Father     No Known Problems Sister     No Known Problems Maternal Grandmother     No Known Problems Maternal Grandfather     No Known Problems Paternal Grandmother     No Known Problems Paternal Grandfather      I have reviewed and agree with the history as documented.    E-Cigarette/Vaping    E-Cigarette Use Never User      E-Cigarette/Vaping Substances    Nicotine No     THC No      CBD No     Flavoring No     Other No     Unknown No      Social History     Tobacco Use    Smoking status: Never    Smokeless tobacco: Never   Vaping Use    Vaping status: Never Used   Substance Use Topics    Alcohol use: Not Currently    Drug use: Not Currently       Review of Systems   All other systems reviewed and are negative.      Physical Exam  Physical Exam  Vitals and nursing note reviewed.   Constitutional:       General: She is in acute distress.      Appearance: She is well-developed.   HENT:      Head: Normocephalic and atraumatic.      Right Ear: External ear normal.      Left Ear: External ear normal.      Mouth/Throat:      Mouth: Mucous membranes are dry.   Eyes:      Pupils: Pupils are equal, round, and reactive to light.   Cardiovascular:      Rate and Rhythm: Normal rate and regular rhythm.      Heart sounds: No murmur heard.  Pulmonary:      Effort: Pulmonary effort is normal. No respiratory distress.      Breath sounds: Normal breath sounds. No wheezing.   Abdominal:      General: Bowel sounds are normal.      Palpations: Abdomen is soft. There is no mass.      Tenderness: There is no abdominal tenderness. There is no rebound.      Hernia: No hernia is present.   Musculoskeletal:      Cervical back: Normal range of motion and neck supple.      Right lower leg: No edema.      Left lower leg: No edema.   Skin:     General: Skin is warm and dry.      Capillary Refill: Capillary refill takes less than 2 seconds.      Coloration: Skin is pale.   Neurological:      Mental Status: She is lethargic.      Coordination: Coordination normal.   Psychiatric:         Behavior: Behavior normal.         Vital Signs  ED Triage Vitals [06/25/24 0932]   Temp Pulse Respirations Blood Pressure SpO2   -- 80 19 108/52 93 %      Temp src Heart Rate Source Patient Position - Orthostatic VS BP Location FiO2 (%)   -- Monitor Lying -- --      Pain Score       --           Vitals:    06/25/24 0932 06/25/24 1130 06/25/24  1215   BP: 108/52 104/51 98/53   Pulse: 80 62 55   Patient Position - Orthostatic VS: Lying  Lying         Visual Acuity      ED Medications  Medications   piperacillin-tazobactam (ZOSYN) 4.5 g in sodium chloride 0.9 % 100 mL IVPB (4.5 g Intravenous New Bag 6/25/24 1253)   sodium chloride 0.9 % bolus 1,000 mL (1,000 mL Intravenous New Bag 6/25/24 1253)   sodium chloride 0.9 % bolus 1,000 mL (0 mL Intravenous Stopped 6/25/24 1237)   ampicillin-sulbactam (UNASYN) 3 g in sodium chloride 0.9 % 100 mL IVPB (0 g Intravenous Stopped 6/25/24 1105)   iohexol (OMNIPAQUE) 350 MG/ML injection (MULTI-DOSE) 100 mL (100 mL Intravenous Given 6/25/24 1118)   magnesium sulfate 2 g/50 mL IVPB (premix) 2 g (0 g Intravenous Stopped 6/25/24 1238)       Diagnostic Studies  Results Reviewed       Procedure Component Value Units Date/Time    Procalcitonin [[id number]]  (Normal) Collected: 06/25/24 0955    Lab Status: Final result Specimen: Blood from Arm, Left Updated: 06/25/24 1117     Procalcitonin <0.05 ng/ml     Urine Microscopic [[id number]]  (Abnormal) Collected: 06/25/24 1035    Lab Status: Final result Specimen: Urine, Straight Cath Updated: 06/25/24 1115     RBC, UA 0-1 /hpf      WBC, UA 10-20 /hpf      Epithelial Cells Occasional /hpf      Bacteria, UA Occasional /hpf     Urine culture [[id number]] Collected: 06/25/24 1035    Lab Status: In process Specimen: Urine, Straight Cath Updated: 06/25/24 1115    FLU/RSV/COVID - if FLU/RSV clinically relevant [[id number]]  (Normal) Collected: 06/25/24 0955    Lab Status: Final result Specimen: Nares from Nose Updated: 06/25/24 1106     SARS-CoV-2 Negative     INFLUENZA A PCR Negative     INFLUENZA B PCR Negative     RSV PCR Negative    Narrative:      FOR PEDIATRIC PATIENTS - copy/paste COVID Guidelines URL to browser: [email or web address]    SARS-CoV-2 assay is a Nucleic Acid Amplification assay intended for the  qualitative detection of  nucleic acid from SARS-CoV-2 in nasopharyngeal  swabs. Results are for the presumptive identification of SARS-CoV-2 RNA.    Positive results are indicative of infection with SARS-CoV-2, the virus  causing COVID-19, but do not rule out bacterial infection or co-infection  with other viruses. Laboratories within the [place] and its  territories are required to report all positive results to the appropriate  public health authorities. Negative results do not preclude SARS-CoV-2  infection and should not be used as the sole basis for treatment or other  patient management decisions. Negative results must be combined with  clinical observations, patient history, and epidemiological information.  This test has not been FDA cleared or approved.    This test has been authorized by FDA under an Emergency Use Authorization  (EUA). This test is only authorized for the duration of time the  declaration that circumstances exist justifying the authorization of the  emergency use of an in vitro diagnostic tests for detection of SARS-CoV-2  virus and/or diagnosis of COVID-19 infection under section 564(b)(1) of  the Act, 21 U.S.C. 360bbb-3(b)(1), unless the authorization is terminated  or revoked sooner. The test has been validated but independent review by FDA  and CLIA is pending.    Test performed using [employer] GeneXpert: This RT-PCR assay targets N2,  a region unique to SARS-CoV-2. A conserved region in the E-gene was chosen  for pan-Sarbecovirus detection which includes SARS-CoV-2.    According to CMS-2020-01-R, this platform meets the definition of high-throughput technology.    UA w Reflex to Microscopic w Reflex to Culture [[id number]]  (Abnormal) Collected: 06/25/24 1035    Lab Status: Final result Specimen: Urine, Straight Cath Updated: 06/25/24 1047     Color, UA Yellow     Clarity, UA Slightly Cloudy     Specific Gravity, UA 1.015     pH, UA 6.5     Leukocytes, UA Moderate     Nitrite, UA Negative     Protein, UA  Negative mg/dl      Glucose, UA Negative mg/dl      Ketones, UA Negative mg/dl      Urobilinogen, UA 0.2 E.U./dl      Bilirubin, UA Negative     Occult Blood, UA Negative    Lactic acid [[id number]]  (Normal) Collected: 06/25/24 0955    Lab Status: Final result Specimen: Blood from Arm, Left Updated: 06/25/24 1046     LACTIC ACID 2.0 mmol/L     Narrative:      Result may be elevated if tourniquet was used during collection.    Comprehensive metabolic panel [[id number]]  (Abnormal) Collected: 06/25/24 0955    Lab Status: Final result Specimen: Blood from Arm, Left Updated: 06/25/24 1041     Sodium 136 mmol/L      Potassium 4.2 mmol/L      Chloride 96 mmol/L      CO2 35 mmol/L      ANION GAP 5 mmol/L      BUN 25 mg/dL      Creatinine 0.68 mg/dL      Glucose 160 mg/dL      Calcium 10.1 mg/dL      AST 25 U/L      ALT 30 U/L      Alkaline Phosphatase 94 U/L      Total Protein 7.1 g/dL      Albumin 3.7 g/dL      Total Bilirubin 0.26 mg/dL      eGFR 88 ml/min/1.73sq m     Narrative:      National Kidney Disease Foundation guidelines for Chronic Kidney Disease (CKD):     Stage 1 with normal or high GFR (GFR > 90 mL/min/1.73 square meters)    Stage 2 Mild CKD (GFR = 60-89 mL/min/1.73 square meters)    Stage 3A Moderate CKD (GFR = 45-59 mL/min/1.73 square meters)    Stage 3B Moderate CKD (GFR = 30-44 mL/min/1.73 square meters)    Stage 4 Severe CKD (GFR = 15-29 mL/min/1.73 square meters)    Stage 5 End Stage CKD (GFR <15 mL/min/1.73 square meters)  Note: GFR calculation is accurate only with a steady state creatinine    Magnesium [[id number]]  (Abnormal) Collected: 06/25/24 0955    Lab Status: Final result Specimen: Blood from Arm, Left Updated: 06/25/24 1041     Magnesium 1.6 mg/dL     Protime-INR [[id number]]  (Normal) Collected: 06/25/24 0955    Lab Status: Final result Specimen: Blood from Arm, Left Updated: 06/25/24 1038     Protime 14.4 seconds      INR 1.09    APTT [[id number]]  (Normal) Collected: 06/25/24 0955    Lab  Status: Final result Specimen: Blood from Arm, Left Updated: 06/25/24 1038     PTT 37 seconds     CBC and differential [[id number]]  (Abnormal) Collected: 06/25/24 0955    Lab Status: Final result Specimen: Blood from Arm, Left Updated: 06/25/24 1030     WBC 4.94 Thousand/uL      RBC 3.63 Million/uL      Hemoglobin 10.6 g/dL      Hematocrit 33.4 %      MCV 92 fL      MCH 29.2 pg      MCHC 31.7 g/dL      RDW 16.8 %      MPV 10.3 fL      Platelets 103 Thousands/uL      nRBC 0 /100 WBCs      Segmented % 82 %      Immature Grans % 0 %      Lymphocytes % 11 %      Monocytes % 6 %      Eosinophils Relative 1 %      Basophils Relative 0 %      Absolute Neutrophils 4.08 Thousands/µL      Absolute Immature Grans 0.01 Thousand/uL      Absolute Lymphocytes 0.52 Thousands/µL      Absolute Monocytes 0.27 Thousand/µL      Eosinophils Absolute 0.06 Thousand/µL      Basophils Absolute 0.00 Thousands/µL     Blood culture #1 [[id number]] Collected: 06/25/24 1022    Lab Status: In process Specimen: Blood Updated: 06/25/24 1022    Blood culture #2 [[id number]] Collected: 06/25/24 0955    Lab Status: In process Specimen: Blood from Arm, Left Updated: 06/25/24 1022                   CT chest abdomen pelvis w contrast   Final Result by Kavitha Hurst MD (06/25 1225)   No acute intrathoracic or intra-abdominal pathology.   Minimally increased compressive atelectasis in the right lower lobe. Redemonstrated elevation of the right hemidiaphragm.   Moderate fecal retention in the colon.   Chronic findings, as per the body of the report.                  Workstation performed: [id number]                    Procedures  Procedures         ED Course  ED Course as of 06/25/24 1302   Tue Jun 25, 2024   1051 Leukocytes, UA(!): Moderate   1051 MAGNESIUM(!): 1.6                               SBIRT 20yo+      Flowsheet Row Most Recent Value   Initial Alcohol Screen: US AUDIT-C     1. How often do you have a drink containing alcohol? 0 Filed at: 06/25/2024  0932   2. How many drinks containing alcohol do you have on a typical day you are drinking?  0 Filed at: 06/25/2024 0932   3a. Male UNDER 65: How often do you have five or more drinks on one occasion? 0 Filed at: 06/25/2024 0932   3b. FEMALE Any Age, or MALE 65+: How often do you have 4 or more drinks on one occassion? 0 Filed at: 06/25/2024 0932   Audit-C Score 0 Filed at: 06/25/2024 0932   KAILASH: How many times in the past year have you...    Used an illegal drug or used a prescription medication for non-medical reasons? Never Filed at: 06/25/2024 0932                      Medical Decision Making  Patient is a 68-year-old female nonverbal from group home who presents with caretaker for the concern of change in mental status more lethargic.  The patient approximately a week ago was diagnosed with a urinary tract infection by PCP was placed on doxycycline and amoxicillin has now finished that course but has become increasingly more lethargic at the facility.  Patient has had decreased p.o. intake and has been less responsive.  The patient has also been seen recently for cough.  She has been having more sputum production over the last few days.  Brought in for reevaluation.    Patient's blood work overall noted to have hypomagnesia.  The patient's UA had leukocytes with a straight cath.  Patient's culture from a few days ago did demonstrate Proteus multidrug resistance.  Did give Unasyn dose as well as Zosyn.  Patient has a history of aspiration pneumonia.  CAT scan did not reveal any aspiration pneumonia but did reveal signs of UTI.  Patient was discussed with the inpatient for admission secondary to her lethargy and multidrug-resistant UTI.  Excepted to their service.  Caretaker and group home in agreement with treatment plan.        Amount and/or Complexity of Data Reviewed  Independent Historian: caregiver  External Data Reviewed: labs, radiology and notes.  Labs: ordered. Decision-making details documented in ED  Course.  Radiology: ordered and independent interpretation performed. Decision-making details documented in ED Course.    Risk  Prescription drug management.  Decision regarding hospitalization.             Disposition  Final diagnoses:   UTI (urinary tract infection)   Lethargy     Time reflects when diagnosis was documented in both MDM as applicable and the Disposition within this note       Time User Action Codes Description Comment    6/25/2024 12:33 PM Steven Wang [N39.0] UTI (urinary tract infection)     6/25/2024 12:34 PM Steven Wang [R53.83] Lethargy           ED Disposition       ED Disposition   Admit    Condition   Stable    Date/Time   Tue Jun 25, 2024 1256    Comment   Case was discussed with brittanie  and the patient's admission status was agreed to be Admission Status: inpatient status to the service of Dr. Sol  .               Follow-up Information    None         Patient's Medications   Discharge Prescriptions    No medications on file       No discharge procedures on file.    PDMP Review         Value Time User    PDMP Reviewed  Yes 7/19/2022  9:40 PM KRISTOPHER Mayer            ED Provider  Electronically Signed by             Steven Wang PA-C  06/25/24 1021

## 2024-06-25 NOTE — ASSESSMENT & PLAN NOTE
Recently diagnosed with UTI-urine culture revealing Proteus ESBL  Has been being treated with amoxicillin and doxycycline  There is no in chart phone call made from the ER to group home to instruct to switch antibiotics, group home states they had not received the call    Now with lethargy  Will re-evaluate urine cultures  Start on Zosyn which on previous culture showed susceptibility  Monitor for improvement in encephalopathy with antibiotics  Not meeting SIRS criteria -no leukocytosis, Pro-Shawn negative, lactic negative  IV fluids  Urinary retention protocol

## 2024-06-25 NOTE — ASSESSMENT & PLAN NOTE
Presented to ED with increased lethargy  Baseline orientation/neuro status nonverbal, alert -usually active, mimics staff, able to feed herself, stands with assistance and uses a sit to stand   Blood glucose 160  Ammonia ordered, LFTs WNL  UA moderate leukocytes, microscopic with WBC (has been on antibiotics, results may be altered)  Check serum Depakote level  Likely etiology ESBL UTI  IV fluid, IV antibiotics and monitor mental status

## 2024-06-26 PROBLEM — R78.81 POSITIVE BLOOD CULTURE: Status: ACTIVE | Noted: 2024-06-26

## 2024-06-26 LAB
ANION GAP SERPL CALCULATED.3IONS-SCNC: 3 MMOL/L (ref 4–13)
ANISOCYTOSIS BLD QL SMEAR: PRESENT
ATRIAL RATE: 71 BPM
BASOPHILS # BLD AUTO: 0 THOUSANDS/ÂΜL (ref 0–0.1)
BASOPHILS NFR BLD AUTO: 0 % (ref 0–1)
BUN SERPL-MCNC: 15 MG/DL (ref 5–25)
CALCIUM SERPL-MCNC: 9.1 MG/DL (ref 8.4–10.2)
CHLORIDE SERPL-SCNC: 100 MMOL/L (ref 96–108)
CO2 SERPL-SCNC: 32 MMOL/L (ref 21–32)
CREAT SERPL-MCNC: 0.45 MG/DL (ref 0.6–1.3)
EOSINOPHIL # BLD AUTO: 0.03 THOUSAND/ÂΜL (ref 0–0.61)
EOSINOPHIL NFR BLD AUTO: 1 % (ref 0–6)
ERYTHROCYTE [DISTWIDTH] IN BLOOD BY AUTOMATED COUNT: 16.7 % (ref 11.6–15.1)
GFR SERPL CREATININE-BSD FRML MDRD: 101 ML/MIN/1.73SQ M
GLUCOSE SERPL-MCNC: 120 MG/DL (ref 65–140)
GLUCOSE SERPL-MCNC: 68 MG/DL (ref 65–140)
GLUCOSE SERPL-MCNC: 95 MG/DL (ref 65–140)
HCT VFR BLD AUTO: 30.8 % (ref 34.8–46.1)
HGB BLD-MCNC: 9.4 G/DL (ref 11.5–15.4)
IMM GRANULOCYTES # BLD AUTO: 0.01 THOUSAND/UL (ref 0–0.2)
IMM GRANULOCYTES NFR BLD AUTO: 0 % (ref 0–2)
LYMPHOCYTES # BLD AUTO: 0.5 THOUSANDS/ÂΜL (ref 0.6–4.47)
LYMPHOCYTES NFR BLD AUTO: 11 % (ref 14–44)
MACROCYTES BLD QL AUTO: PRESENT
MAGNESIUM SERPL-MCNC: 2.3 MG/DL (ref 1.9–2.7)
MCH RBC QN AUTO: 28.5 PG (ref 26.8–34.3)
MCHC RBC AUTO-ENTMCNC: 30.5 G/DL (ref 31.4–37.4)
MCV RBC AUTO: 93 FL (ref 82–98)
MICROCYTES BLD QL AUTO: PRESENT
MONOCYTES # BLD AUTO: 0.32 THOUSAND/ÂΜL (ref 0.17–1.22)
MONOCYTES NFR BLD AUTO: 7 % (ref 4–12)
MRSA NOSE QL CULT: NORMAL
NEUTROPHILS # BLD AUTO: 3.51 THOUSANDS/ÂΜL (ref 1.85–7.62)
NEUTS SEG NFR BLD AUTO: 81 % (ref 43–75)
NRBC BLD AUTO-RTO: 0 /100 WBCS
P AXIS: 59 DEGREES
PLATELET # BLD AUTO: 81 THOUSANDS/UL (ref 149–390)
PLATELET BLD QL SMEAR: ABNORMAL
PMV BLD AUTO: 9.7 FL (ref 8.9–12.7)
POTASSIUM SERPL-SCNC: 4.4 MMOL/L (ref 3.5–5.3)
PR INTERVAL: 200 MS
PROCALCITONIN SERPL-MCNC: <0.05 NG/ML
QRS AXIS: 40 DEGREES
QRSD INTERVAL: 110 MS
QT INTERVAL: 372 MS
QTC INTERVAL: 404 MS
RBC # BLD AUTO: 3.3 MILLION/UL (ref 3.81–5.12)
RBC MORPH BLD: PRESENT
SODIUM SERPL-SCNC: 135 MMOL/L (ref 135–147)
T WAVE AXIS: 247 DEGREES
VENTRICULAR RATE: 71 BPM
WBC # BLD AUTO: 4.37 THOUSAND/UL (ref 4.31–10.16)

## 2024-06-26 PROCEDURE — 85025 COMPLETE CBC W/AUTO DIFF WBC: CPT

## 2024-06-26 PROCEDURE — 84145 PROCALCITONIN (PCT): CPT

## 2024-06-26 PROCEDURE — 83036 HEMOGLOBIN GLYCOSYLATED A1C: CPT | Performed by: FAMILY MEDICINE

## 2024-06-26 PROCEDURE — 83735 ASSAY OF MAGNESIUM: CPT

## 2024-06-26 PROCEDURE — 82948 REAGENT STRIP/BLOOD GLUCOSE: CPT

## 2024-06-26 PROCEDURE — 80048 BASIC METABOLIC PNL TOTAL CA: CPT

## 2024-06-26 PROCEDURE — 99232 SBSQ HOSP IP/OBS MODERATE 35: CPT | Performed by: FAMILY MEDICINE

## 2024-06-26 RX ORDER — INSULIN LISPRO 100 [IU]/ML
1-6 INJECTION, SOLUTION INTRAVENOUS; SUBCUTANEOUS
Status: DISCONTINUED | OUTPATIENT
Start: 2024-06-26 | End: 2024-07-02 | Stop reason: HOSPADM

## 2024-06-26 RX ADMIN — ASPIRIN 81 MG: 81 TABLET, COATED ORAL at 09:21

## 2024-06-26 RX ADMIN — QUETIAPINE FUMARATE 200 MG: 200 TABLET ORAL at 21:36

## 2024-06-26 RX ADMIN — LORAZEPAM 0.5 MG: 0.5 TABLET ORAL at 17:51

## 2024-06-26 RX ADMIN — GABAPENTIN 800 MG: 400 CAPSULE ORAL at 17:47

## 2024-06-26 RX ADMIN — DOCUSATE SODIUM 100 MG: 100 CAPSULE, LIQUID FILLED ORAL at 17:50

## 2024-06-26 RX ADMIN — GABAPENTIN 800 MG: 400 CAPSULE ORAL at 09:20

## 2024-06-26 RX ADMIN — DIVALPROEX SODIUM 500 MG: 500 TABLET, DELAYED RELEASE ORAL at 17:48

## 2024-06-26 RX ADMIN — GABAPENTIN 800 MG: 400 CAPSULE ORAL at 21:36

## 2024-06-26 RX ADMIN — PANTOPRAZOLE SODIUM 20 MG: 20 TABLET, DELAYED RELEASE ORAL at 05:31

## 2024-06-26 RX ADMIN — DOCUSATE SODIUM 100 MG: 100 CAPSULE, LIQUID FILLED ORAL at 09:21

## 2024-06-26 RX ADMIN — PIPERACILLIN AND TAZOBACTAM 4.5 G: 36; 4.5 INJECTION, POWDER, FOR SOLUTION INTRAVENOUS at 00:55

## 2024-06-26 RX ADMIN — ATORVASTATIN CALCIUM 80 MG: 40 TABLET, FILM COATED ORAL at 17:49

## 2024-06-26 RX ADMIN — POLYETHYLENE GLYCOL 3350 17 G: 17 POWDER, FOR SOLUTION ORAL at 09:29

## 2024-06-26 RX ADMIN — DIVALPROEX SODIUM 250 MG: 250 TABLET, DELAYED RELEASE ORAL at 09:20

## 2024-06-26 RX ADMIN — QUETIAPINE FUMARATE 100 MG: 100 TABLET ORAL at 09:21

## 2024-06-26 RX ADMIN — QUETIAPINE FUMARATE 100 MG: 100 TABLET ORAL at 17:50

## 2024-06-26 RX ADMIN — SODIUM CHLORIDE, SODIUM GLUCONATE, SODIUM ACETATE, POTASSIUM CHLORIDE, MAGNESIUM CHLORIDE, SODIUM PHOSPHATE, DIBASIC, AND POTASSIUM PHOSPHATE 100 ML/HR: .53; .5; .37; .037; .03; .012; .00082 INJECTION, SOLUTION INTRAVENOUS at 00:56

## 2024-06-26 RX ADMIN — NYSTATIN 1 APPLICATION: 100000 POWDER TOPICAL at 09:27

## 2024-06-26 RX ADMIN — PIPERACILLIN AND TAZOBACTAM 4.5 G: 36; 4.5 INJECTION, POWDER, FOR SOLUTION INTRAVENOUS at 09:28

## 2024-06-26 RX ADMIN — LORAZEPAM 0.5 MG: 0.5 TABLET ORAL at 09:21

## 2024-06-26 RX ADMIN — ENOXAPARIN SODIUM 40 MG: 40 INJECTION SUBCUTANEOUS at 09:28

## 2024-06-26 RX ADMIN — NYSTATIN 1 APPLICATION: 100000 POWDER TOPICAL at 17:59

## 2024-06-26 RX ADMIN — OXYBUTYNIN CHLORIDE 10 MG: 5 TABLET, EXTENDED RELEASE ORAL at 09:20

## 2024-06-26 RX ADMIN — SENNOSIDES 8.6 MG: 8.6 TABLET, FILM COATED ORAL at 21:39

## 2024-06-26 RX ADMIN — LEVOTHYROXINE SODIUM 50 MCG: 50 TABLET ORAL at 05:31

## 2024-06-26 RX ADMIN — ERTAPENEM SODIUM 1000 MG: 1 INJECTION, POWDER, LYOPHILIZED, FOR SOLUTION INTRAMUSCULAR; INTRAVENOUS at 13:01

## 2024-06-26 NOTE — CASE MANAGEMENT
Case Management Assessment & Discharge Planning Note    Patient name Denita Vital  Location /-01 MRN [id number]  : 1955 Date 2024       Current Admission Date: 2024  Current Admission Diagnosis:Acute metabolic encephalopathy   Patient Active Problem List    Diagnosis Date Noted Date Diagnosed    Urinary tract infection without hematuria 2024     Hypoglycemia 2023     Hyponatremia 2022     Open nondisplaced fracture of distal phalanx of left middle finger 2022     Avulsion of fingernail 2022     Acute urinary retention 2022     Lower extremity edema 2022     Acute metabolic encephalopathy 2022     Cystitis without hematuria 2022     Dysphagia 2021     Chronic anemia 2021     Chronically elevated right hemidiaphragm 2021     Obesity (BMI 30.0-34.9) 2021     Seizure disorder (HCC) 2021     Hyperlipidemia 2021     Abnormal x-ray 2021     Acute respiratory failure with hypoxia (HCC) 2021     Aspiration pneumonitis (HCC) 2021     Mood disorder (HCC) 2021     Type 2 diabetes mellitus with hypoglycemia, without long-term current use of insulin (HCC) 2020     Hypertension 2019     Gastroesophageal reflux disease without esophagitis 2018     Ambulatory dysfunction 2017     Intellectual disability 2017       LOS (days): 1  Geometric Mean LOS (GMLOS) (days): 3.9  Days to GMLOS:3.1     OBJECTIVE:    Risk of Unplanned Readmission Score: 25.38         Current admission status: Inpatient  Referral Reason: Other (Group Home coordination)    Preferred Pharmacy:   [facility] - [place], [place] - [address]  [address] [address]  Phone: [phone] Fax: [phone]    [id number] - [place], [place] - [address]  [address] [address]  Phone: [phone] Fax: [phone]    Primary Care  Provider: Soy Clemente MD    Primary Insurance: MEDICARE  Secondary Insurance: [place] MED[place]    ASSESSMENT:  Active Health Care Proxies       Irais Willis ( Supervisor [facility]) Health Care Representative   Primary Phone: [phone] (Mobile)                 Advance Directives  Does patient have a Health Care POA?: No  Was patient offered paperwork?:  (declined)  Does patient currently have a Health Care decision maker?: Yes, please see Health Care Proxy section  Does patient have Advance Directives?: No  Primary Contact: Christine (sister) per group home - she is decision maker         Readmission Root Cause  30 Day Readmission: No    Patient Information  Admitted from:: Other (comment) ([facility])  Mental Status: Other (Comment) (Non Verbal)  During Assessment patient was accompanied by: Not accompanied during assessment  Assessment information provided by:: Other - please comment ()  Primary Caregiver: Other (Comment) ([facility] staff)  Caregiver's Name:: [facility] Staff  Caregiver's Relationship to Patient:: Other (Specify) (group home staff)  Caregiver's Telephone Number:: [phone]  Support Systems: Family members, Home care staff  County of Residence: [place] [facility]  What city do you live in?: [place]  Home entry access options. Select all that apply.: Ramp, No steps to enter home  Type of Current Residence: [facility]  Living Arrangements: Other (Comment) ([facility])  Is patient a ?: No    Activities of Daily Living Prior to Admission  Functional Status: Assistance  Completes ADLs independently?: No  Level of ADL dependence: Assistance  Ambulates independently?: No  Level of ambulatory dependence: Assistance  Does patient use assisted devices?: Yes  Assisted Devices (DME) used: Shower Chair, Wheelchair  Does patient currently own DME?: Yes  What DME does the patient currently own?: Shower Chair, Walker, Wheelchair  Does patient have a history of Outpatient  Therapy (PT/OT)?: No  Does the patient have a history of Short-Term Rehab?: Yes ([facility])  Does patient have a history of HHC?: Yes ([facility])  Does patient currently have HHC?: No         Patient Information Continued  Income Source: SSI/SSD  Does patient have prescription coverage?: Yes  Does patient receive dialysis treatments?: No  Does patient have a history of substance abuse?: No  Does patient have a history of Mental Health Diagnosis?: Yes (Bipolar, mood d/o)  Is patient receiving treatment for mental health?: Yes (psych Jeri Soham, med mgt)  Has patient received inpatient treatment related to mental health in the last 2 years?: No         Means of Transportation  Means of Transport to Appts:: Other (Comment) (Group Home - via [employer] lift van)      Social Determinants of Health (SDOH)      Flowsheet Row Most Recent Value   Housing Stability    In the last 12 months, was there a time when you were not able to pay the mortgage or rent on time? N   At any time in the past 12 months, were you homeless or living in a shelter (including now)? N   Transportation Needs    In the past 12 months, has lack of transportation kept you from medical appointments or from getting medications? no   In the past 12 months, has lack of transportation kept you from meetings, work, or from getting things needed for daily living? No   Food Insecurity    Within the past 12 months, you worried that your food would run out before you got the money to buy more. Never true   Within the past 12 months, the food you bought just didn't last and you didn't have money to get more. Never true   Utilities    In the past 12 months has the electric, gas, oil, or water company threatened to shut off services in your home? No            DISCHARGE DETAILS:    Discharge planning discussed with:: Patient's  - Irais  Freedom of Choice: Yes  Comments - Freedom of Choice: [facility] if reccomended at discharge  CM contacted  family/caregiver?: Yes (message left with patient's sister's spouse Jacinto, TCT with )  Were Treatment Team discharge recommendations reviewed with patient/caregiver?: Yes  Did patient/caregiver verbalize understanding of patient care needs?: Yes  Were patient/caregiver advised of the risks associated with not following Treatment Team discharge recommendations?: Yes    Contacts  Patient Contacts: Irais, care taker, Sister Christine  Relationship to Patient:: Family  Contact Method: Phone  Phone Number: [phone] (Kathi) [phone] (Davy)  Reason/Outcome: Continuity of Care, Discharge Planning                   Would you like to participate in our [facility] service program?  : No - Declined          Transport at Discharge : Wheelchair van        Transported by (Company and Unit #): [facility] staff                    CM called patient's caregiver at [facility] (patient is non verbal at baseline) and baseline information was obtained. A message was also left for patient's sister. CM discussed the role of CM in helping the patient develop a discharge plan and assist the patient in carry out their plan.  Patient lives at [facility] and relies on staff for ADL. As per Irais ([facility] supervisor) patient is a stand assist for ambulation, can pull herself up, take a few steps (usually around 5) but then gets tired and wants to sit. They have rails through the home that she can use as well to get around. In the bathroom/dressing she does need assistance. She struggles to bend at times. They tried to use walker with her however patient struggled to understand the process. At times they do use a WC.   Upon discharge, patient is able to return to the [facility] and they have a WC van available for transportation. If patient needs HHC - she usually comes home with [facility]. At this time, aside of her symptoms from UTI they feel she is at her baseline for mobility and are not requesting PT/OT  evaluations.     CM received a call back from Christine - reviewed discharge planning and patient's care.     CM to follow patient's care and discharge needs.

## 2024-06-26 NOTE — ASSESSMENT & PLAN NOTE
1 set of blood culture is growing Staph aureus, coagulase-negative, second set of blood cultures still remain pending  Patient remain on ertapenem, continue and monitor blood culture report.

## 2024-06-26 NOTE — PROGRESS NOTES
[facility]  Progress Note  Name: Denita Vital I  MRN: [id number]  Unit/Bed#: -01 I Date of Admission: 6/25/2024   Date of Service: 6/26/2024 I Hospital Day: 1    Assessment & Plan   Positive blood culture  Assessment & Plan  1 set of blood culture is growing Staph aureus, coagulase-negative, second set of blood cultures still remain pending  Patient remain on ertapenem, continue and monitor blood culture report.    Urinary tract infection without hematuria  Assessment & Plan  Recently diagnosed with UTI-urine culture revealing Proteus ESBL  Has been being treated with amoxicillin and doxycycline  There is no in chart phone call made from the ER to group home to instruct to switch antibiotics, group home states they had not received the call    Antibiotic adjusted to ertapenem based on last urine culture.    * Acute metabolic encephalopathy  Assessment & Plan  Presented to ED with increased lethargy  Baseline orientation/neuro status nonverbal, alert -usually active, mimics staff, able to feed herself, stands with assistance and uses a sit to stand   Blood glucose 160  Ammonia ordered, LFTs WNL  UA moderate leukocytes, microscopic with WBC (has been on antibiotics, results may be altered)  Valproic acid level is 40  Likely etiology ESBL UTI  Continue IV antibiotic, antibiotic changed to ertapenem based on last urine culture report  Clinical exam, remained baseline      Intellectual disability  Assessment & Plan  At baseline patient is nonverbal  Resident of care home    Seizure disorder (HCC)  Assessment & Plan  Maintained on Depakote  Valproic acid level is 40, ammonia level normal  Seizure precautions               VTE Pharmacologic Prophylaxis: VTE Score: 4 Moderate Risk (Score 3-4) - Pharmacological DVT Prophylaxis Ordered: enoxaparin (Lovenox).    Mobility:   Basic Mobility Inpatient Raw Score: 9  -[facility] Goal: 3: Sit at edge of bed  -[facility] Achieved: 2: Bed  activities/Dependent transfer  [facility]-HL[facility] Goal NOT achieved. Continue with multidisciplinary rounding and encourage appropriate mobility to improve upon J[facility]-HLM goals.    Patient Centered Rounds: I performed bedside rounds with nursing staff today.   Discussions with Specialists or Other Care Team Provider: None    Education and Discussions with Family / Patient:  . -Left voice message for .    Current Length of Stay: 1 day(s)  Current Patient Status: Inpatient   Certification Statement: The patient will continue to require additional inpatient hospital stay due to monitorable conditions  Discharge Plan: Anticipate discharge in 48-72 hrs to to be determined    Code Status: Level 1 - Full Code    Subjective:   Seen and evaluated during the run.  Resting comfortably.  No overnight issues.    Objective:     Vitals:   Temp (24hrs), Av.9 °F (36.1 °C), Min:96.6 °F (35.9 °C), Max:97.3 °F (36.3 °C)    Temp:  [96.6 °F (35.9 °C)-97.3 °F (36.3 °C)] 97.3 °F (36.3 °C)  HR:  [52-59] 59  Resp:  [12-18] 18  BP: (102-110)/(44-64) 106/44  SpO2:  [95 %-98 %] 96 %  Body mass index is 32.68 kg/m².     Input and Output Summary (last 24 hours):     Intake/Output Summary (Last 24 hours) at 2024 1241  Last data filed at 2024 1204  Gross per 24 hour   Intake 925 ml   Output 1222 ml   Net -297 ml       Physical Exam:   Physical Exam  Vitals reviewed.   Constitutional:       Appearance: She is not ill-appearing.   Eyes:      Extraocular Movements: Extraocular movements intact.      Conjunctiva/sclera: Conjunctivae normal.      Pupils: Pupils are equal, round, and reactive to light.   Cardiovascular:      Rate and Rhythm: Normal rate.      Heart sounds: No murmur heard.     No friction rub. No gallop.   Pulmonary:      Effort: No respiratory distress.      Breath sounds: No stridor. No wheezing or rhonchi.   Musculoskeletal:      Right lower leg: No edema.      Left lower leg: No edema.    Neurological:      Mental Status: She is alert. Mental status is at baseline.      Cranial Nerves: No cranial nerve deficit.      Sensory: No sensory deficit.      Motor: No weakness.      Coordination: Coordination normal.         Additional Data:     Labs:  Results from last 7 days   Lab Units 06/26/24  0517   WBC Thousand/uL 4.37   HEMOGLOBIN g/dL 9.4*   HEMATOCRIT % 30.8*   PLATELETS Thousands/uL 81*   SEGS PCT % 81*   LYMPHO PCT % 11*   MONO PCT % 7   EOS PCT % 1     Results from last 7 days   Lab Units 06/26/24  0517 06/25/24  0955   SODIUM mmol/L 135 136   POTASSIUM mmol/L 4.4 4.2   CHLORIDE mmol/L 100 96   CO2 mmol/L 32 35*   BUN mg/dL 15 25   CREATININE mg/dL 0.45* 0.68   ANION GAP mmol/L 3* 5   CALCIUM mg/dL 9.1 10.1   ALBUMIN g/dL  --  3.7   TOTAL BILIRUBIN mg/dL  --  0.26   ALK PHOS U/L  --  94   ALT U/L  --  30   AST U/L  --  25   GLUCOSE RANDOM mg/dL 68 160*     Results from last 7 days   Lab Units 06/25/24  0955   INR  1.09             Results from last 7 days   Lab Units 06/26/24  0517 06/25/24  0955   LACTIC ACID mmol/L  --  2.0   PROCALCITONIN ng/ml <0.05 <0.05       Lines/Drains:  Invasive Devices       Peripheral Intravenous Line  Duration             Peripheral IV 06/25/24 Left Antecubital 1 day    Peripheral IV 06/25/24 Dorsal (posterior);Right Forearm <1 day                          Imaging: No pertinent imaging reviewed.    Recent Cultures (last 7 days):   Results from last 7 days   Lab Units 06/25/24  1022 06/25/24  0955   BLOOD CULTURE  Received in Microbiology Lab. Culture in Progress. Received in Microbiology Lab. Culture in Progress.   GRAM STAIN RESULT   --  Gram positive cocci in clusters*       Last 24 Hours Medication List:   Current Facility-Administered Medications   Medication Dose Route Frequency Provider Last Rate    acetaminophen  650 mg Oral Q6H PRN Ivana Montague PA-C      aspirin  81 mg Oral Daily Ivana Montague PA-C      atorvastatin  80 mg Oral Daily With Dinner Ivana  Eddi, CARLOS      bisacodyl  5 mg Oral Daily PRN Ivana Montague, PA-C      divalproex sodium  250 mg Oral Daily Ivana Montague, PA-C      divalproex sodium  500 mg Oral After Dinner Ivana Montague, PA-C      docusate sodium  100 mg Oral BID Ivana Montague, PA-C      enoxaparin  40 mg Subcutaneous Daily Ivana Montague, PA-C      ertapenem  1,000 mg Intravenous Q24H Joey Miranda MD      gabapentin  800 mg Oral TID Ivana Montague, PA-C      levothyroxine  50 mcg Oral Early Morning Ivana Montague, PA-C      LORazepam  0.5 mg Oral BID Ivana Montague, PA-C      nystatin  1 Application Topical BID Ivana Montague, PA-ROLDAN      oxybutynin  10 mg Oral Daily Ivana Montague, PA-C      pantoprazole  20 mg Oral Early Morning Ivana Montague, PA-C      polyethylene glycol  17 g Oral Daily Ivana Montague, PA-C      QUEtiapine  100 mg Oral BID Ivana Montague, PA-C      QUEtiapine  200 mg Oral HS Ivana Montague, PA-C      senna  8.6 mg Oral HS Ivana Montague, PA-C      simethicone  80 mg Oral 4x Daily PRN Ivana Montague, CARLOS          Today, Patient Was Seen By: Joey Miranda MD    **Please Note: This note may have been constructed using a voice recognition system.**

## 2024-06-26 NOTE — PLAN OF CARE
Problem: PAIN - ADULT  Goal: Verbalizes/displays adequate comfort level or baseline comfort level  Description: Interventions:  - Encourage patient to monitor pain and request assistance  - Assess pain using appropriate pain scale  - Administer analgesics based on type and severity of pain and evaluate response  - Implement non-pharmacological measures as appropriate and evaluate response  - Consider cultural and social influences on pain and pain management  - Notify physician/advanced practitioner if interventions unsuccessful or patient reports new pain  Outcome: Progressing     Problem: INFECTION - ADULT  Goal: Absence or prevention of progression during hospitalization  Description: INTERVENTIONS:  - Assess and monitor for signs and symptoms of infection  - Monitor lab/diagnostic results  - Monitor all insertion sites, i.e. indwelling lines, tubes, and drains  - Monitor endotracheal if appropriate and nasal secretions for changes in amount and color  - [place] appropriate cooling/warming therapies per order  - Administer medications as ordered  - Instruct and encourage patient and family to use good hand hygiene technique  - Identify and instruct in appropriate isolation precautions for identified infection/condition  Outcome: Progressing     Problem: SAFETY ADULT  Goal: Patient will remain free of falls  Description: INTERVENTIONS:  - Educate patient/family on patient safety including physical limitations  - Instruct patient to call for assistance with activity   - Consult OT/PT to assist with strengthening/mobility   - Keep Call bell within reach  - Keep bed low and locked with side rails adjusted as appropriate  - Keep care items and personal belongings within reach  - Initiate and maintain comfort rounds  - Make Fall Risk Sign visible to staff  - Offer Toileting every 2 Hours, in advance of need  - Initiate/Maintain bed/chair alarm  - Obtain necessary fall risk management equipment: alarms  - Apply  yellow socks and bracelet for high fall risk patients  - Consider moving patient to room near nurses station  Outcome: Progressing  Goal: Maintain or return to baseline ADL function  Description: INTERVENTIONS:  -  Assess patient's ability to carry out ADLs; assess patient's baseline for ADL function and identify physical deficits which impact ability to perform ADLs (bathing, care of mouth/teeth, toileting, grooming, dressing, etc.)  - Assess/evaluate cause of self-care deficits   - Assess range of motion  - Assess patient's mobility; develop plan if impaired  - Assess patient's need for assistive devices and provide as appropriate  - Encourage maximum independence but intervene and supervise when necessary  - Involve family in performance of ADLs  - Assess for home care needs following discharge   - Consider OT consult to assist with ADL evaluation and planning for discharge  - Provide patient education as appropriate  Outcome: Progressing  Goal: Maintains/Returns to pre admission functional level  Description: INTERVENTIONS:  - Perform AM-PAC 6 Click Basic Mobility/ Daily Activity assessment daily.  - Set and communicate daily mobility goal to care team and patient/family/caregiver.   - Collaborate with rehabilitation services on mobility goals if consulted  - Perform Range of Motion 3 times a day.  - Reposition patient every 2 hours.  - Dangle patient 3 times a day  - Stand patient 3 times a day  - Ambulate patient 3 times a day  - Out of bed to chair 3 times a day   - Out of bed for meals 3 times a day  - Out of bed for toileting  - Record patient progress and toleration of activity level   Outcome: Progressing     Problem: DISCHARGE PLANNING  Goal: Discharge to home or other facility with appropriate resources  Description: INTERVENTIONS:  - Identify barriers to discharge w/patient and caregiver  - Arrange for needed discharge resources and transportation as appropriate  - Identify discharge learning needs  (meds, wound care, etc.)  - Arrange for interpretive services to assist at discharge as needed  - Refer to Case Management Department for coordinating discharge planning if the patient needs post-hospital services based on physician/advanced practitioner order or complex needs related to functional status, cognitive ability, or social support system  Outcome: Progressing     Problem: Knowledge Deficit  Goal: Patient/family/caregiver demonstrates understanding of disease process, treatment plan, medications, and discharge instructions  Description: Complete learning assessment and assess knowledge base.  Interventions:  - Provide teaching at level of understanding  - Provide teaching via preferred learning methods  Outcome: Progressing     Problem: GENITOURINARY - ADULT  Goal: Maintains or returns to baseline urinary function  Description: INTERVENTIONS:  - Assess urinary function  - Encourage oral fluids to ensure adequate hydration if ordered  - Administer IV fluids as ordered to ensure adequate hydration  - Administer ordered medications as needed  - Offer frequent toileting  - Follow urinary retention protocol if ordered  Outcome: Progressing     Problem: SKIN/TISSUE INTEGRITY - ADULT  Goal: Skin Integrity remains intact(Skin Breakdown Prevention)  Description: Assess:  -Perform Zafar assessment every shift  -Clean and moisturize skin every shift  -Inspect skin when repositioning, toileting, and assisting with ADLS  -Assess under medical devices such as mepilex every shift  -Assess extremities for adequate circulation and sensation     Bed Management:  -Have minimal linens on bed & keep smooth, unwrinkled  -Change linens as needed when moist or perspiring  -Avoid sitting or lying in one position for more than 2 hours while in bed  -Keep HOB at 30degrees     Toileting:  -Offer bedside commode  -Assess for incontinence every shift  -Use incontinent care products after each incontinent episode such as  wipes    Activity:  -Mobilize patient 3 times a day  -Encourage activity and walks on unit  -Encourage or provide ROM exercises   -Turn and reposition patient every 2 Hours  -Use appropriate equipment to lift or move patient in bed  -Instruct/ Assist with weight shifting every 1 when out of bed in chair  -Consider limitation of chair time 1 hour intervals    Skin Care:  -Avoid use of baby powder, tape, friction and shearing, hot water or constrictive clothing  -Relieve pressure over bony prominences using pillows  -Do not massage red bony areas    Next Steps:  -Teach patient strategies to minimize risks such as    -Consider consults to  interdisciplinary teams such as   Outcome: Progressing     Problem: Prexisting or High Potential for Compromised Skin Integrity  Goal: Skin integrity is maintained or improved  Description: INTERVENTIONS:  - Identify patients at risk for skin breakdown  - Assess and monitor skin integrity  - Assess and monitor nutrition and hydration status  - Monitor labs   - Assess for incontinence   - Turn and reposition patient  - Assist with mobility/ambulation  - Relieve pressure over bony prominences  - Avoid friction and shearing  - Provide appropriate hygiene as needed including keeping skin clean and dry  - Evaluate need for skin moisturizer/barrier cream  - Collaborate with interdisciplinary team   - Patient/family teaching  - Consider wound care consult   Outcome: Progressing

## 2024-06-26 NOTE — ASSESSMENT & PLAN NOTE
Presented to ED with increased lethargy  Baseline orientation/neuro status nonverbal, alert -usually active, mimics staff, able to feed herself, stands with assistance and uses a sit to stand   Blood glucose 160  Ammonia ordered, LFTs WNL  UA moderate leukocytes, microscopic with WBC (has been on antibiotics, results may be altered)  Valproic acid level is 40  Likely etiology ESBL UTI  Continue IV antibiotic, antibiotic changed to ertapenem based on last urine culture report  Clinical exam, remained baseline

## 2024-06-26 NOTE — ASSESSMENT & PLAN NOTE
Recently diagnosed with UTI-urine culture revealing Proteus ESBL  Has been being treated with amoxicillin and doxycycline  There is no in chart phone call made from the ER to group home to instruct to switch antibiotics, group home states they had not received the call    Antibiotic adjusted to ertapenem based on last urine culture.

## 2024-06-27 LAB
ALBUMIN SERPL BCG-MCNC: 2.8 G/DL (ref 3.5–5)
ALP SERPL-CCNC: 78 U/L (ref 34–104)
ALT SERPL W P-5'-P-CCNC: 39 U/L (ref 7–52)
ANION GAP SERPL CALCULATED.3IONS-SCNC: 3 MMOL/L (ref 4–13)
AST SERPL W P-5'-P-CCNC: 47 U/L (ref 13–39)
BACTERIA UR CULT: ABNORMAL
BASOPHILS # BLD AUTO: 0 THOUSANDS/ÂΜL (ref 0–0.1)
BASOPHILS NFR BLD AUTO: 0 % (ref 0–1)
BILIRUB SERPL-MCNC: 0.25 MG/DL (ref 0.2–1)
BUN SERPL-MCNC: 13 MG/DL (ref 5–25)
CALCIUM ALBUM COR SERPL-MCNC: 10.5 MG/DL (ref 8.3–10.1)
CALCIUM SERPL-MCNC: 9.5 MG/DL (ref 8.4–10.2)
CHLORIDE SERPL-SCNC: 101 MMOL/L (ref 96–108)
CO2 SERPL-SCNC: 33 MMOL/L (ref 21–32)
CREAT SERPL-MCNC: 0.45 MG/DL (ref 0.6–1.3)
EOSINOPHIL # BLD AUTO: 0.04 THOUSAND/ÂΜL (ref 0–0.61)
EOSINOPHIL NFR BLD AUTO: 1 % (ref 0–6)
ERYTHROCYTE [DISTWIDTH] IN BLOOD BY AUTOMATED COUNT: 17.1 % (ref 11.6–15.1)
EST. AVERAGE GLUCOSE BLD GHB EST-MCNC: 123 MG/DL
GFR SERPL CREATININE-BSD FRML MDRD: 101 ML/MIN/1.73SQ M
GLUCOSE SERPL-MCNC: 104 MG/DL (ref 65–140)
GLUCOSE SERPL-MCNC: 131 MG/DL (ref 65–140)
GLUCOSE SERPL-MCNC: 75 MG/DL (ref 65–140)
GLUCOSE SERPL-MCNC: 82 MG/DL (ref 65–140)
GLUCOSE SERPL-MCNC: 97 MG/DL (ref 65–140)
HBA1C MFR BLD: 5.9 %
HCT VFR BLD AUTO: 27.5 % (ref 34.8–46.1)
HGB BLD-MCNC: 8.6 G/DL (ref 11.5–15.4)
IMM GRANULOCYTES # BLD AUTO: 0.01 THOUSAND/UL (ref 0–0.2)
IMM GRANULOCYTES NFR BLD AUTO: 0 % (ref 0–2)
LYMPHOCYTES # BLD AUTO: 1.07 THOUSANDS/ÂΜL (ref 0.6–4.47)
LYMPHOCYTES NFR BLD AUTO: 23 % (ref 14–44)
MCH RBC QN AUTO: 29.1 PG (ref 26.8–34.3)
MCHC RBC AUTO-ENTMCNC: 31.3 G/DL (ref 31.4–37.4)
MCV RBC AUTO: 93 FL (ref 82–98)
MONOCYTES # BLD AUTO: 0.44 THOUSAND/ÂΜL (ref 0.17–1.22)
MONOCYTES NFR BLD AUTO: 9 % (ref 4–12)
NEUTROPHILS # BLD AUTO: 3.11 THOUSANDS/ÂΜL (ref 1.85–7.62)
NEUTS SEG NFR BLD AUTO: 67 % (ref 43–75)
NRBC BLD AUTO-RTO: 0 /100 WBCS
PLATELET # BLD AUTO: 94 THOUSANDS/UL (ref 149–390)
PMV BLD AUTO: 10.4 FL (ref 8.9–12.7)
POTASSIUM SERPL-SCNC: 4.1 MMOL/L (ref 3.5–5.3)
PROT SERPL-MCNC: 5.6 G/DL (ref 6.4–8.4)
RBC # BLD AUTO: 2.96 MILLION/UL (ref 3.81–5.12)
SODIUM SERPL-SCNC: 137 MMOL/L (ref 135–147)
WBC # BLD AUTO: 4.67 THOUSAND/UL (ref 4.31–10.16)

## 2024-06-27 PROCEDURE — 99232 SBSQ HOSP IP/OBS MODERATE 35: CPT | Performed by: FAMILY MEDICINE

## 2024-06-27 PROCEDURE — 85025 COMPLETE CBC W/AUTO DIFF WBC: CPT | Performed by: FAMILY MEDICINE

## 2024-06-27 PROCEDURE — 82948 REAGENT STRIP/BLOOD GLUCOSE: CPT

## 2024-06-27 PROCEDURE — 80053 COMPREHEN METABOLIC PANEL: CPT | Performed by: FAMILY MEDICINE

## 2024-06-27 RX ADMIN — ERTAPENEM SODIUM 1000 MG: 1 INJECTION, POWDER, LYOPHILIZED, FOR SOLUTION INTRAMUSCULAR; INTRAVENOUS at 12:19

## 2024-06-27 RX ADMIN — DIVALPROEX SODIUM 250 MG: 250 TABLET, DELAYED RELEASE ORAL at 08:12

## 2024-06-27 RX ADMIN — DOCUSATE SODIUM 100 MG: 100 CAPSULE, LIQUID FILLED ORAL at 17:34

## 2024-06-27 RX ADMIN — DOCUSATE SODIUM 100 MG: 100 CAPSULE, LIQUID FILLED ORAL at 08:11

## 2024-06-27 RX ADMIN — GABAPENTIN 800 MG: 400 CAPSULE ORAL at 21:50

## 2024-06-27 RX ADMIN — LEVOTHYROXINE SODIUM 50 MCG: 50 TABLET ORAL at 06:33

## 2024-06-27 RX ADMIN — ASPIRIN 81 MG: 81 TABLET, COATED ORAL at 08:11

## 2024-06-27 RX ADMIN — DIVALPROEX SODIUM 500 MG: 500 TABLET, DELAYED RELEASE ORAL at 17:34

## 2024-06-27 RX ADMIN — QUETIAPINE FUMARATE 200 MG: 200 TABLET ORAL at 21:50

## 2024-06-27 RX ADMIN — BISACODYL 5 MG: 5 TABLET, COATED ORAL at 17:34

## 2024-06-27 RX ADMIN — NYSTATIN 1 APPLICATION: 100000 POWDER TOPICAL at 08:12

## 2024-06-27 RX ADMIN — OXYBUTYNIN CHLORIDE 10 MG: 5 TABLET, EXTENDED RELEASE ORAL at 08:11

## 2024-06-27 RX ADMIN — PANTOPRAZOLE SODIUM 20 MG: 20 TABLET, DELAYED RELEASE ORAL at 06:33

## 2024-06-27 RX ADMIN — GABAPENTIN 800 MG: 400 CAPSULE ORAL at 08:11

## 2024-06-27 RX ADMIN — LORAZEPAM 0.5 MG: 0.5 TABLET ORAL at 08:11

## 2024-06-27 RX ADMIN — ATORVASTATIN CALCIUM 80 MG: 40 TABLET, FILM COATED ORAL at 16:20

## 2024-06-27 RX ADMIN — NYSTATIN 1 APPLICATION: 100000 POWDER TOPICAL at 17:36

## 2024-06-27 RX ADMIN — ENOXAPARIN SODIUM 40 MG: 40 INJECTION SUBCUTANEOUS at 08:08

## 2024-06-27 RX ADMIN — GABAPENTIN 800 MG: 400 CAPSULE ORAL at 16:20

## 2024-06-27 RX ADMIN — LORAZEPAM 0.5 MG: 0.5 TABLET ORAL at 17:34

## 2024-06-27 RX ADMIN — SENNOSIDES 8.6 MG: 8.6 TABLET, FILM COATED ORAL at 21:50

## 2024-06-27 RX ADMIN — QUETIAPINE FUMARATE 100 MG: 100 TABLET ORAL at 08:11

## 2024-06-27 RX ADMIN — QUETIAPINE FUMARATE 100 MG: 100 TABLET ORAL at 16:20

## 2024-06-27 RX ADMIN — POLYETHYLENE GLYCOL 3350 17 G: 17 POWDER, FOR SOLUTION ORAL at 08:11

## 2024-06-27 NOTE — PROGRESS NOTES
[facility]  Progress Note  Name: Denita Vital I  MRN: [id number]  Unit/Bed#: [facility] I Date of Admission: 6/25/2024   Date of Service: 6/27/2024 I Hospital Day: 2    Assessment & Plan   Positive blood culture  Assessment & Plan  1 set of blood culture is growing Staph aureus, coagulase-negative, second set of blood cultures still remain pending  Patient remain on ertapenem, continue and monitor blood culture report.  Continue to monitor for full report at least 48 hours of blood culture    Urinary tract infection without hematuria  Assessment & Plan  Recently diagnosed with UTI-urine culture revealing Proteus ESBL  Has been being treated with amoxicillin and doxycycline  There is no in chart phone call made from the ER to group home to instruct to switch antibiotics, group home states they had not received the call    Antibiotic adjusted to ertapenem based on last urine culture.  Urine culture is growing Proteus mirabilis, previous urine culture was similar.  Awaiting for full reports    * Acute metabolic encephalopathy  Assessment & Plan  Presented to ED with increased lethargy  Baseline orientation/neuro status nonverbal, alert -usually active, mimics staff, able to feed herself, stands with assistance and uses a sit to stand   On clinical exam, patient returned to baseline.  Continue current treatment      Edema of upper extremity  Assessment & Plan  Had a history of infiltration  On palpation, patient has some nodularity, with bruise-suspect thrombophlebitis  Check venous duplex of upper extremity    Intellectual disability  Assessment & Plan  At baseline patient is nonverbal  Resident of [facility]    Seizure disorder (HCC)  Assessment & Plan  Maintained on Depakote  Valproic acid level is 40, ammonia level normal  Seizure precautions             VTE Pharmacologic Prophylaxis: VTE Score: 4 Moderate Risk (Score 3-4) - Pharmacological DVT Prophylaxis Ordered: enoxaparin  (Lovenox).    Mobility:   Basic Mobility Inpatient Raw Score: 6  JH-HLM Goal: 2: Bed activities/Dependent transfer  JH-HLM Achieved: 4: Move to chair/commode  JH-HLM Goal achieved. Continue to encourage appropriate mobility.    Patient Centered Rounds: I performed bedside rounds with nursing staff today.   Discussions with Specialists or Other Care Team Provider: None    Education and Discussions with Family / Patient:  .     Current Length of Stay: 2 day(s)  Current Patient Status: Inpatient   Certification Statement: The patient will continue to require additional inpatient hospital stay due to monitor above conditions  Discharge Plan: Anticipate discharge in 24-48 hrs to group [facility].    Code Status: Level 1 - Full Code    Subjective:   Seen and evaluated during the run.  No significant overnight issues.    Objective:     Vitals:   Temp (24hrs), Av.2 °F (36.8 °C), Min:97.9 °F (36.6 °C), Max:98.4 °F (36.9 °C)    Temp:  [97.9 °F (36.6 °C)-98.4 °F (36.9 °C)] 97.9 °F (36.6 °C)  HR:  [65-76] 72  Resp:  [20-21] 20  BP: (120-137)/(55-66) 120/55  SpO2:  [89 %-94 %] 94 %  Body mass index is 32.68 kg/m².     Input and Output Summary (last 24 hours):     Intake/Output Summary (Last 24 hours) at 2024 1526  Last data filed at 2024 1401  Gross per 24 hour   Intake 590 ml   Output 2532 ml   Net -1942 ml       Physical Exam:   Physical Exam  Vitals and nursing note reviewed.   Constitutional:       Appearance: She is not ill-appearing or diaphoretic.   Eyes:      Extraocular Movements: Extraocular movements intact.      Conjunctiva/sclera: Conjunctivae normal.      Pupils: Pupils are equal, round, and reactive to light.   Cardiovascular:      Rate and Rhythm: Normal rate.      Heart sounds: No murmur heard.     No friction rub. No gallop.   Pulmonary:      Effort: Pulmonary effort is normal. No respiratory distress.      Breath sounds: No stridor. No wheezing or rhonchi.   Abdominal:       General: There is no distension.      Palpations: There is no mass.      Tenderness: There is no abdominal tenderness.      Hernia: No hernia is present.   Musculoskeletal:         General: Swelling (upper extremities) present.   Neurological:      Mental Status: She is alert. Mental status is at baseline.          Additional Data:     Labs:  Results from last 7 days   Lab Units 06/27/24  0624   WBC Thousand/uL 4.67   HEMOGLOBIN g/dL 8.6*   HEMATOCRIT % 27.5*   PLATELETS Thousands/uL 94*   SEGS PCT % 67   LYMPHO PCT % 23   MONO PCT % 9   EOS PCT % 1     Results from last 7 days   Lab Units 06/27/24  0624   SODIUM mmol/L 137   POTASSIUM mmol/L 4.1   CHLORIDE mmol/L 101   CO2 mmol/L 33*   BUN mg/dL 13   CREATININE mg/dL 0.45*   ANION GAP mmol/L 3*   CALCIUM mg/dL 9.5   ALBUMIN g/dL 2.8*   TOTAL BILIRUBIN mg/dL 0.25   ALK PHOS U/L 78   ALT U/L 39   AST U/L 47*   GLUCOSE RANDOM mg/dL 75     Results from last 7 days   Lab Units 06/25/24  0955   INR  1.09     Results from last 7 days   Lab Units 06/27/24  1044 06/27/24  0730 06/26/24  2113 06/26/24  1524   POC GLUCOSE mg/dl 131 97 95 120     Results from last 7 days   Lab Units 06/26/24  1401   HEMOGLOBIN A1C % 5.9*     Results from last 7 days   Lab Units 06/26/24  0517 06/25/24  0955   LACTIC ACID mmol/L  --  2.0   PROCALCITONIN ng/ml <0.05 <0.05       Lines/Drains:  Invasive Devices       Peripheral Intravenous Line  Duration             Peripheral IV 06/25/24 Left Antecubital 2 days    Peripheral IV 06/25/24 Dorsal (posterior);Right Forearm 1 day                          Imaging: No pertinent imaging reviewed.    Recent Cultures (last 7 days):   Results from last 7 days   Lab Units 06/25/24  1035 06/25/24  1022 06/25/24  0955   BLOOD CULTURE   --  No Growth at 24 hrs. Staphylococcus coagulase negative*   GRAM STAIN RESULT   --   --  Gram positive cocci in clusters*   URINE CULTURE  <10,000 cfu/ml Proteus mirabilis ESBL*  --   --        Last 24 Hours Medication List:    Current Facility-Administered Medications   Medication Dose Route Frequency Provider Last Rate    acetaminophen  650 mg Oral Q6H PRN Ivana Montague PA-C      aspirin  81 mg Oral Daily Ivana Montague PA-C      atorvastatin  80 mg Oral Daily With Dinner Ivana Montague PA-C      bisacodyl  5 mg Oral Daily PRN Ivana Montague PA-C      divalproex sodium  250 mg Oral Daily Ivana Montague PA-C      divalproex sodium  500 mg Oral After Dinner Ivana Montague PA-C      docusate sodium  100 mg Oral BID Ivana Montague PA-C      enoxaparin  40 mg Subcutaneous Daily Ivana Montague PA-C      ertapenem  1,000 mg Intravenous Q24H Joey Miranda MD Stopped (06/27/24 1300)    gabapentin  800 mg Oral TID Ivana Montague PA-C      insulin lispro  1-6 Units Subcutaneous TID AC Joey Miranda MD      insulin lispro  1-6 Units Subcutaneous HS Joey Miranda MD      levothyroxine  50 mcg Oral Early Morning Ivana Montague PA-C      LORazepam  0.5 mg Oral BID Ivana Montague PA-C      nystatin  1 Application Topical BID Ivana Montague PA-C      oxybutynin  10 mg Oral Daily Ivana Montague PA-C      pantoprazole  20 mg Oral Early Morning Ivana Montague PA-C      polyethylene glycol  17 g Oral Daily Ivana Montague PA-C      QUEtiapine  100 mg Oral BID Ivana Montague PA-C      QUEtiapine  200 mg Oral HS Ivana Montague PA-C      senna  8.6 mg Oral HS Ivana Montague PA-C      simethicone  80 mg Oral 4x Daily PRN Ivana Montague PA-C          Today, Patient Was Seen By: Joey Miranda MD    **Please Note: This note may have been constructed using a voice recognition system.**

## 2024-06-27 NOTE — ASSESSMENT & PLAN NOTE
Presented to ED with increased lethargy  Baseline orientation/neuro status nonverbal, alert -usually active, mimics staff, able to feed herself, stands with assistance and uses a sit to stand   On clinical exam, patient returned to baseline.  Continue current treatment

## 2024-06-27 NOTE — PLAN OF CARE
Problem: PAIN - ADULT  Goal: Verbalizes/displays adequate comfort level or baseline comfort level  Description: Interventions:  - Encourage patient to monitor pain and request assistance  - Assess pain using appropriate pain scale  - Administer analgesics based on type and severity of pain and evaluate response  - Implement non-pharmacological measures as appropriate and evaluate response  - Consider cultural and social influences on pain and pain management  - Notify physician/advanced practitioner if interventions unsuccessful or patient reports new pain  Outcome: Progressing     Problem: INFECTION - ADULT  Goal: Absence or prevention of progression during hospitalization  Description: INTERVENTIONS:  - Assess and monitor for signs and symptoms of infection  - Monitor lab/diagnostic results  - Monitor all insertion sites, i.e. indwelling lines, tubes, and drains  - Monitor endotracheal if appropriate and nasal secretions for changes in amount and color  - [place] appropriate cooling/warming therapies per order  - Administer medications as ordered  - Instruct and encourage patient and family to use good hand hygiene technique  - Identify and instruct in appropriate isolation precautions for identified infection/condition  Outcome: Progressing     Problem: SAFETY ADULT  Goal: Patient will remain free of falls  Description: INTERVENTIONS:  - Educate patient/family on patient safety including physical limitations  - Instruct patient to call for assistance with activity   - Consult OT/PT to assist with strengthening/mobility   - Keep Call bell within reach  - Keep bed low and locked with side rails adjusted as appropriate  - Keep care items and personal belongings within reach  - Initiate and maintain comfort rounds  - Make Fall Risk Sign visible to staff  - Offer Toileting every 2 Hours, in advance of need  - Initiate/Maintain bed/chair alarm  - Obtain necessary fall risk management equipment: alarms  - Apply  yellow socks and bracelet for high fall risk patients  - Consider moving patient to room near nurses station  Outcome: Progressing  Goal: Maintain or return to baseline ADL function  Description: INTERVENTIONS:  -  Assess patient's ability to carry out ADLs; assess patient's baseline for ADL function and identify physical deficits which impact ability to perform ADLs (bathing, care of mouth/teeth, toileting, grooming, dressing, etc.)  - Assess/evaluate cause of self-care deficits   - Assess range of motion  - Assess patient's mobility; develop plan if impaired  - Assess patient's need for assistive devices and provide as appropriate  - Encourage maximum independence but intervene and supervise when necessary  - Involve family in performance of ADLs  - Assess for home care needs following discharge   - Consider OT consult to assist with ADL evaluation and planning for discharge  - Provide patient education as appropriate  Outcome: Progressing  Goal: Maintains/Returns to pre admission functional level  Description: INTERVENTIONS:  - Perform AM-PAC 6 Click Basic Mobility/ Daily Activity assessment daily.  - Set and communicate daily mobility goal to care team and patient/family/caregiver.   - Collaborate with rehabilitation services on mobility goals if consulted  - Perform Range of Motion 3 times a day.  - Reposition patient every 2 hours.  - Dangle patient 3 times a day  - Stand patient 3 times a day  - Ambulate patient 3 times a day  - Out of bed to chair 3 times a day   - Out of bed for meals 3 times a day  - Out of bed for toileting  - Record patient progress and toleration of activity level   Outcome: Progressing     Problem: DISCHARGE PLANNING  Goal: Discharge to home or other facility with appropriate resources  Description: INTERVENTIONS:  - Identify barriers to discharge w/patient and caregiver  - Arrange for needed discharge resources and transportation as appropriate  - Identify discharge learning needs  (meds, wound care, etc.)  - Arrange for interpretive services to assist at discharge as needed  - Refer to Case Management Department for coordinating discharge planning if the patient needs post-hospital services based on physician/advanced practitioner order or complex needs related to functional status, cognitive ability, or social support system  Outcome: Progressing     Problem: Knowledge Deficit  Goal: Patient/family/caregiver demonstrates understanding of disease process, treatment plan, medications, and discharge instructions  Description: Complete learning assessment and assess knowledge base.  Interventions:  - Provide teaching at level of understanding  - Provide teaching via preferred learning methods  Outcome: Progressing     Problem: GENITOURINARY - ADULT  Goal: Maintains or returns to baseline urinary function  Description: INTERVENTIONS:  - Assess urinary function  - Encourage oral fluids to ensure adequate hydration if ordered  - Administer IV fluids as ordered to ensure adequate hydration  - Administer ordered medications as needed  - Offer frequent toileting  - Follow urinary retention protocol if ordered  Outcome: Progressing     Problem: SKIN/TISSUE INTEGRITY - ADULT  Goal: Skin Integrity remains intact(Skin Breakdown Prevention)  Description: Assess:  -Perform Zafar assessment every shift  -Clean and moisturize skin every shift  -Inspect skin when repositioning, toileting, and assisting with ADLS  -Assess under medical devices such as mepilex every shift  -Assess extremities for adequate circulation and sensation     Bed Management:  -Have minimal linens on bed & keep smooth, unwrinkled  -Change linens as needed when moist or perspiring  -Avoid sitting or lying in one position for more than 2 hours while in bed  -Keep HOB at 30degrees     Toileting:  -Offer bedside commode  -Assess for incontinence every shift  -Use incontinent care products after each incontinent episode such as  wipes    Activity:  -Mobilize patient 3 times a day  -Encourage activity and walks on unit  -Encourage or provide ROM exercises   -Turn and reposition patient every 2 Hours  -Use appropriate equipment to lift or move patient in bed  -Instruct/ Assist with weight shifting every 1 when out of bed in chair  -Consider limitation of chair time 1 hour intervals    Skin Care:  -Avoid use of baby powder, tape, friction and shearing, hot water or constrictive clothing  -Relieve pressure over bony prominences using pillows  -Do not massage red bony areas    Next Steps:  -Teach patient strategies to minimize risks such as    -Consider consults to  interdisciplinary teams such as   Outcome: Progressing     Problem: Prexisting or High Potential for Compromised Skin Integrity  Goal: Skin integrity is maintained or improved  Description: INTERVENTIONS:  - Identify patients at risk for skin breakdown  - Assess and monitor skin integrity  - Assess and monitor nutrition and hydration status  - Monitor labs   - Assess for incontinence   - Turn and reposition patient  - Assist with mobility/ambulation  - Relieve pressure over bony prominences  - Avoid friction and shearing  - Provide appropriate hygiene as needed including keeping skin clean and dry  - Evaluate need for skin moisturizer/barrier cream  - Collaborate with interdisciplinary team   - Patient/family teaching  - Consider wound care consult   Outcome: Progressing

## 2024-06-27 NOTE — ASSESSMENT & PLAN NOTE
Recently diagnosed with UTI-urine culture revealing Proteus ESBL  Has been being treated with amoxicillin and doxycycline  There is no in chart phone call made from the ER to group home to instruct to switch antibiotics, group home states they had not received the call    Antibiotic adjusted to ertapenem based on last urine culture.  Urine culture is growing Proteus mirabilis, previous urine culture was similar.  Awaiting for full reports

## 2024-06-27 NOTE — CASE MANAGEMENT
Case Management Discharge Planning Note    Patient name Denita Vital  Location /-01 MRN [id number]  : 1955 Date 2024       Current Admission Date: 2024  Current Admission Diagnosis:Acute metabolic encephalopathy   Patient Active Problem List    Diagnosis Date Noted Date Diagnosed    Positive blood culture 2024     Urinary tract infection without hematuria 2024     Hypoglycemia 2023     Hyponatremia 2022     Open nondisplaced fracture of distal phalanx of left middle finger 2022     Avulsion of fingernail 2022     Acute urinary retention 2022     Lower extremity edema 2022     Acute metabolic encephalopathy 2022     Cystitis without hematuria 2022     Dysphagia 2021     Chronic anemia 2021     Chronically elevated right hemidiaphragm 2021     Obesity (BMI 30.0-34.9) 2021     Seizure disorder (HCC) 2021     Hyperlipidemia 2021     Abnormal x-ray 2021     Acute respiratory failure with hypoxia (HCC) 2021     Aspiration pneumonitis (HCC) 2021     Mood disorder (HCC) 2021     Type 2 diabetes mellitus with hypoglycemia, without long-term current use of insulin (HCC) 2020     Hypertension 2019     Gastroesophageal reflux disease without esophagitis 2018     Ambulatory dysfunction 2017     Intellectual disability 2017       LOS (days): 2  Geometric Mean LOS (GMLOS) (days): 3.9  Days to GMLOS:1.9     OBJECTIVE:  Risk of Unplanned Readmission Score: 24.53         Current admission status: Inpatient   Preferred Pharmacy:   [facility] - [place], [place] - [address] [facility]  [address] [address]  Phone: [phone] Fax: [phone]    [facility]/pharmacy #[id number] [place], [place] - [address] [address]  Phone: [phone] Fax: [phone]    Primary Care Provider: Soy Clemente  MD    Primary Insurance: MEDICARE  Secondary Insurance: [place] [employer]    DISCHARGE DETAILS:      Additional Comments: GENESIS spoke with Adia of [facility] group home and informed of anticipated discharge for tomorrow. Adia reports she will have extra staff on tomorrow after 11am to be able to pick pt up.

## 2024-06-27 NOTE — ASSESSMENT & PLAN NOTE
Had a history of infiltration  On palpation, patient has some nodularity, with bruise-suspect thrombophlebitis  Check venous duplex of upper extremity

## 2024-06-27 NOTE — ASSESSMENT & PLAN NOTE
1 set of blood culture is growing Staph aureus, coagulase-negative, second set of blood cultures still remain pending  Patient remain on ertapenem, continue and monitor blood culture report.  Continue to monitor for full report at least 48 hours of blood culture

## 2024-06-27 NOTE — PLAN OF CARE
Problem: GENITOURINARY - ADULT  Goal: Maintains or returns to baseline urinary function  Description: INTERVENTIONS:  - Assess urinary function bladder scan pvr  - Encourage oral fluids to ensure adequate hydration if ordered  - Administer IV fluids as ordered to ensure adequate hydration  - Administer ordered medications as needed  - Offer frequent toileting  - Follow urinary retention protocol if ordered  Outcome: Not Progressing

## 2024-06-28 ENCOUNTER — APPOINTMENT (INPATIENT)
Dept: NON INVASIVE DIAGNOSTICS | Facility: HOSPITAL | Age: 69
DRG: 689 | End: 2024-06-28
Payer: MEDICARE

## 2024-06-28 LAB
ALBUMIN SERPL BCG-MCNC: 2.9 G/DL (ref 3.5–5)
ALP SERPL-CCNC: 78 U/L (ref 34–104)
ALT SERPL W P-5'-P-CCNC: 101 U/L (ref 7–52)
ANION GAP SERPL CALCULATED.3IONS-SCNC: 2 MMOL/L (ref 4–13)
AST SERPL W P-5'-P-CCNC: 132 U/L (ref 13–39)
BACTERIA BLD CULT: ABNORMAL
BASOPHILS # BLD AUTO: 0.01 THOUSANDS/ÂΜL (ref 0–0.1)
BASOPHILS NFR BLD AUTO: 0 % (ref 0–1)
BILIRUB SERPL-MCNC: 0.29 MG/DL (ref 0.2–1)
BUN SERPL-MCNC: 15 MG/DL (ref 5–25)
CALCIUM ALBUM COR SERPL-MCNC: 10.6 MG/DL (ref 8.3–10.1)
CALCIUM SERPL-MCNC: 9.7 MG/DL (ref 8.4–10.2)
CHLORIDE SERPL-SCNC: 102 MMOL/L (ref 96–108)
CO2 SERPL-SCNC: 33 MMOL/L (ref 21–32)
CREAT SERPL-MCNC: 0.51 MG/DL (ref 0.6–1.3)
EOSINOPHIL # BLD AUTO: 0.03 THOUSAND/ÂΜL (ref 0–0.61)
EOSINOPHIL NFR BLD AUTO: 1 % (ref 0–6)
ERYTHROCYTE [DISTWIDTH] IN BLOOD BY AUTOMATED COUNT: 17 % (ref 11.6–15.1)
GFR SERPL CREATININE-BSD FRML MDRD: 97 ML/MIN/1.73SQ M
GLUCOSE SERPL-MCNC: 128 MG/DL (ref 65–140)
GLUCOSE SERPL-MCNC: 135 MG/DL (ref 65–140)
GLUCOSE SERPL-MCNC: 142 MG/DL (ref 65–140)
GLUCOSE SERPL-MCNC: 88 MG/DL (ref 65–140)
GLUCOSE SERPL-MCNC: 97 MG/DL (ref 65–140)
GRAM STN SPEC: ABNORMAL
HCT VFR BLD AUTO: 29.1 % (ref 34.8–46.1)
HGB BLD-MCNC: 9.1 G/DL (ref 11.5–15.4)
IMM GRANULOCYTES # BLD AUTO: 0.02 THOUSAND/UL (ref 0–0.2)
IMM GRANULOCYTES NFR BLD AUTO: 1 % (ref 0–2)
LYMPHOCYTES # BLD AUTO: 1.22 THOUSANDS/ÂΜL (ref 0.6–4.47)
LYMPHOCYTES NFR BLD AUTO: 30 % (ref 14–44)
MCH RBC QN AUTO: 29.1 PG (ref 26.8–34.3)
MCHC RBC AUTO-ENTMCNC: 31.3 G/DL (ref 31.4–37.4)
MCV RBC AUTO: 93 FL (ref 82–98)
MONOCYTES # BLD AUTO: 0.35 THOUSAND/ÂΜL (ref 0.17–1.22)
MONOCYTES NFR BLD AUTO: 9 % (ref 4–12)
NEUTROPHILS # BLD AUTO: 2.51 THOUSANDS/ÂΜL (ref 1.85–7.62)
NEUTS SEG NFR BLD AUTO: 59 % (ref 43–75)
NRBC BLD AUTO-RTO: 0 /100 WBCS
PLATELET # BLD AUTO: 115 THOUSANDS/UL (ref 149–390)
PMV BLD AUTO: 9.5 FL (ref 8.9–12.7)
POTASSIUM SERPL-SCNC: 4.9 MMOL/L (ref 3.5–5.3)
PROT SERPL-MCNC: 5.8 G/DL (ref 6.4–8.4)
RBC # BLD AUTO: 3.13 MILLION/UL (ref 3.81–5.12)
S AUREUS+CONS DNA BLD POS NAA+NON-PROBE: DETECTED
SODIUM SERPL-SCNC: 137 MMOL/L (ref 135–147)
WBC # BLD AUTO: 4.14 THOUSAND/UL (ref 4.31–10.16)

## 2024-06-28 PROCEDURE — 80053 COMPREHEN METABOLIC PANEL: CPT | Performed by: FAMILY MEDICINE

## 2024-06-28 PROCEDURE — 93970 EXTREMITY STUDY: CPT | Performed by: SURGERY

## 2024-06-28 PROCEDURE — 93970 EXTREMITY STUDY: CPT

## 2024-06-28 PROCEDURE — 82948 REAGENT STRIP/BLOOD GLUCOSE: CPT

## 2024-06-28 PROCEDURE — 85025 COMPLETE CBC W/AUTO DIFF WBC: CPT | Performed by: FAMILY MEDICINE

## 2024-06-28 PROCEDURE — 99232 SBSQ HOSP IP/OBS MODERATE 35: CPT | Performed by: FAMILY MEDICINE

## 2024-06-28 PROCEDURE — NC001 PR NO CHARGE: Performed by: FAMILY MEDICINE

## 2024-06-28 RX ADMIN — QUETIAPINE FUMARATE 200 MG: 200 TABLET ORAL at 22:34

## 2024-06-28 RX ADMIN — LEVOTHYROXINE SODIUM 50 MCG: 50 TABLET ORAL at 06:12

## 2024-06-28 RX ADMIN — ENOXAPARIN SODIUM 40 MG: 40 INJECTION SUBCUTANEOUS at 10:24

## 2024-06-28 RX ADMIN — LORAZEPAM 0.5 MG: 0.5 TABLET ORAL at 10:27

## 2024-06-28 RX ADMIN — ERTAPENEM SODIUM 1000 MG: 1 INJECTION, POWDER, LYOPHILIZED, FOR SOLUTION INTRAMUSCULAR; INTRAVENOUS at 11:24

## 2024-06-28 RX ADMIN — OXYBUTYNIN CHLORIDE 10 MG: 5 TABLET, EXTENDED RELEASE ORAL at 10:27

## 2024-06-28 RX ADMIN — PANTOPRAZOLE SODIUM 20 MG: 20 TABLET, DELAYED RELEASE ORAL at 06:12

## 2024-06-28 RX ADMIN — ASPIRIN 81 MG: 81 TABLET, COATED ORAL at 10:26

## 2024-06-28 RX ADMIN — GABAPENTIN 800 MG: 400 CAPSULE ORAL at 10:27

## 2024-06-28 RX ADMIN — SENNOSIDES 8.6 MG: 8.6 TABLET, FILM COATED ORAL at 22:34

## 2024-06-28 RX ADMIN — LORAZEPAM 0.5 MG: 0.5 TABLET ORAL at 17:26

## 2024-06-28 RX ADMIN — ATORVASTATIN CALCIUM 80 MG: 40 TABLET, FILM COATED ORAL at 17:27

## 2024-06-28 RX ADMIN — QUETIAPINE FUMARATE 100 MG: 100 TABLET ORAL at 10:27

## 2024-06-28 RX ADMIN — DIVALPROEX SODIUM 500 MG: 500 TABLET, DELAYED RELEASE ORAL at 17:27

## 2024-06-28 RX ADMIN — GABAPENTIN 800 MG: 400 CAPSULE ORAL at 22:34

## 2024-06-28 RX ADMIN — POLYETHYLENE GLYCOL 3350 17 G: 17 POWDER, FOR SOLUTION ORAL at 10:24

## 2024-06-28 RX ADMIN — DOCUSATE SODIUM 100 MG: 100 CAPSULE, LIQUID FILLED ORAL at 10:27

## 2024-06-28 RX ADMIN — DIVALPROEX SODIUM 250 MG: 250 TABLET, DELAYED RELEASE ORAL at 10:26

## 2024-06-28 RX ADMIN — GABAPENTIN 800 MG: 400 CAPSULE ORAL at 17:26

## 2024-06-28 RX ADMIN — DOCUSATE SODIUM 100 MG: 100 CAPSULE, LIQUID FILLED ORAL at 17:27

## 2024-06-28 RX ADMIN — NYSTATIN 1 APPLICATION: 100000 POWDER TOPICAL at 17:27

## 2024-06-28 RX ADMIN — NYSTATIN 1 APPLICATION: 100000 POWDER TOPICAL at 10:27

## 2024-06-28 RX ADMIN — QUETIAPINE FUMARATE 100 MG: 100 TABLET ORAL at 17:26

## 2024-06-28 NOTE — ASSESSMENT & PLAN NOTE
Had a history of infiltration  On palpation, patient has some nodularity, with bruise-suspect thrombophlebitis  Venous duplex negative for thrombophlebitis or DVT

## 2024-06-28 NOTE — ASSESSMENT & PLAN NOTE
Recently diagnosed with UTI-urine culture revealing Proteus ESBL  Has been being treated with amoxicillin and doxycycline  There is no in chart phone call made from the ER to group home to instruct to switch antibiotics, group home states they had not received the call    Antibiotic adjusted to ertapenem based on last urine culture.  Urine culture is growing Proteus mirabilis, previous urine culture was similar-complaining 3 days of ertapenem.

## 2024-06-28 NOTE — ASSESSMENT & PLAN NOTE
Presented to ED with increased lethargy  Baseline orientation/neuro status nonverbal, alert -usually active, mimics staff, able to feed herself, stands with assistance and uses a sit to stand   On clinical exam, patient returned to baseline.  Continue current treatment  Patient is not moving around, will place PT OT

## 2024-06-28 NOTE — PLAN OF CARE
Problem: PAIN - ADULT  Goal: Verbalizes/displays adequate comfort level or baseline comfort level  Description: Interventions:  - Encourage patient to monitor pain and request assistance  - Assess pain using appropriate pain scale  - Administer analgesics based on type and severity of pain and evaluate response  - Implement non-pharmacological measures as appropriate and evaluate response  - Consider cultural and social influences on pain and pain management  - Notify physician/advanced practitioner if interventions unsuccessful or patient reports new pain  Outcome: Progressing     Problem: INFECTION - ADULT  Goal: Absence or prevention of progression during hospitalization  Description: INTERVENTIONS:  - Assess and monitor for signs and symptoms of infection  - Monitor lab/diagnostic results  - Monitor all insertion sites, i.e. indwelling lines, tubes, and drains  - Monitor endotracheal if appropriate and nasal secretions for changes in amount and color  - [place] appropriate cooling/warming therapies per order  - Administer medications as ordered  - Instruct and encourage patient and family to use good hand hygiene technique  - Identify and instruct in appropriate isolation precautions for identified infection/condition  Outcome: Progressing     Problem: SAFETY ADULT  Goal: Patient will remain free of falls  Description: INTERVENTIONS:  - Educate patient/family on patient safety including physical limitations  - Instruct patient to call for assistance with activity   - Consult OT/PT to assist with strengthening/mobility   - Keep Call bell within reach  - Keep bed low and locked with side rails adjusted as appropriate  - Keep care items and personal belongings within reach  - Initiate and maintain comfort rounds  - Make Fall Risk Sign visible to staff  - Offer Toileting every 2 Hours, in advance of need  - Initiate/Maintain bed/chair alarm  - Obtain necessary fall risk management equipment: alarms  - Apply  yellow socks and bracelet for high fall risk patients  - Consider moving patient to room near nurses station  Outcome: Progressing  Goal: Maintain or return to baseline ADL function  Description: INTERVENTIONS:  -  Assess patient's ability to carry out ADLs; assess patient's baseline for ADL function and identify physical deficits which impact ability to perform ADLs (bathing, care of mouth/teeth, toileting, grooming, dressing, etc.)  - Assess/evaluate cause of self-care deficits   - Assess range of motion  - Assess patient's mobility; develop plan if impaired  - Assess patient's need for assistive devices and provide as appropriate  - Encourage maximum independence but intervene and supervise when necessary  - Involve family in performance of ADLs  - Assess for home care needs following discharge   - Consider OT consult to assist with ADL evaluation and planning for discharge  - Provide patient education as appropriate  Outcome: Progressing  Goal: Maintains/Returns to pre admission functional level  Description: INTERVENTIONS:  - Perform AM-PAC 6 Click Basic Mobility/ Daily Activity assessment daily.  - Set and communicate daily mobility goal to care team and patient/family/caregiver.   - Collaborate with rehabilitation services on mobility goals if consulted  - Perform Range of Motion 3 times a day.  - Reposition patient every 2 hours.  - Dangle patient 3 times a day  - Stand patient 3 times a day  - Ambulate patient 3 times a day  - Out of bed to chair 3 times a day   - Out of bed for meals 3 times a day  - Out of bed for toileting  - Record patient progress and toleration of activity level   Outcome: Progressing     Problem: DISCHARGE PLANNING  Goal: Discharge to home or other facility with appropriate resources  Description: INTERVENTIONS:  - Identify barriers to discharge w/patient and caregiver  - Arrange for needed discharge resources and transportation as appropriate  - Identify discharge learning needs  (meds, wound care, etc.)  - Arrange for interpretive services to assist at discharge as needed  - Refer to Case Management Department for coordinating discharge planning if the patient needs post-hospital services based on physician/advanced practitioner order or complex needs related to functional status, cognitive ability, or social support system  Outcome: Progressing     Problem: GENITOURINARY - ADULT  Goal: Maintains or returns to baseline urinary function  Description: INTERVENTIONS:  - Assess urinary function bladder scan pvr  - Encourage oral fluids to ensure adequate hydration if ordered  - Administer IV fluids as ordered to ensure adequate hydration  - Administer ordered medications as needed  - Offer frequent toileting  - Follow urinary retention protocol if ordered  Outcome: Progressing     Problem: Knowledge Deficit  Goal: Patient/family/caregiver demonstrates understanding of disease process, treatment plan, medications, and discharge instructions  Description: Complete learning assessment and assess knowledge base.  Interventions:  - Provide teaching at level of understanding  - Provide teaching via preferred learning methods  Outcome: Progressing     Problem: SKIN/TISSUE INTEGRITY - ADULT  Goal: Skin Integrity remains intact(Skin Breakdown Prevention)  Description: Assess:  -Perform Zafar assessment every shift  -Clean and moisturize skin every shift  -Inspect skin when repositioning, toileting, and assisting with ADLS  -Assess under medical devices such as mepilex every shift  -Assess extremities for adequate circulation and sensation     Bed Management:  -Have minimal linens on bed & keep smooth, unwrinkled  -Change linens as needed when moist or perspiring  -Avoid sitting or lying in one position for more than 2 hours while in bed  -Keep HOB at 30degrees     Toileting:  -Offer bedside commode  -Assess for incontinence every shift  -Use incontinent care products after each incontinent episode such  as wipes    Activity:  -Mobilize patient 3 times a day  -Encourage activity and walks on unit  -Encourage or provide ROM exercises   -Turn and reposition patient every 2 Hours  -Use appropriate equipment to lift or move patient in bed  -Instruct/ Assist with weight shifting every 1 when out of bed in chair  -Consider limitation of chair time 1 hour intervals    Skin Care:  -Avoid use of baby powder, tape, friction and shearing, hot water or constrictive clothing  -Relieve pressure over bony prominences using pillows  -Do not massage red bony areas    Next Steps:  -Teach patient strategies to minimize risks such as    -Consider consults to  interdisciplinary teams such as   Outcome: Progressing

## 2024-06-28 NOTE — CASE MANAGEMENT
Case Management Discharge Planning Note    Patient name Denita Vital  Location /-01 MRN [id number]  : 1955 Date 2024       Current Admission Date: 2024  Current Admission Diagnosis:Acute metabolic encephalopathy   Patient Active Problem List    Diagnosis Date Noted Date Diagnosed    Positive blood culture 2024     Urinary tract infection without hematuria 2024     Hypoglycemia 2023     Hyponatremia 2022     Open nondisplaced fracture of distal phalanx of left middle finger 2022     Avulsion of fingernail 2022     Acute urinary retention 2022     Edema of upper extremity 2022     Acute metabolic encephalopathy 2022     Cystitis without hematuria 2022     Dysphagia 2021     Chronic anemia 2021     Chronically elevated right hemidiaphragm 2021     Obesity (BMI 30.0-34.9) 2021     Seizure disorder (HCC) 2021     Hyperlipidemia 2021     Abnormal x-ray 2021     Acute respiratory failure with hypoxia (HCC) 2021     Aspiration pneumonitis (HCC) 2021     Mood disorder (HCC) 2021     Type 2 diabetes mellitus with hypoglycemia, without long-term current use of insulin (HCC) 2020     Hypertension 2019     Gastroesophageal reflux disease without esophagitis 2018     Ambulatory dysfunction 2017     Intellectual disability 2017       LOS (days): 3  Geometric Mean LOS (GMLOS) (days): 3.9  Days to GMLOS:1     OBJECTIVE:  Risk of Unplanned Readmission Score: 24.86         Current admission status: Inpatient   Preferred Pharmacy:   [facility] - [place], [place] - [address] [address]  Phone: [phone] Fax: [phone]    [facility]/pharmacy #[id number] [place], [place] - [address] [place] [address]  Phone: [phone] Fax: [phone]    Primary Care Provider: Soy Clemente  MD    Primary Insurance: MEDICARE  Secondary Insurance: [place] [employer]    DISCHARGE DETAILS:        CM left voice message for [facility], to confirm patients mobility and inquire if home can accept back with nicole cath.  CM to follow for return phone call.      1215 CM spoke with staff from [facility]. Patient is very used to routine. Patient is probably not feeding herself because she is not sitting at a table. Patient is taken to bathroom by staff every 1.5 hours. [facility] does not want nicole for her return.  Staff indicated they have all DME needed for patient and request HHC with [facility] for her return.    CM submitted [facility] referral for [facility].    1250CM received message from [facility] indicating they are unavailable. CM added additional HHC agencies to [facility] referral for patient. CM to follow.

## 2024-06-28 NOTE — PLAN OF CARE
Problem: PAIN - ADULT  Goal: Verbalizes/displays adequate comfort level or baseline comfort level  Description: Interventions:  - Encourage patient to monitor pain and request assistance  - Assess pain using appropriate pain scale  - Administer analgesics based on type and severity of pain and evaluate response  - Implement non-pharmacological measures as appropriate and evaluate response  - Consider cultural and social influences on pain and pain management  - Notify physician/advanced practitioner if interventions unsuccessful or patient reports new pain  Outcome: Progressing     Problem: INFECTION - ADULT  Goal: Absence or prevention of progression during hospitalization  Description: INTERVENTIONS:  - Assess and monitor for signs and symptoms of infection  - Monitor lab/diagnostic results  - Monitor all insertion sites, i.e. indwelling lines, tubes, and drains  - Monitor endotracheal if appropriate and nasal secretions for changes in amount and color  - [place] appropriate cooling/warming therapies per order  - Administer medications as ordered  - Instruct and encourage patient and family to use good hand hygiene technique  - Identify and instruct in appropriate isolation precautions for identified infection/condition  Outcome: Progressing     Problem: SAFETY ADULT  Goal: Patient will remain free of falls  Description: INTERVENTIONS:  - Educate patient/family on patient safety including physical limitations  - Instruct patient to call for assistance with activity   - Consult OT/PT to assist with strengthening/mobility   - Keep Call bell within reach  - Keep bed low and locked with side rails adjusted as appropriate  - Keep care items and personal belongings within reach  - Initiate and maintain comfort rounds  - Make Fall Risk Sign visible to staff  - Offer Toileting every 2 Hours, in advance of need  - Initiate/Maintain bed/chair alarm  - Obtain necessary fall risk management equipment: alarms  - Apply  yellow socks and bracelet for high fall risk patients  - Consider moving patient to room near nurses station  Outcome: Progressing  Goal: Maintain or return to baseline ADL function  Description: INTERVENTIONS:  -  Assess patient's ability to carry out ADLs; assess patient's baseline for ADL function and identify physical deficits which impact ability to perform ADLs (bathing, care of mouth/teeth, toileting, grooming, dressing, etc.)  - Assess/evaluate cause of self-care deficits   - Assess range of motion  - Assess patient's mobility; develop plan if impaired  - Assess patient's need for assistive devices and provide as appropriate  - Encourage maximum independence but intervene and supervise when necessary  - Involve family in performance of ADLs  - Assess for home care needs following discharge   - Consider OT consult to assist with ADL evaluation and planning for discharge  - Provide patient education as appropriate  Outcome: Progressing  Goal: Maintains/Returns to pre admission functional level  Description: INTERVENTIONS:  - Perform AM-PAC 6 Click Basic Mobility/ Daily Activity assessment daily.  - Set and communicate daily mobility goal to care team and patient/family/caregiver.   - Collaborate with rehabilitation services on mobility goals if consulted  - Perform Range of Motion 3 times a day.  - Reposition patient every 2 hours.  - Dangle patient 3 times a day  - Stand patient 3 times a day  - Ambulate patient 3 times a day  - Out of bed to chair 3 times a day   - Out of bed for meals 3 times a day  - Out of bed for toileting  - Record patient progress and toleration of activity level   Outcome: Progressing     Problem: DISCHARGE PLANNING  Goal: Discharge to home or other facility with appropriate resources  Description: INTERVENTIONS:  - Identify barriers to discharge w/patient and caregiver  - Arrange for needed discharge resources and transportation as appropriate  - Identify discharge learning needs  (meds, wound care, etc.)  - Arrange for interpretive services to assist at discharge as needed  - Refer to Case Management Department for coordinating discharge planning if the patient needs post-hospital services based on physician/advanced practitioner order or complex needs related to functional status, cognitive ability, or social support system  Outcome: Progressing     Problem: Knowledge Deficit  Goal: Patient/family/caregiver demonstrates understanding of disease process, treatment plan, medications, and discharge instructions  Description: Complete learning assessment and assess knowledge base.  Interventions:  - Provide teaching at level of understanding  - Provide teaching via preferred learning methods  Outcome: Progressing     Problem: GENITOURINARY - ADULT  Goal: Maintains or returns to baseline urinary function  Description: INTERVENTIONS:  - Assess urinary function bladder scan pvr  - Encourage oral fluids to ensure adequate hydration if ordered  - Administer IV fluids as ordered to ensure adequate hydration  - Administer ordered medications as needed  - Offer frequent toileting  - Follow urinary retention protocol if ordered  Outcome: Progressing     Problem: SKIN/TISSUE INTEGRITY - ADULT  Goal: Skin Integrity remains intact(Skin Breakdown Prevention)  Description: Assess:  -Perform Zafar assessment every shift  -Clean and moisturize skin every shift  -Inspect skin when repositioning, toileting, and assisting with ADLS  -Assess under medical devices such as mepilex every shift  -Assess extremities for adequate circulation and sensation     Bed Management:  -Have minimal linens on bed & keep smooth, unwrinkled  -Change linens as needed when moist or perspiring  -Avoid sitting or lying in one position for more than 2 hours while in bed  -Keep HOB at 30degrees     Toileting:  -Offer bedside commode  -Assess for incontinence every shift  -Use incontinent care products after each incontinent episode such  as wipes    Activity:  -Mobilize patient 3 times a day  -Encourage activity and walks on unit  -Encourage or provide ROM exercises   -Turn and reposition patient every 2 Hours  -Use appropriate equipment to lift or move patient in bed  -Instruct/ Assist with weight shifting every 1 when out of bed in chair  -Consider limitation of chair time 1 hour intervals    Skin Care:  -Avoid use of baby powder, tape, friction and shearing, hot water or constrictive clothing  -Relieve pressure over bony prominences using pillows  -Do not massage red bony areas    Next Steps:  -Teach patient strategies to minimize risks such as    -Consider consults to  interdisciplinary teams such as   Outcome: Progressing     Problem: Prexisting or High Potential for Compromised Skin Integrity  Goal: Skin integrity is maintained or improved  Description: INTERVENTIONS:  - Identify patients at risk for skin breakdown  - Assess and monitor skin integrity  - Assess and monitor nutrition and hydration status  - Monitor labs   - Assess for incontinence   - Turn and reposition patient  - Assist with mobility/ambulation  - Relieve pressure over bony prominences  - Avoid friction and shearing  - Provide appropriate hygiene as needed including keeping skin clean and dry  - Evaluate need for skin moisturizer/barrier cream  - Collaborate with interdisciplinary team   - Patient/family teaching  - Consider wound care consult   Outcome: Progressing

## 2024-06-28 NOTE — DISCHARGE SUMMARY
[facility]  Discharge- Denita Vital 1955, 68 y.o. female MRN: [id number]  Unit/Bed#: [facility] Encounter: [id number]  Primary Care Provider: Soy Clemente MD   Date and time admitted to hospital: 6/25/2024  9:29 AM    Positive blood culture  Assessment & Plan  1 set of blood culture is growing Staph aureus, coagulase-negative, second set of blood cultures still remain pending  Patient remain on ertapenem, continue and monitor blood culture report.  Continue to monitor for full report at least 48 hours of blood culture-based on report, contaminated.    Urinary tract infection without hematuria  Assessment & Plan  Recently diagnosed with UTI-urine culture revealing Proteus ESBL  Has been being treated with amoxicillin and doxycycline  There is no in chart phone call made from the ER to group home to instruct to switch antibiotics, group home states they had not received the call    Antibiotic adjusted to ertapenem based on last urine culture.  Urine culture is growing Proteus mirabilis, previous urine culture was similar-complaining 3 days of ertapenem.    * Acute metabolic encephalopathy  Assessment & Plan  Presented to ED with increased lethargy  Baseline orientation/neuro status nonverbal, alert -usually active, mimics staff, able to feed herself, stands with assistance and uses a sit to stand   On clinical exam, patient returned to baseline.  Continue current treatment      Edema of upper extremity  Assessment & Plan  Had a history of infiltration  On palpation, patient has some nodularity, with bruise-suspect thrombophlebitis  Venous duplex negative for thrombophlebitis or DVT    Intellectual disability  Assessment & Plan  At baseline patient is nonverbal  Resident of longterm    Seizure disorder (HCC)  Assessment & Plan  Maintained on Depakote  Valproic acid level is 40, ammonia level normal  Seizure precautions      Medical Problems       Resolved Problems  Date  Reviewed: 2/26/2024   None       Discharging Physician / Practitioner: Joey Miranda MD  PCP: Soy Clemente MD  Admission Date:   Admission Orders (From admission, onward)       Ordered        06/25/24 1256  INPATIENT ADMISSION  Once                          Discharge Date: 06/28/24    Consultations During Hospital Stay:  None    Procedures Performed:   CT chest abdomen pelvis w contrast   Final Result by Kavitha Hurst MD (06/25 1225)   No acute intrathoracic or intra-abdominal pathology.   Minimally increased compressive atelectasis in the right lower lobe. Redemonstrated elevation of the right hemidiaphragm.   Moderate fecal retention in the colon.   Chronic findings, as per the body of the report.                  Workstation performed: [id number]         VAS upper limb venous duplex scan, complete, bilateral    (Results Pending)         Significant Findings / Test Results:   Lab Results   Component Value Date    WBC 4.14 (L) 06/28/2024    HGB 9.1 (L) 06/28/2024    HCT 29.1 (L) 06/28/2024    MCV 93 06/28/2024     (L) 06/28/2024     Lab Results   Component Value Date    SODIUM 137 06/28/2024    K 4.9 06/28/2024     06/28/2024    CO2 33 (H) 06/28/2024    AGAP 2 (L) 06/28/2024    BUN 15 06/28/2024    CREATININE 0.51 (L) 06/28/2024    GLUC 88 06/28/2024    CALCIUM 9.7 06/28/2024     (H) 06/28/2024     (H) 06/28/2024    ALKPHOS 78 06/28/2024    TP 5.8 (L) 06/28/2024    TBILI 0.29 06/28/2024    EGFR 97 06/28/2024     Collected Updated Procedure Result Status Patient Facility Result Comment    06/25/2024 1434 06/26/2024 1853 MRSA culture [[id number]]   Nares from Nose    Final result [facility]  Component Value   MRSA Culture Only No Methicillin Resistant Staphlyococcus aureus (MRSA) isolated             06/25/2024 1035 06/27/2024 1145 Urine culture [[id number]]    (Abnormal)   Urine, Straight Cath    Final result [facility]   Component Value   Urine Culture <10,000 cfu/ml Proteus mirabilis ESBL Abnormal     An Extended-Spectrum Beta-Lactamase is being produced by this organism (requires contact precautions).  Cephalosporins are NOT effective for treating these organisms.  For SEVERE infections (i.e. bacteremia, septic shock, etc.), Carbapenems are the drug of choice.  For MILD infections (i.e. isolated urinary or biliary infection), high-dose Zosyn may be used.  This organism has been edited. The previous result was Proteus species on 6/26/2024 at 1340 EDT.  This organism has been edited. The previous result was Proteus mirabilis on 6/26/2024 at 1512 EDT.       Susceptibility    Proteus mirabilis ESBL (1)    Antibiotic Interpretation Microscan  Method Status    ZID Performed  Yes  ALAN Final    Amoxicillin + Clavulanate Susceptible <=8/4 ug/ml ALAN Final    Ampicillin ($$) Resistant >16.00 ug/ml ALAN Final    Ampicillin + Sulbactam ($) Intermediate 16/8 ug/ml ALAN Final    Aztreonam ($$$) Resistant <=4 ug/ml ALAN Final    Cefazolin ($) Resistant >16.00 ug/ml ALAN Final    Cefepime ($) Resistant >16.00 ug/ml ALAN Final    Ceftazidime ($$) Resistant <=1 ug/ml ALAN Final    Ceftriaxone ($$) Resistant >32.00 ug/ml ALAN Final    Cefuroxime ($$) Resistant >16 ug/ml ALAN Final    Ciprofloxacin ($) Resistant >2.00 ug/ml ALAN Final    Ertapenem ($$$) Susceptible <=0.5 ug/ml ALAN Final    Gentamicin ($$) Susceptible <=2 ug/ml ALAN Final    Levofloxacin ($) Resistant >4.00 ug/ml ALAN Final    Nitrofurantoin Resistant >64 ug/ml ALAN Final    Piperacillin + Tazobactam ($$$) Susceptible <=8 ug/ml ALAN Final    Tetracycline Resistant >8 ug/ml ALAN Final    Trimethoprim + Sulfamethoxazole ($$$) Resistant >2/38 ug/ml ALAN Final          06/25/2024 1022 06/27/2024 1901 Blood culture #1 [[id number]]   Blood    Preliminary result [facility]  Component Value   Blood Culture No Growth at 48 hrs. P             06/25/2024 0955 06/28/2024 0808 Blood  culture #2 [[id number]]    (Abnormal)   Blood from Arm, Left    Final result [facility] Susceptibility testing will not be performed as this organism, when isolated from a single set of blood cultures, represents probable skin maricarmen contamination. Please call the Microbiology Laboratory within 5 days at [phone] if further workup is required. Component Value   Blood Culture Staphylococcus coagulase negative Abnormal    Gram Stain Result Gram positive cocci in clusters Abnormal     This is an appended report. These results have been appended to a previously preliminary verified report.             06/25/2024 0955 06/25/2024 1106 FLU/RSV/COVID - if FLU/RSV clinically relevant [[id number]]    Nares from Nose    Final result [facility] - copy/paste COVID Guidelines URL to browser: [email or web address]   SARS-CoV-2 assay is a Nucleic Acid Amplification assay intended for the   qualitative detection of nucleic acid from SARS-CoV-2 in nasopharyngeal   swabs. Results are for the presumptive identification of SARS-CoV-2 RNA.   Positive results are indicative of infection with SARS-CoV-2, the virus   causing COVID-19, but do not rule out bacterial infection or co-infection   with other viruses. Laboratories within the [place] and its   territories are required to report all positive results to the appropriate   public health authorities. Negative results do not preclude SARS-CoV-2   infection and should not be used as the sole basis for treatment or other   patient management decisions. Negative results must be combined with   clinical observations, patient history, and epidemiological information.   This test has not been FDA cleared or approved.   This test has been authorized by FDA under an Emergency Use Authorization   (EUA). This test is only authorized for the duration of time  the   declaration that circumstances exist justifying the authorization of the   emergency use of an in vitro diagnostic tests for detection of SARS-CoV-2   virus and/or diagnosis of COVID-19 infection under section 564(b)(1) of   the Act, 21 U.S.C. 360bbb-3(b)(1), unless the authorization is terminated   or revoked sooner. The test has been validated but independent review by FDA   and CLIA is pending.   Test performed using [employer]: This RT-PCR assay targets N2,   a region unique to SARS-CoV-2. A conserved region in the E-gene was chosen   for pan-Sarbecovirus detection which includes SARS-CoV-2.   According to CMS-2020-01-R, this platform meets the definition of high-throughput technology.    Component Value   SARS-CoV-2 Negative   INFLUENZA A PCR Negative   INFLUENZA B PCR Negative   RSV PCR Negative          Incidental Findings:   As mentioned above  I reviewed the above mentioned incidental findings with the patient and/or family and they expressed understanding.    Test Results Pending at Discharge (will require follow up):   none     Outpatient Tests Requested:  none    Complications:  none    Reason for Admission: Lethargy    Hospital Course:   Denita Vital is a 68 y.o. female patient who originally presented to the hospital on 6/25/2024 due to lethargy.  Patient admitted under diagnosis of metabolic encephalopathy secondary to urine infection, urine culture was growing ESBL, patient is status post treatment with 3 dose of ertapenem.  Per ID, does not recommend any more.  With the treatment plan, patient condition significantly improved, patient is alert.  At baseline, patient does feed herself, can move around with 2 assist.  During the hospitalization, does not move or feed herself.  Per group home, patient needs to go to sit her on chair dining that is how she reacts and can eat herself.    During the hospitalization, patient also requiring multiple straight cath due to retention.  Per  group home, at a group home patient use restroom every 1.5 hours-and requesting to avoid Perrin catheter.  At this time, will discharge back patient group home with follow-up with PCP.    No New medication added.  Patient can resume all home medications as before    All lab results commending findings, treatment plan and option discussed in details with group home.  Verbalizes to understand and agrees.    Please see above list of diagnoses and related plan for additional information.     Condition at Discharge: stable    Discharge Day Visit / Exam:   Subjective: Seen and evaluated during the run.  No overnight issues.  Vitals: Blood Pressure: 117/57 (06/28/24 0707)  Pulse: 65 (06/28/24 0707)  Temperature: 98.2 °F (36.8 °C) (06/28/24 0707)  Respirations: 17 (06/28/24 0707)  Height: 5' (152.4 cm) (06/25/24 1436)  Weight - Scale: 75.9 kg (167 lb 5.3 oz) (06/25/24 1436)  SpO2: 91 % (06/28/24 0707)  Exam:   Physical Exam  Vitals and nursing note reviewed.   Eyes:      Extraocular Movements: Extraocular movements intact.      Conjunctiva/sclera: Conjunctivae normal.      Pupils: Pupils are equal, round, and reactive to light.   Cardiovascular:      Rate and Rhythm: Normal rate.      Heart sounds: No murmur heard.     No friction rub. No gallop.   Pulmonary:      Effort: Pulmonary effort is normal. No respiratory distress.      Breath sounds: No stridor. No wheezing or rhonchi.   Abdominal:      General: There is no distension.      Palpations: There is no mass.      Tenderness: There is no abdominal tenderness.      Hernia: No hernia is present.   Musculoskeletal:         General: Swelling (both upper extremities) present.   Neurological:      Mental Status: She is alert. Mental status is at baseline.          Discussion with Family:  Group home updated.     Discharge instructions/Information to patient and family:   See after visit summary for information provided to patient and family.      Provisions for Follow-Up  Care:  See after visit summary for information related to follow-up care and any pertinent home health orders.      Mobility at time of Discharge:   Basic Mobility Inpatient Raw Score: 6  JH-HLM Goal: 2: Bed activities/Dependent transfer  JH-HLM Achieved: 2: Bed activities/Dependent transfer  HLM Goal achieved. Continue to encourage appropriate mobility.     Disposition:   Group Home / Personal Care Home at [facility]    Planned Readmission: If condition get worse     Discharge Statement:  Greater than 50% of the total time was spent examining patient, answering all patient questions, arranging and discussing plan of care with patient as well as directly providing post-discharge instructions.  Additional time then spent on discharge activities.    Discharge Medications:  See after visit summary for reconciled discharge medications provided to patient and/or family.      **Please Note: This note may have been constructed using a voice recognition system**

## 2024-06-28 NOTE — ASSESSMENT & PLAN NOTE
1 set of blood culture is growing Staph aureus, coagulase-negative, second set of blood cultures still remain pending  Patient remain on ertapenem, continue and monitor blood culture report.  Continue to monitor for full report at least 48 hours of blood culture-based on report, contaminated.

## 2024-06-28 NOTE — CASE MANAGEMENT
Case Management Discharge Planning Note    Patient name Denita Vital  Location /-01 MRN [id number]  : 1955 Date 2024       Current Admission Date: 2024  Current Admission Diagnosis:Acute metabolic encephalopathy   Patient Active Problem List    Diagnosis Date Noted Date Diagnosed    Positive blood culture 2024     Urinary tract infection without hematuria 2024     Hypoglycemia 2023     Hyponatremia 2022     Open nondisplaced fracture of distal phalanx of left middle finger 2022     Avulsion of fingernail 2022     Acute urinary retention 2022     Edema of upper extremity 2022     Acute metabolic encephalopathy 2022     Cystitis without hematuria 2022     Dysphagia 2021     Chronic anemia 2021     Chronically elevated right hemidiaphragm 2021     Obesity (BMI 30.0-34.9) 2021     Seizure disorder (HCC) 2021     Hyperlipidemia 2021     Abnormal x-ray 2021     Acute respiratory failure with hypoxia (HCC) 2021     Aspiration pneumonitis (HCC) 2021     Mood disorder (HCC) 2021     Type 2 diabetes mellitus with hypoglycemia, without long-term current use of insulin (HCC) 2020     Hypertension 2019     Gastroesophageal reflux disease without esophagitis 2018     Ambulatory dysfunction 2017     Intellectual disability 2017       LOS (days): 3  Geometric Mean LOS (GMLOS) (days): 3.9  Days to GMLOS:0.8     OBJECTIVE:  Risk of Unplanned Readmission Score: 24.91         Current admission status: Inpatient   Preferred Pharmacy:   [facility] - [place], [place] - [address]  [address] [address]  Phone: [phone] Fax: [phone]    [facility]/pharmacy #[id number] [place], [place] - [address]  [address] [address]  Phone: [phone] Fax: [phone]    Primary Care Provider: Soy Clemente  Outcome for 12/16/21 9:12 AM: Called daughter and left VM.   Outcome for 12/16/21 2:14 PM: Patient did not answer after multiple attempts. Unable to collect data       MD    Primary Insurance: MEDICARE  Secondary Insurance: [place] MEDICAL ASSISTANCE    DISCHARGE DETAILS:           GENESIS met with Zehra in patients room. Zehra indicated she feels patient is not at baseline. Zehra knows supervisor, Irais, would like patient to return to Group Home but Zehra feels this is unsafe at this time. Zehra indicated she would take patient to bathroom every 1.5 hours via Sit to Stand and Zehra questions if patient is able to stand at this time.     GENESIS messaged MD and asked for PT/OT eval to assess patient.     GENESIS spoke with Brittney Colindres ([phone]) Community Service Group Supervisor and Zan, , regarding concerns for patient not being at baseline and requiring PT/OT eval for further assessment.   Brittney Colindres emailed CM copy of patients ISP.     GENESIS to follow.

## 2024-06-28 NOTE — CASE MANAGEMENT
Case Management Discharge Planning Note    Patient name Denita Vital  Location /-01 MRN [id number]  : 1955 Date 2024       Current Admission Date: 2024  Current Admission Diagnosis:Acute metabolic encephalopathy   Patient Active Problem List    Diagnosis Date Noted Date Diagnosed    Positive blood culture 2024     Urinary tract infection without hematuria 2024     Hypoglycemia 2023     Hyponatremia 2022     Open nondisplaced fracture of distal phalanx of left middle finger 2022     Avulsion of fingernail 2022     Acute urinary retention 2022     Edema of upper extremity 2022     Acute metabolic encephalopathy 2022     Cystitis without hematuria 2022     Dysphagia 2021     Chronic anemia 2021     Chronically elevated right hemidiaphragm 2021     Obesity (BMI 30.0-34.9) 2021     Seizure disorder (HCC) 2021     Hyperlipidemia 2021     Abnormal x-ray 2021     Acute respiratory failure with hypoxia (HCC) 2021     Aspiration pneumonitis (HCC) 2021     Mood disorder (HCC) 2021     Type 2 diabetes mellitus with hypoglycemia, without long-term current use of insulin (HCC) 2020     Hypertension 2019     Gastroesophageal reflux disease without esophagitis 2018     Ambulatory dysfunction 2017     Intellectual disability 2017       LOS (days): 3  Geometric Mean LOS (GMLOS) (days): 3.9  Days to GMLOS:0.9     OBJECTIVE:  Risk of Unplanned Readmission Score: 24.91         Current admission status: Inpatient   Preferred Pharmacy:   [facility] - [place], [place] - [address]  [address] [address]  Phone: [phone] Fax: [phone]    [facility]/pharmacy #[id number] [place], [place] - [address]  [address] [address]  Phone: [phone] Fax: [phone]    Primary Care Provider: Soy Clemente  MD    Primary Insurance: MEDICARE  Secondary Insurance: [place] MEDICAL [place]    DISCHARGE DETAILS:    Discharge planning discussed with:: Group Jadon Shahid staff                           Requested Home Health Care         Home Health Agency Name:: [facility] External Referral Reason (only applicable if external HHA name selected): Services not provided in network or near patient location         Other Referral/Resources/Interventions Provided:  Referral Comments: [facility]          CM contacted Group Zehra [place] staff, ([phone]) to let her know [facility] unable to accept until 7/5/24. CM offered [facility], [facility], [facility] or [facility].  Zehra contacted supervisor, Irais, for final decision.  [facility] chose [facility].  CM made [facility] providers aware of patients choice.      Staff will provide transport home for patient.

## 2024-06-28 NOTE — ASSESSMENT & PLAN NOTE
Maintained on Depakote  Valproic acid level is 40, ammonia level normal  Seizure precautions  Follow PT OT recommendation

## 2024-06-28 NOTE — PROGRESS NOTES
Patient:    MRN:  [id number]    Camilo Request ID:  [id number]    Level of care reserved:  Home Health Agency    Partner Reserved:  [facility], [place] [id number] [phone]    Clinical needs requested:    Geography searched:  [id number]    Start of Service:    Request sent:  12:20pm EDT on 6/28/2024 by Clare Meyers    Partner reserved:  2:00pm EDT on 6/28/2024 by Clare Meyers    Choice list shared:  1:55pm EDT on 6/28/2024 by Clare Meyers

## 2024-06-29 PROBLEM — R78.81 POSITIVE BLOOD CULTURE: Status: RESOLVED | Noted: 2024-06-26 | Resolved: 2024-06-29

## 2024-06-29 PROBLEM — R74.01 TRANSAMINITIS: Status: ACTIVE | Noted: 2024-06-29

## 2024-06-29 LAB
ALBUMIN SERPL BCG-MCNC: 3.1 G/DL (ref 3.5–5)
ALP SERPL-CCNC: 87 U/L (ref 34–104)
ALT SERPL W P-5'-P-CCNC: 242 U/L (ref 7–52)
AMMONIA PLAS-SCNC: 39 UMOL/L (ref 18–72)
ANION GAP SERPL CALCULATED.3IONS-SCNC: 4 MMOL/L (ref 4–13)
APAP SERPL-MCNC: <2 UG/ML (ref 10–20)
AST SERPL W P-5'-P-CCNC: 293 U/L (ref 13–39)
BASOPHILS # BLD AUTO: 0 THOUSANDS/ÂΜL (ref 0–0.1)
BASOPHILS NFR BLD AUTO: 0 % (ref 0–1)
BILIRUB SERPL-MCNC: 0.32 MG/DL (ref 0.2–1)
BUN SERPL-MCNC: 14 MG/DL (ref 5–25)
CALCIUM ALBUM COR SERPL-MCNC: 10.5 MG/DL (ref 8.3–10.1)
CALCIUM SERPL-MCNC: 9.8 MG/DL (ref 8.4–10.2)
CHLORIDE SERPL-SCNC: 100 MMOL/L (ref 96–108)
CO2 SERPL-SCNC: 31 MMOL/L (ref 21–32)
CREAT SERPL-MCNC: 0.47 MG/DL (ref 0.6–1.3)
EOSINOPHIL # BLD AUTO: 0.03 THOUSAND/ÂΜL (ref 0–0.61)
EOSINOPHIL NFR BLD AUTO: 1 % (ref 0–6)
ERYTHROCYTE [DISTWIDTH] IN BLOOD BY AUTOMATED COUNT: 16.4 % (ref 11.6–15.1)
GFR SERPL CREATININE-BSD FRML MDRD: 100 ML/MIN/1.73SQ M
GLUCOSE SERPL-MCNC: 122 MG/DL (ref 65–140)
GLUCOSE SERPL-MCNC: 145 MG/DL (ref 65–140)
GLUCOSE SERPL-MCNC: 179 MG/DL (ref 65–140)
GLUCOSE SERPL-MCNC: 187 MG/DL (ref 65–140)
GLUCOSE SERPL-MCNC: 269 MG/DL (ref 65–140)
HCT VFR BLD AUTO: 28.3 % (ref 34.8–46.1)
HGB BLD-MCNC: 8.8 G/DL (ref 11.5–15.4)
IMM GRANULOCYTES # BLD AUTO: 0.04 THOUSAND/UL (ref 0–0.2)
IMM GRANULOCYTES NFR BLD AUTO: 1 % (ref 0–2)
LYMPHOCYTES # BLD AUTO: 0.78 THOUSANDS/ÂΜL (ref 0.6–4.47)
LYMPHOCYTES NFR BLD AUTO: 12 % (ref 14–44)
MCH RBC QN AUTO: 28.7 PG (ref 26.8–34.3)
MCHC RBC AUTO-ENTMCNC: 31.1 G/DL (ref 31.4–37.4)
MCV RBC AUTO: 92 FL (ref 82–98)
MONOCYTES # BLD AUTO: 0.42 THOUSAND/ÂΜL (ref 0.17–1.22)
MONOCYTES NFR BLD AUTO: 7 % (ref 4–12)
NEUTROPHILS # BLD AUTO: 5.24 THOUSANDS/ÂΜL (ref 1.85–7.62)
NEUTS SEG NFR BLD AUTO: 79 % (ref 43–75)
NRBC BLD AUTO-RTO: 0 /100 WBCS
PLATELET # BLD AUTO: 121 THOUSANDS/UL (ref 149–390)
PMV BLD AUTO: 9.5 FL (ref 8.9–12.7)
POTASSIUM SERPL-SCNC: 4.1 MMOL/L (ref 3.5–5.3)
PROT SERPL-MCNC: 6.3 G/DL (ref 6.4–8.4)
RBC # BLD AUTO: 3.07 MILLION/UL (ref 3.81–5.12)
SODIUM SERPL-SCNC: 135 MMOL/L (ref 135–147)
VALPROATE SERPL-MCNC: 12 UG/ML (ref 50–100)
WBC # BLD AUTO: 6.51 THOUSAND/UL (ref 4.31–10.16)

## 2024-06-29 PROCEDURE — 80143 DRUG ASSAY ACETAMINOPHEN: CPT

## 2024-06-29 PROCEDURE — 85025 COMPLETE CBC W/AUTO DIFF WBC: CPT | Performed by: FAMILY MEDICINE

## 2024-06-29 PROCEDURE — 99232 SBSQ HOSP IP/OBS MODERATE 35: CPT

## 2024-06-29 PROCEDURE — 80074 ACUTE HEPATITIS PANEL: CPT

## 2024-06-29 PROCEDURE — 80164 ASSAY DIPROPYLACETIC ACD TOT: CPT

## 2024-06-29 PROCEDURE — 82948 REAGENT STRIP/BLOOD GLUCOSE: CPT

## 2024-06-29 PROCEDURE — 80053 COMPREHEN METABOLIC PANEL: CPT | Performed by: FAMILY MEDICINE

## 2024-06-29 PROCEDURE — 82140 ASSAY OF AMMONIA: CPT

## 2024-06-29 RX ADMIN — INSULIN LISPRO 3 UNITS: 100 INJECTION, SOLUTION INTRAVENOUS; SUBCUTANEOUS at 21:01

## 2024-06-29 RX ADMIN — NYSTATIN 1 APPLICATION: 100000 POWDER TOPICAL at 08:23

## 2024-06-29 RX ADMIN — LORAZEPAM 0.5 MG: 0.5 TABLET ORAL at 08:24

## 2024-06-29 RX ADMIN — ASPIRIN 81 MG: 81 TABLET, COATED ORAL at 08:24

## 2024-06-29 RX ADMIN — QUETIAPINE FUMARATE 200 MG: 200 TABLET ORAL at 21:00

## 2024-06-29 RX ADMIN — GABAPENTIN 800 MG: 400 CAPSULE ORAL at 15:44

## 2024-06-29 RX ADMIN — SENNOSIDES 8.6 MG: 8.6 TABLET, FILM COATED ORAL at 21:00

## 2024-06-29 RX ADMIN — ENOXAPARIN SODIUM 40 MG: 40 INJECTION SUBCUTANEOUS at 08:25

## 2024-06-29 RX ADMIN — INSULIN LISPRO 1 UNITS: 100 INJECTION, SOLUTION INTRAVENOUS; SUBCUTANEOUS at 08:00

## 2024-06-29 RX ADMIN — DIVALPROEX SODIUM 250 MG: 250 TABLET, DELAYED RELEASE ORAL at 08:24

## 2024-06-29 RX ADMIN — ATORVASTATIN CALCIUM 80 MG: 40 TABLET, FILM COATED ORAL at 15:44

## 2024-06-29 RX ADMIN — PANTOPRAZOLE SODIUM 20 MG: 20 TABLET, DELAYED RELEASE ORAL at 05:18

## 2024-06-29 RX ADMIN — DOCUSATE SODIUM 100 MG: 100 CAPSULE, LIQUID FILLED ORAL at 17:23

## 2024-06-29 RX ADMIN — QUETIAPINE FUMARATE 100 MG: 100 TABLET ORAL at 08:24

## 2024-06-29 RX ADMIN — GABAPENTIN 800 MG: 400 CAPSULE ORAL at 08:25

## 2024-06-29 RX ADMIN — BISACODYL 5 MG: 5 TABLET, COATED ORAL at 17:22

## 2024-06-29 RX ADMIN — NYSTATIN 1 APPLICATION: 100000 POWDER TOPICAL at 17:23

## 2024-06-29 RX ADMIN — POLYETHYLENE GLYCOL 3350 17 G: 17 POWDER, FOR SOLUTION ORAL at 08:24

## 2024-06-29 RX ADMIN — OXYBUTYNIN CHLORIDE 10 MG: 5 TABLET, EXTENDED RELEASE ORAL at 08:24

## 2024-06-29 RX ADMIN — LEVOTHYROXINE SODIUM 50 MCG: 50 TABLET ORAL at 05:18

## 2024-06-29 RX ADMIN — QUETIAPINE FUMARATE 100 MG: 100 TABLET ORAL at 15:44

## 2024-06-29 RX ADMIN — GABAPENTIN 800 MG: 400 CAPSULE ORAL at 20:59

## 2024-06-29 RX ADMIN — LORAZEPAM 0.5 MG: 0.5 TABLET ORAL at 17:24

## 2024-06-29 RX ADMIN — DOCUSATE SODIUM 100 MG: 100 CAPSULE, LIQUID FILLED ORAL at 08:25

## 2024-06-29 RX ADMIN — INSULIN LISPRO 1 UNITS: 100 INJECTION, SOLUTION INTRAVENOUS; SUBCUTANEOUS at 11:19

## 2024-06-29 RX ADMIN — DIVALPROEX SODIUM 500 MG: 500 TABLET, DELAYED RELEASE ORAL at 17:22

## 2024-06-29 NOTE — ASSESSMENT & PLAN NOTE
Likely secondary to acute cystitis  Failed urinary retention protocol -Perrin placed 6/29  Remain in place for a few days to allow bladder to decompression   Can have voiding trial in 5 days

## 2024-06-29 NOTE — ASSESSMENT & PLAN NOTE
1 set of blood culture is growing Staph aureus, coagulase-negative, second set of blood cultures still remain pending  Has finished ertapenem for UTI  Blood cultures are contaminant

## 2024-06-29 NOTE — ASSESSMENT & PLAN NOTE
Lab Results   Component Value Date     (H) 06/29/2024     (H) 06/28/2024    AST 47 (H) 06/27/2024     Lab Results   Component Value Date     (H) 06/29/2024     (H) 06/28/2024    ALT 39 06/27/2024     CT chest abdomen pelvis from admission with unchanged hepatomegaly  Has been increasing over the last few days  Check Tylenol level  Acute hepatitis panel  Stop the ertapenem as has completed course of 3 days  Abdominal ultrasound  Recheck ammonia and valproic acid level

## 2024-06-29 NOTE — ASSESSMENT & PLAN NOTE
Presented to ED with increased lethargy  Baseline orientation/neuro status nonverbal, alert -usually active, mimics staff, able to feed herself, stands with assistance and uses a sit to stand   On clinical exam, patient returned to baseline.  Continue current treatment  At baseline uses sit to stand, will place PT OT to see if at baseline

## 2024-06-29 NOTE — NURSING NOTE
Called and spoke with pt. Pt reported was sitting outside and noticed red spots on bilateral legs, mostly on the knees. Pt no c/o pain, swelling, itching, or weeping. Advised pt to wash legs with mild soap and water and if not improved within 24 hours to be seen in an urgent care. Pt verbalized understanding. AD   Patient can hold drink and sip on her own. Per group home patient was able to feed herself with set up assistance

## 2024-06-29 NOTE — ASSESSMENT & PLAN NOTE
Recently diagnosed with UTI-urine culture revealing Proteus ESBL  Has been being treated with amoxicillin and doxycycline  There is no in chart phone call made from the ER to group home to instruct to switch antibiotics, group home states they had not received the call    Antibiotic adjusted to ertapenem based on last urine culture.  Urine culture is growing Proteus mirabilis, previous urine culture was similar-has completed 3 days of ertapenem.

## 2024-06-29 NOTE — PLAN OF CARE
Problem: NEUROSENSORY - ADULT  Goal: Achieves maximal functionality and self care  Description: INTERVENTIONS  - Monitor swallowing and airway patency with patient fatigue and changes in neurological status  - Encourage and assist patient to increase activity and self care. Encourage patient tofeed herself  - Encourage visually impaired, hearing impaired and aphasic patients to use assistive/communication devices  Outcome: Progressing

## 2024-06-29 NOTE — PLAN OF CARE
Problem: PAIN - ADULT  Goal: Verbalizes/displays adequate comfort level or baseline comfort level  Description: Interventions:  - Encourage patient to monitor pain and request assistance  - Assess pain using appropriate pain scale  - Administer analgesics based on type and severity of pain and evaluate response  - Implement non-pharmacological measures as appropriate and evaluate response  - Consider cultural and social influences on pain and pain management  - Notify physician/advanced practitioner if interventions unsuccessful or patient reports new pain  Outcome: Progressing     Problem: INFECTION - ADULT  Goal: Absence or prevention of progression during hospitalization  Description: INTERVENTIONS:  - Assess and monitor for signs and symptoms of infection  - Monitor lab/diagnostic results  - Monitor all insertion sites, i.e. indwelling lines, tubes, and drains  - Monitor endotracheal if appropriate and nasal secretions for changes in amount and color  - [place] appropriate cooling/warming therapies per order  - Administer medications as ordered  - Instruct and encourage patient and family to use good hand hygiene technique  - Identify and instruct in appropriate isolation precautions for identified infection/condition  Outcome: Progressing     Problem: SAFETY ADULT  Goal: Patient will remain free of falls  Description: INTERVENTIONS:  - Educate patient/family on patient safety including physical limitations  - Instruct patient to call for assistance with activity   - Consult OT/PT to assist with strengthening/mobility   - Keep Call bell within reach  - Keep bed low and locked with side rails adjusted as appropriate  - Keep care items and personal belongings within reach  - Initiate and maintain comfort rounds  - Make Fall Risk Sign visible to staff  - Offer Toileting every 2 Hours, in advance of need  - Initiate/Maintain bed/chair alarm  - Obtain necessary fall risk management equipment: alarms  - Apply  yellow socks and bracelet for high fall risk patients  - Consider moving patient to room near nurses station  Outcome: Progressing  Goal: Maintain or return to baseline ADL function  Description: INTERVENTIONS:  -  Assess patient's ability to carry out ADLs; assess patient's baseline for ADL function and identify physical deficits which impact ability to perform ADLs (bathing, care of mouth/teeth, toileting, grooming, dressing, etc.)  - Assess/evaluate cause of self-care deficits   - Assess range of motion  - Assess patient's mobility; develop plan if impaired  - Assess patient's need for assistive devices and provide as appropriate  - Encourage maximum independence but intervene and supervise when necessary  - Involve family in performance of ADLs  - Assess for home care needs following discharge   - Consider OT consult to assist with ADL evaluation and planning for discharge  - Provide patient education as appropriate  Outcome: Progressing  Goal: Maintains/Returns to pre admission functional level  Description: INTERVENTIONS:  - Perform AM-PAC 6 Click Basic Mobility/ Daily Activity assessment daily.  - Set and communicate daily mobility goal to care team and patient/family/caregiver.   - Collaborate with rehabilitation services on mobility goals if consulted  - Perform Range of Motion 3 times a day.  - Reposition patient every 2 hours.  - Dangle patient 3 times a day  - Stand patient 3 times a day  - Ambulate patient 3 times a day  - Out of bed to chair 3 times a day   - Out of bed for meals 3 times a day  - Out of bed for toileting  - Record patient progress and toleration of activity level   Outcome: Progressing     Problem: DISCHARGE PLANNING  Goal: Discharge to home or other facility with appropriate resources  Description: INTERVENTIONS:  - Identify barriers to discharge w/patient and caregiver  - Arrange for needed discharge resources and transportation as appropriate  - Identify discharge learning needs  (meds, wound care, etc.)  - Arrange for interpretive services to assist at discharge as needed  - Refer to Case Management Department for coordinating discharge planning if the patient needs post-hospital services based on physician/advanced practitioner order or complex needs related to functional status, cognitive ability, or social support system  Outcome: Progressing     Problem: Knowledge Deficit  Goal: Patient/family/caregiver demonstrates understanding of disease process, treatment plan, medications, and discharge instructions  Description: Complete learning assessment and assess knowledge base.  Interventions:  - Provide teaching at level of understanding  - Provide teaching via preferred learning methods  Outcome: Progressing     Problem: GENITOURINARY - ADULT  Goal: Maintains or returns to baseline urinary function  Description: INTERVENTIONS:  - Assess urinary function bladder scan pvr  - Encourage oral fluids to ensure adequate hydration if ordered  - Administer IV fluids as ordered to ensure adequate hydration  - Administer ordered medications as needed  - Offer frequent toileting  - Follow urinary retention protocol if ordered  Outcome: Progressing     Problem: SKIN/TISSUE INTEGRITY - ADULT  Goal: Skin Integrity remains intact(Skin Breakdown Prevention)  Description: Assess:  -Perform Zafar assessment every shift  -Clean and moisturize skin every shift  -Inspect skin when repositioning, toileting, and assisting with ADLS  -Assess under medical devices such as mepilex every shift  -Assess extremities for adequate circulation and sensation     Bed Management:  -Have minimal linens on bed & keep smooth, unwrinkled  -Change linens as needed when moist or perspiring  -Avoid sitting or lying in one position for more than 2 hours while in bed  -Keep HOB at 30degrees     Toileting:  -Offer bedside commode  -Assess for incontinence every shift  -Use incontinent care products after each incontinent episode such  as wipes    Activity:  -Mobilize patient 3 times a day  -Encourage activity and walks on unit  -Encourage or provide ROM exercises   -Turn and reposition patient every 2 Hours  -Use appropriate equipment to lift or move patient in bed  -Instruct/ Assist with weight shifting every 1 when out of bed in chair  -Consider limitation of chair time 1 hour intervals    Skin Care:  -Avoid use of baby powder, tape, friction and shearing, hot water or constrictive clothing  -Relieve pressure over bony prominences using pillows  -Do not massage red bony areas    Next Steps:  -Teach patient strategies to minimize risks such as    -Consider consults to  interdisciplinary teams such as   Outcome: Progressing     Problem: Prexisting or High Potential for Compromised Skin Integrity  Goal: Skin integrity is maintained or improved  Description: INTERVENTIONS:  - Identify patients at risk for skin breakdown  - Assess and monitor skin integrity  - Assess and monitor nutrition and hydration status  - Monitor labs   - Assess for incontinence   - Turn and reposition patient  - Assist with mobility/ambulation  - Relieve pressure over bony prominences  - Avoid friction and shearing  - Provide appropriate hygiene as needed including keeping skin clean and dry  - Evaluate need for skin moisturizer/barrier cream  - Collaborate with interdisciplinary team   - Patient/family teaching  - Consider wound care consult   Outcome: Progressing

## 2024-06-29 NOTE — ASSESSMENT & PLAN NOTE
Maintained on Depakote  Valproic acid level is 40, ammonia level normal  Seizure precautions  Follow PT OT recommendation    With elevated transaminases will repeat valproic acid and ammonia level

## 2024-06-29 NOTE — PROGRESS NOTES
[facility]  [facility]  Name: Denita Vital I  MRN: [id number]  Unit/Bed#: [facility] Date of Admission: 6/25/2024   Date of Service: 6/29/2024 I Hospital Day: 4    Assessment & Plan   * Acute metabolic encephalopathy  Assessment & Plan  Presented to ED with increased lethargy  Baseline orientation/neuro status nonverbal, alert -usually active, mimics staff, able to feed herself, stands with assistance and uses a sit to stand   On clinical exam, patient returned to baseline.  Continue current treatment  At baseline uses sit to stand, will place PT OT to see if at baseline      Urinary tract infection without hematuria  Assessment & Plan  Recently diagnosed with UTI-urine culture revealing Proteus ESBL  Has been being treated with amoxicillin and doxycycline  There is no in chart phone call made from the ER to group home to instruct to switch antibiotics, group home states they had not received the call    Antibiotic adjusted to ertapenem based on last urine culture.  Urine culture is growing Proteus mirabilis, previous urine culture was similar-has completed 3 days of ertapenem.    Transaminitis  Assessment & Plan  Lab Results   Component Value Date     (H) 06/29/2024     (H) 06/28/2024    AST 47 (H) 06/27/2024     Lab Results   Component Value Date     (H) 06/29/2024     (H) 06/28/2024    ALT 39 06/27/2024     CT chest abdomen pelvis from admission with unchanged hepatomegaly  Has been increasing over the last few days  Check Tylenol level  Acute hepatitis panel  Stop the ertapenem as has completed course of 3 days  Abdominal ultrasound  Recheck ammonia and valproic acid level    Edema of upper extremity  Assessment & Plan  Had a history of infiltration  On palpation, patient has some nodularity, with bruise-suspect thrombophlebitis  Venous duplex negative for thrombophlebitis or DVT    Acute urinary retention  Assessment & Plan  Likely secondary  to acute cystitis  Failed urinary retention protocol -Perrin placed 6/29  Remain in place for a few days to allow bladder to decompression   Can have voiding trial in 5 days    Intellectual disability  Assessment & Plan  At baseline patient is nonverbal  Resident of [facility]    Seizure disorder (HCC)  Assessment & Plan  Maintained on Depakote  Valproic acid level is 40, ammonia level normal  Seizure precautions  Follow PT OT recommendation    With elevated transaminases will repeat valproic acid and ammonia level    Positive blood culture-resolved as of 6/29/2024  Assessment & Plan  1 set of blood culture is growing Staph aureus, coagulase-negative, second set of blood cultures still remain pending  Has finished ertapenem for UTI  Blood cultures are contaminant           VTE Pharmacologic Prophylaxis: VTE Score: 4 Moderate Risk (Score 3-4) - Pharmacological DVT Prophylaxis Ordered: enoxaparin (Lovenox).    Mobility:   Basic Mobility Inpatient Raw Score: 6  -[facility] Goal: 2: Bed activities/Dependent transfer  JH-HLM Achieved: 3: Sit at edge of bed  JH-HLM Goal achieved. Continue to encourage appropriate mobility.    Patient Centered Rounds: I performed bedside rounds with nursing staff today.   Discussions with Specialists or Other Care Team Provider: None    Education and Discussions with Family / Patient:  Will need to contact  with information from PT/OT.     Total Time Spent on Date of Encounter in care of patient:  mins. This time was spent on one or more of the following: performing physical exam; counseling and coordination of care; obtaining or reviewing history; documenting in the medical record; reviewing/ordering tests, medications or procedures; communicating with other healthcare professionals and discussing with patient's family/caregivers.    Current Length of Stay: 4 day(s)  Current Patient Status: Inpatient   Certification Statement: The patient will continue to require additional  inpatient hospital stay due to pending PT/OT consults  Discharge Plan: Anticipate discharge in 48 hrs to group home.    Code Status: Level 1 - Full Code    Subjective:   Seen and examined.  Patient remains nonverbal.  Alert and awake.     Objective:     Vitals:   Temp (24hrs), Av.6 °F (36.4 °C), Min:97.3 °F (36.3 °C), Max:98.2 °F (36.8 °C)    Temp:  [97.3 °F (36.3 °C)-98.2 °F (36.8 °C)] 97.3 °F (36.3 °C)  HR:  [65-72] 65  Resp:  [18-20] 18  BP: (116-130)/(55-57) 127/57  SpO2:  [90 %-96 %] 91 %  Body mass index is 32.68 kg/m².     Input and Output Summary (last 24 hours):     Intake/Output Summary (Last 24 hours) at 2024 1106  Last data filed at 2024 0819  Gross per 24 hour   Intake 965 ml   Output 2100 ml   Net -1135 ml       Physical Exam:   Physical Exam  Vitals and nursing note reviewed.   Constitutional:       General: She is not in acute distress.     Appearance: Normal appearance. She is obese.   HENT:      Head: Normocephalic and atraumatic.      Nose: No congestion.      Mouth/Throat:      Mouth: Mucous membranes are moist.   Eyes:      Conjunctiva/sclera: Conjunctivae normal.   Cardiovascular:      Rate and Rhythm: Normal rate and regular rhythm.      Pulses: Normal pulses.      Heart sounds: Normal heart sounds. No murmur heard.  Pulmonary:      Effort: Pulmonary effort is normal. No respiratory distress.      Breath sounds: Normal breath sounds.   Abdominal:      General: Bowel sounds are normal.      Palpations: Abdomen is soft.      Tenderness: There is no abdominal tenderness.   Musculoskeletal:         General: Swelling (left hand) present. Normal range of motion.      Right lower leg: No edema.      Left lower leg: No edema.   Skin:     General: Skin is warm and dry.      Findings: Bruising (left hand) present.   Neurological:      Mental Status: She is alert. Mental status is at baseline.      Comments: nonverbal          Additional Data:     Labs:  Results from last 7 days   Lab Units  06/29/24  0511   WBC Thousand/uL 6.51   HEMOGLOBIN g/dL 8.8*   HEMATOCRIT % 28.3*   PLATELETS Thousands/uL 121*   SEGS PCT % 79*   LYMPHO PCT % 12*   MONO PCT % 7   EOS PCT % 1     Results from last 7 days   Lab Units 06/29/24  0511   SODIUM mmol/L 135   POTASSIUM mmol/L 4.1   CHLORIDE mmol/L 100   CO2 mmol/L 31   BUN mg/dL 14   CREATININE mg/dL 0.47*   ANION GAP mmol/L 4   CALCIUM mg/dL 9.8   ALBUMIN g/dL 3.1*   TOTAL BILIRUBIN mg/dL 0.32   ALK PHOS U/L 87   ALT U/L 242*   AST U/L 293*   GLUCOSE RANDOM mg/dL 145*     Results from last 7 days   Lab Units 06/25/24  0955   INR  1.09     Results from last 7 days   Lab Units 06/29/24  1103 06/29/24  0719 06/28/24  2042 06/28/24  1547 06/28/24  1102 06/28/24  0708 06/27/24  2141 06/27/24  1543 06/27/24  1044 06/27/24  0730 06/26/24  2113 06/26/24  1524   POC GLUCOSE mg/dl 187* 179* 135 128 142* 97 82 104 131 97 95 120     Results from last 7 days   Lab Units 06/26/24  1401   HEMOGLOBIN A1C % 5.9*     Results from last 7 days   Lab Units 06/26/24  0517 06/25/24  0955   LACTIC ACID mmol/L  --  2.0   PROCALCITONIN ng/ml <0.05 <0.05       Lines/Drains:  Invasive Devices       Peripheral Intravenous Line  Duration             Peripheral IV 06/29/24 Distal;Dorsal (posterior);Right Forearm <1 day              Drain  Duration             Urethral Catheter Latex 16 Fr. <1 day                  Urinary Catheter:  Goal for removal:  voiding trial in a few days               Imaging: Reviewed radiology reports from this admission including: abdominal/pelvic CT    Recent Cultures (last 7 days):   Results from last 7 days   Lab Units 06/25/24  1035 06/25/24  1022 06/25/24  0955   BLOOD CULTURE   --  No Growth at 72 hrs. Staphylococcus coagulase negative*   GRAM STAIN RESULT   --   --  Gram positive cocci in clusters*   URINE CULTURE  <10,000 cfu/ml Proteus mirabilis ESBL*  --   --        Last 24 Hours Medication List:   Current Facility-Administered Medications   Medication Dose Route  Frequency Provider Last Rate    acetaminophen  650 mg Oral Q6H PRN Ivana Montague PA-C      aspirin  81 mg Oral Daily Ivana Montague, CARLOS      atorvastatin  80 mg Oral Daily With Dinner Ivana Montague PA-C      bisacodyl  5 mg Oral Daily PRN Ivana Montague PA-C      divalproex sodium  250 mg Oral Daily Ivana Montague, CARLOS      divalproex sodium  500 mg Oral After Dinner Ivana Montague PA-C      docusate sodium  100 mg Oral BID Ivana Montague PA-C      enoxaparin  40 mg Subcutaneous Daily Ivana Montague, CARLOS      gabapentin  800 mg Oral TID Ivana Montague PA-C      insulin lispro  1-6 Units Subcutaneous TID AC Joey Miranda MD      insulin lispro  1-6 Units Subcutaneous HS Joey Miranda MD      levothyroxine  50 mcg Oral Early Morning Ivana Montague PA-C      LORazepam  0.5 mg Oral BID Ivana Montague PA-C      nystatin  1 Application Topical BID Ivana Montague PA-C      oxybutynin  10 mg Oral Daily Ivana Montague PA-C      pantoprazole  20 mg Oral Early Morning Ivana Montague PA-C      polyethylene glycol  17 g Oral Daily Ivana Montague PA-C      QUEtiapine  100 mg Oral BID Ivana Montague PA-C      QUEtiapine  200 mg Oral HS Ivana Montague PA-C      senna  8.6 mg Oral HS Ivana Montague PA-C      simethicone  80 mg Oral 4x Daily PRN Ivana Montague PA-C          Today, Patient Was Seen By: Ivana Montague PA-C    **Please Note: This note may have been constructed using a voice recognition system.**

## 2024-06-30 ENCOUNTER — APPOINTMENT (INPATIENT)
Dept: ULTRASOUND IMAGING | Facility: HOSPITAL | Age: 69
DRG: 689 | End: 2024-06-30
Payer: MEDICARE

## 2024-06-30 LAB
ALBUMIN SERPL BCG-MCNC: 2.7 G/DL (ref 3.5–5)
ALP SERPL-CCNC: 75 U/L (ref 34–104)
ALT SERPL W P-5'-P-CCNC: 273 U/L (ref 7–52)
ANION GAP SERPL CALCULATED.3IONS-SCNC: 2 MMOL/L (ref 4–13)
AST SERPL W P-5'-P-CCNC: 255 U/L (ref 13–39)
BACTERIA BLD CULT: NORMAL
BILIRUB SERPL-MCNC: 0.29 MG/DL (ref 0.2–1)
BUN SERPL-MCNC: 14 MG/DL (ref 5–25)
CALCIUM ALBUM COR SERPL-MCNC: 10.2 MG/DL (ref 8.3–10.1)
CALCIUM SERPL-MCNC: 9.2 MG/DL (ref 8.4–10.2)
CHLORIDE SERPL-SCNC: 99 MMOL/L (ref 96–108)
CO2 SERPL-SCNC: 31 MMOL/L (ref 21–32)
CREAT SERPL-MCNC: 0.5 MG/DL (ref 0.6–1.3)
ERYTHROCYTE [DISTWIDTH] IN BLOOD BY AUTOMATED COUNT: 16.4 % (ref 11.6–15.1)
GFR SERPL CREATININE-BSD FRML MDRD: 98 ML/MIN/1.73SQ M
GLUCOSE SERPL-MCNC: 135 MG/DL (ref 65–140)
GLUCOSE SERPL-MCNC: 146 MG/DL (ref 65–140)
GLUCOSE SERPL-MCNC: 151 MG/DL (ref 65–140)
GLUCOSE SERPL-MCNC: 173 MG/DL (ref 65–140)
GLUCOSE SERPL-MCNC: 193 MG/DL (ref 65–140)
HAV IGM SER QL: NORMAL
HBV CORE IGM SER QL: NORMAL
HBV SURFACE AG SER QL: NORMAL
HCT VFR BLD AUTO: 24.4 % (ref 34.8–46.1)
HCV AB SER QL: NORMAL
HGB BLD-MCNC: 7.5 G/DL (ref 11.5–15.4)
MAGNESIUM SERPL-MCNC: 1.7 MG/DL (ref 1.9–2.7)
MCH RBC QN AUTO: 28.2 PG (ref 26.8–34.3)
MCHC RBC AUTO-ENTMCNC: 30.7 G/DL (ref 31.4–37.4)
MCV RBC AUTO: 92 FL (ref 82–98)
PLATELET # BLD AUTO: 129 THOUSANDS/UL (ref 149–390)
PMV BLD AUTO: 9.3 FL (ref 8.9–12.7)
POTASSIUM SERPL-SCNC: 4.2 MMOL/L (ref 3.5–5.3)
PROT SERPL-MCNC: 5.4 G/DL (ref 6.4–8.4)
RBC # BLD AUTO: 2.66 MILLION/UL (ref 3.81–5.12)
SODIUM SERPL-SCNC: 132 MMOL/L (ref 135–147)
WBC # BLD AUTO: 4.98 THOUSAND/UL (ref 4.31–10.16)

## 2024-06-30 PROCEDURE — 82746 ASSAY OF FOLIC ACID SERUM: CPT

## 2024-06-30 PROCEDURE — 82948 REAGENT STRIP/BLOOD GLUCOSE: CPT

## 2024-06-30 PROCEDURE — 99232 SBSQ HOSP IP/OBS MODERATE 35: CPT

## 2024-06-30 PROCEDURE — 82728 ASSAY OF FERRITIN: CPT

## 2024-06-30 PROCEDURE — 80053 COMPREHEN METABOLIC PANEL: CPT

## 2024-06-30 PROCEDURE — 85027 COMPLETE CBC AUTOMATED: CPT

## 2024-06-30 PROCEDURE — 80165 DIPROPYLACETIC ACID FREE: CPT

## 2024-06-30 PROCEDURE — 83735 ASSAY OF MAGNESIUM: CPT

## 2024-06-30 PROCEDURE — 76705 ECHO EXAM OF ABDOMEN: CPT

## 2024-06-30 RX ADMIN — INSULIN LISPRO 1 UNITS: 100 INJECTION, SOLUTION INTRAVENOUS; SUBCUTANEOUS at 20:48

## 2024-06-30 RX ADMIN — GABAPENTIN 800 MG: 400 CAPSULE ORAL at 20:41

## 2024-06-30 RX ADMIN — LORAZEPAM 0.5 MG: 0.5 TABLET ORAL at 09:42

## 2024-06-30 RX ADMIN — ATORVASTATIN CALCIUM 80 MG: 40 TABLET, FILM COATED ORAL at 16:24

## 2024-06-30 RX ADMIN — DIVALPROEX SODIUM 500 MG: 500 TABLET, DELAYED RELEASE ORAL at 17:24

## 2024-06-30 RX ADMIN — ASPIRIN 81 MG: 81 TABLET, COATED ORAL at 09:43

## 2024-06-30 RX ADMIN — ENOXAPARIN SODIUM 40 MG: 40 INJECTION SUBCUTANEOUS at 09:43

## 2024-06-30 RX ADMIN — POLYETHYLENE GLYCOL 3350 17 G: 17 POWDER, FOR SOLUTION ORAL at 09:41

## 2024-06-30 RX ADMIN — INSULIN LISPRO 1 UNITS: 100 INJECTION, SOLUTION INTRAVENOUS; SUBCUTANEOUS at 08:00

## 2024-06-30 RX ADMIN — NYSTATIN 1 APPLICATION: 100000 POWDER TOPICAL at 09:43

## 2024-06-30 RX ADMIN — DOCUSATE SODIUM 100 MG: 100 CAPSULE, LIQUID FILLED ORAL at 17:24

## 2024-06-30 RX ADMIN — LEVOTHYROXINE SODIUM 50 MCG: 50 TABLET ORAL at 05:30

## 2024-06-30 RX ADMIN — PANTOPRAZOLE SODIUM 20 MG: 20 TABLET, DELAYED RELEASE ORAL at 05:30

## 2024-06-30 RX ADMIN — DIVALPROEX SODIUM 250 MG: 250 TABLET, DELAYED RELEASE ORAL at 09:43

## 2024-06-30 RX ADMIN — DOCUSATE SODIUM 100 MG: 100 CAPSULE, LIQUID FILLED ORAL at 09:42

## 2024-06-30 RX ADMIN — QUETIAPINE FUMARATE 100 MG: 100 TABLET ORAL at 09:42

## 2024-06-30 RX ADMIN — OXYBUTYNIN CHLORIDE 10 MG: 5 TABLET, EXTENDED RELEASE ORAL at 09:42

## 2024-06-30 RX ADMIN — NYSTATIN 1 APPLICATION: 100000 POWDER TOPICAL at 17:04

## 2024-06-30 RX ADMIN — GABAPENTIN 800 MG: 400 CAPSULE ORAL at 09:42

## 2024-06-30 RX ADMIN — GABAPENTIN 800 MG: 400 CAPSULE ORAL at 16:24

## 2024-06-30 NOTE — ASSESSMENT & PLAN NOTE
Maintained on Depakote  Valproic acid level is 40, ammonia level normal  Seizure precautions  Follow PT OT recommendation    With elevated transaminases will repeat valproic acid and ammonia level  Valproic acid total is low - free pending   Ammonia normal

## 2024-06-30 NOTE — ASSESSMENT & PLAN NOTE
Likely secondary to acute cystitis  Failed urinary retention protocol -Perrin placed 6/29  Remain in place for a few days to allow bladder to decompression   Can have voiding trial in 5 days - 7/4 or 7/5

## 2024-06-30 NOTE — ASSESSMENT & PLAN NOTE
Presented to ED with increased lethargy  Baseline orientation/neuro status nonverbal, alert -usually active, mimics staff, able to feed herself, stands with assistance and uses a sit to stand   On clinical exam, patient returned to baseline.  Continue current treatment  At baseline uses sit to stand, will place PT OT to see if at baseline  Is improved

## 2024-06-30 NOTE — PROGRESS NOTES
[facility]  Progress Note  Name: Denita Vital I  MRN: [id number]  Unit/Bed#: [facility] I Date of Admission: 6/25/2024   Date of Service: 6/30/2024 I Hospital Day: 5    Assessment & Plan   * Acute metabolic encephalopathy  Assessment & Plan  Presented to ED with increased lethargy  Baseline orientation/neuro status nonverbal, alert -usually active, mimics staff, able to feed herself, stands with assistance and uses a sit to stand   On clinical exam, patient returned to baseline.  Continue current treatment  At baseline uses sit to stand, will place PT OT to see if at baseline  Is improved      Urinary tract infection without hematuria  Assessment & Plan  Recently diagnosed with UTI-urine culture revealing Proteus ESBL  Has been being treated with amoxicillin and doxycycline  There is no in chart phone call made from the ER to group home to instruct to switch antibiotics, group home states they had not received the call    Antibiotic adjusted to ertapenem based on last urine culture.  Urine culture is growing Proteus mirabilis, previous urine culture was similar-has completed 3 days of ertapenem.    Transaminitis  Assessment & Plan  Lab Results   Component Value Date     (H) 06/30/2024     (H) 06/29/2024     (H) 06/28/2024     Lab Results   Component Value Date     (H) 06/30/2024     (H) 06/29/2024     (H) 06/28/2024     CT chest abdomen pelvis from admission with unchanged hepatomegaly  Has been increasing over the last few days  Tylenol level fine  Acute hepatitis panel  Stop the ertapenem as has completed course of 3 days  Abdominal ultrasound  Recheck ammonia and valproic acid level  Ammonia normal, valproic acid total low, free pending     Edema of upper extremity  Assessment & Plan  Had a history of infiltration  On palpation, patient has some nodularity, with bruise-suspect thrombophlebitis  Venous duplex negative for  thrombophlebitis or DVT    Acute urinary retention  Assessment & Plan  Likely secondary to acute cystitis  Failed urinary retention protocol -Perrin placed 6/29  Remain in place for a few days to allow bladder to decompression   Can have voiding trial in 5 days - 7/4 or 7/5    Intellectual disability  Assessment & Plan  At baseline patient is nonverbal  Resident of snf    Seizure disorder (HCC)  Assessment & Plan  Maintained on Depakote  Valproic acid level is 40, ammonia level normal  Seizure precautions  Follow PT OT recommendation    With elevated transaminases will repeat valproic acid and ammonia level  Valproic acid total is low - free pending   Ammonia normal              VTE Pharmacologic Prophylaxis: VTE Score: 4 Moderate Risk (Score 3-4) - Pharmacological DVT Prophylaxis Ordered: enoxaparin (Lovenox).    Mobility:   Basic Mobility Inpatient Raw Score: 6  -[facility] Goal: 2: Bed activities/Dependent transfer  JH-HLM Achieved: 3: Sit at edge of bed  JH-HLM Goal achieved. Continue to encourage appropriate mobility.    Patient Centered Rounds: I performed bedside rounds with nursing staff today.   Discussions with Specialists or Other Care Team Provider: CM    Education and Discussions with Family / Patient:  Will discuss care with patient's group home tomorrow during business hours.     Total Time Spent on Date of Encounter in care of patient:  mins. This time was spent on one or more of the following: performing physical exam; counseling and coordination of care; obtaining or reviewing history; documenting in the medical record; reviewing/ordering tests, medications or procedures; communicating with other healthcare professionals and discussing with patient's family/caregivers.    Current Length of Stay: 5 day(s)  Current Patient Status: Inpatient   Certification Statement: The patient will continue to require additional inpatient hospital stay due to pending improvement in physical status and ED to  monitor LFTs  Discharge Plan: Anticipate discharge in 24-48 hrs to group home.    Code Status: Level 1 - Full Code    Subjective:   Seen and examined.  Is at baseline and nonverbal.  Nursing reports no issues overnight.     Objective:     Vitals:   Temp (24hrs), Av.6 °F (36.4 °C), Min:97.5 °F (36.4 °C), Max:97.7 °F (36.5 °C)    Temp:  [97.5 °F (36.4 °C)-97.7 °F (36.5 °C)] 97.7 °F (36.5 °C)  HR:  [67-77] 67  Resp:  [18] 18  BP: (117-150)/(50-60) 117/50  SpO2:  [90 %-93 %] 90 %  Body mass index is 32.68 kg/m².     Input and Output Summary (last 24 hours):     Intake/Output Summary (Last 24 hours) at 2024 1000  Last data filed at 2024 0522  Gross per 24 hour   Intake 420 ml   Output 1700 ml   Net -1280 ml       Physical Exam:   Physical Exam  Vitals and nursing note reviewed.   Constitutional:       General: She is not in acute distress.     Appearance: Normal appearance. She is obese.   HENT:      Head: Normocephalic and atraumatic.      Nose: No congestion.      Mouth/Throat:      Mouth: Mucous membranes are moist.   Eyes:      Conjunctiva/sclera: Conjunctivae normal.   Cardiovascular:      Rate and Rhythm: Normal rate and regular rhythm.      Pulses: Normal pulses.      Heart sounds: Normal heart sounds. No murmur heard.  Pulmonary:      Effort: Pulmonary effort is normal. No respiratory distress.      Breath sounds: Normal breath sounds.   Abdominal:      General: Bowel sounds are normal.      Palpations: Abdomen is soft.      Tenderness: There is no abdominal tenderness.   Musculoskeletal:         General: Normal range of motion.      Right lower leg: No edema.      Left lower leg: No edema.   Skin:     General: Skin is warm and dry.   Neurological:      Mental Status: She is alert. Mental status is at baseline.          Additional Data:     Labs:  Results from last 7 days   Lab Units 24  0503 24  0511   WBC Thousand/uL 4.98 6.51   HEMOGLOBIN g/dL 7.5* 8.8*   HEMATOCRIT % 24.4* 28.3*    PLATELETS Thousands/uL 129* 121*   SEGS PCT %  --  79*   LYMPHO PCT %  --  12*   MONO PCT %  --  7   EOS PCT %  --  1     Results from last 7 days   Lab Units 06/30/24  0503   SODIUM mmol/L 132*   POTASSIUM mmol/L 4.2   CHLORIDE mmol/L 99   CO2 mmol/L 31   BUN mg/dL 14   CREATININE mg/dL 0.50*   ANION GAP mmol/L 2*   CALCIUM mg/dL 9.2   ALBUMIN g/dL 2.7*   TOTAL BILIRUBIN mg/dL 0.29   ALK PHOS U/L 75   ALT U/L 273*   AST U/L 255*   GLUCOSE RANDOM mg/dL 193*     Results from last 7 days   Lab Units 06/25/24  0955   INR  1.09     Results from last 7 days   Lab Units 06/30/24  0730 06/29/24  2053 06/29/24  1538 06/29/24  1103 06/29/24  0719 06/28/24  2042 06/28/24  1547 06/28/24  1102 06/28/24  0708 06/27/24  2141 06/27/24  1543 06/27/24  1044   POC GLUCOSE mg/dl 173* 269* 122 187* 179* 135 128 142* 97 82 104 131     Results from last 7 days   Lab Units 06/26/24  1401   HEMOGLOBIN A1C % 5.9*     Results from last 7 days   Lab Units 06/26/24  0517 06/25/24  0955   LACTIC ACID mmol/L  --  2.0   PROCALCITONIN ng/ml <0.05 <0.05       Lines/Drains:  Invasive Devices       Peripheral Intravenous Line  Duration             Peripheral IV 06/29/24 Distal;Dorsal (posterior);Right Forearm <1 day              Drain  Duration             Urethral Catheter Latex 16 Fr. 1 day                  Urinary Catheter:  Goal for removal: N/A- Discharging with Perrin               Imaging: No pertinent imaging reviewed.    Recent Cultures (last 7 days):   Results from last 7 days   Lab Units 06/25/24  1035 06/25/24  1022 06/25/24  0955   BLOOD CULTURE   --  No Growth After 4 Days. Staphylococcus coagulase negative*   GRAM STAIN RESULT   --   --  Gram positive cocci in clusters*   URINE CULTURE  <10,000 cfu/ml Proteus mirabilis ESBL*  --   --        Last 24 Hours Medication List:   Current Facility-Administered Medications   Medication Dose Route Frequency Provider Last Rate    acetaminophen  650 mg Oral Q6H PRN Ivana Montague PA-C       aspirin  81 mg Oral Daily Ivana Montague, CARLOS      atorvastatin  80 mg Oral Daily With Dinner Ivana Montague, CARLOS      bisacodyl  5 mg Oral Daily PRN Ivana Montague PA-C      divalproex sodium  250 mg Oral Daily Ivana Montague, CARLOS      divalproex sodium  500 mg Oral After Dinner Ivana Montague, CARLOS      docusate sodium  100 mg Oral BID Ivana Montague, CAROLS      enoxaparin  40 mg Subcutaneous Daily Ivana Montague, CARLOS      gabapentin  800 mg Oral TID Ivana Montague PA-C      insulin lispro  1-6 Units Subcutaneous TID AC Joey Miranda MD      insulin lispro  1-6 Units Subcutaneous HS Joey Miranda MD      levothyroxine  50 mcg Oral Early Morning Ivana Montague PA-C      LORazepam  0.5 mg Oral BID Ivana Montague, CARLOS      nystatin  1 Application Topical BID Ivana Montague PA-C      oxybutynin  10 mg Oral Daily Ivana Montague PA-C      pantoprazole  20 mg Oral Early Morning Ivana Montague PA-C      polyethylene glycol  17 g Oral Daily Ivana Montague PA-C      QUEtiapine  100 mg Oral BID Ivana Montague, CARLOS      QUEtiapine  200 mg Oral HS Ivana Montague PA-C      senna  8.6 mg Oral HS Ivana Montague PA-C      simethicone  80 mg Oral 4x Daily PRN Ivana Montague PA-C          Today, Patient Was Seen By: Ivana Montague PA-C    **Please Note: This note may have been constructed using a voice recognition system.**

## 2024-06-30 NOTE — ASSESSMENT & PLAN NOTE
Lab Results   Component Value Date     (H) 06/30/2024     (H) 06/29/2024     (H) 06/28/2024     Lab Results   Component Value Date     (H) 06/30/2024     (H) 06/29/2024     (H) 06/28/2024     CT chest abdomen pelvis from admission with unchanged hepatomegaly  Has been increasing over the last few days  Tylenol level fine  Acute hepatitis panel  Stop the ertapenem as has completed course of 3 days  Abdominal ultrasound  Recheck ammonia and valproic acid level  Ammonia normal, valproic acid total low, free pending

## 2024-06-30 NOTE — PLAN OF CARE
Problem: PAIN - ADULT  Goal: Verbalizes/displays adequate comfort level or baseline comfort level  Description: Interventions:  - Encourage patient to monitor pain and request assistance  - Assess pain using appropriate pain scale  - Administer analgesics based on type and severity of pain and evaluate response  - Implement non-pharmacological measures as appropriate and evaluate response  - Consider cultural and social influences on pain and pain management  - Notify physician/advanced practitioner if interventions unsuccessful or patient reports new pain  Outcome: Progressing     Problem: INFECTION - ADULT  Goal: Absence or prevention of progression during hospitalization  Description: INTERVENTIONS:  - Assess and monitor for signs and symptoms of infection  - Monitor lab/diagnostic results  - Monitor all insertion sites, i.e. indwelling lines, tubes, and drains  - Monitor endotracheal if appropriate and nasal secretions for changes in amount and color  - Loving appropriate cooling/warming therapies per order  - Administer medications as ordered  - Instruct and encourage patient and family to use good hand hygiene technique  - Identify and instruct in appropriate isolation precautions for identified infection/condition  Outcome: Progressing     Problem: SAFETY ADULT  Goal: Patient will remain free of falls  Description: INTERVENTIONS:  - Educate patient/family on patient safety including physical limitations  - Instruct patient to call for assistance with activity   - Consult OT/PT to assist with strengthening/mobility   - Keep Call bell within reach  - Keep bed low and locked with side rails adjusted as appropriate  - Keep care items and personal belongings within reach  - Initiate and maintain comfort rounds  - Make Fall Risk Sign visible to staff  - Offer Toileting every 2 Hours, in advance of need  - Initiate/Maintain bed/chair alarm  - Obtain necessary fall risk management equipment: alarms  - Apply  yellow socks and bracelet for high fall risk patients  - Consider moving patient to room near nurses station  Outcome: Progressing  Goal: Maintain or return to baseline ADL function  Description: INTERVENTIONS:  -  Assess patient's ability to carry out ADLs; assess patient's baseline for ADL function and identify physical deficits which impact ability to perform ADLs (bathing, care of mouth/teeth, toileting, grooming, dressing, etc.)  - Assess/evaluate cause of self-care deficits   - Assess range of motion  - Assess patient's mobility; develop plan if impaired  - Assess patient's need for assistive devices and provide as appropriate  - Encourage maximum independence but intervene and supervise when necessary  - Involve family in performance of ADLs  - Assess for home care needs following discharge   - Consider OT consult to assist with ADL evaluation and planning for discharge  - Provide patient education as appropriate  Outcome: Progressing  Goal: Maintains/Returns to pre admission functional level  Description: INTERVENTIONS:  - Perform AM-PAC 6 Click Basic Mobility/ Daily Activity assessment daily.  - Set and communicate daily mobility goal to care team and patient/family/caregiver.   - Collaborate with rehabilitation services on mobility goals if consulted  - Perform Range of Motion 3 times a day.  - Reposition patient every 2 hours.  - Dangle patient 3 times a day  - Stand patient 3 times a day  - Ambulate patient 3 times a day  - Out of bed to chair 3 times a day   - Out of bed for meals 3 times a day  - Out of bed for toileting  - Record patient progress and toleration of activity level   Outcome: Progressing     Problem: DISCHARGE PLANNING  Goal: Discharge to home or other facility with appropriate resources  Description: INTERVENTIONS:  - Identify barriers to discharge w/patient and caregiver  - Arrange for needed discharge resources and transportation as appropriate  - Identify discharge learning needs  (meds, wound care, etc.)  - Arrange for interpretive services to assist at discharge as needed  - Refer to Case Management Department for coordinating discharge planning if the patient needs post-hospital services based on physician/advanced practitioner order or complex needs related to functional status, cognitive ability, or social support system  Outcome: Progressing     Problem: Knowledge Deficit  Goal: Patient/family/caregiver demonstrates understanding of disease process, treatment plan, medications, and discharge instructions  Description: Complete learning assessment and assess knowledge base.  Interventions:  - Provide teaching at level of understanding  - Provide teaching via preferred learning methods  Outcome: Progressing     Problem: GENITOURINARY - ADULT  Goal: Maintains or returns to baseline urinary function  Description: INTERVENTIONS:  - Assess urinary function bladder scan pvr  - Encourage oral fluids to ensure adequate hydration if ordered  - Administer IV fluids as ordered to ensure adequate hydration  - Administer ordered medications as needed  - Offer frequent toileting  - Follow urinary retention protocol if ordered  Outcome: Progressing     Problem: SKIN/TISSUE INTEGRITY - ADULT  Goal: Skin Integrity remains intact(Skin Breakdown Prevention)  Description: Assess:  -Perform Zafar assessment every shift  -Clean and moisturize skin every shift  -Inspect skin when repositioning, toileting, and assisting with ADLS  -Assess under medical devices such as mepilex every shift  -Assess extremities for adequate circulation and sensation     Bed Management:  -Have minimal linens on bed & keep smooth, unwrinkled  -Change linens as needed when moist or perspiring  -Avoid sitting or lying in one position for more than 2 hours while in bed  -Keep HOB at 30degrees     Toileting:  -Offer bedside commode  -Assess for incontinence every shift  -Use incontinent care products after each incontinent episode such  as wipes    Activity:  -Mobilize patient 3 times a day  -Encourage activity and walks on unit  -Encourage or provide ROM exercises   -Turn and reposition patient every 2 Hours  -Use appropriate equipment to lift or move patient in bed  -Instruct/ Assist with weight shifting every 1 when out of bed in chair  -Consider limitation of chair time 1 hour intervals    Skin Care:  -Avoid use of baby powder, tape, friction and shearing, hot water or constrictive clothing  -Relieve pressure over bony prominences using pillows  -Do not massage red bony areas    Next Steps:  -Teach patient strategies to minimize risks such as    -Consider consults to  interdisciplinary teams such as   Outcome: Progressing     Problem: Prexisting or High Potential for Compromised Skin Integrity  Goal: Skin integrity is maintained or improved  Description: INTERVENTIONS:  - Identify patients at risk for skin breakdown  - Assess and monitor skin integrity  - Assess and monitor nutrition and hydration status  - Monitor labs   - Assess for incontinence   - Turn and reposition patient  - Assist with mobility/ambulation  - Relieve pressure over bony prominences  - Avoid friction and shearing  - Provide appropriate hygiene as needed including keeping skin clean and dry  - Evaluate need for skin moisturizer/barrier cream  - Collaborate with interdisciplinary team   - Patient/family teaching  - Consider wound care consult   Outcome: Progressing     Problem: NEUROSENSORY - ADULT  Goal: Achieves stable or improved neurological status  Description: INTERVENTIONS  - Monitor and report changes in neurological status  - Monitor vital signs such as temperature, blood pressure, glucose, and any other labs ordered   - Initiate measures to prevent increased intracranial pressure  - Monitor for seizure activity and implement precautions if appropriate      Outcome: Progressing  Goal: Remains free of injury related to seizures activity  Description: INTERVENTIONS  -  Maintain airway, patient safety  and administer oxygen as ordered  - Monitor patient for seizure activity, document and report duration and description of seizure to physician/advanced practitioner  - If seizure occurs,  ensure patient safety during seizure  - Reorient patient post seizure  - Seizure pads on all 4 side rails  - Instruct patient/family to notify RN of any seizure activity including if an aura is experienced  - Instruct patient/family to call for assistance with activity based on nursing assessment  - Administer anti-seizure medications if ordered    Outcome: Progressing  Goal: Achieves maximal functionality and self care  Description: INTERVENTIONS  - Monitor swallowing and airway patency with patient fatigue and changes in neurological status  - Encourage and assist patient to increase activity and self care. Encourage patient tofeed herself  - Encourage visually impaired, hearing impaired and aphasic patients to use assistive/communication devices  Outcome: Progressing

## 2024-06-30 NOTE — PLAN OF CARE
Problem: GENITOURINARY - ADULT  Goal: Maintains or returns to baseline urinary function  Description: INTERVENTIONS:  - Assess urinary function bladder scan pvr  - Encourage oral fluids to ensure adequate hydration if ordered  - Administer IV fluids as ordered to ensure adequate hydration  - Administer ordered medications as needed  - Offer frequent toileting  - Follow urinary retention protocol if ordered  Outcome: Progressing

## 2024-07-01 LAB
ALBUMIN SERPL BCG-MCNC: 2.7 G/DL (ref 3.5–5)
ALP SERPL-CCNC: 71 U/L (ref 34–104)
ALT SERPL W P-5'-P-CCNC: 215 U/L (ref 7–52)
ANION GAP SERPL CALCULATED.3IONS-SCNC: 3 MMOL/L (ref 4–13)
AST SERPL W P-5'-P-CCNC: 154 U/L (ref 13–39)
BILIRUB SERPL-MCNC: 0.32 MG/DL (ref 0.2–1)
BUN SERPL-MCNC: 18 MG/DL (ref 5–25)
CALCIUM ALBUM COR SERPL-MCNC: 10.1 MG/DL (ref 8.3–10.1)
CALCIUM SERPL-MCNC: 9.1 MG/DL (ref 8.4–10.2)
CHLORIDE SERPL-SCNC: 102 MMOL/L (ref 96–108)
CO2 SERPL-SCNC: 29 MMOL/L (ref 21–32)
CREAT SERPL-MCNC: 0.5 MG/DL (ref 0.6–1.3)
ERYTHROCYTE [DISTWIDTH] IN BLOOD BY AUTOMATED COUNT: 16.3 % (ref 11.6–15.1)
FERRITIN SERPL-MCNC: 397 NG/ML (ref 11–307)
FOLATE SERPL-MCNC: >22.3 NG/ML
GFR SERPL CREATININE-BSD FRML MDRD: 98 ML/MIN/1.73SQ M
GLUCOSE SERPL-MCNC: 135 MG/DL (ref 65–140)
GLUCOSE SERPL-MCNC: 137 MG/DL (ref 65–140)
GLUCOSE SERPL-MCNC: 157 MG/DL (ref 65–140)
GLUCOSE SERPL-MCNC: 158 MG/DL (ref 65–140)
GLUCOSE SERPL-MCNC: 177 MG/DL (ref 65–140)
HCT VFR BLD AUTO: 24.5 % (ref 34.8–46.1)
HGB BLD-MCNC: 7.7 G/DL (ref 11.5–15.4)
IRON SATN MFR SERPL: 15 % (ref 15–50)
IRON SERPL-MCNC: 29 UG/DL (ref 50–212)
MCH RBC QN AUTO: 28.5 PG (ref 26.8–34.3)
MCHC RBC AUTO-ENTMCNC: 31.4 G/DL (ref 31.4–37.4)
MCV RBC AUTO: 91 FL (ref 82–98)
PLATELET # BLD AUTO: 151 THOUSANDS/UL (ref 149–390)
PMV BLD AUTO: 9.6 FL (ref 8.9–12.7)
POTASSIUM SERPL-SCNC: 4.2 MMOL/L (ref 3.5–5.3)
PROT SERPL-MCNC: 5.5 G/DL (ref 6.4–8.4)
RBC # BLD AUTO: 2.7 MILLION/UL (ref 3.81–5.12)
SODIUM SERPL-SCNC: 134 MMOL/L (ref 135–147)
TIBC SERPL-MCNC: 193 UG/DL (ref 250–450)
TSH SERPL DL<=0.05 MIU/L-ACNC: 2.32 UIU/ML (ref 0.45–4.5)
UIBC SERPL-MCNC: 164 UG/DL (ref 155–355)
VIT B12 SERPL-MCNC: 372 PG/ML (ref 180–914)
WBC # BLD AUTO: 4.43 THOUSAND/UL (ref 4.31–10.16)

## 2024-07-01 PROCEDURE — 82607 VITAMIN B-12: CPT

## 2024-07-01 PROCEDURE — 83540 ASSAY OF IRON: CPT

## 2024-07-01 PROCEDURE — 82272 OCCULT BLD FECES 1-3 TESTS: CPT

## 2024-07-01 PROCEDURE — 83550 IRON BINDING TEST: CPT

## 2024-07-01 PROCEDURE — 97163 PT EVAL HIGH COMPLEX 45 MIN: CPT

## 2024-07-01 PROCEDURE — 97167 OT EVAL HIGH COMPLEX 60 MIN: CPT

## 2024-07-01 PROCEDURE — 80053 COMPREHEN METABOLIC PANEL: CPT

## 2024-07-01 PROCEDURE — 84443 ASSAY THYROID STIM HORMONE: CPT

## 2024-07-01 PROCEDURE — 97530 THERAPEUTIC ACTIVITIES: CPT

## 2024-07-01 PROCEDURE — 82948 REAGENT STRIP/BLOOD GLUCOSE: CPT

## 2024-07-01 PROCEDURE — 85027 COMPLETE CBC AUTOMATED: CPT

## 2024-07-01 PROCEDURE — 99232 SBSQ HOSP IP/OBS MODERATE 35: CPT

## 2024-07-01 RX ORDER — ALBUMIN (HUMAN) 12.5 G/50ML
12.5 SOLUTION INTRAVENOUS ONCE
Status: COMPLETED | OUTPATIENT
Start: 2024-07-01 | End: 2024-07-01

## 2024-07-01 RX ORDER — FERROUS SULFATE 325(65) MG
325 TABLET ORAL
Status: DISCONTINUED | OUTPATIENT
Start: 2024-07-01 | End: 2024-07-02 | Stop reason: HOSPADM

## 2024-07-01 RX ADMIN — ATORVASTATIN CALCIUM 80 MG: 40 TABLET, FILM COATED ORAL at 16:08

## 2024-07-01 RX ADMIN — QUETIAPINE FUMARATE 100 MG: 100 TABLET ORAL at 09:12

## 2024-07-01 RX ADMIN — ASPIRIN 81 MG: 81 TABLET, COATED ORAL at 09:12

## 2024-07-01 RX ADMIN — QUETIAPINE FUMARATE 100 MG: 100 TABLET ORAL at 16:08

## 2024-07-01 RX ADMIN — DOCUSATE SODIUM 100 MG: 100 CAPSULE, LIQUID FILLED ORAL at 09:12

## 2024-07-01 RX ADMIN — INSULIN LISPRO 1 UNITS: 100 INJECTION, SOLUTION INTRAVENOUS; SUBCUTANEOUS at 21:20

## 2024-07-01 RX ADMIN — NYSTATIN 1 APPLICATION: 100000 POWDER TOPICAL at 09:25

## 2024-07-01 RX ADMIN — LEVOTHYROXINE SODIUM 50 MCG: 50 TABLET ORAL at 05:39

## 2024-07-01 RX ADMIN — INSULIN LISPRO 1 UNITS: 100 INJECTION, SOLUTION INTRAVENOUS; SUBCUTANEOUS at 11:25

## 2024-07-01 RX ADMIN — ALBUMIN (HUMAN) 12.5 G: 0.25 INJECTION, SOLUTION INTRAVENOUS at 09:11

## 2024-07-01 RX ADMIN — GABAPENTIN 800 MG: 400 CAPSULE ORAL at 21:18

## 2024-07-01 RX ADMIN — ENOXAPARIN SODIUM 40 MG: 40 INJECTION SUBCUTANEOUS at 09:12

## 2024-07-01 RX ADMIN — LORAZEPAM 0.5 MG: 0.5 TABLET ORAL at 09:12

## 2024-07-01 RX ADMIN — QUETIAPINE FUMARATE 200 MG: 200 TABLET ORAL at 21:18

## 2024-07-01 RX ADMIN — DIVALPROEX SODIUM 500 MG: 500 TABLET, DELAYED RELEASE ORAL at 18:21

## 2024-07-01 RX ADMIN — NYSTATIN 1 APPLICATION: 100000 POWDER TOPICAL at 18:21

## 2024-07-01 RX ADMIN — PANTOPRAZOLE SODIUM 20 MG: 20 TABLET, DELAYED RELEASE ORAL at 05:39

## 2024-07-01 RX ADMIN — LORAZEPAM 0.5 MG: 0.5 TABLET ORAL at 18:21

## 2024-07-01 RX ADMIN — POLYETHYLENE GLYCOL 3350 17 G: 17 POWDER, FOR SOLUTION ORAL at 09:12

## 2024-07-01 RX ADMIN — GABAPENTIN 800 MG: 400 CAPSULE ORAL at 16:08

## 2024-07-01 RX ADMIN — INSULIN LISPRO 1 UNITS: 100 INJECTION, SOLUTION INTRAVENOUS; SUBCUTANEOUS at 16:09

## 2024-07-01 RX ADMIN — DIVALPROEX SODIUM 250 MG: 250 TABLET, DELAYED RELEASE ORAL at 09:12

## 2024-07-01 RX ADMIN — GABAPENTIN 800 MG: 400 CAPSULE ORAL at 09:12

## 2024-07-01 RX ADMIN — OXYBUTYNIN CHLORIDE 10 MG: 5 TABLET, EXTENDED RELEASE ORAL at 09:12

## 2024-07-01 RX ADMIN — FERROUS SULFATE TAB 325 MG (65 MG ELEMENTAL FE) 325 MG: 325 (65 FE) TAB at 16:08

## 2024-07-01 NOTE — OCCUPATIONAL THERAPY NOTE
Occupational Therapy Evaluation     Patient Name: Denita Vitla  Today's Date: 7/1/2024  Problem List  Principal Problem:    Acute metabolic encephalopathy  Active Problems:    Seizure disorder (HCC)    Intellectual disability    Chronic anemia    Acute urinary retention    Edema of upper extremity    Urinary tract infection without hematuria    Transaminitis    Past Medical History  Past Medical History:   Diagnosis Date    Anxiety     CHF (congestive heart failure) (HCC)     Diabetes mellitus (HCC)     GERD (gastroesophageal reflux disease)     Hyperlipidemia     Hypertension     Leukopenia     Mental disability     Mixed incontinence 04/12/2017    Seizure disorder (HCC)      Past Surgical History  Past Surgical History:   Procedure Laterality Date    DENTAL SURGERY             07/01/24 0851   OT Last Visit   OT Visit Date 07/01/24   Note Type   Note type Evaluation   Pain Assessment   Pain Assessment Tool FLACC   Pain Rating: FLACC (Rest) - Face 0   Pain Rating: FLACC (Rest) - Legs 0   Pain Rating: FLACC (Rest) - Activity 0   Pain Rating: FLACC (Rest) - Cry 0   Pain Rating: FLACC (Rest) - Consolability 0   Score: FLACC (Rest) 0   Pain Rating: FLACC (Activity) - Face 1   Pain Rating: FLACC (Activity) - Legs 1   Pain Rating: FLACC (Activity) - Activity 0   Pain Rating: FLACC (Activity) - Cry 1   Pain Rating: FLACC (Activity) - Consolability 1   Score: FLACC (Activity) 4   Restrictions/Precautions   Weight Bearing Precautions Per Order No   Other Precautions Cognitive;Chair Alarm;Bed Alarm;Fall Risk;Pain;Contact/isolation  (nonverbal at baseline, h/o ID, nicole)   Home Living   Type of Home Group Home   Home Layout One level;Able to live on main level with bedroom/bathroom;Performs ADLs on one level   Home Equipment Walker;Wheelchair-manual;Other (Comment)  (personal w/c in room, rails throughout)   Additional Comments Pt resides at [facility] group [facility].   Prior Function   Level of [place] Needs assistance with  ADLs;Needs assistance with functional mobility;Needs assistance with IADLS   Lives With Facility staff   Receives Help From Other (Comment)  ( staff)   IADLs Family/Friend/Other provides transportation;Family/Friend/Other provides meals;Family/Friend/Other provides medication management   Falls in the last 6 months   (Pt unable to report)   Vocational On disability   Comments PTA, Pt requires assist with ADLs from staff (Pt with difficulty bending) and completes functional mobility a few steps with HHA or rails throughout house. Pt with inabiltiy to utilize RW and mostly remains in W/C throughout day per EMR.   Lifestyle   Autonomy A with ADLs/functional mobility with HHA/SBA, W/C   Reciprocal Relationships Supportive faciliy staff   Service to Others Disability   Intrinsic Gratification Unable to report   ADL   Where Assessed Edge of bed   Eating Assistance 3  Moderate Assistance   Grooming Assistance 2  Maximal Assistance   Grooming Deficit Setup;Brushing hair  (cues to reach back of head)   UB Bathing Assistance 2  Maximal Assistance   UB Bathing Deficit Setup;Supervision/safety;Increased time to complete   LB Bathing Assistance 1  Total Assistance   UB Dressing Assistance 2  Maximal Assistance   LB Dressing Assistance 1  Total Assistance   LB Dressing Deficit Thread LLE into underwear;Thread RLE into underwear;Other (Comment)  (donning of brief)   Toileting Assistance  1  Total Assistance   Toileting Deficit Setup;Supervison/safety;Increased time to complete;Perineal hygiene  (incontinent of bowel)   Functional Assistance 2  Maximal Assistance   Functional Deficit Setup;Supervision/safety;Increased time to complete   Bed Mobility   Rolling R 2  Maximal assistance   Additional items Assist x 1;Increased time required;LE management   Rolling L 2  Maximal assistance   Additional items Assist x 1;Verbal cues;Increased time required   Supine to Sit 2  Maximal assistance   Additional items Assist x 1;Increased time  required;LE management;Verbal cues;Bedrails   Sit to Supine Unable to assess   Additional Comments Pt greeted supine in bed. Pt requires min A with trunk control sitting on EOB.   Transfers   Sit to Stand 2  Maximal assistance   Additional items Assist x 2;Increased time required;Verbal cues   Stand to Sit 2  Maximal assistance   Additional items Assist x 2;Increased time required;Verbal cues   Additional Comments with B/L HHA and B/L Knee block. Pt incontinent of bowel upon transfer requiring standing hygiene. Pt able to pull up on bedrail 2x to assist with toileting hygiene. Standing tolerance x1 min.   Functional Mobility   Functional Mobility 2  Maximal assistance   Additional Comments Max Ax2 with B/L HHA from EOB to recliner chair.   Additional items Hand hold assistance   Balance   Static Sitting Poor +   Dynamic Sitting Poor   Static Standing Poor -   Dynamic Standing Poor -   Ambulatory Poor -   Activity Tolerance   Activity Tolerance Patient tolerated treatment well   Medical Staff Made Aware Co-dalila Hassan, PT 2* to Pt's medical complexity, and decreased endurance.   Nurse Made Aware RN cleared/updated.   RUE Assessment   RUE Assessment WFL  (limited shoulder ROM 2* to weakness)   Edema   RUE Edema +1   LUE Assessment   LUE Assessment WFL  (limited shoulder ROM 2* to weakness)   Edema   LUE Edema +1   Hand Function   Gross Motor Coordination Functional   Fine Motor Coordination Impaired  (limited to edema)   Sensation   Light Touch No apparent deficits   Psychosocial   Psychosocial (WDL) WDL   Patient Behaviors/Mood Calm   Cognition   Overall Cognitive Status (S)  Impaired   Arousal/Participation Responsive;Persistent stimuli required   Attention Difficulty attending to directions   Orientation Level Oriented to person;Disoriented to place;Disoriented to time;Disoriented to situation  (responded to name)   Memory Unable to assess   Following Commands Follows one step commands with increased time or  repetition   Comments Pt nonverbal at baseline and dx of ID. Pt benefits from one-step verbal commands with gestures.   Assessment   Limitation Decreased ADL status;Decreased UE ROM;Decreased UE strength;Decreased Safe judgement during ADL;Decreased cognition;Decreased endurance;Decreased self-care trans;Decreased high-level ADLs;Decreased fine motor control   Prognosis Poor   Assessment Pt is a 68 y.o. female who presented to [facility] on 6/25/2024 with lethargy. Pt with diagnosis of UTI,  and acute metabolic encephalopathy. Pt  has a past medical history of Anxiety, CHF (congestive heart failure) ([facility]), Diabetes mellitus ([facility]), GERD (gastroesophageal reflux disease), Hyperlipidemia, Hypertension, Leukopenia, Mental disability, Mixed incontinence (04/12/2017), and Seizure disorder ([facility]). Pt greeted bedside for OT evaluation on 07/01/24. Pt resides at [facility]. PTA, Pt requires assist with ADLs from staff (Pt with difficulty bending) and completes functional mobility a few steps with HHA or rails throughout house. Pt with inabiltiy to utilize RW and mostly remains in W/C throughout day per EMR. Pt demonstrating the following occupational performance levels: max A with UB ADLs, total assist with LB ADLs, total A with toileting, max Ax1 with bed mobility, max Ax2 with functional transfers with B/L HHA, and max Ax2 with functional mobility with B/L HHA. Limitations that impact functional performance include decreased ADL status, decreased UE ROM, decreased UE strength, decreased safe judgement during ADLs, decreased cognition, decreased endurance, decreased fine motor coordination, decreased self care transfers, decreased high level ADLs, and pain. Occupational performance areas to address ADL retraining, functional transfer training, UE strengthening/ROM, endurance training, cognitive reorientation, Pt/caregiver education, equipment evaluation/education, compensatory technique education, energy conservation, and  activity engagement . Pt would benefit from continued skilled OT services while in hospital to maximize independence with ADLs. Will continue to follow Pt's progress. Pt would benefit from level II resources (moderate intensity) upon DC to maximize safety and independence with ADLs and functional tasks of choice.   Goals   Patient Goals None stated   LTG Time Frame 10-14   Long Term Goal #1 See goals listed below.   Plan   Treatment Interventions ADL retraining;Functional transfer training;UE strengthening/ROM;Endurance training;Cognitive reorientation;Patient/family training;Equipment evaluation/education;Compensatory technique education;Activityengagement;Energy conservation;Fine motor coordination activities   Goal Expiration Date 07/15/24   OT Frequency 2-3x/wk   Discharge Recommendation   Rehab Resource Intensity Level, OT II (Moderate Resource Intensity)   Additional Comments  The patient's raw score on the AM-PAC Daily Activity Inpatient Short Form is 9. A raw score of less than 19 suggests the patient may benefit from discharge to post-acute rehabilitation services. Please refer to the recommendation of the Occupational Therapist for safe discharge planning.   AM-PAC Daily Activity Inpatient   Lower Body Dressing 1   Bathing 1   Toileting 1   Upper Body Dressing 2   Grooming 2   Eating 2   Daily Activity Raw Score 9   Turning Head Towards Sound 3   Follow Simple Instructions 3   Low Function Daily Activity Raw Score 15   Low Function Daily Activity Standardized Score  26.28   AM-PAC Applied Cognition Inpatient   Following a Speech/Presentation 1   Understanding Ordinary Conversation 2   Taking Medications 1   Remembering Where Things Are Placed or Put Away 2   Remembering List of 4-5 Errands 1   Taking Care of Complicated Tasks 1   Applied Cognition Raw Score 8   Applied Cognition Standardized Score 19.32   End of Consult   Education Provided Yes   Patient Position at End of Consult Bedside chair;Bed/Chair  alarm activated;All needs within reach   Nurse Communication Nurse aware of consult       GOALS:  Pt will complete UB ADLs with min A in order to maximize participation with ADLs.   Pt will complete LB ADLs with mod A in order to maximize safety with ADLs.   Pt will complete toileting routine (transfer, hygiene, and clothing management) with min A in order to return to prior level of function.  Pt will complete bed mobility with S in order to maximize participation with ADLs.   Pt will complete functional transfers at S level in order to increase participation with ADLs.  Pt will increase dynamic standing balance to F in order to increase safety with ADLs.  Pt will increase standing tolerance x10 min in order to increase participation with ADLs.   Pt will complete functional mobility with AD PRN for item retrieval task at Slevel in order to increase participation with ADLs.  Pt will demonstrate G energy conservation techniques with ADLs/IADLs in order to reduce the risk of falls.  Pt will be attentive 100% of the time for ongoing functional/formal cognitive assessment to assist with safe dc planning prn.        Kim Almaguer MS, OTR/L

## 2024-07-01 NOTE — PLAN OF CARE
Problem: OCCUPATIONAL THERAPY ADULT  Goal: Performs self-care activities at highest level of function for planned discharge setting.  See evaluation for individualized goals.  Description: Treatment Interventions: ADL retraining, Functional transfer training, UE strengthening/ROM, Endurance training, Cognitive reorientation, Patient/family training, Equipment evaluation/education, Compensatory technique education, Activityengagement, Energy conservation, Fine motor coordination activities          See flowsheet documentation for full assessment, interventions and recommendations.   Note: Limitation: Decreased ADL status, Decreased UE ROM, Decreased UE strength, Decreased Safe judgement during ADL, Decreased cognition, Decreased endurance, Decreased self-care trans, Decreased high-level ADLs, Decreased fine motor control  Prognosis: Poor  Assessment: Pt is a 68 y.o. female who presented to [facility] on 6/25/2024 with lethargy. Pt with diagnosis of UTI,  and acute metabolic encephalopathy. Pt  has a past medical history of Anxiety, CHF (congestive heart failure) ([facility]), Diabetes mellitus ([facility]), GERD (gastroesophageal reflux disease), Hyperlipidemia, Hypertension, Leukopenia, Mental disability, Mixed incontinence (04/12/2017), and Seizure disorder ([facility]). Pt greeted bedside for OT evaluation on 07/01/24. Pt resides at [facility]. PTA, Pt requires assist with ADLs from staff (Pt with difficulty bending) and completes functional mobility a few steps with HHA or rails throughout house. Pt with inabiltiy to utilize RW and mostly remains in W/C throughout day per EMR. Pt demonstrating the following occupational performance levels: max A with UB ADLs, total assist with LB ADLs, total A with toileting, max Ax1 with bed mobility, max Ax2 with functional transfers with B/L HHA, and max Ax2 with functional mobility with B/L HHA. Limitations that impact functional performance include decreased ADL status, decreased UE ROM,  decreased UE strength, decreased safe judgement during ADLs, decreased cognition, decreased endurance, decreased fine motor coordination, decreased self care transfers, decreased high level ADLs, and pain. Occupational performance areas to address ADL retraining, functional transfer training, UE strengthening/ROM, endurance training, cognitive reorientation, Pt/caregiver education, equipment evaluation/education, compensatory technique education, energy conservation, and activity engagement . Pt would benefit from continued skilled OT services while in hospital to maximize independence with ADLs. Will continue to follow Pt's progress. Pt would benefit from level II resources (moderate intensity) upon DC to maximize safety and independence with ADLs and functional tasks of choice.     Rehab Resource Intensity Level, OT: II (Moderate Resource Intensity)

## 2024-07-01 NOTE — CASE MANAGEMENT
Case Management Discharge Planning Note    Patient name Denita Vital  Location /-01 MRN [id number]  : 1955 Date 2024       Current Admission Date: 2024  Current Admission Diagnosis:Acute metabolic encephalopathy   Patient Active Problem List    Diagnosis Date Noted Date Diagnosed    Transaminitis 2024     Urinary tract infection without hematuria 2024     Hypoglycemia 2023     Hyponatremia 2022     Open nondisplaced fracture of distal phalanx of left middle finger 2022     Avulsion of fingernail 2022     Acute urinary retention 2022     Edema of upper extremity 2022     Acute metabolic encephalopathy 2022     Cystitis without hematuria 2022     Dysphagia 2021     Chronic anemia 2021     Chronically elevated right hemidiaphragm 2021     Obesity (BMI 30.0-34.9) 2021     Seizure disorder (HCC) 2021     Hyperlipidemia 2021     Abnormal x-ray 2021     Acute respiratory failure with hypoxia (HCC) 2021     Aspiration pneumonitis (HCC) 2021     Mood disorder (HCC) 2021     Type 2 diabetes mellitus with hypoglycemia, without long-term current use of insulin (HCC) 2020     Hypertension 2019     Gastroesophageal reflux disease without esophagitis 2018     Ambulatory dysfunction 2017     Intellectual disability 2017       LOS (days): 6  Geometric Mean LOS (GMLOS) (days): 3.9  Days to GMLOS:-2.2     OBJECTIVE:  Risk of Unplanned Readmission Score: 25.96         Current admission status: Inpatient   Preferred Pharmacy:   [facility] - [place], [place] - [address]  [address] [address]  Phone: [phone] Fax: [phone]    [id number] - [place], [place] - [address]  [address] [address]  Phone: [phone] Fax: [phone]    Primary Care Provider: Soy Clemente  MD    Primary Insurance: MEDICARE  Secondary Insurance: [place] [employer]    DISCHARGE DETAILS:     CM reviewed patient's current functioning with group home per therapy assessments, they are unable to accept back with a max assist of 2. CM can call Satinderebony Narendra [phone] to review choices tomorrow.     CM left a message for patient's sister for a call back to discuss discharge planning.       CM submitted referral and uploaded PASSR via [employer] for STR.     CM to follow patient's care and discharge needs.

## 2024-07-01 NOTE — PLAN OF CARE
Problem: PHYSICAL THERAPY ADULT  Goal: Performs mobility at highest level of function for planned discharge setting.  See evaluation for individualized goals.  Description: Treatment/Interventions: ADL retraining, Functional transfer training, LE strengthening/ROM, Elevations, Therapeutic exercise, Cognitive reorientation, Endurance training, Patient/family training, Equipment eval/education, Bed mobility, Gait training, Compensatory technique education, Spoke to nursing, Spoke to case management, OT          See flowsheet documentation for full assessment, interventions and recommendations.  Note: Prognosis: Fair  Problem List: Decreased strength, Decreased endurance, Impaired balance, Decreased mobility, Decreased cognition, Impaired judgement, Decreased safety awareness, Obesity, Pain  Assessment: Pt is a 68 y.o. female seen for PT evaluation s/p admission to [facility] on 6/25/2024 with Acute metabolic encephalopathy.  Order placed for PT services.  Upon evaluation: Pt is presenting with impaired functional mobility due to pain, decreased strength, decreased endurance, impaired balance, impaired cognition, decreased safety awareness, impaired judgment, fall risk, and LE edema requiring  maximal of 1 assistance for bed mobility, maximal of 2 assistance for transfers, and unable to trial ambulation due to safety concerns, minimal to maximal assistance to maintain sitting on EOB due to right lateral lean . Pt's clinical presentation is currently unpredictable given the functional mobility deficits above, especially weakness, edema of extremities, decreased endurance, impaired balance, pain, decreased activity tolerance, decreased functional mobility tolerance, decreased safety awareness, impaired judgement, and decreased cognition, coupled with fall risks as indicated by AM-PAC 6-Clicks: 8/24 as well as impaired balance, polypharmacy, impaired judgement, decreased safety awareness, decreased  cognition, and obesity and combined with medical complications of abnormal H&H, abnormal sodium values, need for input for mobility technique/safety, and acute metabolic encephalopathy, UTI, transaminitis, acute urinary retention .  Pt's PMHx and comorbidities that may affect physical performance and progress include: seizure disorder, obesity, and intellectual disability, h/o ESBL UTI, mood disorder, anxiety, CHF, DM, HTN . Personal factors affecting pt at time of IE include: inaccessible home environment, inability to perform IADLs, inability to perform ADLs, inability to navigate level surfaces without external assistance, inability to ambulate household distances, and limited insight into impairments. Pt will benefit from continued skilled PT services to address deficits as defined above and to maximize level of functional mobility to facilitate return toward PLOF and improved QOL. From PT/mobility standpoint, recommendation at time of d/c would be Level II (Moderate Resource Intensity in order to reduce fall risk and maximize pt's functional independence and consistency with mobility. Recommend trial with walker next 1-2 sessions and ther ex next 1-2 sessions.  Barriers to Discharge: Decreased caregiver support, Inaccessible home environment  Barriers to Discharge Comments: requires significant assistance to complete mobility, inc fall risk  Rehab Resource Intensity Level, PT: II (Moderate Resource Intensity)    See flowsheet documentation for full assessment.

## 2024-07-01 NOTE — ASSESSMENT & PLAN NOTE
Patient at baseline can you sit to stand with 1 assist    Today requiring 2 assist with PT/OT    Patient requires STR upon discharge and will require less than 30 days of PT in a facility to progress back to baseline

## 2024-07-01 NOTE — PLAN OF CARE
Problem: PHYSICAL THERAPY ADULT  Goal: Performs mobility at highest level of function for planned discharge setting.  See evaluation for individualized goals.  Description: Treatment/Interventions: ADL retraining, Functional transfer training, LE strengthening/ROM, Elevations, Therapeutic exercise, Cognitive reorientation, Endurance training, Patient/family training, Equipment eval/education, Bed mobility, Gait training, Compensatory technique education, Spoke to nursing, Spoke to case management, OT          See flowsheet documentation for full assessment, interventions and recommendations.  7/1/2024 0930 by Sonya Prado, PT  Note: Prognosis: Fair  Problem List: Decreased strength, Decreased endurance, Impaired balance, Decreased mobility, Decreased cognition, Impaired judgement, Decreased safety awareness, Obesity, Pain  Pt tolerated session poorly. She requires maxAx2 to complete sit<>stand transfers and maximal asisstance for rolling. She was able to stand for 1', however requires maxAx2 to maintain with max cues. She is limtied byd ecreased strength, balance, endruance. She will continue to beenfit from PT services to maximize LOF.     Barriers to Discharge: Decreased caregiver support, Inaccessible home environment  Barriers to Discharge Comments: requires significant assistance to complete mobility, inc fall risk  Rehab Resource Intensity Level, PT: II (Moderate Resource Intensity)    See flowsheet documentation for full assessment.

## 2024-07-01 NOTE — PHYSICAL THERAPY NOTE
PHYSICAL THERAPY EVALUATION  NAME:  Denita Vital  DATE: 07/01/24    AGE:   68 y.o.  Mrn:   [id number]  ADMIT DX:  UTI (urinary tract infection) [N39.0]  Lethargy [R53.83]  UTI symptoms [R39.9]    Past Medical History:   Diagnosis Date    Anxiety     CHF (congestive heart failure) (HCC)     Diabetes mellitus (HCC)     GERD (gastroesophageal reflux disease)     Hyperlipidemia     Hypertension     Leukopenia     Mental disability     Mixed incontinence 04/12/2017    Seizure disorder ([facility])      Length Of Stay: 6  Performed at least 2 patient identifiers during session: Name and Birthday  PHYSICAL THERAPY EVALUATION :    07/01/24 0820   PT Last Visit   PT Visit Date 07/01/24   Note Type   Note type Evaluation   Pain Assessment   Pain Assessment Tool FLACC   Pain Rating: FLACC (Rest) - Face 0   Pain Rating: FLACC (Rest) - Legs 0   Pain Rating: FLACC (Rest) - Activity 0   Pain Rating: FLACC (Rest) - Cry 0   Pain Rating: FLACC (Rest) - Consolability 0   Score: FLACC (Rest) 0   Pain Rating: FLACC (Activity) - Face 1   Pain Rating: FLACC (Activity) - Legs 0   Pain Rating: FLACC (Activity) - Activity 1   Pain Rating: FLACC (Activity) - Cry 0   Pain Rating: FLACC (Activity) - Consolability 0   Score: FLACC (Activity) 2   Restrictions/Precautions   Other Precautions Cognitive;Chair Alarm;Bed Alarm;Fall Risk;Contact/isolation  (nicole catheter)   Home Living   Type of Home Group Home   Home Layout One level;Performs ADLs on one level;Able to live on main level with bedroom/bathroom   Home Equipment Wheelchair-manual;Walker  (personal wheelchair in the room)   Additional Comments Patient lives at [facility] [place] and relies on staff for ADL. As per Irais ([facility] supervisor) patient is a stand assist for ambulation, can pull herself up, take a few steps (usually around 5) but then gets tired and wants to sit. They have rails through the home that she can use as well to get around. In the bathroom/dressing she does need assistance.  She struggles to bend at times. They tried to use walker with her however patient struggled to understand the process. At times they do use a WC. Last admission, February 2024, patient was requiring maxAx2 for transfers and had been nonambulatory.   Prior Function   Level of [place] Needs assistance with ADLs;Needs assistance with functional mobility;Needs assistance with IADLS   Lives With Facility staff   Receives Help From   ([facility] group home)   IADLs Family/Friend/Other provides transportation;Family/Friend/Other provides meals;Family/Friend/Other provides medication management   Falls in the last 6 months 0   Comments Pt requires assistance for all mobility. See note above.   General   Additional Pertinent History inc B LE and UE edema   Cognition   Orientation Level Unable to assess  (however responds to name, turns head)   Following Commands Follows one step commands inconsistently   Comments manual cues for task initiation   Subjective   Subjective pt nodded head yes when explained session   RLE Assessment   RLE Assessment   (pt offers minimal AROM. sitting uprihgt knee ext WFL, ankle DF/PF to neutral on left, right minimal strength grossly 2/5)   LLE Assessment   LLE Assessment   (pt offers minimal AROM. sitting uprihgt knee ext WFL, ankle DF/PF to neutral on left, right minimal strength grossly 2/5)   Bed Mobility   Supine to Sit 2  Maximal assistance   Additional items Assist x 1;Increased time required;Verbal cues;LE management  (trunk management)   Additional Comments HOB elevated > 30 degrees. requires maxAx1 to ahcieve sitting on EOB with manual assistance for trunk and LE management. pt wiht inc right lateral lean requiring min to maxAx1 to maintain sitting on EOB.   Transfers   Sit to Stand 2  Maximal assistance   Additional items Assist x 2;Increased time required;Verbal cues   Stand to Sit 2  Maximal assistance   Additional items Assist x 2;Increased time required;Verbal cues   Stand pivot 2   Maximal assistance   Additional items Assist x 2;Increased time required;Verbal cues   Additional Comments no AD as notes indicate patient doesn't use a RW. B knees and feet blocked. required maxAx2 to ahcieve standing and complete spt to recliner chair. manual cues for wt shifting.   Ambulation/Elevation   Distance unable due to safety concerns   Balance   Static Sitting Poor   Dynamic Sitting Poor -   Static Standing Poor -   Dynamic Standing Poor -   Activity Tolerance   Activity Tolerance Patient limited by fatigue   Medical Staff Made Aware Kim LATHAM   Assessment   Prognosis Fair   Problem List Decreased strength;Decreased endurance;Impaired balance;Decreased mobility;Decreased cognition;Impaired judgement;Decreased safety awareness;Obesity;Pain   Barriers to Discharge Decreased caregiver support;Inaccessible home environment   Barriers to Discharge Comments requires significant assistance to complete mobility, inc fall risk   Goals   Patient Goals none stated   STG Expiration Date 07/15/24   PT Treatment Day 1   Plan   Treatment/Interventions ADL retraining;Functional transfer training;LE strengthening/ROM;Elevations;Therapeutic exercise;Cognitive reorientation;Endurance training;Patient/family training;Equipment eval/education;Bed mobility;Gait training;Compensatory technique education;Spoke to nursing;Spoke to case management;OT   PT Frequency 3-5x/wk   Discharge Recommendation   Rehab Resource Intensity Level, PT II (Moderate Resource Intensity)   Additional Comments should patient return to group [facility], will require Ax2 for safe transfers   AM-PAC Basic Mobility Inpatient   Turning in Flat Bed Without Bedrails 2   Lying on Back to Sitting on Edge of Flat Bed Without Bedrails 2   Moving Bed to Chair 1   Standing Up From Chair Using Arms 1   Walk in Room 1   Climb 3-5 Stairs With Railing 1   Basic Mobility Inpatient Raw Score 8   Turning Head Towards Sound 4   Follow Simple Instructions 2   Low Function  Basic Mobility Raw Score  14   Low Function Basic Mobility Standardized Score  22.01   [facility] Highest Level Of Mobility   -[facility] Goal 3: Sit at edge of bed   -[facility] Achieved 5: Stand (1 or more minutes)   Additional Treatment Session   Start Time 0832   End Time 0845   Treatment Assessment Pt tolerated session poorly. She requires maxAx2 to complete sit<>stand transfers and maximal asisstance for rolling. She was able to stand for 1', however requires maxAx2 to maintain with max cues. She is limtied byd ecreased strength, balance, endruance. She will continue to beenfit from PT services to maximize LOF.   Equipment Use use of bedrail. turned recliner to face bedrail, stood 2x with B knees and feet blocked with maxAx2 to ahcieve standing, required maxAx2 to maintain standing for 1' each time with max verbal and manual cues for uprihgt posture, inc hip extension. then completed rolling right and left wiht maxAx1 as patient was incontinent of bowel. pt positioned in recliner with wedge on right side to facilitate uprihgt posture, pillows under arms and legs.   End of Consult   Patient Position at End of Consult Bedside chair;Bed/Chair alarm activated;All needs within reach       Pt requires PT/OT co-eval due to medical complexity, safety concerns, fall risk, significant assistance with mobility and/or cognitive-behavioral impairments.    (Please find full objective findings from PT assessment regarding body systems outlined above).     Assessment: Pt is a 68 y.o. female seen for PT evaluation s/p admission to [facility] on 6/25/2024 with Acute metabolic encephalopathy.  Order placed for PT services.  Upon evaluation: Pt is presenting with impaired functional mobility due to pain, decreased strength, decreased endurance, impaired balance, impaired cognition, decreased safety awareness, impaired judgment, fall risk, and LE edema requiring  maximal of 1 assistance for bed mobility, maximal of 2  assistance for transfers, and unable to trial ambulation due to safety concerns, minimal to maximal assistance to maintain sitting on EOB due to right lateral lean . Pt's clinical presentation is currently unpredictable given the functional mobility deficits above, especially weakness, edema of extremities, decreased endurance, impaired balance, pain, decreased activity tolerance, decreased functional mobility tolerance, decreased safety awareness, impaired judgement, and decreased cognition, coupled with fall risks as indicated by AM-PAC 6-Clicks: 8/24 as well as impaired balance, polypharmacy, impaired judgement, decreased safety awareness, decreased cognition, and obesity and combined with medical complications of abnormal H&H, abnormal sodium values, need for input for mobility technique/safety, and acute metabolic encephalopathy, UTI, transaminitis, acute urinary retention .  Pt's PMHx and comorbidities that may affect physical performance and progress include: seizure disorder, obesity, and intellectual disability, h/o ESBL UTI, mood disorder, anxiety, CHF, DM, HTN . Personal factors affecting pt at time of IE include: inaccessible home environment, inability to perform IADLs, inability to perform ADLs, inability to navigate level surfaces without external assistance, inability to ambulate household distances, and limited insight into impairments. Pt will benefit from continued skilled PT services to address deficits as defined above and to maximize level of functional mobility to facilitate return toward PLOF and improved QOL. From PT/mobility standpoint, recommendation at time of d/c would be Level II (Moderate Resource Intensity in order to reduce fall risk and maximize pt's functional independence and consistency with mobility. Recommend trial with walker next 1-2 sessions and ther ex next 1-2 sessions.       The patient's AM-[facility] Basic Mobility Inpatient Short Form Raw Score is 8. A Raw score of less than  or equal to 16 suggests the patient may benefit from discharge to post-acute rehabilitation services. Please also refer to the recommendation of the Physical Therapist for safe discharge planning.       Goals: Pt will: Perform bed mobility tasks with consistent modA of 1 to reposition in bed and prepare for transfers. Pt will perform transfers with consistent modA of 1 to decrease burden of care, decrease risk for falls, and improve activity tolerance and prepare for ambulation. Pt will ambulate with LRAD for >/= 15' with  consistent modA of 1  to decrease burden of care, decrease risk for falls, improve ease of transfers, improve activity tolerance, and improve gait quality and to access home environment. Pt will participate in objective balance assessment to determine baseline fall risk. Pt will maintain sitting balance with steadying assistance 5' to prepare for transfers. Pt will increase B LE strength >/= 1/2 MMT grade to facilitate functional mobility.      Sonya Prado, PT,DPT

## 2024-07-01 NOTE — PROGRESS NOTES
[facility]  Progress Note  Name: Denita Vital I  MRN: [id number]  Unit/Bed#: MS 334Derek I Date of Admission: 6/25/2024   Date of Service: 7/1/2024 I Hospital Day: 6    Assessment & Plan   * Acute metabolic encephalopathy  Assessment & Plan  Presented to ED with increased lethargy  Baseline orientation/neuro status nonverbal, alert -usually active, mimics staff, able to feed herself, stands with assistance and uses a sit to stand   On clinical exam, patient returned to baseline.  Continue current treatment  At baseline uses sit to stand, will place PT OT to see if at baseline  Is improved      Urinary tract infection without hematuria  Assessment & Plan  Recently diagnosed with UTI-urine culture revealing Proteus ESBL  Has been being treated with amoxicillin and doxycycline  There is no in chart phone call made from the ER to group home to instruct to switch antibiotics, group home states they had not received the call    Antibiotic adjusted to ertapenem based on last urine culture.  Urine culture is growing Proteus mirabilis, previous urine culture was similar-has completed 3 days of ertapenem.    Ambulatory dysfunction  Assessment & Plan  Patient at baseline can you sit to stand with 1 assist    Today requiring 2 assist with PT/OT    Patient requires STR upon discharge and will require less than 30 days of PT in a facility to progress back to baseline     Transaminitis  Assessment & Plan  Lab Results   Component Value Date     (H) 07/01/2024     (H) 06/30/2024     (H) 06/29/2024     Lab Results   Component Value Date     (H) 07/01/2024     (H) 06/30/2024     (H) 06/29/2024     CT chest abdomen pelvis from admission with unchanged hepatomegaly  Has been increasing over the last few days  Tylenol level fine  Acute hepatitis panel  Stop the ertapenem as has completed course of 3 days  Abdominal ultrasound  Recheck ammonia and valproic acid  level  Ammonia normal, valproic acid total low, free pending     Edema of upper extremity  Assessment & Plan  Had a history of infiltration  On palpation, patient has some nodularity, with bruise-suspect thrombophlebitis  Venous duplex negative for thrombophlebitis or DVT    Acute urinary retention  Assessment & Plan  Likely secondary to acute cystitis  Failed urinary retention protocol -Perrin placed 6/29  Remain in place for a few days to allow bladder to decompression   Can have voiding trial in 5 days - 7/4 or 7/5    Chronic anemia  Assessment & Plan  Baseline Hgb between 8-10  Normocytic anemia   Lab Results   Component Value Date    HGB 7.7 (L) 07/01/2024    HGB 7.5 (L) 06/30/2024    HGB 8.8 (L) 06/29/2024    HGB 9.1 (L) 06/28/2024     Iron panel with mildly low iron  TSH ordered   B12 ordered   Stool record at bedside and occult stool ordered  Started on iron supplementation      Intellectual disability  Assessment & Plan  At baseline patient is nonverbal  Resident of [facility]    Seizure disorder (HCC)  Assessment & Plan  Maintained on Depakote  Valproic acid level is 40, ammonia level normal  Seizure precautions  Follow PT OT recommendation    With elevated transaminases will repeat valproic acid and ammonia level  Valproic acid total is low - free pending   Ammonia normal            VTE Pharmacologic Prophylaxis: VTE Score: 4 Moderate Risk (Score 3-4) - Pharmacological DVT Prophylaxis Ordered: enoxaparin (Lovenox).    Mobility:   Basic Mobility Inpatient Raw Score: 8  -[facility] Goal: 3: Sit at edge of bed  JH-HLM Achieved: 5: Stand (1 or more minutes)  JH-HL Goal achieved. Continue to encourage appropriate mobility.    Patient Centered Rounds: I performed bedside rounds with nursing staff today.   Discussions with Specialists or Other Care Team Provider: CM    Education and Discussions with Family / Patient:  Attempted to call patient's sister, line was busy will attempt again later today .     Total Time  Spent on Date of Encounter in care of patient:  mins. This time was spent on one or more of the following: performing physical exam; counseling and coordination of care; obtaining or reviewing history; documenting in the medical record; reviewing/ordering tests, medications or procedures; communicating with other healthcare professionals and discussing with patient's family/caregivers.    Current Length of Stay: 6 day(s)  Current Patient Status: Inpatient   Certification Statement: The patient will continue to require additional inpatient hospital stay due to monitoring anemia, pending improvement of ambulatory status  Discharge Plan: Anticipate discharge in 24-48 hrs to rehab facility.    Code Status: Level 1 - Full Code    Subjective:   Seen and examined.  Patient more alert today, remains nonverbal.    Objective:     Vitals:   Temp (24hrs), Av.3 °F (36.8 °C), Min:97.7 °F (36.5 °C), Max:98.8 °F (37.1 °C)    Temp:  [97.7 °F (36.5 °C)-98.8 °F (37.1 °C)] 97.9 °F (36.6 °C)  HR:  [64-71] 70  Resp:  [18] 18  BP: ()/(37-49) 107/49  SpO2:  [89 %-91 %] 91 %  Body mass index is 32.68 kg/m².     Input and Output Summary (last 24 hours):     Intake/Output Summary (Last 24 hours) at 2024 1540  Last data filed at 2024 1100  Gross per 24 hour   Intake 820 ml   Output 1175 ml   Net -355 ml       Physical Exam:   Physical Exam  Vitals and nursing note reviewed.   Constitutional:       General: She is not in acute distress.     Appearance: Normal appearance. She is obese.   HENT:      Head: Normocephalic and atraumatic.      Nose: No congestion.      Mouth/Throat:      Mouth: Mucous membranes are moist.   Eyes:      Conjunctiva/sclera: Conjunctivae normal.   Cardiovascular:      Rate and Rhythm: Normal rate and regular rhythm.      Pulses: Normal pulses.      Heart sounds: Normal heart sounds. No murmur heard.  Pulmonary:      Effort: Pulmonary effort is normal. No respiratory distress.      Breath sounds: Normal  breath sounds.   Abdominal:      General: Bowel sounds are normal.      Palpations: Abdomen is soft.      Tenderness: There is no abdominal tenderness.   Musculoskeletal:         General: Normal range of motion.      Right lower leg: No edema.      Left lower leg: No edema.   Skin:     General: Skin is warm and dry.   Neurological:      Mental Status: She is alert. Mental status is at baseline.          Additional Data:     Labs:  Results from last 7 days   Lab Units 07/01/24  0547 06/30/24  0503 06/29/24  0511   WBC Thousand/uL 4.43   < > 6.51   HEMOGLOBIN g/dL 7.7*   < > 8.8*   HEMATOCRIT % 24.5*   < > 28.3*   PLATELETS Thousands/uL 151   < > 121*   SEGS PCT %  --   --  79*   LYMPHO PCT %  --   --  12*   MONO PCT %  --   --  7   EOS PCT %  --   --  1    < > = values in this interval not displayed.     Results from last 7 days   Lab Units 07/01/24  0547   SODIUM mmol/L 134*   POTASSIUM mmol/L 4.2   CHLORIDE mmol/L 102   CO2 mmol/L 29   BUN mg/dL 18   CREATININE mg/dL 0.50*   ANION GAP mmol/L 3*   CALCIUM mg/dL 9.1   ALBUMIN g/dL 2.7*   TOTAL BILIRUBIN mg/dL 0.32   ALK PHOS U/L 71   ALT U/L 215*   AST U/L 154*   GLUCOSE RANDOM mg/dL 137     Results from last 7 days   Lab Units 06/25/24  0955   INR  1.09     Results from last 7 days   Lab Units 07/01/24  1113 07/01/24  0730 06/30/24  2042 06/30/24  1551 06/30/24  1108 06/30/24  0730 06/29/24  2053 06/29/24  1538 06/29/24  1103 06/29/24  0719 06/28/24  2042 06/28/24  1547   POC GLUCOSE mg/dl 177* 135 151* 135 146* 173* 269* 122 187* 179* 135 128     Results from last 7 days   Lab Units 06/26/24  1401   HEMOGLOBIN A1C % 5.9*     Results from last 7 days   Lab Units 06/26/24  0517 06/25/24  0955   LACTIC ACID mmol/L  --  2.0   PROCALCITONIN ng/ml <0.05 <0.05       Lines/Drains:  Invasive Devices       Peripheral Intravenous Line  Duration             Peripheral IV 06/29/24 Distal;Dorsal (posterior);Right Forearm 1 day              Drain  Duration             Urethral  Catheter Latex 16 Fr. 2 days                  Urinary Catheter:  Goal for removal: N/A- Discharging with Perrin               Imaging: No pertinent imaging reviewed.    Recent Cultures (last 7 days):   Results from last 7 days   Lab Units 06/25/24  1035 06/25/24  1022 06/25/24  0955   BLOOD CULTURE   --  No Growth After 5 Days. Staphylococcus coagulase negative*   GRAM STAIN RESULT   --   --  Gram positive cocci in clusters*   URINE CULTURE  <10,000 cfu/ml Proteus mirabilis ESBL*  --   --        Last 24 Hours Medication List:   Current Facility-Administered Medications   Medication Dose Route Frequency Provider Last Rate    acetaminophen  650 mg Oral Q6H PRN Ivana Montague PA-C      aspirin  81 mg Oral Daily Ivana Montague PA-C      atorvastatin  80 mg Oral Daily With Dinner Ivana Montague PA-C      bisacodyl  5 mg Oral Daily PRN Ivana Montague PA-C      divalproex sodium  250 mg Oral Daily Ivana Montague, CARLOS      divalproex sodium  500 mg Oral After Dinner Ivana Montague PA-C      docusate sodium  100 mg Oral BID Ivana Montague PA-C      enoxaparin  40 mg Subcutaneous Daily Ivana Montague, CARLOS      ferrous sulfate  325 mg Oral Daily With Breakfast Ivana Montague PA-C      gabapentin  800 mg Oral TID Ivana Montague PA-C      insulin lispro  1-6 Units Subcutaneous TID AC Joey Miranda MD      insulin lispro  1-6 Units Subcutaneous HS Joey Miranda MD      levothyroxine  50 mcg Oral Early Morning Ivana Montague PA-C      LORazepam  0.5 mg Oral BID Ivana Montague PA-C      nystatin  1 Application Topical BID Ivana Montague, CARLOS      oxybutynin  10 mg Oral Daily Ivana Montague, CARLOS      pantoprazole  20 mg Oral Early Morning Ivana Montague, CARLOS      polyethylene glycol  17 g Oral Daily Ivana Montague PA-C      QUEtiapine  100 mg Oral BID Ivana Montague, CARLOS      QUEtiapine  200 mg Oral HS Ivana Montague, CARLOS      senna  8.6 mg Oral HS Ivana Montague, CARLOS      simethicone  80 mg Oral 4x Daily PRN Ivana Montague  CARLOS          Today, Patient Was Seen By: Ivana Montague PA-C    **Please Note: This note may have been constructed using a voice recognition system.**

## 2024-07-01 NOTE — PLAN OF CARE
Problem: PAIN - ADULT  Goal: Verbalizes/displays adequate comfort level or baseline comfort level  Description: Interventions:  - Encourage patient to monitor pain and request assistance  - Assess pain using appropriate pain scale  - Administer analgesics based on type and severity of pain and evaluate response  - Implement non-pharmacological measures as appropriate and evaluate response  - Consider cultural and social influences on pain and pain management  - Notify physician/advanced practitioner if interventions unsuccessful or patient reports new pain  Outcome: Progressing     Problem: INFECTION - ADULT  Goal: Absence or prevention of progression during hospitalization  Description: INTERVENTIONS:  - Assess and monitor for signs and symptoms of infection burning with urination, fever tachycardia,leukocytosis   - Monitor lab/diagnostic results  - Monitor all insertion sites, i.e. indwelling lines, tubes, and drains  - Monitor endotracheal if appropriate and nasal secretions for changes in amount and color  - [place] appropriate cooling/warming therapies per order  - Administer medications as ordered  - Instruct and encourage patient and family to use good hand hygiene technique  - Identify and instruct in appropriate isolation precautions for identified infection/condition  Outcome: Progressing     Problem: SAFETY ADULT  Goal: Patient will remain free of falls  Description: INTERVENTIONS:  - Educate patient/family on patient safety including physical limitations  - Instruct patient to call for assistance with activity   - Consult OT/PT to assist with strengthening/mobility   - Keep Call bell within reach  - Keep bed low and locked with side rails adjusted as appropriate  - Keep care items and personal belongings within reach  - Initiate and maintain comfort rounds  - Make Fall Risk Sign visible to staff  - Offer Toileting every 2 Hours, in advance of need  - Initiate/Maintain bed/chair alarm  - Obtain  necessary fall risk management equipment: alarms  - Apply yellow socks and bracelet for high fall risk patients  - Consider moving patient to room near nurses station  Outcome: Progressing  Goal: Maintain or return to baseline ADL function  Description: INTERVENTIONS:  -  Assess patient's ability to carry out ADLs; assess patient's baseline for ADL function and identify physical deficits which impact ability to perform ADLs (bathing, care of mouth/teeth, toileting, grooming, dressing, etc.)  - Assess/evaluate cause of self-care deficits   - Assess range of motion  - Assess patient's mobility; develop plan if impaired  - Assess patient's need for assistive devices and provide as appropriate  - Encourage maximum independence but intervene and supervise when necessary  - Involve family in performance of ADLs  - Assess for home care needs following discharge   - Consider OT consult to assist with ADL evaluation and planning for discharge  - Provide patient education as appropriate  Outcome: Progressing  Goal: Maintains/Returns to pre admission functional level  Description: INTERVENTIONS:  - Perform AM-PAC 6 Click Basic Mobility/ Daily Activity assessment daily.  - Set and communicate daily mobility goal to care team and patient/family/caregiver.   - Collaborate with rehabilitation services on mobility goals if consulted  - Perform Range of Motion 3 times a day.  - Reposition patient every 2 hours.  - Dangle patient 3 times a day  - Stand patient 3 times a day  - Ambulate patient 3 times a day  - Out of bed to chair 3 times a day   - Out of bed for meals 3 times a day  - Out of bed for toileting  - Record patient progress and toleration of activity level   Outcome: Progressing     Problem: DISCHARGE PLANNING  Goal: Discharge to home or other facility with appropriate resources  Description: INTERVENTIONS:  - Identify barriers to discharge w/patient and caregiver  - Arrange for needed discharge resources and  transportation as appropriate  - Identify discharge learning needs (meds, wound care, etc.)  - Arrange for interpretive services to assist at discharge as needed  - Refer to Case Management Department for coordinating discharge planning if the patient needs post-hospital services based on physician/advanced practitioner order or complex needs related to functional status, cognitive ability, or social support system  Outcome: Progressing     Problem: Knowledge Deficit  Goal: Patient/family/caregiver demonstrates understanding of disease process, treatment plan, medications, and discharge instructions  Description: Complete learning assessment and assess knowledge base.  Interventions:  - Provide teaching at level of understanding  - Provide teaching via preferred learning methods  Outcome: Progressing     Problem: GENITOURINARY - ADULT  Goal: Maintains or returns to baseline urinary function  Description: INTERVENTIONS:  - Assess urinary function bladder scan pvr  - Encourage oral fluids to ensure adequate hydration if ordered  - Administer IV fluids as ordered to ensure adequate hydration  - Administer ordered medications as needed  - Offer frequent toileting  - Follow urinary retention protocol if ordered  Outcome: Progressing     Problem: SKIN/TISSUE INTEGRITY - ADULT  Goal: Skin Integrity remains intact(Skin Breakdown Prevention)  Description: Assess:  -Perform Zafar assessment every shift  -Clean and moisturize skin every shift  -Inspect skin when repositioning, toileting, and assisting with ADLS  -Assess under medical devices such as mepilex every shift  -Assess extremities for adequate circulation and sensation     Bed Management:  -Have minimal linens on bed & keep smooth, unwrinkled  -Change linens as needed when moist or perspiring  -Avoid sitting or lying in one position for more than 2 hours while in bed  -Keep HOB at 30degrees     Toileting:  -Offer bedside commode  -Assess for incontinence every  shift  -Use incontinent care products after each incontinent episode such as wipes    Activity:  -Mobilize patient 3 times a day  -Encourage activity and walks on unit  -Encourage or provide ROM exercises   -Turn and reposition patient every 2 Hours  -Use appropriate equipment to lift or move patient in bed  -Instruct/ Assist with weight shifting every 1 when out of bed in chair  -Consider limitation of chair time 1 hour intervals    Skin Care:  -Avoid use of baby powder, tape, friction and shearing, hot water or constrictive clothing  -Relieve pressure over bony prominences using pillows  -Do not massage red bony areas    Next Steps:  -Teach patient strategies to minimize risks such as    -Consider consults to  interdisciplinary teams such as   Outcome: Progressing     Problem: Prexisting or High Potential for Compromised Skin Integrity  Goal: Skin integrity is maintained or improved  Description: INTERVENTIONS:  - Identify patients at risk for skin breakdown  - Assess and monitor skin integrity  - Assess and monitor nutrition and hydration status  - Monitor labs   - Assess for incontinence   - Turn and reposition patient  - Assist with mobility/ambulation  - Relieve pressure over bony prominences  - Avoid friction and shearing  - Provide appropriate hygiene as needed including keeping skin clean and dry  - Evaluate need for skin moisturizer/barrier cream  - Collaborate with interdisciplinary team   - Patient/family teaching  - Consider wound care consult   Outcome: Progressing     Problem: NEUROSENSORY - ADULT  Goal: Achieves stable or improved neurological status  Description: INTERVENTIONS  - Monitor and report changes in neurological status  - Monitor vital signs such as temperature, blood pressure, glucose, and any other labs ordered   - Initiate measures to prevent increased intracranial pressure  - Monitor for seizure activity and implement precautions if appropriate      Outcome: Progressing  Goal: Remains  free of injury related to seizures activity  Description: INTERVENTIONS  - Maintain airway, patient safety  and administer oxygen as ordered  - Monitor patient for seizure activity, document and report duration and description of seizure to physician/advanced practitioner  - If seizure occurs,  ensure patient safety during seizure  - Reorient patient post seizure  - Seizure pads on all 4 side rails  - Instruct patient/family to notify RN of any seizure activity including if an aura is experienced  - Instruct patient/family to call for assistance with activity based on nursing assessment  - Administer anti-seizure medications if ordered    Outcome: Progressing  Goal: Achieves maximal functionality and self care  Description: INTERVENTIONS  - Monitor swallowing and airway patency with patient fatigue and changes in neurological status  - Encourage and assist patient to increase activity and self care. Encourage patient tofeed herself  - Encourage visually impaired, hearing impaired and aphasic patients to use assistive/communication devices  Outcome: Progressing

## 2024-07-01 NOTE — ASSESSMENT & PLAN NOTE
Baseline Hgb between 8-10  Normocytic anemia   Lab Results   Component Value Date    HGB 7.7 (L) 07/01/2024    HGB 7.5 (L) 06/30/2024    HGB 8.8 (L) 06/29/2024    HGB 9.1 (L) 06/28/2024     Iron panel with mildly low iron  TSH ordered   B12 ordered   Stool record at bedside and occult stool ordered  Started on iron supplementation

## 2024-07-01 NOTE — PLAN OF CARE
Problem: PAIN - ADULT  Goal: Verbalizes/displays adequate comfort level or baseline comfort level  Description: Interventions:  - Encourage patient to monitor pain and request assistance  - Assess pain using appropriate pain scale  - Administer analgesics based on type and severity of pain and evaluate response  - Implement non-pharmacological measures as appropriate and evaluate response  - Consider cultural and social influences on pain and pain management  - Notify physician/advanced practitioner if interventions unsuccessful or patient reports new pain  Outcome: Progressing     Problem: INFECTION - ADULT  Goal: Absence or prevention of progression during hospitalization  Description: INTERVENTIONS:  - Assess and monitor for signs and symptoms of infection burning with urination, fever tachycardia,leukocytosis   - Monitor lab/diagnostic results  - Monitor all insertion sites, i.e. indwelling lines, tubes, and drains  - Monitor endotracheal if appropriate and nasal secretions for changes in amount and color  - [place] appropriate cooling/warming therapies per order  - Administer medications as ordered  - Instruct and encourage patient and family to use good hand hygiene technique  - Identify and instruct in appropriate isolation precautions for identified infection/condition  Outcome: Progressing     Problem: SAFETY ADULT  Goal: Patient will remain free of falls  Description: INTERVENTIONS:  - Educate patient/family on patient safety including physical limitations  - Instruct patient to call for assistance with activity   - Consult OT/PT to assist with strengthening/mobility   - Keep Call bell within reach  - Keep bed low and locked with side rails adjusted as appropriate  - Keep care items and personal belongings within reach  - Initiate and maintain comfort rounds  - Make Fall Risk Sign visible to staff  - Offer Toileting every 2 Hours, in advance of need  - Initiate/Maintain bed/chair alarm  - Obtain  necessary fall risk management equipment: alarms  - Apply yellow socks and bracelet for high fall risk patients  - Consider moving patient to room near nurses station  Outcome: Progressing  Goal: Maintain or return to baseline ADL function  Description: INTERVENTIONS:  -  Assess patient's ability to carry out ADLs; assess patient's baseline for ADL function and identify physical deficits which impact ability to perform ADLs (bathing, care of mouth/teeth, toileting, grooming, dressing, etc.)  - Assess/evaluate cause of self-care deficits   - Assess range of motion  - Assess patient's mobility; develop plan if impaired  - Assess patient's need for assistive devices and provide as appropriate  - Encourage maximum independence but intervene and supervise when necessary  - Involve family in performance of ADLs  - Assess for home care needs following discharge   - Consider OT consult to assist with ADL evaluation and planning for discharge  - Provide patient education as appropriate  Outcome: Progressing  Goal: Maintains/Returns to pre admission functional level  Description: INTERVENTIONS:  - Perform AM-PAC 6 Click Basic Mobility/ Daily Activity assessment daily.  - Set and communicate daily mobility goal to care team and patient/family/caregiver.   - Collaborate with rehabilitation services on mobility goals if consulted  - Perform Range of Motion 3 times a day.  - Reposition patient every 2 hours.  - Dangle patient 3 times a day  - Stand patient 3 times a day  - Ambulate patient 3 times a day  - Out of bed to chair 3 times a day   - Out of bed for meals 3 times a day  - Out of bed for toileting  - Record patient progress and toleration of activity level   Outcome: Progressing     Problem: DISCHARGE PLANNING  Goal: Discharge to home or other facility with appropriate resources  Description: INTERVENTIONS:  - Identify barriers to discharge w/patient and caregiver  - Arrange for needed discharge resources and  transportation as appropriate  - Identify discharge learning needs (meds, wound care, etc.)  - Arrange for interpretive services to assist at discharge as needed  - Refer to Case Management Department for coordinating discharge planning if the patient needs post-hospital services based on physician/advanced practitioner order or complex needs related to functional status, cognitive ability, or social support system  Outcome: Progressing     Problem: Knowledge Deficit  Goal: Patient/family/caregiver demonstrates understanding of disease process, treatment plan, medications, and discharge instructions  Description: Complete learning assessment and assess knowledge base.  Interventions:  - Provide teaching at level of understanding  - Provide teaching via preferred learning methods  Outcome: Progressing     Problem: GENITOURINARY - ADULT  Goal: Maintains or returns to baseline urinary function  Description: INTERVENTIONS:  - Assess urinary function bladder scan pvr  - Encourage oral fluids to ensure adequate hydration if ordered  - Administer IV fluids as ordered to ensure adequate hydration  - Administer ordered medications as needed  - Offer frequent toileting  - Follow urinary retention protocol if ordered  Outcome: Progressing     Problem: SKIN/TISSUE INTEGRITY - ADULT  Goal: Skin Integrity remains intact(Skin Breakdown Prevention)  Description: Assess:  -Perform Zafar assessment every shift  -Clean and moisturize skin every shift  -Inspect skin when repositioning, toileting, and assisting with ADLS  -Assess under medical devices such as mepilex every shift  -Assess extremities for adequate circulation and sensation     Bed Management:  -Have minimal linens on bed & keep smooth, unwrinkled  -Change linens as needed when moist or perspiring  -Avoid sitting or lying in one position for more than 2 hours while in bed  -Keep HOB at 30degrees     Toileting:  -Offer bedside commode  -Assess for incontinence every  shift  -Use incontinent care products after each incontinent episode such as wipes    Activity:  -Mobilize patient 3 times a day  -Encourage activity and walks on unit  -Encourage or provide ROM exercises   -Turn and reposition patient every 2 Hours  -Use appropriate equipment to lift or move patient in bed  -Instruct/ Assist with weight shifting every 1 when out of bed in chair  -Consider limitation of chair time 1 hour intervals    Skin Care:  -Avoid use of baby powder, tape, friction and shearing, hot water or constrictive clothing  -Relieve pressure over bony prominences using pillows  -Do not massage red bony areas    Next Steps:  -Teach patient strategies to minimize risks such as    -Consider consults to  interdisciplinary teams such as   Outcome: Progressing     Problem: Prexisting or High Potential for Compromised Skin Integrity  Goal: Skin integrity is maintained or improved  Description: INTERVENTIONS:  - Identify patients at risk for skin breakdown  - Assess and monitor skin integrity  - Assess and monitor nutrition and hydration status  - Monitor labs   - Assess for incontinence   - Turn and reposition patient  - Assist with mobility/ambulation  - Relieve pressure over bony prominences  - Avoid friction and shearing  - Provide appropriate hygiene as needed including keeping skin clean and dry  - Evaluate need for skin moisturizer/barrier cream  - Collaborate with interdisciplinary team   - Patient/family teaching  - Consider wound care consult   Outcome: Progressing     Problem: NEUROSENSORY - ADULT  Goal: Achieves stable or improved neurological status  Description: INTERVENTIONS  - Monitor and report changes in neurological status  - Monitor vital signs such as temperature, blood pressure, glucose, and any other labs ordered   - Initiate measures to prevent increased intracranial pressure  - Monitor for seizure activity and implement precautions if appropriate      Outcome: Progressing  Goal: Remains  free of injury related to seizures activity  Description: INTERVENTIONS  - Maintain airway, patient safety  and administer oxygen as ordered  - Monitor patient for seizure activity, document and report duration and description of seizure to physician/advanced practitioner  - If seizure occurs,  ensure patient safety during seizure  - Reorient patient post seizure  - Seizure pads on all 4 side rails  - Instruct patient/family to notify RN of any seizure activity including if an aura is experienced  - Instruct patient/family to call for assistance with activity based on nursing assessment  - Administer anti-seizure medications if ordered    Outcome: Progressing  Goal: Achieves maximal functionality and self care  Description: INTERVENTIONS  - Monitor swallowing and airway patency with patient fatigue and changes in neurological status  - Encourage and assist patient to increase activity and self care. Encourage patient tofeed herself  - Encourage visually impaired, hearing impaired and aphasic patients to use assistive/communication devices  Outcome: Progressing

## 2024-07-02 VITALS
SYSTOLIC BLOOD PRESSURE: 108 MMHG | HEIGHT: 60 IN | WEIGHT: 167.33 LBS | BODY MASS INDEX: 32.85 KG/M2 | TEMPERATURE: 98.1 F | HEART RATE: 60 BPM | DIASTOLIC BLOOD PRESSURE: 47 MMHG | OXYGEN SATURATION: 93 % | RESPIRATION RATE: 16 BRPM

## 2024-07-02 PROBLEM — R60.0 EDEMA OF UPPER EXTREMITY: Status: RESOLVED | Noted: 2022-05-21 | Resolved: 2024-07-02

## 2024-07-02 LAB
ALBUMIN SERPL BCG-MCNC: 3 G/DL (ref 3.5–5)
ALP SERPL-CCNC: 67 U/L (ref 34–104)
ALT SERPL W P-5'-P-CCNC: 216 U/L (ref 7–52)
ANION GAP SERPL CALCULATED.3IONS-SCNC: 3 MMOL/L (ref 4–13)
ANISOCYTOSIS BLD QL SMEAR: PRESENT
AST SERPL W P-5'-P-CCNC: 152 U/L (ref 13–39)
BASOPHILS # BLD AUTO: 0.05 THOUSAND/UL (ref 0–0.1)
BASOPHILS NFR MAR MANUAL: 1 % (ref 0–1)
BILIRUB SERPL-MCNC: 0.37 MG/DL (ref 0.2–1)
BUN SERPL-MCNC: 16 MG/DL (ref 5–25)
CALCIUM ALBUM COR SERPL-MCNC: 10.2 MG/DL (ref 8.3–10.1)
CALCIUM SERPL-MCNC: 9.4 MG/DL (ref 8.4–10.2)
CHLORIDE SERPL-SCNC: 101 MMOL/L (ref 96–108)
CO2 SERPL-SCNC: 30 MMOL/L (ref 21–32)
CREAT SERPL-MCNC: 0.49 MG/DL (ref 0.6–1.3)
EOSINOPHIL # BLD AUTO: 0.1 THOUSAND/UL (ref 0–0.61)
EOSINOPHIL NFR BLD MANUAL: 2 % (ref 0–6)
ERYTHROCYTE [DISTWIDTH] IN BLOOD BY AUTOMATED COUNT: 15.9 % (ref 11.6–15.1)
GFR SERPL CREATININE-BSD FRML MDRD: 99 ML/MIN/1.73SQ M
GLUCOSE SERPL-MCNC: 115 MG/DL (ref 65–140)
GLUCOSE SERPL-MCNC: 131 MG/DL (ref 65–140)
GLUCOSE SERPL-MCNC: 168 MG/DL (ref 65–140)
HCT VFR BLD AUTO: 23.2 % (ref 34.8–46.1)
HCT VFR BLD AUTO: 24.8 % (ref 34.8–46.1)
HEMOCCULT STL QL: NEGATIVE
HGB BLD-MCNC: 7.4 G/DL (ref 11.5–15.4)
HGB BLD-MCNC: 7.8 G/DL (ref 11.5–15.4)
HYPERCHROMIA BLD QL SMEAR: PRESENT
LYMPHOCYTES # BLD AUTO: 1.54 THOUSAND/UL (ref 0.6–4.47)
LYMPHOCYTES # BLD AUTO: 32 %
MACROCYTES BLD QL AUTO: PRESENT
MCH RBC QN AUTO: 29.1 PG (ref 26.8–34.3)
MCHC RBC AUTO-ENTMCNC: 31.9 G/DL (ref 31.4–37.4)
MCV RBC AUTO: 91 FL (ref 82–98)
MONOCYTES # BLD AUTO: 0.14 THOUSAND/UL (ref 0–1.22)
MONOCYTES NFR BLD AUTO: 3 % (ref 4–12)
NEUTS SEG # BLD: 2.98 THOUSAND/UL (ref 1.81–6.82)
NEUTS SEG NFR BLD AUTO: 62 %
PLATELET # BLD AUTO: 179 THOUSANDS/UL (ref 149–390)
PLATELET BLD QL SMEAR: ADEQUATE
PMV BLD AUTO: 9.3 FL (ref 8.9–12.7)
POLYCHROMASIA BLD QL SMEAR: PRESENT
POTASSIUM SERPL-SCNC: 4.1 MMOL/L (ref 3.5–5.3)
PROT SERPL-MCNC: 5.7 G/DL (ref 6.4–8.4)
RBC # BLD AUTO: 2.54 MILLION/UL (ref 3.81–5.12)
SODIUM SERPL-SCNC: 134 MMOL/L (ref 135–147)
TOTAL CELLS COUNTED SPEC: 100
WBC # BLD AUTO: 4.81 THOUSAND/UL (ref 4.31–10.16)

## 2024-07-02 PROCEDURE — 99239 HOSP IP/OBS DSCHRG MGMT >30: CPT

## 2024-07-02 PROCEDURE — 85007 BL SMEAR W/DIFF WBC COUNT: CPT

## 2024-07-02 PROCEDURE — 85014 HEMATOCRIT: CPT

## 2024-07-02 PROCEDURE — 80053 COMPREHEN METABOLIC PANEL: CPT

## 2024-07-02 PROCEDURE — 85027 COMPLETE CBC AUTOMATED: CPT

## 2024-07-02 PROCEDURE — 85018 HEMOGLOBIN: CPT

## 2024-07-02 PROCEDURE — 82948 REAGENT STRIP/BLOOD GLUCOSE: CPT

## 2024-07-02 RX ORDER — FERROUS SULFATE 325(65) MG
325 TABLET ORAL
Start: 2024-07-03

## 2024-07-02 RX ADMIN — INSULIN LISPRO 1 UNITS: 100 INJECTION, SOLUTION INTRAVENOUS; SUBCUTANEOUS at 11:22

## 2024-07-02 RX ADMIN — ENOXAPARIN SODIUM 40 MG: 40 INJECTION SUBCUTANEOUS at 08:34

## 2024-07-02 RX ADMIN — LORAZEPAM 0.5 MG: 0.5 TABLET ORAL at 08:46

## 2024-07-02 RX ADMIN — PANTOPRAZOLE SODIUM 20 MG: 20 TABLET, DELAYED RELEASE ORAL at 04:51

## 2024-07-02 RX ADMIN — DIVALPROEX SODIUM 250 MG: 250 TABLET, DELAYED RELEASE ORAL at 08:35

## 2024-07-02 RX ADMIN — GABAPENTIN 800 MG: 400 CAPSULE ORAL at 08:35

## 2024-07-02 RX ADMIN — ASPIRIN 81 MG: 81 TABLET, COATED ORAL at 08:35

## 2024-07-02 RX ADMIN — QUETIAPINE FUMARATE 100 MG: 100 TABLET ORAL at 08:35

## 2024-07-02 RX ADMIN — OXYBUTYNIN CHLORIDE 10 MG: 5 TABLET, EXTENDED RELEASE ORAL at 08:35

## 2024-07-02 RX ADMIN — LEVOTHYROXINE SODIUM 50 MCG: 50 TABLET ORAL at 04:51

## 2024-07-02 RX ADMIN — NYSTATIN 1 APPLICATION: 100000 POWDER TOPICAL at 08:46

## 2024-07-02 RX ADMIN — FERROUS SULFATE TAB 325 MG (65 MG ELEMENTAL FE) 325 MG: 325 (65 FE) TAB at 07:43

## 2024-07-02 NOTE — ASSESSMENT & PLAN NOTE
Baseline Hgb between 8-10  Normocytic anemia   Lab Results   Component Value Date    HGB 7.8 (L) 07/02/2024    HGB 7.4 (L) 07/02/2024    HGB 7.7 (L) 07/01/2024    HGB 7.5 (L) 06/30/2024     Iron panel with mildly low iron  TSH wnl  B12 normal   Stool record at bedside and occult stool ordered -negative x 3  Started on iron supplementation  Stable hemoglobin

## 2024-07-02 NOTE — ASSESSMENT & PLAN NOTE
Had a history of infiltration  On palpation, patient has some nodularity, with bruise-suspect thrombophlebitis  Venous duplex negative for thrombophlebitis or DVT  Edema seems to have resolved

## 2024-07-02 NOTE — PLAN OF CARE
Problem: PAIN - ADULT  Goal: Verbalizes/displays adequate comfort level or baseline comfort level  Description: Interventions:  - Encourage patient to monitor pain and request assistance  - Assess pain using appropriate pain scale  - Administer analgesics based on type and severity of pain and evaluate response  - Implement non-pharmacological measures as appropriate and evaluate response  - Consider cultural and social influences on pain and pain management  - Notify physician/advanced practitioner if interventions unsuccessful or patient reports new pain  7/2/2024 1333 by Susanna Álvarez LPN  Outcome: Adequate for Discharge  7/2/2024 0745 by Susanna Álvarez LPN  Outcome: Progressing     Problem: INFECTION - ADULT  Goal: Absence or prevention of progression during hospitalization  Description: INTERVENTIONS:  - Assess and monitor for signs and symptoms of infection burning with urination, fever tachycardia,leukocytosis   - Monitor lab/diagnostic results  - Monitor all insertion sites, i.e. indwelling lines, tubes, and drains  - Monitor endotracheal if appropriate and nasal secretions for changes in amount and color  - [place] appropriate cooling/warming therapies per order  - Administer medications as ordered  - Instruct and encourage patient and family to use good hand hygiene technique  - Identify and instruct in appropriate isolation precautions for identified infection/condition  7/2/2024 1333 by Susanna Álvarez LPN  Outcome: Adequate for Discharge  7/2/2024 0745 by Susanna Álvarez LPN  Outcome: Progressing     Problem: SAFETY ADULT  Goal: Patient will remain free of falls  Description: INTERVENTIONS:  - Educate patient/family on patient safety including physical limitations  - Instruct patient to call for assistance with activity   - Consult OT/PT to assist with strengthening/mobility   - Keep Call bell within reach  - Keep bed low and locked with side rails adjusted as appropriate  - Keep care items  and personal belongings within reach  - Initiate and maintain comfort rounds  - Make Fall Risk Sign visible to staff  - Offer Toileting every 2 Hours, in advance of need  - Initiate/Maintain bed/chair alarm  - Obtain necessary fall risk management equipment: alarms  - Apply yellow socks and bracelet for high fall risk patients  - Consider moving patient to room near nurses station  7/2/2024 1333 by Susanna Álvarez LPN  Outcome: Adequate for Discharge  7/2/2024 0745 by Susanna Álvarez LPN  Outcome: Progressing  Goal: Maintain or return to baseline ADL function  Description: INTERVENTIONS:  -  Assess patient's ability to carry out ADLs; assess patient's baseline for ADL function and identify physical deficits which impact ability to perform ADLs (bathing, care of mouth/teeth, toileting, grooming, dressing, etc.)  - Assess/evaluate cause of self-care deficits   - Assess range of motion  - Assess patient's mobility; develop plan if impaired  - Assess patient's need for assistive devices and provide as appropriate  - Encourage maximum independence but intervene and supervise when necessary  - Involve family in performance of ADLs  - Assess for home care needs following discharge   - Consider OT consult to assist with ADL evaluation and planning for discharge  - Provide patient education as appropriate  7/2/2024 1333 by Susanna Álvarez LPN  Outcome: Adequate for Discharge  7/2/2024 0745 by Susanna Álvarez LPN  Outcome: Progressing  Goal: Maintains/Returns to pre admission functional level  Description: INTERVENTIONS:  - Perform AM-PAC 6 Click Basic Mobility/ Daily Activity assessment daily.  - Set and communicate daily mobility goal to care team and patient/family/caregiver.   - Collaborate with rehabilitation services on mobility goals if consulted  - Perform Range of Motion 3 times a day.  - Reposition patient every 2 hours.  - Dangle patient 3 times a day  - Stand patient 3 times a day  - Ambulate patient 3 times a  day  - Out of bed to chair 3 times a day   - Out of bed for meals 3 times a day  - Out of bed for toileting  - Record patient progress and toleration of activity level   7/2/2024 1333 by Susanna Álvarez LPN  Outcome: Adequate for Discharge  7/2/2024 0745 by Susanna Álvarez LPN  Outcome: Progressing     Problem: DISCHARGE PLANNING  Goal: Discharge to home or other facility with appropriate resources  Description: INTERVENTIONS:  - Identify barriers to discharge w/patient and caregiver  - Arrange for needed discharge resources and transportation as appropriate  - Identify discharge learning needs (meds, wound care, etc.)  - Arrange for interpretive services to assist at discharge as needed  - Refer to Case Management Department for coordinating discharge planning if the patient needs post-hospital services based on physician/advanced practitioner order or complex needs related to functional status, cognitive ability, or social support system  7/2/2024 1333 by Susanna Álvarez LPN  Outcome: Adequate for Discharge  7/2/2024 0745 by Susanna Álvarez LPN  Outcome: Progressing     Problem: Knowledge Deficit  Goal: Patient/family/caregiver demonstrates understanding of disease process, treatment plan, medications, and discharge instructions  Description: Complete learning assessment and assess knowledge base.  Interventions:  - Provide teaching at level of understanding  - Provide teaching via preferred learning methods  7/2/2024 1333 by Susanna Álvarez LPN  Outcome: Adequate for Discharge  7/2/2024 0745 by Susanna Álvarez LPN  Outcome: Progressing     Problem: GENITOURINARY - ADULT  Goal: Maintains or returns to baseline urinary function  Description: INTERVENTIONS:  - Assess urinary function bladder scan pvr  - Encourage oral fluids to ensure adequate hydration if ordered  - Administer IV fluids as ordered to ensure adequate hydration  - Administer ordered medications as needed  - Offer frequent toileting  - Follow  urinary retention protocol if ordered  7/2/2024 1333 by Susanna Álvarez LPN  Outcome: Adequate for Discharge  7/2/2024 0745 by uSsanna Álvarez LPN  Outcome: Progressing     Problem: SKIN/TISSUE INTEGRITY - ADULT  Goal: Skin Integrity remains intact(Skin Breakdown Prevention)  Description: Assess:  -Perform Zafar assessment every shift  -Clean and moisturize skin every shift  -Inspect skin when repositioning, toileting, and assisting with ADLS  -Assess under medical devices such as mepilex every shift  -Assess extremities for adequate circulation and sensation     Bed Management:  -Have minimal linens on bed & keep smooth, unwrinkled  -Change linens as needed when moist or perspiring  -Avoid sitting or lying in one position for more than 2 hours while in bed  -Keep HOB at 30degrees     Toileting:  -Offer bedside commode  -Assess for incontinence every shift  -Use incontinent care products after each incontinent episode such as wipes    Activity:  -Mobilize patient 3 times a day  -Encourage activity and walks on unit  -Encourage or provide ROM exercises   -Turn and reposition patient every 2 Hours  -Use appropriate equipment to lift or move patient in bed  -Instruct/ Assist with weight shifting every 1 when out of bed in chair  -Consider limitation of chair time 1 hour intervals    Skin Care:  -Avoid use of baby powder, tape, friction and shearing, hot water or constrictive clothing  -Relieve pressure over bony prominences using pillows  -Do not massage red bony areas    Next Steps:  -Teach patient strategies to minimize risks such as    -Consider consults to  interdisciplinary teams such as   7/2/2024 1333 by Susanna Álvarez LPN  Outcome: Adequate for Discharge  7/2/2024 0745 by Susanna Álvarez LPN  Outcome: Progressing     Problem: Prexisting or High Potential for Compromised Skin Integrity  Goal: Skin integrity is maintained or improved  Description: INTERVENTIONS:  - Identify patients at risk for skin  breakdown  - Assess and monitor skin integrity  - Assess and monitor nutrition and hydration status  - Monitor labs   - Assess for incontinence   - Turn and reposition patient  - Assist with mobility/ambulation  - Relieve pressure over bony prominences  - Avoid friction and shearing  - Provide appropriate hygiene as needed including keeping skin clean and dry  - Evaluate need for skin moisturizer/barrier cream  - Collaborate with interdisciplinary team   - Patient/family teaching  - Consider wound care consult   7/2/2024 1333 by Susanna Álvarez LPN  Outcome: Adequate for Discharge  7/2/2024 0745 by Susanna Álvarez LPN  Outcome: Progressing     Problem: NEUROSENSORY - ADULT  Goal: Achieves stable or improved neurological status  Description: INTERVENTIONS  - Monitor and report changes in neurological status  - Monitor vital signs such as temperature, blood pressure, glucose, and any other labs ordered   - Initiate measures to prevent increased intracranial pressure  - Monitor for seizure activity and implement precautions if appropriate      7/2/2024 1333 by Susanna Álvarez LPN  Outcome: Adequate for Discharge  7/2/2024 0745 by Susanna Álvarez LPN  Outcome: Progressing  Goal: Remains free of injury related to seizures activity  Description: INTERVENTIONS  - Maintain airway, patient safety  and administer oxygen as ordered  - Monitor patient for seizure activity, document and report duration and description of seizure to physician/advanced practitioner  - If seizure occurs,  ensure patient safety during seizure  - Reorient patient post seizure  - Seizure pads on all 4 side rails  - Instruct patient/family to notify RN of any seizure activity including if an aura is experienced  - Instruct patient/family to call for assistance with activity based on nursing assessment  - Administer anti-seizure medications if ordered    7/2/2024 1333 by Susanna Álvarez LPN  Outcome: Adequate for Discharge  7/2/2024 0745 by  Susanna Álvarez LPN  Outcome: Progressing  Goal: Achieves maximal functionality and self care  Description: INTERVENTIONS  - Monitor swallowing and airway patency with patient fatigue and changes in neurological status  - Encourage and assist patient to increase activity and self care. Encourage patient tofeed herself  - Encourage visually impaired, hearing impaired and aphasic patients to use assistive/communication devices  7/2/2024 1333 by Susanna Álvarez LPN  Outcome: Adequate for Discharge  7/2/2024 0745 by Susanna Álvarez LPN  Outcome: Progressing

## 2024-07-02 NOTE — PLAN OF CARE
Problem: PAIN - ADULT  Goal: Verbalizes/displays adequate comfort level or baseline comfort level  Description: Interventions:  - Encourage patient to monitor pain and request assistance  - Assess pain using appropriate pain scale  - Administer analgesics based on type and severity of pain and evaluate response  - Implement non-pharmacological measures as appropriate and evaluate response  - Consider cultural and social influences on pain and pain management  - Notify physician/advanced practitioner if interventions unsuccessful or patient reports new pain  7/2/2024 0745 by Susanna Álvarez LPN  Outcome: Progressing  7/1/2024 2056 by Susanna Álvarez LPN  Outcome: Progressing     Problem: INFECTION - ADULT  Goal: Absence or prevention of progression during hospitalization  Description: INTERVENTIONS:  - Assess and monitor for signs and symptoms of infection burning with urination, fever tachycardia,leukocytosis   - Monitor lab/diagnostic results  - Monitor all insertion sites, i.e. indwelling lines, tubes, and drains  - Monitor endotracheal if appropriate and nasal secretions for changes in amount and color  - [place] appropriate cooling/warming therapies per order  - Administer medications as ordered  - Instruct and encourage patient and family to use good hand hygiene technique  - Identify and instruct in appropriate isolation precautions for identified infection/condition  7/2/2024 0745 by Susanna Álvarez LPN  Outcome: Progressing  7/1/2024 2056 by Susanna Álvarez LPN  Outcome: Progressing     Problem: SAFETY ADULT  Goal: Patient will remain free of falls  Description: INTERVENTIONS:  - Educate patient/family on patient safety including physical limitations  - Instruct patient to call for assistance with activity   - Consult OT/PT to assist with strengthening/mobility   - Keep Call bell within reach  - Keep bed low and locked with side rails adjusted as appropriate  - Keep care items and personal belongings  within reach  - Initiate and maintain comfort rounds  - Make Fall Risk Sign visible to staff  - Offer Toileting every 2 Hours, in advance of need  - Initiate/Maintain bed/chair alarm  - Obtain necessary fall risk management equipment: alarms  - Apply yellow socks and bracelet for high fall risk patients  - Consider moving patient to room near nurses station  7/2/2024 0745 by Susanna Álvarez LPN  Outcome: Progressing  7/1/2024 2056 by Susanna Álvarez LPN  Outcome: Progressing  Goal: Maintain or return to baseline ADL function  Description: INTERVENTIONS:  -  Assess patient's ability to carry out ADLs; assess patient's baseline for ADL function and identify physical deficits which impact ability to perform ADLs (bathing, care of mouth/teeth, toileting, grooming, dressing, etc.)  - Assess/evaluate cause of self-care deficits   - Assess range of motion  - Assess patient's mobility; develop plan if impaired  - Assess patient's need for assistive devices and provide as appropriate  - Encourage maximum independence but intervene and supervise when necessary  - Involve family in performance of ADLs  - Assess for home care needs following discharge   - Consider OT consult to assist with ADL evaluation and planning for discharge  - Provide patient education as appropriate  7/2/2024 0745 by Susanna Álvarez LPN  Outcome: Progressing  7/1/2024 2056 by Susanna Álvarez LPN  Outcome: Progressing  Goal: Maintains/Returns to pre admission functional level  Description: INTERVENTIONS:  - Perform AM-PAC 6 Click Basic Mobility/ Daily Activity assessment daily.  - Set and communicate daily mobility goal to care team and patient/family/caregiver.   - Collaborate with rehabilitation services on mobility goals if consulted  - Perform Range of Motion 3 times a day.  - Reposition patient every 2 hours.  - Dangle patient 3 times a day  - Stand patient 3 times a day  - Ambulate patient 3 times a day  - Out of bed to chair 3 times a day   -  Out of bed for meals 3 times a day  - Out of bed for toileting  - Record patient progress and toleration of activity level   7/2/2024 0745 by Susanna Álvarez LPN  Outcome: Progressing  7/1/2024 2056 by Susanna Álvarez LPN  Outcome: Progressing     Problem: DISCHARGE PLANNING  Goal: Discharge to home or other facility with appropriate resources  Description: INTERVENTIONS:  - Identify barriers to discharge w/patient and caregiver  - Arrange for needed discharge resources and transportation as appropriate  - Identify discharge learning needs (meds, wound care, etc.)  - Arrange for interpretive services to assist at discharge as needed  - Refer to Case Management Department for coordinating discharge planning if the patient needs post-hospital services based on physician/advanced practitioner order or complex needs related to functional status, cognitive ability, or social support system  7/2/2024 0745 by Susanna Álvarez LPN  Outcome: Progressing  7/1/2024 2056 by Susanna Álvarez LPN  Outcome: Progressing     Problem: Knowledge Deficit  Goal: Patient/family/caregiver demonstrates understanding of disease process, treatment plan, medications, and discharge instructions  Description: Complete learning assessment and assess knowledge base.  Interventions:  - Provide teaching at level of understanding  - Provide teaching via preferred learning methods  7/2/2024 0745 by Susanna Álvarez LPN  Outcome: Progressing  7/1/2024 2056 by Susanna Álvarez LPN  Outcome: Progressing     Problem: GENITOURINARY - ADULT  Goal: Maintains or returns to baseline urinary function  Description: INTERVENTIONS:  - Assess urinary function bladder scan pvr  - Encourage oral fluids to ensure adequate hydration if ordered  - Administer IV fluids as ordered to ensure adequate hydration  - Administer ordered medications as needed  - Offer frequent toileting  - Follow urinary retention protocol if ordered  7/2/2024 0745 by Susanna Álvarez  LPN  Outcome: Progressing  7/1/2024 2056 by Susanna Álvarez LPN  Outcome: Progressing     Problem: SKIN/TISSUE INTEGRITY - ADULT  Goal: Skin Integrity remains intact(Skin Breakdown Prevention)  Description: Assess:  -Perform Zafar assessment every shift  -Clean and moisturize skin every shift  -Inspect skin when repositioning, toileting, and assisting with ADLS  -Assess under medical devices such as mepilex every shift  -Assess extremities for adequate circulation and sensation     Bed Management:  -Have minimal linens on bed & keep smooth, unwrinkled  -Change linens as needed when moist or perspiring  -Avoid sitting or lying in one position for more than 2 hours while in bed  -Keep HOB at 30degrees     Toileting:  -Offer bedside commode  -Assess for incontinence every shift  -Use incontinent care products after each incontinent episode such as wipes    Activity:  -Mobilize patient 3 times a day  -Encourage activity and walks on unit  -Encourage or provide ROM exercises   -Turn and reposition patient every 2 Hours  -Use appropriate equipment to lift or move patient in bed  -Instruct/ Assist with weight shifting every 1 when out of bed in chair  -Consider limitation of chair time 1 hour intervals    Skin Care:  -Avoid use of baby powder, tape, friction and shearing, hot water or constrictive clothing  -Relieve pressure over bony prominences using pillows  -Do not massage red bony areas    Next Steps:  -Teach patient strategies to minimize risks such as    -Consider consults to  interdisciplinary teams such as   7/2/2024 0745 by Susanna Álvarez LPN  Outcome: Progressing  7/1/2024 2056 by Susanna Álvarez LPN  Outcome: Progressing     Problem: Prexisting or High Potential for Compromised Skin Integrity  Goal: Skin integrity is maintained or improved  Description: INTERVENTIONS:  - Identify patients at risk for skin breakdown  - Assess and monitor skin integrity  - Assess and monitor nutrition and hydration status  -  Monitor labs   - Assess for incontinence   - Turn and reposition patient  - Assist with mobility/ambulation  - Relieve pressure over bony prominences  - Avoid friction and shearing  - Provide appropriate hygiene as needed including keeping skin clean and dry  - Evaluate need for skin moisturizer/barrier cream  - Collaborate with interdisciplinary team   - Patient/family teaching  - Consider wound care consult   7/2/2024 0745 by Susanna Álvarez LPN  Outcome: Progressing  7/1/2024 2056 by Susanna Álvarez LPN  Outcome: Progressing     Problem: NEUROSENSORY - ADULT  Goal: Achieves stable or improved neurological status  Description: INTERVENTIONS  - Monitor and report changes in neurological status  - Monitor vital signs such as temperature, blood pressure, glucose, and any other labs ordered   - Initiate measures to prevent increased intracranial pressure  - Monitor for seizure activity and implement precautions if appropriate      7/2/2024 0745 by Susanna Álvarez LPN  Outcome: Progressing  7/1/2024 2056 by Susanna Álvarez LPN  Outcome: Progressing  Goal: Remains free of injury related to seizures activity  Description: INTERVENTIONS  - Maintain airway, patient safety  and administer oxygen as ordered  - Monitor patient for seizure activity, document and report duration and description of seizure to physician/advanced practitioner  - If seizure occurs,  ensure patient safety during seizure  - Reorient patient post seizure  - Seizure pads on all 4 side rails  - Instruct patient/family to notify RN of any seizure activity including if an aura is experienced  - Instruct patient/family to call for assistance with activity based on nursing assessment  - Administer anti-seizure medications if ordered    7/2/2024 0745 by Susanna Álvarez LPN  Outcome: Progressing  7/1/2024 2056 by Susanna Álvarez LPN  Outcome: Progressing  Goal: Achieves maximal functionality and self care  Description: INTERVENTIONS  - Monitor  swallowing and airway patency with patient fatigue and changes in neurological status  - Encourage and assist patient to increase activity and self care. Encourage patient tofeed herself  - Encourage visually impaired, hearing impaired and aphasic patients to use assistive/communication devices  7/2/2024 0745 by Susanna Álvarez LPN  Outcome: Progressing  7/1/2024 2056 by Susanna Álvarez LPN  Outcome: Progressing

## 2024-07-02 NOTE — PROGRESS NOTES
[facility]  Progress Note  Name: Denita Vital I  MRN: [id number]  Unit/Bed#: MS 334Derek I Date of Admission: 6/25/2024   Date of Service: 7/2/2024 I Hospital Day: 7    Assessment & Plan   * Acute metabolic encephalopathy  Assessment & Plan  Presented to ED with increased lethargy  Baseline orientation/neuro status nonverbal, alert -usually active, mimics staff, able to feed herself, stands with assistance and uses a sit to stand   On clinical exam, patient returned to baseline.  Continue current treatment  At baseline uses sit to stand with 1 assist at the group home, will place PT OT to see if at baseline  Is a max 2 assist now  Is improved      Urinary tract infection without hematuria  Assessment & Plan  Recently diagnosed with UTI-urine culture revealing Proteus ESBL  Has been being treated with amoxicillin and doxycycline  There is no in chart phone call made from the ER to group home to instruct to switch antibiotics, group home states they had not received the call    Antibiotic adjusted to ertapenem based on last urine culture.  Urine culture is growing Proteus mirabilis, previous urine culture was similar-has completed 3 days of ertapenem.    Ambulatory dysfunction  Assessment & Plan  Patient at baseline can you sit to stand with 1 assist    Today requiring 2 assist with PT/OT    Patient requires STR upon discharge and will require less than 30 days of PT in a facility to progress back to baseline     Transaminitis  Assessment & Plan  Lab Results   Component Value Date     (H) 07/02/2024     (H) 07/01/2024     (H) 06/30/2024     Lab Results   Component Value Date     (H) 07/02/2024     (H) 07/01/2024     (H) 06/30/2024     CT chest abdomen pelvis from admission with unchanged hepatomegaly  Has been increasing over the last few days  Tylenol level fine  Acute hepatitis panel  Stop the ertapenem as has completed course of 3  days  Abdominal ultrasound -unremarkable  Recheck ammonia and valproic acid level  Ammonia normal, valproic acid total low, free pending   Improving    Acute urinary retention  Assessment & Plan  Likely secondary to acute cystitis  Failed urinary retention protocol -Perrin placed 6/29  Remain in place for a few days to allow bladder to decompression   Can have voiding trial in 5 days - 7/4 or 7/5    Chronic anemia  Assessment & Plan  Baseline Hgb between 8-10  Normocytic anemia   Lab Results   Component Value Date    HGB 7.8 (L) 07/02/2024    HGB 7.4 (L) 07/02/2024    HGB 7.7 (L) 07/01/2024    HGB 7.5 (L) 06/30/2024     Iron panel with mildly low iron  TSH ordered   B12 ordered   Stool record at bedside and occult stool ordered  Started on iron supplementation      Intellectual disability  Assessment & Plan  At baseline patient is nonverbal  Resident of [facility]    Seizure disorder (HCC)  Assessment & Plan  Maintained on Depakote  Valproic acid level is 40, ammonia level normal  Seizure precautions  Follow PT OT recommendation    With elevated transaminases will repeat valproic acid and ammonia level  Valproic acid total is low - free pending   Ammonia normal     Edema of upper extremity-resolved as of 7/2/2024  Assessment & Plan  Had a history of infiltration  On palpation, patient has some nodularity, with bruise-suspect thrombophlebitis  Venous duplex negative for thrombophlebitis or DVT  Edema seems to have resolved               VTE Pharmacologic Prophylaxis: VTE Score: 4 Moderate Risk (Score 3-4) - Pharmacological DVT Prophylaxis Ordered: enoxaparin (Lovenox).    Mobility:   Basic Mobility Inpatient Raw Score: 8  -[facility] Goal: 3: Sit at edge of bed  JH-HLM Achieved: 4: Move to chair/commode  -HLM Goal achieved. Continue to encourage appropriate mobility.    Patient Centered Rounds: I performed bedside rounds with nursing staff today.   Discussions with Specialists or Other Care Team Provider: CM    Education  and Discussions with Family / Patient:  Attempted to contact patient's sister, left voicemail with callback number.     Total Time Spent on Date of Encounter in care of patient:  mins. This time was spent on one or more of the following: performing physical exam; counseling and coordination of care; obtaining or reviewing history; documenting in the medical record; reviewing/ordering tests, medications or procedures; communicating with other healthcare professionals and discussing with patient's family/caregivers.    Current Length of Stay: 7 day(s)  Current Patient Status: Inpatient   Certification Statement: The patient will continue to require additional inpatient hospital stay due to pending improvement in ambulatory status versus discharge to rehab  Discharge Plan: Anticipate discharge later today or tomorrow to rehab facility.    Code Status: Level 1 - Full Code    Subjective:   Seen and examined.  Patient seems to be at her baseline mentation, alert.  Sitting out of bed in chair.     Objective:     Vitals:   Temp (24hrs), Av °F (36.7 °C), Min:97.9 °F (36.6 °C), Max:98.1 °F (36.7 °C)    Temp:  [97.9 °F (36.6 °C)-98.1 °F (36.7 °C)] 98.1 °F (36.7 °C)  HR:  [60-70] 60  Resp:  [16-18] 16  BP: (107-110)/(47-50) 108/47  SpO2:  [91 %-93 %] 93 %  Body mass index is 32.68 kg/m².     Input and Output Summary (last 24 hours):     Intake/Output Summary (Last 24 hours) at 2024 1222  Last data filed at 2024 0849  Gross per 24 hour   Intake 540 ml   Output 2550 ml   Net -2010 ml       Physical Exam:   Physical Exam  Vitals and nursing note reviewed.   Constitutional:       General: She is not in acute distress.     Appearance: Normal appearance. She is obese.   HENT:      Head: Normocephalic and atraumatic.      Nose: No congestion.      Mouth/Throat:      Mouth: Mucous membranes are moist.   Eyes:      Conjunctiva/sclera: Conjunctivae normal.   Cardiovascular:      Rate and Rhythm: Normal rate and regular  rhythm.      Pulses: Normal pulses.      Heart sounds: Normal heart sounds. No murmur heard.  Pulmonary:      Effort: Pulmonary effort is normal. No respiratory distress.      Breath sounds: Normal breath sounds.   Abdominal:      General: Bowel sounds are normal.      Palpations: Abdomen is soft.      Tenderness: There is no abdominal tenderness.   Musculoskeletal:         General: Normal range of motion.      Right lower leg: No edema.      Left lower leg: No edema.   Skin:     General: Skin is warm and dry.   Neurological:      Mental Status: She is alert. Mental status is at baseline.          Additional Data:     Labs:  Results from last 7 days   Lab Units 07/02/24  0847 07/02/24  0442 06/30/24  0503 06/29/24  0511   WBC Thousand/uL  --  4.81   < > 6.51   HEMOGLOBIN g/dL 7.8* 7.4*   < > 8.8*   HEMATOCRIT % 24.8* 23.2*   < > 28.3*   PLATELETS Thousands/uL  --  179   < > 121*   SEGS PCT %  --   --   --  79*   LYMPHO PCT %  --  32  --  12*   MONO PCT %  --  3*  --  7   EOS PCT %  --  2  --  1    < > = values in this interval not displayed.     Results from last 7 days   Lab Units 07/02/24  0442   SODIUM mmol/L 134*   POTASSIUM mmol/L 4.1   CHLORIDE mmol/L 101   CO2 mmol/L 30   BUN mg/dL 16   CREATININE mg/dL 0.49*   ANION GAP mmol/L 3*   CALCIUM mg/dL 9.4   ALBUMIN g/dL 3.0*   TOTAL BILIRUBIN mg/dL 0.37   ALK PHOS U/L 67   ALT U/L 216*   AST U/L 152*   GLUCOSE RANDOM mg/dL 115         Results from last 7 days   Lab Units 07/02/24  1052 07/02/24  0708 07/01/24  2113 07/01/24  1547 07/01/24  1113 07/01/24  0730 06/30/24  2042 06/30/24  1551 06/30/24  1108 06/30/24  0730 06/29/24  2053 06/29/24  1538   POC GLUCOSE mg/dl 168* 131 158* 157* 177* 135 151* 135 146* 173* 269* 122     Results from last 7 days   Lab Units 06/26/24  1401   HEMOGLOBIN A1C % 5.9*     Results from last 7 days   Lab Units 06/26/24  0517   PROCALCITONIN ng/ml <0.05       Lines/Drains:  Invasive Devices       Peripheral Intravenous Line  Duration              Peripheral IV 06/29/24 Distal;Dorsal (posterior);Right Forearm 2 days              Drain  Duration             Urethral Catheter Latex 16 Fr. 3 days                  Urinary Catheter:  Goal for removal:  Voiding trial in 2 to 3 days               Imaging: No pertinent imaging reviewed.    Recent Cultures (last 7 days):         Last 24 Hours Medication List:   Current Facility-Administered Medications   Medication Dose Route Frequency Provider Last Rate    acetaminophen  650 mg Oral Q6H PRN Ivana Montague PA-C      aspirin  81 mg Oral Daily Ivana Montague PA-C      atorvastatin  80 mg Oral Daily With Dinner Ivana Montague PA-C      bisacodyl  5 mg Oral Daily PRN Ivana Montague PA-C      divalproex sodium  250 mg Oral Daily Ivana Montague PA-C      divalproex sodium  500 mg Oral After Dinner Ivana Montague PA-C      docusate sodium  100 mg Oral BID Ivana Montague PA-C      enoxaparin  40 mg Subcutaneous Daily Ivana Montague PA-C      ferrous sulfate  325 mg Oral Daily With Breakfast Ivana Montague PA-C      gabapentin  800 mg Oral TID Ivana Montague PA-C      insulin lispro  1-6 Units Subcutaneous TID AC Joey Miranda MD      insulin lispro  1-6 Units Subcutaneous HS Joey Miranda MD      levothyroxine  50 mcg Oral Early Morning Ivana Montague PA-C      LORazepam  0.5 mg Oral BID Ivana Montague PA-C      nystatin  1 Application Topical BID Ivana Montague PA-C      oxybutynin  10 mg Oral Daily Ivana Montague PA-C      pantoprazole  20 mg Oral Early Morning Ivana Montague PA-C      polyethylene glycol  17 g Oral Daily Ivana Montague PA-C      QUEtiapine  100 mg Oral BID Ivana Montague PA-C      QUEtiapine  200 mg Oral HS Ivana Montague PA-C      senna  8.6 mg Oral HS Ivana Montague PA-C      simethicone  80 mg Oral 4x Daily PRN Ivana Montague PA-C          Today, Patient Was Seen By: Ivana Montague PA-C    **Please Note: This note may have been constructed using a voice recognition system.**

## 2024-07-02 NOTE — ASSESSMENT & PLAN NOTE
Baseline Hgb between 8-10  Normocytic anemia   Lab Results   Component Value Date    HGB 7.8 (L) 07/02/2024    HGB 7.4 (L) 07/02/2024    HGB 7.7 (L) 07/01/2024    HGB 7.5 (L) 06/30/2024     Iron panel with mildly low iron  TSH ordered   B12 ordered   Stool record at bedside and occult stool ordered  Started on iron supplementation

## 2024-07-02 NOTE — DISCHARGE SUMMARY
[facility]  Discharge- Denita Vital 1955, 68 y.o. female MRN: [id number]  Unit/Bed#: MS Rose Encounter: [id number]  Primary Care Provider: Soy Clemente MD   Date and time admitted to hospital: 6/25/2024  9:29 AM    * Acute metabolic encephalopathy  Assessment & Plan  Presented to ED with increased lethargy  Baseline orientation/neuro status nonverbal, alert -usually active, mimics staff, able to feed herself, stands with assistance and uses a sit to stand   On clinical exam, patient returned to baseline.  Continue current treatment  At baseline uses sit to stand with 1 assist at the group home, will place PT OT to see if at baseline  Is a max 2 assist now  Is improved      Urinary tract infection without hematuria  Assessment & Plan  Recently diagnosed with UTI-urine culture revealing Proteus ESBL  Has been being treated with amoxicillin and doxycycline  There is no in chart phone call made from the ER to group home to instruct to switch antibiotics, group home states they had not received the call    Antibiotic adjusted to ertapenem based on last urine culture.  Urine culture is growing Proteus mirabilis, previous urine culture was similar-has completed 3 days of ertapenem.    Ambulatory dysfunction  Assessment & Plan  Patient at baseline can you sit to stand with 1 assist    Today requiring 2 assist with PT/OT    Patient requires STR upon discharge and will require less than 30 days of PT in a facility to progress back to baseline     Transaminitis  Assessment & Plan  Lab Results   Component Value Date     (H) 07/02/2024     (H) 07/01/2024     (H) 06/30/2024     Lab Results   Component Value Date     (H) 07/02/2024     (H) 07/01/2024     (H) 06/30/2024     CT chest abdomen pelvis from admission with unchanged hepatomegaly  Has been increasing over the last few days  Tylenol level fine  Acute hepatitis panel  Stop the  ertapenem as has completed course of 3 days  Abdominal ultrasound -unremarkable  Recheck ammonia and valproic acid level  Ammonia normal, valproic acid total low, free pending   Improving    Acute urinary retention  Assessment & Plan  Likely secondary to acute cystitis  Failed urinary retention protocol -Perrin placed 6/29  Remain in place for a few days to allow bladder to decompression   Can have voiding trial in 5 days - 7/4 or 7/5    Chronic anemia  Assessment & Plan  Baseline Hgb between 8-10  Normocytic anemia   Lab Results   Component Value Date    HGB 7.8 (L) 07/02/2024    HGB 7.4 (L) 07/02/2024    HGB 7.7 (L) 07/01/2024    HGB 7.5 (L) 06/30/2024     Iron panel with mildly low iron  TSH wnl  B12 normal   Stool record at bedside and occult stool ordered -negative x 3  Started on iron supplementation  Stable hemoglobin      Intellectual disability  Assessment & Plan  At baseline patient is nonverbal  Resident of [facility]    Seizure disorder ([facility])  Assessment & Plan  Maintained on Depakote  Valproic acid level is 40, ammonia level normal  Seizure precautions  Follow PT OT recommendation    With elevated transaminases will repeat valproic acid and ammonia level  Valproic acid total is low - free pending   Ammonia normal         Medical Problems       Resolved Problems  Date Reviewed: 2/26/2024            Resolved    Edema of upper extremity 7/2/2024     Resolved by  Ivana Montague PA-C    Positive blood culture 6/29/2024     Resolved by  Ivana Montague PA-C        Discharging Physician / Practitioner: Ivana Montague PA-C  PCP: Soy Clemente MD  Admission Date:   Admission Orders (From admission, onward)       Ordered        06/25/24 1256  INPATIENT ADMISSION  Once                          Discharge Date: 07/02/24    Consultations During Hospital Stay:  PT/OT    Procedures Performed:   None    Significant Findings / Test Results:   CT chest abdomen pelvis w contrast - Result Date: 6/25/2024  No acute  intrathoracic or intra-abdominal pathology. Minimally increased compressive atelectasis in the right lower lobe. Redemonstrated elevation of the right hemidiaphragm. Moderate fecal retention in the colon.     Lab Results   Component Value Date    BLOODCX No Growth After 5 Days. 06/25/2024    BLOODCX Staphylococcus coagulase negative (A) 06/25/2024    URINECX <10,000 cfu/ml Proteus mirabilis ESBL (A) 06/25/2024     Lab Results   Component Value Date    HGB 7.8 (L) 07/02/2024    HGB 7.4 (L) 07/02/2024    HGB 7.7 (L) 07/01/2024    HGB 7.5 (L) 06/30/2024    HGB 8.8 (L) 06/29/2024    HGB 9.1 (L) 06/28/2024         Incidental Findings:   None     Test Results Pending at Discharge (will require follow up):   None     Outpatient Tests Requested:  None    Complications:  None    Reason for Admission: Acute encephalopathy    Hospital Course:   Denita Vital is a 68 y.o. female patient with history of intellectual disability, ESBL, chronic anemia, seizure disorder who originally presented to the hospital on 6/25/2024 due to being more lethargic at group home.  Was being treated for UTI however was ESBL and not on adequate antibiotics.  Started on Zosyn then ertapenem.  Mentation improved to her baseline.  Found to have mild transaminitis that started to improve after discontinuation of ertapenem, workup negative.  Did develop acute urinary retention likely secondary to acute cystitis.  Plan void trial on 7/47/5.  Did have mild decrease in her chronic anemia however stabilized and without signs for active bleeding with negative occult stools.  Started on iron supplementation.  PT/OT evaluated and as she is a max assist she can return to group home at this level of care.  Will go to [place] for rehab to improve ambulatory status.       Please see above list of diagnoses and related plan for additional information.     Condition at Discharge: stable    Discharge Day Visit / Exam:   Subjective: Seen and examined.  No overnight  events.  Up into the chair today.   Vitals: Blood Pressure: (!) 108/47 (07/02/24 0705)  Pulse: 60 (07/02/24 0705)  Temperature: 98.1 °F (36.7 °C) (07/02/24 0705)  Respirations: 16 (07/02/24 0705)  Height: 5' (152.4 cm) (06/25/24 1436)  Weight - Scale: 75.9 kg (167 lb 5.3 oz) (06/25/24 1436)  SpO2: 93 % (07/02/24 0705)  Exam:   Physical Exam  Vitals and nursing note reviewed.   Constitutional:       General: She is not in acute distress.     Appearance: Normal appearance. She is obese.   HENT:      Head: Normocephalic and atraumatic.      Nose: No congestion.      Mouth/Throat:      Mouth: Mucous membranes are moist.   Eyes:      Conjunctiva/sclera: Conjunctivae normal.   Cardiovascular:      Rate and Rhythm: Normal rate and regular rhythm.      Pulses: Normal pulses.      Heart sounds: Normal heart sounds. No murmur heard.  Pulmonary:      Effort: Pulmonary effort is normal. No respiratory distress.      Breath sounds: Normal breath sounds.   Abdominal:      General: Bowel sounds are normal.      Palpations: Abdomen is soft.      Tenderness: There is no abdominal tenderness.   Musculoskeletal:         General: Normal range of motion.      Right lower leg: No edema.      Left lower leg: No edema.   Skin:     General: Skin is warm and dry.   Neurological:      Mental Status: She is alert. Mental status is at baseline.          Discussion with Family:  The patient's sister, left voicemail.     Discharge instructions/Information to patient and family:   See after visit summary for information provided to patient and family.      Provisions for Follow-Up Care:  See after visit summary for information related to follow-up care and any pertinent home health orders.      Mobility at time of Discharge:   Basic Mobility Inpatient Raw Score: 8  JH-HLM Goal: 3: Sit at edge of bed  JH-HLM Achieved: 4: Move to chair/commode  HLM Goal achieved. Continue to encourage appropriate mobility.     Disposition:   Other Skilled Nursing  Facility at [place]    Planned Readmission: None     Discharge Statement:  I spent 45 minutes discharging the patient. This time was spent on the day of discharge. I had direct contact with the patient on the day of discharge. Greater than 50% of the total time was spent examining patient, answering all patient questions, arranging and discussing plan of care with patient as well as directly providing post-discharge instructions.  Additional time then spent on discharge activities.    Discharge Medications:  See after visit summary for reconciled discharge medications provided to patient and/or family.      **Please Note: This note may have been constructed using a voice recognition system**

## 2024-07-02 NOTE — ASSESSMENT & PLAN NOTE
Lab Results   Component Value Date     (H) 07/02/2024     (H) 07/01/2024     (H) 06/30/2024     Lab Results   Component Value Date     (H) 07/02/2024     (H) 07/01/2024     (H) 06/30/2024     CT chest abdomen pelvis from admission with unchanged hepatomegaly  Has been increasing over the last few days  Tylenol level fine  Acute hepatitis panel  Stop the ertapenem as has completed course of 3 days  Abdominal ultrasound -unremarkable  Recheck ammonia and valproic acid level  Ammonia normal, valproic acid total low, free pending   Improving

## 2024-07-02 NOTE — CASE MANAGEMENT
Case Management Discharge Planning Note    Patient name Denita Vital  Location /-01 MRN [id number]  : 1955 Date 2024       Current Admission Date: 2024  Current Admission Diagnosis:Acute metabolic encephalopathy   Patient Active Problem List    Diagnosis Date Noted Date Diagnosed    Transaminitis 2024     Urinary tract infection without hematuria 2024     Hypoglycemia 2023     Hyponatremia 2022     Open nondisplaced fracture of distal phalanx of left middle finger 2022     Avulsion of fingernail 2022     Acute urinary retention 2022     Acute metabolic encephalopathy 2022     Cystitis without hematuria 2022     Dysphagia 2021     Chronic anemia 2021     Chronically elevated right hemidiaphragm 2021     Obesity (BMI 30.0-34.9) 2021     Seizure disorder (HCC) 2021     Hyperlipidemia 2021     Abnormal x-ray 2021     Acute respiratory failure with hypoxia (HCC) 2021     Aspiration pneumonitis (HCC) 2021     Mood disorder (HCC) 2021     Type 2 diabetes mellitus with hypoglycemia, without long-term current use of insulin (HCC) 2020     Hypertension 2019     Gastroesophageal reflux disease without esophagitis 2018     Ambulatory dysfunction 2017     Intellectual disability 2017       LOS (days): 7  Geometric Mean LOS (GMLOS) (days): 3.9  Days to GMLOS:-3.1     OBJECTIVE:  Risk of Unplanned Readmission Score: 26         Current admission status: Inpatient   Preferred Pharmacy:   [facility] - [place], [place] - [id number] [facility] [address]  Phone: [phone] Fax: [phone]    [facility]/pharmacy #[id number] [place], [place] - [address] [place] [id number]  Phone: [phone] Fax: [phone]    Primary Care Provider: Soy Clemente MD    Primary Insurance: MEDICARE  Secondary  Insurance: [place] [employer]    DISCHARGE DETAILS:    Discharge planning discussed with:: [facility] staff  [place] of Choice: Yes  Comments - [place] of Choice: STR [facility]  CM contacted family/caregiver?: Yes (message left for sister)  Were Treatment Team discharge recommendations reviewed with patient/caregiver?: Yes  Did patient/caregiver verbalize understanding of patient care needs?: Yes  Were patient/caregiver advised of the risks associated with not following Treatment Team discharge recommendations?: Yes    Contacts  Patient Contacts: [facility], care taker, Sister Christine  Relationship to Patient:: Family  Contact Method: Phone  Phone Number: [phone] ([facility]) [phone] (Davy)  Reason/Outcome: Discharge Planning    Requested Home Health Care         Is the patient interested in HHC at discharge?: No    DME Referral Provided  Referral made for DME?: No    Other Referral/Resources/Interventions Provided:  Interventions: Short Term Rehab  Referral Comments: [facility]    Would you like to participate in our [facility] service program?  : No - Declined    Treatment Team Recommendation: Short Term Rehab  Discharge Destination Plan:: Short Term Rehab  Transport at Discharge : Wheelchair van        Transported by (Company and Unit #): [facility]  ETA of Transport (Date): 07/02/24  ETA of Transport (Time): 1530              IMM Given (Date):: 07/02/24  IMM Given to:: Designee  Family notified:: Group home staff reviewed by phone - [facility]       Accepting Facility Name, City & State : [place]  Receiving Facility/Agency Phone Number: [phone]  Facility/Agency Fax Number: [phone]       CM discussed Advance Care Planning with primary provider based on scores of Goals of Care systems list.     CM reviewed choices with [facility] [phone] regarding patient's STR facility. A post acute care recommendation was made by your care team for STR.  Discussed Freedom of Choice with  caregiver. List of facilities given to caregiver via by phone. caregiver aware the list is custom filtered for them by zip code location and that [facility] post acute providers are designated. [facility] is facility of choice.   Discussed with patient that transportation via  is not covered by insurance and patient will be billed for this service. Patient verbalized understanding.  CM spoke to patient's designee, reviewed DC IMM and informed that patient can stay an additional 4 hours for reconsidering appealing the discharge as the medicare rights were review on the day of discharge. Pt verbalized understanding and feels ready to go home and does not intend to stay 4 hours to reconsider. IMM documented CM effort. CM asked if patient able to sign herself - she can with guided support.    CM confirmed discharge plan with [facility] today and informed them of timelines. Provider and beside nurse also made aware.     CM attempted to review IMM with patient, patient took pen and scribbled where asked to. Patient nodded head of acknowledgment that she signed she received rights. CM documented on IMM. CM informed her of discharge time today 330pm to [facility].     CM requested transportation for 3pm to [facility]. It was accepted by [facility] for 330pm.     CM to follow patient's care and discharge needs.

## 2024-07-02 NOTE — ASSESSMENT & PLAN NOTE
Presented to ED with increased lethargy  Baseline orientation/neuro status nonverbal, alert -usually active, mimics staff, able to feed herself, stands with assistance and uses a sit to stand   On clinical exam, patient returned to baseline.  Continue current treatment  At baseline uses sit to stand with 1 assist at the group home, will place PT OT to see if at baseline  Is a max 2 assist now  Is improved

## 2024-07-02 NOTE — NURSING NOTE
Call placed to patient's sister and left message with  for a call back to discuss patient. Will await call back.

## 2024-07-02 NOTE — PLAN OF CARE
Problem: PAIN - ADULT  Goal: Verbalizes/displays adequate comfort level or baseline comfort level  Description: Interventions:  - Encourage patient to monitor pain and request assistance  - Assess pain using appropriate pain scale  - Administer analgesics based on type and severity of pain and evaluate response  - Implement non-pharmacological measures as appropriate and evaluate response  - Consider cultural and social influences on pain and pain management  - Notify physician/advanced practitioner if interventions unsuccessful or patient reports new pain  7/1/2024 2056 by Susanna Álvarez LPN  Outcome: Progressing  7/1/2024 1000 by Susanna Álvarez LPN  Outcome: Progressing     Problem: INFECTION - ADULT  Goal: Absence or prevention of progression during hospitalization  Description: INTERVENTIONS:  - Assess and monitor for signs and symptoms of infection burning with urination, fever tachycardia,leukocytosis   - Monitor lab/diagnostic results  - Monitor all insertion sites, i.e. indwelling lines, tubes, and drains  - Monitor endotracheal if appropriate and nasal secretions for changes in amount and color  - [place] appropriate cooling/warming therapies per order  - Administer medications as ordered  - Instruct and encourage patient and family to use good hand hygiene technique  - Identify and instruct in appropriate isolation precautions for identified infection/condition  7/1/2024 2056 by Susanna Álvarez LPN  Outcome: Progressing  7/1/2024 1000 by Susanna Álvarez LPN  Outcome: Progressing     Problem: SAFETY ADULT  Goal: Patient will remain free of falls  Description: INTERVENTIONS:  - Educate patient/family on patient safety including physical limitations  - Instruct patient to call for assistance with activity   - Consult OT/PT to assist with strengthening/mobility   - Keep Call bell within reach  - Keep bed low and locked with side rails adjusted as appropriate  - Keep care items and personal belongings  within reach  - Initiate and maintain comfort rounds  - Make Fall Risk Sign visible to staff  - Offer Toileting every 2 Hours, in advance of need  - Initiate/Maintain bed/chair alarm  - Obtain necessary fall risk management equipment: alarms  - Apply yellow socks and bracelet for high fall risk patients  - Consider moving patient to room near nurses station  7/1/2024 2056 by Susanna Álvarez LPN  Outcome: Progressing  7/1/2024 1000 by Susanna Álvarez LPN  Outcome: Progressing  Goal: Maintain or return to baseline ADL function  Description: INTERVENTIONS:  -  Assess patient's ability to carry out ADLs; assess patient's baseline for ADL function and identify physical deficits which impact ability to perform ADLs (bathing, care of mouth/teeth, toileting, grooming, dressing, etc.)  - Assess/evaluate cause of self-care deficits   - Assess range of motion  - Assess patient's mobility; develop plan if impaired  - Assess patient's need for assistive devices and provide as appropriate  - Encourage maximum independence but intervene and supervise when necessary  - Involve family in performance of ADLs  - Assess for home care needs following discharge   - Consider OT consult to assist with ADL evaluation and planning for discharge  - Provide patient education as appropriate  7/1/2024 2056 by Susanna Álvarez LPN  Outcome: Progressing  7/1/2024 1000 by Susanna Álvarez LPN  Outcome: Progressing  Goal: Maintains/Returns to pre admission functional level  Description: INTERVENTIONS:  - Perform AM-PAC 6 Click Basic Mobility/ Daily Activity assessment daily.  - Set and communicate daily mobility goal to care team and patient/family/caregiver.   - Collaborate with rehabilitation services on mobility goals if consulted  - Perform Range of Motion 3 times a day.  - Reposition patient every 2 hours.  - Dangle patient 3 times a day  - Stand patient 3 times a day  - Ambulate patient 3 times a day  - Out of bed to chair 3 times a day   -  Out of bed for meals 3 times a day  - Out of bed for toileting  - Record patient progress and toleration of activity level   7/1/2024 2056 by Susanna Álvarez LPN  Outcome: Progressing  7/1/2024 1000 by Susanna Álvarez LPN  Outcome: Progressing     Problem: DISCHARGE PLANNING  Goal: Discharge to home or other facility with appropriate resources  Description: INTERVENTIONS:  - Identify barriers to discharge w/patient and caregiver  - Arrange for needed discharge resources and transportation as appropriate  - Identify discharge learning needs (meds, wound care, etc.)  - Arrange for interpretive services to assist at discharge as needed  - Refer to Case Management Department for coordinating discharge planning if the patient needs post-hospital services based on physician/advanced practitioner order or complex needs related to functional status, cognitive ability, or social support system  7/1/2024 2056 by Susanna Álvarez LPN  Outcome: Progressing  7/1/2024 1000 by Susanna Álvarez LPN  Outcome: Progressing     Problem: Knowledge Deficit  Goal: Patient/family/caregiver demonstrates understanding of disease process, treatment plan, medications, and discharge instructions  Description: Complete learning assessment and assess knowledge base.  Interventions:  - Provide teaching at level of understanding  - Provide teaching via preferred learning methods  7/1/2024 2056 by Susanna Álvarez LPN  Outcome: Progressing  7/1/2024 1000 by Susanna Álvarez LPN  Outcome: Progressing     Problem: GENITOURINARY - ADULT  Goal: Maintains or returns to baseline urinary function  Description: INTERVENTIONS:  - Assess urinary function bladder scan pvr  - Encourage oral fluids to ensure adequate hydration if ordered  - Administer IV fluids as ordered to ensure adequate hydration  - Administer ordered medications as needed  - Offer frequent toileting  - Follow urinary retention protocol if ordered  7/1/2024 2056 by Susanna Álvarez  LPN  Outcome: Progressing  7/1/2024 1000 by Susanna Álvarez LPN  Outcome: Progressing     Problem: SKIN/TISSUE INTEGRITY - ADULT  Goal: Skin Integrity remains intact(Skin Breakdown Prevention)  Description: Assess:  -Perform Zafar assessment every shift  -Clean and moisturize skin every shift  -Inspect skin when repositioning, toileting, and assisting with ADLS  -Assess under medical devices such as mepilex every shift  -Assess extremities for adequate circulation and sensation     Bed Management:  -Have minimal linens on bed & keep smooth, unwrinkled  -Change linens as needed when moist or perspiring  -Avoid sitting or lying in one position for more than 2 hours while in bed  -Keep HOB at 30degrees     Toileting:  -Offer bedside commode  -Assess for incontinence every shift  -Use incontinent care products after each incontinent episode such as wipes    Activity:  -Mobilize patient 3 times a day  -Encourage activity and walks on unit  -Encourage or provide ROM exercises   -Turn and reposition patient every 2 Hours  -Use appropriate equipment to lift or move patient in bed  -Instruct/ Assist with weight shifting every 1 when out of bed in chair  -Consider limitation of chair time 1 hour intervals    Skin Care:  -Avoid use of baby powder, tape, friction and shearing, hot water or constrictive clothing  -Relieve pressure over bony prominences using pillows  -Do not massage red bony areas    Next Steps:  -Teach patient strategies to minimize risks such as    -Consider consults to  interdisciplinary teams such as   7/1/2024 2056 by Susanna Álvarez LPN  Outcome: Progressing  7/1/2024 1000 by Susanna Álvarez LPN  Outcome: Progressing     Problem: Prexisting or High Potential for Compromised Skin Integrity  Goal: Skin integrity is maintained or improved  Description: INTERVENTIONS:  - Identify patients at risk for skin breakdown  - Assess and monitor skin integrity  - Assess and monitor nutrition and hydration status  -  Monitor labs   - Assess for incontinence   - Turn and reposition patient  - Assist with mobility/ambulation  - Relieve pressure over bony prominences  - Avoid friction and shearing  - Provide appropriate hygiene as needed including keeping skin clean and dry  - Evaluate need for skin moisturizer/barrier cream  - Collaborate with interdisciplinary team   - Patient/family teaching  - Consider wound care consult   7/1/2024 2056 by Susanna Álvarez LPN  Outcome: Progressing  7/1/2024 1000 by Susanna Álvarez LPN  Outcome: Progressing     Problem: NEUROSENSORY - ADULT  Goal: Achieves stable or improved neurological status  Description: INTERVENTIONS  - Monitor and report changes in neurological status  - Monitor vital signs such as temperature, blood pressure, glucose, and any other labs ordered   - Initiate measures to prevent increased intracranial pressure  - Monitor for seizure activity and implement precautions if appropriate      7/1/2024 2056 by Susanna Álvarez LPN  Outcome: Progressing  7/1/2024 1000 by Susanna Álvarez LPN  Outcome: Progressing  Goal: Remains free of injury related to seizures activity  Description: INTERVENTIONS  - Maintain airway, patient safety  and administer oxygen as ordered  - Monitor patient for seizure activity, document and report duration and description of seizure to physician/advanced practitioner  - If seizure occurs,  ensure patient safety during seizure  - Reorient patient post seizure  - Seizure pads on all 4 side rails  - Instruct patient/family to notify RN of any seizure activity including if an aura is experienced  - Instruct patient/family to call for assistance with activity based on nursing assessment  - Administer anti-seizure medications if ordered    7/1/2024 2056 by Susanna Álvarez LPN  Outcome: Progressing  7/1/2024 1000 by Susanna Álvarez LPN  Outcome: Progressing  Goal: Achieves maximal functionality and self care  Description: INTERVENTIONS  - Monitor  swallowing and airway patency with patient fatigue and changes in neurological status  - Encourage and assist patient to increase activity and self care. Encourage patient tofeed herself  - Encourage visually impaired, hearing impaired and aphasic patients to use assistive/communication devices  7/1/2024 2056 by Susanna Álvarez LPN  Outcome: Progressing  7/1/2024 1000 by Susanna Álvarez LPN  Outcome: Progressing

## 2024-07-03 LAB — VALPROATE FREE SERPL-MCNC: 3.8 UG/ML (ref 6–22)

## 2024-07-25 PROBLEM — N39.0 URINARY TRACT INFECTION WITHOUT HEMATURIA: Status: RESOLVED | Noted: 2024-06-02 | Resolved: 2024-07-25

## 2024-07-31 ENCOUNTER — DOCTOR'S OFFICE (OUTPATIENT)
Dept: URBAN - NONMETROPOLITAN AREA CLINIC 1 | Facility: CLINIC | Age: 69
Setting detail: OPHTHALMOLOGY
End: 2024-07-31
Payer: MEDICARE

## 2024-07-31 DIAGNOSIS — H25.12: ICD-10-CM

## 2024-07-31 DIAGNOSIS — H25.11: ICD-10-CM

## 2024-07-31 DIAGNOSIS — E11.9: ICD-10-CM

## 2024-07-31 DIAGNOSIS — H35.033: ICD-10-CM

## 2024-07-31 PROCEDURE — 99214 OFFICE O/P EST MOD 30 MIN: CPT | Performed by: OPHTHALMOLOGY

## 2024-08-18 ENCOUNTER — APPOINTMENT (EMERGENCY)
Dept: RADIOLOGY | Facility: HOSPITAL | Age: 69
End: 2024-08-18
Payer: MEDICARE

## 2024-08-18 ENCOUNTER — HOSPITAL ENCOUNTER (EMERGENCY)
Facility: HOSPITAL | Age: 69
Discharge: HOME | End: 2024-08-18
Attending: EMERGENCY MEDICINE | Admitting: EMERGENCY MEDICINE
Payer: MEDICARE

## 2024-08-18 ENCOUNTER — APPOINTMENT (EMERGENCY)
Dept: CT IMAGING | Facility: HOSPITAL | Age: 69
End: 2024-08-18
Payer: MEDICARE

## 2024-08-18 VITALS
HEIGHT: 60 IN | BODY MASS INDEX: 29.56 KG/M2 | OXYGEN SATURATION: 98 % | WEIGHT: 150.57 LBS | DIASTOLIC BLOOD PRESSURE: 78 MMHG | RESPIRATION RATE: 16 BRPM | HEART RATE: 68 BPM | TEMPERATURE: 96.8 F | SYSTOLIC BLOOD PRESSURE: 102 MMHG

## 2024-08-18 DIAGNOSIS — N39.0 UTI (URINARY TRACT INFECTION): ICD-10-CM
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
## 2024-08-18 LAB
ANION GAP SERPL CALCULATED.3IONS-SCNC: 5 MMOL/L (ref 4–13)
APTT PPP: 20 SECONDS (ref 23–34)
BACTERIA UR QL AUTO: ABNORMAL /HPF
BASOPHILS # BLD AUTO: 0.01 THOUSANDS/ÂΜL (ref 0–0.1)
BASOPHILS NFR BLD AUTO: 0 % (ref 0–1)
BILIRUB UR QL STRIP: NEGATIVE
BUN SERPL-MCNC: 16 MG/DL (ref 5–25)
CALCIUM SERPL-MCNC: 10.1 MG/DL (ref 8.4–10.2)
CARDIAC TROPONIN I PNL SERPL HS: 5 NG/L
CHLORIDE SERPL-SCNC: 97 MMOL/L (ref 96–108)
CK SERPL-CCNC: 34 U/L (ref 26–192)
CLARITY UR: CLEAR
CO2 SERPL-SCNC: 30 MMOL/L (ref 21–32)
COLOR UR: YELLOW
CREAT SERPL-MCNC: 0.58 MG/DL (ref 0.6–1.3)
EOSINOPHIL # BLD AUTO: 0.05 THOUSAND/ÂΜL (ref 0–0.61)
EOSINOPHIL NFR BLD AUTO: 2 % (ref 0–6)
ERYTHROCYTE [DISTWIDTH] IN BLOOD BY AUTOMATED COUNT: 15.9 % (ref 11.6–15.1)
GFR SERPL CREATININE-BSD FRML MDRD: 93 ML/MIN/1.73SQ M
GLUCOSE SERPL-MCNC: 86 MG/DL (ref 65–140)
GLUCOSE UR STRIP-MCNC: NEGATIVE MG/DL
HCT VFR BLD AUTO: 34.3 % (ref 34.8–46.1)
HGB BLD-MCNC: 10.9 G/DL (ref 11.5–15.4)
HGB UR QL STRIP.AUTO: NEGATIVE
IMM GRANULOCYTES # BLD AUTO: 0.01 THOUSAND/UL (ref 0–0.2)
IMM GRANULOCYTES NFR BLD AUTO: 0 % (ref 0–2)
INR PPP: 1.02 (ref 0.85–1.19)
KETONES UR STRIP-MCNC: NEGATIVE MG/DL
LEUKOCYTE ESTERASE UR QL STRIP: ABNORMAL
LYMPHOCYTES # BLD AUTO: 0.89 THOUSANDS/ÂΜL (ref 0.6–4.47)
LYMPHOCYTES NFR BLD AUTO: 27 % (ref 14–44)
MCH RBC QN AUTO: 27.8 PG (ref 26.8–34.3)
MCHC RBC AUTO-ENTMCNC: 31.8 G/DL (ref 31.4–37.4)
MCV RBC AUTO: 88 FL (ref 82–98)
MONOCYTES # BLD AUTO: 0.28 THOUSAND/ÂΜL (ref 0.17–1.22)
MONOCYTES NFR BLD AUTO: 8 % (ref 4–12)
NEUTROPHILS # BLD AUTO: 2.08 THOUSANDS/ÂΜL (ref 1.85–7.62)
NEUTS SEG NFR BLD AUTO: 63 % (ref 43–75)
NITRITE UR QL STRIP: NEGATIVE
NON-SQ EPI CELLS URNS QL MICRO: ABNORMAL /HPF
NRBC BLD AUTO-RTO: 0 /100 WBCS
PH UR STRIP.AUTO: 6.5 [PH]
PLATELET # BLD AUTO: 170 THOUSANDS/UL (ref 149–390)
PMV BLD AUTO: 9.2 FL (ref 8.9–12.7)
POTASSIUM SERPL-SCNC: 4.6 MMOL/L (ref 3.5–5.3)
PROT UR STRIP-MCNC: NEGATIVE MG/DL
PROTHROMBIN TIME: 13.8 SECONDS (ref 12.3–15)
RBC # BLD AUTO: 3.92 MILLION/UL (ref 3.81–5.12)
RBC #/AREA URNS AUTO: ABNORMAL /HPF
SODIUM SERPL-SCNC: 132 MMOL/L (ref 135–147)
SP GR UR STRIP.AUTO: <=1.005 (ref 1–1.03)
UROBILINOGEN UR QL STRIP.AUTO: 0.2 E.U./DL
WBC # BLD AUTO: 3.32 THOUSAND/UL (ref 4.31–10.16)
WBC #/AREA URNS AUTO: ABNORMAL /HPF

## 2024-08-18 PROCEDURE — 80048 BASIC METABOLIC PNL TOTAL CA: CPT | Performed by: PHYSICIAN ASSISTANT

## 2024-08-18 PROCEDURE — 70450 CT HEAD/BRAIN W/O DYE: CPT

## 2024-08-18 PROCEDURE — 99285 EMERGENCY DEPT VISIT HI MDM: CPT

## 2024-08-18 PROCEDURE — 84484 ASSAY OF TROPONIN QUANT: CPT | Performed by: PHYSICIAN ASSISTANT

## 2024-08-18 PROCEDURE — 71045 X-RAY EXAM CHEST 1 VIEW: CPT

## 2024-08-18 PROCEDURE — 72125 CT NECK SPINE W/O DYE: CPT

## 2024-08-18 PROCEDURE — 71260 CT THORAX DX C+: CPT

## 2024-08-18 PROCEDURE — 85610 PROTHROMBIN TIME: CPT | Performed by: PHYSICIAN ASSISTANT

## 2024-08-18 PROCEDURE — 99285 EMERGENCY DEPT VISIT HI MDM: CPT | Performed by: EMERGENCY MEDICINE

## 2024-08-18 PROCEDURE — 74177 CT ABD & PELVIS W/CONTRAST: CPT

## 2024-08-18 PROCEDURE — 82550 ASSAY OF CK (CPK): CPT | Performed by: PHYSICIAN ASSISTANT

## 2024-08-18 PROCEDURE — 81001 URINALYSIS AUTO W/SCOPE: CPT | Performed by: PHYSICIAN ASSISTANT

## 2024-08-18 PROCEDURE — 93005 ELECTROCARDIOGRAM TRACING: CPT

## 2024-08-18 PROCEDURE — 85730 THROMBOPLASTIN TIME PARTIAL: CPT | Performed by: PHYSICIAN ASSISTANT

## 2024-08-18 PROCEDURE — 36415 COLL VENOUS BLD VENIPUNCTURE: CPT | Performed by: PHYSICIAN ASSISTANT

## 2024-08-18 PROCEDURE — 85025 COMPLETE CBC W/AUTO DIFF WBC: CPT | Performed by: PHYSICIAN ASSISTANT

## 2024-08-18 PROCEDURE — 72170 X-RAY EXAM OF PELVIS: CPT

## 2024-08-18 RX ORDER — CEPHALEXIN 500 MG/1
500 CAPSULE ORAL EVERY 12 HOURS SCHEDULED
Qty: 10 CAPSULE | Refills: 0 | Status: SHIPPED | OUTPATIENT
Start: 2024-08-18 | End: 2024-08-23

## 2024-08-18 RX ADMIN — IOHEXOL 100 ML: 350 INJECTION, SOLUTION INTRAVENOUS at 10:29

## 2024-08-18 NOTE — DISCHARGE INSTRUCTIONS
I recommend taking antibiotics as prescribed.  Follow-up with family doctor.  Use Tylenol and Motrin as needed for discomforts.  Return with new or worsening symptoms  TRAUMA - CT chest abdomen pelvis w contrast   Final Result   No acute intra-abdominal or intrathoracic injury.   No acute fracture or dislocation.   Mild urothelial thickening and hyperenhancement in the bladder and right collecting system. Correlate to exclude symptoms of UTI.   Chronic pulmonary findings, as above.                  Workstation performed: [id number]         TRAUMA - CT head wo contrast   Final Result      No acute intracranial abnormality.   Unchanged chronic intracranial findings.                  Workstation performed: [id number]         TRAUMA - CT spine cervical wo contrast   Final Result   No cervical spine fracture or traumatic malalignment.                  Workstation performed: [id number]         XR Trauma chest portable   Final Result      No acute cardiopulmonary disease.            Workstation performed: [id number]         XR Trauma pelvis ap only 1 or 2 vw   Final Result      No acute osseous abnormality.         Computerized Assisted Algorithm (CAA) may have been used to analyze all applicable images.         Workstation performed: [id number]

## 2024-08-18 NOTE — ED PROVIDER NOTES
Emergency Department Trauma Note  Denita Vital 68 y.o. female MRN: [id number]  Unit/Bed#: ED 06/ED 06 Encounter: [id number]      Trauma Alert: Trauma Acuity: Trauma Evaluation  Model of Arrival: Mode of Arrival: BLS via    Trauma Team: Current Providers  Attending Provider: Wade Penn MD  Physician Assistant: La Nena Doan PA-C  Registered Nurse: Yamila Galeano RN  Registered Nurse: Vicente Greene RN  Consultants:     None      History of Present Illness     Chief Complaint:   Chief Complaint   Patient presents with    Fall     Pt presented to this ED via EMS from group home staff reports unwitnessed fall in bathroom this morning. Unknown h/s/loc. +asa Pt non-verbal     HPI:  Denita Vital is a 68 y.o. female who presents with fall.  Mechanism:Details of Incident: pt from group home per staff , unwitnessed fall in bathroom found in supine position. +asa, unknown h/s, loc. pt non-verbal Injury Date: 08/18/24 Injury Time: 0857 Injury Occurence Location - Specify County: [facility]    68-year-old female presents to the emergency department from local group [place] for evaluation of unwitnessed fall.  Patient is nonverbal at baseline.  Very limited history provided by EMS.  Patient was found down, unknown downtime.  Unknown head strike.  Unsure if patient walks at baseline.  EMS reported no blood thinners however per chart review patient appears to take baby aspirin.  Trauma evaluation was called.  Patient's head appears atraumatic, normal ROM of the neck there was some bruising on the abdomen.      Review of Systems   Unable to perform ROS: Patient nonverbal       Historical Information     Immunizations:   Immunization History   Administered Date(s) Administered    COVID-19 MODERNA VACC 0.5 ML IM 02/22/2021, 03/22/2021, 03/18/2022    H1N1, All Formulations 12/02/2009    INFLUENZA 10/12/2016, 09/20/2017, 10/10/2018, 10/02/2019, 10/06/2020, 10/18/2021, 10/19/2022, 10/20/2023    Influenza, Seasonal  Vaccine, Quadrivalent, Adjuvanted, .5e 10/06/2020, 10/18/2021, 10/19/2022    Pneumococcal Conjugate 13-Valent 10/02/2019    Pneumococcal Polysaccharide PPV23 10/18/2021    Tdap 08/03/2018    Tuberculin Skin Test-PPD Intradermal 05/07/2004, 01/22/2018, 01/15/2020, 05/24/2022, 06/01/2022, 06/05/2024       Past Medical History:   Diagnosis Date    Anxiety     CHF (congestive heart failure) (Formerly [facility])     Diabetes mellitus (Formerly [facility])     GERD (gastroesophageal reflux disease)     Hyperlipidemia     Hypertension     Leukopenia     Mental disability     Mixed incontinence 04/12/2017    Seizure disorder (Formerly [facility])        Family History   Problem Relation Age of Onset    No Known Problems Mother     No Known Problems Father     No Known Problems Sister     No Known Problems Maternal Grandmother     No Known Problems Maternal Grandfather     No Known Problems Paternal Grandmother     No Known Problems Paternal Grandfather      Past Surgical History:   Procedure Laterality Date    DENTAL SURGERY       Social History     Tobacco Use    Smoking status: Never    Smokeless tobacco: Never   Vaping Use    Vaping status: Never Used   Substance Use Topics    Alcohol use: Not Currently    Drug use: Not Currently     E-Cigarette/Vaping    E-Cigarette Use Never User      E-Cigarette/Vaping Substances    Nicotine No     THC No     CBD No     Flavoring No     Other No     Unknown No        Family History: non-contributory    Meds/Allergies   Prior to Admission Medications   Prescriptions Last Dose Informant Patient Reported? Taking?   Cranberry 450 MG CAPS   No No   Sig: Take 1 capsule (450 mg total) by mouth in the morning   Diapers & Supplies MISC   Yes No   Sig: Use as directed.   Incontinence Supply Disposable (FQ Pant Liner) MISC   Yes No   LORazepam (ATIVAN) 0.5 mg tablet   Yes No   Sig: Take 0.5 mg by mouth in the morning and 0.5 mg in the evening.   Lancets 33G MISC   Yes No   Sig: daily   QUEtiapine (SEROquel) 100 mg tablet   Yes No   Sig: Take  100 mg by mouth in the morning and 100 mg in the evening. 8am, 4pm.   QUEtiapine (SEROquel) 200 mg tablet   Yes No   Sig: Take 200 mg by mouth daily at bedtime   aspirin (ECOTRIN LOW STRENGTH) 81 mg EC tablet   Yes No   Sig: Take 81 mg by mouth daily   bisacodyl (DULCOLAX) 5 mg EC tablet   Yes No   Sig: Take 5 mg by mouth daily as needed for constipation   cholecalciferol (VITAMIN D3) 250 MCG (51784 UT) capsule   No No   Sig: Take 5 capsules (50,000 Units total) by mouth once a week On saturday   divalproex sodium (DEPAKOTE) 250 mg EC tablet   Yes No   Sig: Take 250 mg by mouth in the morning 1 tab AM (8am)   divalproex sodium (DEPAKOTE) 500 mg EC tablet   Yes No   Sig: Take 500 mg by mouth daily after dinner   docusate sodium (COLACE) 100 mg capsule  Outside Facility (Specify) Yes No   Sig: Take 100 mg by mouth in the morning and 100 mg in the evening.   ergocalciferol (VITAMIN D2) 50,000 units   Yes No   Sig: Take 50,000 Units by mouth once a week   ferrous sulfate 325 (65 Fe) mg tablet   No No   Sig: Take 1 tablet (325 mg total) by mouth daily with breakfast   gabapentin (NEURONTIN) 800 mg tablet   Yes No   Sig: Take 800 mg by mouth 3 (three) times a day   levothyroxine 75 mcg tablet   Yes No   Sig: Take 75 mcg by mouth daily in the early morning   memantine (NAMENDA) 5 mg tablet   Yes No   Sig: Take 5 mg by mouth daily   methenamine hippurate (HIPREX) 1 g tablet   Yes No   Sig: Take 1 g by mouth 2 (two) times a day with meals   nystatin powder   Yes No   Sig: Apply 1 Application topically 2 (two) times a day   omeprazole (PriLOSEC) 20 mg delayed release capsule   Yes No   Sig: Take 20 mg by mouth daily   polyethylene glycol (MIRALAX) 17 g packet   No No   Sig: Take 17 g by mouth every other day as needed (if no bm in 3 days)   rosuvastatin (CRESTOR) 40 MG tablet   Yes No   Sig: Take 40 mg by mouth daily   senna (SENOKOT) 8.6 mg   No No   Sig: Take 1 tablet (8.6 mg total) by mouth daily at bedtime   sertraline  (ZOLOFT) 100 mg tablet   Yes No   Sig: Take 100 mg by mouth daily   tolterodine (DETROL LA) 4 mg 24 hr capsule   No No   Sig: Take 1 capsule (4 mg total) by mouth daily   torsemide (DEMADEX) 5 MG tablet   No No   Sig: Take 1 tablet (5 mg total) by mouth daily      Facility-Administered Medications: None       Allergies   Allergen Reactions    Actos [Pioglitazone] Other (See Comments)     unkown        PHYSICAL EXAM    PE limited by: none    Objective   Vitals:   First set: Temperature: (!) 96.8 °F (36 °C) (08/18/24 0950)  Pulse: 63 (08/18/24 0950)  Respirations: 18 (08/18/24 0950)  Blood Pressure: 147/63 (08/18/24 0950)  SpO2: 94 % (08/18/24 0949)    Primary Survey:   (A) Airway: intact  (B) Breathing: Spontaneously  (C) Circulation: Pulses:   normal  (D) Disabliity:  Eye Opening:   Spontaneous = 4, Motor Response: Localizes pain = 5, and Verbal Response:  None = 1 baseline  (E) Expose:  Completed    Secondary Survey: (Click on Physical Exam tab above)  Physical Exam  Vitals and nursing note reviewed.   Constitutional:       General: She is not in acute distress.     Appearance: Normal appearance. She is ill-appearing (chroincally ill apperaing). She is not toxic-appearing or diaphoretic.   HENT:      Head: Normocephalic.      Mouth/Throat:      Mouth: Mucous membranes are moist.   Eyes:      Conjunctiva/sclera: Conjunctivae normal.      Pupils: Pupils are equal, round, and reactive to light.   Cardiovascular:      Rate and Rhythm: Normal rate and regular rhythm.   Pulmonary:      Effort: Pulmonary effort is normal.      Breath sounds: Normal breath sounds. No stridor. No wheezing, rhonchi or rales.   Chest:      Chest wall: No tenderness.   Abdominal:      General: Abdomen is flat. There is no distension.      Palpations: Abdomen is soft.      Tenderness: There is no abdominal tenderness.      Comments: Some bruising on lower abdomen    Musculoskeletal:         General: Normal range of motion.      Cervical back:  Normal range of motion.   Skin:     General: Skin is warm and dry.      Findings: Bruising (lower abd) present.   Neurological:      General: No focal deficit present.      Mental Status: She is alert. Mental status is at baseline.      GCS: GCS eye subscore is 4. GCS verbal subscore is 1. GCS motor subscore is 5.      Comments: Patient at baseline, nonverbal         Cervical spine cleared by clinical criteria? No (imaging required)      Invasive Devices       Drain  Duration             Urethral Catheter Latex 16 Fr. 50 days                    Lab Results:   Results Reviewed       Procedure Component Value Units Date/Time    Urine Microscopic [[id number]]  (Abnormal) Collected: 08/18/24 1208    Lab Status: Final result Specimen: Urine, Other Updated: 08/18/24 1252     RBC, UA 0-1 /hpf      WBC, UA 4-10 /hpf      Epithelial Cells Occasional /hpf      Bacteria, UA Moderate /hpf     UA w Reflex to Microscopic w Reflex to Culture [[id number]]  (Abnormal) Collected: 08/18/24 1208    Lab Status: Final result Specimen: Urine, Other Updated: 08/18/24 1227     Color, UA Yellow     Clarity, UA Clear     Specific Gravity, UA <=1.005     pH, UA 6.5     Leukocytes, UA Moderate     Nitrite, UA Negative     Protein, UA Negative mg/dl      Glucose, UA Negative mg/dl      Ketones, UA Negative mg/dl      Urobilinogen, UA 0.2 E.U./dl      Bilirubin, UA Negative     Occult Blood, UA Negative    Protime-INR [[id number]]  (Normal) Collected: 08/18/24 1040    Lab Status: Final result Specimen: Blood from Arm, Right Updated: 08/18/24 1104     Protime 13.8 seconds      INR 1.02    Narrative:      INR Therapeutic Range    Indication                                             INR Range      Atrial Fibrillation                                               2.0-3.0  Hypercoagulable State                                    2.0.2.3  Left Ventricular Asist Device                            2.0-3.0  Mechanical Heart Valve                                   -    Aortic(with afib, MI, embolism, HF, LA enlargement,    and/or coagulopathy)                                     2.0-3.0 (2.5-3.5)     Mitral                                                             2.5-3.5  Prosthetic/Bioprosthetic Heart Valve               2.0-3.0  Venous thromboembolism (VTE: VT, PE        2.0-3.0    APTT [[id number]]  (Abnormal) Collected: 08/18/24 1040    Lab Status: Final result Specimen: Blood from Arm, Right Updated: 08/18/24 1104     PTT 20 seconds     CBC and differential [[id number]]  (Abnormal) Collected: 08/18/24 1040    Lab Status: Final result Specimen: Blood from Arm, Left Updated: 08/18/24 1045     WBC 3.32 Thousand/uL      RBC 3.92 Million/uL      Hemoglobin 10.9 g/dL      Hematocrit 34.3 %      MCV 88 fL      MCH 27.8 pg      MCHC 31.8 g/dL      RDW 15.9 %      MPV 9.2 fL      Platelets 170 Thousands/uL      nRBC 0 /100 WBCs      Segmented % 63 %      Immature Grans % 0 %      Lymphocytes % 27 %      Monocytes % 8 %      Eosinophils Relative 2 %      Basophils Relative 0 %      Absolute Neutrophils 2.08 Thousands/µL      Absolute Immature Grans 0.01 Thousand/uL      Absolute Lymphocytes 0.89 Thousands/µL      Absolute Monocytes 0.28 Thousand/µL      Eosinophils Absolute 0.05 Thousand/µL      Basophils Absolute 0.01 Thousands/µL     HS Troponin 0hr (reflex protocol) [[id number]]  (Normal) Collected: 08/18/24 1009    Lab Status: Final result Specimen: Blood from Arm, Left Updated: 08/18/24 1037     hs TnI 0hr 5 ng/L     Basic metabolic panel [[id number]]  (Abnormal) Collected: 08/18/24 1009    Lab Status: Final result Specimen: Blood from Arm, Left Updated: 08/18/24 1028     Sodium 132 mmol/L      Potassium 4.6 mmol/L      Chloride 97 mmol/L      CO2 30 mmol/L      ANION GAP 5 mmol/L      BUN 16 mg/dL      Creatinine 0.58 mg/dL      Glucose 86 mg/dL      Calcium 10.1 mg/dL      eGFR 93 ml/min/1.73sq m     Narrative:      National Kidney Disease Foundation guidelines for  Chronic Kidney Disease (CKD):     Stage 1 with normal or high GFR (GFR > 90 mL/min/1.73 square meters)    Stage 2 Mild CKD (GFR = 60-89 mL/min/1.73 square meters)    Stage 3A Moderate CKD (GFR = 45-59 mL/min/1.73 square meters)    Stage 3B Moderate CKD (GFR = 30-44 mL/min/1.73 square meters)    Stage 4 Severe CKD (GFR = 15-29 mL/min/1.73 square meters)    Stage 5 End Stage CKD (GFR <15 mL/min/1.73 square meters)  Note: GFR calculation is accurate only with a steady state creatinine    CK [[id number]]  (Normal) Collected: 08/18/24 1009    Lab Status: Final result Specimen: Blood from Arm, Left Updated: 08/18/24 1028     Total CK 34 U/L                    Imaging Studies:   Direct to CT: No  TRAUMA - CT chest abdomen pelvis w contrast   Final Result by Kavitha Hurst MD (08/18 1118)   No acute intra-abdominal or intrathoracic injury.   No acute fracture or dislocation.   Mild urothelial thickening and hyperenhancement in the bladder and right collecting system. Correlate to exclude symptoms of UTI.   Chronic pulmonary findings, as above.                  Workstation performed: [id number]         TRAUMA - CT head wo contrast   Final Result by Kavitha Hurst MD (08/18 1048)      No acute intracranial abnormality.   Unchanged chronic intracranial findings.                  Workstation performed: [id number]         TRAUMA - CT spine cervical wo contrast   Final Result by Kavitha Hurst MD (08/18 1052)   No cervical spine fracture or traumatic malalignment.                  Workstation performed: [id number]         XR Trauma chest portable   Final Result by Kavitha Hurst MD (08/18 1119)      No acute cardiopulmonary disease.            Workstation performed: [id number]         XR Trauma pelvis ap only 1 or 2 vw   Final Result by Kavitha Hurst MD (08/18 1120)      No acute osseous abnormality.         Computerized Assisted Algorithm (CAA) may have been used to analyze all applicable images.         Workstation  performed: [id number]               Procedures  ECG 12 Lead Documentation Only    Date/Time: 8/18/2024 9:57 AM    Performed by: aL Nena Doan PA-C  Authorized by: La Nena Doan PA-C    Indications / Diagnosis:  Fall  Patient location:  ED  Rate:     ECG rate:  63    ECG rate assessment: normal    Rhythm:     Rhythm: sinus rhythm and A-V block    Ectopy:     Ectopy: none    QRS:     QRS axis:  Normal    QRS intervals:  Normal  Conduction:     Conduction: normal             ED Course  ED Course as of 08/18/24 1552   Sun Aug 18, 2024   1020 Trauma evaluation was called on patient's arrival.  ED interpretation of pelvic x-ray negative for acute fracture ED interpretation of chest x-ray negative for acute traumatic injury   1054 CT head: No acute intracranial abnormality.  Unchanged chronic intracranial findings.     1055 CT cspine: No cervical spine fracture or traumatic malalignment.   1055 Cervical Collar Clearance:    The patient had a CT scan of the cervical spine demonstrating no acute injury. On exam, the patient had no midline point tenderness or paresthesias/numbness/weakness in the extremities. The patient had full range of motion (was then able to flex, extend, and rotate head laterally) without pain. There were no distracting injuries and the patient was not intoxicated.      The patient's cervical spine was cleared radiologically and clinically. Cervical collar removed at this time.     La Nena Doan PA-C  8/18/2024 10:56 AM          1056 Patient continues to rest, appears comfortable.  Updated on results and findings.  Caregiver has not arrived   1124 CT chest, abd, pelvis: No acute intra-abdominal or intrathoracic injury.  No acute fracture or dislocation.  Mild urothelial thickening and hyperenhancement in the bladder and right collecting system. Correlate to exclude symptoms of UTI.  Chronic pulmonary findings, as above.       1241 UA concerning for UTI.  Due to CT findings will give antibiotics.   Patient is clinically hemodynamically stable for discharge   1300 Caretaker now at bedside, discussed all results and findings including symptomatic care and return precautions and follow-up and caretaker verbalized understanding.  Patient was clinically and hemodynamically stable for discharge           Medical Decision Making  68-year-old, nonverbal female presented to the emergency department from local group home after being found down.  Vitals and medical record reviewed.  Patient at risk for the following but not limited to fracture, contusion, intracranial bleed, organ injury, rhabdomyolysis.  CT imaging negative for acute traumatic injury.  CK normal, lower concern for rhabdo.  Patient's urine was concerning for UTI and she was started on oral antibiotics.  Cussed all results and findings with group home caretaker including return precautions and follow-up and she verbalized understanding.  Patient was clinically and hemodynamically stable for discharge    Problems Addressed:  Fall, initial encounter: acute illness or injury  UTI (urinary tract infection): acute illness or injury    Amount and/or Complexity of Data Reviewed  Independent Historian: caregiver and EMS  Labs: ordered.  Radiology: ordered.    Risk  Prescription drug management.                Disposition  [facility] Transfer: No  Final diagnoses:   Fall, initial encounter   UTI (urinary tract infection)     Time reflects when diagnosis was documented in both MDM as applicable and the Disposition within this note       Time User Action Codes Description Comment    8/18/2024 12:42 PM La Nena Doan Add [W19.XXXA] Fall, initial encounter     8/18/2024 12:42 PM La Nena Doan Add [N39.0] UTI (urinary tract infection)           ED Disposition       ED Disposition   Discharge    Condition   Stable    Date/Time   Sun Aug 18, 2024 12:42 PM    Comment   Denita Vital discharge to home/self care.                   Follow-up Information       Follow  up With Specialties Details Why Contact Info    Soy Clemente MD    [address] [place] [address]  [place] [address] [phone]            Discharge Medication List as of 8/18/2024 12:43 PM        START taking these medications    Details   cephalexin (KEFLEX) 500 mg capsule Take 1 capsule (500 mg total) by mouth every 12 (twelve) hours for 5 days, Starting Sun 8/18/2024, Until Fri 8/23/2024, Normal           CONTINUE these medications which have NOT CHANGED    Details   aspirin (ECOTRIN LOW STRENGTH) 81 mg EC tablet Take 81 mg by mouth daily, Historical Med      bisacodyl (DULCOLAX) 5 mg EC tablet Take 5 mg by mouth daily as needed for constipation, Historical Med      cholecalciferol (VITAMIN D3) 250 MCG (25154 UT) capsule Take 5 capsules (50,000 Units total) by mouth once a week On saturday, Starting Thu 8/24/2023, No Print      Cranberry 450 MG CAPS Take 1 capsule (450 mg total) by mouth in the morning, Starting Thu 8/24/2023, No Print      Diapers & Supplies MISC Use as directed., Historical Med      !! divalproex sodium (DEPAKOTE) 250 mg EC tablet Take 250 mg by mouth in the morning 1 tab AM (8am), Historical Med      !! divalproex sodium (DEPAKOTE) 500 mg EC tablet Take 500 mg by mouth daily after dinner, Historical Med      docusate sodium (COLACE) 100 mg capsule Take 100 mg by mouth in the morning and 100 mg in the evening., Historical Med      ergocalciferol (VITAMIN D2) 50,000 units Take 50,000 Units by mouth once a week, Starting Sat 6/22/2024, Historical Med      ferrous sulfate 325 (65 Fe) mg tablet Take 1 tablet (325 mg total) by mouth daily with breakfast, Starting Wed 7/3/2024, No Print      gabapentin (NEURONTIN) 800 mg tablet Take 800 mg by mouth 3 (three) times a day, Starting Thu 5/12/2022, Historical Med      Incontinence Supply Disposable (FQ Pant Liner) MISC Starting Sat 5/14/2022, Historical Med      Lancets 33G MISC daily, Starting Thu 5/12/2022, Historical Med      levothyroxine 75 mcg  tablet Take 75 mcg by mouth daily in the early morning, Starting Tue 6/4/2024, Historical Med      LORazepam (ATIVAN) 0.5 mg tablet Take 0.5 mg by mouth in the morning and 0.5 mg in the evening., Historical Med      memantine (NAMENDA) 5 mg tablet Take 5 mg by mouth daily, Starting Sat 6/8/2024, Historical Med      methenamine hippurate (HIPREX) 1 g tablet Take 1 g by mouth 2 (two) times a day with meals, Starting Sun 6/16/2024, Historical Med      nystatin powder Apply 1 Application topically 2 (two) times a day, Starting Mon 6/17/2024, Historical Med      omeprazole (PriLOSEC) 20 mg delayed release capsule Take 20 mg by mouth daily, Starting Tue 6/22/2021, Until Mon 6/3/2024, Historical Med      polyethylene glycol (MIRALAX) 17 g packet Take 17 g by mouth every other day as needed (if no bm in 3 days), Starting Thu 8/24/2023, No Print      !! QUEtiapine (SEROquel) 100 mg tablet Take 100 mg by mouth in the morning and 100 mg in the evening. 8am, 4pm., Historical Med      !! QUEtiapine (SEROquel) 200 mg tablet Take 200 mg by mouth daily at bedtime, Historical Med      rosuvastatin (CRESTOR) 40 MG tablet Take 40 mg by mouth daily, Historical Med      senna (SENOKOT) 8.6 mg Take 1 tablet (8.6 mg total) by mouth daily at bedtime, Starting Wed 8/11/2021, No Print      sertraline (ZOLOFT) 100 mg tablet Take 100 mg by mouth daily, Starting Tue 6/11/2024, Historical Med      tolterodine (DETROL LA) 4 mg 24 hr capsule Take 1 capsule (4 mg total) by mouth daily, Starting Thu 8/24/2023, No Print      torsemide (DEMADEX) 5 MG tablet Take 1 tablet (5 mg total) by mouth daily, Starting Thu 8/24/2023, Until Tue 6/25/2024, Normal       !! - Potential duplicate medications found. Please discuss with provider.        No discharge procedures on file.    PDMP Review         Value Time User    PDMP Reviewed  Yes 6/28/2024  2:13 PM Joey Miranda MD            ED Provider  Electronically Signed by           La Nena Doan,  CARLOS  08/18/24 1551

## 2024-08-18 NOTE — ED ATTENDING ATTESTATION
8/18/2024  I, Wade Penn MD, saw and evaluated the patient. I have discussed the patient with the resident/non-physician practitioner and agree with the resident's/non-physician practitioner's findings, Plan of Care, and MDM as documented in the resident's/non-physician practitioner's note, except where noted. All available labs and Radiology studies were reviewed.  I was present for key portions of any procedure(s) performed by the resident/non-physician practitioner and I was immediately available to provide assistance.       At this point I agree with the current assessment done in the Emergency Department.  I have conducted an independent evaluation of this patient a history and physical is as follows:    ED Course     Patient is nonverbal.  Unwitnessed fall from group home.    On arrival here the patient is awake.  Making eye contact.  No distress.  Lungs are clear to auscultation.  Abdomen soft and nontender good bowel sounds.  Full range of motion of all 4 extremities.    Critical Care Time  Procedures

## 2024-08-19 LAB
ATRIAL RATE: 63 BPM
P AXIS: 77 DEGREES
PR INTERVAL: 210 MS
QRS AXIS: 69 DEGREES
QRSD INTERVAL: 104 MS
QT INTERVAL: 410 MS
QTC INTERVAL: 419 MS
T WAVE AXIS: 84 DEGREES
VENTRICULAR RATE: 63 BPM

## 2024-10-05 NOTE — PROGRESS NOTES
[facility]  Progress Note  Name: Denita Vital I  MRN: [id number]  Unit/Bed#: MS 334Derek I Date of Admission: 6/25/2024   Date of Service: 6/28/2024 I Hospital Day: 3    Assessment & Plan   Positive blood culture  Assessment & Plan  1 set of blood culture is growing Staph aureus, coagulase-negative, second set of blood cultures still remain pending  Patient remain on ertapenem, continue and monitor blood culture report.  Continue to monitor for full report at least 48 hours of blood culture-based on report, contaminated.    Urinary tract infection without hematuria  Assessment & Plan  Recently diagnosed with UTI-urine culture revealing Proteus ESBL  Has been being treated with amoxicillin and doxycycline  There is no in chart phone call made from the ER to group home to instruct to switch antibiotics, group home states they had not received the call    Antibiotic adjusted to ertapenem based on last urine culture.  Urine culture is growing Proteus mirabilis, previous urine culture was similar-complaining 3 days of ertapenem.    * Acute metabolic encephalopathy  Assessment & Plan  Presented to ED with increased lethargy  Baseline orientation/neuro status nonverbal, alert -usually active, mimics staff, able to feed herself, stands with assistance and uses a sit to stand   On clinical exam, patient returned to baseline.  Continue current treatment  Patient is not moving around, will place PT OT      Edema of upper extremity  Assessment & Plan  Had a history of infiltration  On palpation, patient has some nodularity, with bruise-suspect thrombophlebitis  Venous duplex negative for thrombophlebitis or DVT    Intellectual disability  Assessment & Plan  At baseline patient is nonverbal  Resident of long term    Seizure disorder (HCC)  Assessment & Plan  Maintained on Depakote  Valproic acid level is 40, ammonia level normal  Seizure precautions  Follow PT OT recommendation              VTE Pharmacologic Prophylaxis: VTE Score: 4 Moderate Risk (Score 3-4) - Pharmacological DVT Prophylaxis Ordered: enoxaparin (Lovenox).    Mobility:   Basic Mobility Inpatient Raw Score: 6  JH-HLM Goal: 2: Bed activities/Dependent transfer  JH-HLM Achieved: 2: Bed activities/Dependent transfer  JH-HLM Goal achieved. Continue to encourage appropriate mobility.    Patient Centered Rounds: I performed bedside rounds with nursing staff today.   Discussions with Specialists or Other Care Team Provider: None    Education and Discussions with Family / Patient: Updated  (group home) via phone.      Current Length of Stay: 3 day(s)  Current Patient Status: Inpatient   Certification Statement: The patient will continue to require additional inpatient hospital stay due to monitorable conditions  Discharge Plan: Anticipate discharge in 48-72 hrs to group home.    Code Status: Level 1 - Full Code    Subjective:   Seen and evaluated during the run.  No overnight issues.  Per nurse, patient has not moving around.  At baseline, patient is able to feed herself.  Patient also retaining urine    Objective:     Vitals:   Temp (24hrs), Av.2 °F (36.8 °C), Min:98.2 °F (36.8 °C), Max:98.2 °F (36.8 °C)    Temp:  [98.2 °F (36.8 °C)] 98.2 °F (36.8 °C)  HR:  [65-74] 65  Resp:  [17] 17  BP: (117-118)/(57-60) 117/57  SpO2:  [90 %-91 %] 91 %  Body mass index is 32.68 kg/m².     Input and Output Summary (last 24 hours):     Intake/Output Summary (Last 24 hours) at 2024 1508  Last data filed at 2024 1312  Gross per 24 hour   Intake 600 ml   Output 1700 ml   Net -1100 ml       Physical Exam:   Physical Exam  Vitals and nursing note reviewed.   Constitutional:       Appearance: She is not diaphoretic.   Eyes:      Extraocular Movements: Extraocular movements intact.      Pupils: Pupils are equal, round, and reactive to light.   Cardiovascular:      Rate and Rhythm: Normal rate.      Heart sounds: No murmur heard.      No friction rub. No gallop.   Pulmonary:      Effort: Pulmonary effort is normal. No respiratory distress.      Breath sounds: No stridor. No wheezing or rhonchi.   Musculoskeletal:         General: Swelling (upper extremities) present.   Neurological:      Mental Status: She is alert. Mental status is at baseline.          Additional Data:     Labs:  Results from last 7 days   Lab Units 06/28/24  0546   WBC Thousand/uL 4.14*   HEMOGLOBIN g/dL 9.1*   HEMATOCRIT % 29.1*   PLATELETS Thousands/uL 115*   SEGS PCT % 59   LYMPHO PCT % 30   MONO PCT % 9   EOS PCT % 1     Results from last 7 days   Lab Units 06/28/24  0546   SODIUM mmol/L 137   POTASSIUM mmol/L 4.9   CHLORIDE mmol/L 102   CO2 mmol/L 33*   BUN mg/dL 15   CREATININE mg/dL 0.51*   ANION GAP mmol/L 2*   CALCIUM mg/dL 9.7   ALBUMIN g/dL 2.9*   TOTAL BILIRUBIN mg/dL 0.29   ALK PHOS U/L 78   ALT U/L 101*   AST U/L 132*   GLUCOSE RANDOM mg/dL 88     Results from last 7 days   Lab Units 06/25/24  0955   INR  1.09     Results from last 7 days   Lab Units 06/28/24  1102 06/28/24  0708 06/27/24  2141 06/27/24  1543 06/27/24  1044 06/27/24  0730 06/26/24  2113 06/26/24  1524   POC GLUCOSE mg/dl 142* 97 82 104 131 97 95 120     Results from last 7 days   Lab Units 06/26/24  1401   HEMOGLOBIN A1C % 5.9*     Results from last 7 days   Lab Units 06/26/24  0517 06/25/24  0955   LACTIC ACID mmol/L  --  2.0   PROCALCITONIN ng/ml <0.05 <0.05       Lines/Drains:  Invasive Devices       Peripheral Intravenous Line  Duration             Peripheral IV 06/25/24 Left Antecubital 3 days    Peripheral IV 06/25/24 Dorsal (posterior);Right Forearm 2 days                          Imaging: No pertinent imaging reviewed.    Recent Cultures (last 7 days):   Results from last 7 days   Lab Units 06/25/24  1035 06/25/24  1022 06/25/24  0955   BLOOD CULTURE   --  No Growth at 48 hrs. Staphylococcus coagulase negative*   GRAM STAIN RESULT   --   --  Gram positive cocci in clusters*   URINE  CULTURE  <10,000 cfu/ml Proteus mirabilis ESBL*  --   --        Last 24 Hours Medication List:   Current Facility-Administered Medications   Medication Dose Route Frequency Provider Last Rate    acetaminophen  650 mg Oral Q6H PRN Ivana Montague PA-C      aspirin  81 mg Oral Daily Ivana Montague, CARLOS      atorvastatin  80 mg Oral Daily With Dinner Ivana Montague, CARLOS      bisacodyl  5 mg Oral Daily PRN Ivana Montague PA-C      divalproex sodium  250 mg Oral Daily Ivana Montague, CARLOS      divalproex sodium  500 mg Oral After Dinner Ivana Montague, CARLOS      docusate sodium  100 mg Oral BID Ivana Montague, CARLOS      enoxaparin  40 mg Subcutaneous Daily Ivana Montague PA-C      ertapenem  1,000 mg Intravenous Q24H Joey Miranda MD 1,000 mg (06/28/24 1124)    gabapentin  800 mg Oral TID Ivana Montague PA-C      insulin lispro  1-6 Units Subcutaneous TID AC Joey Miranda MD      insulin lispro  1-6 Units Subcutaneous HS Joey Miranda MD      levothyroxine  50 mcg Oral Early Morning Ivana Montague, CARLOS      LORazepam  0.5 mg Oral BID Ivana Montague, CARLOS      nystatin  1 Application Topical BID Ivana Montague PA-C      oxybutynin  10 mg Oral Daily Ivana Montague, CARLOS      pantoprazole  20 mg Oral Early Morning Ivana Montague PA-C      polyethylene glycol  17 g Oral Daily Ivana Montague PA-C      QUEtiapine  100 mg Oral BID Ivana Montague, CARLOS      QUEtiapine  200 mg Oral HS Ivana Montague, CARLOS      senna  8.6 mg Oral HS Ivana Montague, CARLOS      simethicone  80 mg Oral 4x Daily PRN Ivana Montague PA-C          Today, Patient Was Seen By: Joey Miranda MD    **Please Note: This note may have been constructed using a voice recognition system.**   pt emesis

## 2024-11-19 ENCOUNTER — APPOINTMENT (EMERGENCY)
Dept: CT IMAGING | Facility: HOSPITAL | Age: 69
DRG: 643 | End: 2024-11-19
Payer: MEDICARE

## 2024-11-19 ENCOUNTER — HOSPITAL ENCOUNTER (INPATIENT)
Facility: HOSPITAL | Age: 69
LOS: 3 days | Discharge: HOME | DRG: 643 | End: 2024-11-22
Attending: EMERGENCY MEDICINE | Admitting: FAMILY MEDICINE
Payer: MEDICARE

## 2024-11-19 ENCOUNTER — APPOINTMENT (EMERGENCY)
Dept: NON INVASIVE DIAGNOSTICS | Facility: HOSPITAL | Age: 69
DRG: 643 | End: 2024-11-19
Payer: MEDICARE

## 2024-11-19 DIAGNOSIS — G93.41 ACUTE METABOLIC ENCEPHALOPATHY: ICD-10-CM

## 2024-11-19 DIAGNOSIS — T68.XXXA HYPOTHERMIA, INITIAL ENCOUNTER: Primary | ICD-10-CM

## 2024-11-19 DIAGNOSIS — E87.1 HYPONATREMIA: ICD-10-CM

## 2024-11-19 DIAGNOSIS — N39.0 UTI (URINARY TRACT INFECTION): ICD-10-CM

## 2024-11-19 DIAGNOSIS — E83.42 HYPOMAGNESEMIA: ICD-10-CM

## 2024-11-19 DIAGNOSIS — G40.909 SEIZURE DISORDER (HCC): Chronic | ICD-10-CM

## 2024-11-19 DIAGNOSIS — R53.1 GENERALIZED WEAKNESS: ICD-10-CM

## 2024-11-19 DIAGNOSIS — J90 PLEURAL EFFUSION: ICD-10-CM

## 2024-11-19 DIAGNOSIS — J18.9 PNEUMONIA: ICD-10-CM

## 2024-11-19 PROBLEM — I27.20 PULMONARY HYPERTENSION (HCC): Status: ACTIVE | Noted: 2024-11-19

## 2024-11-19 PROBLEM — G91.2 NORMAL PRESSURE HYDROCEPHALUS (HCC): Status: ACTIVE | Noted: 2024-11-19

## 2024-11-19 LAB
ALBUMIN SERPL BCG-MCNC: 3.6 G/DL (ref 3.5–5)
ALP SERPL-CCNC: 62 U/L (ref 34–104)
ALT SERPL W P-5'-P-CCNC: 21 U/L (ref 7–52)
ANION GAP SERPL CALCULATED.3IONS-SCNC: 5 MMOL/L (ref 4–13)
APTT PPP: 28 SECONDS (ref 23–34)
AST SERPL W P-5'-P-CCNC: 20 U/L (ref 13–39)
BACTERIA UR QL AUTO: ABNORMAL /HPF
BASOPHILS # BLD AUTO: 0.01 THOUSANDS/ÂΜL (ref 0–0.1)
BASOPHILS NFR BLD AUTO: 0 % (ref 0–1)
BILIRUB SERPL-MCNC: 0.25 MG/DL (ref 0.2–1)
BILIRUB UR QL STRIP: NEGATIVE
BNP SERPL-MCNC: 42 PG/ML (ref 0–100)
BUN SERPL-MCNC: 13 MG/DL (ref 5–25)
CALCIUM SERPL-MCNC: 9.4 MG/DL (ref 8.4–10.2)
CHLORIDE SERPL-SCNC: 98 MMOL/L (ref 96–108)
CLARITY UR: CLEAR
CO2 SERPL-SCNC: 28 MMOL/L (ref 21–32)
COLOR UR: YELLOW
CREAT SERPL-MCNC: 0.58 MG/DL (ref 0.6–1.3)
EOSINOPHIL # BLD AUTO: 0.08 THOUSAND/ÂΜL (ref 0–0.61)
EOSINOPHIL NFR BLD AUTO: 2 % (ref 0–6)
ERYTHROCYTE [DISTWIDTH] IN BLOOD BY AUTOMATED COUNT: 16.7 % (ref 11.6–15.1)
FLUAV AG UPPER RESP QL IA.RAPID: NEGATIVE
FLUBV AG UPPER RESP QL IA.RAPID: NEGATIVE
GFR SERPL CREATININE-BSD FRML MDRD: 93 ML/MIN/1.73SQ M
GLUCOSE SERPL-MCNC: 136 MG/DL (ref 65–140)
GLUCOSE UR STRIP-MCNC: NEGATIVE MG/DL
HCT VFR BLD AUTO: 40.6 % (ref 34.8–46.1)
HGB BLD-MCNC: 12.8 G/DL (ref 11.5–15.4)
HGB UR QL STRIP.AUTO: NEGATIVE
HYALINE CASTS #/AREA URNS LPF: ABNORMAL /LPF
IMM GRANULOCYTES # BLD AUTO: 0.02 THOUSAND/UL (ref 0–0.2)
IMM GRANULOCYTES NFR BLD AUTO: 1 % (ref 0–2)
INR PPP: 0.97 (ref 0.85–1.19)
KETONES UR STRIP-MCNC: NEGATIVE MG/DL
LACTATE SERPL-SCNC: 1.5 MMOL/L (ref 0.5–2)
LEUKOCYTE ESTERASE UR QL STRIP: ABNORMAL
LEVETIRACETAM SERPL-MCNC: <2 UG/ML (ref 12–46)
LIPASE SERPL-CCNC: 13 U/L (ref 11–82)
LYMPHOCYTES # BLD AUTO: 1.01 THOUSANDS/ÂΜL (ref 0.6–4.47)
LYMPHOCYTES NFR BLD AUTO: 25 % (ref 14–44)
MAGNESIUM SERPL-MCNC: 1.6 MG/DL (ref 1.9–2.7)
MCH RBC QN AUTO: 28.7 PG (ref 26.8–34.3)
MCHC RBC AUTO-ENTMCNC: 31.5 G/DL (ref 31.4–37.4)
MCV RBC AUTO: 91 FL (ref 82–98)
MONOCYTES # BLD AUTO: 0.22 THOUSAND/ÂΜL (ref 0.17–1.22)
MONOCYTES NFR BLD AUTO: 6 % (ref 4–12)
NEUTROPHILS # BLD AUTO: 2.67 THOUSANDS/ÂΜL (ref 1.85–7.62)
NEUTS SEG NFR BLD AUTO: 66 % (ref 43–75)
NITRITE UR QL STRIP: NEGATIVE
NON-SQ EPI CELLS URNS QL MICRO: ABNORMAL /HPF
NRBC BLD AUTO-RTO: 0 /100 WBCS
PH UR STRIP.AUTO: 6 [PH]
PLATELET # BLD AUTO: 134 THOUSANDS/UL (ref 149–390)
PMV BLD AUTO: 9.4 FL (ref 8.9–12.7)
POTASSIUM SERPL-SCNC: 4.1 MMOL/L (ref 3.5–5.3)
PROCALCITONIN SERPL-MCNC: <0.05 NG/ML
PROT SERPL-MCNC: 6.2 G/DL (ref 6.4–8.4)
PROT UR STRIP-MCNC: NEGATIVE MG/DL
PROTHROMBIN TIME: 13.3 SECONDS (ref 12.3–15)
RBC # BLD AUTO: 4.46 MILLION/UL (ref 3.81–5.12)
RBC #/AREA URNS AUTO: ABNORMAL /HPF
SARS-COV+SARS-COV-2 AG RESP QL IA.RAPID: NEGATIVE
SODIUM SERPL-SCNC: 131 MMOL/L (ref 135–147)
SP GR UR STRIP.AUTO: <=1.005 (ref 1–1.03)
TSH SERPL DL<=0.05 MIU/L-ACNC: 2.18 UIU/ML (ref 0.45–4.5)
UROBILINOGEN UR QL STRIP.AUTO: 0.2 E.U./DL
WBC # BLD AUTO: 4.01 THOUSAND/UL (ref 4.31–10.16)
WBC #/AREA URNS AUTO: ABNORMAL /HPF

## 2024-11-19 PROCEDURE — 36415 COLL VENOUS BLD VENIPUNCTURE: CPT | Performed by: EMERGENCY MEDICINE

## 2024-11-19 PROCEDURE — 85610 PROTHROMBIN TIME: CPT | Performed by: EMERGENCY MEDICINE

## 2024-11-19 PROCEDURE — 93971 EXTREMITY STUDY: CPT

## 2024-11-19 PROCEDURE — 70450 CT HEAD/BRAIN W/O DYE: CPT

## 2024-11-19 PROCEDURE — 99223 1ST HOSP IP/OBS HIGH 75: CPT | Performed by: FAMILY MEDICINE

## 2024-11-19 PROCEDURE — 81001 URINALYSIS AUTO W/SCOPE: CPT | Performed by: EMERGENCY MEDICINE

## 2024-11-19 PROCEDURE — 85730 THROMBOPLASTIN TIME PARTIAL: CPT | Performed by: EMERGENCY MEDICINE

## 2024-11-19 PROCEDURE — 83605 ASSAY OF LACTIC ACID: CPT | Performed by: EMERGENCY MEDICINE

## 2024-11-19 PROCEDURE — 85025 COMPLETE CBC W/AUTO DIFF WBC: CPT | Performed by: EMERGENCY MEDICINE

## 2024-11-19 PROCEDURE — 87081 CULTURE SCREEN ONLY: CPT | Performed by: FAMILY MEDICINE

## 2024-11-19 PROCEDURE — 93005 ELECTROCARDIOGRAM TRACING: CPT

## 2024-11-19 PROCEDURE — 87804 INFLUENZA ASSAY W/OPTIC: CPT | Performed by: EMERGENCY MEDICINE

## 2024-11-19 PROCEDURE — 83880 ASSAY OF NATRIURETIC PEPTIDE: CPT | Performed by: EMERGENCY MEDICINE

## 2024-11-19 PROCEDURE — 87040 BLOOD CULTURE FOR BACTERIA: CPT | Performed by: EMERGENCY MEDICINE

## 2024-11-19 PROCEDURE — 83690 ASSAY OF LIPASE: CPT | Performed by: EMERGENCY MEDICINE

## 2024-11-19 PROCEDURE — 99285 EMERGENCY DEPT VISIT HI MDM: CPT

## 2024-11-19 PROCEDURE — 83520 IMMUNOASSAY QUANT NOS NONAB: CPT | Performed by: FAMILY MEDICINE

## 2024-11-19 PROCEDURE — 84443 ASSAY THYROID STIM HORMONE: CPT | Performed by: FAMILY MEDICINE

## 2024-11-19 PROCEDURE — 99285 EMERGENCY DEPT VISIT HI MDM: CPT | Performed by: EMERGENCY MEDICINE

## 2024-11-19 PROCEDURE — 87811 SARS-COV-2 COVID19 W/OPTIC: CPT | Performed by: EMERGENCY MEDICINE

## 2024-11-19 PROCEDURE — 71275 CT ANGIOGRAPHY CHEST: CPT

## 2024-11-19 PROCEDURE — 83735 ASSAY OF MAGNESIUM: CPT | Performed by: EMERGENCY MEDICINE

## 2024-11-19 PROCEDURE — 80053 COMPREHEN METABOLIC PANEL: CPT | Performed by: EMERGENCY MEDICINE

## 2024-11-19 PROCEDURE — 84145 PROCALCITONIN (PCT): CPT | Performed by: EMERGENCY MEDICINE

## 2024-11-19 PROCEDURE — 93971 EXTREMITY STUDY: CPT | Performed by: SURGERY

## 2024-11-19 PROCEDURE — 74177 CT ABD & PELVIS W/CONTRAST: CPT

## 2024-11-19 PROCEDURE — 96374 THER/PROPH/DIAG INJ IV PUSH: CPT

## 2024-11-19 RX ORDER — QUETIAPINE FUMARATE 25 MG/1
50 TABLET, FILM COATED ORAL EVERY MORNING
Status: DISCONTINUED | OUTPATIENT
Start: 2024-11-20 | End: 2024-11-22 | Stop reason: HOSPADM

## 2024-11-19 RX ORDER — MAGNESIUM SULFATE HEPTAHYDRATE 40 MG/ML
2 INJECTION, SOLUTION INTRAVENOUS ONCE
Status: COMPLETED | OUTPATIENT
Start: 2024-11-19 | End: 2024-11-19

## 2024-11-19 RX ORDER — LEVOTHYROXINE SODIUM 50 UG/1
50 TABLET ORAL
Status: DISCONTINUED | OUTPATIENT
Start: 2024-11-20 | End: 2024-11-22 | Stop reason: HOSPADM

## 2024-11-19 RX ORDER — METHENAMINE HIPPURATE 1000 MG/1
1 TABLET ORAL 2 TIMES DAILY WITH MEALS
Status: DISCONTINUED | OUTPATIENT
Start: 2024-11-19 | End: 2024-11-22 | Stop reason: HOSPADM

## 2024-11-19 RX ORDER — GABAPENTIN 400 MG/1
400 CAPSULE ORAL
Status: DISCONTINUED | OUTPATIENT
Start: 2024-11-19 | End: 2024-11-22 | Stop reason: HOSPADM

## 2024-11-19 RX ORDER — ONDANSETRON 2 MG/ML
4 INJECTION INTRAMUSCULAR; INTRAVENOUS EVERY 6 HOURS PRN
Status: DISCONTINUED | OUTPATIENT
Start: 2024-11-19 | End: 2024-11-22 | Stop reason: HOSPADM

## 2024-11-19 RX ORDER — OXYBUTYNIN CHLORIDE 5 MG/1
5 TABLET, EXTENDED RELEASE ORAL DAILY
Status: DISCONTINUED | OUTPATIENT
Start: 2024-11-19 | End: 2024-11-22 | Stop reason: HOSPADM

## 2024-11-19 RX ORDER — SERTRALINE HYDROCHLORIDE 100 MG/1
200 TABLET, FILM COATED ORAL
Status: DISCONTINUED | OUTPATIENT
Start: 2024-11-20 | End: 2024-11-22 | Stop reason: HOSPADM

## 2024-11-19 RX ORDER — POLYETHYLENE GLYCOL 3350 17 G/17G
17 POWDER, FOR SOLUTION ORAL
Status: DISCONTINUED | OUTPATIENT
Start: 2024-11-19 | End: 2024-11-22 | Stop reason: HOSPADM

## 2024-11-19 RX ORDER — VANCOMYCIN HYDROCHLORIDE 1 G/200ML
15 INJECTION, SOLUTION INTRAVENOUS ONCE
Status: COMPLETED | OUTPATIENT
Start: 2024-11-19 | End: 2024-11-19

## 2024-11-19 RX ORDER — PANTOPRAZOLE SODIUM 20 MG/1
20 TABLET, DELAYED RELEASE ORAL
Status: DISCONTINUED | OUTPATIENT
Start: 2024-11-19 | End: 2024-11-22 | Stop reason: HOSPADM

## 2024-11-19 RX ORDER — DIVALPROEX SODIUM 250 MG/1
250 TABLET, DELAYED RELEASE ORAL EVERY 12 HOURS SCHEDULED
Status: DISCONTINUED | OUTPATIENT
Start: 2024-11-19 | End: 2024-11-20

## 2024-11-19 RX ORDER — AMPICILLIN TRIHYDRATE 500 MG
1 CAPSULE ORAL DAILY
Status: DISCONTINUED | OUTPATIENT
Start: 2024-11-19 | End: 2024-11-22

## 2024-11-19 RX ORDER — QUETIAPINE FUMARATE 200 MG/1
200 TABLET, FILM COATED ORAL
Status: DISCONTINUED | OUTPATIENT
Start: 2024-11-19 | End: 2024-11-22 | Stop reason: HOSPADM

## 2024-11-19 RX ORDER — SENNOSIDES 8.6 MG
8.6 TABLET ORAL
Status: DISCONTINUED | OUTPATIENT
Start: 2024-11-19 | End: 2024-11-22 | Stop reason: HOSPADM

## 2024-11-19 RX ORDER — HEPARIN SODIUM 5000 [USP'U]/ML
5000 INJECTION, SOLUTION INTRAVENOUS; SUBCUTANEOUS EVERY 8 HOURS SCHEDULED
Status: DISCONTINUED | OUTPATIENT
Start: 2024-11-19 | End: 2024-11-22 | Stop reason: HOSPADM

## 2024-11-19 RX ORDER — ASPIRIN 81 MG/1
81 TABLET ORAL DAILY
Status: DISCONTINUED | OUTPATIENT
Start: 2024-11-19 | End: 2024-11-22 | Stop reason: HOSPADM

## 2024-11-19 RX ORDER — ACETAMINOPHEN 325 MG/1
650 TABLET ORAL EVERY 6 HOURS PRN
Status: DISCONTINUED | OUTPATIENT
Start: 2024-11-19 | End: 2024-11-22 | Stop reason: HOSPADM

## 2024-11-19 RX ORDER — ATORVASTATIN CALCIUM 40 MG/1
40 TABLET, FILM COATED ORAL
Status: DISCONTINUED | OUTPATIENT
Start: 2024-11-19 | End: 2024-11-22 | Stop reason: HOSPADM

## 2024-11-19 RX ORDER — DOCUSATE SODIUM 100 MG/1
100 CAPSULE, LIQUID FILLED ORAL 2 TIMES DAILY
Status: DISCONTINUED | OUTPATIENT
Start: 2024-11-19 | End: 2024-11-22 | Stop reason: HOSPADM

## 2024-11-19 RX ORDER — BISACODYL 5 MG/1
5 TABLET, DELAYED RELEASE ORAL DAILY PRN
Status: DISCONTINUED | OUTPATIENT
Start: 2024-11-19 | End: 2024-11-22 | Stop reason: HOSPADM

## 2024-11-19 RX ORDER — ERGOCALCIFEROL 1.25 MG/1
50000 CAPSULE, LIQUID FILLED ORAL WEEKLY
Status: DISCONTINUED | OUTPATIENT
Start: 2024-11-19 | End: 2024-11-22 | Stop reason: HOSPADM

## 2024-11-19 RX ADMIN — SENNOSIDES 8.6 MG: 8.6 TABLET, FILM COATED ORAL at 22:11

## 2024-11-19 RX ADMIN — SODIUM CHLORIDE 2000 ML: 0.9 INJECTION, SOLUTION INTRAVENOUS at 15:45

## 2024-11-19 RX ADMIN — HEPARIN SODIUM 5000 UNITS: 5000 INJECTION, SOLUTION INTRAVENOUS; SUBCUTANEOUS at 22:11

## 2024-11-19 RX ADMIN — IOHEXOL 100 ML: 350 INJECTION, SOLUTION INTRAVENOUS at 12:06

## 2024-11-19 RX ADMIN — DIVALPROEX SODIUM 250 MG: 250 TABLET, DELAYED RELEASE ORAL at 22:11

## 2024-11-19 RX ADMIN — PIPERACILLIN AND TAZOBACTAM 4.5 G: 36; 4.5 INJECTION, POWDER, FOR SOLUTION INTRAVENOUS at 13:38

## 2024-11-19 RX ADMIN — MAGNESIUM SULFATE HEPTAHYDRATE 2 G: 40 INJECTION, SOLUTION INTRAVENOUS at 16:56

## 2024-11-19 RX ADMIN — VANCOMYCIN HYDROCHLORIDE 1000 MG: 1 INJECTION, SOLUTION INTRAVENOUS at 14:30

## 2024-11-19 NOTE — ASSESSMENT & PLAN NOTE
Chronic in nature, patient came with sodium level of 131  Imaging shows: Pulmonary hypertension,Stable diffuse groundglass opacities which may be due edema, infection, or inflammation   Patient received IV hydration, since patient blood pressure is running low, will give 2 L bolus and recheck sodium level

## 2024-11-19 NOTE — ASSESSMENT & PLAN NOTE
CT scan of head:Stable ventriculomegaly which could be due to normal pressure hydrocephalus.   Conservative management.

## 2024-11-19 NOTE — ED NOTES
On arrival, patient unable to stand and transfer to ED stretcher without complete assistance.  Per caregiver, at baseline patient is a stand assist.     Allison A Schoener, RN  11/19/24 3414

## 2024-11-19 NOTE — ASSESSMENT & PLAN NOTE
"Lab Results   Component Value Date    HGBA1C 5.9 (H) 06/26/2024       No results for input(s): \"POCGLU\" in the last 72 hours.    Blood Sugar Average: Last 72 hrs:  Sliding scale with hypoglycemia precaution.    "

## 2024-11-19 NOTE — ASSESSMENT & PLAN NOTE
Baseline, patient is intellectually delayed, nonverbal-but normally mimic, try to get up, normally titered drawing the board  Per caregiver, patient was unable to get up by herself, and also very tired and not acting well  CT scan of head no significant other than a stable ventriculomegaly  CTA PE with abdomen and pelvis: No active infection noted-patient has bladder wall thickening -but urine analysis no significant  Patient has history of seizure disorder, is on Depakote, check Depakote level  Patient also takes gabapentin 400 mg, Seroquel, sertraline-will continue at this time, consider to adjust as appropriate  Follow-up PT OT, speech recommendation

## 2024-11-19 NOTE — ED PROVIDER NOTES
ED Disposition       ED Disposition   Admit    Condition   Stable    Date/Time   Tue Nov 19, 2024  1:52 PM    Comment   Case was discussed with hospital medicine and the patient's admission status was agreed to be Admission Status: inpatient status to the service of Dr. Miranda .               Assessment & Plan       Medical Decision Making  68-year-old female presents to the emergency department with generalized weakness, fatigue.  Differential diagnoses include COVID, flu, RSV, ACS, CVA, TIA, pulmonary embolism, pneumonia, electrolyte abnormality, UTI, urosepsis, pyelonephritis.  As patient is nonverbal, will perform broad workup.  CT head, chest abdomen pelvis, infectious workup ordered.  Anticipate admission for placement as patient may require higher level of care.    Amount and/or Complexity of Data Reviewed  Labs: ordered.  Radiology: ordered.        ED Course as of 11/19/24 1353   Tue Nov 19, 2024   1326 WBC(!): 4.01  Rectal temp 95.6* - Will discuss admission with hospital medicine.    1328 CT razia shows stable ground glass opacities.    1330 Patient had rectal temp of 95.6.  Heidi hugger placed.  White blood cell count 4, CT scan shows stable ground glass opacities, patient is too weak to be cared for by caretakers at group home.  Patient normally is able to stand by herself with walker, however now she cannot even bear weight on both of her legs.  Patient is nonverbal.  Anticipate patient might require placement at short-term rehab/skilled nursing facility.       Medications   vancomycin (VANCOCIN) IVPB (premix in dextrose) 1,000 mg 200 mL (has no administration in time range)   piperacillin-tazobactam (ZOSYN) 4.5 g in sodium chloride 0.9 % 100 mL IVPB (4.5 g Intravenous New Bag 11/19/24 1338)   iohexol (OMNIPAQUE) 350 MG/ML injection (MULTI-DOSE) 100 mL (100 mL Intravenous Given 11/19/24 1206)       ED Risk Strat Scores                           SBIRT 22yo+      Flowsheet Row Most Recent Value   Initial  Alcohol Screen: US AUDIT-C     1. How often do you have a drink containing alcohol? 0 Filed at: 11/19/2024 1028   2. How many drinks containing alcohol do you have on a typical day you are drinking?  0 Filed at: 11/19/2024 1028   3b. FEMALE Any Age, or MALE 65+: How often do you have 4 or more drinks on one occassion? 0 Filed at: 11/19/2024 1028   Audit-C Score 0 Filed at: 11/19/2024 1028   KAILASH: How many times in the past year have you...    Used an illegal drug or used a prescription medication for non-medical reasons? Never Filed at: 11/19/2024 1028                            History of Present Illness       Chief Complaint   Patient presents with    Weakness - Generalized     Patient diagnosed with UTI earlier this month and placed on abx. Caregiver reports patient was weak while using commode, unable to stand, and pale. Normally is able to color but today was leaning on table not coloring and was coughing up bloody mucus. Patient nonverbal       Past Medical History:   Diagnosis Date    Anxiety     CHF (congestive heart failure) (HCC)     Diabetes mellitus (HCC)     GERD (gastroesophageal reflux disease)     Hyperlipidemia     Hypertension     Leukopenia     Mental disability     Mixed incontinence 04/12/2017    Seizure disorder (HCC)       Past Surgical History:   Procedure Laterality Date    DENTAL SURGERY        Family History   Problem Relation Age of Onset    No Known Problems Mother     No Known Problems Father     No Known Problems Sister     No Known Problems Maternal Grandmother     No Known Problems Maternal Grandfather     No Known Problems Paternal Grandmother     No Known Problems Paternal Grandfather       Social History     Tobacco Use    Smoking status: Never    Smokeless tobacco: Never   Vaping Use    Vaping status: Never Used   Substance Use Topics    Alcohol use: Not Currently    Drug use: Not Currently      E-Cigarette/Vaping    E-Cigarette Use Never User       E-Cigarette/Vaping Substances     Nicotine No     THC No     CBD No     Flavoring No     Other No     Unknown No       I have reviewed and agree with the history as documented.     68-year-old female with past medical history of nonverbal, seizure disorder, mental disability, hypertension, hyperlipidemia, GERD, diabetes, CHF, anxiety, presents to the emergency department with generalized weakness and fatigue.  Patient is a resident of a group home.  Patient was noticed to have generalized fatigue, and worsening weakness over the past 3 days.  Patient apparently had recent diagnosed UTI earlier this month, and was placed on antibiotics.  Caregiver reports that she was using the commode this morning, and unable to stand, was very weak, and appeared pale.  Caretaker also mentions that patient is normally able to use her coloring book, however today she appeared weak, and had some blank stares.  They also mention some hemoptysis.  Patient has had no fevers per caretaker.  Patient is nonverbal, and unable to complain of pain.  Caretaker denies any bloody stool, or bloody urine.  They report that the last time she was like this, she was found to have a urinary tract infection.  On initial evaluation, patient's vitals are normal.  She is able to follow commands.  No focal deficits appreciated throughout.  Patient moving bilateral upper extremities with ease.  Patient does have generalized weakness and bilateral lower extremities.  Left lower extremity appears to have pitting edema, there is no erythema, or warmth.  Heart, lung and abdominal exam unremarkable.  No facial droop appreciated.          Review of Systems   Unable to perform ROS: Patient nonverbal           Objective       ED Triage Vitals   Temperature Pulse Blood Pressure Respirations SpO2 Patient Position - Orthostatic VS   11/19/24 1052 11/19/24 1027 11/19/24 1027 11/19/24 1027 11/19/24 1027 11/19/24 1027   (!) 96.4 °F (35.8 °C) 78 102/50 16 99 % Lying      Temp Source Heart Rate Source BP  Location FiO2 (%) Pain Score    11/19/24 1052 11/19/24 1027 11/19/24 1027 -- --    Rectal Monitor Left arm        Vitals      Date and Time Temp Pulse SpO2 Resp BP Pain Score FACES Pain Rating User   11/19/24 1330 -- 66 95 % 18 134/59 -- -- AS   11/19/24 1327 95.8 °F (35.4 °C) -- -- -- -- -- -- AS   11/19/24 1300 -- 65 95 % 13 122/59 -- -- AS   11/19/24 1230 -- 67 95 % 12 132/60 -- -- AS   11/19/24 1220 -- 66 96 % 12 122/56 -- -- KK   11/19/24 1200 -- 63 98 % 11 133/60 -- -- AS   11/19/24 1115 -- 68 100 % 12 95/49 -- -- AS   11/19/24 1052 96.4 °F (35.8 °C) -- -- -- -- -- -- AS   11/19/24 1027 -- 78 99 % 16 102/50 -- -- SL            Physical Exam  Vitals and nursing note reviewed.   Constitutional:       General: She is not in acute distress.     Appearance: She is well-developed.   HENT:      Head: Normocephalic and atraumatic.   Eyes:      Conjunctiva/sclera: Conjunctivae normal.   Cardiovascular:      Rate and Rhythm: Normal rate and regular rhythm.      Heart sounds: No murmur heard.  Pulmonary:      Effort: Pulmonary effort is normal. No respiratory distress.      Breath sounds: Normal breath sounds.   Abdominal:      Palpations: Abdomen is soft.      Tenderness: There is no abdominal tenderness.   Musculoskeletal:         General: No swelling.      Cervical back: Neck supple.   Skin:     General: Skin is warm and dry.      Capillary Refill: Capillary refill takes less than 2 seconds.   Neurological:      General: No focal deficit present.      Mental Status: She is alert. Mental status is at baseline.      Motor: Weakness present.      Comments: Generalized weakness throughout, bilateral lower extremity weakness   Psychiatric:         Mood and Affect: Mood normal.         Results Reviewed       Procedure Component Value Units Date/Time    Comprehensive metabolic panel [[id number]]  (Abnormal) Collected: 11/19/24 1127    Lab Status: Final result Specimen: Blood from Arm, Left Updated: 11/19/24 1151     Sodium  131 mmol/L      Potassium 4.1 mmol/L      Chloride 98 mmol/L      CO2 28 mmol/L      ANION GAP 5 mmol/L      BUN 13 mg/dL      Creatinine 0.58 mg/dL      Glucose 136 mg/dL      Calcium 9.4 mg/dL      AST 20 U/L      ALT 21 U/L      Alkaline Phosphatase 62 U/L      Total Protein 6.2 g/dL      Albumin 3.6 g/dL      Total Bilirubin 0.25 mg/dL      eGFR 93 ml/min/1.73sq m     Narrative:      National Kid[employer] guidelines for Chronic Kidney Disease (CKD):     Stage 1 with normal or high GFR (GFR > 90 mL/min/1.73 square meters)    Stage 2 Mild CKD (GFR = 60-89 mL/min/1.73 square meters)    Stage 3A Moderate CKD (GFR = 45-59 mL/min/1.73 square meters)    Stage 3B Moderate CKD (GFR = 30-44 mL/min/1.73 square meters)    Stage 4 Severe CKD (GFR = 15-29 mL/min/1.73 square meters)    Stage 5 End Stage CKD (GFR <15 mL/min/1.73 square meters)  Note: GFR calculation is accurate only with a steady state creatinine    Lipase [[id number]]  (Normal) Collected: 11/19/24 1127    Lab Status: Final result Specimen: Blood from Arm, Left Updated: 11/19/24 1151     Lipase 13 u/L     Magnesium [[id number]]  (Abnormal) Collected: 11/19/24 1127    Lab Status: Final result Specimen: Blood from Arm, Left Updated: 11/19/24 1151     Magnesium 1.6 mg/dL     B-Type Natriuretic Peptide(BNP) [[id number]]  (Normal) Collected: 11/19/24 1039    Lab Status: Final result Specimen: Blood from Arm, Left Updated: 11/19/24 1123     BNP 42 pg/mL     Procalcitonin [[id number]]  (Normal) Collected: 11/19/24 1039    Lab Status: Final result Specimen: Blood from Arm, Left Updated: 11/19/24 1117     Procalcitonin <0.05 ng/ml     Blood culture #1 [[id number]] Collected: 11/19/24 1108    Lab Status: In process Specimen: Blood from Arm, Right Updated: 11/19/24 1116    Urine Microscopic [[id number]]  (Abnormal) Collected: 11/19/24 1057    Lab Status: Final result Specimen: Urine, Straight Cath Updated: 11/19/24 1115     RBC, UA 0-1 /hpf      WBC, UA 2-4 /hpf       Epithelial Cells Occasional /hpf      Bacteria, UA None Seen /hpf      Hyaline Casts, UA 0-1 /lpf     UA w Reflex to Microscopic w Reflex to Culture [[id number]]  (Abnormal) Collected: 11/19/24 1057    Lab Status: Final result Specimen: Urine, Straight Cath Updated: 11/19/24 1106     Color, UA Yellow     Clarity, UA Clear     Specific Gravity, UA <=1.005     pH, UA 6.0     Leukocytes, UA Small     Nitrite, UA Negative     Protein, UA Negative mg/dl      Glucose, UA Negative mg/dl      Ketones, UA Negative mg/dl      Urobilinogen, UA 0.2 E.U./dl      Bilirubin, UA Negative     Occult Blood, UA Negative    Lactic acid [[id number]]  (Normal) Collected: 11/19/24 1039    Lab Status: Final result Specimen: Blood from Arm, Left Updated: 11/19/24 1106     LACTIC ACID 1.5 mmol/L     Narrative:      Result may be elevated if tourniquet was used during collection.    FLU/COVID Rapid Antigen (30 min. TAT) - Preferred screening test in ED [[id number]]  (Normal) Collected: 11/19/24 1039    Lab Status: Final result Specimen: Nares from Nose Updated: 11/19/24 1104     SARS COV Rapid Antigen Negative     Influenza A Rapid Antigen Negative     Influenza B Rapid Antigen Negative    Narrative:      This test has been performed using the [employer] 2 FLU+SARS Antigen test under the Emergency Use Authorization (EUA). This test has been validated by the  and verified by the performing laboratory. The Comfort uses lateral flow immunofluorescent sandwich assay to detect SARS-COV, Influenza A and Influenza B Antigen.     The [employer] 2 SARS Antigen test does not differentiate between SARS-CoV and SARS-CoV-2.     Negative results are presumptive and may be confirmed with a molecular assay, if necessary, for patient management. Negative results do not rule out SARS-CoV-2 or influenza infection and should not be used as the sole basis for treatment or patient management decisions. A negative test result may occur if the level  of antigen in a sample is below the limit of detection of this test.     Positive results are indicative of the presence of viral antigens, but do not rule out bacterial infection or co-infection with other viruses.     All test results should be used as an adjunct to clinical observations and other information available to the provider.    FOR PEDIATRIC PATIENTS - copy/paste COVID Guidelines URL to browser: [email or web address]    Protime-INR [[id number]]  (Normal) Collected: 11/19/24 1039    Lab Status: Final result Specimen: Blood from Arm, Left Updated: 11/19/24 1103     Protime 13.3 seconds      INR 0.97    Narrative:      INR Therapeutic Range    Indication                                             INR Range      Atrial Fibrillation                                               2.0-3.0  Hypercoagulable State                                    2.0.2.3  Left Ventricular Asist Device                            2.0-3.0  Mechanical Heart Valve                                  -    Aortic(with afib, MI, embolism, HF, LA enlargement,    and/or coagulopathy)                                     2.0-3.0 (2.5-3.5)     Mitral                                                             2.5-3.5  Prosthetic/Bioprosthetic Heart Valve               2.0-3.0  Venous thromboembolism (VTE: VT, PE        2.0-3.0    APTT [[id number]]  (Normal) Collected: 11/19/24 1039    Lab Status: Final result Specimen: Blood from Arm, Left Updated: 11/19/24 1103     PTT 28 seconds     CBC and differential [[id number]]  (Abnormal) Collected: 11/19/24 1039    Lab Status: Final result Specimen: Blood from Arm, Left Updated: 11/19/24 1058     WBC 4.01 Thousand/uL      RBC 4.46 Million/uL      Hemoglobin 12.8 g/dL      Hematocrit 40.6 %      MCV 91 fL      MCH 28.7 pg      MCHC 31.5 g/dL      RDW 16.7 %      MPV 9.4 fL      Platelets 134 Thousands/uL      nRBC 0 /100 WBCs      Segmented % 66 %       Immature Grans % 1 %      Lymphocytes % 25 %      Monocytes % 6 %      Eosinophils Relative 2 %      Basophils Relative 0 %      Absolute Neutrophils 2.67 Thousands/µL      Absolute Immature Grans 0.02 Thousand/uL      Absolute Lymphocytes 1.01 Thousands/µL      Absolute Monocytes 0.22 Thousand/µL      Eosinophils Absolute 0.08 Thousand/µL      Basophils Absolute 0.01 Thousands/µL     Blood culture #2 [[id number]] Collected: 11/19/24 1039    Lab Status: In process Specimen: Blood from Arm, Left Updated: 11/19/24 1047            CT head without contrast   Final Interpretation by Carlos Singh MD (11/19 2489)      No acute intracranial abnormality.      Stable ventriculomegaly which could be due to normal pressure hydrocephalus.                  Workstation performed: [id number]         CT pe study w abdomen pelvis w contrast   Final Interpretation by Carlos Singh MD (11/19 8914)      1.  No pulmonary embolus.   2.  Enlarged pulmonary trunk consistent with pulmonary hypertension.   3.  Stable diffuse groundglass opacities which may be due edema, infection, or inflammation.   4.  Bladder wall thickening. Correlate for cystitis.   5.  Constipation.   6.  Stable 1 cm pancreatic cyst. For simple cyst(s) less than 1.5 cm, recommend follow-up every 2 years for 5 times or to age 80, whichever comes first. Follow-up can stop at age 80 or can switch over to 80 year or older algorithm. Recommend next    follow-up in 2 years. Preferred imaging modality: abdomen MRI and MRCP with and without IV contrast, or triple phase abdomen CT with IV contrast, or abdomen MRI and MRCP without IV contrast.               The study was marked in EPIC for immediate notification.                              Workstation performed: [id number]         VAS lower limb venous duplex study, unilateral/limited    (Results Pending)       Procedures    ED Medication and Procedure Management   Prior to Admission Medications   Prescriptions Last Dose  Informant Patient Reported? Taking?   Cranberry 450 MG CAPS   No Yes   Sig: Take 1 capsule (450 mg total) by mouth in the morning   Diapers & Supplies MISC   Yes Yes   Sig: Use as directed.   Incontinence Supply Disposable (FQ Pant Liner) MISC   Yes Yes   LORazepam (ATIVAN) 0.5 mg tablet   Yes Yes   Sig: Take 0.5 mg by mouth in the morning and 0.5 mg in the evening.   Lancets 33G MISC   Yes Yes   Sig: daily   QUEtiapine (SEROquel) 100 mg tablet   Yes Yes   Sig: Take 100 mg by mouth in the morning and 100 mg in the evening. 8am, 4pm.   QUEtiapine (SEROquel) 200 mg tablet   Yes Yes   Sig: Take 200 mg by mouth daily at bedtime   aspirin (ECOTRIN LOW STRENGTH) 81 mg EC tablet   Yes Yes   Sig: Take 81 mg by mouth daily   bisacodyl (DULCOLAX) 5 mg EC tablet Not Taking  Yes No   Sig: Take 5 mg by mouth daily as needed for constipation   Patient not taking: Reported on 11/19/2024   cholecalciferol (VITAMIN D3) 250 MCG (20877 UT) capsule Not Taking  No No   Sig: Take 5 capsules (50,000 Units total) by mouth once a week On saturday   Patient not taking: Reported on 11/19/2024   divalproex sodium (DEPAKOTE) 250 mg EC tablet   Yes Yes   Sig: Take 250 mg by mouth in the morning 1 tab AM (8am)   divalproex sodium (DEPAKOTE) 500 mg EC tablet   Yes Yes   Sig: Take 500 mg by mouth daily after dinner   docusate sodium (COLACE) 100 mg capsule  Outside Facility (Specify) Yes Yes   Sig: Take 100 mg by mouth in the morning and 100 mg in the evening.   ergocalciferol (VITAMIN D2) 50,000 units   Yes Yes   Sig: Take 50,000 Units by mouth once a week   ferrous sulfate 325 (65 Fe) mg tablet Not Taking  No No   Sig: Take 1 tablet (325 mg total) by mouth daily with breakfast   Patient not taking: Reported on 11/19/2024   gabapentin (NEURONTIN) 800 mg tablet   Yes Yes   Sig: Take 800 mg by mouth 3 (three) times a day   levothyroxine 75 mcg tablet   Yes Yes   Sig: Take 75 mcg by mouth daily in the early morning   memantine (NAMENDA) 5 mg tablet  Not Taking  Yes No   Sig: Take 5 mg by mouth daily   Patient not taking: Reported on 11/19/2024   methenamine hippurate (HIPREX) 1 g tablet   Yes Yes   Sig: Take 1 g by mouth 2 (two) times a day with meals   nystatin powder Not Taking  Yes No   Sig: Apply 1 Application topically 2 (two) times a day   Patient not taking: Reported on 11/19/2024   omeprazole (PriLOSEC) 20 mg delayed release capsule   Yes Yes   Sig: Take 20 mg by mouth daily   polyethylene glycol (MIRALAX) 17 g packet   No Yes   Sig: Take 17 g by mouth every other day as needed (if no bm in 3 days)   rosuvastatin (CRESTOR) 40 MG tablet   Yes Yes   Sig: Take 40 mg by mouth daily   senna (SENOKOT) 8.6 mg   No Yes   Sig: Take 1 tablet (8.6 mg total) by mouth daily at bedtime   sertraline (ZOLOFT) 100 mg tablet   Yes Yes   Sig: Take 100 mg by mouth daily   tolterodine (DETROL LA) 4 mg 24 hr capsule   No Yes   Sig: Take 1 capsule (4 mg total) by mouth daily   torsemide (DEMADEX) 5 MG tablet   No Yes   Sig: Take 1 tablet (5 mg total) by mouth daily      Facility-Administered Medications: None     Patient's Medications   Discharge Prescriptions    No medications on file     No discharge procedures on file.  ED SEPSIS DOCUMENTATION            Quinn Silva DO  11/19/24 4900

## 2024-11-19 NOTE — ASSESSMENT & PLAN NOTE
CTA PE study shows no PE, but pulmonary hypertension  Patient had previous echocardiogram back in 2021, shows ejection fraction of 60%  Was on low-dose of torsemide, remained on hold  Follow repeat echocardiogram

## 2024-11-19 NOTE — H&P
"H&P - Hospitalist   Name: Denita Vital 68 y.o. female I MRN: [id number]  Unit/Bed#: ED 11 I Date of Admission: 11/19/2024   Date of Service: 11/19/2024 I Hospital Day: 0     Assessment & Plan  Acute metabolic encephalopathy  Baseline, patient is intellectually delayed, nonverbal-but normally mimic, try to get up, normally titered drawing the board  Per caregiver, patient was unable to get up by herself, and also very tired and not acting well  CT scan of head no significant other than a stable ventriculomegaly  CTA PE with abdomen and pelvis: No active infection noted-patient has bladder wall thickening -but urine analysis no significant  Patient has history of seizure disorder, is on Depakote, check Depakote level  Patient also takes gabapentin 400 mg, Seroquel, sertraline-will continue at this time, consider to adjust as appropriate  Follow-up PT OT, speech recommendation  Hyponatremia  Chronic in nature, patient came with sodium level of 131  Imaging shows: Pulmonary hypertension,Stable diffuse groundglass opacities which may be due edema, infection, or inflammation   Patient received IV hydration, since patient blood pressure is running low, will give 2 L bolus and recheck sodium level  Type 2 diabetes mellitus with hypoglycemia, without long-term current use of insulin (HCC)  Lab Results   Component Value Date    HGBA1C 5.9 (H) 06/26/2024       No results for input(s): \"POCGLU\" in the last 72 hours.    Blood Sugar Average: Last 72 hrs:  Sliding scale with hypoglycemia precaution.    Seizure disorder (HCC)  Patient is on Depakote, gabapentin,  Check Depakote level.  Pulmonary hypertension (HCC)  CTA PE study shows no PE, but pulmonary hypertension  Patient had previous echocardiogram back in 2021, shows ejection fraction of 60%  Was on low-dose of torsemide, remained on hold  Follow repeat echocardiogram  Normal pressure hydrocephalus (HCC)  CT scan of head:Stable ventriculomegaly which could be due to " normal pressure hydrocephalus.   Conservative management.      VTE Pharmacologic Prophylaxis: VTE Score: 6 High Risk (Score >/= 5) - Pharmacological DVT Prophylaxis Ordered: heparin. Sequential Compression Devices Ordered.  Code Status: Level 1 - Full Code per caregiver  Discussion with family: Updated  (caregiver) at bedside.    Anticipated Length of Stay: Patient will be admitted on an inpatient basis with an anticipated length of stay of greater than 2 midnights secondary to monitorable conditions.    History of Present Illness   Chief Complaint: Generalized weakness, confusion    Denita Vital is a 68 y.o. female with a PMH of intellectually delayed, nonverbal who presents with generalized weakness, confusion.  History is taken from caregiver at bedside.  Per caregiver, patient normally mimic, tried to stand up by herself with support, also reports at regular time, patient tried to draw on the board.  Which all are abnormal since this morning.  Per caregiver, patient was having hard time to get up from the commode and needing assistance.  Patient also sleeping and confused and looks tired and not acting on regular basis.  Denies any medication change, nausea, vomiting.  But caregiver noticed some thick cough.  Denies any wheezing.  Denies any sick contact..    Review of Systems   Constitutional:  Positive for activity change, appetite change and fatigue. Negative for chills, diaphoresis, fever and unexpected weight change.   HENT:  Negative for congestion.    Respiratory:  Negative for apnea, cough and shortness of breath.    Cardiovascular:  Negative for chest pain, palpitations and leg swelling.   Gastrointestinal:  Negative for abdominal distention, diarrhea, nausea and rectal pain.   Genitourinary:  Negative for difficulty urinating and dyspareunia.   Skin:  Negative for color change.   Neurological:  Negative for dizziness and facial asymmetry.   Psychiatric/Behavioral:  Positive for  confusion. Negative for agitation.    All other systems reviewed and are negative.      Historical Information   Past Medical History:   Diagnosis Date    Anxiety     CHF (congestive heart failure) (HCC)     Diabetes mellitus (HCC)     GERD (gastroesophageal reflux disease)     Hyperlipidemia     Hypertension     Leukopenia     Mental disability     Mixed incontinence 04/12/2017    Seizure disorder (HCC)      Past Surgical History:   Procedure Laterality Date    DENTAL SURGERY       Social History     Tobacco Use    Smoking status: Never    Smokeless tobacco: Never   Vaping Use    Vaping status: Never Used   Substance and Sexual Activity    Alcohol use: Not Currently    Drug use: Not Currently    Sexual activity: Not Currently     E-Cigarette/Vaping    E-Cigarette Use Never User      E-Cigarette/Vaping Substances    Nicotine No     THC No     CBD No     Flavoring No     Other No     Unknown No      Family History   Problem Relation Age of Onset    No Known Problems Mother     No Known Problems Father     No Known Problems Sister     No Known Problems Maternal Grandmother     No Known Problems Maternal Grandfather     No Known Problems Paternal Grandmother     No Known Problems Paternal Grandfather      Social History:  Marital Status: Single   Occupation: Unknown  Patient Pre-hospital Living Situation: Home group home  Patient Pre-hospital Level of Mobility: walks with person assist  Patient Pre-hospital Diet Restrictions: None    Meds/Allergies   I have reviewed home medications with patient personally.  Prior to Admission medications    Medication Sig Start Date End Date Taking? Authorizing Provider   aspirin (ECOTRIN LOW STRENGTH) 81 mg EC tablet Take 81 mg by mouth daily   Yes Historical Provider, MD   Cranberry 450 MG CAPS Take 1 capsule (450 mg total) by mouth in the morning 8/24/23  Yes Eli Tolentino MD   Diapers & Supplies MISC Use as directed. 5/12/22  Yes Historical Provider, MD   divalproex sodium  (DEPAKOTE) 250 mg EC tablet Take 250 mg by mouth in the morning 1 tab AM (8am)   Yes Historical Provider, MD   docusate sodium (COLACE) 100 mg capsule Take 100 mg by mouth in the morning and 100 mg in the evening.   Yes Historical Provider, MD   ergocalciferol (VITAMIN D2) 50,000 units Take 50,000 Units by mouth once a week 6/22/24  Yes Historical Provider, MD   Incontinence Supply Disposable (FQ Pant Liner) MISC  5/14/22  Yes Historical Provider, MD   Lancets 33G MISC daily 5/12/22  Yes Historical Provider, MD   LORazepam (ATIVAN) 0.5 mg tablet Take 0.5 mg by mouth in the morning and 0.5 mg in the evening.   Yes Historical Provider, MD   methenamine hippurate (HIPREX) 1 g tablet Take 1 g by mouth 2 (two) times a day with meals 6/16/24  Yes Historical Provider, MD   omeprazole (PriLOSEC) 20 mg delayed release capsule Take 20 mg by mouth daily 6/22/21 11/19/24 Yes Historical Provider, MD   polyethylene glycol (MIRALAX) 17 g packet Take 17 g by mouth every other day as needed (if no bm in 3 days) 8/24/23  Yes Eli Tolentino MD   rosuvastatin (CRESTOR) 40 MG tablet Take 40 mg by mouth daily   Yes Historical Provider, MD   senna (SENOKOT) 8.6 mg Take 1 tablet (8.6 mg total) by mouth daily at bedtime 8/11/21  Yes Demetria Sol MD   sertraline (ZOLOFT) 100 mg tablet Take 100 mg by mouth daily 6/11/24  Yes Historical Provider, MD   tolterodine (DETROL LA) 4 mg 24 hr capsule Take 1 capsule (4 mg total) by mouth daily 8/24/23  Yes Eli Tolentino MD   torsemide (DEMADEX) 5 MG tablet Take 1 tablet (5 mg total) by mouth daily 8/24/23 11/19/24 Yes Eli Tolentino MD   divalproex sodium (DEPAKOTE) 500 mg EC tablet Take 500 mg by mouth daily after dinner  11/19/24 Yes Historical Provider, MD   gabapentin (NEURONTIN) 800 mg tablet Take 800 mg by mouth 3 (three) times a day 5/12/22 11/19/24 Yes Historical Provider, MD   levothyroxine 75 mcg tablet Take 75 mcg by mouth daily in the early morning 6/4/24 11/19/24 Yes Historical Provider,  MD   QUEtiapine (SEROquel) 100 mg tablet Take 100 mg by mouth in the morning and 100 mg in the evening. 8am, 4pm.  11/19/24 Yes Historical Provider, MD   QUEtiapine (SEROquel) 200 mg tablet Take 200 mg by mouth daily at bedtime  11/19/24 Yes Historical Provider, MD   bisacodyl (DULCOLAX) 5 mg EC tablet Take 5 mg by mouth daily as needed for constipation  Patient not taking: Reported on 11/19/2024    Historical Provider, MD   cholecalciferol (VITAMIN D3) 250 MCG (01822 UT) capsule Take 5 capsules (50,000 Units total) by mouth once a week On saturday  Patient not taking: Reported on 11/19/2024 8/24/23   Eli Tolentino MD   ferrous sulfate 325 (65 Fe) mg tablet Take 1 tablet (325 mg total) by mouth daily with breakfast  Patient not taking: Reported on 11/19/2024 7/3/24   Ivana Donnelly PA-C   memantine (NAMENDA) 5 mg tablet Take 5 mg by mouth daily  Patient not taking: Reported on 11/19/2024 6/8/24 11/19/24  Historical Provider, MD   nystatin powder Apply 1 Application topically 2 (two) times a day  Patient not taking: Reported on 11/19/2024 6/17/24 11/19/24  Historical Provider, MD     Allergies   Allergen Reactions    Actos [Pioglitazone] Other (See Comments)     unkown        Objective :  Temp:  [95.6 °F (35.3 °C)-98.8 °F (37.1 °C)] 98.8 °F (37.1 °C)  HR:  [62-78] 73  BP: ()/(40-64) 100/46  Resp:  [11-18] 15  SpO2:  [92 %-100 %] 92 %  O2 Device: None (Room air)    Physical Exam  Vitals and nursing note reviewed.   Constitutional:       Appearance: She is not ill-appearing or diaphoretic.   HENT:      Mouth/Throat:      Mouth: Mucous membranes are moist.   Eyes:      General: No scleral icterus.        Left eye: No discharge.      Extraocular Movements: Extraocular movements intact.      Conjunctiva/sclera: Conjunctivae normal.      Pupils: Pupils are equal, round, and reactive to light.   Cardiovascular:      Rate and Rhythm: Normal rate.      Heart sounds: No murmur heard.     No friction rub. No gallop.    Pulmonary:      Effort: No respiratory distress.      Breath sounds: No stridor. No wheezing or rhonchi.   Abdominal:      General: There is no distension.      Palpations: There is no mass.      Tenderness: There is no abdominal tenderness.      Hernia: No hernia is present.   Musculoskeletal:      Right lower leg: No edema.      Left lower leg: No edema.   Neurological:      Mental Status: She is alert.      Comments: Patient nonverbal, try to responds with simple commands.         Lines/Drains:            Lab Results: I have reviewed the following results:  Results from last 7 days   Lab Units 11/19/24  1039   WBC Thousand/uL 4.01*   HEMOGLOBIN g/dL 12.8   HEMATOCRIT % 40.6   PLATELETS Thousands/uL 134*   SEGS PCT % 66   LYMPHO PCT % 25   MONO PCT % 6   EOS PCT % 2     Results from last 7 days   Lab Units 11/19/24  1127   SODIUM mmol/L 131*   POTASSIUM mmol/L 4.1   CHLORIDE mmol/L 98   CO2 mmol/L 28   BUN mg/dL 13   CREATININE mg/dL 0.58*   ANION GAP mmol/L 5   CALCIUM mg/dL 9.4   ALBUMIN g/dL 3.6   TOTAL BILIRUBIN mg/dL 0.25   ALK PHOS U/L 62   ALT U/L 21   AST U/L 20   GLUCOSE RANDOM mg/dL 136     Results from last 7 days   Lab Units 11/19/24  1039   INR  0.97         Lab Results   Component Value Date    HGBA1C 5.9 (H) 06/26/2024    HGBA1C 5.4 08/24/2023    HGBA1C 7.6 (H) 04/08/2022     Results from last 7 days   Lab Units 11/19/24  1039   LACTIC ACID mmol/L 1.5   PROCALCITONIN ng/ml <0.05       Imaging Results Review: I reviewed radiology reports from this admission including: CT abdomen/pelvis and CT head.  Other Study Results Review: No additional pertinent studies reviewed.    Administrative Statements       ** Please Note: This note has been constructed using a voice recognition system. **

## 2024-11-20 ENCOUNTER — APPOINTMENT (INPATIENT)
Dept: NON INVASIVE DIAGNOSTICS | Facility: HOSPITAL | Age: 69
DRG: 643 | End: 2024-11-20
Payer: MEDICARE

## 2024-11-20 LAB
ANION GAP SERPL CALCULATED.3IONS-SCNC: 3 MMOL/L (ref 4–13)
AORTIC ROOT: 3.7 CM
APICAL FOUR CHAMBER EJECTION FRACTION: 47 %
ASCENDING AORTA: 3.5 CM
BSA FOR ECHO PROCEDURE: 1.69 M2
BUN SERPL-MCNC: 10 MG/DL (ref 5–25)
CALCIUM SERPL-MCNC: 9.2 MG/DL (ref 8.4–10.2)
CHLORIDE SERPL-SCNC: 100 MMOL/L (ref 96–108)
CO2 SERPL-SCNC: 30 MMOL/L (ref 21–32)
CREAT SERPL-MCNC: 0.55 MG/DL (ref 0.6–1.3)
E WAVE DECELERATION TIME: 255 MS
E/A RATIO: 0.71
ERYTHROCYTE [DISTWIDTH] IN BLOOD BY AUTOMATED COUNT: 16.7 % (ref 11.6–15.1)
FRACTIONAL SHORTENING: 34 (ref 28–44)
GFR SERPL CREATININE-BSD FRML MDRD: 95 ML/MIN/1.73SQ M
GLUCOSE SERPL-MCNC: 69 MG/DL (ref 65–140)
HCT VFR BLD AUTO: 31.2 % (ref 34.8–46.1)
HGB BLD-MCNC: 10.2 G/DL (ref 11.5–15.4)
INTERVENTRICULAR SEPTUM IN DIASTOLE (PARASTERNAL SHORT AXIS VIEW): 1.3 CM
INTERVENTRICULAR SEPTUM: 1.6 CM (ref 0.6–1.1)
LEFT ATRIUM SIZE: 3.3 CM
LEFT INTERNAL DIMENSION IN SYSTOLE: 2.3 CM (ref 2.1–4)
LEFT VENTRICLE DIASTOLIC VOLUME (MOD BIPLANE): 66 ML
LEFT VENTRICLE DIASTOLIC VOLUME INDEX (MOD BIPLANE): 39.1 ML/M2
LEFT VENTRICLE SYSTOLIC VOLUME (MOD BIPLANE): 23 ML
LEFT VENTRICLE SYSTOLIC VOLUME INDEX (MOD BIPLANE): 13.6 ML/M2
LEFT VENTRICULAR INTERNAL DIMENSION IN DIASTOLE: 3.5 CM (ref 3.5–6)
LEFT VENTRICULAR POSTERIOR WALL IN END DIASTOLE: 1.2 CM
LEFT VENTRICULAR STROKE VOLUME: 32 ML
LV EF: 65 %
LVSV (TEICH): 32 ML
MAGNESIUM SERPL-MCNC: 1.9 MG/DL (ref 1.9–2.7)
MCH RBC QN AUTO: 28.9 PG (ref 26.8–34.3)
MCHC RBC AUTO-ENTMCNC: 32.7 G/DL (ref 31.4–37.4)
MCV RBC AUTO: 88 FL (ref 82–98)
MV E'TISSUE VEL-LAT: 7 CM/S
MV E'TISSUE VEL-SEP: 5 CM/S
MV PEAK A VEL: 1.07 M/S
MV PEAK E VEL: 76 CM/S
MV STENOSIS PRESSURE HALF TIME: 74 MS
MV VALVE AREA P 1/2 METHOD: 2.97
PLATELET # BLD AUTO: 118 THOUSANDS/UL (ref 149–390)
PMV BLD AUTO: 9.8 FL (ref 8.9–12.7)
POTASSIUM SERPL-SCNC: 3.9 MMOL/L (ref 3.5–5.3)
RBC # BLD AUTO: 3.53 MILLION/UL (ref 3.81–5.12)
SL CV LV EF: 65
SL CV PED ECHO LEFT VENTRICLE DIASTOLIC VOLUME (MOD BIPLANE) 2D: 50 ML
SL CV PED ECHO LEFT VENTRICLE SYSTOLIC VOLUME (MOD BIPLANE) 2D: 19 ML
SODIUM SERPL-SCNC: 133 MMOL/L (ref 135–147)
TR MAX PG: 21 MMHG
TR PEAK VELOCITY: 2.3 M/S
TRICUSPID ANNULAR PLANE SYSTOLIC EXCURSION: 1 CM
TRICUSPID VALVE PEAK REGURGITATION VELOCITY: 2.3 M/S
WBC # BLD AUTO: 4.5 THOUSAND/UL (ref 4.31–10.16)

## 2024-11-20 PROCEDURE — 80048 BASIC METABOLIC PNL TOTAL CA: CPT | Performed by: FAMILY MEDICINE

## 2024-11-20 PROCEDURE — 92610 EVALUATE SWALLOWING FUNCTION: CPT

## 2024-11-20 PROCEDURE — 85027 COMPLETE CBC AUTOMATED: CPT | Performed by: FAMILY MEDICINE

## 2024-11-20 PROCEDURE — 97167 OT EVAL HIGH COMPLEX 60 MIN: CPT

## 2024-11-20 PROCEDURE — 83735 ASSAY OF MAGNESIUM: CPT | Performed by: FAMILY MEDICINE

## 2024-11-20 PROCEDURE — 97163 PT EVAL HIGH COMPLEX 45 MIN: CPT

## 2024-11-20 PROCEDURE — 99232 SBSQ HOSP IP/OBS MODERATE 35: CPT | Performed by: FAMILY MEDICINE

## 2024-11-20 PROCEDURE — 93306 TTE W/DOPPLER COMPLETE: CPT

## 2024-11-20 PROCEDURE — 93306 TTE W/DOPPLER COMPLETE: CPT | Performed by: INTERNAL MEDICINE

## 2024-11-20 RX ORDER — LEVOTHYROXINE SODIUM 50 UG/1
50 TABLET ORAL DAILY
COMMUNITY

## 2024-11-20 RX ORDER — GABAPENTIN 400 MG/1
400 CAPSULE ORAL
COMMUNITY

## 2024-11-20 RX ORDER — QUETIAPINE FUMARATE 50 MG/1
150 TABLET, FILM COATED ORAL 2 TIMES DAILY
Status: ON HOLD | COMMUNITY
End: 2024-11-22

## 2024-11-20 RX ORDER — QUETIAPINE FUMARATE 200 MG/1
200 TABLET, FILM COATED ORAL
Status: ON HOLD | COMMUNITY
End: 2024-11-22

## 2024-11-20 RX ORDER — DIVALPROEX SODIUM 500 MG/1
500 TABLET, DELAYED RELEASE ORAL EVERY 12 HOURS SCHEDULED
Status: DISCONTINUED | OUTPATIENT
Start: 2024-11-20 | End: 2024-11-22 | Stop reason: HOSPADM

## 2024-11-20 RX ADMIN — HEPARIN SODIUM 5000 UNITS: 5000 INJECTION, SOLUTION INTRAVENOUS; SUBCUTANEOUS at 21:02

## 2024-11-20 RX ADMIN — DOCUSATE SODIUM 100 MG: 100 CAPSULE, LIQUID FILLED ORAL at 08:06

## 2024-11-20 RX ADMIN — OXYBUTYNIN CHLORIDE 5 MG: 5 TABLET, EXTENDED RELEASE ORAL at 08:06

## 2024-11-20 RX ADMIN — DIVALPROEX SODIUM 250 MG: 250 TABLET, DELAYED RELEASE ORAL at 08:06

## 2024-11-20 RX ADMIN — GABAPENTIN 400 MG: 400 CAPSULE ORAL at 21:03

## 2024-11-20 RX ADMIN — PANTOPRAZOLE SODIUM 20 MG: 20 TABLET, DELAYED RELEASE ORAL at 16:47

## 2024-11-20 RX ADMIN — ATORVASTATIN CALCIUM 40 MG: 40 TABLET, FILM COATED ORAL at 16:47

## 2024-11-20 RX ADMIN — ASPIRIN 81 MG: 81 TABLET, COATED ORAL at 08:06

## 2024-11-20 RX ADMIN — QUETIAPINE FUMARATE 200 MG: 200 TABLET ORAL at 21:02

## 2024-11-20 RX ADMIN — HEPARIN SODIUM 5000 UNITS: 5000 INJECTION, SOLUTION INTRAVENOUS; SUBCUTANEOUS at 13:32

## 2024-11-20 RX ADMIN — METHENAMINE HIPPURATE 1 G: 1 TABLET ORAL at 16:47

## 2024-11-20 RX ADMIN — DIVALPROEX SODIUM 500 MG: 500 TABLET, DELAYED RELEASE ORAL at 21:03

## 2024-11-20 RX ADMIN — DOCUSATE SODIUM 100 MG: 100 CAPSULE, LIQUID FILLED ORAL at 17:26

## 2024-11-20 RX ADMIN — SERTRALINE HYDROCHLORIDE 200 MG: 50 TABLET ORAL at 08:06

## 2024-11-20 RX ADMIN — SENNOSIDES 8.6 MG: 8.6 TABLET, FILM COATED ORAL at 21:02

## 2024-11-20 RX ADMIN — LEVOTHYROXINE SODIUM 50 MCG: 50 TABLET ORAL at 06:15

## 2024-11-20 RX ADMIN — METHENAMINE HIPPURATE 1 G: 1 TABLET ORAL at 08:07

## 2024-11-20 RX ADMIN — HEPARIN SODIUM 5000 UNITS: 5000 INJECTION, SOLUTION INTRAVENOUS; SUBCUTANEOUS at 06:15

## 2024-11-20 RX ADMIN — PANTOPRAZOLE SODIUM 20 MG: 20 TABLET, DELAYED RELEASE ORAL at 06:15

## 2024-11-20 NOTE — ASSESSMENT & PLAN NOTE
Chronic in nature, patient came with sodium level of 131>133  Imaging shows: Pulmonary hypertension,Stable diffuse groundglass opacities which may be due edema, infection, or inflammation   Patient received IV hydration, since patient blood pressure is running low, will give 2 L bolus and recheck sodium level

## 2024-11-20 NOTE — CASE MANAGEMENT
Case Management Assessment & Discharge Planning Note    Patient name Denita Vital  Location /-01 MRN [id number]  : 1955 Date 2024       Current Admission Date: 2024  Current Admission Diagnosis:Acute metabolic encephalopathy   Patient Active Problem List    Diagnosis Date Noted Date Diagnosed    Pulmonary hypertension (HCC) 2024     Normal pressure hydrocephalus (HCC) 2024     Transaminitis 2024     Hypoglycemia 2023     Hyponatremia 2022     Open nondisplaced fracture of distal phalanx of left middle finger 2022     Avulsion of fingernail 2022     Acute urinary retention 2022     Acute metabolic encephalopathy 2022     Cystitis without hematuria 2022     Dysphagia 2021     Chronic anemia 2021     Chronically elevated right hemidiaphragm 2021     Obesity (BMI 30.0-34.9) 2021     Seizure disorder (HCC) 2021     Hyperlipidemia 2021     Abnormal x-ray 2021     Acute respiratory failure with hypoxia (HCC) 2021     Aspiration pneumonitis (HCC) 2021     Mood disorder (HCC) 2021     Type 2 diabetes mellitus with hypoglycemia, without long-term current use of insulin ([facility]) 2020     Hypertension 2019     Gastroesophageal reflux disease without esophagitis 2018     Ambulatory dysfunction 2017     Intellectual disability 2017       LOS (days): 1  Geometric Mean LOS (GMLOS) (days): 3.6  Days to GMLOS:2.8     OBJECTIVE:    Risk of Unplanned Readmission Score: 25.8         Current admission status: Inpatient  Referral Reason: Other (dc planning)    Preferred Pharmacy:   [facility]  YEMI Cottrell - [address] [place] [address]  [address] [facility] [address]  Phone: [phone] Fax: [phone]    Primary Care Provider: Soy Clemente MD    Primary Insurance: MEDICARE  Secondary Insurance[place] [employer] [place]      Spoke with Kathi  Lazaro, [employer], baseline information was obtained.  Sister is HCR Christine      ASSESSMENT:  Active Health Care Proxies       Lazaro Irais ( [employer]) Health Care Representative   Primary Phone: [phone] (Mobile)                 Advance Directives  Does patient have a Health Care POA?: No  Was patient offered paperwork?: No  Does patient currently have a Health Care decision maker?: Yes, please see Health Care Proxy section  Does patient have Advance Directives?: No  Was patient offered paperwork?: No  Primary Contact: Christine Caballero (Sister)  [phone] (Mobile)   and  is Kathi          Readmission Root Cause  30 Day Readmission: No    Patient Information  Admitted from:: Home (Group Home)  Mental Status: Confused  During Assessment patient was accompanied by: Not accompanied during assessment  Assessment information provided by:: Other - please comment (, Kathi)  Primary Caregiver: Other (Comment)  Caregiver's Name[place] [facility]  Caregiver's Relationship to Patient:: Other (Specify) (Group Home)  Caregiver's Telephone Number:: [phone]  Support Systems: Home care staff, Family members  [place]idence[place] [facility]  What city do you live in?: [facility] entry access options. Select all that apply.: Ramp  Type of Current Residence: [facility]  Living Arrangements: Other (Comment) (Group Home)    Activities of Daily Living Prior to Admission  Functional Status: Assistance  Completes ADLs independently?: No  Level of ADL dependence: Assistance  Ambulates independently?: No  Level of ambulatory dependence: Total Dependent  Does patient use assisted devices?: Yes (Stand assist to mobilize patient. also has a sit stand)  Assisted Devices (DME) used: Wheelchair, Shower Chair  Does patient currently own DME?: Yes  What DME does the patient currently own?: Wheelchair, Shower Chair, Walker  Does patient have a history of Outpatient  Therapy (PT/OT)?: No  Does the patient have a history of Short-Term Rehab?: Yes ([facility])  Does patient have a history of HHC?: Yes ([facility])    Current Home Health Care  Home Health Agency Name:: [facility]    Patient Information Continued  Income Source: SSI/SSD  Does patient have prescription coverage?: Yes  Does patient receive dialysis treatments?: No  Does patient have a history of substance abuse?: No  Does patient have a history of Mental Health Diagnosis?: Yes (anxiety on medications)  Has patient received inpatient treatment related to mental health in the last 2 years?: Yes         Means of Transportation  Means of Transport to Appts:: Other (Comment) (Group Home has [employer] [facility])    Other:  Pt with ID resides in a Group Home, has 2 roommates.  Per Kathi they use a stand assist for Denita and they also have a sit to stand device as needed.  Pt is on a pureed diet at home, with reg liquids.       DISCHARGE DETAILS:    Discharge planning discussed with::   Freedom of Choice: Yes  Comments - Freedom of Choice: Discussed the staff at the Group home uses a stand assist for Denita. There plans is for pt to return home and they would like HHC, choice is [facility] as Denita had them in the past and a roommate in the home, has [facility]  CM contacted family/caregiver?: Yes       Requested Home Health Care         Is the patient interested in HHC at discharge?: Yes  Home Health Discipline requested:: Nursing, Physical Therapy  Home Health Agency Name:: [facility]  HHA External Referral Reason (only applicable if external HHA name selected): Patient has established relationship with provider  Home Health Follow-Up Provider:: PCP  Home Health Services Needed:: Gait/ADL Training, Evaluate Functional Status and Safety (Cardiopulmonary assessment/ med management)  Homebound Criteria Met:: Uses an Assist Device (i.e. cane, walker, etc)  Supporting Clincal  Findings:: Bed Bound or Wheelchair Bound    DME Referral Provided  Referral made for DME?: No    Other Referral/Resources/Interventions Provided:  Interventions: C  Referral Comments: Referral to [facility] was made     CM will continue to follow for dc needs.

## 2024-11-20 NOTE — PLAN OF CARE
Problem: OCCUPATIONAL THERAPY ADULT  Goal: Performs self-care activities at highest level of function for planned discharge setting.  See evaluation for individualized goals.  Description: Treatment Interventions: ADL retraining, Activityengagement, Energy conservation, Compensatory technique education, UE strengthening/ROM, Endurance training, Equipment evaluation/education          See flowsheet documentation for full assessment, interventions and recommendations.   Note: Limitation: Decreased ADL status, Decreased Safe judgement during ADL, Decreased cognition, Decreased endurance, Decreased self-care trans, Decreased high-level ADLs  Prognosis: Fair  Assessment: Pt is a 68 y.o. female, admitted to [facility] 11/19/2024 d/t experiencing generalized weakness and confusion. Dx: acute metabolic encephalopathy. Pt with PMHx impacting their performance during ADL tasks, including: anxiety, CHF, DM II, GERD, hyperlipidemia, ID, seizure disorder. Prior to admission to the hospital Pt was performing ADLs with physical assistance. IADLs with physical assistance. Functional transfers/ambulation with physical assistance. Cognitive status was PTA was impaired. OT order placed to assess Pt's ADLs, cognitive status, and performance during functional tasks in order to maximize safety and independence while making most appropriate d/c recommendations. PT/OT co-evaluation completed at this time d/t significant mobility deficits and safety concerns. Pt's clinical presentation is currently unstable/unpredictable given new onset deficits that effect Pt's occupational performance and ability to safely return to PLOF including decrease activity tolerance, decrease standing balance, decrease sitting balance, decrease performance during ADL tasks, decrease cognition, decrease generalized strength, decrease activity engagement, decrease performance during functional transfers, limited family support, and limited insight to deficits combined  with medical complications of edema/swelling and need for input for mobility technique/safety. Personal factors affecting Pt at time of initial evaluation include: inability to perform ADLs and decreased initiation and engagement. Pt will benefit from continued skilled OT services to address deficits as defined above and to maximize level independence/participation during ADLs and functional tasks to facilitate return toward PLOF and improved quality of life. From an occupational therapy standpoint, recommendation at time of d/c would be Level II: Moderate Resource Intensity.     Rehab Resource Intensity Level, OT: II (Moderate Resource Intensity)

## 2024-11-20 NOTE — PHYSICAL THERAPY NOTE
PHYSICAL THERAPY EVALUATION  NAME:  Denita Vital  DATE: 11/20/24    AGE:   68 y.o.  Mrn:   [id number]  ADMIT DX:  UTI (urinary tract infection) [N39.0]  Pneumonia [J18.9]  Weakness generalized [R53.1]  Generalized weakness [R53.1]  Hypothermia, initial encounter [T68.XXXA]  Acute metabolic encephalopathy [G93.41]    Past Medical History:   Diagnosis Date    Anxiety     CHF (congestive heart failure) (HCC)     Diabetes mellitus (HCC)     GERD (gastroesophageal reflux disease)     Hyperlipidemia     Hypertension     Leukopenia     Mental disability     Mixed incontinence 04/12/2017    Seizure disorder (HCC)      Length Of Stay: 1  Performed at least 2 patient identifiers during session: Assigned identification number and ID bracelet  PHYSICAL THERAPY EVALUATION :    11/20/24 1129   PT Last Visit   PT Visit Date 11/20/24   Note Type   Note type Evaluation   Pain Assessment   Pain Assessment Tool FLACC   Pain Rating: FLACC (Rest) - Face 0   Pain Rating: FLACC (Rest) - Legs 0   Pain Rating: FLACC (Rest) - Activity 0   Pain Rating: FLACC (Rest) - Cry 0   Pain Rating: FLACC (Rest) - Consolability 0   Score: FLACC (Rest) 0   Pain Rating: FLACC (Activity) - Face 1   Pain Rating: FLACC (Activity) - Legs 0   Pain Rating: FLACC (Activity) - Activity 0   Pain Rating: FLACC (Activity) - Cry 0   Pain Rating: FLACC (Activity) - Consolability 0   Score: FLACC (Activity) 1   Restrictions/Precautions   Other Precautions Contact/isolation;Cognitive;Chair Alarm;Bed Alarm;Fall Risk;Telemetry   Home Living   Type of Home Group Home   Home Layout One level;Ramped entrance   Home Equipment Walker;Wheelchair-manual  (sit to stand lift and stand assist device (similar to quick move))   Additional Comments Pt nonverbal and unable to provide home living information. Above information obtained from CM note.   Prior Function   Level of [place] Needs assistance with ADLs;Needs assistance with functional mobility;Needs assistance with  IADLS   Lives With Facility staff   Receives Help From   (facility staff)   IADLs Family/Friend/Other provides transportation;Family/Friend/Other provides meals;Family/Friend/Other provides medication management   Comments Per conversation with supervisor at group home, Adia, patient uses a device for transfers (similar to quick move) with Ax1 person to stand and if unable to stand uses sit<>stand mechanical lift, can wash her face if handed a wash cloth and with motion to use the bathroom.   General   Additional Pertinent History B LE edema   Cognition   Orientation Level Unable to assess  (nonverbal at baseline)   Following Commands Follows one step commands inconsistently   Subjective   Subjective pt is nonverbal   RLE Assessment   RLE Assessment   (minimal AROM offered strength grossly 2-/5)   LLE Assessment   LLE Assessment   (minimal AROM offered strength grossly 2-/5)   Bed Mobility   Rolling R 2  Maximal assistance   Additional items Assist x 1;Increased time required;Verbal cues   Supine to Sit 2  Maximal assistance   Additional items Assist x 1;Increased time required;Verbal cues;LE management  (trunk management)   Additional Comments HOB elevated with use of bedrail prn. maxAx1 to ahcieve sitting on EOB   Transfers   Sit to Stand 2  Maximal assistance   Additional items Assist x 2;Increased time required;Verbal cues   Stand to Sit 2  Maximal assistance   Additional items Assist x 2;Increased time required;Verbal cues   Stand pivot 1  Dependent  (use of quick move)   Additional Comments quick move used for transfers. manual cues for foot placement. sit<>stand with maxAx2 wiht manual cues for standing and in hip extension to place pads of quick move. required maxAx2 to stand from quick move and for controlle ddescent ot sit   Balance   Static Sitting   (poor + to poor wiht inc right lateral lean, manual cues to maintain sitting uprihgt posture)   Dynamic Sitting Poor -   Static Standing Poor -   Dynamic  Standing Poor -   Activity Tolerance   Activity Tolerance Patient limited by fatigue   Medical Staff Made Aware Fermin LATHAM   Nurse Made Aware Kathie LERMA   Assessment   Prognosis Fair   Problem List Decreased strength;Decreased endurance;Impaired balance;Decreased mobility;Decreased cognition;Impaired judgement;Decreased safety awareness;Obesity   Barriers to Discharge Comments pt has support from staff at group home however requires significant asisstanc eof 2 people to achieve standing   Goals   Patient Goals none stated   STG Expiration Date 12/04/24   PT Treatment Day 0   Plan   Treatment/Interventions ADL retraining;Functional transfer training;LE strengthening/ROM;Therapeutic exercise;Endurance training;Cognitive reorientation;Patient/family training;Equipment eval/education;Bed mobility;Gait training;Compensatory technique education;Spoke to nursing;Spoke to case management;OT   PT Frequency 2-3x/wk   Discharge Recommendation   Rehab Resource Intensity Level, PT II (Moderate Resource Intensity)   Additional Comments recommending PAR to faciliatte safe sit<>stand transfers and improved sitting balance at EOB to prepare for transfers prior to return to group home as aptient requires Ax2 at this time. Should she return to group home, will require use of sit<>stand mechanical lift or robson lift for transfers OOB   AM-PAC Basic Mobility Inpatient   Turning in Flat Bed Without Bedrails 2   Lying on Back to Sitting on Edge of Flat Bed Without Bedrails 2   Moving Bed to Chair 1   Standing Up From Chair Using Arms 1   Walk in Room 1   Climb 3-5 Stairs With Railing 1   Basic Mobility Inpatient Raw Score 8   Turning Head Towards Sound 3   Follow Simple Instructions 2   Low Function Basic Mobility Raw Score  13   Low Function Basic Mobility Standardized Score  20.14   [facility] Highest Level Of Mobility   -[facility] Goal 3: Sit at edge of bed   -HL Achieved 3: Sit at edge of bed   End of Consult   Patient Position at End  of Consult Bedside chair;Bed/Chair alarm activated;All needs within reach       Pt requires PT/OT co-eval due to medical complexity, safety concerns, fall risk, significant assistance with mobility and/or cognitive-behavioral impairments.    (Please find full objective findings from PT assessment regarding body systems outlined above).     Assessment: Pt is a 68 y.o. female seen for PT evaluation s/p admission to [facility] on 11/19/2024 with Acute metabolic encephalopathy.  Order placed for PT services.  Upon evaluation: Pt is presenting with impaired functional mobility due to decreased strength, decreased endurance, impaired balance, impaired cognition, decreased safety awareness, impaired judgment, fall risk, and LE edema requiring  maximal of 1 assistance for bed mobility and maximal of 2 to dependent assistance for transfers. Pt's clinical presentation is currently unpredictable given the functional mobility deficits above, especially weakness, edema of extremities, decreased endurance, impaired balance, decreased activity tolerance, decreased functional mobility tolerance, and decreased safety awareness, coupled with fall risks as indicated by AM-PAC 6-Clicks: 8/24 as well as impaired balance, polypharmacy, decreased safety awareness, and obesity and combined with medical complications of abnormal H&H, abnormal sodium values, need for input for mobility technique/safety, and pulmonary hypertension, acute metabolic encephalopathy .  Pt's PMHx and comorbidities that may affect physical performance and progress include: DM, seizure disorder, and Intellectual delay, anxiety, CHF, HTN . Personal factors affecting pt at time of IE include: inaccessible home environment, inability to perform IADLs, inability to perform ADLs, inability to navigate level surfaces without external assistance, and limited insight into impairments. Pt will benefit from continued skilled PT services to address deficits  as defined above and to maximize level of functional mobility to facilitate return toward PLOF and improved QOL. From PT/mobility standpoint, recommendation at time of d/c would be Level II (Moderate Resource Intensity in order to reduce fall risk and maximize pt's functional independence and consistency with mobility. Recommend ther ex next 1-2 sessions.       The patient's AM-PAC Basic Mobility Inpatient Short Form Raw Score is 8. A Raw score of less than or equal to 16 suggests the patient may benefit from discharge to post-acute rehabilitation services. Please also refer to the recommendation of the Physical Therapist for safe discharge planning.       Goals: Pt will: Perform bed mobility tasks with consistent modA of 1 to reposition in bed and prepare for transfers. Pt will perform transfers with  modAx2  to decrease burden of care, decrease risk for falls, and improve activity tolerance. Pt will sit EOB with Supervision for 10' to prepare for transfers. Pt will participate in objective balance assessment to determine baseline fall risk. Pt will increase B LE strength >/= 1/2 MMT grade to facilitate functional mobility.      Sonya Prado, PT,DPT

## 2024-11-20 NOTE — CASE MANAGEMENT
Case Management Discharge Planning Note    Patient name Denita Vital  Location /-01 MRN [id number]  : 1955 Date 2024       Current Admission Date: 2024  Current Admission Diagnosis:Acute metabolic encephalopathy   Patient Active Problem List    Diagnosis Date Noted Date Diagnosed    Pulmonary hypertension (HCC) 2024     Normal pressure hydrocephalus (HCC) 2024     Transaminitis 2024     Hypoglycemia 2023     Hyponatremia 2022     Open nondisplaced fracture of distal phalanx of left middle finger 2022     Avulsion of fingernail 2022     Acute urinary retention 2022     Acute metabolic encephalopathy 2022     Cystitis without hematuria 2022     Dysphagia 2021     Chronic anemia 2021     Chronically elevated right hemidiaphragm 2021     Obesity (BMI 30.0-34.9) 2021     Seizure disorder (HCC) 2021     Hyperlipidemia 2021     Abnormal x-ray 2021     Acute respiratory failure with hypoxia (HCC) 2021     Aspiration pneumonitis (HCC) 2021     Mood disorder ([facility]) 2021     Type 2 diabetes mellitus with hypoglycemia, without long-term current use of insulin ([facility]) 2020     Hypertension 2019     Gastroesophageal reflux disease without esophagitis 2018     Ambulatory dysfunction 2017     Intellectual disability 2017       LOS (days): 1  Geometric Mean LOS (GMLOS) (days): 3.6  Days to GMLOS:2.6     OBJECTIVE:  Risk of Unplanned Readmission Score: 26.83         Current admission status: Inpatient   Preferred Pharmacy:   [facility] - [address] [place] [address]  [address] [facility] [address]  Phone: [phone] Fax: [phone]    Primary Care Provider: Soy Clemente MD    Primary Insurance: MEDICARE  Secondary Insurance[place] [employer]    DISCHARGE DETAILS:    Recommendation is for MAUDE Willis  Supervisor of Group home, shared patient needs 2 people to stand with quick move, maxAx2  with recommendation for STR.  She would like to talk this over with her staff and  to see if they can accept Denita back at this level of care.  Will email PT notes to [email or web address]  Will need to follow up with ANEL Willis tomorrow..  Katey torres

## 2024-11-20 NOTE — ASSESSMENT & PLAN NOTE
Baseline, patient is intellectually delayed, nonverbal-but normally mimic, try to get up, normally titered drawing the board  Patient is much more alert, drawing with colored pencil (which per caregiver is baseline)  Per caregiver, patient was unable to get up by herself, and also very tired and not acting well  CT scan of head no significant other than a stable ventriculomegaly  CTA PE with abdomen and pelvis: No active infection noted-patient has bladder wall thickening -but urine analysis no significant  Patient has history of seizure disorder, is on Depakote, reviewed Depakote level  Patient also takes gabapentin 400 mg, Seroquel, sertraline-will continue at this time, consider to adjust as appropriate  Follow-up PT OT,   Appreciate speech recommendation.

## 2024-11-20 NOTE — SPEECH THERAPY NOTE
Speech Language/Pathology  Speech-Language Pathology Bedside Swallow Evaluation      Patient Name: Denita Vital    Today's Date: 11/20/2024    Summary   Consult received for bedside swallow assessment. Pt admitted w/ acute metabolic encephalopathy, hyponatremia. PMHx includes DM2, seizures, intellectual delay, pulmonary HTN, normal pressure hydrocephalus.   Currently on puree and thin liquids which is baseline for pt. SLP familiar w/ pt from previous admissions. Pt is pleasant and cooperative, nonverbal at baseline. Seen w/ breakfast meal which pt was self feeding at rapid rate w/ large bites. Pt demo'd adequate oral manipulation and clearance. Swallow appears prompt, cough x2 w/ puree.    Risk/s for Aspiration: Moderate     Recommended Diet: puree/level 1 diet and thin liquids   Recommended Form of Meds: crushed with puree   Aspiration precautions and swallowing strategies: upright posture, slow rate of feeding, and small bites/sips  Other Recommendations: Continue frequent oral care        Current Medical Status  Denita Vital is a 68 y.o. female with a PMH of intellectually delayed, nonverbal who presents with generalized weakness, confusion.  History is taken from caregiver at bedside.  Per caregiver, patient normally mimic, tried to stand up by herself with support, also reports at regular time, patient tried to draw on the board.  Which all are abnormal since this morning.  Per caregiver, patient was having hard time to get up from the commode and needing assistance.  Patient also sleeping and confused and looks tired and not acting on regular basis.  Denies any medication change, nausea, vomiting.  But caregiver noticed some thick cough.  Denies any wheezing.  Denies any sick contact..    Current Precautions:  Fall  Aspiration      Allergies:  No known food allergies    Past medical history:  Please see H&P for details    Special Studies:  CT chest:  1.  No pulmonary embolus.  2.  Enlarged pulmonary  trunk consistent with pulmonary hypertension.  3.  Stable diffuse groundglass opacities which may be due edema, infection, or inflammation.  4.  Bladder wall thickening. Correlate for cystitis.  5.  Constipation.  6.  Stable 1 cm pancreatic cyst. For simple cyst(s) less than 1.5 cm, recommend follow-up every 2 years for 5 times or to age 80, whichever comes first. Follow-up can stop at age 80 or can switch over to 80 year or older algorithm. Recommend next   follow-up in 2 years. Preferred imaging modality: abdomen MRI and MRCP with and without IV contrast, or triple phase abdomen CT with IV contrast, or abdomen MRI and MRCP without IV contrast.    CT Head:  No acute intracranial abnormality.  Stable ventriculomegaly which could be due to normal pressure hydrocephalus.    Social/Education/Vocational Hx:  Pt lives in group home    Swallow Information   Current Risks for Dysphagia & Aspiration: known history of dysphagia  Current Symptoms/Concerns:  baseline altered diet  Current Diet: puree/level 1 diet and thin liquids   Baseline Diet: puree/level 1 diet and thin liquids      Baseline Assessment   Behavior/Cognition: alert  Speech/Language Status: able to follow commands inconsistently  Patient Positioning: upright in bed  Pain Status/Interventions/Response to Interventions:   No report of or nonverbal indications of pain.       Swallow Mechanism Exam  Facial: symmetrical  Labial: unable to test 2/2 limited command following  Lingual: unable to test 2/2 limited command following  Velum: symmetrical  Mandible: adequate ROM  Dentition: edentulous  Vocal quality: pt nonverbal    Volitional Cough: strong/productive   Respiratory Status: on RA       Consistencies Assessed and Performance   Consistencies Administered: thin liquids and puree    Oral Stage: mild  Manipulation was adequate w/ purees.  Bolus formation and transfer were functional with no significant oral residue noted.  No overt s/s reduced oral  control.    Pharyngeal Stage:  suspect grossly WFL  Swallow Mechanics:  Swallowing initiation appeared prompt.  Laryngeal rise was palpated and judged to be within functional limits. Cough x2 w/ puree     Esophageal Concerns: none reported      Summary and Recommendations (see above)    Results Reviewed with: patient and RN     Treatment Recommended: None at this time, pt on baseline diet and tolerating well     Keily Washington MS CCC-SLP  11/20/2024

## 2024-11-20 NOTE — PLAN OF CARE
Problem: Potential for Falls  Goal: Patient will remain free of falls  Description: INTERVENTIONS:  - Educate patient/family on patient safety including physical limitations  - Instruct patient to call for assistance with activity   - Consult OT/PT to assist with strengthening/mobility   - Keep Call bell within reach  - Keep bed low and locked with side rails adjusted as appropriate  - Keep care items and personal belongings within reach  - Initiate and maintain comfort rounds  - Make Fall Risk Sign visible to staff  - Offer Toileting every 2 Hours, in advance of need  - Initiate/Maintain bed alarm  - Obtain necessary fall risk management equipment: socks, chair alarm  - Apply yellow socks and bracelet for high fall risk patients  - Consider moving patient to room near nurses station  Outcome: Progressing     Problem: PAIN - ADULT  Goal: Verbalizes/displays adequate comfort level or baseline comfort level  Description: Interventions:  - Encourage patient to monitor pain and request assistance  - Assess pain using appropriate pain scale  - Administer analgesics based on type and severity of pain and evaluate response  - Implement non-pharmacological measures as appropriate and evaluate response  - Consider cultural and social influences on pain and pain management  - Notify physician/advanced practitioner if interventions unsuccessful or patient reports new pain  Outcome: Progressing     Problem: INFECTION - ADULT  Goal: Absence or prevention of progression during hospitalization  Description: INTERVENTIONS:  - Assess and monitor for signs and symptoms of infection  - Monitor lab/diagnostic results  - Monitor all insertion sites, i.e. indwelling lines, tubes, and drains  - Monitor endotracheal if appropriate and nasal secretions for changes in amount and color  - Hamer appropriate cooling/warming therapies per order  - Administer medications as ordered  - Instruct and encourage patient and family to use good  hand hygiene technique  - Identify and instruct in appropriate isolation precautions for identified infection/condition  Outcome: Progressing  Goal: Absence of fever/infection during neutropenic period  Description: INTERVENTIONS:  - Monitor WBC    Outcome: Progressing

## 2024-11-20 NOTE — OCCUPATIONAL THERAPY NOTE
Occupational Therapy Evaluation     Patient Name: Denita Vital  Today's Date: 11/20/2024  Problem List  Principal Problem:    Acute metabolic encephalopathy  Active Problems:    Type 2 diabetes mellitus with hypoglycemia, without long-term current use of insulin (HCC)    Seizure disorder (HCC)    Hyponatremia    Pulmonary hypertension (HCC)    Normal pressure hydrocephalus (HCC)    Past Medical History  Past Medical History:   Diagnosis Date    Anxiety     CHF (congestive heart failure) (HCC)     Diabetes mellitus (HCC)     GERD (gastroesophageal reflux disease)     Hyperlipidemia     Hypertension     Leukopenia     Mental disability     Mixed incontinence 04/12/2017    Seizure disorder (HCC)      Past Surgical History  Past Surgical History:   Procedure Laterality Date    DENTAL SURGERY           11/20/24 1130   OT Last Visit   OT Visit Date 11/20/24   Note Type   Note type Evaluation   Pain Assessment   Pain Assessment Tool FLACC   Pain Rating: FLACC (Rest) - Face 0   Pain Rating: FLACC (Rest) - Legs 0   Pain Rating: FLACC (Rest) - Activity 0   Pain Rating: FLACC (Rest) - Cry 0   Pain Rating: FLACC (Rest) - Consolability 0   Score: FLACC (Rest) 0   Pain Rating: FLACC (Activity) - Face 0   Pain Rating: FLACC (Activity) - Legs 0   Pain Rating: FLACC (Activity) - Activity 0   Pain Rating: FLACC (Activity) - Cry 0   Pain Rating: FLACC (Activity) - Consolability 0   Score: FLACC (Activity) 0   Restrictions/Precautions   Other Precautions Contact/isolation;Chair Alarm;Bed Alarm;Telemetry   Home Living   Type of Home Group Home   Home Layout One level;Ramped entrance   Home Equipment Walker;Wheelchair-manual  (sit to stand life, quick move)   Additional Comments Pt nonverbal and unable to provide home living information. Above information obtained from CM note.   Prior Function   Level of Paul Needs assistance with IADLS;Needs assistance with functional mobility;Needs assistance with ADLs   Lives With  Facility staff   Receives Help From Home health  (group home facility staff)   IADLs Family/Friend/Other provides transportation;Family/Friend/Other provides meals;Family/Friend/Other provides medication management   Comments Pt unable to provide information due to being nonverbal. Information obtained from CM note.   ADL   Where Assessed Chair   UB Dressing Assistance 1  Total Assistance   LB Dressing Assistance 1  Total Assistance   Additional Comments Therapist assisted with doffing/donning B socks   Bed Mobility   Supine to Sit 2  Maximal assistance   Transfers   Sit to Stand 2  Maximal assistance   Additional items Assist x 2   Stand to Sit 3  Moderate assistance   Additional items Assist x 2   Stand pivot 1  Dependent   Additional items   (quick move)   Functional Mobility   Additional Comments used quick move for bed to recliner transfer; pt uses this at baseline   Balance   Static Sitting Poor +   Dynamic Sitting Poor +   Static Standing Poor -   Dynamic Standing Poor -   Ambulatory Zero   Activity Tolerance   Activity Tolerance Patient limited by fatigue   Medical Staff Made Aware PT Sonya WONG Assessment   RUE Assessment WFL   LUE Assessment   LUE Assessment WFL   Hand Function   Gross Motor Coordination Functional   Fine Motor Coordination Functional   Cognition   Overall Cognitive Status Impaired   Arousal/Participation Alert   Attention Difficulty attending to directions   Orientation Level Unable to assess   Memory Unable to assess   Following Commands Follows one step commands inconsistently   Assessment   Limitation Decreased ADL status;Decreased Safe judgement during ADL;Decreased cognition;Decreased endurance;Decreased self-care trans;Decreased high-level ADLs   Prognosis Fair   Assessment Pt is a 68 y.o. female, admitted to [facility] 11/19/2024 d/t experiencing generalized weakness and confusion. Dx: acute metabolic encephalopathy. Pt with PMHx impacting their performance during ADL tasks, including:  anxiety, CHF, DM II, GERD, hyperlipidemia, ID, seizure disorder. Prior to admission to the hospital Pt was performing ADLs with physical assistance. IADLs with physical assistance. Functional transfers/ambulation with physical assistance. Cognitive status was PTA was impaired. OT order placed to assess Pt's ADLs, cognitive status, and performance during functional tasks in order to maximize safety and independence while making most appropriate d/c recommendations. PT/OT co-evaluation completed at this time d/t significant mobility deficits and safety concerns. Pt's clinical presentation is currently unstable/unpredictable given new onset deficits that effect Pt's occupational performance and ability to safely return to PLOF including decrease activity tolerance, decrease standing balance, decrease sitting balance, decrease performance during ADL tasks, decrease cognition, decrease generalized strength, decrease activity engagement, decrease performance during functional transfers, limited family support, and limited insight to deficits combined with medical complications of edema/swelling and need for input for mobility technique/safety. Personal factors affecting Pt at time of initial evaluation include: inability to perform ADLs and decreased initiation and engagement. Pt will benefit from continued skilled OT services to address deficits as defined above and to maximize level independence/participation during ADLs and functional tasks to facilitate return toward PLOF and improved quality of life. From an occupational therapy standpoint, recommendation at time of d/c would be Level II: Moderate Resource Intensity.   Plan   Treatment Interventions ADL retraining;Activityengagement;Energy conservation;Compensatory technique education;UE strengthening/ROM;Endurance training;Equipment evaluation/education   Goal Expiration Date 12/04/24   OT Frequency 2-3x/wk   Discharge Recommendation   Rehab Resource Intensity  Level, OT II (Moderate Resource Intensity)   AM-PAC Daily Activity Inpatient   Lower Body Dressing 1   Bathing 1   Toileting 1   Upper Body Dressing 2   Grooming 2   Eating 3   Daily Activity Raw Score 10   Turning Head Towards Sound 3   Follow Simple Instructions 2   Low Function Daily Activity Raw Score 15   Low Function Daily Activity Standardized Score  26.28   AM-PAC Applied Cognition Inpatient   Following a Speech/Presentation 2   Understanding Ordinary Conversation 2   Taking Medications 2   Remembering Where Things Are Placed or Put Away 1   Remembering List of 4-5 Errands 1   Taking Care of Complicated Tasks 1   Applied Cognition Raw Score 9   Applied Cognition Standardized Score 22.48   End of Consult   Education Provided Yes   Patient Position at End of Consult Bed/Chair alarm activated;Bedside chair   Nurse Communication Nurse aware of consult     The patient's raw score on the AM-PAC Daily Activity Inpatient Short Form is 10. A raw score of less than 19 suggests the patient may benefit from discharge to post-acute rehabilitation services. Please refer to the recommendation of the Occupational Therapist for safe discharge planning.    Pt goals to be met by 12/4/2024    Pt will demonstrate ability to complete grooming/hygiene tasks @ Min A after set-up.  Pt will demonstrate ability to complete supine<>sit @ Mod A x1 in order to increase safety and independence during ADL tasks.  Pt will demonstrate ability to complete UB ADLs including washing/dressing @ Min A in order to increase performance and participation during meaningful tasks  Pt will demonstrate ability to complete LB dressing @ Max A x1 in order to increase safety and independence during meaningful tasks.   Pt will demonstrate ability to complete EOB, chair, toilet/commode transfers with 1 person assist using quick move in order to increase performance and participation during functional tasks.  Pt will demonstrate ability to stand for 2 minutes  while maintaining poor+ balance with use of grab bar for UB support PRN.  Pt will demonstrate ability to tolerate 30-35 minute OT session with no vc'ing for deep breathing or use of energy conservation techniques in order to increase activity tolerance during functional tasks.   Pt will demonstrate Good carryover of use of energy conservation/compensatory strategies during ADLs and functional tasks in order to increase safety and reduce risk for falls.   Pt will attend to continued cognitive assessments 100% of the time in order to provide most appropriate d/c recommendations.     Fermin Vieyra, OTR/L

## 2024-11-20 NOTE — PLAN OF CARE
Problem: Potential for Falls  Goal: Patient will remain free of falls  Description: INTERVENTIONS:  - Educate patient/family on patient safety including physical limitations  - Instruct patient to call for assistance with activity   - Consult OT/PT to assist with strengthening/mobility   - Keep Call bell within reach  - Keep bed low and locked with side rails adjusted as appropriate  - Keep care items and personal belongings within reach  - Initiate and maintain comfort rounds  - Make Fall Risk Sign visible to staff  - Offer Toileting every 2 Hours, in advance of need  - Initiate/Maintain bed alarm  - Obtain necessary fall risk management equipment: socks, chair alarm  - Apply yellow socks and bracelet for high fall risk patients  - Consider moving patient to room near nurses station  Outcome: Progressing     Problem: PAIN - ADULT  Goal: Verbalizes/displays adequate comfort level or baseline comfort level  Description: Interventions:  - Encourage patient to monitor pain and request assistance  - Assess pain using appropriate pain scale  - Administer analgesics based on type and severity of pain and evaluate response  - Implement non-pharmacological measures as appropriate and evaluate response  - Consider cultural and social influences on pain and pain management  - Notify physician/advanced practitioner if interventions unsuccessful or patient reports new pain  Outcome: Progressing     Problem: INFECTION - ADULT  Goal: Absence or prevention of progression during hospitalization  Description: INTERVENTIONS:  - Assess and monitor for signs and symptoms of infection  - Monitor lab/diagnostic results  - Monitor all insertion sites, i.e. indwelling lines, tubes, and drains  - Monitor endotracheal if appropriate and nasal secretions for changes in amount and color  - [place] appropriate cooling/warming therapies per order  - Administer medications as ordered  - Instruct and encourage patient and family to use good  hand hygiene technique  - Identify and instruct in appropriate isolation precautions for identified infection/condition  Outcome: Progressing  Goal: Absence of fever/infection during neutropenic period  Description: INTERVENTIONS:  - Monitor WBC    Outcome: Progressing     Problem: SAFETY ADULT  Goal: Patient will remain free of falls  Description: INTERVENTIONS:  - Educate patient/family on patient safety including physical limitations  - Instruct patient to call for assistance with activity   - Consult OT/PT to assist with strengthening/mobility   - Keep Call bell within reach  - Keep bed low and locked with side rails adjusted as appropriate  - Keep care items and personal belongings within reach  - Initiate and maintain comfort rounds  - Make Fall Risk Sign visible to staff  - Offer Toileting every 2 Hours, in advance of need  - Initiate/Maintain bed alarm  - Obtain necessary fall risk management equipment: socks, chair alarm  - Apply yellow socks and bracelet for high fall risk patients  - Consider moving patient to room near nurses station  Outcome: Progressing  Goal: Maintain or return to baseline ADL function  Description: INTERVENTIONS:  -  Assess patient's ability to carry out ADLs; assess patient's baseline for ADL function and identify physical deficits which impact ability to perform ADLs (bathing, care of mouth/teeth, toileting, grooming, dressing, etc.)  - Assess/evaluate cause of self-care deficits   - Assess range of motion  - Assess patient's mobility; develop plan if impaired  - Assess patient's need for assistive devices and provide as appropriate  - Encourage maximum independence but intervene and supervise when necessary  - Involve family in performance of ADLs  - Assess for home care needs following discharge   - Consider OT consult to assist with ADL evaluation and planning for discharge  - Provide patient education as appropriate  Outcome: Progressing  Goal: Maintains/Returns to pre  admission functional level  Description: INTERVENTIONS:  - Perform AM-PAC 6 Click Basic Mobility/ Daily Activity assessment daily.  - Set and communicate daily mobility goal to care team and patient/family/caregiver.   - Collaborate with rehabilitation services on mobility goals if consulted  - Perform Range of Motion 6 times a day.  - Reposition patient every 2 hours.  - Dangle patient 3 times a day  - Stand patient 3 times a day  - Out of bed to chair 3 times a day   - Out of bed for meals 3 times a day  - Out of bed for toileting  - Record patient progress and toleration of activity level   Outcome: Progressing     Problem: DISCHARGE PLANNING  Goal: Discharge to home or other facility with appropriate resources  Description: INTERVENTIONS:  - Identify barriers to discharge w/patient and caregiver  - Arrange for needed discharge resources and transportation as appropriate  - Identify discharge learning needs (meds, wound care, etc.)  - Arrange for interpretive services to assist at discharge as needed  - Refer to Case Management Department for coordinating discharge planning if the patient needs post-hospital services based on physician/advanced practitioner order or complex needs related to functional status, cognitive ability, or social support system  Outcome: Progressing     Problem: Knowledge Deficit  Goal: Patient/family/caregiver demonstrates understanding of disease process, treatment plan, medications, and discharge instructions  Description: Complete learning assessment and assess knowledge base.  Interventions:  - Provide teaching at level of understanding  - Provide teaching via preferred learning methods  Outcome: Progressing     Problem: Prexisting or High Potential for Compromised Skin Integrity  Goal: Skin integrity is maintained or improved  Description: INTERVENTIONS:  - Identify patients at risk for skin breakdown  - Assess and monitor skin integrity  - Assess and monitor nutrition and hydration  status  - Monitor labs   - Assess for incontinence   - Turn and reposition patient  - Assist with mobility/ambulation  - Relieve pressure over bony prominences  - Avoid friction and shearing  - Provide appropriate hygiene as needed including keeping skin clean and dry  - Evaluate need for skin moisturizer/barrier cream  - Collaborate with interdisciplinary team   - Patient/family teaching  - Consider wound care consult   Outcome: Progressing

## 2024-11-20 NOTE — PROGRESS NOTES
"Progress Note - Hospitalist   Name: Denita Vital 68 y.o. female I MRN: [id number]  Unit/Bed#: -01 I Date of Admission: 11/19/2024   Date of Service: 11/20/2024 I Hospital Day: 1    Assessment & Plan  Acute metabolic encephalopathy  Baseline, patient is intellectually delayed, nonverbal-but normally mimic, try to get up, normally titered drawing the board  Patient is much more alert, drawing with colored pencil (which per caregiver is baseline)  Per caregiver, patient was unable to get up by herself, and also very tired and not acting well  CT scan of head no significant other than a stable ventriculomegaly  CTA PE with abdomen and pelvis: No active infection noted-patient has bladder wall thickening -but urine analysis no significant  Patient has history of seizure disorder, is on Depakote, reviewed Depakote level  Patient also takes gabapentin 400 mg, Seroquel, sertraline-will continue at this time, consider to adjust as appropriate  Follow-up PT OT,   Appreciate speech recommendation.  Hyponatremia  Chronic in nature, patient came with sodium level of 131>133  Imaging shows: Pulmonary hypertension,Stable diffuse groundglass opacities which may be due edema, infection, or inflammation   Patient received IV hydration, since patient blood pressure is running low, will give 2 L bolus and recheck sodium level  Type 2 diabetes mellitus with hypoglycemia, without long-term current use of insulin (HCC)  Lab Results   Component Value Date    HGBA1C 5.9 (H) 06/26/2024       No results for input(s): \"POCGLU\" in the last 72 hours.    Blood Sugar Average: Last 72 hrs:  Sliding scale with hypoglycemia precaution.    Seizure disorder (HCC)  Patient is on Depakote, gabapentin,  Check Depakote level.  Pulmonary hypertension (HCC)  CTA PE study shows no PE, but pulmonary hypertension  Patient had previous echocardiogram back in 2021, shows ejection fraction of 60%  Was on low-dose of torsemide, remained on hold  Follow " repeat echocardiogram  Normal pressure hydrocephalus (HCC)  CT scan of head:Stable ventriculomegaly which could be due to normal pressure hydrocephalus.   Conservative management.    VTE Pharmacologic Prophylaxis: VTE Score: 6 High Risk (Score >/= 5) - Pharmacological DVT Prophylaxis Ordered: heparin. Sequential Compression Devices Ordered.    Mobility:   Basic Mobility Inpatient Raw Score: 12  JH-HLM Goal: 4: Move to chair/commode  JH-HLM Achieved: 2: Bed activities/Dependent transfer  JH-HLM Goal NOT achieved. Continue with multidisciplinary rounding and encourage appropriate mobility to improve upon JH-HLM goals.    Patient Centered Rounds: I performed bedside rounds with nursing staff today.   Discussions with Specialists or Other Care Team Provider: None    Education and Discussions with Family / Patient: Updated  (caregiver) via phone.    Current Length of Stay: 1 day(s)  Current Patient Status: Inpatient   Certification Statement: The patient will continue to require additional inpatient hospital stay due to monitorable conditions  Discharge Plan: Anticipate discharge in 48-72 hrs to group home.    Code Status: Level 1 - Full Code    Subjective   Seen and evaluated during the rounds.  Much more alert, drawing using colored pencil.    Objective :  Temp:  [95.6 °F (35.3 °C)-99.1 °F (37.3 °C)] 98.3 °F (36.8 °C)  HR:  [62-78] 62  BP: ()/(40-74) 125/72  Resp:  [11-24] 13  SpO2:  [90 %-100 %] 91 %  O2 Device: None (Room air)    Body mass index is 31.22 kg/m².     Input and Output Summary (last 24 hours):     Intake/Output Summary (Last 24 hours) at 11/20/2024 0806  Last data filed at 11/20/2024 0637  Gross per 24 hour   Intake 2380 ml   Output 1953 ml   Net 427 ml       Physical Exam  Vitals and nursing note reviewed.   Constitutional:       Appearance: She is not ill-appearing or diaphoretic.   Eyes:      Extraocular Movements: Extraocular movements intact.      Conjunctiva/sclera: Conjunctivae  normal.      Pupils: Pupils are equal, round, and reactive to light.   Cardiovascular:      Rate and Rhythm: Normal rate.      Heart sounds: No murmur heard.     No friction rub. No gallop.   Pulmonary:      Effort: No respiratory distress.      Breath sounds: No stridor. No wheezing or rhonchi.   Abdominal:      General: There is no distension.      Palpations: There is no mass.      Tenderness: There is no abdominal tenderness.      Hernia: No hernia is present.   Musculoskeletal:      Right lower leg: No edema.      Left lower leg: No edema.   Neurological:      Mental Status: She is alert. Mental status is at baseline.         Lines/Drains:              Lab Results: I have reviewed the following results:   Results from last 7 days   Lab Units 11/20/24  0614 11/19/24  1039   WBC Thousand/uL 4.50 4.01*   HEMOGLOBIN g/dL 10.2* 12.8   HEMATOCRIT % 31.2* 40.6   PLATELETS Thousands/uL 118* 134*   SEGS PCT %  --  66   LYMPHO PCT %  --  25   MONO PCT %  --  6   EOS PCT %  --  2     Results from last 7 days   Lab Units 11/20/24  0614 11/19/24  1127   SODIUM mmol/L 133* 131*   POTASSIUM mmol/L 3.9 4.1   CHLORIDE mmol/L 100 98   CO2 mmol/L 30 28   BUN mg/dL 10 13   CREATININE mg/dL 0.55* 0.58*   ANION GAP mmol/L 3* 5   CALCIUM mg/dL 9.2 9.4   ALBUMIN g/dL  --  3.6   TOTAL BILIRUBIN mg/dL  --  0.25   ALK PHOS U/L  --  62   ALT U/L  --  21   AST U/L  --  20   GLUCOSE RANDOM mg/dL 69 136     Results from last 7 days   Lab Units 11/19/24  1039   INR  0.97             Results from last 7 days   Lab Units 11/19/24  1039   LACTIC ACID mmol/L 1.5   PROCALCITONIN ng/ml <0.05       Recent Cultures (last 7 days):   Results from last 7 days   Lab Units 11/19/24  1108 11/19/24  1039   BLOOD CULTURE  Received in Microbiology Lab. Culture in Progress. Received in Microbiology Lab. Culture in Progress.       Imaging Results Review: No pertinent imaging studies reviewed.  Other Study Results Review: No additional pertinent studies  reviewed.    Last 24 Hours Medication List:     Current Facility-Administered Medications:     acetaminophen (TYLENOL) tablet 650 mg, Q6H PRN    aspirin (ECOTRIN LOW STRENGTH) EC tablet 81 mg, Daily    atorvastatin (LIPITOR) tablet 40 mg, Daily With Dinner    bisacodyl (DULCOLAX) EC tablet 5 mg, Daily PRN    Cranberry CAPS 450 mg, Daily    divalproex sodium (DEPAKOTE) DR tablet 250 mg, Q12H RONNELL    docusate sodium (COLACE) capsule 100 mg, BID    ergocalciferol (VITAMIN D2) capsule 50,000 Units, Weekly    gabapentin (NEURONTIN) capsule 400 mg, HS    heparin (porcine) subcutaneous injection 5,000 Units, Q8H RONNELL    levothyroxine tablet 50 mcg, Early Morning    methenamine hippurate (HIPREX) tablet 1 g, BID With Meals    ondansetron (ZOFRAN) injection 4 mg, Q6H PRN    oxybutynin (DITROPAN-XL) 24 hr tablet 5 mg, Daily    pantoprazole (PROTONIX) EC tablet 20 mg, BID AC    polyethylene glycol (MIRALAX) packet 17 g, Q48H PRN    QUEtiapine (SEROquel) tablet 200 mg, HS    QUEtiapine (SEROquel) tablet 50 mg, QAM    senna (SENOKOT) tablet 8.6 mg, HS    sertraline (ZOLOFT) tablet 200 mg, Daily With Breakfast    Administrative Statements   Today, Patient Was Seen By: Joey Miranda MD      **Please Note: This note may have been constructed using a voice recognition system.**

## 2024-11-20 NOTE — PLAN OF CARE
Problem: PHYSICAL THERAPY ADULT  Goal: Performs mobility at highest level of function for planned discharge setting.  See evaluation for individualized goals.  Description: Treatment/Interventions: ADL retraining, Functional transfer training, LE strengthening/ROM, Therapeutic exercise, Endurance training, Cognitive reorientation, Patient/family training, Equipment eval/education, Bed mobility, Gait training, Compensatory technique education, Spoke to nursing, Spoke to case management, OT          See flowsheet documentation for full assessment, interventions and recommendations.  Note: Prognosis: Fair  Problem List: Decreased strength, Decreased endurance, Impaired balance, Decreased mobility, Decreased cognition, Impaired judgement, Decreased safety awareness, Obesity  Assessment: Pt is a 68 y.o. female seen for PT evaluation s/p admission to [facility] on 11/19/2024 with Acute metabolic encephalopathy.  Order placed for PT services.  Upon evaluation: Pt is presenting with impaired functional mobility due to decreased strength, decreased endurance, impaired balance, impaired cognition, decreased safety awareness, impaired judgment, fall risk, and LE edema requiring  maximal of 1 assistance for bed mobility and maximal of 2 to dependent assistance for transfers. Pt's clinical presentation is currently unpredictable given the functional mobility deficits above, especially weakness, edema of extremities, decreased endurance, impaired balance, decreased activity tolerance, decreased functional mobility tolerance, and decreased safety awareness, coupled with fall risks as indicated by AM-PAC 6-Clicks: 8/24 as well as impaired balance, polypharmacy, decreased safety awareness, and obesity and combined with medical complications of abnormal H&H, abnormal sodium values, need for input for mobility technique/safety, and pulmonary hypertension, acute metabolic encephalopathy .  Pt's PMHx and  comorbidities that may affect physical performance and progress include: DM, seizure disorder, and Intellectual delay, anxiety, CHF, HTN . Personal factors affecting pt at time of IE include: inaccessible home environment, inability to perform IADLs, inability to perform ADLs, inability to navigate level surfaces without external assistance, and limited insight into impairments. Pt will benefit from continued skilled PT services to address deficits as defined above and to maximize level of functional mobility to facilitate return toward PLOF and improved QOL. From PT/mobility standpoint, recommendation at time of d/c would be Level II (Moderate Resource Intensity in order to reduce fall risk and maximize pt's functional independence and consistency with mobility. Recommend ther ex next 1-2 sessions.     Barriers to Discharge Comments: pt has support from staff at group home however requires significant asisstanc eof 2 people to achieve standing  Rehab Resource Intensity Level, PT: II (Moderate Resource Intensity)    See flowsheet documentation for full assessment.

## 2024-11-21 PROBLEM — E83.42 HYPOMAGNESEMIA: Status: ACTIVE | Noted: 2024-11-21

## 2024-11-21 LAB
ALBUMIN SERPL BCG-MCNC: 3.3 G/DL (ref 3.5–5)
ALP SERPL-CCNC: 55 U/L (ref 34–104)
ALT SERPL W P-5'-P-CCNC: 18 U/L (ref 7–52)
ANION GAP SERPL CALCULATED.3IONS-SCNC: 3 MMOL/L (ref 4–13)
AST SERPL W P-5'-P-CCNC: 18 U/L (ref 13–39)
BASOPHILS # BLD AUTO: 0 THOUSANDS/ÂΜL (ref 0–0.1)
BASOPHILS NFR BLD AUTO: 0 % (ref 0–1)
BILIRUB SERPL-MCNC: 0.28 MG/DL (ref 0.2–1)
BUN SERPL-MCNC: 13 MG/DL (ref 5–25)
CALCIUM ALBUM COR SERPL-MCNC: 9.7 MG/DL (ref 8.3–10.1)
CALCIUM SERPL-MCNC: 9.1 MG/DL (ref 8.4–10.2)
CHLORIDE SERPL-SCNC: 97 MMOL/L (ref 96–108)
CO2 SERPL-SCNC: 28 MMOL/L (ref 21–32)
CREAT SERPL-MCNC: 0.49 MG/DL (ref 0.6–1.3)
CREAT UR-MCNC: 9.3 MG/DL
EOSINOPHIL # BLD AUTO: 0.05 THOUSAND/ÂΜL (ref 0–0.61)
EOSINOPHIL NFR BLD AUTO: 1 % (ref 0–6)
ERYTHROCYTE [DISTWIDTH] IN BLOOD BY AUTOMATED COUNT: 16.1 % (ref 11.6–15.1)
FERRITIN SERPL-MCNC: 156 NG/ML (ref 11–307)
FOLATE SERPL-MCNC: >22.3 NG/ML
GFR SERPL CREATININE-BSD FRML MDRD: 99 ML/MIN/1.73SQ M
GLUCOSE SERPL-MCNC: 128 MG/DL (ref 65–140)
HCT VFR BLD AUTO: 28.7 % (ref 34.8–46.1)
HGB BLD-MCNC: 9.6 G/DL (ref 11.5–15.4)
IMM GRANULOCYTES # BLD AUTO: 0.01 THOUSAND/UL (ref 0–0.2)
IMM GRANULOCYTES NFR BLD AUTO: 0 % (ref 0–2)
IRON SATN MFR SERPL: 35 % (ref 15–50)
IRON SERPL-MCNC: 86 UG/DL (ref 50–212)
L PNEUMO1 AG UR QL IA.RAPID: NEGATIVE
LYMPHOCYTES # BLD AUTO: 1.31 THOUSANDS/ÂΜL (ref 0.6–4.47)
LYMPHOCYTES NFR BLD AUTO: 33 % (ref 14–44)
MAGNESIUM SERPL-MCNC: 1.5 MG/DL (ref 1.9–2.7)
MCH RBC QN AUTO: 29.1 PG (ref 26.8–34.3)
MCHC RBC AUTO-ENTMCNC: 33.4 G/DL (ref 31.4–37.4)
MCV RBC AUTO: 87 FL (ref 82–98)
MONOCYTES # BLD AUTO: 0.48 THOUSAND/ÂΜL (ref 0.17–1.22)
MONOCYTES NFR BLD AUTO: 12 % (ref 4–12)
MRSA NOSE QL CULT: NORMAL
NEUTROPHILS # BLD AUTO: 2.17 THOUSANDS/ÂΜL (ref 1.85–7.62)
NEUTS SEG NFR BLD AUTO: 54 % (ref 43–75)
NRBC BLD AUTO-RTO: 0 /100 WBCS
OSMOLALITY UR/SERPL-RTO: 283 MMOL/KG (ref 282–298)
OSMOLALITY UR: 241 MMOL/KG (ref 250–900)
PLATELET # BLD AUTO: 107 THOUSANDS/UL (ref 149–390)
PMV BLD AUTO: 9.7 FL (ref 8.9–12.7)
POTASSIUM SERPL-SCNC: 3.7 MMOL/L (ref 3.5–5.3)
PROCALCITONIN SERPL-MCNC: <0.05 NG/ML
PROT SERPL-MCNC: 5.6 G/DL (ref 6.4–8.4)
RBC # BLD AUTO: 3.3 MILLION/UL (ref 3.81–5.12)
S PNEUM AG UR QL: NEGATIVE
SODIUM 24H UR-SCNC: 70 MOL/L
SODIUM SERPL-SCNC: 128 MMOL/L (ref 135–147)
TIBC SERPL-MCNC: 248 UG/DL (ref 250–450)
UIBC SERPL-MCNC: 162 UG/DL (ref 155–355)
VIT B12 SERPL-MCNC: 354 PG/ML (ref 180–914)
WBC # BLD AUTO: 4.02 THOUSAND/UL (ref 4.31–10.16)

## 2024-11-21 PROCEDURE — 83550 IRON BINDING TEST: CPT | Performed by: FAMILY MEDICINE

## 2024-11-21 PROCEDURE — 82746 ASSAY OF FOLIC ACID SERUM: CPT | Performed by: FAMILY MEDICINE

## 2024-11-21 PROCEDURE — 99223 1ST HOSP IP/OBS HIGH 75: CPT | Performed by: INTERNAL MEDICINE

## 2024-11-21 PROCEDURE — 82607 VITAMIN B-12: CPT | Performed by: FAMILY MEDICINE

## 2024-11-21 PROCEDURE — 99233 SBSQ HOSP IP/OBS HIGH 50: CPT | Performed by: FAMILY MEDICINE

## 2024-11-21 PROCEDURE — 83935 ASSAY OF URINE OSMOLALITY: CPT | Performed by: FAMILY MEDICINE

## 2024-11-21 PROCEDURE — 87449 NOS EACH ORGANISM AG IA: CPT | Performed by: FAMILY MEDICINE

## 2024-11-21 PROCEDURE — 84300 ASSAY OF URINE SODIUM: CPT | Performed by: FAMILY MEDICINE

## 2024-11-21 PROCEDURE — 82728 ASSAY OF FERRITIN: CPT | Performed by: FAMILY MEDICINE

## 2024-11-21 PROCEDURE — 83735 ASSAY OF MAGNESIUM: CPT | Performed by: FAMILY MEDICINE

## 2024-11-21 PROCEDURE — 97530 THERAPEUTIC ACTIVITIES: CPT

## 2024-11-21 PROCEDURE — 97535 SELF CARE MNGMENT TRAINING: CPT

## 2024-11-21 PROCEDURE — 83540 ASSAY OF IRON: CPT | Performed by: FAMILY MEDICINE

## 2024-11-21 PROCEDURE — 85025 COMPLETE CBC W/AUTO DIFF WBC: CPT | Performed by: FAMILY MEDICINE

## 2024-11-21 PROCEDURE — 83930 ASSAY OF BLOOD OSMOLALITY: CPT | Performed by: FAMILY MEDICINE

## 2024-11-21 PROCEDURE — 80053 COMPREHEN METABOLIC PANEL: CPT | Performed by: FAMILY MEDICINE

## 2024-11-21 PROCEDURE — 84145 PROCALCITONIN (PCT): CPT | Performed by: FAMILY MEDICINE

## 2024-11-21 PROCEDURE — 82570 ASSAY OF URINE CREATININE: CPT | Performed by: FAMILY MEDICINE

## 2024-11-21 RX ORDER — SODIUM CHLORIDE 1 G/1
2 TABLET ORAL
Status: DISCONTINUED | OUTPATIENT
Start: 2024-11-21 | End: 2024-11-22 | Stop reason: HOSPADM

## 2024-11-21 RX ORDER — POTASSIUM CHLORIDE 1500 MG/1
40 TABLET, EXTENDED RELEASE ORAL ONCE
Status: COMPLETED | OUTPATIENT
Start: 2024-11-21 | End: 2024-11-21

## 2024-11-21 RX ORDER — SODIUM CHLORIDE 9 MG/ML
100 INJECTION, SOLUTION INTRAVENOUS CONTINUOUS
Status: DISCONTINUED | OUTPATIENT
Start: 2024-11-21 | End: 2024-11-21

## 2024-11-21 RX ORDER — MAGNESIUM SULFATE HEPTAHYDRATE 40 MG/ML
2 INJECTION, SOLUTION INTRAVENOUS ONCE
Status: COMPLETED | OUTPATIENT
Start: 2024-11-21 | End: 2024-11-21

## 2024-11-21 RX ORDER — TORSEMIDE 10 MG/1
5 TABLET ORAL DAILY
Status: DISCONTINUED | OUTPATIENT
Start: 2024-11-21 | End: 2024-11-22 | Stop reason: HOSPADM

## 2024-11-21 RX ADMIN — MAGNESIUM SULFATE HEPTAHYDRATE 2 G: 40 INJECTION, SOLUTION INTRAVENOUS at 08:56

## 2024-11-21 RX ADMIN — ASPIRIN 81 MG: 81 TABLET, COATED ORAL at 08:57

## 2024-11-21 RX ADMIN — GABAPENTIN 400 MG: 400 CAPSULE ORAL at 20:54

## 2024-11-21 RX ADMIN — LEVOTHYROXINE SODIUM 50 MCG: 50 TABLET ORAL at 05:01

## 2024-11-21 RX ADMIN — HEPARIN SODIUM 5000 UNITS: 5000 INJECTION, SOLUTION INTRAVENOUS; SUBCUTANEOUS at 20:54

## 2024-11-21 RX ADMIN — QUETIAPINE FUMARATE 50 MG: 25 TABLET ORAL at 08:57

## 2024-11-21 RX ADMIN — PANTOPRAZOLE SODIUM 20 MG: 20 TABLET, DELAYED RELEASE ORAL at 16:39

## 2024-11-21 RX ADMIN — DIVALPROEX SODIUM 500 MG: 500 TABLET, DELAYED RELEASE ORAL at 08:56

## 2024-11-21 RX ADMIN — HEPARIN SODIUM 5000 UNITS: 5000 INJECTION, SOLUTION INTRAVENOUS; SUBCUTANEOUS at 15:11

## 2024-11-21 RX ADMIN — QUETIAPINE FUMARATE 200 MG: 200 TABLET ORAL at 20:54

## 2024-11-21 RX ADMIN — SENNOSIDES 8.6 MG: 8.6 TABLET, FILM COATED ORAL at 20:53

## 2024-11-21 RX ADMIN — ATORVASTATIN CALCIUM 40 MG: 40 TABLET, FILM COATED ORAL at 16:39

## 2024-11-21 RX ADMIN — TORSEMIDE 5 MG: 10 TABLET ORAL at 11:16

## 2024-11-21 RX ADMIN — PANTOPRAZOLE SODIUM 20 MG: 20 TABLET, DELAYED RELEASE ORAL at 06:35

## 2024-11-21 RX ADMIN — SERTRALINE HYDROCHLORIDE 200 MG: 50 TABLET ORAL at 08:58

## 2024-11-21 RX ADMIN — METHENAMINE HIPPURATE 1 G: 1 TABLET ORAL at 08:57

## 2024-11-21 RX ADMIN — Medication 2 G: at 11:16

## 2024-11-21 RX ADMIN — DIVALPROEX SODIUM 500 MG: 500 TABLET, DELAYED RELEASE ORAL at 20:38

## 2024-11-21 RX ADMIN — OXYBUTYNIN CHLORIDE 5 MG: 5 TABLET, EXTENDED RELEASE ORAL at 08:57

## 2024-11-21 RX ADMIN — HEPARIN SODIUM 5000 UNITS: 5000 INJECTION, SOLUTION INTRAVENOUS; SUBCUTANEOUS at 05:00

## 2024-11-21 RX ADMIN — SODIUM CHLORIDE 100 ML/HR: 0.9 INJECTION, SOLUTION INTRAVENOUS at 08:56

## 2024-11-21 RX ADMIN — Medication 2 G: at 16:39

## 2024-11-21 RX ADMIN — DOCUSATE SODIUM 100 MG: 100 CAPSULE, LIQUID FILLED ORAL at 17:36

## 2024-11-21 RX ADMIN — POTASSIUM CHLORIDE 40 MEQ: 1500 TABLET, EXTENDED RELEASE ORAL at 11:16

## 2024-11-21 RX ADMIN — DOCUSATE SODIUM 100 MG: 100 CAPSULE, LIQUID FILLED ORAL at 08:56

## 2024-11-21 RX ADMIN — METHENAMINE HIPPURATE 1 G: 1 TABLET ORAL at 16:39

## 2024-11-21 NOTE — ASSESSMENT & PLAN NOTE
Definite history in the past of chronic hyponatremia varies to normal at times  Most recent sodium 132 from 8/18/2024  Sodium on admission 131 now down to 128 apparently received IV fluids initially secondary to relative hypotension    Etiology: Certainly prerenal/rule out typical hypothyroidism adrenal insufficiency/SIADH definitely in the differential diagnosis both related to mental state and psychiatric medications less likely from pulmonary status.  Also?  Decreased solute intake.    Workup:  - Obtain urine sodium and urine osmolality  - Obtain serum osmolality/uric acid/a.m. cortisol  TSH was normal    -CT of the chest abdomen pelvis: No overt malignancy or obvious etiology for hyponatremia except enlarged stable diffuse groundglass opacities possible infection inflammation    Treatment:  -Hep-Lock IV fluids sodium worsening with isotonic saline  - Fluid restriction 1000 mL/day  - Oral dietary supplements for increase solute  - Add torsemide 5 mg daily for the hyponatremia by monitor blood pressure  - Sodium chloride 2 g 3 times daily especially with recent low blood pressure  - 1 dose of p.o. potassium especially in the setting of torsemide    Ultimate goal is maintaining serum sodium greater than 130 closer to 135

## 2024-11-21 NOTE — PLAN OF CARE
Problem: OCCUPATIONAL THERAPY ADULT  Goal: Performs self-care activities at highest level of function for planned discharge setting.  See evaluation for individualized goals.  Description: Treatment Interventions: ADL retraining, Activityengagement, Energy conservation, Compensatory technique education, UE strengthening/ROM, Endurance training, Equipment evaluation/education          See flowsheet documentation for full assessment, interventions and recommendations.   Note: Limitation: Decreased ADL status, Decreased Safe judgement during ADL, Decreased cognition, Decreased endurance, Decreased self-care trans, Decreased high-level ADLs  Prognosis: Fair  Assessment: Pt completed OT tx session #1 focused on ADL performance and functional mobility. Pt alert and agreeable to participate. PT/OT co-treat completed d/t significant mobility deficits, cognitive deficits and safety concerns. Pt LB ADLs and toileting @ Total A, and functional mobility with RW @ Max A x2. Pt demonstrating Good participation and Good potential to achieve goals but is currently demonstrating deficits in decrease activity tolerance, decrease standing balance, decrease sitting balance, decrease performance during ADL tasks, decrease cognition, decrease safety awareness , decrease generalized strength, and decrease performance during functional transfers. Continue to recommend Level II: Moderate Resource Intensity Therapy upon discharge to increase safety and independence in ADL tasks and functional mobility.     Rehab Resource Intensity Level, OT: II (Moderate Resource Intensity)

## 2024-11-21 NOTE — PLAN OF CARE
Problem: Potential for Falls  Goal: Patient will remain free of falls  Description: INTERVENTIONS:  - Educate patient/family on patient safety including physical limitations  - Instruct patient to call for assistance with activity   - Consult OT/PT to assist with strengthening/mobility   - Keep Call bell within reach  - Keep bed low and locked with side rails adjusted as appropriate  - Keep care items and personal belongings within reach  - Initiate and maintain comfort rounds  - Make Fall Risk Sign visible to staff  - Offer Toileting every 2 Hours, in advance of need  - Initiate/Maintain bed alarm  - Obtain necessary fall risk management equipment: socks, chair alarm  - Apply yellow socks and bracelet for high fall risk patients  - Consider moving patient to room near nurses station  Outcome: Progressing     Problem: PAIN - ADULT  Goal: Verbalizes/displays adequate comfort level or baseline comfort level  Description: Interventions:  - Encourage patient to monitor pain and request assistance  - Assess pain using appropriate pain scale  - Administer analgesics based on type and severity of pain and evaluate response  - Implement non-pharmacological measures as appropriate and evaluate response  - Consider cultural and social influences on pain and pain management  - Notify physician/advanced practitioner if interventions unsuccessful or patient reports new pain  Outcome: Progressing     Problem: INFECTION - ADULT  Goal: Absence or prevention of progression during hospitalization  Description: INTERVENTIONS:  - Assess and monitor for signs and symptoms of infection  - Monitor lab/diagnostic results  - Monitor all insertion sites, i.e. indwelling lines, tubes, and drains  - Monitor endotracheal if appropriate and nasal secretions for changes in amount and color  - [place] appropriate cooling/warming therapies per order  - Administer medications as ordered  - Instruct and encourage patient and family to use good  hand hygiene technique  - Identify and instruct in appropriate isolation precautions for identified infection/condition  Outcome: Progressing     Problem: SAFETY ADULT  Goal: Patient will remain free of falls  Description: INTERVENTIONS:  - Educate patient/family on patient safety including physical limitations  - Instruct patient to call for assistance with activity   - Consult OT/PT to assist with strengthening/mobility   - Keep Call bell within reach  - Keep bed low and locked with side rails adjusted as appropriate  - Keep care items and personal belongings within reach  - Initiate and maintain comfort rounds  - Make Fall Risk Sign visible to staff  - Offer Toileting every 2 Hours, in advance of need  - Initiate/Maintain bed alarm  - Obtain necessary fall risk management equipment: socks, chair alarm  - Apply yellow socks and bracelet for high fall risk patients  - Consider moving patient to room near nurses station  Outcome: Progressing  Goal: Maintain or return to baseline ADL function  Description: INTERVENTIONS:  -  Assess patient's ability to carry out ADLs; assess patient's baseline for ADL function and identify physical deficits which impact ability to perform ADLs (bathing, care of mouth/teeth, toileting, grooming, dressing, etc.)  - Assess/evaluate cause of self-care deficits   - Assess range of motion  - Assess patient's mobility; develop plan if impaired  - Assess patient's need for assistive devices and provide as appropriate  - Encourage maximum independence but intervene and supervise when necessary  - Involve family in performance of ADLs  - Assess for home care needs following discharge   - Consider OT consult to assist with ADL evaluation and planning for discharge  - Provide patient education as appropriate  Outcome: Progressing

## 2024-11-21 NOTE — ASSESSMENT & PLAN NOTE
Chronic in nature, patient came with sodium level of 131>133-sodium dropped to 128 on 11/21/2024-suspect component of SIADH due to other comorbid conditions, and medication effects.  Check serum osmolality, urine osmolality, urine creatinine,  Follow nephrology recommendation  TSH was normal.  Imaging shows: Pulmonary hypertension,Stable diffuse groundglass opacities which may be due edema, infection, or inflammation   Patient received IV hydration, since patient blood pressure is running low, will give 2 L bolus and recheck sodium level

## 2024-11-21 NOTE — PLAN OF CARE
Problem: PHYSICAL THERAPY ADULT  Goal: Performs mobility at highest level of function for planned discharge setting.  See evaluation for individualized goals.  Description: Treatment/Interventions: ADL retraining, Functional transfer training, LE strengthening/ROM, Therapeutic exercise, Endurance training, Cognitive reorientation, Patient/family training, Equipment eval/education, Bed mobility, Gait training, Compensatory technique education, Spoke to nursing, Spoke to case management, OT          See flowsheet documentation for full assessment, interventions and recommendations.  Outcome: Progressing  Note: Prognosis: Fair  Problem List: Decreased strength, Decreased endurance, Impaired balance, Decreased mobility, Decreased cognition, Impaired judgement, Decreased safety awareness  Assessment: Pt seen for PT treatment session this date with interventions consisting of bed mobility tasks, transfer training, toilet transfers, and education provided as needed for safety and direction to improve functional mobility, safety awareness, and activity tolerance. Pt agreeable to PT treatment session upon arrival, pt found resting in bed. At end of session, pt left sitting OOB in recliner, positioned for comfort with BLE elevated and placed on pillow to free float B heels for pressure relief, chair alarm activated, and with all needs in reach. In comparison to previous session, pt with improvements in ability to perform SPS transfer onto BSC with staff assistance . Continue to recommend Level II (Moderate Resource Intensity) at time of d/c in order to maximize pt's functional independence and safety w/ mobility. Pt continues to be functioning below baseline level. PT will continue to see pt while here in order to address the deficits listed above and provide interventions consistent w/ POC in effort to achieve STGs.     Barriers to Discharge Comments: pt has support from staff at group home however requires significant  asisstanc eof 2 people to achieve standing  Rehab Resource Intensity Level, PT: II (Moderate Resource Intensity)    See flowsheet documentation for full assessment.

## 2024-11-21 NOTE — ASSESSMENT & PLAN NOTE
Anemia, leukopenia, thrombocytopenia  On admission day, platelet was 134  Patient is on heparin subcu, no significant drop of platelet.  Continue to monitor.

## 2024-11-21 NOTE — PROGRESS NOTES
Progress Note - Hospitalist   Name: Denita Vital 68 y.o. female I MRN: [id number]  Unit/Bed#: -01 I Date of Admission: 11/19/2024   Date of Service: 11/21/2024 I Hospital Day: 2    Assessment & Plan  Acute metabolic encephalopathy  Baseline, patient is intellectually delayed, nonverbal-but normally mimic, try to get up, normally titered drawing the board  Patient is much more alert, drawing with colored pencil (which per caregiver is baseline)  Per caregiver, patient was unable to get up by herself, and also very tired and not acting well  CT scan of head no significant other than a stable ventriculomegaly  CTA PE with abdomen and pelvis: No active infection noted-patient has bladder wall thickening -but urine analysis no significant  Patient has history of seizure disorder, is on Depakote, reviewed Depakote level  Patient also takes gabapentin 400 mg, Seroquel, sertraline-will continue at this time, consider to adjust as appropriate  Follow-up PT OT,   Appreciate speech recommendation.  Hyponatremia  Chronic in nature, patient came with sodium level of 131>133-sodium dropped to 128 on 11/21/2024-suspect component of SIADH due to other comorbid conditions, and medication effects.  Check serum osmolality, urine osmolality, urine creatinine,  Follow nephrology recommendation  TSH was normal.  Imaging shows: Pulmonary hypertension,Stable diffuse groundglass opacities which may be due edema, infection, or inflammation   Patient received IV hydration, since patient blood pressure is running low, will give 2 L bolus and recheck sodium level  Normocytic anemia  Per chart review, patient hemoglobin range around average 10.5  Patient came with hemoglobin 12.8, today hemoglobin is 9.6  Initially patient received IV hydration (??  Dilutional)-patient also on antiseizure medication, could be the effects  Check iron panel, vitamin B12, folic acid  Type 2 diabetes mellitus with hypoglycemia, without long-term current  "use of insulin ([facility])  Lab Results   Component Value Date    HGBA1C 5.9 (H) 06/26/2024       No results for input(s): \"POCGLU\" in the last 72 hours.    Blood Sugar Average: Last 72 hrs:  Sliding scale with hypoglycemia precaution.    Seizure disorder ([facility])  Patient is on Depakote, gabapentin,  Check Depakote level.  Pulmonary hypertension ([facility])  CTA PE study shows no PE, but pulmonary hypertension  Patient had previous echocardiogram back in 2021, shows ejection fraction of 60%  Was on low-dose of torsemide, remained on hold  Repeat echocardiogram:Left Ventricle: Left ventricular cavity size is normal. Wall thickness is mildly increased. The left ventricular ejection fraction is 65%. Systolic function is normal. Wall motion is normal. Diastolic function is mildly abnormal, consistent with grade I (abnormal) relaxation.    Aortic Valve: The aortic valve is trileaflet. The leaflets are mildly thickened. The leaflets are mildly calcified. The leaflets exhibit normal mobility. There is no evidence of regurgitation. The aortic valve has no significant stenosis.    Mitral Valve: There is mild thickening. There is mild annular calcification. There is mild regurgitation.    Tricuspid Valve: There is mild regurgitation. The right ventricular systolic pressure is normal.    No significant difference compared to 2021 study report.  Normal pressure hydrocephalus ([facility])  CT scan of head:Stable ventriculomegaly which could be due to normal pressure hydrocephalus.   Conservative management.  Pancytopenia ([facility])  Anemia, leukopenia, thrombocytopenia  On admission day, platelet was 134  Patient is on heparin subcu, no significant drop of platelet.  Continue to monitor.    VTE Pharmacologic Prophylaxis: VTE Score: 6 High Risk (Score >/= 5) - Pharmacological DVT Prophylaxis Ordered: heparin. Sequential Compression Devices Ordered.    Mobility:   Basic Mobility Inpatient Raw Score: 8  -[facility] Goal: 3: Sit at edge of bed  -[facility] Achieved: " 4: Move to chair/commode  -[facility] Goal achieved. Continue to encourage appropriate mobility.    Patient Centered Rounds: I performed bedside rounds with nursing staff today.   Discussions with Specialists or Other Care Team Provider: Nephrology    Education and Discussions with Family / Patient: Updated  (sister) via phone.    Current Length of Stay: 2 day(s)  Current Patient Status: Inpatient   Certification Statement: The patient will continue to require additional inpatient hospital stay due to monitor above conditions  Discharge Plan: Anticipate discharge in 48-72 hrs to group home.    Code Status: Level 1 - Full Code    Subjective   Seen and ambulated during the run.  Resting comfortably.    Objective :  Temp:  [98.2 °F (36.8 °C)-98.9 °F (37.2 °C)] 98.9 °F (37.2 °C)  HR:  [69-80] 79  BP: (117-160)/(54-72) 117/54  Resp:  [19-27] 19  SpO2:  [92 %-95 %] 92 %  O2 Device: None (Room air)    Body mass index is 31 kg/m².     Input and Output Summary (last 24 hours):     Intake/Output Summary (Last 24 hours) at 11/21/2024 0806  Last data filed at 11/20/2024 2355  Gross per 24 hour   Intake 1080 ml   Output 1064 ml   Net 16 ml       Physical Exam  Vitals and nursing note reviewed.   Constitutional:       Appearance: She is not ill-appearing or diaphoretic.   HENT:      Mouth/Throat:      Mouth: Mucous membranes are moist.      Pharynx: No oropharyngeal exudate or posterior oropharyngeal erythema.   Eyes:      General: No scleral icterus.        Left eye: No discharge.      Pupils: Pupils are equal, round, and reactive to light.   Cardiovascular:      Rate and Rhythm: Normal rate.      Heart sounds: No murmur heard.     No friction rub. No gallop.   Pulmonary:      Effort: No respiratory distress.      Breath sounds: No stridor. No wheezing or rhonchi.   Abdominal:      General: There is no distension.      Palpations: There is no mass.      Tenderness: There is no abdominal tenderness.      Hernia: No  hernia is present.   Neurological:      Mental Status: She is alert. Mental status is at baseline.           Lines/Drains:              Lab Results: I have reviewed the following results:   Results from last 7 days   Lab Units 11/21/24  0501   WBC Thousand/uL 4.02*   HEMOGLOBIN g/dL 9.6*   HEMATOCRIT % 28.7*   PLATELETS Thousands/uL 107*   SEGS PCT % 54   LYMPHO PCT % 33   MONO PCT % 12   EOS PCT % 1     Results from last 7 days   Lab Units 11/21/24  0501   SODIUM mmol/L 128*   POTASSIUM mmol/L 3.7   CHLORIDE mmol/L 97   CO2 mmol/L 28   BUN mg/dL 13   CREATININE mg/dL 0.49*   ANION GAP mmol/L 3*   CALCIUM mg/dL 9.1   ALBUMIN g/dL 3.3*   TOTAL BILIRUBIN mg/dL 0.28   ALK PHOS U/L 55   ALT U/L 18   AST U/L 18   GLUCOSE RANDOM mg/dL 128     Results from last 7 days   Lab Units 11/19/24  1039   INR  0.97             Results from last 7 days   Lab Units 11/19/24  1039   LACTIC ACID mmol/L 1.5   PROCALCITONIN ng/ml <0.05       Recent Cultures (last 7 days):   Results from last 7 days   Lab Units 11/19/24  1108 11/19/24  1039   BLOOD CULTURE  No Growth at 24 hrs. No Growth at 24 hrs.       Imaging Results Review: No pertinent imaging studies reviewed.  Other Study Results Review: No additional pertinent studies reviewed.    Last 24 Hours Medication List:     Current Facility-Administered Medications:     acetaminophen (TYLENOL) tablet 650 mg, Q6H PRN    aspirin (ECOTRIN LOW STRENGTH) EC tablet 81 mg, Daily    atorvastatin (LIPITOR) tablet 40 mg, Daily With Dinner    bisacodyl (DULCOLAX) EC tablet 5 mg, Daily PRN    Cranberry CAPS 450 mg, Daily    divalproex sodium (DEPAKOTE) DR tablet 500 mg, Q12H RONNELL    docusate sodium (COLACE) capsule 100 mg, BID    ergocalciferol (VITAMIN D2) capsule 50,000 Units, Weekly    gabapentin (NEURONTIN) capsule 400 mg, HS    heparin (porcine) subcutaneous injection 5,000 Units, Q8H RONNELL    levothyroxine tablet 50 mcg, Early Morning    methenamine hippurate (HIPREX) tablet 1 g, BID With Meals     ondansetron (ZOFRAN) injection 4 mg, Q6H PRN    oxybutynin (DITROPAN-XL) 24 hr tablet 5 mg, Daily    pantoprazole (PROTONIX) EC tablet 20 mg, BID AC    polyethylene glycol (MIRALAX) packet 17 g, Q48H PRN    QUEtiapine (SEROquel) tablet 200 mg, HS    QUEtiapine (SEROquel) tablet 50 mg, QAM    senna (SENOKOT) tablet 8.6 mg, HS    sertraline (ZOLOFT) tablet 200 mg, Daily With Breakfast    sodium chloride 0.9 % infusion, Continuous    Administrative Statements   Today, Patient Was Seen By: Joey Miranda MD      **Please Note: This note may have been constructed using a voice recognition system.**

## 2024-11-21 NOTE — PHYSICAL THERAPY NOTE
PHYSICAL THERAPY TREATMENT NOTE  NAME:  Dentia Vital  DATE: 11/21/24    Length Of Stay: 2  Performed at least 2 patient identifiers during session: ID bracelet and Epic photo    TREATMENT FLOWSHEET:    11/21/24 0950   PT Last Visit   PT Visit Date 11/21/24   Note Type   Note Type Treatment   Pain Assessment   Pain Assessment Tool Wang-Baker FACES   Wang-Baker FACES Pain Rating 0   Restrictions/Precautions   Other Precautions Contact/isolation;Chair Alarm;Bed Alarm;Fall Risk;Telemetry   General   Chart Reviewed Yes   Response to Previous Treatment Patient unable to report, no changes reported from family or staff   Family/Caregiver Present No   Cognition   Overall Cognitive Status Impaired   Arousal/Participation Alert   Attention Attends with cues to redirect   Orientation Level Unable to assess   Memory Unable to assess   Following Commands Follows one step commands with increased time or repetition   Subjective   Subjective Pt. nonverbal   Bed Mobility   Supine to Sit 2  Maximal assistance   Additional items HOB elevated;Bedrails;Increased time required;Verbal cues;LE management;Assist x 1  (UB management)   Transfers   Sit to Stand 2  Maximal assistance   Additional items Assist x 2;Increased time required;Verbal cues  (RW)   Stand to Sit 3  Moderate assistance   Additional items Assist x 2;Increased time required;Verbal cues   Stand pivot 2  Maximal assistance   Additional items Assist x 2;Increased time required;Verbal cues  (RW)   Toilet transfer 2  Maximal assistance   Additional items Assist x 2;Increased time required;Verbal cues;Commode  (RW)   Additional Comments Pt. was able to transfer from EOB with max Ax2 and RW f/b SPS to BSC with RW and max Ax2. Quick move used to transfer off BSC for safety while staff performed hygiene needs due to decreased standing tolerance time and amount of assistance to maintain stance. Quick move used to perform SPS transfer to recliner.   Balance   Static Sitting  Poor +   Dynamic Sitting Poor +   Static Standing Poor -   Dynamic Standing Poor -   Ambulatory Zero   Endurance Deficit   Endurance Deficit Yes   Activity Tolerance   Activity Tolerance Patient limited by fatigue   Medical Staff Made Aware OTR present for safety due to medical complexity   Nurse Made Aware yes   Assessment   Prognosis Fair   Problem List Decreased strength;Decreased endurance;Impaired balance;Decreased mobility;Decreased cognition;Impaired judgement;Decreased safety awareness   Goals   Patient Goals pt. nonverbal   PT Treatment Day 1   Plan   Treatment/Interventions Functional transfer training;LE strengthening/ROM;Endurance training;Patient/family training;Equipment eval/education;Bed mobility;Compensatory technique education;Spoke to nursing;OT   Progress Slow progress, decreased activity tolerance   PT Frequency 2-3x/wk   Discharge Recommendation   Rehab Resource Intensity Level, PT II (Moderate Resource Intensity)   AM-PAC Basic Mobility Inpatient   Turning in Flat Bed Without Bedrails 2   Lying on Back to Sitting on Edge of Flat Bed Without Bedrails 2   Moving Bed to Chair 1   Standing Up From Chair Using Arms 1   Walk in Room 1   Climb 3-5 Stairs With Railing 1   Basic Mobility Inpatient Raw Score 8   Turning Head Towards Sound 3   Follow Simple Instructions 2   Low Function Basic Mobility Raw Score  13   Low Function Basic Mobility Standardized Score  20.14   [facility] Highest Level Of Mobility   -[facility] Goal 3: Sit at edge of bed   -[facility] Achieved 4: Move to chair/commode         The patient's AM-PAC Basic Mobility Inpatient Short Form Raw Score is 8. A raw score less than 16 suggests the patient may benefit from discharge to post-acute rehabilitation services. Please also refer to the recommendation of the Physical Therapist for safe discharge planning.    Pt seen for PT treatment session this date with interventions consisting of bed mobility tasks, transfer training, toilet transfers,  and education provided as needed for safety and direction to improve functional mobility, safety awareness, and activity tolerance. Pt agreeable to PT treatment session upon arrival, pt found resting in bed. At end of session, pt left sitting OOB in recliner, positioned for comfort with BLE elevated and placed on pillow to free float B heels for pressure relief, chair alarm activated, and with all needs in reach. In comparison to previous session, pt with improvements in ability to perform SPS transfer onto BSC with staff assistance . Continue to recommend Level II (Moderate Resource Intensity) at time of d/c in order to maximize pt's functional independence and safety w/ mobility. Pt continues to be functioning below baseline level. PT will continue to see pt while here in order to address the deficits listed above and provide interventions consistent w/ POC in effort to achieve STGs.    Iram Godwin, PTA

## 2024-11-21 NOTE — ASSESSMENT & PLAN NOTE
CTA PE study shows no PE, but pulmonary hypertension  Patient had previous echocardiogram back in 2021, shows ejection fraction of 60%  Was on low-dose of torsemide, remained on hold  Repeat echocardiogram:Left Ventricle: Left ventricular cavity size is normal. Wall thickness is mildly increased. The left ventricular ejection fraction is 65%. Systolic function is normal. Wall motion is normal. Diastolic function is mildly abnormal, consistent with grade I (abnormal) relaxation.    Aortic Valve: The aortic valve is trileaflet. The leaflets are mildly thickened. The leaflets are mildly calcified. The leaflets exhibit normal mobility. There is no evidence of regurgitation. The aortic valve has no significant stenosis.    Mitral Valve: There is mild thickening. There is mild annular calcification. There is mild regurgitation.    Tricuspid Valve: There is mild regurgitation. The right ventricular systolic pressure is normal.    No significant difference compared to 2021 study report.

## 2024-11-21 NOTE — OCCUPATIONAL THERAPY NOTE
Occupational Therapy Progress Note     Patient Name: Denita Vital  Today's Date: 11/21/2024  Problem List  Principal Problem:    Acute metabolic encephalopathy  Active Problems:    Type 2 diabetes mellitus with hypoglycemia, without long-term current use of insulin (HCC)    Seizure disorder (HCC)    Pancytopenia (HCC)    Normocytic anemia    Hyponatremia    Pulmonary hypertension (HCC)    Normal pressure hydrocephalus (HCC)    Hypomagnesemia     11/21/24 0951   Note Type   Note Type Treatment   Pain Assessment   Pain Assessment Tool Wang-Baker FACES   Wang-Baker FACES Pain Rating 0   Restrictions/Precautions   Other Precautions Contact/isolation;Chair Alarm;Bed Alarm;Fall Risk;Multiple lines   ADL   LB Dressing Assistance 1  Total Assistance   LB Dressing Deficit Setup;Requires assistive device for steadying;Verbal cueing;Increased time to complete;Thread RLE into underwear;Thread LLE into underwear;Pull up over hips   Toileting Assistance  1  Total Assistance   Toileting Deficit Setup;Verbal cueing;Increased time to complete;Bedside commode;Perineal hygiene;Clothing management down;Clothing management up   Toileting Comments Assist of 1 for clothing management and posterior pericare while standing with assist of another to maintain standing balance   Bed Mobility   Supine to Sit 2  Maximal assistance   Additional items Assist x 1;Increased time required;HOB elevated   Additional Comments Pt supine in bed at beginning of session. Supine to sit @ Max A.   Transfers   Sit to Stand 2  Maximal assistance   Additional items Assist x 2;Increased time required;Verbal cues   Stand to Sit 2  Maximal assistance   Additional items Assist x 2;Increased time required;Verbal cues   Stand pivot 2  Maximal assistance   Additional items Assist x 2;Increased time required;Verbal cues   Toilet transfer 2  Maximal assistance   Additional items Assist x 2;Increased time required;Verbal cues;Commode   Additional Comments Initial  STS from EOB to RW @ Max Ax2. SPT to BSC @ Max Ax2. Quick move utilized for safety of staff and patient. Max Ax2 for STS. Pt placed in chair.   Cognition   Overall Cognitive Status Impaired   Arousal/Participation Alert;Responsive;Cooperative   Attention Attends with cues to redirect   Orientation Level Unable to assess   Memory Unable to assess   Following Commands Follows one step commands with increased time or repetition   Activity Tolerance   Activity Tolerance Patient tolerated treatment well  (Limited by cognitive deficits)   Medical Staff Made Aware Spoke with ROSALVA Walden and RN Quinn   Assessment   Assessment Pt completed OT tx session #1 focused on ADL performance and functional mobility. Pt alert and agreeable to participate. PT/OT co-treat completed d/t significant mobility deficits, cognitive deficits and safety concerns. Pt LB ADLs and toileting @ Total A, and functional mobility with RW @ Max A x2. Pt demonstrating Good participation and Good potential to achieve goals but is currently demonstrating deficits in decrease activity tolerance, decrease standing balance, decrease sitting balance, decrease performance during ADL tasks, decrease cognition, decrease safety awareness , decrease generalized strength, and decrease performance during functional transfers. Continue to recommend Level II: Moderate Resource Intensity Therapy upon discharge to increase safety and independence in ADL tasks and functional mobility.   Plan   Treatment Interventions ADL retraining;Functional transfer training   Goal Expiration Date 12/04/24   OT Treatment Day 1   OT Frequency 2-3x/wk   Discharge Recommendation   Rehab Resource Intensity Level, OT II (Moderate Resource Intensity)   AM-PAC Daily Activity Inpatient   Lower Body Dressing 1   Bathing 1   Toileting 1   Upper Body Dressing 2   Grooming 2   Eating 2   Daily Activity Raw Score 9   Turning Head Towards Sound 3   Follow Simple Instructions 2   Low Function Daily  Activity Raw Score 14   Low Function Daily Activity Standardized Score  24.79   AM-PAC Applied Cognition Inpatient   Following a Speech/Presentation 1   Understanding Ordinary Conversation 2   Taking Medications 1   Remembering Where Things Are Placed or Put Away 1   Remembering List of 4-5 Errands 1   Taking Care of Complicated Tasks 1   Applied Cognition Raw Score 7   Applied Cognition Standardized Score 15.17   End of Consult   Patient Position at End of Consult Bedside chair;All needs within reach;Bed/Chair alarm activated     The patient's raw score on the AM-PAC Daily Activity Inpatient Short Form is 9. A raw score of less than 19 suggests the patient may benefit from discharge to post-acute rehabilitation services. Please refer to the recommendation of the Occupational Therapist for safe discharge planning.    Pt goals to be met by 12/4/2024     Pt will demonstrate ability to complete grooming/hygiene tasks @ Min A after set-up.  Pt will demonstrate ability to complete supine<>sit @ Mod A x1 in order to increase safety and independence during ADL tasks.  Pt will demonstrate ability to complete UB ADLs including washing/dressing @ Min A in order to increase performance and participation during meaningful tasks  Pt will demonstrate ability to complete LB dressing @ Max A x1 in order to increase safety and independence during meaningful tasks.   Pt will demonstrate ability to complete EOB, chair, toilet/commode transfers with 1 person assist using quick move in order to increase performance and participation during functional tasks.  Pt will demonstrate ability to stand for 2 minutes while maintaining poor+ balance with use of grab bar for UB support PRN.  Pt will demonstrate ability to tolerate 30-35 minute OT session with no vc'ing for deep breathing or use of energy conservation techniques in order to increase activity tolerance during functional tasks.   Pt will demonstrate Good carryover of use of energy  conservation/compensatory strategies during ADLs and functional tasks in order to increase safety and reduce risk for falls.   Pt will attend to continued cognitive assessments 100% of the time in order to provide most appropriate d/c recommendations.     Lore Yadav, OTR/L

## 2024-11-21 NOTE — PROGRESS NOTES
Patient:    MRN:  [id number]    Camilo Request ID:  [id number]    Level of care reserved:  Home Health Agency    Partner Reserved:  [facility] [place] [address] [phone]    Clinical needs requested:    Geography searched:  [id number]    Start of Service:    Request sent:  9:23am EST on 11/20/2024 by Irais Ramírez    Partner reserved:  11:59am EST on 11/21/2024 by Irais Ramírez    Choice list shared:  11:47am EST on 11/21/2024 by Irais Ramírez

## 2024-11-21 NOTE — CASE MANAGEMENT
Case Management Discharge Planning Note    Patient name Denita Vital  Location /-01 MRN [id number]  : 1955 Date 2024       Current Admission Date: 2024  Current Admission Diagnosis:Acute metabolic encephalopathy   Patient Active Problem List    Diagnosis Date Noted Date Diagnosed    Hypomagnesemia 2024     Pulmonary hypertension (HCC) 2024     Normal pressure hydrocephalus (HCC) 2024     Transaminitis 2024     Hypoglycemia 2023     Hyponatremia 2022     Open nondisplaced fracture of distal phalanx of left middle finger 2022     Avulsion of fingernail 2022     Acute urinary retention 2022     Acute metabolic encephalopathy 2022     Cystitis without hematuria 2022     Dysphagia 2021     Normocytic anemia 2021     Chronically elevated right hemidiaphragm 2021     Obesity (BMI 30.0-34.9) 2021     Seizure disorder (HCC) 2021     Pancytopenia (HCC) 2021     Hyperlipidemia 2021     Abnormal x-ray 2021     Acute respiratory failure with hypoxia (HCC) 2021     Aspiration pneumonitis (HCC) 2021     Mood disorder (HCC) 2021     Type 2 diabetes mellitus with hypoglycemia, without long-term current use of insulin (HCC) 2020     Hypertension 2019     Gastroesophageal reflux disease without esophagitis 2018     Ambulatory dysfunction 2017     Intellectual disability 2017       LOS (days): 2  Geometric Mean LOS (GMLOS) (days): 3.6  Days to GMLOS:1.7     OBJECTIVE:  Risk of Unplanned Readmission Score: 25.89         Current admission status: Inpatient   Preferred Pharmacy:   [facility] - [place], [place] - [address] [place] [id number]  Phone: [phone] Fax: [phone]    Primary Care Provider: Soy Clemente MD    Primary Insurance: MEDICARE  Secondary Insurance: [place] [employer]ICAL  [employer]SI[employer]ANCE    DISCHARGE DETAILS:    Not medically ready for dc related to decreased sodium, anticipate stability in 2 days.  GENESIS called Kathi Willis Supervisor of [facility] to update her on status, see what the determination of return was with the therapy notes that were sent email yesterday. Per House staff Kathi is off and on call is Richelle at , GENESIS called that number and left a message for Richelle to call be back to discuss dc planning.

## 2024-11-21 NOTE — ASSESSMENT & PLAN NOTE
Per chart review, patient hemoglobin range around average 10.5  Patient came with hemoglobin 12.8, today hemoglobin is 9.6  Initially patient received IV hydration (??  Dilutional)-patient also on antiseizure medication, could be the effects  Check iron panel, vitamin B12, folic acid

## 2024-11-21 NOTE — CONSULTS
Consultation - Nephrology   Name: Denita Vital 68 y.o. female I MRN: [id number]  Unit/Bed#: -01 I Date of Admission: 11/19/2024   Date of Service: 11/21/2024 I Hospital Day: 2   Inpatient consult to Nephrology  Consult performed by: Otis Lynch MD  Consult ordered by: Joey Miranda MD        Physician Requesting Evaluation: Joey Miranda MD   Reason for Evaluation / Principal Problem: Hyponatremia    Assessment & Plan  Hyponatremia  Definite history in the past of chronic hyponatremia varies to normal at times  Most recent sodium 132 from 8/18/2024  Sodium on admission 131 now down to 128 apparently received IV fluids initially secondary to relative hypotension    Etiology: Certainly prerenal/rule out typical hypothyroidism adrenal insufficiency/SIADH definitely in the differential diagnosis both related to mental state and psychiatric medications less likely from pulmonary status.  Also?  Decreased solute intake.    Workup:  - Obtain urine sodium and urine osmolality  - Obtain serum osmolality/uric acid/a.m. cortisol  TSH was normal    -CT of the chest abdomen pelvis: No overt malignancy or obvious etiology for hyponatremia except enlarged stable diffuse groundglass opacities possible infection inflammation    Treatment:  -Hep-Lock IV fluids sodium worsening with isotonic saline  - Fluid restriction 1000 mL/day  - Oral dietary supplements for increase solute  - Add torsemide 5 mg daily for the hyponatremia by monitor blood pressure  - Sodium chloride 2 g 3 times daily especially with recent low blood pressure  - 1 dose of p.o. potassium especially in the setting of torsemide    Ultimate goal is maintaining serum sodium greater than 130 closer to 135  Hypomagnesemia  -Replete  Type 2 diabetes mellitus with hypoglycemia, without long-term current use of insulin (HCC)  -Per primary service  Seizure disorder (HCC)  -Per primary service  Pancytopenia (HCC)  -Per primary service      I have  reviewed the nephrology recommendations including management of hyponatremia, with SLIM, and we are in agreement with renal plan including the information outlined above.     History of Present Illness   Denita Vital is a 68 y.o. female with a PMH of intellectually delayed nonverbal/seizure disorder/hypertension/dyslipidemia/GERD/diabetes mellitus/CHF/anxiety who was admitted to [facility] after presenting with generalized weakness and confusion more than usual, potential thick cough based on caregivers history. A renal consultation is requested today for assistance in the management of hyponatremia.    No history obtainable  Sodium on admission 131 down to 2/1/2028 today    Review of Systems  Review of systems are not obtainable patient is nonverbal unable to give any significant history    I have reviewed the patient's PMH, PSH, Social History, Family History, Meds, and Allergies  Historical Information   Past Medical History:   Diagnosis Date    Anxiety     CHF (congestive heart failure) (HCC)     Diabetes mellitus (HCC)     GERD (gastroesophageal reflux disease)     Hyperlipidemia     Hypertension     Leukopenia     Mental disability     Mixed incontinence 04/12/2017    Seizure disorder (HCC)      Past Surgical History:   Procedure Laterality Date    DENTAL SURGERY       Social History     Tobacco Use    Smoking status: Never    Smokeless tobacco: Never   Vaping Use    Vaping status: Never Used   Substance and Sexual Activity    Alcohol use: Not Currently    Drug use: Not Currently    Sexual activity: Not Currently     E-Cigarette/Vaping    E-Cigarette Use Never User      E-Cigarette/Vaping Substances    Nicotine No     THC No     CBD No     Flavoring No     Other No     Unknown No      Family history non-contributory  Social History     Tobacco Use    Smoking status: Never    Smokeless tobacco: Never   Vaping Use    Vaping status: Never Used   Substance and Sexual Activity    Alcohol use: Not Currently    Drug  use: Not Currently    Sexual activity: Not Currently       Current Facility-Administered Medications:     acetaminophen (TYLENOL) tablet 650 mg, Q6H PRN    aspirin (ECOTRIN LOW STRENGTH) EC tablet 81 mg, Daily    atorvastatin (LIPITOR) tablet 40 mg, Daily With Dinner    bisacodyl (DULCOLAX) EC tablet 5 mg, Daily PRN    Cranberry CAPS 450 mg, Daily    divalproex sodium (DEPAKOTE) DR tablet 500 mg, Q12H RONNELL    docusate sodium (COLACE) capsule 100 mg, BID    ergocalciferol (VITAMIN D2) capsule 50,000 Units, Weekly    gabapentin (NEURONTIN) capsule 400 mg, HS    heparin (porcine) subcutaneous injection 5,000 Units, Q8H RONNELL    levothyroxine tablet 50 mcg, Early Morning    magnesium sulfate 2 g/50 mL IVPB (premix) 2 g, Once, Last Rate: 2 g (11/21/24 0856)    methenamine hippurate (HIPREX) tablet 1 g, BID With Meals    ondansetron (ZOFRAN) injection 4 mg, Q6H PRN    oxybutynin (DITROPAN-XL) 24 hr tablet 5 mg, Daily    pantoprazole (PROTONIX) EC tablet 20 mg, BID AC    polyethylene glycol (MIRALAX) packet 17 g, Q48H PRN    QUEtiapine (SEROquel) tablet 200 mg, HS    QUEtiapine (SEROquel) tablet 50 mg, QAM    senna (SENOKOT) tablet 8.6 mg, HS    sertraline (ZOLOFT) tablet 200 mg, Daily With Breakfast    sodium chloride 0.9 % infusion, Continuous, Last Rate: 100 mL/hr (11/21/24 0856)  Prior to Admission Medications   Prescriptions Last Dose Informant Patient Reported? Taking?   Cranberry 450 MG CAPS 11/19/2024  No Yes   Sig: Take 1 capsule (450 mg total) by mouth in the morning   Diapers & Supplies MISC   Yes Yes   Sig: Use as directed.   Incontinence Supply Disposable (FQ Pant Liner) MISC   Yes Yes   LORazepam (ATIVAN) 0.5 mg tablet 11/19/2024  Yes Yes   Sig: Take 0.5 mg by mouth in the morning and 0.5 mg in the evening.   Lancets 33G MISC   Yes Yes   Sig: daily   QUEtiapine (SEROquel) 200 mg tablet   Yes Yes   Sig: Take 200 mg by mouth daily at bedtime   QUEtiapine (SEROquel) 50 mg tablet 11/19/2024  Yes Yes   Sig: Take 150  mg by mouth 2 (two) times a day   aspirin (ECOTRIN LOW STRENGTH) 81 mg EC tablet 11/19/2024  Yes Yes   Sig: Take 81 mg by mouth daily   bisacodyl (DULCOLAX) 5 mg EC tablet Unknown  Yes No   Sig: Take 5 mg by mouth daily as needed for constipation   Patient not taking: Reported on 11/19/2024   cholecalciferol (VITAMIN D3) 250 MCG (05412 UT) capsule Unknown  No No   Sig: Take 5 capsules (50,000 Units total) by mouth once a week On saturday   Patient not taking: Reported on 11/19/2024   divalproex sodium (DEPAKOTE) 250 mg EC tablet 11/19/2024  Yes Yes   Sig: Take 500 mg by mouth 2 (two) times a day   docusate sodium (COLACE) 100 mg capsule 11/19/2024 Outside Facility (Specify) Yes Yes   Sig: Take 100 mg by mouth in the morning and 100 mg in the evening.   ergocalciferol (VITAMIN D2) 50,000 units Unknown  Yes No   Sig: Take 50,000 Units by mouth once a week   ferrous sulfate 325 (65 Fe) mg tablet Unknown  No No   Sig: Take 1 tablet (325 mg total) by mouth daily with breakfast   Patient not taking: Reported on 11/19/2024   gabapentin (NEURONTIN) 400 mg capsule 11/19/2024  Yes Yes   Sig: Take 400 mg by mouth daily at bedtime   levothyroxine 50 mcg tablet 11/19/2024  Yes Yes   Sig: Take 50 mcg by mouth daily   methenamine hippurate (HIPREX) 1 g tablet 11/19/2024  Yes Yes   Sig: Take 1 g by mouth 2 (two) times a day with meals   omeprazole (PriLOSEC) 20 mg delayed release capsule 11/19/2024  Yes Yes   Sig: Take 20 mg by mouth daily   polyethylene glycol (MIRALAX) 17 g packet Past Week  No Yes   Sig: Take 17 g by mouth every other day as needed (if no bm in 3 days)   rosuvastatin (CRESTOR) 40 MG tablet 11/19/2024  Yes Yes   Sig: Take 40 mg by mouth daily   senna (SENOKOT) 8.6 mg 11/19/2024  No Yes   Sig: Take 1 tablet (8.6 mg total) by mouth daily at bedtime   sertraline (ZOLOFT) 100 mg tablet 11/19/2024  Yes Yes   Sig: Take 200 mg by mouth daily   tolterodine (DETROL LA) 4 mg 24 hr capsule 11/19/2024  No Yes   Sig: Take 1  capsule (4 mg total) by mouth daily   torsemide (DEMADEX) 5 MG tablet 11/19/2024  No Yes   Sig: Take 1 tablet (5 mg total) by mouth daily      Facility-Administered Medications: None     Actos [pioglitazone]    Objective :  Temp:  [98.2 °F (36.8 °C)-98.9 °F (37.2 °C)] 98.9 °F (37.2 °C)  HR:  [69-80] 79  BP: (117-160)/(54-72) 117/54  Resp:  [19-27] 19  SpO2:  [92 %-95 %] 92 %  O2 Device: None (Room air)    Current Weight: Weight - Scale: 72 kg (158 lb 11.7 oz)  First Weight: Weight - Scale: 70.8 kg (156 lb 1.4 oz)  I/O         11/19 0701  11/20 0700 11/20 0701  11/21 0700 11/21 0701  11/22 0700    P.O.  1080     I.V. (mL/kg) 30 (0.4)      IV Piggyback 2350      Total Intake(mL/kg) 2380 (32.8) 1080 (15)     Urine (mL/kg/hr) 1953 1064 (0.6)     Stool  0     Total Output 1953 1064     Net +427 +16            Unmeasured Urine Occurrence 2 x 2 x     Unmeasured Stool Occurrence  1 x           Physical Exam  General: Well-developed well-nourished, no acute distress but nonverbal  Skin:  No acute rash  Eyes: No scleral icterus and noninjected  ENT: Normocephalic/atraumatic moist mucous membranes  Neck:  Supple, no jugular venous distention, trachea midline, overall appearance is normal; no  carotid bruits, 1+ carotid upstroke  Back: No CVA tenderness spine midline  Chest:  Clear to auscultation and percussion, good respiratory effort no use of accessory respiratory muscles  CVS:  Regular rate and rhythm, without a rub or gallops or murmurs, normal S3 and S4  Abdomen:  Normal bowel sounds, soft and nontender and nondistended; no overt hepatosplenomegaly or bruits appreciable  Extremities:  No clubbing, cyanosis or edema; 1+ dorsalis pedis pulse, no femoral bruits and no significant arthritic changes  Neuro:  No gross focality moves all extremities but not fully cooperative nonverbal  Psych:  Alert and nonverbal  Medications:    Current Facility-Administered Medications:     acetaminophen (TYLENOL) tablet 650 mg, 650 mg, Oral,  Q6H PRN, Joey Miranda MD    aspirin (ECOTRIN LOW STRENGTH) EC tablet 81 mg, 81 mg, Oral, Daily, Joey Miranda MD, 81 mg at 11/21/24 0857    atorvastatin (LIPITOR) tablet 40 mg, 40 mg, Oral, Daily With Dinner, Joey Miranda MD, 40 mg at 11/20/24 1647    bisacodyl (DULCOLAX) EC tablet 5 mg, 5 mg, Oral, Daily PRN, Joey Miranda MD    Cranberry CAPS 450 mg, 1 capsule, Oral, Daily, Joey Miranda MD    divalproex sodium (DEPAKOTE) DR tablet 500 mg, 500 mg, Oral, Q12H RONNELL, Joey Miranda MD, 500 mg at 11/21/24 0856    docusate sodium (COLACE) capsule 100 mg, 100 mg, Oral, BID, Joey Miranda MD, 100 mg at 11/21/24 0856    ergocalciferol (VITAMIN D2) capsule 50,000 Units, 50,000 Units, Oral, Weekly, Joey Miranda MD    gabapentin (NEURONTIN) capsule 400 mg, 400 mg, Oral, HS, Joey Miranda MD, 400 mg at 11/20/24 2103    heparin (porcine) subcutaneous injection 5,000 Units, 5,000 Units, Subcutaneous, Q8H RONNELL, Joey Miranda MD, 5,000 Units at 11/21/24 0500    levothyroxine tablet 50 mcg, 50 mcg, Oral, Early Morning, Joey Miranda MD, 50 mcg at 11/21/24 0501    magnesium sulfate 2 g/50 mL IVPB (premix) 2 g, 2 g, Intravenous, Once, Joey Miranda MD, Last Rate: 25 mL/hr at 11/21/24 0856, 2 g at 11/21/24 0856    methenamine hippurate (HIPREX) tablet 1 g, 1 g, Oral, BID With Meals, Joey Miranda MD, 1 g at 11/21/24 0857    ondansetron (ZOFRAN) injection 4 mg, 4 mg, Intravenous, Q6H PRN, Joey Miranda MD    oxybutynin (DITROPAN-XL) 24 hr tablet 5 mg, 5 mg, Oral, Daily, Joey Miranda MD, 5 mg at 11/21/24 0857    pantoprazole (PROTONIX) EC tablet 20 mg, 20 mg, Oral, BID AC, Joey Miranda MD, 20 mg at 11/21/24 0635    polyethylene glycol (MIRALAX) packet 17 g, 17 g, Oral, Q48H PRN, Joey Miranda MD    QUEtiapine (SEROquel) tablet 200 mg, 200 mg, Oral, HS, Joey Miranda MD, 200 mg at 11/20/24 2102    QUEtiapine (SEROquel) tablet 50 mg, 50 mg, Oral, QAM, Joey LAMAR  "MD Ruben, 50 mg at 11/21/24 0857    senna (SENOKOT) tablet 8.6 mg, 8.6 mg, Oral, HS, Joey Miranda MD, 8.6 mg at 11/20/24 2102    sertraline (ZOLOFT) tablet 200 mg, 200 mg, Oral, Daily With Breakfast, Joey Miranda MD, 200 mg at 11/21/24 0858    sodium chloride 0.9 % infusion, 100 mL/hr, Intravenous, Continuous, Joey Miranda MD, Last Rate: 100 mL/hr at 11/21/24 0856, 100 mL/hr at 11/21/24 0856      Lab Results: I have reviewed the following results:  Results from last 7 days   Lab Units 11/21/24  0501 11/20/24  0614 11/19/24  1127 11/19/24  1039   WBC Thousand/uL 4.02* 4.50  --  4.01*   HEMOGLOBIN g/dL 9.6* 10.2*  --  12.8   HEMATOCRIT % 28.7* 31.2*  --  40.6   PLATELETS Thousands/uL 107* 118*  --  134*   POTASSIUM mmol/L 3.7 3.9 4.1  --    CHLORIDE mmol/L 97 100 98  --    CO2 mmol/L 28 30 28  --    BUN mg/dL 13 10 13  --    CREATININE mg/dL 0.49* 0.55* 0.58*  --    CALCIUM mg/dL 9.1 9.2 9.4  --    MAGNESIUM mg/dL 1.5* 1.9 1.6*  --    ALBUMIN g/dL 3.3*  --  3.6  --        Administrative Statements     Portions of the record may have been created with voice recognition software. Occasional wrong word or \"sound a like\" substitutions may have occurred due to the inherent limitations of voice recognition software. Read the chart carefully and recognize, using context, where substitutions have occurred.If you have any questions, please contact the dictating provider.  "

## 2024-11-22 ENCOUNTER — TELEPHONE (OUTPATIENT)
Dept: OTHER | Facility: HOSPITAL | Age: 69
End: 2024-11-22

## 2024-11-22 VITALS
BODY MASS INDEX: 29.99 KG/M2 | SYSTOLIC BLOOD PRESSURE: 155 MMHG | DIASTOLIC BLOOD PRESSURE: 70 MMHG | RESPIRATION RATE: 18 BRPM | WEIGHT: 152.78 LBS | HEART RATE: 84 BPM | HEIGHT: 60 IN | TEMPERATURE: 97.3 F | OXYGEN SATURATION: 93 %

## 2024-11-22 LAB
ALBUMIN SERPL BCG-MCNC: 3.6 G/DL (ref 3.5–5)
ALP SERPL-CCNC: 63 U/L (ref 34–104)
ALT SERPL W P-5'-P-CCNC: 20 U/L (ref 7–52)
ANION GAP SERPL CALCULATED.3IONS-SCNC: 3 MMOL/L (ref 4–13)
AST SERPL W P-5'-P-CCNC: 25 U/L (ref 13–39)
BASOPHILS # BLD AUTO: 0.01 THOUSANDS/ÂΜL (ref 0–0.1)
BASOPHILS NFR BLD AUTO: 0 % (ref 0–1)
BILIRUB SERPL-MCNC: 0.24 MG/DL (ref 0.2–1)
BUN SERPL-MCNC: 12 MG/DL (ref 5–25)
CALCIUM SERPL-MCNC: 9.5 MG/DL (ref 8.4–10.2)
CHLORIDE SERPL-SCNC: 99 MMOL/L (ref 96–108)
CO2 SERPL-SCNC: 30 MMOL/L (ref 21–32)
CORTIS SERPL-MCNC: 13.5 UG/DL
CREAT SERPL-MCNC: 0.48 MG/DL (ref 0.6–1.3)
EOSINOPHIL # BLD AUTO: 0.09 THOUSAND/ÂΜL (ref 0–0.61)
EOSINOPHIL NFR BLD AUTO: 2 % (ref 0–6)
ERYTHROCYTE [DISTWIDTH] IN BLOOD BY AUTOMATED COUNT: 16.7 % (ref 11.6–15.1)
GFR SERPL CREATININE-BSD FRML MDRD: 99 ML/MIN/1.73SQ M
GLUCOSE SERPL-MCNC: 94 MG/DL (ref 65–140)
HCT VFR BLD AUTO: 33.5 % (ref 34.8–46.1)
HGB BLD-MCNC: 11.2 G/DL (ref 11.5–15.4)
IMM GRANULOCYTES # BLD AUTO: 0.03 THOUSAND/UL (ref 0–0.2)
IMM GRANULOCYTES NFR BLD AUTO: 1 % (ref 0–2)
LYMPHOCYTES # BLD AUTO: 1.57 THOUSANDS/ÂΜL (ref 0.6–4.47)
LYMPHOCYTES NFR BLD AUTO: 33 % (ref 14–44)
MCH RBC QN AUTO: 29.5 PG (ref 26.8–34.3)
MCHC RBC AUTO-ENTMCNC: 33.4 G/DL (ref 31.4–37.4)
MCV RBC AUTO: 88 FL (ref 82–98)
MONOCYTES # BLD AUTO: 0.58 THOUSAND/ÂΜL (ref 0.17–1.22)
MONOCYTES NFR BLD AUTO: 12 % (ref 4–12)
NEUTROPHILS # BLD AUTO: 2.46 THOUSANDS/ÂΜL (ref 1.85–7.62)
NEUTS SEG NFR BLD AUTO: 52 % (ref 43–75)
NRBC BLD AUTO-RTO: 0 /100 WBCS
PLATELET # BLD AUTO: 137 THOUSANDS/UL (ref 149–390)
PMV BLD AUTO: 9 FL (ref 8.9–12.7)
POTASSIUM SERPL-SCNC: 4.6 MMOL/L (ref 3.5–5.3)
PROT SERPL-MCNC: 6.3 G/DL (ref 6.4–8.4)
RBC # BLD AUTO: 3.8 MILLION/UL (ref 3.81–5.12)
SODIUM SERPL-SCNC: 132 MMOL/L (ref 135–147)
URATE SERPL-MCNC: 1.8 MG/DL (ref 2–7.5)
WBC # BLD AUTO: 4.74 THOUSAND/UL (ref 4.31–10.16)

## 2024-11-22 PROCEDURE — 80053 COMPREHEN METABOLIC PANEL: CPT | Performed by: FAMILY MEDICINE

## 2024-11-22 PROCEDURE — 84550 ASSAY OF BLOOD/URIC ACID: CPT | Performed by: INTERNAL MEDICINE

## 2024-11-22 PROCEDURE — 82533 TOTAL CORTISOL: CPT | Performed by: INTERNAL MEDICINE

## 2024-11-22 PROCEDURE — 85025 COMPLETE CBC W/AUTO DIFF WBC: CPT | Performed by: FAMILY MEDICINE

## 2024-11-22 PROCEDURE — 99239 HOSP IP/OBS DSCHRG MGMT >30: CPT | Performed by: FAMILY MEDICINE

## 2024-11-22 RX ORDER — DIVALPROEX SODIUM 250 MG/1
500 TABLET, DELAYED RELEASE ORAL 2 TIMES DAILY
Qty: 120 TABLET | Refills: 0 | Status: SHIPPED | OUTPATIENT
Start: 2024-11-22

## 2024-11-22 RX ORDER — QUETIAPINE FUMARATE 200 MG/1
200 TABLET, FILM COATED ORAL
Qty: 30 TABLET | Refills: 0 | Status: SHIPPED | OUTPATIENT
Start: 2024-11-22

## 2024-11-22 RX ORDER — MAGNESIUM OXIDE 400 MG/1
200 TABLET ORAL 2 TIMES DAILY
Qty: 30 TABLET | Refills: 0 | Status: SHIPPED | OUTPATIENT
Start: 2024-11-22 | End: 2024-12-22

## 2024-11-22 RX ORDER — SODIUM CHLORIDE 1 G/1
1 TABLET ORAL
Qty: 90 TABLET | Refills: 0 | Status: SHIPPED | OUTPATIENT
Start: 2024-11-22

## 2024-11-22 RX ORDER — QUETIAPINE FUMARATE 50 MG/1
50 TABLET, FILM COATED ORAL
Qty: 180 TABLET | Refills: 0 | Status: SHIPPED | OUTPATIENT
Start: 2024-11-22

## 2024-11-22 RX ORDER — DIVALPROEX SODIUM 250 MG/1
500 TABLET, DELAYED RELEASE ORAL 2 TIMES DAILY
Qty: 120 TABLET | Refills: 0 | Status: SHIPPED | OUTPATIENT
Start: 2024-11-22 | End: 2024-11-22

## 2024-11-22 RX ORDER — TORSEMIDE 5 MG/1
5 TABLET ORAL DAILY
Qty: 30 TABLET | Refills: 0 | Status: SHIPPED | OUTPATIENT
Start: 2024-11-22 | End: 2024-12-22

## 2024-11-22 RX ORDER — LORAZEPAM 0.5 MG/1
0.5 TABLET ORAL 2 TIMES DAILY
Qty: 20 TABLET | Refills: 0 | Status: SHIPPED | OUTPATIENT
Start: 2024-11-22 | End: 2024-12-02

## 2024-11-22 RX ADMIN — QUETIAPINE FUMARATE 50 MG: 25 TABLET ORAL at 09:00

## 2024-11-22 RX ADMIN — LEVOTHYROXINE SODIUM 50 MCG: 50 TABLET ORAL at 05:27

## 2024-11-22 RX ADMIN — SERTRALINE HYDROCHLORIDE 200 MG: 50 TABLET ORAL at 09:01

## 2024-11-22 RX ADMIN — OXYBUTYNIN CHLORIDE 5 MG: 5 TABLET, EXTENDED RELEASE ORAL at 09:00

## 2024-11-22 RX ADMIN — PANTOPRAZOLE SODIUM 20 MG: 20 TABLET, DELAYED RELEASE ORAL at 09:00

## 2024-11-22 RX ADMIN — Medication 2 G: at 09:00

## 2024-11-22 RX ADMIN — PANTOPRAZOLE SODIUM 20 MG: 20 TABLET, DELAYED RELEASE ORAL at 15:19

## 2024-11-22 RX ADMIN — METHENAMINE HIPPURATE 1 G: 1 TABLET ORAL at 11:28

## 2024-11-22 RX ADMIN — DIVALPROEX SODIUM 500 MG: 500 TABLET, DELAYED RELEASE ORAL at 09:00

## 2024-11-22 RX ADMIN — DOCUSATE SODIUM 100 MG: 100 CAPSULE, LIQUID FILLED ORAL at 09:16

## 2024-11-22 RX ADMIN — HEPARIN SODIUM 5000 UNITS: 5000 INJECTION, SOLUTION INTRAVENOUS; SUBCUTANEOUS at 13:04

## 2024-11-22 RX ADMIN — Medication 2 G: at 15:19

## 2024-11-22 RX ADMIN — ASPIRIN 81 MG: 81 TABLET, COATED ORAL at 09:00

## 2024-11-22 RX ADMIN — METHENAMINE HIPPURATE 1 G: 1 TABLET ORAL at 15:19

## 2024-11-22 RX ADMIN — TORSEMIDE 5 MG: 10 TABLET ORAL at 09:01

## 2024-11-22 RX ADMIN — Medication 2 G: at 11:28

## 2024-11-22 RX ADMIN — HEPARIN SODIUM 5000 UNITS: 5000 INJECTION, SOLUTION INTRAVENOUS; SUBCUTANEOUS at 05:27

## 2024-11-22 RX ADMIN — ATORVASTATIN CALCIUM 40 MG: 40 TABLET, FILM COATED ORAL at 15:19

## 2024-11-22 NOTE — ASSESSMENT & PLAN NOTE
Chronic in nature, patient came with sodium level of 131>133-sodium dropped to 128 on 11/21/2024-suspect component of SIADH due to other comorbid conditions, and medication effects.  Sodium level improved after patient placed on sodium chloride tablet 2 g 3 times daily.  Today sodium is 132.  Per nephrology recommendation, patient also placed on 5 mg torsemide with fluid restriction 1000 mL/day.  As patient sodium level improved, patient can continue torsemide.  Will decrease sodium chloride tablet 1 g 3 times daily with repeating BMP in 2 to 3 days.  TSH was normal.  Imaging shows: Pulmonary hypertension,Stable diffuse groundglass opacities which may be due edema, infection, or inflammation   Patient received IV hydration, since patient blood pressure is running low, will give 2 L bolus and recheck sodium level

## 2024-11-22 NOTE — ASSESSMENT & PLAN NOTE
Baseline, patient is intellectually delayed, nonverbal-but normally mimic, try to get up, normally titered drawing the board-on round, patient back to baseline.  Patient is much more alert, drawing with colored pencil (which per caregiver is baseline)  Per caregiver, patient was unable to get up by herself, and also very tired and not acting well  CT scan of head no significant other than a stable ventriculomegaly  CTA PE with abdomen and pelvis: No active infection noted-patient has bladder wall thickening -but urine analysis no significant  Patient has history of seizure disorder, is on Depakote, reviewed Depakote level  Patient is on gabapentin 400 mg can continue  Continue Seroquel 200 mg at bedtime, 50 mg in the morning, patient also takes Zoloft 200 mg in a.m.  Continue Depakote 500 mg twice daily  Follow-up PT OT,   Appreciate speech recommendation.

## 2024-11-22 NOTE — PLAN OF CARE
Problem: Potential for Falls  Goal: Patient will remain free of falls  Description: INTERVENTIONS:  - Educate patient/family on patient safety including physical limitations  - Instruct patient to call for assistance with activity   - Consult OT/PT to assist with strengthening/mobility   - Keep Call bell within reach  - Keep bed low and locked with side rails adjusted as appropriate  - Keep care items and personal belongings within reach  - Initiate and maintain comfort rounds  - Make Fall Risk Sign visible to staff  - Offer Toileting every 2 Hours, in advance of need  - Initiate/Maintain bed alarm  - Obtain necessary fall risk management equipment: socks, chair alarm  - Apply yellow socks and bracelet for high fall risk patients  - Consider moving patient to room near nurses station  Outcome: Progressing     Problem: PAIN - ADULT  Goal: Verbalizes/displays adequate comfort level or baseline comfort level  Description: Interventions:  - Encourage patient to monitor pain and request assistance  - Assess pain using appropriate pain scale  - Administer analgesics based on type and severity of pain and evaluate response  - Implement non-pharmacological measures as appropriate and evaluate response  - Consider cultural and social influences on pain and pain management  - Notify physician/advanced practitioner if interventions unsuccessful or patient reports new pain  Outcome: Progressing     Problem: INFECTION - ADULT  Goal: Absence or prevention of progression during hospitalization  Description: INTERVENTIONS:  - Assess and monitor for signs and symptoms of infection  - Monitor lab/diagnostic results  - Monitor all insertion sites, i.e. indwelling lines, tubes, and drains  - Monitor endotracheal if appropriate and nasal secretions for changes in amount and color  - [place] appropriate cooling/warming therapies per order  - Administer medications as ordered  - Instruct and encourage patient and family to use good  hand hygiene technique  - Identify and instruct in appropriate isolation precautions for identified infection/condition  Outcome: Progressing  Goal: Absence of fever/infection during neutropenic period  Description: INTERVENTIONS:  - Monitor WBC    Outcome: Progressing     Problem: SAFETY ADULT  Goal: Patient will remain free of falls  Description: INTERVENTIONS:  - Educate patient/family on patient safety including physical limitations  - Instruct patient to call for assistance with activity   - Consult OT/PT to assist with strengthening/mobility   - Keep Call bell within reach  - Keep bed low and locked with side rails adjusted as appropriate  - Keep care items and personal belongings within reach  - Initiate and maintain comfort rounds  - Make Fall Risk Sign visible to staff  - Offer Toileting every 2 Hours, in advance of need  - Initiate/Maintain bed alarm  - Obtain necessary fall risk management equipment: socks, chair alarm  - Apply yellow socks and bracelet for high fall risk patients  - Consider moving patient to room near nurses station  Outcome: Progressing  Goal: Maintain or return to baseline ADL function  Description: INTERVENTIONS:  -  Assess patient's ability to carry out ADLs; assess patient's baseline for ADL function and identify physical deficits which impact ability to perform ADLs (bathing, care of mouth/teeth, toileting, grooming, dressing, etc.)  - Assess/evaluate cause of self-care deficits   - Assess range of motion  - Assess patient's mobility; develop plan if impaired  - Assess patient's need for assistive devices and provide as appropriate  - Encourage maximum independence but intervene and supervise when necessary  - Involve family in performance of ADLs  - Assess for home care needs following discharge   - Consider OT consult to assist with ADL evaluation and planning for discharge  - Provide patient education as appropriate  Outcome: Progressing  Goal: Maintains/Returns to pre  admission functional level  Description: INTERVENTIONS:  - Perform AM-PAC 6 Click Basic Mobility/ Daily Activity assessment daily.  - Set and communicate daily mobility goal to care team and patient/family/caregiver.   - Collaborate with rehabilitation services on mobility goals if consulted  - Perform Range of Motion 6 times a day.  - Reposition patient every 2 hours.  - Dangle patient 3 times a day  - Stand patient 3 times a day  - Out of bed to chair 3 times a day   - Out of bed for meals 3 times a day  - Out of bed for toileting  - Record patient progress and toleration of activity level   Outcome: Progressing     Problem: DISCHARGE PLANNING  Goal: Discharge to home or other facility with appropriate resources  Description: INTERVENTIONS:  - Identify barriers to discharge w/patient and caregiver  - Arrange for needed discharge resources and transportation as appropriate  - Identify discharge learning needs (meds, wound care, etc.)  - Arrange for interpretive services to assist at discharge as needed  - Refer to Case Management Department for coordinating discharge planning if the patient needs post-hospital services based on physician/advanced practitioner order or complex needs related to functional status, cognitive ability, or social support system  Outcome: Progressing     Problem: Knowledge Deficit  Goal: Patient/family/caregiver demonstrates understanding of disease process, treatment plan, medications, and discharge instructions  Description: Complete learning assessment and assess knowledge base.  Interventions:  - Provide teaching at level of understanding  - Provide teaching via preferred learning methods  Outcome: Progressing     Problem: Prexisting or High Potential for Compromised Skin Integrity  Goal: Skin integrity is maintained or improved  Description: INTERVENTIONS:  - Identify patients at risk for skin breakdown  - Assess and monitor skin integrity  - Assess and monitor nutrition and hydration  status  - Monitor labs   - Assess for incontinence   - Turn and reposition patient  - Assist with mobility/ambulation  - Relieve pressure over bony prominences  - Avoid friction and shearing  - Provide appropriate hygiene as needed including keeping skin clean and dry  - Evaluate need for skin moisturizer/barrier cream  - Collaborate with interdisciplinary team   - Patient/family teaching  - Consider wound care consult   Outcome: Progressing

## 2024-11-22 NOTE — INCIDENTAL FINDINGS
The following findings require follow up:  Radiographic finding   Finding: CT pe study w abdomen pelvis w contrast: 1. No pulmonary embolus., 2. Enlarged pulmonary trunk consistent with pulmonary hypertension., 3. Stable diffuse groundglass opacities which may be due edema, infection, or inflammation., 4. Bladder wall thickening. Correlate for cystitis., 5. Constipation., 6. Stable 1 cm pancreatic cyst. For simple cyst(s) less than 1.5 cm, recommend follow-up every 2 years for 5 times or to age 80, whichever comes first. Follow-up can stop at age 80 or can switch over to 80 year or older algorithm.    Follow up required: yes   Follow up should be done within 1 week(s)    Please notify the following clinician to assist with the follow up:   Dr. Soy Clemente MD      [facility]     [phone]       Incidental finding results were discussed with the Patient and Patient's POA by Joey Miranda MD on 11/22/24.   They expressed understanding and all questions answered.

## 2024-11-22 NOTE — ASSESSMENT & PLAN NOTE
Per recent echocardiogram report, no significant hypertension noted  CTA PE study shows no PE, but pulmonary hypertension  Patient had previous echocardiogram back in 2021, shows ejection fraction of 60%  Was on low-dose of torsemide, remained on hold  Repeat echocardiogram:Left Ventricle: Left ventricular cavity size is normal. Wall thickness is mildly increased. The left ventricular ejection fraction is 65%. Systolic function is normal. Wall motion is normal. Diastolic function is mildly abnormal, consistent with grade I (abnormal) relaxation.    Aortic Valve: The aortic valve is trileaflet. The leaflets are mildly thickened. The leaflets are mildly calcified. The leaflets exhibit normal mobility. There is no evidence of regurgitation. The aortic valve has no significant stenosis.    Mitral Valve: There is mild thickening. There is mild annular calcification. There is mild regurgitation.    Tricuspid Valve: There is mild regurgitation. The right ventricular systolic pressure is normal.    No significant difference compared to 2021 study report.

## 2024-11-22 NOTE — CASE MANAGEMENT
Case Management Discharge Planning Note    Patient name Denita Vital  Location /-01 MRN [id number]  : 1955 Date 2024       Current Admission Date: 2024  Current Admission Diagnosis:Acute metabolic encephalopathy   Patient Active Problem List    Diagnosis Date Noted Date Diagnosed    Hypomagnesemia 2024     Pulmonary hypertension (HCC) 2024     Normal pressure hydrocephalus (HCC) 2024     Transaminitis 2024     Hypoglycemia 2023     Hyponatremia 2022     Open nondisplaced fracture of distal phalanx of left middle finger 2022     Avulsion of fingernail 2022     Acute urinary retention 2022     Acute metabolic encephalopathy 2022     Cystitis without hematuria 2022     Dysphagia 2021     Normocytic anemia 2021     Chronically elevated right hemidiaphragm 2021     Obesity (BMI 30.0-34.9) 2021     Seizure disorder (HCC) 2021     Pancytopenia (HCC) 2021     Hyperlipidemia 2021     Abnormal x-ray 2021     Acute respiratory failure with hypoxia (HCC) 2021     Aspiration pneumonitis (HCC) 2021     Mood disorder (HCC) 2021     Type 2 diabetes mellitus with hypoglycemia, without long-term current use of insulin (HCC) 2020     Hypertension 2019     Gastroesophageal reflux disease without esophagitis 2018     Ambulatory dysfunction 2017     Intellectual disability 2017       LOS (days): 3  Geometric Mean LOS (GMLOS) (days): 5  Days to GMLOS:2     OBJECTIVE:  Risk of Unplanned Readmission Score: 29.21         Current admission status: Inpatient   Preferred Pharmacy:   [facility] - [address] [place], [place] [address] [facility] [address]  Phone: [phone] Fax: [phone]    Primary Care Provider: Soy Clemente MD    Primary Insurance: MEDICARE  Secondary Insurance: [place] [employer]    DISCHARGE  DETAILS:           CM placing transportation request to [facility] in [facility]    As per [facility], the home can accept pt back today, requesting  after 1500          As per [facility] will  pt at 1600 to transport to home address via S

## 2024-11-22 NOTE — PLAN OF CARE
Problem: Potential for Falls  Goal: Patient will remain free of falls  Description: INTERVENTIONS:  - Educate patient/family on patient safety including physical limitations  - Instruct patient to call for assistance with activity   - Consult OT/PT to assist with strengthening/mobility   - Keep Call bell within reach  - Keep bed low and locked with side rails adjusted as appropriate  - Keep care items and personal belongings within reach  - Initiate and maintain comfort rounds  - Make Fall Risk Sign visible to staff  - Offer Toileting every 2 Hours, in advance of need  - Initiate/Maintain bed alarm  - Obtain necessary fall risk management equipment: socks, chair alarm  - Apply yellow socks and bracelet for high fall risk patients  - Consider moving patient to room near nurses station  Outcome: Progressing     Problem: PAIN - ADULT  Goal: Verbalizes/displays adequate comfort level or baseline comfort level  Description: Interventions:  - Encourage patient to monitor pain and request assistance  - Assess pain using appropriate pain scale  - Administer analgesics based on type and severity of pain and evaluate response  - Implement non-pharmacological measures as appropriate and evaluate response  - Consider cultural and social influences on pain and pain management  - Notify physician/advanced practitioner if interventions unsuccessful or patient reports new pain  Outcome: Progressing     Problem: INFECTION - ADULT  Goal: Absence or prevention of progression during hospitalization  Description: INTERVENTIONS:  - Assess and monitor for signs and symptoms of infection  - Monitor lab/diagnostic results  - Monitor all insertion sites, i.e. indwelling lines, tubes, and drains  - Monitor endotracheal if appropriate and nasal secretions for changes in amount and color  - [place] appropriate cooling/warming therapies per order  - Administer medications as ordered  - Instruct and encourage patient and family to use good  hand hygiene technique  - Identify and instruct in appropriate isolation precautions for identified infection/condition  Outcome: Progressing  Goal: Absence of fever/infection during neutropenic period  Description: INTERVENTIONS:  - Monitor WBC    Outcome: Progressing     Problem: SAFETY ADULT  Goal: Patient will remain free of falls  Description: INTERVENTIONS:  - Educate patient/family on patient safety including physical limitations  - Instruct patient to call for assistance with activity   - Consult OT/PT to assist with strengthening/mobility   - Keep Call bell within reach  - Keep bed low and locked with side rails adjusted as appropriate  - Keep care items and personal belongings within reach  - Initiate and maintain comfort rounds  - Make Fall Risk Sign visible to staff  - Offer Toileting every 2 Hours, in advance of need  - Initiate/Maintain bed alarm  - Obtain necessary fall risk management equipment: socks, chair alarm  - Apply yellow socks and bracelet for high fall risk patients  - Consider moving patient to room near nurses station  Outcome: Progressing  Goal: Maintain or return to baseline ADL function  Description: INTERVENTIONS:  -  Assess patient's ability to carry out ADLs; assess patient's baseline for ADL function and identify physical deficits which impact ability to perform ADLs (bathing, care of mouth/teeth, toileting, grooming, dressing, etc.)  - Assess/evaluate cause of self-care deficits   - Assess range of motion  - Assess patient's mobility; develop plan if impaired  - Assess patient's need for assistive devices and provide as appropriate  - Encourage maximum independence but intervene and supervise when necessary  - Involve family in performance of ADLs  - Assess for home care needs following discharge   - Consider OT consult to assist with ADL evaluation and planning for discharge  - Provide patient education as appropriate  Outcome: Progressing  Goal: Maintains/Returns to pre  admission functional level  Description: INTERVENTIONS:  - Perform AM-PAC 6 Click Basic Mobility/ Daily Activity assessment daily.  - Set and communicate daily mobility goal to care team and patient/family/caregiver.   - Collaborate with rehabilitation services on mobility goals if consulted  - Perform Range of Motion 6 times a day.  - Reposition patient every 2 hours.  - Dangle patient 3 times a day  - Stand patient 3 times a day  - Out of bed to chair 3 times a day   - Out of bed for meals 3 times a day  - Out of bed for toileting  - Record patient progress and toleration of activity level   Outcome: Progressing     Problem: DISCHARGE PLANNING  Goal: Discharge to home or other facility with appropriate resources  Description: INTERVENTIONS:  - Identify barriers to discharge w/patient and caregiver  - Arrange for needed discharge resources and transportation as appropriate  - Identify discharge learning needs (meds, wound care, etc.)  - Arrange for interpretive services to assist at discharge as needed  - Refer to Case Management Department for coordinating discharge planning if the patient needs post-hospital services based on physician/advanced practitioner order or complex needs related to functional status, cognitive ability, or social support system  Outcome: Progressing     Problem: Knowledge Deficit  Goal: Patient/family/caregiver demonstrates understanding of disease process, treatment plan, medications, and discharge instructions  Description: Complete learning assessment and assess knowledge base.  Interventions:  - Provide teaching at level of understanding  - Provide teaching via preferred learning methods  Outcome: Progressing     Problem: Prexisting or High Potential for Compromised Skin Integrity  Goal: Skin integrity is maintained or improved  Description: INTERVENTIONS:  - Identify patients at risk for skin breakdown  - Assess and monitor skin integrity  - Assess and monitor nutrition and hydration  status  - Monitor labs   - Assess for incontinence   - Turn and reposition patient  - Assist with mobility/ambulation  - Relieve pressure over bony prominences  - Avoid friction and shearing  - Provide appropriate hygiene as needed including keeping skin clean and dry  - Evaluate need for skin moisturizer/barrier cream  - Collaborate with interdisciplinary team   - Patient/family teaching  - Consider wound care consult   Outcome: Progressing     Problem: SAFETY,RESTRAINT: NV/NON-SELF DESTRUCTIVE BEHAVIOR  Goal: Remains free of harm/injury (restraint for non violent/non self-detsructive behavior)  Description: INTERVENTIONS:  - Instruct patient/family regarding restraint use   - Assess and monitor physiologic and psychological status   - Provide interventions and comfort measures to meet assessed patient needs   - Identify and implement measures to help patient regain control  - Assess readiness for release of restraint   Outcome: Progressing  Goal: Returns to optimal restraint-free functioning  Description: INTERVENTIONS:  - Assess the patient's behavior and symptoms that indicate continued need for restraint  - Identify and implement measures to help patient regain control  - Assess readiness for release of restraint   Outcome: Progressing

## 2024-11-22 NOTE — ASSESSMENT & PLAN NOTE
Per chart review, patient hemoglobin range around average 10.5  Patient came with hemoglobin 12.8, today hemoglobin is 9.6  Initially patient received IV hydration (??  Dilutional)-patient also on antiseizure medication, could be the effects  Lab Results   Component Value Date    IRON 86 11/21/2024    TIBC 248 (L) 11/21/2024    FERRITIN 156 11/21/2024     Lab Results   Component Value Date    [id number] 354 11/21/2024     Lab Results   Component Value Date    FOLATE >22.3 11/21/2024

## 2024-11-22 NOTE — TELEPHONE ENCOUNTER
I was not involved in the care of this patient.  I was on service on the day patient was discharged prior to being seen.  I have sent a message to outpatient renal office for follow-up and lab work.

## 2024-11-22 NOTE — ASSESSMENT & PLAN NOTE
Anemia, thrombocytopenia  Lead to [facility] improved, platelet count is improving  On admission day, platelet was 134  Patient is on heparin subcu, no significant drop of platelet.  Continue to monitor.

## 2024-11-22 NOTE — DISCHARGE SUMMARY
Discharge Summary - Hospitalist   Name: Denita Vital 69 y.o. female I MRN: [id number]  Unit/Bed#: -01 I Date of Admission: 11/19/2024   Date of Service: 11/22/2024 I Hospital Day: 3     Assessment & Plan  Acute metabolic encephalopathy  Baseline, patient is intellectually delayed, nonverbal-but normally mimic, try to get up, normally titered drawing the board-on round, patient back to baseline.  Patient is much more alert, drawing with colored pencil (which per caregiver is baseline)  Per caregiver, patient was unable to get up by herself, and also very tired and not acting well  CT scan of head no significant other than a stable ventriculomegaly  CTA PE with abdomen and pelvis: No active infection noted-patient has bladder wall thickening -but urine analysis no significant  Patient has history of seizure disorder, is on Depakote, reviewed Depakote level  Patient is on gabapentin 400 mg can continue  Continue Seroquel 200 mg at bedtime, 50 mg in the morning, patient also takes Zoloft 200 mg in a.m.  Continue Depakote 500 mg twice daily  Follow-up PT OT,   Appreciate speech recommendation.  Hyponatremia  Chronic in nature, patient came with sodium level of 131>133-sodium dropped to 128 on 11/21/2024-suspect component of SIADH due to other comorbid conditions, and medication effects.  Sodium level improved after patient placed on sodium chloride tablet 2 g 3 times daily.  Today sodium is 132.  Per nephrology recommendation, patient also placed on 5 mg torsemide with fluid restriction 1000 mL/day.  As patient sodium level improved, patient can continue torsemide.  Will decrease sodium chloride tablet 1 g 3 times daily with repeating BMP in 2 to 3 days.  TSH was normal.  Imaging shows: Pulmonary hypertension,Stable diffuse groundglass opacities which may be due edema, infection, or inflammation   Patient received IV hydration, since patient blood pressure is running low, will give 2 L bolus and recheck  "sodium level  Normocytic anemia  Per chart review, patient hemoglobin range around average 10.5  Patient came with hemoglobin 12.8, today hemoglobin is 9.6  Initially patient received IV hydration (??  Dilutional)-patient also on antiseizure medication, could be the effects  Lab Results   Component Value Date    IRON 86 11/21/2024    TIBC 248 (L) 11/21/2024    FERRITIN 156 11/21/2024     Lab Results   Component Value Date    [id number] 354 11/21/2024     Lab Results   Component Value Date    FOLATE >22.3 11/21/2024         Type 2 diabetes mellitus with hypoglycemia, without long-term current use of insulin (HCC)  Lab Results   Component Value Date    HGBA1C 5.9 (H) 06/26/2024       No results for input(s): \"POCGLU\" in the last 72 hours.    Blood Sugar Average: Last 72 hrs:  Sliding scale with hypoglycemia precaution.    Seizure disorder (HCC)  Patient is on Depakote, gabapentin,  Reviewed.  Pulmonary hypertension (HCC)  Per recent echocardiogram report, no significant hypertension noted  CTA PE study shows no PE, but pulmonary hypertension  Patient had previous echocardiogram back in 2021, shows ejection fraction of 60%  Was on low-dose of torsemide, remained on hold  Repeat echocardiogram:Left Ventricle: Left ventricular cavity size is normal. Wall thickness is mildly increased. The left ventricular ejection fraction is 65%. Systolic function is normal. Wall motion is normal. Diastolic function is mildly abnormal, consistent with grade I (abnormal) relaxation.    Aortic Valve: The aortic valve is trileaflet. The leaflets are mildly thickened. The leaflets are mildly calcified. The leaflets exhibit normal mobility. There is no evidence of regurgitation. The aortic valve has no significant stenosis.    Mitral Valve: There is mild thickening. There is mild annular calcification. There is mild regurgitation.    Tricuspid Valve: There is mild regurgitation. The right ventricular systolic pressure is normal.    No " significant difference compared to 2021 study report.  Normal pressure hydrocephalus (HCC)  CT scan of head:Stable ventriculomegaly which could be due to normal pressure hydrocephalus.   Conservative management.  Pancytopenia (HCC)  Anemia, thrombocytopenia  Lead to [facility] improved, platelet count is improving  On admission day, platelet was 134  Patient is on heparin subcu, no significant drop of platelet.  Continue to monitor.  Hypomagnesemia  Continue to PO supplement     Discharging Physician / Practitioner: Joey Miranda MD  PCP: Soy Clemente MD  Admission Date:   Admission Orders (From admission, onward)       Ordered        11/19/24 1353  INPATIENT ADMISSION  Once                          Discharge Date: 11/22/24    Medical Problems       Resolved Problems  Date Reviewed: 2/26/2024   None         Consultations During Hospital Stay:  Nephrology, PT/OT    Procedures Performed:   CT head without contrast   Final Result by Carlos Singh MD (11/19 5340)      No acute intracranial abnormality.      Stable ventriculomegaly which could be due to normal pressure hydrocephalus.                  Workstation performed: [id number]         CT pe study w abdomen pelvis w contrast   Final Result by Carlos Singh MD (11/19 1313)      1.  No pulmonary embolus.   2.  Enlarged pulmonary trunk consistent with pulmonary hypertension.   3.  Stable diffuse groundglass opacities which may be due edema, infection, or inflammation.   4.  Bladder wall thickening. Correlate for cystitis.   5.  Constipation.   6.  Stable 1 cm pancreatic cyst. For simple cyst(s) less than 1.5 cm, recommend follow-up every 2 years for 5 times or to age 80, whichever comes first. Follow-up can stop at age 80 or can switch over to 80 year or older algorithm. Recommend next    follow-up in 2 years. Preferred imaging modality: abdomen MRI and MRCP with and without IV contrast, or triple phase abdomen CT with IV contrast, or abdomen MRI and MRCP  without IV contrast.               The study was marked in EPIC for immediate notification.                              Workstation performed: [id number]         VAS lower limb venous duplex study, unilateral/limited   Final Result by Lars Deleon DO (11/19 1527)        ECHO:    Left Ventricle: Left ventricular cavity size is normal. Wall thickness is mildly increased. The left ventricular ejection fraction is 65%. Systolic function is normal. Wall motion is normal. Diastolic function is mildly abnormal, consistent with grade I (abnormal) relaxation.    Aortic Valve: The aortic valve is trileaflet. The leaflets are mildly thickened. The leaflets are mildly calcified. The leaflets exhibit normal mobility. There is no evidence of regurgitation. The aortic valve has no significant stenosis.    Mitral Valve: There is mild thickening. There is mild annular calcification. There is mild regurgitation.    Tricuspid Valve: There is mild regurgitation. The right ventricular systolic pressure is normal.    No significant difference compared to 2021 study report.    Significant Findings / Test Results:   Lab Results   Component Value Date    WBC 4.74 11/22/2024    HGB 11.2 (L) 11/22/2024    HCT 33.5 (L) 11/22/2024    MCV 88 11/22/2024     (L) 11/22/2024     Lab Results   Component Value Date    SODIUM 132 (L) 11/22/2024    K 4.6 11/22/2024    CL 99 11/22/2024    CO2 30 11/22/2024    AGAP 3 (L) 11/22/2024    BUN 12 11/22/2024    CREATININE 0.48 (L) 11/22/2024    GLUC 94 11/22/2024    CALCIUM 9.5 11/22/2024    AST 25 11/22/2024    ALT 20 11/22/2024    ALKPHOS 63 11/22/2024    TP 6.3 (L) 11/22/2024    TBILI 0.24 11/22/2024    EGFR 99 11/22/2024     Lab Results   Component Value Date    YQT9KMNCAGER 2.176 11/19/2024    TSH 0.70 07/10/2024     Lab Results   Component Value Date    IRON 86 11/21/2024    TIBC 248 (L) 11/21/2024    FERRITIN 156 11/21/2024     Lab Results   Component Value Date    [id number] 354 11/21/2024        Lab Results   Component Value Date    FOLATE >22.3 11/21/2024         Incidental Findings:   As mentioned above    Test Results Pending at Discharge (will require follow up):   none     Outpatient Tests Requested:  BMP in 2-3 days with PCP    Complications: Hyponatremia    Reason for Admission: Generalized weakness, confusion    Hospital Course:     Denita Vital is a 69 y.o. female patient who originally presented to the hospital on 11/19/2024 due to generalized weakness, confusion.  Patient admitted under diagnosis of metabolic encephalopathy most likely secondary to electrolyte imbalance as well as medication side effect.  Initially it was suspected most likely UTI or possible pneumonia, in ER patient received 1 dose of antibiotic, then antibiotic remain off.  Patient blood culture remain negative, COVID/flu panel negative, MRSA negative, urine Legionella pneumonia negative.  Procalcitonin normal-confirm, patient does not have any pneumonia.  Patient urine analysis also nonsignificant.  Initially patient received IV hydration and some of the medication dose adjusted with significantly improvement.  Patient Depakote level remain normal, per group home chart, patient takes Depakote 500 mg twice daily which patient is to continue.  Continue home dose of gabapentin and other medications.    The following medications dose adjusted during the hospitalization:  Seroquel 200 mg at bedtime, and 50 mg in the morning  Zoloft/sertraline 200 mg with breakfast  Sodium chloride tablet 1 g 3 times daily-patient needs repeat BMP in 2 to 3 days to recheck sodium level  Torsemide 5 mg daily  Magnesium oxide 200 mg twice daily  Fluid restriction 1000 mL/day    Patient remained clinically stable and back to baseline.  For that reason, patient is sending back to group home with follow-up with her PCP, neurology/psychiatry.    Please see above list of diagnoses and related plan for additional information.     Condition at  Discharge: stable     Discharge Day Visit / Exam:     Subjective: Seen and evaluated during the run.  Resting comfortably.  Denies any significant complaint.  Patient is much more alert and mimicking with hand gesture  Vitals: Blood Pressure: 133/60 (11/21/24 2300)  Pulse: 75 (11/21/24 2300)  Temperature: 98.5 °F (36.9 °C) (11/21/24 2300)  Temp Source: Temporal (11/21/24 2300)  Respirations: 16 (11/21/24 1509)  Height: 5' (152.4 cm) (11/20/24 1000)  Weight - Scale: 69.3 kg (152 lb 12.5 oz) (11/22/24 0500)  SpO2: 92 % (11/21/24 2300)  Exam:   Physical Exam  Vitals and nursing note reviewed. Exam conducted with a chaperone present.   Constitutional:       Appearance: She is not ill-appearing or diaphoretic.   Eyes:      General: No scleral icterus.        Left eye: No discharge.      Extraocular Movements: Extraocular movements intact.      Conjunctiva/sclera: Conjunctivae normal.      Pupils: Pupils are equal, round, and reactive to light.   Cardiovascular:      Rate and Rhythm: Normal rate.      Heart sounds:      No friction rub. No gallop.   Pulmonary:      Effort: Pulmonary effort is normal. No respiratory distress.      Breath sounds: No stridor. No wheezing or rhonchi.   Abdominal:      General: There is no distension.      Palpations: There is no mass.      Tenderness: There is no abdominal tenderness.      Hernia: No hernia is present.   Neurological:      Mental Status: She is alert. Mental status is at baseline.       Discussion with Family: Group home notified by case management    Discharge instructions/Information to patient and family:   See after visit summary for information provided to patient and family.      Provisions for Follow-Up Care:  See after visit summary for information related to follow-up care and any pertinent home health orders.      Disposition:     Group Home / Personal Care Home at      For Discharges to [facility]:   Not Applicable to this Patient - Not Applicable to  this Patient    Planned Readmission: If condition get worse     Discharge Statement:   Greater than 50% of the total time was spent examining patient, answering all patient questions, arranging and discussing plan of care with patient as well as directly providing post-discharge instructions.  Additional time then spent on discharge activities.    Discharge Medications:  See after visit summary for reconciled discharge medications provided to patient and family.      ** Please Note: This note has been constructed using a voice recognition system **

## 2024-11-24 LAB
BACTERIA BLD CULT: NORMAL
BACTERIA BLD CULT: NORMAL

## 2024-11-25 ENCOUNTER — TELEPHONE (OUTPATIENT)
Dept: NEPHROLOGY | Facility: CLINIC | Age: 69
End: 2024-11-25

## 2024-11-25 DIAGNOSIS — E87.1 HYPONATREMIA: Primary | ICD-10-CM

## 2024-11-25 NOTE — TELEPHONE ENCOUNTER
----- Message from Anahi Romano MD sent at 11/22/2024  5:38 PM EST -----  MA team-please have the patient complete a BMP in 1 week.  And a nephrology follow-up in 2 to 3 weeks postdischarge.  I was not involved in the care of this patient as was discharged prior to being seen.  Patient can be seen by anybody postdischarge    Thank you

## 2024-11-26 DIAGNOSIS — E87.1 HYPONATREMIA: Primary | ICD-10-CM

## 2024-11-26 LAB
ATRIAL RATE: 71 BPM
P AXIS: 61 DEGREES
PR INTERVAL: 186 MS
QRS AXIS: 43 DEGREES
QRSD INTERVAL: 100 MS
QT INTERVAL: 390 MS
QTC INTERVAL: 423 MS
T WAVE AXIS: 126 DEGREES
VENTRICULAR RATE: 71 BPM

## 2024-11-26 PROCEDURE — 93010 ELECTROCARDIOGRAM REPORT: CPT | Performed by: STUDENT IN AN ORGANIZED HEALTH CARE EDUCATION/TRAINING PROGRAM

## 2024-11-26 NOTE — TELEPHONE ENCOUNTER
Zackary'd with Vonnie in NR on 12/31.   Went over location info and phone number with Kathi  (Home Supervisor)

## 2024-12-18 ENCOUNTER — TELEPHONE (OUTPATIENT)
Age: 69
End: 2024-12-18

## 2024-12-18 NOTE — TELEPHONE ENCOUNTER
I called and left a VM for Denita regarding completing blood and urine studies prior to appt on 12/31/2024.

## 2024-12-31 ENCOUNTER — TELEPHONE (OUTPATIENT)
Age: 69
End: 2024-12-31

## 2024-12-31 ENCOUNTER — OFFICE VISIT (OUTPATIENT)
Age: 69
End: 2024-12-31
Payer: MEDICARE

## 2024-12-31 VITALS
HEIGHT: 60 IN | TEMPERATURE: 97.3 F | BODY MASS INDEX: 26.5 KG/M2 | SYSTOLIC BLOOD PRESSURE: 108 MMHG | WEIGHT: 135 LBS | DIASTOLIC BLOOD PRESSURE: 64 MMHG

## 2024-12-31 DIAGNOSIS — E11.40 TYPE 2 DIABETES MELLITUS WITH DIABETIC NEUROPATHY, WITHOUT LONG-TERM CURRENT USE OF INSULIN (HCC): ICD-10-CM

## 2024-12-31 DIAGNOSIS — E66.01 MORBID OBESITY (HCC): ICD-10-CM

## 2024-12-31 DIAGNOSIS — E87.1 HYPONATREMIA: ICD-10-CM

## 2024-12-31 DIAGNOSIS — E83.42 HYPOMAGNESEMIA: ICD-10-CM

## 2024-12-31 DIAGNOSIS — N39.0 RECURRENT UTI: Primary | ICD-10-CM

## 2024-12-31 PROCEDURE — 99214 OFFICE O/P EST MOD 30 MIN: CPT | Performed by: NURSE PRACTITIONER

## 2024-12-31 RX ORDER — SODIUM CHLORIDE 1 G/1
1 TABLET ORAL
Qty: 90 TABLET | Refills: 0 | Status: SHIPPED | OUTPATIENT
Start: 2024-12-31

## 2024-12-31 NOTE — PROGRESS NOTES
Nephrology   Office Follow-Up  Denita Vital 69 y.o. female MRN: [id number]    Encounter: [id number]        Assessment & Plan    Denita Vital was seen in the [place] office today. All diagnoses and orders for visit:     1. Recurrent UTI  Start d-mannose  -     D-Mannose 500 MG CAPS; Take 500 mg by mouth 2 (two) times a day  2. Hyponatremia  Query pseudohyponatremia.  Serum osmolality was normal.  She certainly has reasons to have ADH over secretion with SSRI, antiepileptic.  Recommend resuming gentle fluid restriction 64 ounces per day and weaning sodium chloride tablets by 1 g/week.  Continue torsemide until sodium chloride tablets are discontinued altogether.  Weekly BMP ordered.  Will also check SPEP/UPEP/FLC with next labs  -     sodium chloride 1 g tablet; Take 1 tablet (1 g total) by mouth 3 (three) times a day with meals  -     Basic metabolic panel; Standing  -     Protein electrophoresis, serum; Future  -     Protein electrophoresis, urine; Future  -     Immunoglobulin free LT chains blood; Future  3. Morbid obesity (HCC)  4. Type 2 diabetes mellitus with diabetic neuropathy, without long-term current use of insulin ([facility])  5. Hypomagnesemia  -     Magnesium; Future        HPI: Denita Vital is a 69 y.o. female who is here for hospital follow-up    Pertinent medical problems include intellectually delayed, nonverbal, history of seizure disorder, hypertension, hyperlipidemia, GERD, diabetes, CHF    Hospitalized at [facility] in November of this year for generalized weakness and confusion noted to have hyponatremia for which nephrology was consulted    She was discontinued on 1 L/day fluid restriction, sodium chloride 1 g 3 times daily, torsemide 5 mg daily    He was noted to have a serum sodium of 140 mmol/L by PCP who discontinued sodium tablets and also discontinue fluid restriction.  Sodium then dropped to 131 mmol/L and salt tabs were reinitiated at 1 g 4 times daily per  caregiver today    Serum sodium 135 mmol/L on most recent lab work.  Most importantly recommend fluid restriction-64 ounces per day.  Will reduce sodium tablets to 1 g 3 times daily and check BMP in 1 week.  If remains stable can reduce sodium chloride tablets by 1 g/week until discontinued.  Would continue torsemide until sodium tablets are stopped altogether as patient is on roughly a 7 g/day diet of sodium        ROS:   Review of Systems   Constitutional:  Negative for chills and fever.   HENT:  Negative for ear pain and sore throat.    Eyes:  Negative for pain and visual disturbance.   Respiratory:  Negative for cough and shortness of breath.    Cardiovascular:  Negative for chest pain and palpitations.   Gastrointestinal:  Negative for abdominal pain and vomiting.   Genitourinary:  Negative for dysuria and hematuria.   Musculoskeletal:  Negative for arthralgias and back pain.   Skin:  Negative for color change and rash.   Neurological:  Negative for seizures and syncope.   All other systems reviewed and are negative.      Allergies: Actos [pioglitazone]    Medications:   Current Outpatient Medications:     aspirin (ECOTRIN LOW STRENGTH) 81 mg EC tablet, Take 81 mg by mouth daily, Disp: , Rfl:     Cranberry 450 MG CAPS, Take 1 capsule (450 mg total) by mouth in the morning, Disp: , Rfl: 0    D-Mannose 500 MG CAPS, Take 500 mg by mouth 2 (two) times a day, Disp: 180 capsule, Rfl: 1    Diapers & Supplies MISC, Use as directed., Disp: , Rfl:     divalproex sodium (DEPAKOTE) 250 mg DR tablet, Take 2 tablets (500 mg total) by mouth 2 (two) times a day, Disp: 120 tablet, Rfl: 0    docusate sodium (COLACE) 100 mg capsule, Take 100 mg by mouth in the morning and 100 mg in the evening., Disp: , Rfl:     gabapentin (NEURONTIN) 400 mg capsule, Take 400 mg by mouth daily at bedtime, Disp: , Rfl:     Incontinence Supply Disposable (FQ Pant Liner) MISC, , Disp: , Rfl:     Lancets 33G MISC, daily, Disp: , Rfl:      levothyroxine 50 mcg tablet, Take 50 mcg by mouth daily, Disp: , Rfl:     LORazepam (ATIVAN) 0.5 mg tablet, Take 1 tablet (0.5 mg total) by mouth 2 (two) times a day for 10 days, Disp: 20 tablet, Rfl: 0    methenamine hippurate (HIPREX) 1 g tablet, Take 1 g by mouth 2 (two) times a day with meals, Disp: , Rfl:     omeprazole (PriLOSEC) 20 mg delayed release capsule, Take 20 mg by mouth daily, Disp: , Rfl:     QUEtiapine (SEROquel) 200 mg tablet, Take 1 tablet (200 mg total) by mouth daily at bedtime, Disp: 30 tablet, Rfl: 0    QUEtiapine (SEROquel) 50 mg tablet, Take 1 tablet (50 mg total) by mouth daily with breakfast, Disp: 180 tablet, Rfl: 0    rosuvastatin (CRESTOR) 40 MG tablet, Take 40 mg by mouth daily, Disp: , Rfl:     sertraline (ZOLOFT) 100 mg tablet, Take 200 mg by mouth daily, Disp: , Rfl:     sodium chloride 1 g tablet, Take 1 tablet (1 g total) by mouth 3 (three) times a day with meals, Disp: 90 tablet, Rfl: 0    tolterodine (DETROL LA) 4 mg 24 hr capsule, Take 1 capsule (4 mg total) by mouth daily, Disp: , Rfl: 0    torsemide (DEMADEX) 5 MG tablet, Take 1 tablet (5 mg total) by mouth daily, Disp: 30 tablet, Rfl: 0    ergocalciferol (VITAMIN D2) 50,000 units, Take 50,000 Units by mouth once a week (Patient not taking: Reported on 12/31/2024), Disp: , Rfl:     polyethylene glycol (MIRALAX) 17 g packet, Take 17 g by mouth every other day as needed (if no bm in 3 days) (Patient not taking: Reported on 12/31/2024), Disp: , Rfl: 0    Past Medical History:   Diagnosis Date    Anxiety     CHF (congestive heart failure) (HCC)     Diabetes mellitus (HCC)     GERD (gastroesophageal reflux disease)     Hyperlipidemia     Hypertension     Leukopenia     Mental disability     Mixed incontinence 04/12/2017    Seizure disorder (HCC)      Past Surgical History:   Procedure Laterality Date    DENTAL SURGERY       Family History   Problem Relation Age of Onset    No Known Problems Mother     No Known Problems Father      "No Known Problems Sister     No Known Problems Maternal Grandmother     No Known Problems Maternal Grandfather     No Known Problems Paternal Grandmother     No Known Problems Paternal Grandfather       reports that she has never smoked. She has never used smokeless tobacco. She reports that she does not currently use alcohol. She reports that she does not currently use drugs.      Physical Exam:   Vitals:    12/31/24 0944   BP: 108/64   BP Location: Left arm   Patient Position: Sitting   Cuff Size: Standard   Temp: (!) 97.3 °F (36.3 °C)   TempSrc: Temporal   Weight: 61.2 kg (135 lb)   Height: 5' (1.524 m)     Body mass index is 26.37 kg/m².    General: conscious, cooperative, in no acute distress, appears stated age  Eyes: conjunctivae pale, anicteric sclerae  ENT: lips and mucous membranes moist  Neck: supple, no JVD, no masses  Chest:  essentially clear breath sounds bilaterally, no crackles, ronchus or wheezings  CVS: S1 & S2, normal rate, regular rhythm  Abdomen: soft, non-tender, non-distended, normoactive bowel sounds, rounded  Extremities: no edema of both legs  Skin: no rash   Neuro: awake, alert, oriented       Diagnostic Data:  Lab: I have personally reviewed pertinent lab results.,   CBC:       CMP: No results found for: \"SODIUM\", \"K\", \"CL\", \"CO2\", \"ANIONGAP\", \"BUN\", \"CREATININE\", \"GLUCOSE\", \"CALCIUM\", \"AST\", \"ALT\", \"ALKPHOS\", \"PROT\", \"BILITOT\", \"EGFR\",   PT/INR: No results found for: \"PT\", \"INR\",   Magnesium: No components found for: \"MAG\",  Phosphorous: No results found for: \"PHOS\"    Patient Instructions   It was nice to see you today. Sodium normal on most recent labs. Recommend reducing sodium chloride tab to 1 gram three times a day and continuing 64 ounce fluid restriction plus torsemide daily. Repeat bmp in 1 week in addition to other labs  Can start d mannose 500 mg twice daily for UTI prevention     Portions of the record may have been created with voice recognition software. Occasional wrong " "word or \"sound a like\" substitutions may have occurred due to the inherent limitations of voice recognition software. Read the chart carefully and recognize, using context, where substitutions have occurred.    If you have any questions, please contact the dictating provider.  "

## 2024-12-31 NOTE — PATIENT INSTRUCTIONS
It was nice to see you today. Sodium normal on most recent labs. Recommend reducing sodium chloride tab to 1 gram three times a day and continuing 64 ounce fluid restriction plus torsemide daily. Repeat bmp in 1 week in addition to other labs  Can start d mannose 500 mg twice daily for UTI prevention

## 2024-12-31 NOTE — TELEPHONE ENCOUNTER
Christine calling asking if we can fax the labs to [facility] at [phone]. Labs were faxed via epic.

## 2025-01-01 ENCOUNTER — HOSPITAL ENCOUNTER (EMERGENCY)
Facility: HOSPITAL | Age: 70
Discharge: HOME | End: 2025-01-01
Attending: EMERGENCY MEDICINE | Admitting: EMERGENCY MEDICINE
Payer: MEDICARE

## 2025-01-01 ENCOUNTER — APPOINTMENT (EMERGENCY)
Dept: CT IMAGING | Facility: HOSPITAL | Age: 70
End: 2025-01-01
Payer: MEDICARE

## 2025-01-01 VITALS
SYSTOLIC BLOOD PRESSURE: 132 MMHG | BODY MASS INDEX: 30.01 KG/M2 | OXYGEN SATURATION: 97 % | DIASTOLIC BLOOD PRESSURE: 66 MMHG | RESPIRATION RATE: 15 BRPM | HEART RATE: 84 BPM | TEMPERATURE: 97.1 F | WEIGHT: 153.66 LBS

## 2025-01-01 DIAGNOSIS — R56.9 SEIZURE (HCC): ICD-10-CM

## 2025-01-01 DIAGNOSIS — E87.1 HYPONATREMIA: ICD-10-CM

## 2025-01-01 DIAGNOSIS — N39.0 UTI (URINARY TRACT INFECTION): Primary | ICD-10-CM

## 2025-01-01 LAB
ALBUMIN SERPL BCG-MCNC: 4.3 G/DL (ref 3.5–5)
ALP SERPL-CCNC: 87 U/L (ref 34–104)
ALT SERPL W P-5'-P-CCNC: 11 U/L (ref 7–52)
ANION GAP SERPL CALCULATED.3IONS-SCNC: 5 MMOL/L (ref 4–13)
AST SERPL W P-5'-P-CCNC: 13 U/L (ref 13–39)
BACTERIA UR QL AUTO: ABNORMAL /HPF
BASOPHILS # BLD AUTO: 0.01 THOUSANDS/ΜL (ref 0–0.1)
BASOPHILS NFR BLD AUTO: 0 % (ref 0–1)
BILIRUB SERPL-MCNC: 0.42 MG/DL (ref 0.2–1)
BILIRUB UR QL STRIP: NEGATIVE
BUN SERPL-MCNC: 15 MG/DL (ref 5–25)
CALCIUM SERPL-MCNC: 10 MG/DL (ref 8.4–10.2)
CHLORIDE SERPL-SCNC: 91 MMOL/L (ref 96–108)
CK SERPL-CCNC: 26 U/L (ref 26–192)
CLARITY UR: CLEAR
CO2 SERPL-SCNC: 33 MMOL/L (ref 21–32)
COLOR UR: YELLOW
CREAT SERPL-MCNC: 0.77 MG/DL (ref 0.6–1.3)
EOSINOPHIL # BLD AUTO: 0.03 THOUSAND/ΜL (ref 0–0.61)
EOSINOPHIL NFR BLD AUTO: 0 % (ref 0–6)
ERYTHROCYTE [DISTWIDTH] IN BLOOD BY AUTOMATED COUNT: 15 % (ref 11.6–15.1)
ERYTHROCYTE [SEDIMENTATION RATE] IN BLOOD: 21 MM/HOUR (ref 0–29)
GFR SERPL CREATININE-BSD FRML MDRD: 77 ML/MIN/1.73SQ M
GLUCOSE SERPL-MCNC: 162 MG/DL (ref 65–140)
GLUCOSE SERPL-MCNC: 185 MG/DL (ref 65–140)
GLUCOSE UR STRIP-MCNC: NEGATIVE MG/DL
HCT VFR BLD AUTO: 35.2 % (ref 34.8–46.1)
HGB BLD-MCNC: 11.9 G/DL (ref 11.5–15.4)
HGB UR QL STRIP.AUTO: NEGATIVE
IMM GRANULOCYTES # BLD AUTO: 0.03 THOUSAND/UL (ref 0–0.2)
IMM GRANULOCYTES NFR BLD AUTO: 0 % (ref 0–2)
KETONES UR STRIP-MCNC: NEGATIVE MG/DL
LEUKOCYTE ESTERASE UR QL STRIP: ABNORMAL
LYMPHOCYTES # BLD AUTO: 0.91 THOUSANDS/ΜL (ref 0.6–4.47)
LYMPHOCYTES NFR BLD AUTO: 12 % (ref 14–44)
MAGNESIUM SERPL-MCNC: 1.8 MG/DL (ref 1.9–2.7)
MCH RBC QN AUTO: 29.5 PG (ref 26.8–34.3)
MCHC RBC AUTO-ENTMCNC: 33.8 G/DL (ref 31.4–37.4)
MCV RBC AUTO: 87 FL (ref 82–98)
MONOCYTES # BLD AUTO: 0.55 THOUSAND/ΜL (ref 0.17–1.22)
MONOCYTES NFR BLD AUTO: 7 % (ref 4–12)
MUCOUS THREADS UR QL AUTO: ABNORMAL
NEUTROPHILS # BLD AUTO: 6.22 THOUSANDS/ΜL (ref 1.85–7.62)
NEUTS SEG NFR BLD AUTO: 81 % (ref 43–75)
NITRITE UR QL STRIP: POSITIVE
NON-SQ EPI CELLS URNS QL MICRO: ABNORMAL /HPF
NRBC BLD AUTO-RTO: 0 /100 WBCS
PH UR STRIP.AUTO: 6 [PH]
PLATELET # BLD AUTO: 149 THOUSANDS/UL (ref 149–390)
PMV BLD AUTO: 9.3 FL (ref 8.9–12.7)
POTASSIUM SERPL-SCNC: 4.4 MMOL/L (ref 3.5–5.3)
PROT SERPL-MCNC: 7.7 G/DL (ref 6.4–8.4)
PROT UR STRIP-MCNC: NEGATIVE MG/DL
RBC # BLD AUTO: 4.03 MILLION/UL (ref 3.81–5.12)
RBC #/AREA URNS AUTO: ABNORMAL /HPF
SODIUM SERPL-SCNC: 129 MMOL/L (ref 135–147)
SP GR UR STRIP.AUTO: 1.02 (ref 1–1.03)
TSH SERPL DL<=0.05 MIU/L-ACNC: 1.68 UIU/ML (ref 0.45–4.5)
UROBILINOGEN UR QL STRIP.AUTO: 0.2 E.U./DL
VALPROATE SERPL-MCNC: 47 UG/ML (ref 50–100)
WBC # BLD AUTO: 7.75 THOUSAND/UL (ref 4.31–10.16)
WBC #/AREA URNS AUTO: ABNORMAL /HPF

## 2025-01-01 PROCEDURE — 84443 ASSAY THYROID STIM HORMONE: CPT | Performed by: EMERGENCY MEDICINE

## 2025-01-01 PROCEDURE — 36415 COLL VENOUS BLD VENIPUNCTURE: CPT | Performed by: EMERGENCY MEDICINE

## 2025-01-01 PROCEDURE — 81001 URINALYSIS AUTO W/SCOPE: CPT | Performed by: EMERGENCY MEDICINE

## 2025-01-01 PROCEDURE — 99284 EMERGENCY DEPT VISIT MOD MDM: CPT

## 2025-01-01 PROCEDURE — 80053 COMPREHEN METABOLIC PANEL: CPT | Performed by: EMERGENCY MEDICINE

## 2025-01-01 PROCEDURE — 80164 ASSAY DIPROPYLACETIC ACD TOT: CPT | Performed by: EMERGENCY MEDICINE

## 2025-01-01 PROCEDURE — 85025 COMPLETE CBC W/AUTO DIFF WBC: CPT | Performed by: EMERGENCY MEDICINE

## 2025-01-01 PROCEDURE — 85652 RBC SED RATE AUTOMATED: CPT | Performed by: EMERGENCY MEDICINE

## 2025-01-01 PROCEDURE — 70450 CT HEAD/BRAIN W/O DYE: CPT

## 2025-01-01 PROCEDURE — 82948 REAGENT STRIP/BLOOD GLUCOSE: CPT

## 2025-01-01 PROCEDURE — 96365 THER/PROPH/DIAG IV INF INIT: CPT

## 2025-01-01 PROCEDURE — 82550 ASSAY OF CK (CPK): CPT | Performed by: EMERGENCY MEDICINE

## 2025-01-01 PROCEDURE — 96375 TX/PRO/DX INJ NEW DRUG ADDON: CPT

## 2025-01-01 PROCEDURE — 99285 EMERGENCY DEPT VISIT HI MDM: CPT | Performed by: EMERGENCY MEDICINE

## 2025-01-01 PROCEDURE — 83735 ASSAY OF MAGNESIUM: CPT | Performed by: EMERGENCY MEDICINE

## 2025-01-01 PROCEDURE — 93005 ELECTROCARDIOGRAM TRACING: CPT

## 2025-01-01 RX ORDER — LORAZEPAM 2 MG/ML
1 INJECTION INTRAMUSCULAR ONCE
Status: COMPLETED | OUTPATIENT
Start: 2025-01-01 | End: 2025-01-01

## 2025-01-01 RX ORDER — DIVALPROEX SODIUM 500 MG/1
500 TABLET, FILM COATED, EXTENDED RELEASE ORAL DAILY
Status: DISCONTINUED | OUTPATIENT
Start: 2025-01-02 | End: 2025-01-01

## 2025-01-01 RX ORDER — DOCUSATE SODIUM 100 MG/1
100 CAPSULE, LIQUID FILLED ORAL ONCE
Status: COMPLETED | OUTPATIENT
Start: 2025-01-01 | End: 2025-01-01

## 2025-01-01 RX ORDER — DIVALPROEX SODIUM 500 MG/1
500 TABLET, FILM COATED, EXTENDED RELEASE ORAL ONCE
Status: COMPLETED | OUTPATIENT
Start: 2025-01-01 | End: 2025-01-01

## 2025-01-01 RX ORDER — GABAPENTIN 400 MG/1
800 CAPSULE ORAL ONCE
Status: COMPLETED | OUTPATIENT
Start: 2025-01-01 | End: 2025-01-01

## 2025-01-01 RX ORDER — CEFTRIAXONE 1 G/50ML
1000 INJECTION, SOLUTION INTRAVENOUS ONCE
Status: COMPLETED | OUTPATIENT
Start: 2025-01-01 | End: 2025-01-01

## 2025-01-01 RX ORDER — CEPHALEXIN 500 MG/1
500 CAPSULE ORAL EVERY 12 HOURS SCHEDULED
Qty: 20 CAPSULE | Refills: 0 | Status: SHIPPED | OUTPATIENT
Start: 2025-01-01 | End: 2025-01-11

## 2025-01-01 RX ORDER — SULFAMETHOXAZOLE AND TRIMETHOPRIM 800; 160 MG/1; MG/1
1 TABLET ORAL ONCE
Status: DISCONTINUED | OUTPATIENT
Start: 2025-01-01 | End: 2025-01-01

## 2025-01-01 RX ORDER — QUETIAPINE FUMARATE 100 MG/1
200 TABLET, FILM COATED ORAL ONCE
Status: COMPLETED | OUTPATIENT
Start: 2025-01-01 | End: 2025-01-01

## 2025-01-01 RX ADMIN — DIVALPROEX SODIUM 500 MG: 500 TABLET, FILM COATED, EXTENDED RELEASE ORAL at 21:01

## 2025-01-01 RX ADMIN — LORAZEPAM 1 MG: 2 INJECTION INTRAMUSCULAR; INTRAVENOUS at 19:07

## 2025-01-01 RX ADMIN — DOCUSATE SODIUM 100 MG: 100 CAPSULE, LIQUID FILLED ORAL at 21:01

## 2025-01-01 RX ADMIN — QUETIAPINE FUMARATE 200 MG: 100 TABLET ORAL at 21:09

## 2025-01-01 RX ADMIN — GABAPENTIN 800 MG: 400 CAPSULE ORAL at 21:01

## 2025-01-01 RX ADMIN — CEFTRIAXONE 1000 MG: 1 INJECTION, SOLUTION INTRAVENOUS at 21:00

## 2025-01-01 NOTE — ED NOTES
Multiple attempts at IV/blood draw by 2 RNs, other staff to attempt.     Neeta Siegel, MARIA GUADALUPE  01/01/25 2115

## 2025-01-02 LAB
ATRIAL RATE: 85 BPM
P AXIS: 70 DEGREES
PR INTERVAL: 208 MS
QRS AXIS: 49 DEGREES
QRSD INTERVAL: 86 MS
QT INTERVAL: 380 MS
QTC INTERVAL: 452 MS
T WAVE AXIS: 68 DEGREES
VENTRICULAR RATE: 85 BPM

## 2025-01-02 PROCEDURE — 93010 ELECTROCARDIOGRAM REPORT: CPT | Performed by: INTERNAL MEDICINE

## 2025-01-02 NOTE — ED PROVIDER NOTES
Time reflects when diagnosis was documented in both MDM as applicable and the Disposition within this note       Time User Action Codes Description Comment    1/1/2025  8:49 PM Chantal Tracy Add [N39.0] UTI (urinary tract infection)     1/1/2025  8:49 PM Chantal Tracy Add [E87.1] Hyponatremia     1/1/2025  8:49 PM Chantal Tracy Add [R56.9] Seizure (HCC)           ED Disposition       ED Disposition   Discharge    Condition   Stable    Date/Time   Wed Jan 1, 2025  8:49 PM    Comment   Denita Vital discharge to home/self care.                   Assessment & Plan       Medical Decision Making  This patient presents with facial twitching consistent with possible seizure activity.  Patient has a history of seizures although according to group home staff has not had 1 in at least the past 5 years, staff reports that patient's Depakote dosing was recently decreased, and Depakote level found to be subtherapeutic today.  There form Depakote dosing provided, patient also noted to have urine positive form leukocytes and nitrites, therefore started on oral antibiotics, was recently treated for UTI with 3-day course of Bactrim.  Patient received IV Ativan in the emergency department, seizure activity lasted less than 1 minute, she is also noted to have mild hyponatremia which appears to be chronic in nature with waxing and waning sodium levels.  Neurological exam without evidence of meningismus, AMS, focal neurological findings so doubt meningitis, encephalitis, stroke.  Presentation not consistent with acute intracranial bleed to include SAH (lack of risk factors, headache and history).  No history of trauma so doubt ICH.  Given history and physical temporal arteritis unlikely, as is acute angle-closure glaucoma.  Doubt carotid artery dissection given no focal neural logical deficits, no neck trauma or recent neck strain.  Patient with no signs of increased intracranial pressure or weight loss and history of physical  suggest more benign headache, so less likely mass effect on brain from tumor or abscess or idiopathic intracranial hypertension.       Problems Addressed:  Hyponatremia: chronic illness or injury  Seizure (HCC): chronic illness or injury  UTI (urinary tract infection): acute illness or injury    Amount and/or Complexity of Data Reviewed  Labs: ordered. Decision-making details documented in ED Course.  Radiology: ordered. Decision-making details documented in ED Course.    Risk  OTC drugs.  Prescription drug management.        ED Course as of 01/01/25 2059 Wed Jan 01, 2025 1908 At bedside and witnessed patient having a facial twitching episode, concerning for possible seizure activity, no limb involvement at this time   1908 Group home staff reports patient has a history of previous seizures but has not had 1 in at least the past 5 years since being placed at the group home   2008 Sedimentation rate, automated   2009 UA w Reflex to Microscopic w Reflex to Culture(!)   2009 CBC and differential(!)   2009 Urine Microscopic(!)   2009 Valproic acid level, total(!)   2009 CK   2009 Magnesium(!)   2009 Comprehensive metabolic panel(!)   2049 CT head without contrast       Medications   cefTRIAXone (ROCEPHIN) IVPB (premix in dextrose) 1,000 mg 50 mL (has no administration in time range)   divalproex sodium (DEPAKOTE ER) 24 hr tablet 500 mg (has no administration in time range)   docusate sodium (COLACE) capsule 100 mg (has no administration in time range)   gabapentin (NEURONTIN) capsule 800 mg (has no administration in time range)   QUEtiapine (SEROquel) tablet 200 mg (has no administration in time range)   sulfamethoxazole-trimethoprim (BACTRIM DS) 800-160 mg per tablet 1 tablet (has no administration in time range)   LORazepam (ATIVAN) injection 1 mg (1 mg Intravenous Given 1/1/25 1907)       ED Risk Strat Scores                                              History of Present Illness       Chief Complaint   Patient  presents with    Medical Problem     Patient arrived via EMS from a group home for right sided facial twitching. Per EMS patient had episode of facial twitching while they were with the patient. No twitching on patient's arrival to ED. Patient is nonverbal and deaf.       Past Medical History:   Diagnosis Date    Anxiety     CHF (congestive heart failure) (HCC)     Diabetes mellitus (HCC)     GERD (gastroesophageal reflux disease)     Hyperlipidemia     Hypertension     Leukopenia     Mental disability     Mixed incontinence 04/12/2017    Seizure disorder (HCC)       Past Surgical History:   Procedure Laterality Date    DENTAL SURGERY        Family History   Problem Relation Age of Onset    No Known Problems Mother     No Known Problems Father     No Known Problems Sister     No Known Problems Maternal Grandmother     No Known Problems Maternal Grandfather     No Known Problems Paternal Grandmother     No Known Problems Paternal Grandfather       Social History     Tobacco Use    Smoking status: Never    Smokeless tobacco: Never   Vaping Use    Vaping status: Never Used   Substance Use Topics    Alcohol use: Not Currently    Drug use: Not Currently      E-Cigarette/Vaping    E-Cigarette Use Never User       E-Cigarette/Vaping Substances    Nicotine No     THC No     CBD No     Flavoring No     Other No     Unknown No       I have reviewed and agree with the history as documented.     Patient is a 69-year-old female brought to the emergency department by EMS from local group home secondary to facial twitching that was noted by group home staff around 5:30 PM, patient is nonverbal, staff reports they were feeding patient dinner, when she seemed to lose function of her right arm and had facial twitching concerning for possible seizure-like activity, patient has a history of seizures previously but has not had 1 in the past 5 years since being placed at the group home she is currently in, staff denies any recent illness  or injury        Review of Systems   Unable to perform ROS: Patient nonverbal   Neurological:  Positive for seizures.           Objective       ED Triage Vitals   Temperature Pulse Blood Pressure Respirations SpO2 Patient Position - Orthostatic VS   01/01/25 1823 01/01/25 1824 01/01/25 1824 01/01/25 1824 01/01/25 1824 --   (!) 97.1 °F (36.2 °C) 86 126/64 18 96 %       Temp src Heart Rate Source BP Location FiO2 (%) Pain Score    -- 01/01/25 1824 -- -- --     Monitor         Vitals      Date and Time Temp Pulse SpO2 Resp BP Pain Score FACES Pain Rating User   01/01/25 1824 -- 86 96 % 18 126/64 -- -- AS   01/01/25 1823 97.1 °F (36.2 °C) -- -- -- -- -- -- AS            Physical Exam  Constitutional:       Appearance: She is well-developed. She is obese.   HENT:      Head: Normocephalic and atraumatic.      Nose: Nose normal.      Mouth/Throat:      Mouth: Mucous membranes are moist.   Eyes:      Conjunctiva/sclera: Conjunctivae normal.      Pupils: Pupils are equal, round, and reactive to light.   Cardiovascular:      Rate and Rhythm: Normal rate and regular rhythm.      Heart sounds: Normal heart sounds.   Pulmonary:      Effort: Pulmonary effort is normal.      Breath sounds: Normal breath sounds.   Abdominal:      Palpations: Abdomen is soft.   Musculoskeletal:         General: Normal range of motion.      Cervical back: Normal range of motion and neck supple.   Skin:     General: Skin is warm and dry.   Neurological:      Mental Status: She is alert and oriented to person, place, and time.         Results Reviewed       Procedure Component Value Units Date/Time    TSH, 3rd generation with Free T4 reflex [[id number]]  (Normal) Collected: 01/01/25 1910    Lab Status: Final result Specimen: Blood from Arm, Left Updated: 01/01/25 2032     TSH 3RD GENERATON 1.675 uIU/mL     Comprehensive metabolic panel [[id number]]  (Abnormal) Collected: 01/01/25 1910    Lab Status: Final result Specimen: Blood from Arm, Left Updated:  01/01/25 2005     Sodium 129 mmol/L      Potassium 4.4 mmol/L      Chloride 91 mmol/L      CO2 33 mmol/L      ANION GAP 5 mmol/L      BUN 15 mg/dL      Creatinine 0.77 mg/dL      Glucose 185 mg/dL      Calcium 10.0 mg/dL      AST 13 U/L      ALT 11 U/L      Alkaline Phosphatase 87 U/L      Total Protein 7.7 g/dL      Albumin 4.3 g/dL      Total Bilirubin 0.42 mg/dL      eGFR 77 ml/min/1.73sq m     Narrative:      [employer] guidelines for Chronic Kidney Disease (CKD):     Stage 1 with normal or high GFR (GFR > 90 mL/min/1.73 square meters)    Stage 2 Mild CKD (GFR = 60-89 mL/min/1.73 square meters)    Stage 3A Moderate CKD (GFR = 45-59 mL/min/1.73 square meters)    Stage 3B Moderate CKD (GFR = 30-44 mL/min/1.73 square meters)    Stage 4 Severe CKD (GFR = 15-29 mL/min/1.73 square meters)    Stage 5 End Stage CKD (GFR <15 mL/min/1.73 square meters)  Note: GFR calculation is accurate only with a steady state creatinine    Magnesium [[id number]]  (Abnormal) Collected: 01/01/25 1910    Lab Status: Final result Specimen: Blood from Arm, Left Updated: 01/01/25 2005     Magnesium 1.8 mg/dL     CK [[id number]]  (Normal) Collected: 01/01/25 1910    Lab Status: Final result Specimen: Blood from Arm, Left Updated: 01/01/25 2005     Total CK 26 U/L     Valproic acid level, total [[id number]]  (Abnormal) Collected: 01/01/25 1910    Lab Status: Final result Specimen: Blood from Arm, Left Updated: 01/01/25 2005     Valproic Acid, Total 47 ug/mL     Urine Microscopic [[id number]]  (Abnormal) Collected: 01/01/25 1916    Lab Status: Final result Specimen: Urine, Straight Cath Updated: 01/01/25 1951     RBC, UA None Seen /hpf      WBC, UA 2-4 /hpf      Epithelial Cells Occasional /hpf      Bacteria, UA None Seen /hpf      MUCUS THREADS Occasional    UA w Reflex to Microscopic w Reflex to Culture [[id number]]  (Abnormal) Collected: 01/01/25 1916    Lab Status: Final result Specimen: Urine, Straight Cath Updated:  01/01/25 1937     Color, UA Yellow     Clarity, UA Clear     Specific Gravity, UA 1.025     pH, UA 6.0     Leukocytes, UA Trace     Nitrite, UA Positive     Protein, UA Negative mg/dl      Glucose, UA Negative mg/dl      Ketones, UA Negative mg/dl      Urobilinogen, UA 0.2 E.U./dl      Bilirubin, UA Negative     Occult Blood, UA Negative    Sedimentation rate, automated [[id number]]  (Normal) Collected: 01/01/25 1917    Lab Status: Final result Specimen: Blood from Arm, Left Updated: 01/01/25 1934     Sed Rate 21 mm/hour     CBC and differential [[id number]]  (Abnormal) Collected: 01/01/25 1917    Lab Status: Final result Specimen: Blood from Arm, Left Updated: 01/01/25 1926     WBC 7.75 Thousand/uL      RBC 4.03 Million/uL      Hemoglobin 11.9 g/dL      Hematocrit 35.2 %      MCV 87 fL      MCH 29.5 pg      MCHC 33.8 g/dL      RDW 15.0 %      MPV 9.3 fL      Platelets 149 Thousands/uL      nRBC 0 /100 WBCs      Segmented % 81 %      Immature Grans % 0 %      Lymphocytes % 12 %      Monocytes % 7 %      Eosinophils Relative 0 %      Basophils Relative 0 %      Absolute Neutrophils 6.22 Thousands/µL      Absolute Immature Grans 0.03 Thousand/uL      Absolute Lymphocytes 0.91 Thousands/µL      Absolute Monocytes 0.55 Thousand/µL      Eosinophils Absolute 0.03 Thousand/µL      Basophils Absolute 0.01 Thousands/µL     Fingerstick Glucose (POCT) [[id number]]  (Abnormal) Collected: 01/01/25 1910    Lab Status: Final result Specimen: Blood Updated: 01/01/25 1911     POC Glucose 162 mg/dl             CT head without contrast   Final Interpretation by E. Alec Schoenberger, MD (01/01 2041)      No acute intracranial pathology. Stable ventriculomegaly suggesting NPH in the appropriate clinical setting.                  Workstation performed: [id number]             ECG 12 Lead Documentation Only    Date/Time: 1/1/2025 7:18 PM    Performed by: Chantal Tracy DO  Authorized by: Chantal Tarcy DO    Indications / Diagnosis:  Facial  twitching/seizure  ECG reviewed by me, the ED Provider: yes    Patient location:  ED  Previous ECG:     Comparison to cardiac monitor: Yes    Interpretation:     Interpretation: normal    Quality:     Tracing quality:  Limited by artifact  Rate:     ECG rate:  85    ECG rate assessment: normal    Rhythm:     Rhythm: sinus rhythm    Ectopy:     Ectopy: none    QRS:     QRS intervals:  Normal  Conduction:     Conduction: normal    ST segments:     ST segments:  Normal  T waves:     T waves: normal        ED Medication and Procedure Management   Prior to Admission Medications   Prescriptions Last Dose Informant Patient Reported? Taking?   Cranberry 450 MG CAPS   No No   Sig: Take 1 capsule (450 mg total) by mouth in the morning   D-Mannose 500 MG CAPS   No No   Sig: Take 500 mg by mouth 2 (two) times a day   Diapers & Supplies MISC   Yes No   Sig: Use as directed.   Incontinence Supply Disposable (FQ Pant Liner) MISC   Yes No   LORazepam (ATIVAN) 0.5 mg tablet   No No   Sig: Take 1 tablet (0.5 mg total) by mouth 2 (two) times a day for 10 days   Lancets 33G MISC   Yes No   Sig: daily   QUEtiapine (SEROquel) 200 mg tablet   No No   Sig: Take 1 tablet (200 mg total) by mouth daily at bedtime   QUEtiapine (SEROquel) 50 mg tablet   No No   Sig: Take 1 tablet (50 mg total) by mouth daily with breakfast   aspirin (ECOTRIN LOW STRENGTH) 81 mg EC tablet   Yes No   Sig: Take 81 mg by mouth daily   divalproex sodium (DEPAKOTE) 250 mg DR tablet   No No   Sig: Take 2 tablets (500 mg total) by mouth 2 (two) times a day   docusate sodium (COLACE) 100 mg capsule  Outside Facility (Specify) Yes No   Sig: Take 100 mg by mouth in the morning and 100 mg in the evening.   ergocalciferol (VITAMIN D2) 50,000 units   Yes No   Sig: Take 50,000 Units by mouth once a week   Patient not taking: Reported on 12/31/2024   gabapentin (NEURONTIN) 400 mg capsule   Yes No   Sig: Take 400 mg by mouth daily at bedtime   levothyroxine 50 mcg tablet   Yes  No   Sig: Take 50 mcg by mouth daily   methenamine hippurate (HIPREX) 1 g tablet   Yes No   Sig: Take 1 g by mouth 2 (two) times a day with meals   omeprazole (PriLOSEC) 20 mg delayed release capsule   Yes No   Sig: Take 20 mg by mouth daily   polyethylene glycol (MIRALAX) 17 g packet   No No   Sig: Take 17 g by mouth every other day as needed (if no bm in 3 days)   Patient not taking: Reported on 12/31/2024   rosuvastatin (CRESTOR) 40 MG tablet   Yes No   Sig: Take 40 mg by mouth daily   sertraline (ZOLOFT) 100 mg tablet   Yes No   Sig: Take 200 mg by mouth daily   sodium chloride 1 g tablet   No No   Sig: Take 1 tablet (1 g total) by mouth 3 (three) times a day with meals   tolterodine (DETROL LA) 4 mg 24 hr capsule   No No   Sig: Take 1 capsule (4 mg total) by mouth daily   torsemide (DEMADEX) 5 MG tablet   No No   Sig: Take 1 tablet (5 mg total) by mouth daily      Facility-Administered Medications: None     Patient's Medications   Discharge Prescriptions    CEPHALEXIN (KEFLEX) 500 MG CAPSULE    Take 1 capsule (500 mg total) by mouth every 12 (twelve) hours for 10 days       Start Date: 1/1/2025  End Date: 1/11/2025       Order Dose: 500 mg       Quantity: 20 capsule    Refills: 0     No discharge procedures on file.  ED SEPSIS DOCUMENTATION   Time reflects when diagnosis was documented in both MDM as applicable and the Disposition within this note       Time User Action Codes Description Comment    1/1/2025  8:49 PM Chantal Tracy [N39.0] UTI (urinary tract infection)     1/1/2025  8:49 PM Chantal Tracy [E87.1] Hyponatremia     1/1/2025  8:49 PM Chantal Tracy [R56.9] Seizure (HCC)                  Chantal Tracy DO  01/01/25 2100

## 2025-01-02 NOTE — ED NOTES
Patient noted to have full facial twitching while provider at bedside      Neeta Siegel RN  01/01/25 3395

## 2025-01-03 ENCOUNTER — HOSPITAL ENCOUNTER (EMERGENCY)
Facility: HOSPITAL | Age: 70
Discharge: HOME | End: 2025-01-03
Attending: EMERGENCY MEDICINE
Payer: MEDICARE

## 2025-01-03 VITALS
OXYGEN SATURATION: 96 % | RESPIRATION RATE: 17 BRPM | HEART RATE: 79 BPM | TEMPERATURE: 98.1 F | BODY MASS INDEX: 30.23 KG/M2 | DIASTOLIC BLOOD PRESSURE: 65 MMHG | SYSTOLIC BLOOD PRESSURE: 140 MMHG | WEIGHT: 154.76 LBS

## 2025-01-03 DIAGNOSIS — E87.1 HYPONATREMIA: ICD-10-CM

## 2025-01-03 DIAGNOSIS — R53.83 FATIGUE: Primary | ICD-10-CM

## 2025-01-03 LAB
ALBUMIN SERPL BCG-MCNC: 3.9 G/DL (ref 3.5–5)
ALP SERPL-CCNC: 75 U/L (ref 34–104)
ALT SERPL W P-5'-P-CCNC: 12 U/L (ref 7–52)
ANION GAP SERPL CALCULATED.3IONS-SCNC: 6 MMOL/L (ref 4–13)
AST SERPL W P-5'-P-CCNC: 14 U/L (ref 13–39)
BACTERIA UR QL AUTO: ABNORMAL /HPF
BASOPHILS # BLD AUTO: 0.01 THOUSANDS/ΜL (ref 0–0.1)
BASOPHILS NFR BLD AUTO: 0 % (ref 0–1)
BILIRUB SERPL-MCNC: 0.63 MG/DL (ref 0.2–1)
BILIRUB UR QL STRIP: NEGATIVE
BUN SERPL-MCNC: 12 MG/DL (ref 5–25)
CALCIUM SERPL-MCNC: 9.6 MG/DL (ref 8.4–10.2)
CHLORIDE SERPL-SCNC: 91 MMOL/L (ref 96–108)
CLARITY UR: CLEAR
CO2 SERPL-SCNC: 30 MMOL/L (ref 21–32)
COLOR UR: YELLOW
CREAT SERPL-MCNC: 0.48 MG/DL (ref 0.6–1.3)
EOSINOPHIL # BLD AUTO: 0.01 THOUSAND/ΜL (ref 0–0.61)
EOSINOPHIL NFR BLD AUTO: 0 % (ref 0–6)
ERYTHROCYTE [DISTWIDTH] IN BLOOD BY AUTOMATED COUNT: 15.1 % (ref 11.6–15.1)
FLUAV AG UPPER RESP QL IA.RAPID: NEGATIVE
FLUBV AG UPPER RESP QL IA.RAPID: NEGATIVE
GFR SERPL CREATININE-BSD FRML MDRD: 99 ML/MIN/1.73SQ M
GLUCOSE SERPL-MCNC: 100 MG/DL (ref 65–140)
GLUCOSE UR STRIP-MCNC: NEGATIVE MG/DL
HCT VFR BLD AUTO: 33.3 % (ref 34.8–46.1)
HGB BLD-MCNC: 11.3 G/DL (ref 11.5–15.4)
HGB UR QL STRIP.AUTO: NEGATIVE
IMM GRANULOCYTES # BLD AUTO: 0.02 THOUSAND/UL (ref 0–0.2)
IMM GRANULOCYTES NFR BLD AUTO: 0 % (ref 0–2)
KETONES UR STRIP-MCNC: ABNORMAL MG/DL
LEUKOCYTE ESTERASE UR QL STRIP: ABNORMAL
LYMPHOCYTES # BLD AUTO: 0.93 THOUSANDS/ΜL (ref 0.6–4.47)
LYMPHOCYTES NFR BLD AUTO: 16 % (ref 14–44)
MCH RBC QN AUTO: 29.4 PG (ref 26.8–34.3)
MCHC RBC AUTO-ENTMCNC: 33.9 G/DL (ref 31.4–37.4)
MCV RBC AUTO: 87 FL (ref 82–98)
MONOCYTES # BLD AUTO: 0.72 THOUSAND/ΜL (ref 0.17–1.22)
MONOCYTES NFR BLD AUTO: 12 % (ref 4–12)
NEUTROPHILS # BLD AUTO: 4.27 THOUSANDS/ΜL (ref 1.85–7.62)
NEUTS SEG NFR BLD AUTO: 72 % (ref 43–75)
NITRITE UR QL STRIP: NEGATIVE
NON-SQ EPI CELLS URNS QL MICRO: ABNORMAL /HPF
NRBC BLD AUTO-RTO: 0 /100 WBCS
PH UR STRIP.AUTO: 7.5 [PH]
PLATELET # BLD AUTO: 150 THOUSANDS/UL (ref 149–390)
PMV BLD AUTO: 9.1 FL (ref 8.9–12.7)
POTASSIUM SERPL-SCNC: 4.2 MMOL/L (ref 3.5–5.3)
PROT SERPL-MCNC: 7 G/DL (ref 6.4–8.4)
PROT UR STRIP-MCNC: NEGATIVE MG/DL
RBC # BLD AUTO: 3.84 MILLION/UL (ref 3.81–5.12)
RBC #/AREA URNS AUTO: ABNORMAL /HPF
SARS-COV+SARS-COV-2 AG RESP QL IA.RAPID: NEGATIVE
SODIUM SERPL-SCNC: 127 MMOL/L (ref 135–147)
SP GR UR STRIP.AUTO: 1.02 (ref 1–1.03)
UROBILINOGEN UR QL STRIP.AUTO: 0.2 E.U./DL
WBC # BLD AUTO: 5.96 THOUSAND/UL (ref 4.31–10.16)
WBC #/AREA URNS AUTO: ABNORMAL /HPF

## 2025-01-03 PROCEDURE — 36415 COLL VENOUS BLD VENIPUNCTURE: CPT | Performed by: EMERGENCY MEDICINE

## 2025-01-03 PROCEDURE — 99284 EMERGENCY DEPT VISIT MOD MDM: CPT

## 2025-01-03 PROCEDURE — 87811 SARS-COV-2 COVID19 W/OPTIC: CPT | Performed by: EMERGENCY MEDICINE

## 2025-01-03 PROCEDURE — 85025 COMPLETE CBC W/AUTO DIFF WBC: CPT | Performed by: EMERGENCY MEDICINE

## 2025-01-03 PROCEDURE — 87804 INFLUENZA ASSAY W/OPTIC: CPT | Performed by: EMERGENCY MEDICINE

## 2025-01-03 PROCEDURE — 80053 COMPREHEN METABOLIC PANEL: CPT | Performed by: EMERGENCY MEDICINE

## 2025-01-03 PROCEDURE — 99285 EMERGENCY DEPT VISIT HI MDM: CPT | Performed by: EMERGENCY MEDICINE

## 2025-01-03 PROCEDURE — 96365 THER/PROPH/DIAG IV INF INIT: CPT

## 2025-01-03 PROCEDURE — 82272 OCCULT BLD FECES 1-3 TESTS: CPT

## 2025-01-03 PROCEDURE — 81001 URINALYSIS AUTO W/SCOPE: CPT | Performed by: EMERGENCY MEDICINE

## 2025-01-03 PROCEDURE — 87086 URINE CULTURE/COLONY COUNT: CPT | Performed by: EMERGENCY MEDICINE

## 2025-01-03 RX ADMIN — SODIUM CHLORIDE, SODIUM LACTATE, POTASSIUM CHLORIDE, AND CALCIUM CHLORIDE 1000 ML: .6; .31; .03; .02 INJECTION, SOLUTION INTRAVENOUS at 09:27

## 2025-01-03 NOTE — ED PROVIDER NOTES
Time reflects when diagnosis was documented in both MDM as applicable and the Disposition within this note       Time User Action Codes Description Comment    1/3/2025 11:58 AM Carlos Lzu Add [R53.83] Fatigue     1/3/2025 12:01 PM Carlos Luz Add [E87.1] Hyponatremia           ED Disposition       ED Disposition   Discharge    Condition   Stable    Date/Time   Fri Flip 3, 2025 11:58 AM    Comment   Denita Vital discharge to home/self care.                   Assessment & Plan       Medical Decision Making  0855: Patient appears chronically ill but in no acute distress.  The patient is alert.  The patient was recently seen here for similar diagnosed with possible UTI placed on antibiotics.  CT of the head was completed at that time, reviewed.  Discussed with [facility], they transferred me to her caregiver Quinn, message left to return call.  In the meantime I will check basic labs and urinalysis.  The patient offers no complaints.  The patient has a complete avulsion of the left second toenail however there is no other significant signs of trauma to the foot or body.      0920: Received return call from Quinn at [facility], she is unclear why the patient was sent to the emergency room.  She plans to contact the staff about more information.    1000: Caretaker at bedside, informs me that they were attempting to get her into the lift harness and she was not holding onto the harness with great strength and slumped down into harness, there was no trauma.  She was being sent in for evaluation from this episode.  Patient has no change in neurological status per caregiver.  Basic lab work were completed.  Stable for discharge.    Amount and/or Complexity of Data Reviewed  Labs: ordered.  ECG/medicine tests: ordered and independent interpretation performed.     Details: Normal sinus rhythm 77 bpm, no acute ischemia.             Medications   lactated ringers bolus 1,000 mL (0 mL Intravenous Stopped  "1/3/25 1027)       ED Risk Strat Scores                          SBIRT 20yo+      Flowsheet Row Most Recent Value   Initial Alcohol Screen: US AUDIT-C     1. How often do you have a drink containing alcohol? 0 Filed at: 01/03/2025 1021   2. How many drinks containing alcohol do you have on a typical day you are drinking?  0 Filed at: 01/03/2025 1021   3b. FEMALE Any Age, or MALE 65+: How often do you have 4 or more drinks on one occassion? 0 Filed at: 01/03/2025 1021   Audit-C Score 0 Filed at: 01/03/2025 1021   KAILASH: How many times in the past year have you...    Used an illegal drug or used a prescription medication for non-medical reasons? Never Filed at: 01/03/2025 1021                            History of Present Illness       Chief Complaint   Patient presents with    Failure To Thrive     Pt arrives from group home via EMS with c/o \"worsening UTI\". Per EMS pt weaker, not eating and not urinating. Started ABX for UTI yesterday. Per transfer paperwork, pt slid out of chair this AM. No HS, no LOC per EMS       Past Medical History:   Diagnosis Date    Anxiety     CHF (congestive heart failure) (HCC)     Diabetes mellitus (HCC)     GERD (gastroesophageal reflux disease)     Hyperlipidemia     Hypertension     Leukopenia     Mental disability     Mixed incontinence 04/12/2017    Seizure disorder (HCC)       Past Surgical History:   Procedure Laterality Date    DENTAL SURGERY        Family History   Problem Relation Age of Onset    No Known Problems Mother     No Known Problems Father     No Known Problems Sister     No Known Problems Maternal Grandmother     No Known Problems Maternal Grandfather     No Known Problems Paternal Grandmother     No Known Problems Paternal Grandfather       Social History     Tobacco Use    Smoking status: Never    Smokeless tobacco: Never   Vaping Use    Vaping status: Never Used   Substance Use Topics    Alcohol use: Not Currently    Drug use: Not Currently      E-Cigarette/Vaping "    E-Cigarette Use Never User       E-Cigarette/Vaping Substances    Nicotine No     THC No     CBD No     Flavoring No     Other No     Unknown No       I have reviewed and agree with the history as documented.       History provided by:  Medical records, patient and EMS personnel (Quinn Caregiver at [facility])  Medical Problem      Review of Systems   Unable to perform ROS: Patient nonverbal           Objective       ED Triage Vitals   Temperature Pulse Blood Pressure Respirations SpO2 Patient Position - Orthostatic VS   01/03/25 0853 01/03/25 0853 01/03/25 0857 01/03/25 0853 01/03/25 0853 01/03/25 0853   98.1 °F (36.7 °C) 79 140/65 17 96 % Sitting      Temp Source Heart Rate Source BP Location FiO2 (%) Pain Score    01/03/25 0853 01/03/25 0853 01/03/25 0853 -- --    Temporal Monitor Right arm        Vitals      Date and Time Temp Pulse SpO2 Resp BP Pain Score FACES Pain Rating User   01/03/25 0857 -- -- -- -- 140/65 -- -- MD   01/03/25 0853 98.1 °F (36.7 °C) 79 96 % 17 -- -- -- MD            Physical Exam  Vitals and nursing note reviewed.   Constitutional:       General: She is not in acute distress.     Appearance: Normal appearance. She is not ill-appearing, toxic-appearing or diaphoretic.   HENT:      Head: Normocephalic and atraumatic.      Right Ear: External ear normal.      Left Ear: External ear normal.      Nose: Nose normal. No congestion or rhinorrhea.      Mouth/Throat:      Mouth: Mucous membranes are moist.      Pharynx: Oropharynx is clear. No oropharyngeal exudate or posterior oropharyngeal erythema.   Eyes:      General:         Right eye: No discharge.         Left eye: No discharge.      Extraocular Movements: Extraocular movements intact.      Conjunctiva/sclera: Conjunctivae normal.      Pupils: Pupils are equal, round, and reactive to light.   Cardiovascular:      Rate and Rhythm: Normal rate and regular rhythm.      Pulses: Normal pulses.      Heart sounds: Normal heart  sounds. No murmur heard.     No gallop.   Pulmonary:      Effort: Pulmonary effort is normal. No respiratory distress.      Breath sounds: Normal breath sounds. No stridor. No wheezing, rhonchi or rales.   Chest:      Chest wall: No tenderness.   Abdominal:      General: Bowel sounds are normal. There is no distension.      Palpations: Abdomen is soft. There is no mass.      Tenderness: There is no abdominal tenderness. There is no right CVA tenderness, left CVA tenderness, guarding or rebound.      Hernia: No hernia is present.   Genitourinary:     Rectum: Normal. Guaiac result negative.      Comments: Light brown stool in rectal vault, no rectal mass, good rectal tone, guaiac negative  Musculoskeletal:         General: No tenderness or deformity.      Cervical back: Normal range of motion and neck supple.      Right lower leg: No edema.      Left lower leg: No edema.      Comments: Contractures of the bilateral hips and knees   Skin:     General: Skin is warm and dry.      Capillary Refill: Capillary refill takes less than 2 seconds.      Comments: There is complete avulsion of the left second toenail.   Neurological:      Mental Status: She is alert.      Sensory: No sensory deficit.      Comments: Generalized weakness, no focal deficit, patient is not cooperative with neurological exam due to underlying cognitive delay.  Patient withdraws to pain.  Patient will follow some simple commands.   my hand with both of her hands         Results Reviewed       Procedure Component Value Units Date/Time    Urine culture [[id number]] Collected: 01/03/25 1147    Lab Status: Final result Specimen: Urine, Straight Cath Updated: 01/04/25 1256     Urine Culture No Growth <1000 cfu/mL    Urine Microscopic [[id number]]  (Abnormal) Collected: 01/03/25 1147    Lab Status: Final result Specimen: Urine, Straight Cath Updated: 01/03/25 1203     RBC, UA None Seen /hpf      WBC, UA 10-20 /hpf      Epithelial Cells Occasional /hpf       Bacteria, UA Occasional /hpf     UA w Reflex to Microscopic w Reflex to Culture [[id number]]  (Abnormal) Collected: 01/03/25 1147    Lab Status: Final result Specimen: Urine, Straight Cath Updated: 01/03/25 1155     Color, UA Yellow     Clarity, UA Clear     Specific Gravity, UA 1.020     pH, UA 7.5     Leukocytes, UA Trace     Nitrite, UA Negative     Protein, UA Negative mg/dl      Glucose, UA Negative mg/dl      Ketones, UA Trace mg/dl      Urobilinogen, UA 0.2 E.U./dl      Bilirubin, UA Negative     Occult Blood, UA Negative    Comprehensive metabolic panel [[id number]]  (Abnormal) Collected: 01/03/25 0928    Lab Status: Final result Specimen: Blood from Arm, Left Updated: 01/03/25 1004     Sodium 127 mmol/L      Potassium 4.2 mmol/L      Chloride 91 mmol/L      CO2 30 mmol/L      ANION GAP 6 mmol/L      BUN 12 mg/dL      Creatinine 0.48 mg/dL      Glucose 100 mg/dL      Calcium 9.6 mg/dL      AST 14 U/L      ALT 12 U/L      Alkaline Phosphatase 75 U/L      Total Protein 7.0 g/dL      Albumin 3.9 g/dL      Total Bilirubin 0.63 mg/dL      eGFR 99 ml/min/1.73sq m     Narrative:      National Kidney Disease Foundation guidelines for Chronic Kidney Disease (CKD):     Stage 1 with normal or high GFR (GFR > 90 mL/min/1.73 square meters)    Stage 2 Mild CKD (GFR = 60-89 mL/min/1.73 square meters)    Stage 3A Moderate CKD (GFR = 45-59 mL/min/1.73 square meters)    Stage 3B Moderate CKD (GFR = 30-44 mL/min/1.73 square meters)    Stage 4 Severe CKD (GFR = 15-29 mL/min/1.73 square meters)    Stage 5 End Stage CKD (GFR <15 mL/min/1.73 square meters)  Note: GFR calculation is accurate only with a steady state creatinine    FLU/COVID Rapid Antigen (30 min. TAT) - Preferred screening test in ED [[id number]]  (Normal) Collected: 01/03/25 0928    Lab Status: Final result Specimen: Nares from Nose Updated: 01/03/25 0950     SARS COV Rapid Antigen Negative     Influenza A Rapid Antigen Negative     Influenza B Rapid Antigen Negative     Narrative:      This test has been performed using the [employer] 2 FLU+SARS Antigen test under the Emergency Use Authorization (EUA). This test has been validated by the  and verified by the performing laboratory. The Comfort uses lateral flow immunofluorescent sandwich assay to detect SARS-COV, Influenza A and Influenza B Antigen.     The [employer] 2 SARS Antigen test does not differentiate between SARS-CoV and SARS-CoV-2.     Negative results are presumptive and may be confirmed with a molecular assay, if necessary, for patient management. Negative results do not rule out SARS-CoV-2 or influenza infection and should not be used as the sole basis for treatment or patient management decisions. A negative test result may occur if the level of antigen in a sample is below the limit of detection of this test.     Positive results are indicative of the presence of viral antigens, but do not rule out bacterial infection or co-infection with other viruses.     All test results should be used as an adjunct to clinical observations and other information available to the provider.    FOR PEDIATRIC PATIENTS - copy/paste COVID Guidelines URL to browser: [email or web address]    CBC and differential [[id number]]  (Abnormal) Collected: 01/03/25 0928    Lab Status: Final result Specimen: Blood from Arm, Left Updated: 01/03/25 0933     WBC 5.96 Thousand/uL      RBC 3.84 Million/uL      Hemoglobin 11.3 g/dL      Hematocrit 33.3 %      MCV 87 fL      MCH 29.4 pg      MCHC 33.9 g/dL      RDW 15.1 %      MPV 9.1 fL      Platelets 150 Thousands/uL      nRBC 0 /100 WBCs      Segmented % 72 %      Immature Grans % 0 %      Lymphocytes % 16 %      Monocytes % 12 %      Eosinophils Relative 0 %      Basophils Relative 0 %      Absolute Neutrophils 4.27 Thousands/µL      Absolute Immature Grans 0.02 Thousand/uL      Absolute Lymphocytes 0.93 Thousands/µL      Absolute  Monocytes 0.72 Thousand/µL      Eosinophils Absolute 0.01 Thousand/µL      Basophils Absolute 0.01 Thousands/µL             No orders to display       Procedures    ED Medication and Procedure Management   Prior to Admission Medications   Prescriptions Last Dose Informant Patient Reported? Taking?   Cranberry 450 MG CAPS   No No   Sig: Take 1 capsule (450 mg total) by mouth in the morning   D-Mannose 500 MG CAPS   No No   Sig: Take 500 mg by mouth 2 (two) times a day   Diapers & Supplies MISC   Yes No   Sig: Use as directed.   Incontinence Supply Disposable (FQ Pant Liner) MISC   Yes No   LORazepam (ATIVAN) 0.5 mg tablet   No No   Sig: Take 1 tablet (0.5 mg total) by mouth 2 (two) times a day for 10 days   Lancets 33G MISC   Yes No   Sig: daily   QUEtiapine (SEROquel) 200 mg tablet   No No   Sig: Take 1 tablet (200 mg total) by mouth daily at bedtime   QUEtiapine (SEROquel) 50 mg tablet   No No   Sig: Take 1 tablet (50 mg total) by mouth daily with breakfast   aspirin (ECOTRIN LOW STRENGTH) 81 mg EC tablet   Yes No   Sig: Take 81 mg by mouth daily   cephalexin (KEFLEX) 500 mg capsule   No No   Sig: Take 1 capsule (500 mg total) by mouth every 12 (twelve) hours for 10 days   divalproex sodium (DEPAKOTE) 250 mg DR tablet   No No   Sig: Take 2 tablets (500 mg total) by mouth 2 (two) times a day   docusate sodium (COLACE) 100 mg capsule  Outside Facility (Specify) Yes No   Sig: Take 100 mg by mouth in the morning and 100 mg in the evening.   ergocalciferol (VITAMIN D2) 50,000 units   Yes No   Sig: Take 50,000 Units by mouth once a week   Patient not taking: Reported on 12/31/2024   gabapentin (NEURONTIN) 400 mg capsule   Yes No   Sig: Take 400 mg by mouth daily at bedtime   levothyroxine 50 mcg tablet   Yes No   Sig: Take 50 mcg by mouth daily   methenamine hippurate (HIPREX) 1 g tablet   Yes No   Sig: Take 1 g by mouth 2 (two) times a day with meals   omeprazole (PriLOSEC) 20 mg delayed release capsule   Yes No   Sig:  Take 20 mg by mouth daily   polyethylene glycol (MIRALAX) 17 g packet   No No   Sig: Take 17 g by mouth every other day as needed (if no bm in 3 days)   Patient not taking: Reported on 12/31/2024   rosuvastatin (CRESTOR) 40 MG tablet   Yes No   Sig: Take 40 mg by mouth daily   sertraline (ZOLOFT) 100 mg tablet   Yes No   Sig: Take 200 mg by mouth daily   sodium chloride 1 g tablet   No No   Sig: Take 1 tablet (1 g total) by mouth 3 (three) times a day with meals   tolterodine (DETROL LA) 4 mg 24 hr capsule   No No   Sig: Take 1 capsule (4 mg total) by mouth daily   torsemide (DEMADEX) 5 MG tablet   No No   Sig: Take 1 tablet (5 mg total) by mouth daily      Facility-Administered Medications: None     Discharge Medication List as of 1/3/2025 12:00 PM        CONTINUE these medications which have NOT CHANGED    Details   aspirin (ECOTRIN LOW STRENGTH) 81 mg EC tablet Take 81 mg by mouth daily, Historical Med      cephalexin (KEFLEX) 500 mg capsule Take 1 capsule (500 mg total) by mouth every 12 (twelve) hours for 10 days, Starting Wed 1/1/2025, Until Sat 1/11/2025, Normal      Cranberry 450 MG CAPS Take 1 capsule (450 mg total) by mouth in the morning, Starting Thu 8/24/2023, No Print      D-Mannose 500 MG CAPS Take 500 mg by mouth 2 (two) times a day, Starting Tue 12/31/2024, Normal      Diapers & Supplies MISC Use as directed., Historical Med      divalproex sodium (DEPAKOTE) 250 mg DR tablet Take 2 tablets (500 mg total) by mouth 2 (two) times a day, Starting Fri 11/22/2024, Normal      docusate sodium (COLACE) 100 mg capsule Take 100 mg by mouth in the morning and 100 mg in the evening., Historical Med      ergocalciferol (VITAMIN D2) 50,000 units Take 50,000 Units by mouth once a week, Starting Sat 6/22/2024, Historical Med      gabapentin (NEURONTIN) 400 mg capsule Take 400 mg by mouth daily at bedtime, Historical Med      Incontinence Supply Disposable (FQ Pant Liner) MISC Starting Sat 5/14/2022, Historical Med       Lancets 33G MISC daily, Starting Thu 5/12/2022, Historical Med      levothyroxine 50 mcg tablet Take 50 mcg by mouth daily, Historical Med      LORazepam (ATIVAN) 0.5 mg tablet Take 1 tablet (0.5 mg total) by mouth 2 (two) times a day for 10 days, Starting Fri 11/22/2024, Until Tue 12/31/2024, Normal      methenamine hippurate (HIPREX) 1 g tablet Take 1 g by mouth 2 (two) times a day with meals, Starting Sun 6/16/2024, Historical Med      omeprazole (PriLOSEC) 20 mg delayed release capsule Take 20 mg by mouth daily, Starting Tue 6/22/2021, Until Tue 12/31/2024, Historical Med      polyethylene glycol (MIRALAX) 17 g packet Take 17 g by mouth every other day as needed (if no bm in 3 days), Starting Thu 8/24/2023, No Print      !! QUEtiapine (SEROquel) 200 mg tablet Take 1 tablet (200 mg total) by mouth daily at bedtime, Starting Fri 11/22/2024, Normal      !! QUEtiapine (SEROquel) 50 mg tablet Take 1 tablet (50 mg total) by mouth daily with breakfast, Starting Fri 11/22/2024, Normal      rosuvastatin (CRESTOR) 40 MG tablet Take 40 mg by mouth daily, Historical Med      sertraline (ZOLOFT) 100 mg tablet Take 200 mg by mouth daily, Starting Tue 6/11/2024, Historical Med      sodium chloride 1 g tablet Take 1 tablet (1 g total) by mouth 3 (three) times a day with meals, Starting Tue 12/31/2024, Normal      tolterodine (DETROL LA) 4 mg 24 hr capsule Take 1 capsule (4 mg total) by mouth daily, Starting Thu 8/24/2023, No Print      torsemide (DEMADEX) 5 MG tablet Take 1 tablet (5 mg total) by mouth daily, Starting Fri 11/22/2024, Until Tue 12/31/2024, Normal       !! - Potential duplicate medications found. Please discuss with provider.        No discharge procedures on file.  ED SEPSIS DOCUMENTATION   Time reflects when diagnosis was documented in both MDM as applicable and the Disposition within this note       Time User Action Codes Description Comment    1/3/2025 11:58 AM Carlos Luz Add [R53.83] Fatigue      1/3/2025 12:01 PM Carlos Luz Add [E87.1] Hyponatremia                  Carlos Luz MD  01/04/25 2127

## 2025-01-04 LAB — BACTERIA UR CULT: NORMAL

## 2025-01-22 LAB
ATRIAL RATE: 77 BPM
P AXIS: 65 DEGREES
PR INTERVAL: 182 MS
QRS AXIS: 61 DEGREES
QRSD INTERVAL: 94 MS
QT INTERVAL: 384 MS
QTC INTERVAL: 434 MS
T WAVE AXIS: 92 DEGREES
VENTRICULAR RATE: 77 BPM